# Patient Record
Sex: FEMALE | Race: WHITE | Employment: OTHER | URBAN - METROPOLITAN AREA
[De-identification: names, ages, dates, MRNs, and addresses within clinical notes are randomized per-mention and may not be internally consistent; named-entity substitution may affect disease eponyms.]

---

## 2017-03-14 ENCOUNTER — OFFICE VISIT (OUTPATIENT)
Dept: PULMONOLOGY | Age: 72
End: 2017-03-14

## 2017-03-14 VITALS
SYSTOLIC BLOOD PRESSURE: 136 MMHG | OXYGEN SATURATION: 95 % | RESPIRATION RATE: 20 BRPM | BODY MASS INDEX: 28.99 KG/M2 | HEIGHT: 65 IN | WEIGHT: 174 LBS | HEART RATE: 96 BPM | DIASTOLIC BLOOD PRESSURE: 70 MMHG

## 2017-03-14 DIAGNOSIS — J44.9 COPD, VERY SEVERE (HCC): Primary | ICD-10-CM

## 2017-03-14 DIAGNOSIS — R91.1 PULMONARY NODULE: ICD-10-CM

## 2017-03-14 PROCEDURE — 99213 OFFICE O/P EST LOW 20 MIN: CPT | Performed by: INTERNAL MEDICINE

## 2017-03-22 ENCOUNTER — HOSPITAL ENCOUNTER (OUTPATIENT)
Dept: CT IMAGING | Age: 72
Discharge: HOME | End: 2017-03-22
Attending: INTERNAL MEDICINE | Admitting: INTERNAL MEDICINE

## 2017-03-22 DIAGNOSIS — R91.1 LUNG NODULE: ICD-10-CM

## 2017-04-10 ENCOUNTER — NURSE ONLY (OUTPATIENT)
Dept: PULMONOLOGY | Age: 72
End: 2017-04-10

## 2017-04-10 DIAGNOSIS — R91.1 PULMONARY NODULE: ICD-10-CM

## 2017-04-10 DIAGNOSIS — J44.9 COPD, VERY SEVERE (HCC): ICD-10-CM

## 2017-04-10 LAB
DLCO %PRED: NORMAL
DLCO PRE: NORMAL
FEF 25-75%-POST: 0.22
FEF 25-75%-PRE: 0.2
FEV1-POST: 0.5
FEV1-PRE: 0.45
FEV1/FVC-POST: 32.1
FEV1/FVC-PRE: 28.97
FVC-POST: 1.56
FVC-PRE: 1.55
MEP: NORMAL
MIP: NORMAL
TLC %PRED: NORMAL
TLC PRE: NORMAL

## 2017-04-10 PROCEDURE — 94060 EVALUATION OF WHEEZING: CPT | Performed by: INTERNAL MEDICINE

## 2017-04-10 ASSESSMENT — PULMONARY FUNCTION TESTS
FEV1/FVC_PRE: 28.97
FEV1_PRE: 0.45
FVC_PRE: 1.55
FVC_POST: 1.56
FEV1_POST: 0.50
FEV1/FVC_POST: 32.1

## 2017-07-14 RX ORDER — PREDNISONE 1 MG/1
530 TABLET ORAL DAILY
Qty: 30 TABLET | Refills: 11 | Status: SHIPPED | OUTPATIENT
Start: 2017-07-14 | End: 2018-08-23 | Stop reason: SDUPTHER

## 2017-07-25 ENCOUNTER — HOSPITAL ENCOUNTER (OUTPATIENT)
Dept: GENERAL RADIOLOGY | Age: 72
Discharge: HOME | End: 2017-07-25
Attending: INTERNAL MEDICINE | Admitting: INTERNAL MEDICINE

## 2017-07-25 LAB
ALT SERPL-CCNC: 20 U/L (ref 10–40)
AST SERPL-CCNC: 26 IU/L (ref 15–37)
BASOPHILS ABSOLUTE: 0.1 K/CU MM
BASOPHILS RELATIVE PERCENT: 0.4 % (ref 0–1)
BUN BLDV-MCNC: 21 MG/DL (ref 6–23)
CREAT SERPL-MCNC: 0.9 MG/DL (ref 0.6–1.1)
DIFFERENTIAL TYPE: ABNORMAL
EOSINOPHILS ABSOLUTE: 0.1 K/CU MM
EOSINOPHILS RELATIVE PERCENT: 0.5 % (ref 0–3)
ERYTHROCYTE SEDIMENTATION RATE: 15 MM/HR (ref 0–30)
GFR AFRICAN AMERICAN: >60 ML/MIN/1.73M2
GFR NON-AFRICAN AMERICAN: >60 ML/MIN/1.73M2
HCT VFR BLD CALC: 47 % (ref 37–47)
HEMOGLOBIN: 15.3 GM/DL (ref 12.5–16)
IMMATURE NEUTROPHIL %: 0.5 % (ref 0–0.43)
LYMPHOCYTES ABSOLUTE: 1.7 K/CU MM
LYMPHOCYTES RELATIVE PERCENT: 12.5 % (ref 24–44)
MCH RBC QN AUTO: 31.7 PG (ref 27–31)
MCHC RBC AUTO-ENTMCNC: 32.6 % (ref 32–36)
MCV RBC AUTO: 97.3 FL (ref 78–100)
MONOCYTES ABSOLUTE: 0.8 K/CU MM
MONOCYTES RELATIVE PERCENT: 6.2 % (ref 0–4)
NUCLEATED RBC %: 0 %
PDW BLD-RTO: 13.1 % (ref 11.7–14.9)
PLATELET # BLD: 222 K/CU MM (ref 140–440)
PMV BLD AUTO: 10.8 FL (ref 7.5–11.1)
RBC # BLD: 4.83 M/CU MM (ref 4.2–5.4)
SEGMENTED NEUTROPHILS ABSOLUTE COUNT: 10.9 K/CU MM
SEGMENTED NEUTROPHILS RELATIVE PERCENT: 79.9 % (ref 36–66)
TOTAL IMMATURE NEUTOROPHIL: 0.07 K/CU MM
TOTAL NUCLEATED RBC: 0 K/CU MM
WBC # BLD: 13.6 K/CU MM (ref 4–10.5)

## 2017-09-06 ENCOUNTER — HOSPITAL ENCOUNTER (OUTPATIENT)
Dept: GENERAL RADIOLOGY | Age: 72
Discharge: HOME | End: 2017-09-06
Attending: INTERNAL MEDICINE | Admitting: INTERNAL MEDICINE

## 2017-09-06 ENCOUNTER — HOSPITAL ENCOUNTER (OUTPATIENT)
Dept: CT IMAGING | Age: 72
Discharge: HOME | End: 2017-09-06
Attending: INTERNAL MEDICINE | Admitting: INTERNAL MEDICINE

## 2017-09-06 DIAGNOSIS — R91.1 COIN LESION OF LUNG: ICD-10-CM

## 2017-09-06 LAB — ERYTHROCYTE SEDIMENTATION RATE: 15 MM/HR (ref 0–30)

## 2017-09-14 ENCOUNTER — OFFICE VISIT (OUTPATIENT)
Dept: PULMONOLOGY | Age: 72
End: 2017-09-14

## 2017-09-14 VITALS
WEIGHT: 173.94 LBS | OXYGEN SATURATION: 96 % | HEART RATE: 90 BPM | HEIGHT: 65 IN | DIASTOLIC BLOOD PRESSURE: 72 MMHG | SYSTOLIC BLOOD PRESSURE: 126 MMHG | BODY MASS INDEX: 28.98 KG/M2

## 2017-09-14 DIAGNOSIS — R91.1 PULMONARY NODULE: ICD-10-CM

## 2017-09-14 DIAGNOSIS — R06.02 SHORTNESS OF BREATH: ICD-10-CM

## 2017-09-14 DIAGNOSIS — J44.9 COPD, VERY SEVERE (HCC): Primary | ICD-10-CM

## 2017-09-14 DIAGNOSIS — J96.11 CHRONIC HYPOXEMIC RESPIRATORY FAILURE (HCC): ICD-10-CM

## 2017-09-14 PROCEDURE — 99213 OFFICE O/P EST LOW 20 MIN: CPT | Performed by: INTERNAL MEDICINE

## 2017-09-14 RX ORDER — ALBUTEROL SULFATE 90 UG/1
2 AEROSOL, METERED RESPIRATORY (INHALATION) EVERY 6 HOURS PRN
Qty: 1 INHALER | Refills: 3 | Status: ON HOLD | OUTPATIENT
Start: 2017-09-14 | End: 2017-11-20 | Stop reason: HOSPADM

## 2017-11-14 PROBLEM — J18.9 SEPSIS DUE TO PNEUMONIA (HCC): Status: ACTIVE | Noted: 2017-11-14

## 2017-11-14 PROBLEM — A41.9 SEPSIS DUE TO PNEUMONIA (HCC): Status: ACTIVE | Noted: 2017-11-14

## 2017-11-15 PROBLEM — J96.21 ACUTE AND CHRONIC RESPIRATORY FAILURE WITH HYPOXIA (HCC): Status: ACTIVE | Noted: 2017-09-14

## 2017-11-15 PROBLEM — J44.1 COPD WITH EXACERBATION (HCC): Status: ACTIVE | Noted: 2017-11-15

## 2017-11-15 PROBLEM — D72.829 LEUKOCYTOSIS: Status: ACTIVE | Noted: 2017-11-15

## 2018-02-06 ENCOUNTER — OFFICE VISIT (OUTPATIENT)
Dept: PULMONOLOGY | Age: 73
End: 2018-02-06

## 2018-02-06 VITALS
OXYGEN SATURATION: 93 % | HEIGHT: 66 IN | SYSTOLIC BLOOD PRESSURE: 116 MMHG | DIASTOLIC BLOOD PRESSURE: 72 MMHG | WEIGHT: 170 LBS | RESPIRATION RATE: 20 BRPM | BODY MASS INDEX: 27.32 KG/M2 | HEART RATE: 95 BPM

## 2018-02-06 DIAGNOSIS — J96.11 CHRONIC HYPOXEMIC RESPIRATORY FAILURE (HCC): ICD-10-CM

## 2018-02-06 DIAGNOSIS — J44.9 COPD, VERY SEVERE (HCC): Primary | ICD-10-CM

## 2018-02-06 DIAGNOSIS — R91.1 PULMONARY NODULE: ICD-10-CM

## 2018-02-06 DIAGNOSIS — R06.02 SHORTNESS OF BREATH: ICD-10-CM

## 2018-02-06 PROCEDURE — 1123F ACP DISCUSS/DSCN MKR DOCD: CPT | Performed by: INTERNAL MEDICINE

## 2018-02-06 PROCEDURE — G8400 PT W/DXA NO RESULTS DOC: HCPCS | Performed by: INTERNAL MEDICINE

## 2018-02-06 PROCEDURE — G8419 CALC BMI OUT NRM PARAM NOF/U: HCPCS | Performed by: INTERNAL MEDICINE

## 2018-02-06 PROCEDURE — 1090F PRES/ABSN URINE INCON ASSESS: CPT | Performed by: INTERNAL MEDICINE

## 2018-02-06 PROCEDURE — G8427 DOCREV CUR MEDS BY ELIG CLIN: HCPCS | Performed by: INTERNAL MEDICINE

## 2018-02-06 PROCEDURE — 4040F PNEUMOC VAC/ADMIN/RCVD: CPT | Performed by: INTERNAL MEDICINE

## 2018-02-06 PROCEDURE — 3023F SPIROM DOC REV: CPT | Performed by: INTERNAL MEDICINE

## 2018-02-06 PROCEDURE — 3014F SCREEN MAMMO DOC REV: CPT | Performed by: INTERNAL MEDICINE

## 2018-02-06 PROCEDURE — G8926 SPIRO NO PERF OR DOC: HCPCS | Performed by: INTERNAL MEDICINE

## 2018-02-06 PROCEDURE — G8598 ASA/ANTIPLAT THER USED: HCPCS | Performed by: INTERNAL MEDICINE

## 2018-02-06 PROCEDURE — G8484 FLU IMMUNIZE NO ADMIN: HCPCS | Performed by: INTERNAL MEDICINE

## 2018-02-06 PROCEDURE — 1036F TOBACCO NON-USER: CPT | Performed by: INTERNAL MEDICINE

## 2018-02-06 PROCEDURE — 3017F COLORECTAL CA SCREEN DOC REV: CPT | Performed by: INTERNAL MEDICINE

## 2018-02-06 PROCEDURE — 99213 OFFICE O/P EST LOW 20 MIN: CPT | Performed by: INTERNAL MEDICINE

## 2018-02-06 RX ORDER — LORAZEPAM 0.5 MG/1
TABLET ORAL
Refills: 0 | COMMUNITY
Start: 2018-01-04 | End: 2022-01-18 | Stop reason: SDUPTHER

## 2018-02-06 RX ORDER — PREDNISONE 1 MG/1
5 TABLET ORAL DAILY
Qty: 90 TABLET | Refills: 3 | Status: SHIPPED | OUTPATIENT
Start: 2018-02-06 | End: 2018-05-07

## 2018-02-16 ENCOUNTER — HOSPITAL ENCOUNTER (OUTPATIENT)
Dept: CT IMAGING | Age: 73
Discharge: HOME | End: 2018-02-16
Attending: INTERNAL MEDICINE | Admitting: INTERNAL MEDICINE

## 2018-02-16 ENCOUNTER — HOSPITAL ENCOUNTER (OUTPATIENT)
Dept: GENERAL RADIOLOGY | Age: 73
Discharge: HOME | End: 2018-02-16
Attending: INTERNAL MEDICINE | Admitting: INTERNAL MEDICINE

## 2018-02-16 DIAGNOSIS — R91.1 LUNG NODULE: ICD-10-CM

## 2018-02-16 LAB — ERYTHROCYTE SEDIMENTATION RATE: 24 MM/HR (ref 0–30)

## 2018-02-23 ENCOUNTER — TELEPHONE (OUTPATIENT)
Dept: PULMONOLOGY | Age: 73
End: 2018-02-23

## 2018-04-09 ENCOUNTER — NURSE ONLY (OUTPATIENT)
Dept: PULMONOLOGY | Age: 73
End: 2018-04-09

## 2018-04-09 DIAGNOSIS — R06.02 SHORTNESS OF BREATH: ICD-10-CM

## 2018-04-09 DIAGNOSIS — J96.11 CHRONIC HYPOXEMIC RESPIRATORY FAILURE (HCC): ICD-10-CM

## 2018-04-09 DIAGNOSIS — J44.9 COPD, VERY SEVERE (HCC): ICD-10-CM

## 2018-04-09 LAB
DLCO %PRED: NORMAL
DLCO PRE: NORMAL
FEF 25-75%-POST: 0.18
FEF 25-75%-PRE: 0.18
FEV1-POST: 0.44
FEV1-PRE: 0.43
FEV1/FVC-POST: 31.3
FEV1/FVC-PRE: 31.1
FVC-POST: 1.4
FVC-PRE: 1.38
MEP: NORMAL
MIP: NORMAL
TLC %PRED: NORMAL
TLC PRE: NORMAL

## 2018-04-09 PROCEDURE — 94060 EVALUATION OF WHEEZING: CPT | Performed by: INTERNAL MEDICINE

## 2018-04-09 ASSESSMENT — PULMONARY FUNCTION TESTS
FEV1/FVC_POST: 31.3
FEV1_POST: 0.44
FEV1/FVC_PRE: 31.1
FVC_POST: 1.40
FEV1_PRE: 0.43
FVC_PRE: 1.38

## 2018-05-24 PROBLEM — I20.0 UNSTABLE ANGINA (HCC): Status: ACTIVE | Noted: 2018-05-24

## 2018-05-29 ENCOUNTER — HOSPITAL ENCOUNTER (OUTPATIENT)
Dept: CARDIAC CATH/INVASIVE PROCEDURES | Age: 73
Discharge: HOME | End: 2018-06-27
Attending: INTERNAL MEDICINE | Admitting: INTERNAL MEDICINE

## 2018-06-18 ENCOUNTER — OFFICE VISIT (OUTPATIENT)
Dept: PULMONOLOGY | Age: 73
End: 2018-06-18

## 2018-06-18 VITALS
WEIGHT: 175.04 LBS | SYSTOLIC BLOOD PRESSURE: 128 MMHG | BODY MASS INDEX: 29.88 KG/M2 | HEIGHT: 64 IN | HEART RATE: 82 BPM | DIASTOLIC BLOOD PRESSURE: 72 MMHG | OXYGEN SATURATION: 92 %

## 2018-06-18 DIAGNOSIS — J96.11 CHRONIC HYPOXEMIC RESPIRATORY FAILURE (HCC): ICD-10-CM

## 2018-06-18 DIAGNOSIS — J44.9 COPD, VERY SEVERE (HCC): Primary | ICD-10-CM

## 2018-06-18 DIAGNOSIS — R91.1 PULMONARY NODULE: ICD-10-CM

## 2018-06-18 PROCEDURE — G8926 SPIRO NO PERF OR DOC: HCPCS | Performed by: INTERNAL MEDICINE

## 2018-06-18 PROCEDURE — 4040F PNEUMOC VAC/ADMIN/RCVD: CPT | Performed by: INTERNAL MEDICINE

## 2018-06-18 PROCEDURE — 99213 OFFICE O/P EST LOW 20 MIN: CPT | Performed by: INTERNAL MEDICINE

## 2018-06-18 PROCEDURE — 3017F COLORECTAL CA SCREEN DOC REV: CPT | Performed by: INTERNAL MEDICINE

## 2018-06-18 PROCEDURE — G8427 DOCREV CUR MEDS BY ELIG CLIN: HCPCS | Performed by: INTERNAL MEDICINE

## 2018-06-18 PROCEDURE — G8400 PT W/DXA NO RESULTS DOC: HCPCS | Performed by: INTERNAL MEDICINE

## 2018-06-18 PROCEDURE — 1123F ACP DISCUSS/DSCN MKR DOCD: CPT | Performed by: INTERNAL MEDICINE

## 2018-06-18 PROCEDURE — G8598 ASA/ANTIPLAT THER USED: HCPCS | Performed by: INTERNAL MEDICINE

## 2018-06-18 PROCEDURE — 1090F PRES/ABSN URINE INCON ASSESS: CPT | Performed by: INTERNAL MEDICINE

## 2018-06-18 PROCEDURE — G8419 CALC BMI OUT NRM PARAM NOF/U: HCPCS | Performed by: INTERNAL MEDICINE

## 2018-06-18 PROCEDURE — 3023F SPIROM DOC REV: CPT | Performed by: INTERNAL MEDICINE

## 2018-06-18 PROCEDURE — 1036F TOBACCO NON-USER: CPT | Performed by: INTERNAL MEDICINE

## 2018-06-18 RX ORDER — PREDNISONE 1 MG/1
TABLET ORAL
Qty: 20 TABLET | Refills: 0 | Status: SHIPPED | OUTPATIENT
Start: 2018-06-18 | End: 2018-08-09 | Stop reason: ALTCHOICE

## 2018-06-27 ENCOUNTER — TELEPHONE (OUTPATIENT)
Dept: PULMONOLOGY | Age: 73
End: 2018-06-27

## 2018-07-11 ENCOUNTER — HOSPITAL ENCOUNTER (OUTPATIENT)
Dept: CT IMAGING | Age: 73
Discharge: HOME | End: 2018-07-11
Attending: PHYSICIAN ASSISTANT | Admitting: PHYSICIAN ASSISTANT

## 2018-07-11 DIAGNOSIS — R06.02 BREATH SHORTNESS: ICD-10-CM

## 2018-07-11 RX ORDER — SODIUM CHLORIDE 0.9 % (FLUSH) 0.9 %
10 SYRINGE (ML) INJECTION ONCE
Status: COMPLETED | OUTPATIENT
Start: 2018-07-11 | End: 2018-07-11

## 2018-07-11 RX ADMIN — Medication 10 ML: at 13:01

## 2018-07-18 ENCOUNTER — OFFICE VISIT (OUTPATIENT)
Dept: PULMONOLOGY | Age: 73
End: 2018-07-18

## 2018-07-18 VITALS
HEIGHT: 64 IN | HEART RATE: 90 BPM | DIASTOLIC BLOOD PRESSURE: 58 MMHG | BODY MASS INDEX: 29.02 KG/M2 | WEIGHT: 170 LBS | OXYGEN SATURATION: 95 % | SYSTOLIC BLOOD PRESSURE: 118 MMHG

## 2018-07-18 DIAGNOSIS — J44.9 COPD, VERY SEVERE (HCC): ICD-10-CM

## 2018-07-18 DIAGNOSIS — J85.0 NECROTIZING PNEUMONIA (HCC): Primary | ICD-10-CM

## 2018-07-18 DIAGNOSIS — J96.11 CHRONIC HYPOXEMIC RESPIRATORY FAILURE (HCC): ICD-10-CM

## 2018-07-18 DIAGNOSIS — R06.02 SHORTNESS OF BREATH: ICD-10-CM

## 2018-07-18 PROCEDURE — 1036F TOBACCO NON-USER: CPT | Performed by: INTERNAL MEDICINE

## 2018-07-18 PROCEDURE — G8598 ASA/ANTIPLAT THER USED: HCPCS | Performed by: INTERNAL MEDICINE

## 2018-07-18 PROCEDURE — G8427 DOCREV CUR MEDS BY ELIG CLIN: HCPCS | Performed by: INTERNAL MEDICINE

## 2018-07-18 PROCEDURE — 1123F ACP DISCUSS/DSCN MKR DOCD: CPT | Performed by: INTERNAL MEDICINE

## 2018-07-18 PROCEDURE — 1101F PT FALLS ASSESS-DOCD LE1/YR: CPT | Performed by: INTERNAL MEDICINE

## 2018-07-18 PROCEDURE — 3017F COLORECTAL CA SCREEN DOC REV: CPT | Performed by: INTERNAL MEDICINE

## 2018-07-18 PROCEDURE — 1090F PRES/ABSN URINE INCON ASSESS: CPT | Performed by: INTERNAL MEDICINE

## 2018-07-18 PROCEDURE — 4040F PNEUMOC VAC/ADMIN/RCVD: CPT | Performed by: INTERNAL MEDICINE

## 2018-07-18 PROCEDURE — G8926 SPIRO NO PERF OR DOC: HCPCS | Performed by: INTERNAL MEDICINE

## 2018-07-18 PROCEDURE — G8419 CALC BMI OUT NRM PARAM NOF/U: HCPCS | Performed by: INTERNAL MEDICINE

## 2018-07-18 PROCEDURE — 3023F SPIROM DOC REV: CPT | Performed by: INTERNAL MEDICINE

## 2018-07-18 PROCEDURE — G8400 PT W/DXA NO RESULTS DOC: HCPCS | Performed by: INTERNAL MEDICINE

## 2018-07-18 PROCEDURE — 99213 OFFICE O/P EST LOW 20 MIN: CPT | Performed by: INTERNAL MEDICINE

## 2018-07-18 RX ORDER — PREDNISONE 1 MG/1
TABLET ORAL
Qty: 38 TABLET | Refills: 0 | Status: SHIPPED | OUTPATIENT
Start: 2018-07-18 | End: 2018-08-09 | Stop reason: ALTCHOICE

## 2018-07-18 RX ORDER — CLINDAMYCIN HYDROCHLORIDE 150 MG/1
150 CAPSULE ORAL 4 TIMES DAILY
Qty: 40 CAPSULE | Refills: 1
Start: 2018-07-18 | End: 2018-07-28

## 2018-07-18 RX ORDER — CLINDAMYCIN HYDROCHLORIDE 150 MG/1
150 CAPSULE ORAL 4 TIMES DAILY
Qty: 40 CAPSULE | Refills: 1 | Status: SHIPPED | OUTPATIENT
Start: 2018-07-18 | End: 2018-07-28

## 2018-07-18 NOTE — PROGRESS NOTES
cylindrical bronchiectasis.  Findings are new since   prior examination suggesting an acute infectious process.       Findings of COPD and emphysema.       Coronary and aortic atherosclerotic disease. PFT: Office spirometry last done on 4/9/18 demonstrated a very severe obstructive defect with no significant response to bronchodilators        ASSESSMENT:    1. Necrotizing pneumonia ([address]    2. COPD, very severe ([address]    3. Chronic hypoxemic respiratory failure (HCC)    4. Shortness of breath          PLAN:  I will have her finish out her course of Levaquin and bump her up on her prednisone for short while. When she finishes her Levaquin I will repeat her chest x-ray. Because this is probably a necrotizing pneumonia I would prefer using Augmentin but she is allergic to penicillin. I will start her on clindamycin and complete 2-3 weeks of therapy and then hopefully consider repeating her CT chest at that time. At any time during this course of therapy if she gets worse I will have her medially go to the emergency room for hospital admission. I will continue to follow her  Return in about 3 weeks (around 8/8/2018) for Recheck for COPD, Recheck for Shortness of Breath, recheck for pneumonia, Recheck for emphysema, chr.      This dictation was performed with a verbal recognition program and it was checked for errors. It is possible that there are still dictated errors within this office note. Any errors should be brought immediately to my attention for correction. All efforts were made to ensure that this office note is accurate.

## 2018-07-23 LAB
CHOLESTEROL, TOTAL: 161 MG/DL
CHOLESTEROL/HDL RATIO: NORMAL
HDLC SERPL-MCNC: 60 MG/DL (ref 35–70)
LDL CHOLESTEROL CALCULATED: 68 MG/DL (ref 0–160)
TRIGL SERPL-MCNC: 164 MG/DL
VLDLC SERPL CALC-MCNC: 33 MG/DL

## 2018-08-01 ENCOUNTER — HOSPITAL ENCOUNTER (OUTPATIENT)
Dept: GENERAL RADIOLOGY | Age: 73
Discharge: HOME | End: 2018-08-01
Attending: INTERNAL MEDICINE | Admitting: INTERNAL MEDICINE

## 2018-08-01 ENCOUNTER — TELEPHONE (OUTPATIENT)
Dept: PULMONOLOGY | Age: 73
End: 2018-08-01

## 2018-08-01 DIAGNOSIS — J85.0 NECROTIZING PNEUMONIA (HCC): ICD-10-CM

## 2018-08-01 DIAGNOSIS — J44.9 COPD, VERY SEVERE (HCC): ICD-10-CM

## 2018-08-02 RX ORDER — CLINDAMYCIN HYDROCHLORIDE 150 MG/1
150 CAPSULE ORAL 4 TIMES DAILY
Qty: 40 CAPSULE | Refills: 0 | Status: SHIPPED | OUTPATIENT
Start: 2018-08-02 | End: 2018-08-12

## 2018-08-02 NOTE — TELEPHONE ENCOUNTER
I spoke directly to Star Christensen over the phone and discuss her most recent chest x-ray which shows persistent residual infiltrates in the right perihilar and mid lung field. Unfortunately a chest x-ray was not ordered at the time of her CT chest in early July and so I have no chest x-ray to compare to since her chest x-ray in June  did not show any pneumonic infiltrate. She has completed several week course of clindamycin following her therapy with Levaquin. She is not having fever or chills but states she still has some chest congestion with tightness and sometimes some right-sided chest pain. I would like to continue the clindamycin for 10 more days and will repeat her chest x-ray at that time if she fails to show improvement I will consider infectious disease consultation.   At any time if she gets worse or develops fever I told her to go directly to the emergency room for admission and IV antibiotic therapy

## 2018-08-03 ENCOUNTER — TELEPHONE (OUTPATIENT)
Dept: PULMONOLOGY | Age: 73
End: 2018-08-03

## 2018-08-09 ENCOUNTER — OFFICE VISIT (OUTPATIENT)
Dept: PULMONOLOGY | Age: 73
End: 2018-08-09

## 2018-08-09 VITALS
HEART RATE: 89 BPM | SYSTOLIC BLOOD PRESSURE: 126 MMHG | WEIGHT: 160 LBS | OXYGEN SATURATION: 94 % | HEIGHT: 65 IN | RESPIRATION RATE: 20 BRPM | BODY MASS INDEX: 26.66 KG/M2 | DIASTOLIC BLOOD PRESSURE: 68 MMHG

## 2018-08-09 DIAGNOSIS — R06.02 SHORTNESS OF BREATH: ICD-10-CM

## 2018-08-09 DIAGNOSIS — J96.11 CHRONIC HYPOXEMIC RESPIRATORY FAILURE (HCC): ICD-10-CM

## 2018-08-09 DIAGNOSIS — J85.0 NECROTIZING PNEUMONIA (HCC): Primary | ICD-10-CM

## 2018-08-09 DIAGNOSIS — J44.9 COPD, VERY SEVERE (HCC): ICD-10-CM

## 2018-08-09 PROCEDURE — 1101F PT FALLS ASSESS-DOCD LE1/YR: CPT | Performed by: INTERNAL MEDICINE

## 2018-08-09 PROCEDURE — G8926 SPIRO NO PERF OR DOC: HCPCS | Performed by: INTERNAL MEDICINE

## 2018-08-09 PROCEDURE — 4040F PNEUMOC VAC/ADMIN/RCVD: CPT | Performed by: INTERNAL MEDICINE

## 2018-08-09 PROCEDURE — G8427 DOCREV CUR MEDS BY ELIG CLIN: HCPCS | Performed by: INTERNAL MEDICINE

## 2018-08-09 PROCEDURE — 1123F ACP DISCUSS/DSCN MKR DOCD: CPT | Performed by: INTERNAL MEDICINE

## 2018-08-09 PROCEDURE — G8419 CALC BMI OUT NRM PARAM NOF/U: HCPCS | Performed by: INTERNAL MEDICINE

## 2018-08-09 PROCEDURE — 1036F TOBACCO NON-USER: CPT | Performed by: INTERNAL MEDICINE

## 2018-08-09 PROCEDURE — 3023F SPIROM DOC REV: CPT | Performed by: INTERNAL MEDICINE

## 2018-08-09 PROCEDURE — 99213 OFFICE O/P EST LOW 20 MIN: CPT | Performed by: INTERNAL MEDICINE

## 2018-08-09 PROCEDURE — G8400 PT W/DXA NO RESULTS DOC: HCPCS | Performed by: INTERNAL MEDICINE

## 2018-08-09 PROCEDURE — G8598 ASA/ANTIPLAT THER USED: HCPCS | Performed by: INTERNAL MEDICINE

## 2018-08-09 PROCEDURE — 1090F PRES/ABSN URINE INCON ASSESS: CPT | Performed by: INTERNAL MEDICINE

## 2018-08-09 PROCEDURE — 3017F COLORECTAL CA SCREEN DOC REV: CPT | Performed by: INTERNAL MEDICINE

## 2018-08-14 ENCOUNTER — TELEPHONE (OUTPATIENT)
Dept: PULMONOLOGY | Age: 73
End: 2018-08-14

## 2018-08-14 ENCOUNTER — HOSPITAL ENCOUNTER (OUTPATIENT)
Dept: GENERAL RADIOLOGY | Age: 73
Discharge: HOME | End: 2018-08-14
Attending: INTERNAL MEDICINE | Admitting: INTERNAL MEDICINE

## 2018-08-14 DIAGNOSIS — J96.11 CHRONIC HYPOXEMIC RESPIRATORY FAILURE (HCC): ICD-10-CM

## 2018-08-14 DIAGNOSIS — R91.8 LUNG INFILTRATE ON CT: Primary | ICD-10-CM

## 2018-08-14 DIAGNOSIS — J85.0 NECROTIZING PNEUMONIA (HCC): ICD-10-CM

## 2018-08-14 DIAGNOSIS — J44.9 COPD, VERY SEVERE (HCC): ICD-10-CM

## 2018-08-21 ENCOUNTER — TELEPHONE (OUTPATIENT)
Dept: PULMONOLOGY | Age: 73
End: 2018-08-21

## 2018-08-21 ENCOUNTER — HOSPITAL ENCOUNTER (OUTPATIENT)
Dept: CT IMAGING | Age: 73
Discharge: HOME | End: 2018-08-21
Attending: INTERNAL MEDICINE | Admitting: INTERNAL MEDICINE

## 2018-08-21 DIAGNOSIS — R91.8 LUNG INFILTRATE ON CT: ICD-10-CM

## 2018-08-23 RX ORDER — PREDNISONE 1 MG/1
5 TABLET ORAL DAILY
Qty: 90 TABLET | Refills: 3 | Status: SHIPPED | OUTPATIENT
Start: 2018-08-23 | End: 2019-08-22 | Stop reason: SDUPTHER

## 2018-09-06 ENCOUNTER — OFFICE VISIT (OUTPATIENT)
Dept: PULMONOLOGY | Age: 73
End: 2018-09-06

## 2018-09-06 VITALS
DIASTOLIC BLOOD PRESSURE: 70 MMHG | SYSTOLIC BLOOD PRESSURE: 110 MMHG | HEIGHT: 65 IN | RESPIRATION RATE: 18 BRPM | BODY MASS INDEX: 26.99 KG/M2 | HEART RATE: 89 BPM | WEIGHT: 162 LBS | OXYGEN SATURATION: 94 %

## 2018-09-06 DIAGNOSIS — J85.0 NECROTIZING PNEUMONIA (HCC): ICD-10-CM

## 2018-09-06 DIAGNOSIS — R06.02 SHORTNESS OF BREATH: ICD-10-CM

## 2018-09-06 DIAGNOSIS — J96.11 CHRONIC HYPOXEMIC RESPIRATORY FAILURE (HCC): ICD-10-CM

## 2018-09-06 DIAGNOSIS — J44.9 COPD, VERY SEVERE (HCC): Primary | ICD-10-CM

## 2018-09-06 PROCEDURE — G8427 DOCREV CUR MEDS BY ELIG CLIN: HCPCS | Performed by: INTERNAL MEDICINE

## 2018-09-06 PROCEDURE — G8926 SPIRO NO PERF OR DOC: HCPCS | Performed by: INTERNAL MEDICINE

## 2018-09-06 PROCEDURE — G8419 CALC BMI OUT NRM PARAM NOF/U: HCPCS | Performed by: INTERNAL MEDICINE

## 2018-09-06 PROCEDURE — G8598 ASA/ANTIPLAT THER USED: HCPCS | Performed by: INTERNAL MEDICINE

## 2018-09-06 PROCEDURE — G8400 PT W/DXA NO RESULTS DOC: HCPCS | Performed by: INTERNAL MEDICINE

## 2018-09-06 PROCEDURE — 1123F ACP DISCUSS/DSCN MKR DOCD: CPT | Performed by: INTERNAL MEDICINE

## 2018-09-06 PROCEDURE — 1036F TOBACCO NON-USER: CPT | Performed by: INTERNAL MEDICINE

## 2018-09-06 PROCEDURE — 3017F COLORECTAL CA SCREEN DOC REV: CPT | Performed by: INTERNAL MEDICINE

## 2018-09-06 PROCEDURE — 1101F PT FALLS ASSESS-DOCD LE1/YR: CPT | Performed by: INTERNAL MEDICINE

## 2018-09-06 PROCEDURE — 4040F PNEUMOC VAC/ADMIN/RCVD: CPT | Performed by: INTERNAL MEDICINE

## 2018-09-06 PROCEDURE — 3023F SPIROM DOC REV: CPT | Performed by: INTERNAL MEDICINE

## 2018-09-06 PROCEDURE — 1090F PRES/ABSN URINE INCON ASSESS: CPT | Performed by: INTERNAL MEDICINE

## 2018-09-06 PROCEDURE — 99213 OFFICE O/P EST LOW 20 MIN: CPT | Performed by: INTERNAL MEDICINE

## 2018-09-06 NOTE — PROGRESS NOTES
SUBJECTIVE:  Chief Complaint: very severe COPD with recent necrotizing right-sided pneumonia, chronic hypoxemic respiratory failure  Leny Lehman continues to slowly improve. She is not expectorating purulent sputum and she denies any chest discomfort. She remains short of breath with mild exertion. She remains on oxygen 24 hours a day. She denies fever or chills. She continues on Advair discus, Spiriva, long-acting theophylline 400 mg twice a day and has albuterol solution for her nebulizer as needed and also carries an Atrovent MDI for rescue while ambulating    OBJECTIVE:  /70   Pulse 89   Resp 18   Ht 5' 4.5\" (1.638 m)   Wt 162 lb (73.5 kg)   SpO2 94% Comment: 2L/NC  BMI 27.38 kg/m²      Physical Exam:  Constitutional:  She appears well developed and well-nourished. Has nasal oxygen on  Neck:  Supple, No palpable lymphadenopathy, No JVD  Cardiovascular:  S1, S2 Normal, Regular rhythm, no murmurs or gallops, No pericardial  rubs. Pulmonary:  Breath sounds are diminished throughout all lung areas but I hear no wheezing or rhonchi and there is no pleural rubs  Abdomen:not examined   Extremities: no edema, No DVT  Neurologic:  Awake and Alert, No focal deficits    Radiology: CT chest 8/21/18 showed multifocal consolidation within the right lung has improved with residual consolidation in the posterior right upper lobe and spare segment of the right lower lobe. Previously seen air-fluid levels or cavitary components have resolved. 8 mm noncalcified nodule in left lower lobe is unchanged from 2016. Advanced emphysema  PFT: office spirometry on 4/9/18 demonstrated a very severe obstructive defect with no significant response to bronchodilators        ASSESSMENT:    1. COPD, very severe ([address]    2. Chronic hypoxemic respiratory failure (HCC)    3. Necrotizing pneumonia ([address]    4.  Shortness of breath          PLAN:  I would like her switched from Atrovent MDI 2 an albuterol MDI since she is already on

## 2018-09-11 ENCOUNTER — TELEPHONE (OUTPATIENT)
Dept: PULMONOLOGY | Age: 73
End: 2018-09-11

## 2018-09-11 NOTE — TELEPHONE ENCOUNTER
I received the most recent theophylline level done on Janessa through Dr. Son Castro office with a level of 18. I did call her and currently she is on long-acting theophylline 450 mg twice a day and I asked her to cut her pill in half and take a half pill twice a day and I will recheck a theophylline level in 2-3 weeks.   I would like her level somewhere between 5 and 12

## 2018-09-21 ENCOUNTER — TELEPHONE (OUTPATIENT)
Dept: PULMONOLOGY | Age: 73
End: 2018-09-21

## 2018-09-21 DIAGNOSIS — J44.9 COPD, SEVERE (HCC): Primary | ICD-10-CM

## 2018-09-21 NOTE — TELEPHONE ENCOUNTER
Pt states you wanted her cut her Theophylline in half & you would order labs in a couple of weeks.  Pleas place order

## 2018-09-25 ENCOUNTER — TELEPHONE (OUTPATIENT)
Dept: PULMONOLOGY | Age: 73
End: 2018-09-25

## 2018-09-25 ENCOUNTER — HOSPITAL ENCOUNTER (OUTPATIENT)
Age: 73
Discharge: HOME | End: 2018-09-25
Payer: COMMERCIAL

## 2018-09-25 LAB — THEOPHYLLINE LEVEL: 6.3 UG/ML (ref 10–20)

## 2018-09-25 PROCEDURE — 80198 ASSAY OF THEOPHYLLINE: CPT

## 2018-09-25 PROCEDURE — 36415 COLL VENOUS BLD VENIPUNCTURE: CPT

## 2018-12-18 ENCOUNTER — APPOINTMENT (OUTPATIENT)
Dept: GENERAL RADIOLOGY | Age: 73
End: 2018-12-18
Payer: MEDICARE

## 2018-12-18 ENCOUNTER — HOSPITAL ENCOUNTER (EMERGENCY)
Age: 73
Discharge: HOME | End: 2018-12-18
Attending: EMERGENCY MEDICINE
Payer: MEDICARE

## 2018-12-18 VITALS
WEIGHT: 160 LBS | OXYGEN SATURATION: 96 % | TEMPERATURE: 98 F | BODY MASS INDEX: 27.31 KG/M2 | SYSTOLIC BLOOD PRESSURE: 136 MMHG | HEIGHT: 64 IN | HEART RATE: 78 BPM | RESPIRATION RATE: 18 BRPM | DIASTOLIC BLOOD PRESSURE: 64 MMHG

## 2018-12-18 DIAGNOSIS — R07.89 CHEST WALL PAIN: ICD-10-CM

## 2018-12-18 DIAGNOSIS — R07.9 CHEST PAIN, UNSPECIFIED TYPE: Primary | ICD-10-CM

## 2018-12-18 LAB
ALBUMIN SERPL-MCNC: 3.8 GM/DL (ref 3.4–5)
ALP BLD-CCNC: 56 IU/L (ref 40–129)
ALT SERPL-CCNC: 14 U/L (ref 10–40)
ANION GAP SERPL CALCULATED.3IONS-SCNC: 9 MMOL/L (ref 4–16)
AST SERPL-CCNC: 18 IU/L (ref 15–37)
BACTERIA: ABNORMAL /HPF
BASOPHILS ABSOLUTE: 0 K/CU MM
BASOPHILS RELATIVE PERCENT: 0.4 % (ref 0–1)
BILIRUB SERPL-MCNC: 0.3 MG/DL (ref 0–1)
BILIRUBIN URINE: NEGATIVE MG/DL
BLOOD, URINE: NEGATIVE
BUN BLDV-MCNC: 17 MG/DL (ref 6–23)
CALCIUM SERPL-MCNC: 9.5 MG/DL (ref 8.3–10.6)
CHLORIDE BLD-SCNC: 98 MMOL/L (ref 99–110)
CLARITY: CLEAR
CO2: 31 MMOL/L (ref 21–32)
COLOR: YELLOW
CREAT SERPL-MCNC: 0.7 MG/DL (ref 0.6–1.1)
DIFFERENTIAL TYPE: ABNORMAL
EOSINOPHILS ABSOLUTE: 0 K/CU MM
EOSINOPHILS RELATIVE PERCENT: 0.2 % (ref 0–3)
GFR AFRICAN AMERICAN: >60 ML/MIN/1.73M2
GFR NON-AFRICAN AMERICAN: >60 ML/MIN/1.73M2
GLUCOSE BLD-MCNC: 100 MG/DL (ref 70–99)
GLUCOSE, URINE: NEGATIVE MG/DL
HCT VFR BLD CALC: 44.4 % (ref 37–47)
HEMOGLOBIN: 13.5 GM/DL (ref 12.5–16)
HYALINE CASTS: 2 /LPF
IMMATURE NEUTROPHIL %: 0.4 % (ref 0–0.43)
INR BLD: 2.86 INDEX
KETONES, URINE: NEGATIVE MG/DL
LEUKOCYTE ESTERASE, URINE: NEGATIVE
LIPASE: 37 IU/L (ref 13–60)
LYMPHOCYTES ABSOLUTE: 1.1 K/CU MM
LYMPHOCYTES RELATIVE PERCENT: 10.2 % (ref 24–44)
MCH RBC QN AUTO: 30.1 PG (ref 27–31)
MCHC RBC AUTO-ENTMCNC: 30.4 % (ref 32–36)
MCV RBC AUTO: 98.9 FL (ref 78–100)
MONOCYTES ABSOLUTE: 0.7 K/CU MM
MONOCYTES RELATIVE PERCENT: 6.1 % (ref 0–4)
MUCUS: ABNORMAL HPF
NITRITE URINE, QUANTITATIVE: NEGATIVE
NUCLEATED RBC %: 0 %
PDW BLD-RTO: 15 % (ref 11.7–14.9)
PH, URINE: 5 (ref 5–8)
PLATELET # BLD: 227 K/CU MM (ref 140–440)
PMV BLD AUTO: 10.1 FL (ref 7.5–11.1)
POTASSIUM SERPL-SCNC: 3.8 MMOL/L (ref 3.5–5.1)
PROTEIN UA: NEGATIVE MG/DL
PROTHROMBIN TIME: 32.3 SECONDS (ref 9.12–12.5)
RBC # BLD: 4.49 M/CU MM (ref 4.2–5.4)
RBC URINE: 2 /HPF (ref 0–6)
SEGMENTED NEUTROPHILS ABSOLUTE COUNT: 9.2 K/CU MM
SEGMENTED NEUTROPHILS RELATIVE PERCENT: 82.7 % (ref 36–66)
SODIUM BLD-SCNC: 138 MMOL/L (ref 135–145)
SPECIFIC GRAVITY UA: 1.02 (ref 1–1.03)
SQUAMOUS EPITHELIAL: 1 /HPF
TOTAL CK: 68 IU/L (ref 26–140)
TOTAL IMMATURE NEUTOROPHIL: 0.05 K/CU MM
TOTAL NUCLEATED RBC: 0 K/CU MM
TOTAL PROTEIN: 6 GM/DL (ref 6.4–8.2)
TRANSITIONAL EPITHELIAL: <1 /HPF
TRICHOMONAS: ABNORMAL /HPF
TROPONIN T: <0.01 NG/ML
URIC ACID CRYSTALS: ABNORMAL /HPF
UROBILINOGEN, URINE: 1 MG/DL (ref 0.2–1)
WBC # BLD: 11.2 K/CU MM (ref 4–10.5)
WBC UA: 1 /HPF (ref 0–5)

## 2018-12-18 PROCEDURE — 6370000000 HC RX 637 (ALT 250 FOR IP): Performed by: EMERGENCY MEDICINE

## 2018-12-18 PROCEDURE — 85610 PROTHROMBIN TIME: CPT

## 2018-12-18 PROCEDURE — 99285 EMERGENCY DEPT VISIT HI MDM: CPT

## 2018-12-18 PROCEDURE — 36415 COLL VENOUS BLD VENIPUNCTURE: CPT

## 2018-12-18 PROCEDURE — 83690 ASSAY OF LIPASE: CPT

## 2018-12-18 PROCEDURE — 81001 URINALYSIS AUTO W/SCOPE: CPT

## 2018-12-18 PROCEDURE — 93005 ELECTROCARDIOGRAM TRACING: CPT | Performed by: EMERGENCY MEDICINE

## 2018-12-18 PROCEDURE — 80053 COMPREHEN METABOLIC PANEL: CPT

## 2018-12-18 PROCEDURE — 85025 COMPLETE CBC W/AUTO DIFF WBC: CPT

## 2018-12-18 PROCEDURE — 71046 X-RAY EXAM CHEST 2 VIEWS: CPT

## 2018-12-18 PROCEDURE — 84484 ASSAY OF TROPONIN QUANT: CPT

## 2018-12-18 PROCEDURE — 82550 ASSAY OF CK (CPK): CPT

## 2018-12-18 RX ORDER — LIDOCAINE 50 MG/G
1 PATCH TOPICAL DAILY
Qty: 30 PATCH | Refills: 0 | Status: SHIPPED | OUTPATIENT
Start: 2018-12-18 | End: 2019-01-17

## 2018-12-18 RX ORDER — ACETAMINOPHEN 500 MG
1000 TABLET ORAL ONCE
Status: COMPLETED | OUTPATIENT
Start: 2018-12-18 | End: 2018-12-18

## 2018-12-18 RX ORDER — LIDOCAINE 4 G/G
1 PATCH TOPICAL DAILY
Status: DISCONTINUED | OUTPATIENT
Start: 2018-12-18 | End: 2018-12-18 | Stop reason: HOSPADM

## 2018-12-18 RX ADMIN — ACETAMINOPHEN 1000 MG: 500 TABLET, COATED ORAL at 13:24

## 2018-12-18 ASSESSMENT — PAIN DESCRIPTION - DESCRIPTORS: DESCRIPTORS: ACHING

## 2018-12-18 ASSESSMENT — PAIN SCALES - WONG BAKER: WONGBAKER_NUMERICALRESPONSE: 4

## 2018-12-18 ASSESSMENT — PAIN SCALES - GENERAL: PAINLEVEL_OUTOF10: 6

## 2018-12-18 ASSESSMENT — ENCOUNTER SYMPTOMS
ALLERGIC/IMMUNOLOGIC NEGATIVE: 1
GASTROINTESTINAL NEGATIVE: 1
EYES NEGATIVE: 1
RESPIRATORY NEGATIVE: 1

## 2018-12-18 ASSESSMENT — PAIN DESCRIPTION - PAIN TYPE: TYPE: ACUTE PAIN

## 2018-12-18 ASSESSMENT — PAIN DESCRIPTION - LOCATION: LOCATION: CHEST;RIB CAGE

## 2018-12-18 ASSESSMENT — PAIN DESCRIPTION - ORIENTATION: ORIENTATION: RIGHT

## 2018-12-18 ASSESSMENT — PAIN DESCRIPTION - FREQUENCY: FREQUENCY: CONTINUOUS

## 2018-12-18 NOTE — ED PROVIDER NOTES
 Biotin 05990 MCG TABS Take 10,000 mcg by mouth daily      fluticasone-salmeterol (ADVAIR DISKUS) 500-50 MCG/DOSE diskus inhaler Inhale 1 puff into the lungs every 12 hours      LORazepam (ATIVAN) 0.5 MG tablet TAKE 1 TAB BY MOUTH EVERY DAY AS NEEDED  0    b complex vitamins capsule Take 1 capsule by mouth daily      levothyroxine (SYNTHROID) 50 MCG tablet Take 50 mcg by mouth Daily      theophylline CR, 12 hour, (THEODUR) 450 MG CR tablet Take 450 mg by mouth 2 times daily      Calcium Carb-Cholecalciferol (CALCIUM + D3) 600-200 MG-UNIT TABS Take 1 tablet by mouth 2 times daily      Multiple Vitamins-Minerals (MULTIVITAMIN PO) Take 1 tablet by mouth daily      gabapentin (NEURONTIN) 400 MG capsule Take 400 mg by mouth every evening      rosuvastatin (CRESTOR) 20 MG tablet Take 20 mg by mouth nightly       Ipratropium Bromide HFA (ATROVENT HFA IN) Inhale 2 puffs into the lungs as needed.  OXYGEN Inhale 2 L/hr into the lungs continuous.  Roflumilast (DALIRESP) 500 MCG tablet Take 500 mcg by mouth daily.  risedronate (ACTONEL) 35 MG tablet Take 35 mg by mouth every 7 days 05/24/8 Patient takes on Sundays      tiotropium (SPIRIVA) 18 MCG inhalation capsule Inhale 18 mcg into the lungs daily.  albuterol (PROVENTIL) (2.5 MG/3ML) 0.083% nebulizer solution   Take 2.5 mg by nebulization every 4 hours       montelukast (SINGULAIR) 10 MG tablet Take 10 mg by mouth nightly.  albuterol (PROVENTIL HFA;VENTOLIN HFA) 108 (90 BASE) MCG/ACT inhaler Inhale 2 puffs into the lungs every 6 hours as needed.         celecoxib (CELEBREX) 200 MG capsule Take 200 mg by mouth daily       furosemide (LASIX) 40 MG tablet Take 40 mg by mouth See Admin Instructions 05/24/18 40 mg x 2 days and then 60 mg on the third day then repeat, takes at HS dose due 60 mg this pm (05/24/18)      potassium chloride (KLOR-CON) 10 MEQ CR tablet Take 10 mEq by mouth daily       esomeprazole (NEXIUM) 40 MG capsule no edema or deformity. Neurological: She is alert and oriented to person, place, and time. She has normal strength. She is not disoriented. She displays no atrophy and no tremor. No cranial nerve deficit or sensory deficit. She exhibits normal muscle tone. She displays no seizure activity. Coordination normal. GCS eye subscore is 4. GCS verbal subscore is 5. GCS motor subscore is 6. Skin: Skin is warm. No rash noted. She is not diaphoretic. No erythema. No pallor. Psychiatric: She has a normal mood and affect. Her behavior is normal. Judgment and thought content normal.   Nursing note and vitals reviewed.         I have reviewed andinterpreted all of the currently available lab results from this visit (if applicable):    Results for orders placed or performed during the hospital encounter of 12/18/18   CBC auto diff   Result Value Ref Range    WBC 11.2 (H) 4.0 - 10.5 K/CU MM    RBC 4.49 4.2 - 5.4 M/CU MM    Hemoglobin 13.5 12.5 - 16.0 GM/DL    Hematocrit 44.4 37 - 47 %    MCV 98.9 78 - 100 FL    MCH 30.1 27 - 31 PG    MCHC 30.4 (L) 32.0 - 36.0 %    RDW 15.0 (H) 11.7 - 14.9 %    Platelets 742 771 - 151 K/CU MM    MPV 10.1 7.5 - 11.1 FL    Differential Type AUTOMATED DIFFERENTIAL     Segs Relative 82.7 (H) 36 - 66 %    Lymphocytes % 10.2 (L) 24 - 44 %    Monocytes % 6.1 (H) 0 - 4 %    Eosinophils % 0.2 0 - 3 %    Basophils % 0.4 0 - 1 %    Segs Absolute 9.2 K/CU MM    Lymphocytes # 1.1 K/CU MM    Monocytes # 0.7 K/CU MM    Eosinophils # 0.0 K/CU MM    Basophils # 0.0 K/CU MM    Nucleated RBC % 0.0 %    Total Nucleated RBC 0.0 K/CU MM    Total Immature Neutrophil 0.05 K/CU MM    Immature Neutrophil % 0.4 0 - 0.43 %   CMP   Result Value Ref Range    Sodium 138 135 - 145 MMOL/L    Potassium 3.8 3.5 - 5.1 MMOL/L    Chloride 98 (L) 99 - 110 mMol/L    CO2 31 21 - 32 MMOL/L    BUN 17 6 - 23 MG/DL    CREATININE 0.7 0.6 - 1.1 MG/DL    Glucose 100 (H) 70 - 99 MG/DL    Calcium 9.5 8.3 - 10.6 MG/DL    Alb 3.8 3.4 - 5.0 GM/DL Total Protein 6.0 (L) 6.4 - 8.2 GM/DL    Total Bilirubin 0.3 0.0 - 1.0 MG/DL    ALT 14 10 - 40 U/L    AST 18 15 - 37 IU/L    Alkaline Phosphatase 56 40 - 129 IU/L    GFR Non-African American >60 >60 mL/min/1.73m2    GFR African American >60 >60 mL/min/1.73m2    Anion Gap 9 4 - 16   Troponin   Result Value Ref Range    Troponin T <0.010 <0.01 NG/ML   Urinalysis   Result Value Ref Range    Color, UA YELLOW UYELL    Clarity, UA CLEAR CLEAR    Glucose, Urine NEGATIVE NEG MG/DL    Bilirubin Urine NEGATIVE NEG MG/DL    Ketones, Urine NEGATIVE NEG MG/DL    Specific Gravity, UA 1.018 1.001 - 1.035    Blood, Urine NEGATIVE NEG    pH, Urine 5.0 5.0 - 8.0    Protein, UA NEGATIVE NEG MG/DL    Urobilinogen, Urine 1 0.2 - 1.0 MG/DL    Nitrite Urine, Quantitative NEGATIVE NEG    Leukocyte Esterase, Urine NEGATIVE NEG    RBC, UA 2 0 - 6 /HPF    WBC, UA 1 0 - 5 /HPF    Bacteria, UA RARE (A) NEG /HPF    Squam Epithel, UA 1 /HPF    Trans Epithel, UA <1 /HPF    Mucus, UA RARE (A) NEG HPF    Trichomonas, UA NONE SEEN NOSEE /HPF    Hyaline Casts, UA 2 /LPF    Uric Acid Meggan, UA RARE /HPF   CK   Result Value Ref Range    Total CK 68 26 - 140 IU/L   Lipase   Result Value Ref Range    Lipase 37 13 - 60 IU/L   PT - INR   Result Value Ref Range    Protime 32.3 (H) 9.12 - 12.5 SECONDS    INR 2.86 INDEX   EKG 12 Lead   Result Value Ref Range    Ventricular Rate 86 BPM    Atrial Rate 86 BPM    P-R Interval 128 ms    QRS Duration 100 ms    Q-T Interval 350 ms    QTc Calculation (Bazett) 418 ms    P Axis 77 degrees    R Axis -33 degrees    T Axis 71 degrees    Diagnosis       Sinus rhythm with occasional premature ventricular complexes  Left axis deviation  Left ventricular hypertrophy with repolarization abnormality  Abnormal ECG  When compared with ECG of 27-JUN-2018 14:15,  No significant change was found          Radiographs (if obtained):  [] The following radiograph was interpreted by myself in the absence of a radiologist:  [x]

## 2018-12-19 PROCEDURE — 93010 ELECTROCARDIOGRAM REPORT: CPT | Performed by: INTERNAL MEDICINE

## 2018-12-21 LAB
EKG ATRIAL RATE: 86 BPM
EKG DIAGNOSIS: NORMAL
EKG P AXIS: 77 DEGREES
EKG P-R INTERVAL: 128 MS
EKG Q-T INTERVAL: 350 MS
EKG QRS DURATION: 100 MS
EKG QTC CALCULATION (BAZETT): 418 MS
EKG R AXIS: -33 DEGREES
EKG T AXIS: 71 DEGREES
EKG VENTRICULAR RATE: 86 BPM

## 2019-01-07 ENCOUNTER — TELEPHONE (OUTPATIENT)
Dept: PULMONOLOGY | Age: 74
End: 2019-01-07

## 2019-01-07 DIAGNOSIS — R91.8 LUNG INFILTRATE ON CT: Primary | ICD-10-CM

## 2019-01-07 DIAGNOSIS — R91.1 NODULE OF LOWER LOBE OF LEFT LUNG: ICD-10-CM

## 2019-01-14 ENCOUNTER — HOSPITAL ENCOUNTER (OUTPATIENT)
Dept: CT IMAGING | Age: 74
Discharge: HOME | End: 2019-01-14
Payer: MEDICARE

## 2019-01-14 DIAGNOSIS — R91.1 NODULE OF LOWER LOBE OF LEFT LUNG: ICD-10-CM

## 2019-01-14 DIAGNOSIS — R91.8 LUNG INFILTRATE ON CT: ICD-10-CM

## 2019-01-14 PROCEDURE — 71250 CT THORAX DX C-: CPT

## 2019-01-22 ENCOUNTER — OFFICE VISIT (OUTPATIENT)
Dept: PULMONOLOGY | Age: 74
End: 2019-01-22
Payer: MEDICARE

## 2019-01-22 VITALS
SYSTOLIC BLOOD PRESSURE: 132 MMHG | DIASTOLIC BLOOD PRESSURE: 70 MMHG | OXYGEN SATURATION: 93 % | HEART RATE: 84 BPM | BODY MASS INDEX: 27.31 KG/M2 | WEIGHT: 160 LBS | HEIGHT: 64 IN

## 2019-01-22 DIAGNOSIS — R91.8 LUNG INFILTRATE ON CT: ICD-10-CM

## 2019-01-22 DIAGNOSIS — R06.02 SHORTNESS OF BREATH: ICD-10-CM

## 2019-01-22 DIAGNOSIS — J44.9 COPD, VERY SEVERE (HCC): Primary | ICD-10-CM

## 2019-01-22 DIAGNOSIS — J96.11 CHRONIC HYPOXEMIC RESPIRATORY FAILURE (HCC): ICD-10-CM

## 2019-01-22 PROCEDURE — G8427 DOCREV CUR MEDS BY ELIG CLIN: HCPCS | Performed by: INTERNAL MEDICINE

## 2019-01-22 PROCEDURE — 1101F PT FALLS ASSESS-DOCD LE1/YR: CPT | Performed by: INTERNAL MEDICINE

## 2019-01-22 PROCEDURE — 1090F PRES/ABSN URINE INCON ASSESS: CPT | Performed by: INTERNAL MEDICINE

## 2019-01-22 PROCEDURE — G8599 NO ASA/ANTIPLAT THER USE RNG: HCPCS | Performed by: INTERNAL MEDICINE

## 2019-01-22 PROCEDURE — 3023F SPIROM DOC REV: CPT | Performed by: INTERNAL MEDICINE

## 2019-01-22 PROCEDURE — G8400 PT W/DXA NO RESULTS DOC: HCPCS | Performed by: INTERNAL MEDICINE

## 2019-01-22 PROCEDURE — G8926 SPIRO NO PERF OR DOC: HCPCS | Performed by: INTERNAL MEDICINE

## 2019-01-22 PROCEDURE — 3017F COLORECTAL CA SCREEN DOC REV: CPT | Performed by: INTERNAL MEDICINE

## 2019-01-22 PROCEDURE — 4040F PNEUMOC VAC/ADMIN/RCVD: CPT | Performed by: INTERNAL MEDICINE

## 2019-01-22 PROCEDURE — G8484 FLU IMMUNIZE NO ADMIN: HCPCS | Performed by: INTERNAL MEDICINE

## 2019-01-22 PROCEDURE — G8419 CALC BMI OUT NRM PARAM NOF/U: HCPCS | Performed by: INTERNAL MEDICINE

## 2019-01-22 PROCEDURE — 99213 OFFICE O/P EST LOW 20 MIN: CPT | Performed by: INTERNAL MEDICINE

## 2019-01-22 PROCEDURE — 1123F ACP DISCUSS/DSCN MKR DOCD: CPT | Performed by: INTERNAL MEDICINE

## 2019-01-22 PROCEDURE — 1036F TOBACCO NON-USER: CPT | Performed by: INTERNAL MEDICINE

## 2019-01-22 RX ORDER — PREDNISONE 20 MG/1
TABLET ORAL
Refills: 0 | COMMUNITY
Start: 2019-01-07 | End: 2019-01-22

## 2019-04-30 LAB
INR BLD: 1.62 (ref 0.86–1.14)
PROTHROMBIN TIME: 18.5 SEC (ref 9.8–13)

## 2019-05-01 ENCOUNTER — TELEPHONE (OUTPATIENT)
Dept: FAMILY MEDICINE CLINIC | Age: 74
End: 2019-05-01

## 2019-05-01 DIAGNOSIS — Z79.01 LONG TERM (CURRENT) USE OF ANTICOAGULANTS: ICD-10-CM

## 2019-05-01 DIAGNOSIS — Z51.81 ENCOUNTER FOR THERAPEUTIC DRUG MONITORING: ICD-10-CM

## 2019-05-01 DIAGNOSIS — I80.292: Primary | ICD-10-CM

## 2019-05-01 NOTE — TELEPHONE ENCOUNTER
----- Message from Rishabh Laureano MD sent at 5/1/2019  4:45 PM EDT -----  Increase Coumadin to 6/6/3 mg alternating every 3 days. recheck in 4 weeks.

## 2019-05-01 NOTE — TELEPHONE ENCOUNTER
Spoke w/pt re: inr 5/1/19 1.6. Per Dr Tasha Wan change coumadin to 6/6/3 recheck 4 weeks. Pt voiced understanding.

## 2019-05-21 ENCOUNTER — OFFICE VISIT (OUTPATIENT)
Dept: PULMONOLOGY | Age: 74
End: 2019-05-21
Payer: MEDICARE

## 2019-05-21 VITALS
HEIGHT: 64 IN | OXYGEN SATURATION: 93 % | DIASTOLIC BLOOD PRESSURE: 68 MMHG | HEART RATE: 93 BPM | BODY MASS INDEX: 28.51 KG/M2 | RESPIRATION RATE: 18 BRPM | SYSTOLIC BLOOD PRESSURE: 142 MMHG | WEIGHT: 167 LBS

## 2019-05-21 DIAGNOSIS — R91.8 LUNG INFILTRATE ON CT: ICD-10-CM

## 2019-05-21 DIAGNOSIS — J44.9 COPD, VERY SEVERE (HCC): Primary | ICD-10-CM

## 2019-05-21 DIAGNOSIS — R06.02 SHORTNESS OF BREATH: ICD-10-CM

## 2019-05-21 DIAGNOSIS — J96.11 CHRONIC HYPOXEMIC RESPIRATORY FAILURE (HCC): ICD-10-CM

## 2019-05-21 PROCEDURE — 1123F ACP DISCUSS/DSCN MKR DOCD: CPT | Performed by: INTERNAL MEDICINE

## 2019-05-21 PROCEDURE — G8926 SPIRO NO PERF OR DOC: HCPCS | Performed by: INTERNAL MEDICINE

## 2019-05-21 PROCEDURE — 4040F PNEUMOC VAC/ADMIN/RCVD: CPT | Performed by: INTERNAL MEDICINE

## 2019-05-21 PROCEDURE — G8599 NO ASA/ANTIPLAT THER USE RNG: HCPCS | Performed by: INTERNAL MEDICINE

## 2019-05-21 PROCEDURE — 1036F TOBACCO NON-USER: CPT | Performed by: INTERNAL MEDICINE

## 2019-05-21 PROCEDURE — G8419 CALC BMI OUT NRM PARAM NOF/U: HCPCS | Performed by: INTERNAL MEDICINE

## 2019-05-21 PROCEDURE — G8400 PT W/DXA NO RESULTS DOC: HCPCS | Performed by: INTERNAL MEDICINE

## 2019-05-21 PROCEDURE — G8427 DOCREV CUR MEDS BY ELIG CLIN: HCPCS | Performed by: INTERNAL MEDICINE

## 2019-05-21 PROCEDURE — 99213 OFFICE O/P EST LOW 20 MIN: CPT | Performed by: INTERNAL MEDICINE

## 2019-05-21 PROCEDURE — 1090F PRES/ABSN URINE INCON ASSESS: CPT | Performed by: INTERNAL MEDICINE

## 2019-05-21 PROCEDURE — 3017F COLORECTAL CA SCREEN DOC REV: CPT | Performed by: INTERNAL MEDICINE

## 2019-05-21 PROCEDURE — 3023F SPIROM DOC REV: CPT | Performed by: INTERNAL MEDICINE

## 2019-05-21 NOTE — PROGRESS NOTES
SUBJECTIVE:  Chief Complaint: Very severe COPD, shortness of breath, lung infiltrate, chronic hypoxemic respiratory failure  Star Christensen has done very well over the past several months. She denies any recent episodes of bronchitis. She denies purulent sputum expectoration, persistent cough or hemoptysis. She continues on Advair discus, Spiriva, oral theophylline 225 mg twice a day, albuterol solution per nebulizer and 5 mg prednisone daily. She also continues on oxygen 24 hours a day. She states that she is recovered from her pneumonia this winter. OBJECTIVE:  BP (!) 142/68   Pulse 93   Resp 18   Ht 5' 4\" (1.626 m)   Wt 167 lb (75.8 kg)   SpO2 93% Comment: 2L/NC  BMI 28.67 kg/m²      Physical Exam:  Constitutional:  She appears well developed and well-nourished. Wearing nasal oxygen  Neck:  Supple, No palpable lymphadenopathy, No JVD  Cardiovascular:  S1, S2 Normal, Regular rhythm, no murmurs or gallops, No pericardial  rubs. Pulmonary:  Diminished but fairly clear breath sounds throughout all lung areas without wheezing or rhonchi  Abdomen: Not examined  Extremities: no edema, No DVT  Neurologic:  Awake and Alert, No focal deficits    Radiology: CT chest without contrast on 1/14/19 showed significant interval improvement of the right lung. Consolidative changes with a stable 8 mm left lower lobe lung nodule with emphysema and chronic bronchitis  PFT: Office spirometry on 4/9/18 demonstrated a very severe obstructive defect with no significant response to bronchodilators        ASSESSMENT:    1. COPD, very severe ([address]    2. Lung infiltrate on CT    3. Chronic hypoxemic respiratory failure (HCC)    4. Shortness of breath          PLAN:   I will make no change in her current bronchodilator therapy. I will plan on seeing her back for pulmonary function tests in near future.  I will continue to follow her  Return in about 1 month (around 6/18/2019) for Recheck for COPD, Recheck for Shortness of Breath, chronic hypoxemic respiratory failure. This dictation was performed with a verbal recognition program and it was checked for errors. It is possible that there are still dictated errors within this office note. Any errors should be brought immediately to my attention for correction. All efforts were made to ensure that this office note is accurate.

## 2019-05-29 ENCOUNTER — NURSE ONLY (OUTPATIENT)
Dept: FAMILY MEDICINE CLINIC | Age: 74
End: 2019-05-29
Payer: MEDICARE

## 2019-05-29 DIAGNOSIS — Z79.01 LONG TERM (CURRENT) USE OF ANTICOAGULANTS: ICD-10-CM

## 2019-05-29 DIAGNOSIS — Z51.81 ENCOUNTER FOR THERAPEUTIC DRUG MONITORING: ICD-10-CM

## 2019-05-29 DIAGNOSIS — I80.292: ICD-10-CM

## 2019-05-29 LAB
INTERNATIONAL NORMALIZATION RATIO, POC: 1.5
PROTHROMBIN TIME, POC: ABNORMAL

## 2019-05-29 PROCEDURE — 85610 PROTHROMBIN TIME: CPT | Performed by: FAMILY MEDICINE

## 2019-05-29 RX ORDER — WARFARIN SODIUM 6 MG/1
TABLET ORAL
Qty: 30 TABLET | Refills: 0 | Status: SHIPPED | OUTPATIENT
Start: 2019-05-29 | End: 2019-06-22 | Stop reason: SDUPTHER

## 2019-05-29 NOTE — PROGRESS NOTES
PATIENT'S INR 1.5. CURRENT DOSE OF COUMADIN IS ALT 6/6/3. PER DR. HAMPTON, CHANGE TO 6 MG DAILY AND RECHECK INR IN 3 WEEKS. PATIENT VOICED UNDERSTANDING. RX FOR COUMADIN 6 MG SENT TO [facility] PER PATIENT'S REQUEST.

## 2019-06-04 ENCOUNTER — NURSE ONLY (OUTPATIENT)
Dept: PULMONOLOGY | Age: 74
End: 2019-06-04
Payer: MEDICARE

## 2019-06-04 DIAGNOSIS — J44.9 COPD, VERY SEVERE (HCC): Primary | ICD-10-CM

## 2019-06-04 LAB
EXPIRATORY TIME-POST: NORMAL SEC
EXPIRATORY TIME: NORMAL SEC
FEF 25-75% %CHNG: NORMAL
FEF 25-75% %PRED-POST: NORMAL %
FEF 25-75% %PRED-PRE: NORMAL L/SEC
FEF 25-75% PRED: NORMAL L/SEC
FEF 25-75%-POST: NORMAL L/SEC
FEF 25-75%-PRE: NORMAL L/SEC
FEV1 %PRED-POST: 23 %
FEV1 %PRED-PRE: 24 %
FEV1 PRED: 2.43 L
FEV1-POST: 0.55 L
FEV1-PRE: 0.58 L
FEV1/FVC %PRED-POST: 43 %
FEV1/FVC %PRED-PRE: 44 %
FEV1/FVC PRED: 75 %
FEV1/FVC-POST: 32.5 %
FEV1/FVC-PRE: 33 %
FVC %PRED-POST: 53 L
FVC %PRED-PRE: 54 %
FVC PRED: 3.22 L
FVC-POST: 1.7 L
FVC-PRE: 1.75 L
PEF %PRED-POST: NORMAL %
PEF %PRED-PRE: NORMAL L/SEC
PEF PRED: NORMAL L/SEC
PEF%CHNG: NORMAL
PEF-POST: NORMAL L/SEC
PEF-PRE: NORMAL L/SEC

## 2019-06-04 PROCEDURE — 94060 EVALUATION OF WHEEZING: CPT | Performed by: INTERNAL MEDICINE

## 2019-06-04 ASSESSMENT — PULMONARY FUNCTION TESTS
FEV1_PRE: 0.58
FVC_PERCENT_PREDICTED_PRE: 54
FEV1/FVC_PERCENT_PREDICTED_PRE: 44
FEV1/FVC_PERCENT_PREDICTED_POST: 43
FEV1/FVC_PRE: 33.0
FVC_PERCENT_PREDICTED_POST: 53
FVC_PRE: 1.75
FEV1/FVC_PREDICTED: 75.0
FEV1/FVC_POST: 32.5
FEV1_POST: 0.55
FEV1_PERCENT_PREDICTED_POST: 23
FVC_PREDICTED: 3.22
FVC_POST: 1.70
FEV1_PERCENT_PREDICTED_PRE: 24
FEV1_PREDICTED: 2.43

## 2019-06-10 NOTE — PROGRESS NOTES
Savana Crawford came to office for a PFT. The patients chief complaint is very severe COPD. She demonstrates an FEV1 of 0.58 liters. She demonstrates a very severe obstructive lung defect. She shows no significant response to bronchodilators. Overall, her lung function has remained stable or slightly improved over the past year. I will make no changes to her current bronchodilator therapy.  These results will be discussed further at a later date or her next appointment

## 2019-06-11 RX ORDER — TIOTROPIUM BROMIDE 18 UG/1
CAPSULE ORAL; RESPIRATORY (INHALATION)
Qty: 90 CAPSULE | Refills: 0 | Status: SHIPPED | OUTPATIENT
Start: 2019-06-11 | End: 2019-06-19 | Stop reason: SDUPTHER

## 2019-06-11 NOTE — TELEPHONE ENCOUNTER
Bozena Saavedra called needing the following prescription refills:   Requested Prescriptions     Pending Prescriptions Disp Refills    Natan Vicky 18 MCG inhalation capsule [Pharmacy Med Name: Andrey Coppersmith 18 MCG CP-HANDIHALER] 90 capsule 0     Sig: INHALE 1 PUFF AS DIRECTED ONCE A DAY       Medications were reviewed and appropriate refills were sent to the requested pharmacy after provider approval.     Electronically signed by Lynn Escobar LPN on 9/87/69 at 3:09 PM

## 2019-06-12 DIAGNOSIS — R06.02 SHORTNESS OF BREATH: ICD-10-CM

## 2019-06-12 DIAGNOSIS — J44.9 COPD, VERY SEVERE (HCC): ICD-10-CM

## 2019-06-12 DIAGNOSIS — J96.11 CHRONIC HYPOXEMIC RESPIRATORY FAILURE (HCC): ICD-10-CM

## 2019-06-12 PROCEDURE — 94060 EVALUATION OF WHEEZING: CPT | Performed by: INTERNAL MEDICINE

## 2019-06-17 RX ORDER — THEOPHYLLINE 450 MG/1
TABLET, EXTENDED RELEASE ORAL
Qty: 180 TABLET | Refills: 1 | Status: SHIPPED | OUTPATIENT
Start: 2019-06-17 | End: 2019-06-19 | Stop reason: SDUPTHER

## 2019-06-19 ENCOUNTER — NURSE ONLY (OUTPATIENT)
Dept: FAMILY MEDICINE CLINIC | Age: 74
End: 2019-06-19
Payer: MEDICARE

## 2019-06-19 DIAGNOSIS — I80.00: Primary | ICD-10-CM

## 2019-06-19 LAB
INTERNATIONAL NORMALIZATION RATIO, POC: 2.6
PROTHROMBIN TIME, POC: NORMAL

## 2019-06-19 PROCEDURE — 85610 PROTHROMBIN TIME: CPT | Performed by: FAMILY MEDICINE

## 2019-06-19 RX ORDER — MONTELUKAST SODIUM 10 MG/1
10 TABLET ORAL NIGHTLY
Qty: 90 TABLET | Refills: 0 | Status: SHIPPED | OUTPATIENT
Start: 2019-06-19 | End: 2019-08-22 | Stop reason: SDUPTHER

## 2019-06-19 RX ORDER — THEOPHYLLINE 450 MG/1
TABLET, EXTENDED RELEASE ORAL
Qty: 90 TABLET | Refills: 0 | Status: SHIPPED | OUTPATIENT
Start: 2019-06-19 | End: 2020-06-04 | Stop reason: SDUPTHER

## 2019-06-19 RX ORDER — ALBUTEROL SULFATE 90 UG/1
2 AEROSOL, METERED RESPIRATORY (INHALATION) 4 TIMES DAILY PRN
Qty: 3 INHALER | Refills: 0 | Status: SHIPPED | OUTPATIENT
Start: 2019-06-19 | End: 2019-09-16 | Stop reason: SDUPTHER

## 2019-06-19 RX ORDER — FUROSEMIDE 40 MG/1
40 TABLET ORAL DAILY
Qty: 90 TABLET | Refills: 0 | Status: SHIPPED | OUTPATIENT
Start: 2019-06-19 | End: 2020-03-10

## 2019-06-19 RX ORDER — LEVOTHYROXINE SODIUM 0.05 MG/1
50 TABLET ORAL DAILY
Qty: 90 TABLET | Refills: 0 | Status: SHIPPED | OUTPATIENT
Start: 2019-06-19 | End: 2019-10-07 | Stop reason: SDUPTHER

## 2019-06-19 RX ORDER — VITAMIN E (DL,TOCOPHERYL ACET) 180 MG
500 CAPSULE ORAL DAILY
Qty: 90 CAPSULE | Refills: 0 | Status: SHIPPED | OUTPATIENT
Start: 2019-06-19 | End: 2019-08-01

## 2019-06-19 RX ORDER — CELECOXIB 200 MG/1
200 CAPSULE ORAL DAILY
Qty: 90 CAPSULE | Refills: 0 | Status: SHIPPED | OUTPATIENT
Start: 2019-06-19 | End: 2019-06-24 | Stop reason: SDUPTHER

## 2019-06-19 RX ORDER — ESOMEPRAZOLE MAGNESIUM 40 MG/1
40 CAPSULE, DELAYED RELEASE ORAL 2 TIMES DAILY
Qty: 90 CAPSULE | Refills: 0 | Status: SHIPPED | OUTPATIENT
Start: 2019-06-19 | End: 2019-08-22 | Stop reason: SDUPTHER

## 2019-06-19 RX ORDER — GABAPENTIN 400 MG/1
400 CAPSULE ORAL EVERY EVENING
Qty: 90 CAPSULE | Refills: 0 | Status: SHIPPED | OUTPATIENT
Start: 2019-06-19 | End: 2020-01-20

## 2019-06-19 NOTE — TELEPHONE ENCOUNTER
PATIENT REQUESTED REFILLS FOR ALL MEDICATIONS BE SENT TO [facility]. SENT 90 DAY SUPPLY PER REQUEST. RX FOR GABAPENTIN GENERATED AND ROUTED TO DR. HAMPTON TO APPROVE AND SEND TO [facility].

## 2019-07-15 RX ORDER — POTASSIUM CHLORIDE 750 MG/1
TABLET, EXTENDED RELEASE ORAL
Qty: 30 TABLET | Refills: 0 | Status: SHIPPED | OUTPATIENT
Start: 2019-07-15 | End: 2019-08-12 | Stop reason: SDUPTHER

## 2019-07-16 ENCOUNTER — NURSE ONLY (OUTPATIENT)
Dept: FAMILY MEDICINE CLINIC | Age: 74
End: 2019-07-16
Payer: MEDICARE

## 2019-07-16 VITALS
HEIGHT: 64 IN | SYSTOLIC BLOOD PRESSURE: 118 MMHG | HEART RATE: 92 BPM | DIASTOLIC BLOOD PRESSURE: 60 MMHG | OXYGEN SATURATION: 98 % | BODY MASS INDEX: 28.24 KG/M2 | WEIGHT: 165.4 LBS

## 2019-07-16 DIAGNOSIS — Z79.01 ANTICOAGULANT LONG-TERM USE: Primary | ICD-10-CM

## 2019-07-16 LAB
INTERNATIONAL NORMALIZATION RATIO, POC: 2.6
INTERNATIONAL NORMALIZATION RATIO, POC: NORMAL
PROTHROMBIN TIME, POC: 2.6

## 2019-07-16 PROCEDURE — 85610 PROTHROMBIN TIME: CPT | Performed by: FAMILY MEDICINE

## 2019-07-16 RX ORDER — WARFARIN SODIUM 6 MG/1
TABLET ORAL
Qty: 30 TABLET | Refills: 0 | Status: SHIPPED | OUTPATIENT
Start: 2019-07-16 | End: 2019-08-09 | Stop reason: SDUPTHER

## 2019-07-31 ENCOUNTER — TELEPHONE (OUTPATIENT)
Dept: FAMILY MEDICINE CLINIC | Age: 74
End: 2019-07-31

## 2019-08-01 ENCOUNTER — OFFICE VISIT (OUTPATIENT)
Dept: FAMILY MEDICINE CLINIC | Age: 74
End: 2019-08-01
Payer: MEDICARE

## 2019-08-01 VITALS
HEART RATE: 74 BPM | HEIGHT: 64 IN | WEIGHT: 167.8 LBS | DIASTOLIC BLOOD PRESSURE: 72 MMHG | BODY MASS INDEX: 28.65 KG/M2 | SYSTOLIC BLOOD PRESSURE: 124 MMHG | OXYGEN SATURATION: 97 %

## 2019-08-01 DIAGNOSIS — I10 ESSENTIAL HYPERTENSION: Primary | ICD-10-CM

## 2019-08-01 DIAGNOSIS — E03.9 ACQUIRED HYPOTHYROIDISM: ICD-10-CM

## 2019-08-01 DIAGNOSIS — J44.9 COPD, VERY SEVERE (HCC): ICD-10-CM

## 2019-08-01 DIAGNOSIS — I25.10 ATHEROSCLEROSIS OF CORONARY ARTERY OF NATIVE HEART WITHOUT ANGINA PECTORIS, UNSPECIFIED VESSEL OR LESION TYPE: ICD-10-CM

## 2019-08-01 PROBLEM — I20.0 UNSTABLE ANGINA (HCC): Status: RESOLVED | Noted: 2018-05-24 | Resolved: 2019-08-01

## 2019-08-01 PROCEDURE — 99214 OFFICE O/P EST MOD 30 MIN: CPT | Performed by: FAMILY MEDICINE

## 2019-08-01 ASSESSMENT — PATIENT HEALTH QUESTIONNAIRE - PHQ9
SUM OF ALL RESPONSES TO PHQ QUESTIONS 1-9: 0
SUM OF ALL RESPONSES TO PHQ9 QUESTIONS 1 & 2: 0
SUM OF ALL RESPONSES TO PHQ QUESTIONS 1-9: 0
2. FEELING DOWN, DEPRESSED OR HOPELESS: 0
1. LITTLE INTEREST OR PLEASURE IN DOING THINGS: 0

## 2019-08-01 ASSESSMENT — ENCOUNTER SYMPTOMS
CHEST TIGHTNESS: 0
DIARRHEA: 0
SORE THROAT: 0
COUGH: 0
RHINORRHEA: 0
ABDOMINAL PAIN: 0
CONSTIPATION: 0
SHORTNESS OF BREATH: 0
SINUS PRESSURE: 0

## 2019-08-01 NOTE — PROGRESS NOTES
8/1/2019    Trish Mims    Chief Complaint   Patient presents with    Other     medical clearance for c-scope       HPI  Mkaayla Marte is a 76 y.o. female who presents today with clearance for colonoscopy. Patient is planning on a colonoscopy for general cancer screening. She has not had problems with colonoscopies in the past.  It is a 5-year follow-up. Patient had significant medical problems 2 years ago with severe respiratory failure. She has been doing better in the last 12 months. She feels good she denies chest pain. She is at her baseline shortness of breath. Patient is not having GI complaints. She has had no blood loss. No recent history of anemia. Patient denies chest pain. She has followed up with cardiology and states that her last appointment they did not need to see her for a year. Patient feels her current respiratory medications are sufficient. She is not rescuing herself more than her baseline. She is without fever or significant sputum production. REVIEW OF SYMPTOMS  Review of Systems   Constitutional: Negative for chills and fever. HENT: Negative for rhinorrhea, sinus pressure and sore throat. Respiratory: Negative for cough, chest tightness and shortness of breath. Gastrointestinal: Negative for abdominal pain, constipation and diarrhea. Genitourinary: Negative for dysuria and frequency. Musculoskeletal: Negative for myalgias.        PAST MEDICAL HISTORY  Past Medical History:   Diagnosis Date    Anticoagulant long-term use     Arthritis     Asthma     [facility] [address] [address] [address]     Chronic hypoxemic respiratory failure (HCC) 2/6/2018    Coronary atherosclerosis     Deep venous thrombosis of lower extremity (HCC)     Gastroesophageal reflux disease     Hiatal hernia     History of pulmonary embolus (PE)     Hyperlipidemia     Leg pain, right     burning     LONG TERM ANTICOAGULENT USE     Lung infiltrate on CT 1/22/2019    Lung nodule     [facility]- Dr Teresa Garcia

## 2019-08-01 NOTE — LETTER
MEDICATION AGREEMENT     Jonathan Bidding  [birth year]      For certain conditions, multiple classes of medications may be used to help better manage your symptoms, and to improve your ability to function at home, work and in social settings. However, these medications do have risks, which will be discussed with you, including addiction and dependency. The following prescribed medications need frequent monitoring and will require you to partner and assist in your healthcare. Medication  Dose, instructions and quantity as indicated on current prescription bottle Diagnosis/Reason(s) for Taking Category     lorazepam 1 qd prn 4   gabapentin 400mg 1 q evening 4                    Benefits and goals of Controlled Substance Medications: There are two potential goals for your treatment: (1) decreased pain and suffering (2) improved daily life functions. There are many possible treatments for your chronic condition(s), and, in addition to controlled substance medications, we will try alternatives such as physical therapy, yoga, massage, home daily exercise, meditation, relaxation techniques, injections, chiropractic manipulations, surgery, cognitive therapy, hypnosis and many medications that are not habit-forming. Use of controlled substance medications may be helpful, but they are unlikely to resolve all of your symptoms or restore all function. Risks of Controlled Substance Medications:    Opioid pain medications: These medications can lead to problems such as addiction/dependence, sedation, lightheadedness/dizziness, memory issues, falls, constipation, nausea, or vomiting. They may also impair the ability to drive or operate machinery. Additionally, these medications may lower testosterone levels, leading to loss of bone strength, stamina and sex drive.   They may cause problems with breathing, sleep apnea and reduced coughing, which are especially dangerous for patients with lung disease. Overdose or dangerous interactions with alcohol and other medications may occur, leading to death. Hyperalgesia may develop, in which patients receiving opioids for the treatment of pain may actually become more sensitive to certain painful stimuli, and in some cases, experience pain from ordinarily non-painful stimuli. Women between the ages of 14-53 who could become pregnant should carefully weigh the risks and benefits of opioids with their physicians, as these medications increase the risk of pregnancy complications, including miscarriage,  delivery and stillbirth. It is also possible for babies to be born addicted to opioids. Opioid dependence withdrawal symptoms may include; feelings of uneasiness, increased pain, irritability, belly pain, diarrhea, sweats and goose-flesh. Benzodiazepines and non-benzodiazepine sleep medications: These medications can lead to problems such as addiction/dependence, sedation, fatigue, lightheadedness, dizziness, incoordination, falls, depression, hallucinations, and impaired judgment, memory and concentration. The ability to drive and operate machinery may also be affected. Abnormal sleep-related behaviors have been reported, including sleep walking, driving, making telephone calls, eating, or having sex while not fully awake. These medications can suppress breathing and worsen sleep apnea, particularly when combined with alcohol or other sedating medications, potentially leading to death. Dependence withdrawal symptoms may include tremors, anxiety, hallucinations and seizures. Stimulants:  Common adverse effects include addiction/dependence, increased blood pressure and heart rate, decreased appetite, nausea, involuntary weight loss, insomnia, irritability, and headaches.   These risks may increase when these medications are combined with other stimulants, such as caffeine pills or energy drinks, certain weight loss

## 2019-08-06 ENCOUNTER — TELEPHONE (OUTPATIENT)
Dept: FAMILY MEDICINE CLINIC | Age: 74
End: 2019-08-06

## 2019-08-06 NOTE — TELEPHONE ENCOUNTER
DAVID  PM REGARD MESSAGE BELOW, PT WILL NEED AN APPT. WITH A PROVIDER HERE IN OUR OFFICE TO SUBMIT. PAPERWORK SUCESSFULLY TO HAVE INSURANCE COVER PORTABLE OXYGEN. PT IS TO CALL AND [address]. APPT. Electronically signed by Katherine Nelson LPN on 4/8/2170 at 8:08 PM      ----- Message from Kyara Dong sent at 8/5/2019 12:49 PM EDT -----  Would like Dr. Kiara Shearer to ok her to get a portable oxygen concentrator, battery operated. She said it will need a prior authorization then he can send a script in for it. Any questions call her.

## 2019-08-09 RX ORDER — WARFARIN SODIUM 6 MG/1
TABLET ORAL
Qty: 30 TABLET | Refills: 1 | Status: SHIPPED | OUTPATIENT
Start: 2019-08-09 | End: 2019-08-13 | Stop reason: SDUPTHER

## 2019-08-12 ENCOUNTER — TELEPHONE (OUTPATIENT)
Dept: FAMILY MEDICINE CLINIC | Age: 74
End: 2019-08-12

## 2019-08-12 RX ORDER — POTASSIUM CHLORIDE 750 MG/1
10 TABLET, EXTENDED RELEASE ORAL DAILY
Qty: 90 TABLET | Refills: 0 | Status: SHIPPED | OUTPATIENT
Start: 2019-08-12 | End: 2019-10-07 | Stop reason: SDUPTHER

## 2019-08-12 NOTE — TELEPHONE ENCOUNTER
----- Message from [facility] sent at 8/12/2019  9:27 AM EDT -----  klor-con 90 day       [facility]

## 2019-08-13 ENCOUNTER — NURSE ONLY (OUTPATIENT)
Dept: FAMILY MEDICINE CLINIC | Age: 74
End: 2019-08-13
Payer: MEDICARE

## 2019-08-13 DIAGNOSIS — Z79.01 LONG TERM (CURRENT) USE OF ANTICOAGULANTS: ICD-10-CM

## 2019-08-13 DIAGNOSIS — Z51.81 ENCOUNTER FOR THERAPEUTIC DRUG MONITORING: ICD-10-CM

## 2019-08-13 DIAGNOSIS — I80.299 PHLEBITIS OF DORSALIS PEDIS VEIN, UNSPECIFIED LATERALITY (HCC): Primary | ICD-10-CM

## 2019-08-13 LAB
INTERNATIONAL NORMALIZATION RATIO, POC: 2.5
PROTHROMBIN TIME, POC: NORMAL

## 2019-08-13 PROCEDURE — 85610 PROTHROMBIN TIME: CPT | Performed by: FAMILY MEDICINE

## 2019-08-13 RX ORDER — WARFARIN SODIUM 6 MG/1
TABLET ORAL
Qty: 30 TABLET | Refills: 1 | Status: CANCELLED | OUTPATIENT
Start: 2019-08-13

## 2019-08-13 RX ORDER — WARFARIN SODIUM 6 MG/1
TABLET ORAL
Qty: 30 TABLET | Refills: 1 | Status: SHIPPED | OUTPATIENT
Start: 2019-08-13 | End: 2019-10-08 | Stop reason: SDUPTHER

## 2019-08-22 RX ORDER — ESOMEPRAZOLE MAGNESIUM 40 MG/1
40 CAPSULE, DELAYED RELEASE ORAL 2 TIMES DAILY
Qty: 180 CAPSULE | Refills: 0 | Status: SHIPPED | OUTPATIENT
Start: 2019-08-22 | End: 2019-10-07 | Stop reason: SDUPTHER

## 2019-08-22 RX ORDER — ROSUVASTATIN CALCIUM 20 MG/1
20 TABLET, COATED ORAL NIGHTLY
Qty: 90 TABLET | Refills: 0 | Status: SHIPPED | OUTPATIENT
Start: 2019-08-22 | End: 2019-10-07 | Stop reason: SDUPTHER

## 2019-08-22 RX ORDER — MONTELUKAST SODIUM 10 MG/1
10 TABLET ORAL NIGHTLY
Qty: 90 TABLET | Refills: 0 | Status: SHIPPED | OUTPATIENT
Start: 2019-08-22 | End: 2019-10-07 | Stop reason: SDUPTHER

## 2019-08-22 RX ORDER — PREDNISONE 1 MG/1
TABLET ORAL
Qty: 90 TABLET | Refills: 3 | Status: SHIPPED | OUTPATIENT
Start: 2019-08-22 | End: 2019-09-23 | Stop reason: SDUPTHER

## 2019-09-10 ENCOUNTER — NURSE ONLY (OUTPATIENT)
Dept: FAMILY MEDICINE CLINIC | Age: 74
End: 2019-09-10
Payer: MEDICARE

## 2019-09-10 DIAGNOSIS — Z51.81 ENCOUNTER FOR THERAPEUTIC DRUG MONITORING: ICD-10-CM

## 2019-09-10 DIAGNOSIS — Z79.01 LONG TERM (CURRENT) USE OF ANTICOAGULANTS: ICD-10-CM

## 2019-09-10 DIAGNOSIS — I80.299 PHLEBITIS OF DORSALIS PEDIS VEIN, UNSPECIFIED LATERALITY (HCC): ICD-10-CM

## 2019-09-10 LAB
INTERNATIONAL NORMALIZATION RATIO, POC: 2.6
INTERNATIONAL NORMALIZATION RATIO, POC: 2.6
PROTHROMBIN TIME, POC: NORMAL

## 2019-09-10 PROCEDURE — 85610 PROTHROMBIN TIME: CPT | Performed by: FAMILY MEDICINE

## 2019-09-13 RX ORDER — MAGNESIUM OXIDE 500 MG
TABLET ORAL
Qty: 90 TABLET | Refills: 0 | Status: SHIPPED | OUTPATIENT
Start: 2019-09-13 | End: 2020-01-20

## 2019-09-23 ENCOUNTER — OFFICE VISIT (OUTPATIENT)
Dept: PULMONOLOGY | Age: 74
End: 2019-09-23
Payer: MEDICARE

## 2019-09-23 VITALS
SYSTOLIC BLOOD PRESSURE: 136 MMHG | HEIGHT: 65 IN | HEART RATE: 74 BPM | WEIGHT: 165 LBS | DIASTOLIC BLOOD PRESSURE: 56 MMHG | BODY MASS INDEX: 27.49 KG/M2 | OXYGEN SATURATION: 95 %

## 2019-09-23 DIAGNOSIS — J44.9 COPD, VERY SEVERE (HCC): Primary | ICD-10-CM

## 2019-09-23 DIAGNOSIS — R06.02 SHORTNESS OF BREATH: ICD-10-CM

## 2019-09-23 DIAGNOSIS — J96.11 CHRONIC HYPOXEMIC RESPIRATORY FAILURE (HCC): ICD-10-CM

## 2019-09-23 PROCEDURE — 99213 OFFICE O/P EST LOW 20 MIN: CPT | Performed by: INTERNAL MEDICINE

## 2019-09-23 PROCEDURE — G8427 DOCREV CUR MEDS BY ELIG CLIN: HCPCS | Performed by: INTERNAL MEDICINE

## 2019-09-23 PROCEDURE — 4040F PNEUMOC VAC/ADMIN/RCVD: CPT | Performed by: INTERNAL MEDICINE

## 2019-09-23 PROCEDURE — 3017F COLORECTAL CA SCREEN DOC REV: CPT | Performed by: INTERNAL MEDICINE

## 2019-09-23 PROCEDURE — G8926 SPIRO NO PERF OR DOC: HCPCS | Performed by: INTERNAL MEDICINE

## 2019-09-23 PROCEDURE — 1090F PRES/ABSN URINE INCON ASSESS: CPT | Performed by: INTERNAL MEDICINE

## 2019-09-23 PROCEDURE — G8419 CALC BMI OUT NRM PARAM NOF/U: HCPCS | Performed by: INTERNAL MEDICINE

## 2019-09-23 PROCEDURE — 1036F TOBACCO NON-USER: CPT | Performed by: INTERNAL MEDICINE

## 2019-09-23 PROCEDURE — 1123F ACP DISCUSS/DSCN MKR DOCD: CPT | Performed by: INTERNAL MEDICINE

## 2019-09-23 PROCEDURE — G8400 PT W/DXA NO RESULTS DOC: HCPCS | Performed by: INTERNAL MEDICINE

## 2019-09-23 PROCEDURE — G8598 ASA/ANTIPLAT THER USED: HCPCS | Performed by: INTERNAL MEDICINE

## 2019-09-23 PROCEDURE — 3023F SPIROM DOC REV: CPT | Performed by: INTERNAL MEDICINE

## 2019-09-23 RX ORDER — PREDNISONE 1 MG/1
TABLET ORAL
Qty: 90 TABLET | Refills: 3 | Status: SHIPPED | OUTPATIENT
Start: 2019-09-23 | End: 2019-10-07 | Stop reason: SDUPTHER

## 2019-09-23 RX ORDER — PREDNISONE 1 MG/1
TABLET ORAL
Qty: 20 TABLET | Refills: 0 | Status: SHIPPED | OUTPATIENT
Start: 2019-09-23 | End: 2019-12-02

## 2019-10-07 RX ORDER — POTASSIUM CHLORIDE 750 MG/1
10 TABLET, EXTENDED RELEASE ORAL DAILY
Qty: 90 TABLET | Refills: 0 | Status: SHIPPED | OUTPATIENT
Start: 2019-10-07 | End: 2020-01-20

## 2019-10-07 RX ORDER — ESOMEPRAZOLE MAGNESIUM 40 MG/1
40 CAPSULE, DELAYED RELEASE ORAL 2 TIMES DAILY
Qty: 180 CAPSULE | Refills: 0 | Status: SHIPPED | OUTPATIENT
Start: 2019-10-07 | End: 2020-01-07

## 2019-10-07 RX ORDER — ALBUTEROL SULFATE 2.5 MG/3ML
2.5 SOLUTION RESPIRATORY (INHALATION) 4 TIMES DAILY PRN
Qty: 1050 ML | Refills: 0 | Status: SHIPPED | OUTPATIENT
Start: 2019-10-07 | End: 2020-04-01

## 2019-10-07 RX ORDER — PREDNISONE 1 MG/1
TABLET ORAL
Qty: 20 TABLET | Refills: 0 | Status: CANCELLED | OUTPATIENT
Start: 2019-10-07

## 2019-10-07 RX ORDER — PREDNISONE 1 MG/1
TABLET ORAL
Qty: 90 TABLET | Refills: 3 | Status: SHIPPED | OUTPATIENT
Start: 2019-10-07 | End: 2020-05-28 | Stop reason: SDUPTHER

## 2019-10-07 RX ORDER — ROSUVASTATIN CALCIUM 20 MG/1
20 TABLET, COATED ORAL NIGHTLY
Qty: 90 TABLET | Refills: 0 | Status: SHIPPED | OUTPATIENT
Start: 2019-10-07 | End: 2020-01-20

## 2019-10-07 RX ORDER — LEVOTHYROXINE SODIUM 0.05 MG/1
50 TABLET ORAL DAILY
Qty: 90 TABLET | Refills: 0 | Status: SHIPPED | OUTPATIENT
Start: 2019-10-07 | End: 2020-01-07

## 2019-10-07 RX ORDER — MONTELUKAST SODIUM 10 MG/1
10 TABLET ORAL NIGHTLY
Qty: 90 TABLET | Refills: 0 | Status: SHIPPED | OUTPATIENT
Start: 2019-10-07 | End: 2020-01-20

## 2019-10-08 ENCOUNTER — NURSE ONLY (OUTPATIENT)
Dept: FAMILY MEDICINE CLINIC | Age: 74
End: 2019-10-08
Payer: MEDICARE

## 2019-10-08 DIAGNOSIS — Z51.81 ENCOUNTER FOR THERAPEUTIC DRUG MONITORING: ICD-10-CM

## 2019-10-08 DIAGNOSIS — I80.299 PHLEBITIS OF DORSALIS PEDIS VEIN, UNSPECIFIED LATERALITY (HCC): ICD-10-CM

## 2019-10-08 DIAGNOSIS — Z79.01 LONG TERM (CURRENT) USE OF ANTICOAGULANTS: ICD-10-CM

## 2019-10-08 LAB
INTERNATIONAL NORMALIZATION RATIO, POC: 2.6
PROTHROMBIN TIME, POC: NORMAL

## 2019-10-08 PROCEDURE — 85610 PROTHROMBIN TIME: CPT | Performed by: FAMILY MEDICINE

## 2019-10-08 RX ORDER — CELECOXIB 200 MG/1
200 CAPSULE ORAL DAILY
Qty: 90 CAPSULE | Refills: 1 | Status: SHIPPED | OUTPATIENT
Start: 2019-10-08 | End: 2020-04-16

## 2019-10-08 RX ORDER — WARFARIN SODIUM 6 MG/1
TABLET ORAL
Qty: 30 TABLET | Refills: 1 | Status: SHIPPED | OUTPATIENT
Start: 2019-10-08 | End: 2019-11-05 | Stop reason: SDUPTHER

## 2019-10-08 RX ORDER — ISOSORBIDE MONONITRATE 60 MG/1
60 TABLET, EXTENDED RELEASE ORAL DAILY
Qty: 90 TABLET | Refills: 1 | Status: SHIPPED | OUTPATIENT
Start: 2019-10-08 | End: 2020-10-26

## 2019-10-22 ENCOUNTER — TELEPHONE (OUTPATIENT)
Dept: FAMILY MEDICINE CLINIC | Age: 74
End: 2019-10-22

## 2019-10-22 RX ORDER — RISEDRONATE SODIUM 35 MG/1
35 TABLET, FILM COATED ORAL
Qty: 4 TABLET | Refills: 2 | Status: SHIPPED | OUTPATIENT
Start: 2019-10-22 | End: 2020-01-10

## 2019-11-05 ENCOUNTER — ANTI-COAG VISIT (OUTPATIENT)
Dept: FAMILY MEDICINE CLINIC | Age: 74
End: 2019-11-05

## 2019-11-05 ENCOUNTER — NURSE ONLY (OUTPATIENT)
Dept: FAMILY MEDICINE CLINIC | Age: 74
End: 2019-11-05
Payer: MEDICARE

## 2019-11-05 DIAGNOSIS — I80.299 PHLEBITIS OF DORSALIS PEDIS VEIN, UNSPECIFIED LATERALITY (HCC): ICD-10-CM

## 2019-11-05 DIAGNOSIS — Z79.01 LONG TERM (CURRENT) USE OF ANTICOAGULANTS: ICD-10-CM

## 2019-11-05 DIAGNOSIS — Z51.81 ENCOUNTER FOR THERAPEUTIC DRUG MONITORING: ICD-10-CM

## 2019-11-05 LAB
INTERNATIONAL NORMALIZATION RATIO, POC: 2.5
PROTHROMBIN TIME, POC: NORMAL

## 2019-11-05 PROCEDURE — 85610 PROTHROMBIN TIME: CPT | Performed by: FAMILY MEDICINE

## 2019-11-05 RX ORDER — WARFARIN SODIUM 6 MG/1
TABLET ORAL
Qty: 30 TABLET | Refills: 1 | Status: SHIPPED | OUTPATIENT
Start: 2019-11-05 | End: 2020-03-11 | Stop reason: SDUPTHER

## 2019-11-25 ENCOUNTER — TELEPHONE (OUTPATIENT)
Dept: FAMILY MEDICINE CLINIC | Age: 74
End: 2019-11-25

## 2019-12-02 ENCOUNTER — OFFICE VISIT (OUTPATIENT)
Dept: FAMILY MEDICINE CLINIC | Age: 74
End: 2019-12-02
Payer: MEDICARE

## 2019-12-02 VITALS
WEIGHT: 167.6 LBS | DIASTOLIC BLOOD PRESSURE: 82 MMHG | HEIGHT: 64 IN | OXYGEN SATURATION: 95 % | BODY MASS INDEX: 28.61 KG/M2 | SYSTOLIC BLOOD PRESSURE: 122 MMHG | HEART RATE: 71 BPM

## 2019-12-02 DIAGNOSIS — I10 ESSENTIAL HYPERTENSION: ICD-10-CM

## 2019-12-02 DIAGNOSIS — E03.9 ACQUIRED HYPOTHYROIDISM: Primary | ICD-10-CM

## 2019-12-02 DIAGNOSIS — E78.00 PURE HYPERCHOLESTEROLEMIA: ICD-10-CM

## 2019-12-02 DIAGNOSIS — E03.9 ACQUIRED HYPOTHYROIDISM: ICD-10-CM

## 2019-12-02 DIAGNOSIS — I26.99 PULMONARY EMBOLISM WITHOUT ACUTE COR PULMONALE, UNSPECIFIED CHRONICITY, UNSPECIFIED PULMONARY EMBOLISM TYPE (HCC): ICD-10-CM

## 2019-12-02 DIAGNOSIS — J44.9 COPD, VERY SEVERE (HCC): ICD-10-CM

## 2019-12-02 LAB
A/G RATIO: 1.8 (ref 1.1–2.2)
ALBUMIN SERPL-MCNC: 4 G/DL (ref 3.4–5)
ALP BLD-CCNC: 59 U/L (ref 40–129)
ALT SERPL-CCNC: 20 U/L (ref 10–40)
ANION GAP SERPL CALCULATED.3IONS-SCNC: 15 MMOL/L (ref 3–16)
AST SERPL-CCNC: 24 U/L (ref 15–37)
BASOPHILS ABSOLUTE: 0 K/UL (ref 0–0.2)
BASOPHILS RELATIVE PERCENT: 0.3 %
BILIRUB SERPL-MCNC: <0.2 MG/DL (ref 0–1)
BUN BLDV-MCNC: 22 MG/DL (ref 7–20)
CALCIUM SERPL-MCNC: 9.8 MG/DL (ref 8.3–10.6)
CHLORIDE BLD-SCNC: 100 MMOL/L (ref 99–110)
CO2: 25 MMOL/L (ref 21–32)
CREAT SERPL-MCNC: 0.7 MG/DL (ref 0.6–1.2)
EOSINOPHILS ABSOLUTE: 0 K/UL (ref 0–0.6)
EOSINOPHILS RELATIVE PERCENT: 0.2 %
GFR AFRICAN AMERICAN: >60
GFR NON-AFRICAN AMERICAN: >60
GLOBULIN: 2.2 G/DL
GLUCOSE BLD-MCNC: 107 MG/DL (ref 70–99)
HCT VFR BLD CALC: 41.6 % (ref 36–48)
HEMOGLOBIN: 13.7 G/DL (ref 12–16)
INR BLD: 2.45 (ref 0.86–1.14)
LYMPHOCYTES ABSOLUTE: 0.9 K/UL (ref 1–5.1)
LYMPHOCYTES RELATIVE PERCENT: 10.3 %
MCH RBC QN AUTO: 31 PG (ref 26–34)
MCHC RBC AUTO-ENTMCNC: 32.8 G/DL (ref 31–36)
MCV RBC AUTO: 94.3 FL (ref 80–100)
MONOCYTES ABSOLUTE: 0.4 K/UL (ref 0–1.3)
MONOCYTES RELATIVE PERCENT: 4.5 %
NEUTROPHILS ABSOLUTE: 7.7 K/UL (ref 1.7–7.7)
NEUTROPHILS RELATIVE PERCENT: 84.7 %
PDW BLD-RTO: 15.1 % (ref 12.4–15.4)
PLATELET # BLD: 237 K/UL (ref 135–450)
PMV BLD AUTO: 8.7 FL (ref 5–10.5)
POTASSIUM SERPL-SCNC: 4.3 MMOL/L (ref 3.5–5.1)
PROTHROMBIN TIME: 28.7 SEC (ref 10–13.2)
RBC # BLD: 4.42 M/UL (ref 4–5.2)
SODIUM BLD-SCNC: 140 MMOL/L (ref 136–145)
TOTAL PROTEIN: 6.2 G/DL (ref 6.4–8.2)
TSH REFLEX FT4: 1.25 UIU/ML (ref 0.27–4.2)
WBC # BLD: 9.1 K/UL (ref 4–11)

## 2019-12-02 PROCEDURE — G8482 FLU IMMUNIZE ORDER/ADMIN: HCPCS | Performed by: FAMILY MEDICINE

## 2019-12-02 PROCEDURE — G8598 ASA/ANTIPLAT THER USED: HCPCS | Performed by: FAMILY MEDICINE

## 2019-12-02 PROCEDURE — 1036F TOBACCO NON-USER: CPT | Performed by: FAMILY MEDICINE

## 2019-12-02 PROCEDURE — 3017F COLORECTAL CA SCREEN DOC REV: CPT | Performed by: FAMILY MEDICINE

## 2019-12-02 PROCEDURE — G8926 SPIRO NO PERF OR DOC: HCPCS | Performed by: FAMILY MEDICINE

## 2019-12-02 PROCEDURE — G8400 PT W/DXA NO RESULTS DOC: HCPCS | Performed by: FAMILY MEDICINE

## 2019-12-02 PROCEDURE — 4040F PNEUMOC VAC/ADMIN/RCVD: CPT | Performed by: FAMILY MEDICINE

## 2019-12-02 PROCEDURE — G8427 DOCREV CUR MEDS BY ELIG CLIN: HCPCS | Performed by: FAMILY MEDICINE

## 2019-12-02 PROCEDURE — 3023F SPIROM DOC REV: CPT | Performed by: FAMILY MEDICINE

## 2019-12-02 PROCEDURE — 1123F ACP DISCUSS/DSCN MKR DOCD: CPT | Performed by: FAMILY MEDICINE

## 2019-12-02 PROCEDURE — G8417 CALC BMI ABV UP PARAM F/U: HCPCS | Performed by: FAMILY MEDICINE

## 2019-12-02 PROCEDURE — 1090F PRES/ABSN URINE INCON ASSESS: CPT | Performed by: FAMILY MEDICINE

## 2019-12-02 PROCEDURE — 99214 OFFICE O/P EST MOD 30 MIN: CPT | Performed by: FAMILY MEDICINE

## 2019-12-02 ASSESSMENT — ENCOUNTER SYMPTOMS
SINUS PRESSURE: 0
SORE THROAT: 0
CHEST TIGHTNESS: 0
SHORTNESS OF BREATH: 0
CONSTIPATION: 0
ABDOMINAL PAIN: 0
DIARRHEA: 0
RHINORRHEA: 0
COUGH: 0

## 2019-12-09 RX ORDER — ALBUTEROL SULFATE 90 UG/1
AEROSOL, METERED RESPIRATORY (INHALATION)
Qty: 25.5 INHALER | Refills: 0 | Status: SHIPPED | OUTPATIENT
Start: 2019-12-09 | End: 2020-12-04 | Stop reason: SDUPTHER

## 2019-12-20 ENCOUNTER — TELEPHONE (OUTPATIENT)
Dept: FAMILY MEDICINE CLINIC | Age: 74
End: 2019-12-20

## 2019-12-31 ENCOUNTER — NURSE ONLY (OUTPATIENT)
Dept: FAMILY MEDICINE CLINIC | Age: 74
End: 2019-12-31
Payer: MEDICARE

## 2019-12-31 DIAGNOSIS — I80.299 PHLEBITIS OF DORSALIS PEDIS VEIN, UNSPECIFIED LATERALITY (HCC): ICD-10-CM

## 2019-12-31 DIAGNOSIS — Z51.81 ENCOUNTER FOR THERAPEUTIC DRUG MONITORING: ICD-10-CM

## 2019-12-31 DIAGNOSIS — Z79.01 LONG TERM (CURRENT) USE OF ANTICOAGULANTS: ICD-10-CM

## 2019-12-31 LAB
INTERNATIONAL NORMALIZATION RATIO, POC: 2.8
PROTHROMBIN TIME, POC: NORMAL

## 2019-12-31 PROCEDURE — 99024 POSTOP FOLLOW-UP VISIT: CPT | Performed by: FAMILY MEDICINE

## 2019-12-31 PROCEDURE — 85610 PROTHROMBIN TIME: CPT | Performed by: FAMILY MEDICINE

## 2020-01-07 RX ORDER — LEVOTHYROXINE SODIUM 0.05 MG/1
TABLET ORAL
Qty: 90 TABLET | Refills: 0 | Status: SHIPPED | OUTPATIENT
Start: 2020-01-07 | End: 2020-04-15 | Stop reason: SDUPTHER

## 2020-01-07 RX ORDER — ESOMEPRAZOLE MAGNESIUM 40 MG/1
CAPSULE, DELAYED RELEASE ORAL
Qty: 180 CAPSULE | Refills: 0 | Status: SHIPPED | OUTPATIENT
Start: 2020-01-07 | End: 2020-01-08 | Stop reason: SDUPTHER

## 2020-01-08 RX ORDER — ESOMEPRAZOLE MAGNESIUM 40 MG/1
40 CAPSULE, DELAYED RELEASE ORAL DAILY
Qty: 180 CAPSULE | Refills: 0 | Status: SHIPPED | OUTPATIENT
Start: 2020-01-08 | End: 2020-01-08 | Stop reason: SDUPTHER

## 2020-01-08 RX ORDER — ESOMEPRAZOLE MAGNESIUM 40 MG/1
40 CAPSULE, DELAYED RELEASE ORAL 2 TIMES DAILY
Qty: 180 CAPSULE | Refills: 1 | Status: SHIPPED | OUTPATIENT
Start: 2020-01-08 | End: 2020-04-09 | Stop reason: SDUPTHER

## 2020-01-14 ENCOUNTER — TELEPHONE (OUTPATIENT)
Dept: FAMILY MEDICINE CLINIC | Age: 75
End: 2020-01-14

## 2020-01-15 ENCOUNTER — NURSE ONLY (OUTPATIENT)
Dept: FAMILY MEDICINE CLINIC | Age: 75
End: 2020-01-15
Payer: MEDICARE

## 2020-01-15 LAB
INTERNATIONAL NORMALIZATION RATIO, POC: 3.2
PROTHROMBIN TIME, POC: NORMAL

## 2020-01-15 PROCEDURE — 85610 PROTHROMBIN TIME: CPT | Performed by: FAMILY MEDICINE

## 2020-01-15 RX ORDER — WARFARIN SODIUM 6 MG/1
6 TABLET ORAL DAILY
Qty: 30 TABLET | Refills: 2 | Status: SHIPPED | OUTPATIENT
Start: 2020-01-15 | End: 2020-02-12 | Stop reason: CLARIF

## 2020-01-20 RX ORDER — VITAMIN E (DL,TOCOPHERYL ACET) 180 MG
CAPSULE ORAL
Qty: 90 CAPSULE | Refills: 0 | Status: SHIPPED | OUTPATIENT
Start: 2020-01-20 | End: 2020-04-27

## 2020-01-20 RX ORDER — ROSUVASTATIN CALCIUM 20 MG/1
TABLET, COATED ORAL
Qty: 90 TABLET | Refills: 0 | Status: SHIPPED | OUTPATIENT
Start: 2020-01-20 | End: 2020-04-24

## 2020-01-20 RX ORDER — GABAPENTIN 400 MG/1
400 CAPSULE ORAL EVERY EVENING
Qty: 90 CAPSULE | Refills: 0 | Status: SHIPPED | OUTPATIENT
Start: 2020-01-20 | End: 2020-04-20

## 2020-01-20 RX ORDER — MONTELUKAST SODIUM 10 MG/1
TABLET ORAL
Qty: 90 TABLET | Refills: 0 | Status: SHIPPED | OUTPATIENT
Start: 2020-01-20 | End: 2020-04-22

## 2020-01-20 RX ORDER — POTASSIUM CHLORIDE 750 MG/1
TABLET, EXTENDED RELEASE ORAL
Qty: 90 TABLET | Refills: 0 | Status: SHIPPED | OUTPATIENT
Start: 2020-01-20 | End: 2020-06-04 | Stop reason: SDUPTHER

## 2020-02-04 ENCOUNTER — OFFICE VISIT (OUTPATIENT)
Dept: PULMONOLOGY | Age: 75
End: 2020-02-04
Payer: MEDICARE

## 2020-02-04 VITALS
DIASTOLIC BLOOD PRESSURE: 58 MMHG | HEIGHT: 65 IN | BODY MASS INDEX: 27.72 KG/M2 | OXYGEN SATURATION: 95 % | SYSTOLIC BLOOD PRESSURE: 128 MMHG | HEART RATE: 80 BPM | WEIGHT: 166.4 LBS

## 2020-02-04 PROCEDURE — 3023F SPIROM DOC REV: CPT | Performed by: INTERNAL MEDICINE

## 2020-02-04 PROCEDURE — 1036F TOBACCO NON-USER: CPT | Performed by: INTERNAL MEDICINE

## 2020-02-04 PROCEDURE — G8482 FLU IMMUNIZE ORDER/ADMIN: HCPCS | Performed by: INTERNAL MEDICINE

## 2020-02-04 PROCEDURE — 1090F PRES/ABSN URINE INCON ASSESS: CPT | Performed by: INTERNAL MEDICINE

## 2020-02-04 PROCEDURE — 3017F COLORECTAL CA SCREEN DOC REV: CPT | Performed by: INTERNAL MEDICINE

## 2020-02-04 PROCEDURE — G8427 DOCREV CUR MEDS BY ELIG CLIN: HCPCS | Performed by: INTERNAL MEDICINE

## 2020-02-04 PROCEDURE — G8400 PT W/DXA NO RESULTS DOC: HCPCS | Performed by: INTERNAL MEDICINE

## 2020-02-04 PROCEDURE — 99213 OFFICE O/P EST LOW 20 MIN: CPT | Performed by: INTERNAL MEDICINE

## 2020-02-04 PROCEDURE — 1123F ACP DISCUSS/DSCN MKR DOCD: CPT | Performed by: INTERNAL MEDICINE

## 2020-02-04 PROCEDURE — 4040F PNEUMOC VAC/ADMIN/RCVD: CPT | Performed by: INTERNAL MEDICINE

## 2020-02-04 PROCEDURE — G8926 SPIRO NO PERF OR DOC: HCPCS | Performed by: INTERNAL MEDICINE

## 2020-02-04 PROCEDURE — G8417 CALC BMI ABV UP PARAM F/U: HCPCS | Performed by: INTERNAL MEDICINE

## 2020-02-04 NOTE — PROGRESS NOTES
SUBJECTIVE:  Chief Complaint: Very severe COPD, chronic hypoxemic respiratory failure, shortness of breath, history of pulmonary embolus  Ray Martins has done very well over the past several months. She denies any recent bronchitic infections. She continues on Advair Diskus, Spiriva, oral theophylline, albuterol solution per nebulizer and 5 mg prednisone daily. She also continues on anticoagulants. She wears oxygen 24 hours a day. She denies any worsening shortness of breath. She denies chest pain or hemoptysis    OBJECTIVE:  BP (!) 128/58   Pulse 80   Ht 5' 5\" (1.651 m)   Wt 166 lb 6.4 oz (75.5 kg)   SpO2 95% Comment: on 2 L  BMI 27.69 kg/m²      Physical Exam:  Constitutional:  She appears well developed and well-nourished. Wearing nasal oxygen  Neck:  Supple, No palpable lymphadenopathy, No JVD  Cardiovascular:  S1, S2 Normal, Regular rhythm, no murmurs or gallops, No pericardial  rubs. Pulmonary: Diminished breath sounds throughout all lung areas but no wheezing or rhonchi are noted and there is no pleural rubs  Abdomen: Not examined  Extremities: no edema, No DVT, mild clubbing of fingernails  Neurologic:  Awake and Alert, No focal deficits    Radiology: Chest CT on 1/14/2019 showed significant interval improvement of the right lung consolidative changes with a stable 8 mm solid pulmonary nodule of the left lower lobe compared to 11/11/2016  PFT: Office spirometry on 6/10/2019 demonstrated a very severe obstructive defect with no significant response to bronchodilators        ASSESSMENT:    1. COPD, very severe ([address]    2. Chronic hypoxemic respiratory failure (HCC)    3. Shortness of breath          PLAN:   I will make no change in her current bronchodilator therapy. I may consider repeating her CT chest in the near future to follow-up on her lung nodule.   I will continue to follow her  Return in about 4 months (around 6/4/2020) for Recheck for COPD, Recheck for Shortness of Breath, chronic hypoxemic

## 2020-02-12 ENCOUNTER — OFFICE VISIT (OUTPATIENT)
Dept: FAMILY MEDICINE CLINIC | Age: 75
End: 2020-02-12
Payer: MEDICARE

## 2020-02-12 VITALS
RESPIRATION RATE: 20 BRPM | DIASTOLIC BLOOD PRESSURE: 99 MMHG | OXYGEN SATURATION: 96 % | SYSTOLIC BLOOD PRESSURE: 152 MMHG | HEIGHT: 65 IN | WEIGHT: 147 LBS | HEART RATE: 83 BPM | BODY MASS INDEX: 24.49 KG/M2

## 2020-02-12 LAB
INTERNATIONAL NORMALIZATION RATIO, POC: 3.2
PROTHROMBIN TIME, POC: NORMAL

## 2020-02-12 PROCEDURE — 85610 PROTHROMBIN TIME: CPT | Performed by: NURSE PRACTITIONER

## 2020-02-12 PROCEDURE — 3017F COLORECTAL CA SCREEN DOC REV: CPT | Performed by: NURSE PRACTITIONER

## 2020-02-12 PROCEDURE — G8427 DOCREV CUR MEDS BY ELIG CLIN: HCPCS | Performed by: NURSE PRACTITIONER

## 2020-02-12 PROCEDURE — 1090F PRES/ABSN URINE INCON ASSESS: CPT | Performed by: NURSE PRACTITIONER

## 2020-02-12 PROCEDURE — G8400 PT W/DXA NO RESULTS DOC: HCPCS | Performed by: NURSE PRACTITIONER

## 2020-02-12 PROCEDURE — 4040F PNEUMOC VAC/ADMIN/RCVD: CPT | Performed by: NURSE PRACTITIONER

## 2020-02-12 PROCEDURE — 1123F ACP DISCUSS/DSCN MKR DOCD: CPT | Performed by: NURSE PRACTITIONER

## 2020-02-12 PROCEDURE — 3023F SPIROM DOC REV: CPT | Performed by: NURSE PRACTITIONER

## 2020-02-12 PROCEDURE — G8926 SPIRO NO PERF OR DOC: HCPCS | Performed by: NURSE PRACTITIONER

## 2020-02-12 PROCEDURE — G8420 CALC BMI NORM PARAMETERS: HCPCS | Performed by: NURSE PRACTITIONER

## 2020-02-12 PROCEDURE — G8482 FLU IMMUNIZE ORDER/ADMIN: HCPCS | Performed by: NURSE PRACTITIONER

## 2020-02-12 PROCEDURE — 99213 OFFICE O/P EST LOW 20 MIN: CPT | Performed by: NURSE PRACTITIONER

## 2020-02-12 PROCEDURE — 1036F TOBACCO NON-USER: CPT | Performed by: NURSE PRACTITIONER

## 2020-02-12 RX ORDER — BENZONATATE 100 MG/1
100 CAPSULE ORAL 3 TIMES DAILY PRN
Qty: 30 CAPSULE | Refills: 0 | Status: SHIPPED | OUTPATIENT
Start: 2020-02-12 | End: 2020-02-19

## 2020-02-12 RX ORDER — PREDNISONE 10 MG/1
TABLET ORAL
Qty: 20 TABLET | Refills: 0 | Status: SHIPPED | OUTPATIENT
Start: 2020-02-12 | End: 2020-09-15

## 2020-02-12 RX ORDER — AZITHROMYCIN 250 MG/1
250 TABLET, FILM COATED ORAL SEE ADMIN INSTRUCTIONS
Qty: 6 TABLET | Refills: 0 | Status: SHIPPED | OUTPATIENT
Start: 2020-02-12 | End: 2020-02-17

## 2020-02-12 ASSESSMENT — ENCOUNTER SYMPTOMS
SINUS PRESSURE: 1
SHORTNESS OF BREATH: 0
COLOR CHANGE: 0
RHINORRHEA: 1
VOMITING: 0
ABDOMINAL PAIN: 0
CHEST TIGHTNESS: 0
DIARRHEA: 0
APNEA: 0
SINUS PAIN: 0
COUGH: 1
NAUSEA: 0

## 2020-02-12 ASSESSMENT — PATIENT HEALTH QUESTIONNAIRE - PHQ9
2. FEELING DOWN, DEPRESSED OR HOPELESS: 0
SUM OF ALL RESPONSES TO PHQ9 QUESTIONS 1 & 2: 0
SUM OF ALL RESPONSES TO PHQ QUESTIONS 1-9: 0
SUM OF ALL RESPONSES TO PHQ QUESTIONS 1-9: 0
DEPRESSION UNABLE TO ASSESS: FUNCTIONAL CAPACITY MOTIVATION LIMITS ACCURACY
1. LITTLE INTEREST OR PLEASURE IN DOING THINGS: 0

## 2020-02-12 NOTE — PROGRESS NOTES
Espinoza Pickup  1945 02/12/20    Chief Complaint   Patient presents with    Cough     light green mucus production, sore throat sx started 2 days ago     Coagulation Disorder       SUBJECTIVE:      Mrs Heath Christianson is a current patient of Dr Simba Moralez who presents to the office this afternoon for c/o chest congestion, mild wheezing, and cough productive intermittent of green mucus over the last 4-5 days. Denies any fevers, chills, nausea, emesis, diarrhea. [facility] s/p pulmonary embolism. She denies any c/o blood in stools, blood in urine or other overt signs of obvious bleeding. Anticoagulation: Patient here for followup of chronic anticoagulation. Indication: PE  Bleeding Signs/Symptoms:  None  Missed Coumadin Doses:  None  Medication Changes:  no  Dietary Changes:  no    VITAL SIGNS reviewed    PLAN:  Alternate 6 mg, 3 mg every other day while on ATB     Review of Systems   Constitutional: Negative for activity change, appetite change, fatigue and fever. HENT: Positive for congestion, postnasal drip, rhinorrhea and sinus pressure. Negative for nosebleeds and sinus pain. Respiratory: Positive for cough. Negative for apnea, chest tightness and shortness of breath. Cardiovascular: Negative for chest pain and palpitations. Gastrointestinal: Negative for abdominal pain, diarrhea, nausea and vomiting. Genitourinary: Negative for difficulty urinating, flank pain and hematuria. Musculoskeletal: Negative for arthralgias, joint swelling and myalgias. Skin: Negative for color change and rash. Neurological: Negative for dizziness, light-headedness and headaches. Psychiatric/Behavioral: Negative. Negative for behavioral problems. OBJECTIVE:    BP (!) 152/99   Pulse 83   Resp 20   Ht 5' 5\" (1.651 m)   Wt 147 lb (66.7 kg)   SpO2 96%   BMI 24.46 kg/m²     Physical Exam  Constitutional:       Appearance: She is well-developed.    HENT:      Right Ear: External ear

## 2020-02-12 NOTE — PATIENT INSTRUCTIONS
While on Azithromycin take coumadin as below    Day 1: 3 mg  Day 2: 6 mg  Day 3: 3 mg   Day 4: 6 mg  Day 5: 3 mg

## 2020-03-11 ENCOUNTER — NURSE ONLY (OUTPATIENT)
Dept: FAMILY MEDICINE CLINIC | Age: 75
End: 2020-03-11
Payer: MEDICARE

## 2020-03-11 ENCOUNTER — ANTI-COAG VISIT (OUTPATIENT)
Dept: FAMILY MEDICINE CLINIC | Age: 75
End: 2020-03-11

## 2020-03-11 LAB
INTERNATIONAL NORMALIZATION RATIO, POC: 3.2
PROTHROMBIN TIME, POC: NORMAL

## 2020-03-11 PROCEDURE — 85610 PROTHROMBIN TIME: CPT | Performed by: FAMILY MEDICINE

## 2020-03-11 RX ORDER — WARFARIN SODIUM 6 MG/1
TABLET ORAL
Qty: 30 TABLET | Refills: 5 | Status: SHIPPED | OUTPATIENT
Start: 2020-03-11 | End: 2020-07-20 | Stop reason: SDUPTHER

## 2020-03-31 ENCOUNTER — PATIENT MESSAGE (OUTPATIENT)
Dept: FAMILY MEDICINE CLINIC | Age: 75
End: 2020-03-31

## 2020-04-02 NOTE — TELEPHONE ENCOUNTER
Patient won't be picking up the refills of the albuterol (it is too expensive that way---she has to go every week to ) She uses 4 tubes everyday and there are only 25 tubes in one package. Please re-order the albuterol for 14 packages which is cheaper and will last about 3 months for her.     Pharmacy:  [facility] on [address]

## 2020-04-03 RX ORDER — ALBUTEROL SULFATE 2.5 MG/3ML
2.5 SOLUTION RESPIRATORY (INHALATION) 4 TIMES DAILY PRN
Qty: 14 PACKAGE | Refills: 0 | Status: SHIPPED | OUTPATIENT
Start: 2020-04-03 | End: 2020-08-07 | Stop reason: SDUPTHER

## 2020-04-08 ENCOUNTER — PATIENT MESSAGE (OUTPATIENT)
Dept: FAMILY MEDICINE CLINIC | Age: 75
End: 2020-04-08

## 2020-04-08 ENCOUNTER — NURSE ONLY (OUTPATIENT)
Dept: FAMILY MEDICINE CLINIC | Age: 75
End: 2020-04-08
Payer: MEDICARE

## 2020-04-08 LAB — INTERNATIONAL NORMALIZATION RATIO, POC: 2

## 2020-04-08 PROCEDURE — 85610 PROTHROMBIN TIME: CPT | Performed by: FAMILY MEDICINE

## 2020-04-08 NOTE — TELEPHONE ENCOUNTER
From: Mando Leon  To: Radha Cloud MD  Sent: 4/8/2020 2:57 PM EDT  Subject: Prescription Question    I have a question about POCT INR - ANTICOAG [facility] resulted on 4/8/20, 10:49 AM.    Do I need to alter my Coumadin dosage or keep doing 6,6,3,6,6?

## 2020-04-09 ENCOUNTER — ANTI-COAG VISIT (OUTPATIENT)
Dept: FAMILY MEDICINE CLINIC | Age: 75
End: 2020-04-09
Payer: MEDICARE

## 2020-04-09 LAB — INTERNATIONAL NORMALIZATION RATIO, POC: 2

## 2020-04-09 PROCEDURE — 85610 PROTHROMBIN TIME: CPT | Performed by: FAMILY MEDICINE

## 2020-04-09 RX ORDER — ESOMEPRAZOLE MAGNESIUM 40 MG/1
40 CAPSULE, DELAYED RELEASE ORAL 2 TIMES DAILY
Qty: 180 CAPSULE | Refills: 0 | Status: SHIPPED | OUTPATIENT
Start: 2020-04-09 | End: 2020-06-04 | Stop reason: SDUPTHER

## 2020-04-15 RX ORDER — LEVOTHYROXINE SODIUM 0.05 MG/1
TABLET ORAL
Qty: 90 TABLET | Refills: 0 | OUTPATIENT
Start: 2020-04-15

## 2020-04-15 RX ORDER — ESOMEPRAZOLE MAGNESIUM 40 MG/1
CAPSULE, DELAYED RELEASE ORAL
Qty: 180 CAPSULE | Refills: 0 | OUTPATIENT
Start: 2020-04-15

## 2020-04-15 RX ORDER — LEVOTHYROXINE SODIUM 0.05 MG/1
50 TABLET ORAL DAILY
Qty: 90 TABLET | Refills: 0 | Status: SHIPPED | OUTPATIENT
Start: 2020-04-15 | End: 2020-06-04 | Stop reason: SDUPTHER

## 2020-04-16 RX ORDER — CELECOXIB 200 MG/1
CAPSULE ORAL
Qty: 90 CAPSULE | Refills: 0 | Status: SHIPPED | OUTPATIENT
Start: 2020-04-16 | End: 2020-06-04 | Stop reason: SDUPTHER

## 2020-04-20 RX ORDER — GABAPENTIN 400 MG/1
400 CAPSULE ORAL EVERY EVENING
Qty: 90 CAPSULE | Refills: 0 | Status: SHIPPED | OUTPATIENT
Start: 2020-04-20 | End: 2020-06-04 | Stop reason: SDUPTHER

## 2020-04-22 RX ORDER — MONTELUKAST SODIUM 10 MG/1
TABLET ORAL
Qty: 90 TABLET | Refills: 0 | Status: SHIPPED | OUTPATIENT
Start: 2020-04-22 | End: 2020-06-04 | Stop reason: SDUPTHER

## 2020-04-24 RX ORDER — ROSUVASTATIN CALCIUM 20 MG/1
TABLET, COATED ORAL
Qty: 90 TABLET | Refills: 0 | Status: SHIPPED | OUTPATIENT
Start: 2020-04-24 | End: 2020-06-04 | Stop reason: SDUPTHER

## 2020-04-27 RX ORDER — VITAMIN E (DL,TOCOPHERYL ACET) 180 MG
CAPSULE ORAL
Qty: 90 CAPSULE | Refills: 0 | Status: SHIPPED | OUTPATIENT
Start: 2020-04-27 | End: 2020-06-04 | Stop reason: SDUPTHER

## 2020-05-05 ENCOUNTER — TELEPHONE (OUTPATIENT)
Dept: FAMILY MEDICINE CLINIC | Age: 75
End: 2020-05-05

## 2020-05-07 ENCOUNTER — NURSE ONLY (OUTPATIENT)
Dept: FAMILY MEDICINE CLINIC | Age: 75
End: 2020-05-07
Payer: MEDICARE

## 2020-05-07 LAB
INTERNATIONAL NORMALIZATION RATIO, POC: 2
PROTHROMBIN TIME, POC: NORMAL

## 2020-05-07 PROCEDURE — 99024 POSTOP FOLLOW-UP VISIT: CPT | Performed by: FAMILY MEDICINE

## 2020-05-07 PROCEDURE — 85610 PROTHROMBIN TIME: CPT | Performed by: FAMILY MEDICINE

## 2020-05-19 ENCOUNTER — PATIENT MESSAGE (OUTPATIENT)
Dept: FAMILY MEDICINE CLINIC | Age: 75
End: 2020-05-19

## 2020-05-20 NOTE — TELEPHONE ENCOUNTER
From: Luli Muniz  To: Tommie Daigle MD  Sent: 5/19/2020 3:37 PM EDT  Subject: Non-Urgent Medical Question    I have an appt on June 4, at 10:00, which is a reg. Checkup. I also need a protime done. My question is, since I am high risk, do I keep the Dr. John Bazan? If not I need u to come down to the car like the last two months. As far as I can tell there is nothing going on health wise that I need to see Dr. Cheo Sandoval. If you can't understand my question, please call me.

## 2020-05-28 ENCOUNTER — OFFICE VISIT (OUTPATIENT)
Dept: PULMONOLOGY | Age: 75
End: 2020-05-28
Payer: MEDICARE

## 2020-05-28 VITALS
SYSTOLIC BLOOD PRESSURE: 118 MMHG | DIASTOLIC BLOOD PRESSURE: 60 MMHG | TEMPERATURE: 97.9 F | HEIGHT: 65 IN | BODY MASS INDEX: 27.49 KG/M2 | HEART RATE: 74 BPM | OXYGEN SATURATION: 96 % | WEIGHT: 165 LBS

## 2020-05-28 PROCEDURE — 3023F SPIROM DOC REV: CPT | Performed by: INTERNAL MEDICINE

## 2020-05-28 PROCEDURE — 4040F PNEUMOC VAC/ADMIN/RCVD: CPT | Performed by: INTERNAL MEDICINE

## 2020-05-28 PROCEDURE — 3017F COLORECTAL CA SCREEN DOC REV: CPT | Performed by: INTERNAL MEDICINE

## 2020-05-28 PROCEDURE — G8427 DOCREV CUR MEDS BY ELIG CLIN: HCPCS | Performed by: INTERNAL MEDICINE

## 2020-05-28 PROCEDURE — 1090F PRES/ABSN URINE INCON ASSESS: CPT | Performed by: INTERNAL MEDICINE

## 2020-05-28 PROCEDURE — 99213 OFFICE O/P EST LOW 20 MIN: CPT | Performed by: INTERNAL MEDICINE

## 2020-05-28 PROCEDURE — 1123F ACP DISCUSS/DSCN MKR DOCD: CPT | Performed by: INTERNAL MEDICINE

## 2020-05-28 PROCEDURE — G8926 SPIRO NO PERF OR DOC: HCPCS | Performed by: INTERNAL MEDICINE

## 2020-05-28 PROCEDURE — 1036F TOBACCO NON-USER: CPT | Performed by: INTERNAL MEDICINE

## 2020-05-28 PROCEDURE — G8417 CALC BMI ABV UP PARAM F/U: HCPCS | Performed by: INTERNAL MEDICINE

## 2020-05-28 PROCEDURE — G8400 PT W/DXA NO RESULTS DOC: HCPCS | Performed by: INTERNAL MEDICINE

## 2020-06-04 ENCOUNTER — OFFICE VISIT (OUTPATIENT)
Dept: FAMILY MEDICINE CLINIC | Age: 75
End: 2020-06-04
Payer: MEDICARE

## 2020-06-04 ENCOUNTER — ANTI-COAG VISIT (OUTPATIENT)
Dept: FAMILY MEDICINE CLINIC | Age: 75
End: 2020-06-04

## 2020-06-04 VITALS
BODY MASS INDEX: 28.71 KG/M2 | DIASTOLIC BLOOD PRESSURE: 64 MMHG | HEART RATE: 75 BPM | TEMPERATURE: 97.6 F | OXYGEN SATURATION: 98 % | SYSTOLIC BLOOD PRESSURE: 128 MMHG | WEIGHT: 168.2 LBS | HEIGHT: 64 IN

## 2020-06-04 DIAGNOSIS — I10 ESSENTIAL HYPERTENSION: ICD-10-CM

## 2020-06-04 DIAGNOSIS — E78.00 PURE HYPERCHOLESTEROLEMIA: ICD-10-CM

## 2020-06-04 DIAGNOSIS — E03.9 ACQUIRED HYPOTHYROIDISM: ICD-10-CM

## 2020-06-04 LAB
A/G RATIO: 2.2 (ref 1.1–2.2)
ALBUMIN SERPL-MCNC: 4 G/DL (ref 3.4–5)
ALP BLD-CCNC: 57 U/L (ref 40–129)
ALT SERPL-CCNC: 18 U/L (ref 10–40)
ANION GAP SERPL CALCULATED.3IONS-SCNC: 12 MMOL/L (ref 3–16)
AST SERPL-CCNC: 25 U/L (ref 15–37)
BASOPHILS ABSOLUTE: 0.1 K/UL (ref 0–0.2)
BASOPHILS RELATIVE PERCENT: 0.7 %
BILIRUB SERPL-MCNC: 0.3 MG/DL (ref 0–1)
BUN BLDV-MCNC: 12 MG/DL (ref 7–20)
CALCIUM SERPL-MCNC: 9.4 MG/DL (ref 8.3–10.6)
CHLORIDE BLD-SCNC: 97 MMOL/L (ref 99–110)
CHOLESTEROL, TOTAL: 190 MG/DL (ref 0–199)
CO2: 34 MMOL/L (ref 21–32)
CREAT SERPL-MCNC: 0.7 MG/DL (ref 0.6–1.2)
EOSINOPHILS ABSOLUTE: 0 K/UL (ref 0–0.6)
EOSINOPHILS RELATIVE PERCENT: 0.4 %
GFR AFRICAN AMERICAN: >60
GFR NON-AFRICAN AMERICAN: >60
GLOBULIN: 1.8 G/DL
GLUCOSE BLD-MCNC: 91 MG/DL (ref 70–99)
HCT VFR BLD CALC: 42.3 % (ref 36–48)
HDLC SERPL-MCNC: 86 MG/DL (ref 40–60)
HEMOGLOBIN: 13.9 G/DL (ref 12–16)
INTERNATIONAL NORMALIZATION RATIO, POC: 1.7
LDL CHOLESTEROL CALCULATED: 80 MG/DL
LYMPHOCYTES ABSOLUTE: 2.1 K/UL (ref 1–5.1)
LYMPHOCYTES RELATIVE PERCENT: 29.5 %
MCH RBC QN AUTO: 31.2 PG (ref 26–34)
MCHC RBC AUTO-ENTMCNC: 32.8 G/DL (ref 31–36)
MCV RBC AUTO: 94.9 FL (ref 80–100)
MONOCYTES ABSOLUTE: 0.6 K/UL (ref 0–1.3)
MONOCYTES RELATIVE PERCENT: 8.8 %
NEUTROPHILS ABSOLUTE: 4.4 K/UL (ref 1.7–7.7)
NEUTROPHILS RELATIVE PERCENT: 60.6 %
PDW BLD-RTO: 14.9 % (ref 12.4–15.4)
PLATELET # BLD: 208 K/UL (ref 135–450)
PMV BLD AUTO: 8.6 FL (ref 5–10.5)
POTASSIUM SERPL-SCNC: 3.4 MMOL/L (ref 3.5–5.1)
PROTHROMBIN TIME, POC: ABNORMAL
RBC # BLD: 4.45 M/UL (ref 4–5.2)
SODIUM BLD-SCNC: 143 MMOL/L (ref 136–145)
TOTAL PROTEIN: 5.8 G/DL (ref 6.4–8.2)
TRIGL SERPL-MCNC: 121 MG/DL (ref 0–150)
TSH REFLEX FT4: 2.03 UIU/ML (ref 0.27–4.2)
VLDLC SERPL CALC-MCNC: 24 MG/DL
WBC # BLD: 7.2 K/UL (ref 4–11)

## 2020-06-04 PROCEDURE — 99214 OFFICE O/P EST MOD 30 MIN: CPT | Performed by: FAMILY MEDICINE

## 2020-06-04 PROCEDURE — 85610 PROTHROMBIN TIME: CPT | Performed by: FAMILY MEDICINE

## 2020-06-04 RX ORDER — THEOPHYLLINE 450 MG/1
TABLET, EXTENDED RELEASE ORAL
Qty: 90 TABLET | Refills: 1 | Status: SHIPPED | OUTPATIENT
Start: 2020-06-04 | End: 2020-12-04 | Stop reason: SDUPTHER

## 2020-06-04 RX ORDER — CELECOXIB 200 MG/1
CAPSULE ORAL
Qty: 90 CAPSULE | Refills: 1 | Status: SHIPPED | OUTPATIENT
Start: 2020-06-04 | End: 2021-01-06

## 2020-06-04 RX ORDER — ROSUVASTATIN CALCIUM 20 MG/1
TABLET, COATED ORAL
Qty: 90 TABLET | Refills: 1 | Status: SHIPPED | OUTPATIENT
Start: 2020-06-04 | End: 2020-12-04 | Stop reason: SDUPTHER

## 2020-06-04 RX ORDER — LEVOTHYROXINE SODIUM 0.05 MG/1
50 TABLET ORAL DAILY
Qty: 90 TABLET | Refills: 1 | Status: SHIPPED | OUTPATIENT
Start: 2020-06-04 | End: 2020-12-04 | Stop reason: SDUPTHER

## 2020-06-04 RX ORDER — POTASSIUM CHLORIDE 750 MG/1
TABLET, EXTENDED RELEASE ORAL
Qty: 90 TABLET | Refills: 1 | Status: SHIPPED | OUTPATIENT
Start: 2020-06-04 | End: 2020-06-09

## 2020-06-04 RX ORDER — VITAMIN E (DL,TOCOPHERYL ACET) 180 MG
CAPSULE ORAL
Qty: 90 CAPSULE | Refills: 1 | Status: SHIPPED | OUTPATIENT
Start: 2020-06-04 | End: 2020-10-19 | Stop reason: SDUPTHER

## 2020-06-04 RX ORDER — ESOMEPRAZOLE MAGNESIUM 40 MG/1
40 CAPSULE, DELAYED RELEASE ORAL 2 TIMES DAILY
Qty: 180 CAPSULE | Refills: 1 | Status: SHIPPED | OUTPATIENT
Start: 2020-06-04 | End: 2020-12-04 | Stop reason: SDUPTHER

## 2020-06-04 RX ORDER — MONTELUKAST SODIUM 10 MG/1
TABLET ORAL
Qty: 90 TABLET | Refills: 1 | Status: SHIPPED | OUTPATIENT
Start: 2020-06-04 | End: 2020-12-04 | Stop reason: SDUPTHER

## 2020-06-04 RX ORDER — RISEDRONATE SODIUM 35 MG/1
TABLET, FILM COATED ORAL
Qty: 12 TABLET | Refills: 1 | Status: SHIPPED | OUTPATIENT
Start: 2020-06-04 | End: 2020-07-01 | Stop reason: SDUPTHER

## 2020-06-04 RX ORDER — GABAPENTIN 400 MG/1
400 CAPSULE ORAL EVERY EVENING
Qty: 90 CAPSULE | Refills: 0 | Status: SHIPPED | OUTPATIENT
Start: 2020-06-04 | End: 2020-08-27 | Stop reason: SDUPTHER

## 2020-06-04 NOTE — PROGRESS NOTES
Per  pt is to keep coumadin dose at coumadin 6/6/3mg alternating.  inr 1.7. per dr Cris Gan change to 6mg qd recheck in 4 weeks

## 2020-06-05 NOTE — PROGRESS NOTES
pneumonia ([facility] 2018    On home oxygen therapy     on     Osteopenia     Phlebitis     Phlebitis     Pulmonary nodule 2016    S/P left heart catheterization by percutaneous approach 2013    Shortness of breath 2019    Stasis ulcer ([facility]     Wears glasses        FAMILY HISTORY  Family History   Problem Relation Age of Onset    Heart Disease Mother     Cancer Father         lung ca    Heart Disease Father     COPD Father     Cancer Sister     Heart Disease Sister        SOCIAL HISTORY  Social History     Socioeconomic History    Marital status:      Spouse name: None    Number of children: None    Years of education: None    Highest education level: None   Occupational History    None   Social Needs    Financial resource strain: None    Food insecurity     Worry: None     Inability: None    Transportation needs     Medical: None     Non-medical: None   Tobacco Use    Smoking status: Former Smoker     Packs/day: 1.50     Years: 26.00     Pack years: 39.00     Types: Cigarettes     Start date:      Last attempt to quit: 1991     Years since quittin.5    Smokeless tobacco: Never Used   Substance and Sexual Activity    Alcohol use: No    Drug use: No    Sexual activity: Not Currently     Partners: Male   Lifestyle    Physical activity     Days per week: None     Minutes per session: None    Stress: None   Relationships    Social connections     Talks on phone: None     Gets together: None     Attends Hindu service: None     Active member of club or organization: None     Attends meetings of clubs or organizations: None     Relationship status: None    Intimate partner violence     Fear of current or ex partner: None     Emotionally abused: None     Physically abused: None     Forced sexual activity: None   Other Topics Concern    None   Social History Narrative    None        SURGICAL HISTORY  Past Surgical History:   Procedure Laterality Date    Alan Kumar    COLONOSCOPY      ENDOSCOPY, COLON, DIAGNOSTIC      TONSILLECTOMY      TUBAL LIGATION      VEIN SURGERY         CURRENT MEDICATIONS  Current Outpatient Medications   Medication Sig Dispense Refill    Magnesium Oxide 500 MG CAPS TAKE 1 CAPSULE BY MOUTH EVERY DAY 90 capsule 1    rosuvastatin (CRESTOR) 20 MG tablet TAKE 1 TABLET BY MOUTH EVERY DAY EVERY NIGHT 90 tablet 1    montelukast (SINGULAIR) 10 MG tablet TAKE 1 TABLET BY MOUTH EVERY DAY EVERY NIGHT 90 tablet 1    gabapentin (NEURONTIN) 400 MG capsule Take 1 capsule by mouth every evening for 90 days.  90 capsule 0    celecoxib (CELEBREX) 200 MG capsule TAKE 1 CAPSULE BY MOUTH EVERY DAY 90 capsule 1    levothyroxine (SYNTHROID) 50 MCG tablet Take 1 tablet by mouth Daily 90 tablet 1    esomeprazole (NEXIUM) 40 MG delayed release capsule Take 1 capsule by mouth 2 times daily 180 capsule 1    potassium chloride (KLOR-CON M10) 10 MEQ extended release tablet TAKE 1 TABLET BY MOUTH EVERY DAY 90 tablet 1    risedronate (ACTONEL) 35 MG tablet TAKE 1 TABLET BY MOUTH EVERY 7 DAYS PATIENT TAKES ON SUNDAY 12 tablet 1    Roflumilast (DALIRESP) 500 MCG tablet Take 1 tablet by mouth daily 90 tablet 1    theophylline (THEODUR) 450 MG extended release tablet TAKE 1/2 TABLET TWICE A DAY 90 tablet 1    furosemide (LASIX) 40 MG tablet TAKE 1 TABLET BY MOUTH EVERY DAY 90 tablet 1    albuterol (PROVENTIL) (2.5 MG/3ML) 0.083% nebulizer solution Take 3 mLs by nebulization 4 times daily as needed for Wheezing 14 Package 0    tiotropium (SPIRIVA HANDIHALER) 18 MCG inhalation capsule INHALE 1 PUFF BY MOUTH AS DIRECTED ONCE A DAY 30 capsule 2    warfarin (COUMADIN) 6 MG tablet TAKE 1 TABLET BY MOUTH EVERY DAY PER INR 30 tablet 5    predniSONE (DELTASONE) 10 MG tablet Take 4 tablets daily for 2 days, then 3 tablets for 2 days, 2 tablets for 2 days, then 1 tablet for 2 days (Patient taking differently: Take 5 mg tenderness, supple  Lymphatic:  No lymphadenopathy noted  Cardiovascular:  Normal heart rate, S1S2 nl  Thorax & Lungs: Chronically diminished breath sounds, no respiratory distress, expiratory wheezing  Skin:  Warm, dry, no erythema, no rash  Back:  straight  Extremities:  No edema, no tenderness, no cyanosis  Musculoskeletal:  Good range of motion   Neurologic:  Alert & oriented X 3      ASSESSMENT & PLAN    1. Acquired hypothyroidism  Issue is stable check labs today. Adjust medication off of lab results. - levothyroxine (SYNTHROID) 50 MCG tablet; Take 1 tablet by mouth Daily  Dispense: 90 tablet; Refill: 1  - TSH WITH REFLEX TO FT4; Future    2. Anticoagulant long-term use  Increase Coumadin to 6 mg daily recheck in 4 weeks  - POCT INR    3. COPD, very severe ([address]  Issue controlled. Continue meds. Refilled meds. - montelukast (SINGULAIR) 10 MG tablet; TAKE 1 TABLET BY MOUTH EVERY DAY EVERY NIGHT  Dispense: 90 tablet; Refill: 1  - Roflumilast (DALIRESP) 500 MCG tablet; Take 1 tablet by mouth daily  Dispense: 90 tablet; Refill: 1  - theophylline (THEODUR) 450 MG extended release tablet; TAKE 1/2 TABLET TWICE A DAY  Dispense: 90 tablet; Refill: 1    4. Essential hypertension  Issue controlled. Continue meds. Refilled meds. - Magnesium Oxide 500 MG CAPS; TAKE 1 CAPSULE BY MOUTH EVERY DAY  Dispense: 90 capsule; Refill: 1  - potassium chloride (KLOR-CON M10) 10 MEQ extended release tablet; TAKE 1 TABLET BY MOUTH EVERY DAY  Dispense: 90 tablet; Refill: 1  - Comprehensive Metabolic Panel; Future  - CBC Auto Differential; Future    5. Pure hypercholesterolemia  Issue is stable check labs today. Adjust medication off of lab results. - rosuvastatin (CRESTOR) 20 MG tablet; TAKE 1 TABLET BY MOUTH EVERY DAY EVERY NIGHT  Dispense: 90 tablet; Refill: 1  - Lipid Panel;  Future      Follow-up 6 months    Electronically signed by Radha Cloud MD on 6/5/2020

## 2020-06-09 RX ORDER — POTASSIUM CHLORIDE 750 MG/1
TABLET, EXTENDED RELEASE ORAL
Qty: 90 TABLET | Refills: 1
Start: 2020-06-09 | End: 2020-09-14 | Stop reason: SDUPTHER

## 2020-06-22 RX ORDER — TIOTROPIUM BROMIDE 18 UG/1
CAPSULE ORAL; RESPIRATORY (INHALATION)
Qty: 30 CAPSULE | Refills: 5 | Status: SHIPPED | OUTPATIENT
Start: 2020-06-22 | End: 2020-12-04 | Stop reason: SDUPTHER

## 2020-07-01 ENCOUNTER — HOSPITAL ENCOUNTER (OUTPATIENT)
Age: 75
Discharge: HOME | End: 2020-07-01
Payer: MEDICARE

## 2020-07-01 ENCOUNTER — NURSE ONLY (OUTPATIENT)
Dept: FAMILY MEDICINE CLINIC | Age: 75
End: 2020-07-01

## 2020-07-01 ENCOUNTER — HOSPITAL ENCOUNTER (OUTPATIENT)
Dept: GENERAL RADIOLOGY | Age: 75
Discharge: HOME | End: 2020-07-01
Payer: MEDICARE

## 2020-07-01 ENCOUNTER — ANTI-COAG VISIT (OUTPATIENT)
Dept: FAMILY MEDICINE CLINIC | Age: 75
End: 2020-07-01
Payer: MEDICARE

## 2020-07-01 LAB
INTERNATIONAL NORMALIZATION RATIO, POC: 3.8
PROTHROMBIN TIME, POC: ABNORMAL

## 2020-07-01 PROCEDURE — 85610 PROTHROMBIN TIME: CPT | Performed by: FAMILY MEDICINE

## 2020-07-01 PROCEDURE — 71046 X-RAY EXAM CHEST 2 VIEWS: CPT

## 2020-07-01 RX ORDER — RISEDRONATE SODIUM 35 MG/1
TABLET, FILM COATED ORAL
Qty: 12 TABLET | Refills: 1 | Status: SHIPPED | OUTPATIENT
Start: 2020-07-01 | End: 2020-12-04 | Stop reason: SDUPTHER

## 2020-07-01 RX ORDER — RISEDRONATE SODIUM 35 MG/1
TABLET, FILM COATED ORAL
Qty: 12 TABLET | Refills: 1 | OUTPATIENT
Start: 2020-07-01

## 2020-07-01 NOTE — TELEPHONE ENCOUNTER
Requested Prescriptions     Signed Prescriptions Disp Refills    risedronate (ACTONEL) 35 MG tablet 12 tablet 1     Sig: TAKE 1 TABLET BY MOUTH EVERY 7 DAYS PATIENT TAKES ON SUNDAY     Authorizing Provider: Suki Lacy     Ordering User: Annelise Gonzales

## 2020-07-01 NOTE — TELEPHONE ENCOUNTER
To Marisa-    Patient wants to know why her Actonel was declined----she only 1 tablet left-----she just had an appt on 6/4/20----the appt for 7/1/20 is for an INR-----could you please call and let her know what she should do.         Phone: [phone]

## 2020-07-01 NOTE — PROGRESS NOTES
Confirmed current coumadin 6 mg qd.  inr 3.8. per valencia nichols change to 6/6/6/3 recheck in 2 weeks

## 2020-07-07 ENCOUNTER — TELEPHONE (OUTPATIENT)
Dept: FAMILY MEDICINE CLINIC | Age: 75
End: 2020-07-07

## 2020-07-07 NOTE — TELEPHONE ENCOUNTER
New script for hand held battery operated nebulizer.  The one she has now is Aero nebgo  Can you fax to [address] [place] [address]    [phone]

## 2020-07-15 ENCOUNTER — NURSE ONLY (OUTPATIENT)
Dept: FAMILY MEDICINE CLINIC | Age: 75
End: 2020-07-15
Payer: MEDICARE

## 2020-07-15 ENCOUNTER — ANTI-COAG VISIT (OUTPATIENT)
Dept: FAMILY MEDICINE CLINIC | Age: 75
End: 2020-07-15

## 2020-07-15 LAB
INTERNATIONAL NORMALIZATION RATIO, POC: 2.2
PROTHROMBIN TIME, POC: NORMAL

## 2020-07-15 PROCEDURE — 85610 PROTHROMBIN TIME: CPT | Performed by: FAMILY MEDICINE

## 2020-07-20 ENCOUNTER — TELEPHONE (OUTPATIENT)
Dept: FAMILY MEDICINE CLINIC | Age: 75
End: 2020-07-20

## 2020-07-20 RX ORDER — WARFARIN SODIUM 3 MG/1
TABLET ORAL
Qty: 30 TABLET | Refills: 0 | Status: SHIPPED | OUTPATIENT
Start: 2020-07-20 | End: 2020-08-17

## 2020-07-20 NOTE — TELEPHONE ENCOUNTER
Requested Prescriptions     Signed Prescriptions Disp Refills    fluticasone-salmeterol (ADVAIR DISKUS) 500-50 MCG/DOSE diskus inhaler 1 each 4     Sig: Inhale 1 puff into the lungs every 12 hours     Authorizing Provider: Thuy Minor     Ordering User: MARCO A Bueno    warfarin (COUMADIN) 3 MG tablet 30 tablet 0     Sig: TAKE 1 TABLET BY MOUTH EVERY DAY PER INR     Authorizing Provider: Thuy Minor     Ordering User: Caleb Urbano

## 2020-07-28 ENCOUNTER — NURSE ONLY (OUTPATIENT)
Dept: PULMONOLOGY | Age: 75
End: 2020-07-28
Payer: MEDICARE

## 2020-07-28 VITALS — TEMPERATURE: 97.7 F

## 2020-07-28 LAB
EXPIRATORY TIME-POST: NORMAL
EXPIRATORY TIME: NORMAL
FEF 25-75% %CHNG: NORMAL
FEF 25-75% %PRED-POST: NORMAL
FEF 25-75% %PRED-PRE: NORMAL
FEF 25-75% PRED: NORMAL
FEF 25-75%-POST: NORMAL
FEF 25-75%-PRE: NORMAL
FEV1 %PRED-POST: 23.6 %
FEV1 %PRED-PRE: 24.6 %
FEV1 PRED: 2.2 L
FEV1-POST: 0.52 L
FEV1-PRE: 0.54 L
FEV1/FVC %PRED-POST: 57.7 %
FEV1/FVC %PRED-PRE: 55.7 %
FEV1/FVC PRED: 74.8 %
FEV1/FVC-POST: 43.2 %
FEV1/FVC-PRE: 41.7 %
FVC %PRED-POST: 41.1 L
FVC %PRED-PRE: 44.3 %
FVC PRED: 2.94 L
FVC-POST: 1.21 L
FVC-PRE: 1.3 L
PEF %PRED-POST: NORMAL
PEF %PRED-PRE: NORMAL
PEF PRED: NORMAL
PEF%CHNG: NORMAL
PEF-POST: NORMAL
PEF-PRE: NORMAL

## 2020-07-28 PROCEDURE — 94060 EVALUATION OF WHEEZING: CPT | Performed by: INTERNAL MEDICINE

## 2020-07-28 ASSESSMENT — PULMONARY FUNCTION TESTS
FVC_POST: 1.21
FEV1/FVC_PREDICTED: 74.8
FEV1_PERCENT_PREDICTED_PRE: 24.6
FEV1_PRE: 0.54
FEV1_PREDICTED: 2.20
FVC_PRE: 1.30
FEV1_POST: 0.52
FEV1/FVC_PERCENT_PREDICTED_POST: 57.7
FEV1/FVC_POST: 43.2
FEV1/FVC_PRE: 41.7
FVC_PERCENT_PREDICTED_PRE: 44.3
FVC_PERCENT_PREDICTED_POST: 41.1
FEV1_PERCENT_PREDICTED_POST: 23.6
FEV1/FVC_PERCENT_PREDICTED_PRE: 55.7
FVC_PREDICTED: 2.94

## 2020-08-06 ENCOUNTER — TELEPHONE (OUTPATIENT)
Dept: FAMILY MEDICINE CLINIC | Age: 75
End: 2020-08-06

## 2020-08-12 ENCOUNTER — TELEPHONE (OUTPATIENT)
Dept: FAMILY MEDICINE CLINIC | Age: 75
End: 2020-08-12

## 2020-08-12 ENCOUNTER — NURSE ONLY (OUTPATIENT)
Dept: FAMILY MEDICINE CLINIC | Age: 75
End: 2020-08-12
Payer: MEDICARE

## 2020-08-12 LAB
INTERNATIONAL NORMALIZATION RATIO, POC: 2.1
INTERNATIONAL NORMALIZATION RATIO, POC: 2.1
PROTHROMBIN TIME, POC: NORMAL
PROTHROMBIN TIME, POC: NORMAL

## 2020-08-12 PROCEDURE — 85610 PROTHROMBIN TIME: CPT | Performed by: FAMILY MEDICINE

## 2020-08-12 NOTE — PROGRESS NOTES
Nurse visit, INR check. Patient came to our parking lot, I went out to her and checked her INR level. Level @ 2.1 today, her current dose is 6/6/6/3 mg alternating. Keep the same and re check in 4 weeks.

## 2020-08-17 RX ORDER — WARFARIN SODIUM 3 MG/1
TABLET ORAL
Qty: 30 TABLET | Refills: 0 | Status: SHIPPED | OUTPATIENT
Start: 2020-08-17 | End: 2020-09-11

## 2020-08-17 NOTE — TELEPHONE ENCOUNTER
Requested Prescriptions     Signed Prescriptions Disp Refills    warfarin (COUMADIN) 3 MG tablet 30 tablet 0     Sig: TAKE 1 TABLET BY MOUTH EVERY DAY PER INR     Authorizing Provider: Sera Andino     Ordering User: Nickolas Barba

## 2020-08-26 ENCOUNTER — TELEPHONE (OUTPATIENT)
Dept: PULMONOLOGY | Age: 75
End: 2020-08-26

## 2020-08-26 RX ORDER — PREDNISONE 1 MG/1
5 TABLET ORAL DAILY
Qty: 30 TABLET | Refills: 11 | Status: SHIPPED | OUTPATIENT
Start: 2020-08-26 | End: 2020-09-25

## 2020-08-28 RX ORDER — GABAPENTIN 400 MG/1
400 CAPSULE ORAL EVERY EVENING
Qty: 90 CAPSULE | Refills: 0 | Status: SHIPPED | OUTPATIENT
Start: 2020-08-28 | End: 2020-12-04 | Stop reason: SDUPTHER

## 2020-09-09 ENCOUNTER — NURSE ONLY (OUTPATIENT)
Dept: FAMILY MEDICINE CLINIC | Age: 75
End: 2020-09-09
Payer: MEDICARE

## 2020-09-09 LAB
INTERNATIONAL NORMALIZATION RATIO, POC: 1.9
PROTHROMBIN TIME, POC: NORMAL

## 2020-09-09 PROCEDURE — 85610 PROTHROMBIN TIME: CPT | Performed by: FAMILY MEDICINE

## 2020-09-11 RX ORDER — WARFARIN SODIUM 3 MG/1
TABLET ORAL
Qty: 30 TABLET | Refills: 0 | Status: SHIPPED | OUTPATIENT
Start: 2020-09-11 | End: 2020-10-07

## 2020-09-11 NOTE — TELEPHONE ENCOUNTER
Requested Prescriptions     Signed Prescriptions Disp Refills    warfarin (COUMADIN) 3 MG tablet 30 tablet 0     Sig: TAKE 1 TABLET BY MOUTH EVERY DAY PER INR     Authorizing Provider: Harshal Perez     Ordering User: Jacek Harris

## 2020-09-14 RX ORDER — POTASSIUM CHLORIDE 750 MG/1
TABLET, EXTENDED RELEASE ORAL
Qty: 135 TABLET | Refills: 0 | Status: SHIPPED | OUTPATIENT
Start: 2020-09-14 | End: 2020-12-18

## 2020-09-14 NOTE — TELEPHONE ENCOUNTER
Requested Prescriptions     Signed Prescriptions Disp Refills    potassium chloride (KLOR-CON M10) 10 MEQ extended release tablet 135 tablet 0     Sig: Alternate take 1 tablet and then 2 tablets every other day     Authorizing Provider: Phoebe Haile     Ordering User: Janna Doss

## 2020-09-15 ENCOUNTER — OFFICE VISIT (OUTPATIENT)
Dept: PULMONOLOGY | Age: 75
End: 2020-09-15
Payer: MEDICARE

## 2020-09-15 VITALS
HEIGHT: 64 IN | SYSTOLIC BLOOD PRESSURE: 118 MMHG | BODY MASS INDEX: 28.17 KG/M2 | WEIGHT: 165 LBS | DIASTOLIC BLOOD PRESSURE: 56 MMHG | HEART RATE: 74 BPM | OXYGEN SATURATION: 95 %

## 2020-09-15 PROCEDURE — 4040F PNEUMOC VAC/ADMIN/RCVD: CPT | Performed by: INTERNAL MEDICINE

## 2020-09-15 PROCEDURE — 1123F ACP DISCUSS/DSCN MKR DOCD: CPT | Performed by: INTERNAL MEDICINE

## 2020-09-15 PROCEDURE — 3017F COLORECTAL CA SCREEN DOC REV: CPT | Performed by: INTERNAL MEDICINE

## 2020-09-15 PROCEDURE — G8417 CALC BMI ABV UP PARAM F/U: HCPCS | Performed by: INTERNAL MEDICINE

## 2020-09-15 PROCEDURE — 3023F SPIROM DOC REV: CPT | Performed by: INTERNAL MEDICINE

## 2020-09-15 PROCEDURE — G8427 DOCREV CUR MEDS BY ELIG CLIN: HCPCS | Performed by: INTERNAL MEDICINE

## 2020-09-15 PROCEDURE — 99213 OFFICE O/P EST LOW 20 MIN: CPT | Performed by: INTERNAL MEDICINE

## 2020-09-15 PROCEDURE — 1090F PRES/ABSN URINE INCON ASSESS: CPT | Performed by: INTERNAL MEDICINE

## 2020-09-15 PROCEDURE — G8400 PT W/DXA NO RESULTS DOC: HCPCS | Performed by: INTERNAL MEDICINE

## 2020-09-15 PROCEDURE — G8926 SPIRO NO PERF OR DOC: HCPCS | Performed by: INTERNAL MEDICINE

## 2020-09-15 PROCEDURE — 1036F TOBACCO NON-USER: CPT | Performed by: INTERNAL MEDICINE

## 2020-09-15 NOTE — PROGRESS NOTES
SUBJECTIVE:  Chief Complaint: Stage IV COPD, chronic hypoxemic respiratory failure, shortness of breath, history of pulmonary nodule  , He has done very well over the past 4 to 5 months. She denies any recent bronchitic infections. She has been socially distancing herself and has had no known COVID-19 exposure. She specifically denies any fever associated with cough. She continues on Advair Diskus, Spiriva, oral theophylline, albuterol solution per nebulizer and also 5 mg of prednisone daily. She denies chest pain or chest pressure. She denies purulent sputum expectoration and has had no hemoptysis. ROS:  Constitution:  HEENT: Negative for ear, throat pain  Cardiovascular: Negative for chest pain, syncope, edema  Pulmonary: See HPI  Musculoskeletal: Negative for DVT, myalgias, arthralgias    OBJECTIVE:  BP (!) 118/56   Pulse 74   Ht 5' 4\" (1.626 m)   Wt 165 lb (74.8 kg)   SpO2 95%   BMI 28.32 kg/m²      Physical Exam:  Constitutional:  She appears well developed and well-nourished. Wearing nasal oxygen  Neck:  Supple, No palpable lymphadenopathy, No JVD  Cardiovascular:  S1, S2 Normal, Regular rhythm, no murmurs or gallops, No pericardial  rubs. Pulmonary: Diminished breath sounds bilateral with a few scattered basilar rhonchi  Abdomen: Not examined  Extremities: no edema, No DVT  Neurologic: Oriented x3, No focal deficits    Radiology: Chest x-ray on 7/1/2020 showed COPD with no acute process  PFT: Office spirometry on 7/28/2020 demonstrated a severe obstructive lung defect with no significant response to bronchodilators and overall her lung function had remained stable over the previous year      Echocardiogram: No recent echo    ASSESSMENT:    1. COPD, very severe ([address]    2. Chronic hypoxemic respiratory failure (HCC)    3. Shortness of breath          PLAN:   I will make no change in her current bronchodilator therapy.   She mentions that she has already received the flu vaccine and I did recommend the COVID-19 vaccine when becomes available as soon as possible. I will continue to follow her    We have discussed the need to maintain yearly flu immunization, pneumococcal vaccination. We have discussed Coronavirus precaution including handwashing practice, wiping items touched in public such as gas pumps, door handles, shopping carts, etc. Self monitoring for infection - fever, chills, cough, SOB. Should they develop symptoms they should call office for further instructions. Return in about 4 months (around 1/15/2021) for Recheck for COPD, Recheck for Shortness of Breath, chronic hypoxemic respiratory failure. This dictation was performed with a verbal recognition program and it was checked for errors. It is possible that there are still dictated errors within this office note. Any errors should be brought immediately to my attention for correction. All efforts were made to ensure that this office note is accurate.

## 2020-10-07 ENCOUNTER — ANTI-COAG VISIT (OUTPATIENT)
Dept: FAMILY MEDICINE CLINIC | Age: 75
End: 2020-10-07
Payer: MEDICARE

## 2020-10-07 ENCOUNTER — NURSE ONLY (OUTPATIENT)
Dept: FAMILY MEDICINE CLINIC | Age: 75
End: 2020-10-07
Payer: MEDICARE

## 2020-10-07 LAB
INTERNATIONAL NORMALIZATION RATIO, POC: 2.6
PROTHROMBIN TIME, POC: NORMAL

## 2020-10-07 PROCEDURE — 85610 PROTHROMBIN TIME: CPT | Performed by: FAMILY MEDICINE

## 2020-10-07 PROCEDURE — 93793 ANTICOAG MGMT PT WARFARIN: CPT | Performed by: FAMILY MEDICINE

## 2020-10-19 ENCOUNTER — PATIENT MESSAGE (OUTPATIENT)
Dept: FAMILY MEDICINE CLINIC | Age: 75
End: 2020-10-19

## 2020-10-19 RX ORDER — VITAMIN E (DL,TOCOPHERYL ACET) 180 MG
CAPSULE ORAL
Qty: 90 CAPSULE | Refills: 0 | Status: SHIPPED | OUTPATIENT
Start: 2020-10-19 | End: 2021-01-29

## 2020-10-19 NOTE — TELEPHONE ENCOUNTER
Requested Prescriptions     Pending Prescriptions Disp Refills    Magnesium Oxide 500 MG CAPS 90 capsule 0     Sig: TAKE 1 CAPSULE BY MOUTH EVERY DAY

## 2020-10-26 RX ORDER — ISOSORBIDE MONONITRATE 60 MG/1
TABLET, EXTENDED RELEASE ORAL
Qty: 90 TABLET | Refills: 0 | Status: SHIPPED | OUTPATIENT
Start: 2020-10-26 | End: 2021-01-29

## 2020-10-26 NOTE — TELEPHONE ENCOUNTER
Requested Prescriptions     Signed Prescriptions Disp Refills    isosorbide mononitrate (IMDUR) 60 MG extended release tablet 90 tablet 0     Sig: TAKE 1 TABLET BY MOUTH EVERY DAY     Authorizing Provider: Arslan Lopez     Ordering User: Mona Ames

## 2020-11-02 ENCOUNTER — TELEPHONE (OUTPATIENT)
Dept: FAMILY MEDICINE CLINIC | Age: 75
End: 2020-11-02

## 2020-11-04 ENCOUNTER — NURSE ONLY (OUTPATIENT)
Dept: FAMILY MEDICINE CLINIC | Age: 75
End: 2020-11-04
Payer: MEDICARE

## 2020-11-04 LAB
INTERNATIONAL NORMALIZATION RATIO, POC: 2.5
PROTHROMBIN TIME, POC: NORMAL

## 2020-11-04 PROCEDURE — 99024 POSTOP FOLLOW-UP VISIT: CPT | Performed by: FAMILY MEDICINE

## 2020-11-04 PROCEDURE — 85610 PROTHROMBIN TIME: CPT | Performed by: FAMILY MEDICINE

## 2020-11-13 ENCOUNTER — PATIENT MESSAGE (OUTPATIENT)
Dept: FAMILY MEDICINE CLINIC | Age: 75
End: 2020-11-13

## 2020-11-13 NOTE — TELEPHONE ENCOUNTER
Spoke with pt confirmed no sob,pt states sx started yesterday, no fever.  Strongly advised pt to use the drive thru covid testing at [facility] l[facility] \[facility]\""[place] open 1  - 5 pm. Pt agreed and voiced understanding

## 2020-11-13 NOTE — TELEPHONE ENCOUNTER
From: Bang Rojas  To: Flor Flores MD  Sent: 11/13/2020 8:09 AM EST  Subject: Prescription Question    Dr. Emily Kessler can you please call in a prescription for me? I have a cough that feels like it is in my chest. Not coughing anything up, but my chest hurts when I cough and I am afraid it will get worse if I don't get an antibiotic for it. I haven't had to call in for medicine in a long time. [facility] on [address]. Thank you.

## 2020-11-17 ENCOUNTER — PATIENT MESSAGE (OUTPATIENT)
Dept: FAMILY MEDICINE CLINIC | Age: 75
End: 2020-11-17

## 2020-11-17 NOTE — TELEPHONE ENCOUNTER
From: Kathy Bazan  To: Papito Gallo MD  Sent: 11/17/2020 9:12 AM EST  Subject: Prescription Question    Hi, I took the [facility], [facility] 19 test on Sunday, so far no results. In the meantime, I fill like I need some medicine for :  Cough, chest hurts when I cough. When I can finally cough enough to get something up it is clear and I also have COPD and on oxygen 24/7. This fills like when I would get sick all the times in the past. I haven't had to get medicine for this in a long time. I have been very fortunate this past year. I just don't want this to get worse. Thanks.

## 2020-11-18 ENCOUNTER — TELEPHONE (OUTPATIENT)
Dept: FAMILY MEDICINE CLINIC | Age: 75
End: 2020-11-18

## 2020-11-18 RX ORDER — GUAIFENESIN AND DEXTROMETHORPHAN HYDROBROMIDE 600; 30 MG/1; MG/1
TABLET, EXTENDED RELEASE ORAL
Qty: 28 TABLET | Refills: 0 | Status: SHIPPED | OUTPATIENT
Start: 2020-11-18

## 2020-11-18 RX ORDER — AZITHROMYCIN 500 MG/1
500 TABLET, FILM COATED ORAL DAILY
Qty: 3 TABLET | Refills: 0 | Status: SHIPPED | OUTPATIENT
Start: 2020-11-18 | End: 2020-11-21

## 2020-11-18 NOTE — TELEPHONE ENCOUNTER
Has a cold like she always gets- had covid test done Sunday   She would like you to call in what you usually do for her colds.      z-pack and Biaxin - [facility]

## 2020-11-18 NOTE — TELEPHONE ENCOUNTER
Marisa please call patient and Z-Manfred #1 use as directed and Medrol Dosepak No. 1 use as directed.   Thanks

## 2020-11-19 RX ORDER — METHYLPREDNISOLONE 4 MG/1
TABLET ORAL
Qty: 1 KIT | Refills: 0 | Status: SHIPPED | OUTPATIENT
Start: 2020-11-19 | End: 2020-11-25

## 2020-11-19 NOTE — TELEPHONE ENCOUNTER
Requested Prescriptions     Signed Prescriptions Disp Refills    methylPREDNISolone (MEDROL DOSEPACK) 4 MG tablet 1 kit 0     Sig: Take by mouth.      Authorizing Provider: Linwood Purdy     Ordering User: Inez Hameed

## 2020-12-04 ENCOUNTER — OFFICE VISIT (OUTPATIENT)
Dept: FAMILY MEDICINE CLINIC | Age: 75
End: 2020-12-04
Payer: MEDICARE

## 2020-12-04 VITALS
BODY MASS INDEX: 28 KG/M2 | TEMPERATURE: 97.2 F | HEART RATE: 72 BPM | SYSTOLIC BLOOD PRESSURE: 120 MMHG | HEIGHT: 64 IN | DIASTOLIC BLOOD PRESSURE: 62 MMHG | WEIGHT: 164 LBS

## 2020-12-04 DIAGNOSIS — I10 ESSENTIAL HYPERTENSION: ICD-10-CM

## 2020-12-04 PROBLEM — Z86.711 HISTORY OF PULMONARY EMBOLISM: Status: ACTIVE | Noted: 2020-12-04

## 2020-12-04 PROBLEM — Z86.718 HISTORY OF DVT OF LOWER EXTREMITY: Status: ACTIVE | Noted: 2020-12-04

## 2020-12-04 LAB
ANION GAP SERPL CALCULATED.3IONS-SCNC: 16 MMOL/L (ref 3–16)
BUN BLDV-MCNC: 12 MG/DL (ref 7–20)
CALCIUM SERPL-MCNC: 9.5 MG/DL (ref 8.3–10.6)
CHLORIDE BLD-SCNC: 99 MMOL/L (ref 99–110)
CO2: 27 MMOL/L (ref 21–32)
CREAT SERPL-MCNC: 0.7 MG/DL (ref 0.6–1.2)
GFR AFRICAN AMERICAN: >60
GFR NON-AFRICAN AMERICAN: >60
GLUCOSE BLD-MCNC: 77 MG/DL (ref 70–99)
INTERNATIONAL NORMALIZATION RATIO, POC: 5.7
POTASSIUM SERPL-SCNC: 3.7 MMOL/L (ref 3.5–5.1)
PROTHROMBIN TIME, POC: ABNORMAL
SODIUM BLD-SCNC: 142 MMOL/L (ref 136–145)

## 2020-12-04 PROCEDURE — G8926 SPIRO NO PERF OR DOC: HCPCS | Performed by: FAMILY MEDICINE

## 2020-12-04 PROCEDURE — 3017F COLORECTAL CA SCREEN DOC REV: CPT | Performed by: FAMILY MEDICINE

## 2020-12-04 PROCEDURE — 3023F SPIROM DOC REV: CPT | Performed by: FAMILY MEDICINE

## 2020-12-04 PROCEDURE — 4040F PNEUMOC VAC/ADMIN/RCVD: CPT | Performed by: FAMILY MEDICINE

## 2020-12-04 PROCEDURE — G8484 FLU IMMUNIZE NO ADMIN: HCPCS | Performed by: FAMILY MEDICINE

## 2020-12-04 PROCEDURE — 1090F PRES/ABSN URINE INCON ASSESS: CPT | Performed by: FAMILY MEDICINE

## 2020-12-04 PROCEDURE — 1123F ACP DISCUSS/DSCN MKR DOCD: CPT | Performed by: FAMILY MEDICINE

## 2020-12-04 PROCEDURE — G8417 CALC BMI ABV UP PARAM F/U: HCPCS | Performed by: FAMILY MEDICINE

## 2020-12-04 PROCEDURE — 99214 OFFICE O/P EST MOD 30 MIN: CPT | Performed by: FAMILY MEDICINE

## 2020-12-04 PROCEDURE — G8427 DOCREV CUR MEDS BY ELIG CLIN: HCPCS | Performed by: FAMILY MEDICINE

## 2020-12-04 PROCEDURE — G8400 PT W/DXA NO RESULTS DOC: HCPCS | Performed by: FAMILY MEDICINE

## 2020-12-04 PROCEDURE — 1036F TOBACCO NON-USER: CPT | Performed by: FAMILY MEDICINE

## 2020-12-04 PROCEDURE — 85610 PROTHROMBIN TIME: CPT | Performed by: FAMILY MEDICINE

## 2020-12-04 RX ORDER — ESOMEPRAZOLE MAGNESIUM 40 MG/1
40 CAPSULE, DELAYED RELEASE ORAL 2 TIMES DAILY
Qty: 180 CAPSULE | Refills: 1 | Status: SHIPPED | OUTPATIENT
Start: 2020-12-04 | End: 2021-06-01

## 2020-12-04 RX ORDER — ALBUTEROL SULFATE 90 UG/1
AEROSOL, METERED RESPIRATORY (INHALATION)
Qty: 3 INHALER | Refills: 1 | Status: SHIPPED | OUTPATIENT
Start: 2020-12-04 | End: 2021-06-01

## 2020-12-04 RX ORDER — ROSUVASTATIN CALCIUM 20 MG/1
TABLET, COATED ORAL
Qty: 90 TABLET | Refills: 1 | Status: SHIPPED | OUTPATIENT
Start: 2020-12-04 | End: 2021-05-05 | Stop reason: SDUPTHER

## 2020-12-04 RX ORDER — THEOPHYLLINE 450 MG/1
TABLET, EXTENDED RELEASE ORAL
Qty: 90 TABLET | Refills: 1 | Status: SHIPPED | OUTPATIENT
Start: 2020-12-04 | End: 2021-05-26

## 2020-12-04 RX ORDER — ALBUTEROL SULFATE 2.5 MG/3ML
2.5 SOLUTION RESPIRATORY (INHALATION) 4 TIMES DAILY PRN
Qty: 14 PACKAGE | Refills: 1 | Status: SHIPPED | OUTPATIENT
Start: 2020-12-04 | End: 2021-08-26

## 2020-12-04 RX ORDER — WARFARIN SODIUM 6 MG/1
TABLET ORAL
Qty: 30 TABLET | Refills: 5 | Status: SHIPPED | OUTPATIENT
Start: 2020-12-04 | End: 2021-06-01

## 2020-12-04 RX ORDER — MONTELUKAST SODIUM 10 MG/1
TABLET ORAL
Qty: 90 TABLET | Refills: 1 | Status: SHIPPED | OUTPATIENT
Start: 2020-12-04 | End: 2021-06-16 | Stop reason: SDUPTHER

## 2020-12-04 RX ORDER — RISEDRONATE SODIUM 35 MG/1
TABLET, FILM COATED ORAL
Qty: 12 TABLET | Refills: 1 | Status: SHIPPED | OUTPATIENT
Start: 2020-12-04 | End: 2021-10-04

## 2020-12-04 RX ORDER — TIOTROPIUM BROMIDE 18 UG/1
CAPSULE ORAL; RESPIRATORY (INHALATION)
Qty: 30 CAPSULE | Refills: 5 | Status: SHIPPED | OUTPATIENT
Start: 2020-12-04 | End: 2021-05-04

## 2020-12-04 RX ORDER — LEVOTHYROXINE SODIUM 0.05 MG/1
50 TABLET ORAL DAILY
Qty: 90 TABLET | Refills: 1 | Status: SHIPPED | OUTPATIENT
Start: 2020-12-04 | End: 2021-08-12

## 2020-12-04 RX ORDER — GABAPENTIN 400 MG/1
400 CAPSULE ORAL EVERY EVENING
Qty: 90 CAPSULE | Refills: 0 | Status: SHIPPED | OUTPATIENT
Start: 2020-12-04 | End: 2021-03-04 | Stop reason: SDUPTHER

## 2020-12-05 NOTE — PROGRESS NOTES
12/5/2020    eC Carranza    Chief Complaint   Patient presents with    6 Month Follow-Up    Other     no concerns    Other     PLEASE REVIEW PT INR AND COUMADIN DOSE       HPI    Tiffanie Gan is a 76 y.o. female who presents today with follow-up. Patient notes compliance with her Coumadin and her Coumadin dose. She feels that her nutrition has been stable. She has not been on antibiotics for over a week. Both she and I are unclear why her Coumadin is high today. Patient just got over Covid. I placed her on steroids and antibiotics due to the fact that she had a viral infection and has severe COPD. Patient despite having chronic respiratory failure and severe COPD did not require hospitalization for her Covid. She feels that she is doing fairly well. Patient feels that her reflux is controlled medications are needed and appropriate. She denies side effect. We reviewed prior thyroid tests which are on track. She notes compliance and denies side effects.     REVIEW OF SYSTEMS    Constitutional:  Denies fever, chills, weight loss or weakness  Eyes:  no photophobia or discharge  ENT:  no sore throat or ear pain  Cardiovascular:  Denies chest pain, palpitations or swelling  Respiratory:  Denies cough or shortness of breath  GI:  no abdominal pain, nausea, vomiting, or diarrhea  Musculoskeletal:  no back pain  Skin:  No rashes  Neurologic:  no headache, focal weakness, or sensory changes  Endocrine:  no polyuria or polydipsia      PAST MEDICAL HISTORY  Past Medical History:   Diagnosis Date    Anticoagulant long-term use     Arthritis     Cancer ([facility] [address]     Chronic hypoxemic respiratory failure (HCC) 2/6/2018    Coronary atherosclerosis     Gastroesophageal reflux disease     Hiatal hernia     Lung infiltrate on CT 1/22/2019    On home oxygen therapy     on 24/7    Osteopenia     Phlebitis     Pulmonary nodule 7/5/2016    S/P left heart catheterization by percutaneous approach 6/27/2013    Shortness of breath 2019    Wears glasses        FAMILY HISTORY  Family History   Problem Relation Age of Onset    Heart Disease Mother     Cancer Father         lung ca    Heart Disease Father     COPD Father     Cancer Sister     Heart Disease Sister        SOCIAL HISTORY  Social History     Socioeconomic History    Marital status:      Spouse name: None    Number of children: None    Years of education: None    Highest education level: None   Occupational History    None   Social Needs    Financial resource strain: None    Food insecurity     Worry: None     Inability: None    Transportation needs     Medical: None     Non-medical: None   Tobacco Use    Smoking status: Former Smoker     Packs/day: 1.50     Years: 26.00     Pack years: 39.00     Types: Cigarettes     Start date:      Last attempt to quit: 1991     Years since quittin.0    Smokeless tobacco: Never Used   Substance and Sexual Activity    Alcohol use: No    Drug use: No    Sexual activity: Not Currently     Partners: Male   Lifestyle    Physical activity     Days per week: None     Minutes per session: None    Stress: None   Relationships    Social connections     Talks on phone: None     Gets together: None     Attends Religion service: None     Active member of club or organization: None     Attends meetings of clubs or organizations: None     Relationship status: None    Intimate partner violence     Fear of current or ex partner: None     Emotionally abused: None     Physically abused: None     Forced sexual activity: None   Other Topics Concern    None   Social History Narrative    None        SURGICAL HISTORY  Past Surgical History:   Procedure Laterality Date   Kate Corona    COLONOSCOPY      ENDOSCOPY, COLON, DIAGNOSTIC      TONSILLECTOMY      [address]  Current Outpatient Medications Medication Sig Dispense Refill    tiotropium (SPIRIVA HANDIHALER) 18 MCG inhalation capsule INHALE 1 PUFF BY MOUTH AS DIRECTED ONCE A DAY 30 capsule 5    albuterol (PROVENTIL) (2.5 MG/3ML) 0.083% nebulizer solution Take 3 mLs by nebulization 4 times daily as needed for Wheezing 14 Package 1    albuterol sulfate  (90 Base) MCG/ACT inhaler INHALE 2 PUFF FOUR TIMES A DAY AS NEEDED 3 Inhaler 1    fluticasone-salmeterol (ADVAIR DISKUS) 500-50 MCG/DOSE diskus inhaler Inhale 1 puff into the lungs every 12 hours 3 each 1    risedronate (ACTONEL) 35 MG tablet TAKE 1 TABLET BY MOUTH EVERY 7 DAYS PATIENT TAKES ON SUNDAY 12 tablet 1    theophylline (THEODUR) 450 MG extended release tablet TAKE 1/2 TABLET TWICE A DAY 90 tablet 1    montelukast (SINGULAIR) 10 MG tablet TAKE 1 TABLET BY MOUTH EVERY DAY EVERY NIGHT 90 tablet 1    gabapentin (NEURONTIN) 400 MG capsule Take 1 capsule by mouth every evening for 90 days.  90 capsule 0    esomeprazole (NEXIUM) 40 MG delayed release capsule Take 1 capsule by mouth 2 times daily 180 capsule 1    rosuvastatin (CRESTOR) 20 MG tablet TAKE 1 TABLET BY MOUTH EVERY DAY EVERY NIGHT 90 tablet 1    warfarin (COUMADIN) 6 MG tablet TAKE 1 TABLET BY MOUTH EVERY DAY PER INR 30 tablet 5    levothyroxine (SYNTHROID) 50 MCG tablet Take 1 tablet by mouth Daily 90 tablet 1    isosorbide mononitrate (IMDUR) 60 MG extended release tablet TAKE 1 TABLET BY MOUTH EVERY DAY 90 tablet 0    Magnesium Oxide 500 MG CAPS TAKE 1 CAPSULE BY MOUTH EVERY DAY 90 capsule 0    potassium chloride (KLOR-CON M10) 10 MEQ extended release tablet Alternate take 1 tablet and then 2 tablets every other day 135 tablet 0    celecoxib (CELEBREX) 200 MG capsule TAKE 1 CAPSULE BY MOUTH EVERY DAY 90 capsule 1    Roflumilast (DALIRESP) 500 MCG tablet Take 1 tablet by mouth daily 90 tablet 1    furosemide (LASIX) 40 MG tablet TAKE 1 TABLET BY MOUTH EVERY DAY 90 tablet 1    nitroGLYCERIN (NITROSTAT) 0.4 MG SL tablet Place 1 tablet under the tongue every 5 minutes as needed for Chest pain 25 tablet 1    diltiazem (CARDIZEM CD) 240 MG extended release capsule Take 240 mg by mouth daily      dicyclomine (BENTYL) 10 MG capsule Take 10 mg by mouth 2 times daily      Biotin 07966 MCG TABS Take 10,000 mcg by mouth daily      LORazepam (ATIVAN) 0.5 MG tablet TAKE 1 TAB BY MOUTH EVERY DAY AS NEEDED  0    b complex vitamins capsule Take 1 capsule by mouth daily      Calcium Carb-Cholecalciferol (CALCIUM + D3) 600-200 MG-UNIT TABS Take 1 tablet by mouth 2 times daily      Multiple Vitamins-Minerals (MULTIVITAMIN PO) Take 1 tablet by mouth daily      OXYGEN Inhale 2 L/hr into the lungs continuous.  Dextromethorphan-guaiFENesin (MUCINEX DM)  MG TB12 Take 1 to 2 tablet by mouth every 12 hours (maximum: 4 tablets/24 hours). Drink plenty of water. (Patient not taking: Reported on 12/4/2020) 28 tablet 0    Ipratropium Bromide HFA (ATROVENT HFA IN) Inhale 2 puffs into the lungs as needed. No current facility-administered medications for this visit.         ALLERGIES  Allergies   Allergen Reactions    Codeine Swelling    Darvon [Propoxyphene Hcl] Swelling    Lamisil [Terbinafine Hcl]     Morphine Swelling    Neosporin [Neomycin-Polymyxin-Gramicidin]     Pcn [Penicillins] Swelling       PHYSICAL EXAM    /62   Pulse 72   Temp 97.2 °F (36.2 °C)   Ht 5' 3.5\" (1.613 m)   Wt 164 lb (74.4 kg)   BMI 28.60 kg/m²     Constitutional:  Well developed, well nourished  HEENT:  Normocephalic, atraumatic, bilateral external ears normal, oropharynx moist, nose normal  Neck:  Normal range of motion, no tenderness, supple  Lymphatic:  No lymphadenopathy noted  Cardiovascular:  Normal heart rate, S1S2 nl  Thorax & Lungs:  Normal breath sounds, no respiratory distress, no wheezing  Skin:  Warm, dry, no erythema, no rash  Back:  straight  Extremities:  No edema, no tenderness, no cyanosis  Musculoskeletal:  Good range of motion   Neurologic:  Alert & oriented X 3      ASSESSMENT & PLAN    1. COPD, very severe ([address]  Issue controlled. Continue meds. Refilled meds. - tiotropium (SPIRIVA HANDIHALER) 18 MCG inhalation capsule; INHALE 1 PUFF BY MOUTH AS DIRECTED ONCE A DAY  Dispense: 30 capsule; Refill: 5  - albuterol (PROVENTIL) (2.5 MG/3ML) 0.083% nebulizer solution; Take 3 mLs by nebulization 4 times daily as needed for Wheezing  Dispense: 14 Package; Refill: 1  - albuterol sulfate  (90 Base) MCG/ACT inhaler; INHALE 2 PUFF FOUR TIMES A DAY AS NEEDED  Dispense: 3 Inhaler; Refill: 1  - fluticasone-salmeterol (ADVAIR DISKUS) 500-50 MCG/DOSE diskus inhaler; Inhale 1 puff into the lungs every 12 hours  Dispense: 3 each; Refill: 1  - theophylline (THEODUR) 450 MG extended release tablet; TAKE 1/2 TABLET TWICE A DAY  Dispense: 90 tablet; Refill: 1  - montelukast (SINGULAIR) 10 MG tablet; TAKE 1 TABLET BY MOUTH EVERY DAY EVERY NIGHT  Dispense: 90 tablet; Refill: 1    2. Gastroesophageal reflux disease without esophagitis  Issue controlled. Continue meds. Refilled meds. 3. Pure hypercholesterolemia  Reviewed latest cholesterol. Currently at goal.  Continue the same. - rosuvastatin (CRESTOR) 20 MG tablet; TAKE 1 TABLET BY MOUTH EVERY DAY EVERY NIGHT  Dispense: 90 tablet; Refill: 1    4. Acquired hypothyroidism  Reviewed latest labs. Currently controlled. Continue the same.  - levothyroxine (SYNTHROID) 50 MCG tablet; Take 1 tablet by mouth Daily  Dispense: 90 tablet; Refill: 1    5. Anticoagulant long-term use  Coumadin is supratherapeutic see discussion above. Patient to hold Coumadin for 1 day then start 3 and 6 alternating every other day. Recheck in 2 weeks  - warfarin (COUMADIN) 6 MG tablet; TAKE 1 TABLET BY MOUTH EVERY DAY PER INR  Dispense: 30 tablet; Refill: 5  - POCT INR    6. Essential hypertension  Issue controlled. Continue meds. Refilled meds. - Basic Metabolic Panel;  Future    Follow-up 6 months Electronically signed by Marshal Spicer MD on 12/5/2020

## 2020-12-18 ENCOUNTER — NURSE ONLY (OUTPATIENT)
Dept: FAMILY MEDICINE CLINIC | Age: 75
End: 2020-12-18
Payer: MEDICARE

## 2020-12-18 LAB
INTERNATIONAL NORMALIZATION RATIO, POC: 2.3
PROTHROMBIN TIME, POC: NORMAL

## 2020-12-18 PROCEDURE — 85610 PROTHROMBIN TIME: CPT | Performed by: FAMILY MEDICINE

## 2020-12-18 RX ORDER — POTASSIUM CHLORIDE 750 MG/1
TABLET, EXTENDED RELEASE ORAL
Qty: 135 TABLET | Refills: 1 | Status: SHIPPED | OUTPATIENT
Start: 2020-12-18 | End: 2021-07-14

## 2020-12-18 NOTE — TELEPHONE ENCOUNTER
Requested Prescriptions     Signed Prescriptions Disp Refills    potassium chloride (KLOR-CON M10) 10 MEQ extended release tablet 135 tablet 1     Sig: ALTERNATE TAKE 1 TABLET AND THEN 2 TABLETS EVERY OTHER DAY     Authorizing Provider: Karolina Patricia     Ordering User: Richard Rojas

## 2021-01-06 RX ORDER — CELECOXIB 200 MG/1
CAPSULE ORAL
Qty: 90 CAPSULE | Refills: 1 | Status: SHIPPED | OUTPATIENT
Start: 2021-01-06 | End: 2021-04-30 | Stop reason: SDUPTHER

## 2021-01-15 ENCOUNTER — ANTI-COAG VISIT (OUTPATIENT)
Dept: FAMILY MEDICINE CLINIC | Age: 76
End: 2021-01-15
Payer: MEDICARE

## 2021-01-15 ENCOUNTER — NURSE ONLY (OUTPATIENT)
Dept: FAMILY MEDICINE CLINIC | Age: 76
End: 2021-01-15
Payer: MEDICARE

## 2021-01-15 DIAGNOSIS — Z79.01 ANTICOAGULANT LONG-TERM USE: ICD-10-CM

## 2021-01-15 LAB
INTERNATIONAL NORMALIZATION RATIO, POC: 2
PROTHROMBIN TIME, POC: NORMAL

## 2021-01-15 PROCEDURE — 93793 ANTICOAG MGMT PT WARFARIN: CPT | Performed by: FAMILY MEDICINE

## 2021-01-15 PROCEDURE — 85610 PROTHROMBIN TIME: CPT | Performed by: FAMILY MEDICINE

## 2021-01-18 ENCOUNTER — OFFICE VISIT (OUTPATIENT)
Dept: PULMONOLOGY | Age: 76
End: 2021-01-18
Payer: MEDICARE

## 2021-01-18 VITALS
SYSTOLIC BLOOD PRESSURE: 124 MMHG | BODY MASS INDEX: 28 KG/M2 | WEIGHT: 164 LBS | OXYGEN SATURATION: 94 % | HEIGHT: 64 IN | HEART RATE: 58 BPM | DIASTOLIC BLOOD PRESSURE: 60 MMHG

## 2021-01-18 DIAGNOSIS — R91.1 PULMONARY NODULE: ICD-10-CM

## 2021-01-18 DIAGNOSIS — J44.9 COPD, VERY SEVERE (HCC): Primary | ICD-10-CM

## 2021-01-18 DIAGNOSIS — R06.02 SHORTNESS OF BREATH: ICD-10-CM

## 2021-01-18 DIAGNOSIS — J96.11 CHRONIC HYPOXEMIC RESPIRATORY FAILURE (HCC): ICD-10-CM

## 2021-01-18 DIAGNOSIS — U07.1 COVID-19 VIRUS DETECTED: ICD-10-CM

## 2021-01-18 PROCEDURE — G8400 PT W/DXA NO RESULTS DOC: HCPCS | Performed by: INTERNAL MEDICINE

## 2021-01-18 PROCEDURE — 4040F PNEUMOC VAC/ADMIN/RCVD: CPT | Performed by: INTERNAL MEDICINE

## 2021-01-18 PROCEDURE — 1036F TOBACCO NON-USER: CPT | Performed by: INTERNAL MEDICINE

## 2021-01-18 PROCEDURE — G8484 FLU IMMUNIZE NO ADMIN: HCPCS | Performed by: INTERNAL MEDICINE

## 2021-01-18 PROCEDURE — 3017F COLORECTAL CA SCREEN DOC REV: CPT | Performed by: INTERNAL MEDICINE

## 2021-01-18 PROCEDURE — 1090F PRES/ABSN URINE INCON ASSESS: CPT | Performed by: INTERNAL MEDICINE

## 2021-01-18 PROCEDURE — G8926 SPIRO NO PERF OR DOC: HCPCS | Performed by: INTERNAL MEDICINE

## 2021-01-18 PROCEDURE — 99213 OFFICE O/P EST LOW 20 MIN: CPT | Performed by: INTERNAL MEDICINE

## 2021-01-18 PROCEDURE — 3023F SPIROM DOC REV: CPT | Performed by: INTERNAL MEDICINE

## 2021-01-18 PROCEDURE — 1123F ACP DISCUSS/DSCN MKR DOCD: CPT | Performed by: INTERNAL MEDICINE

## 2021-01-18 PROCEDURE — G8427 DOCREV CUR MEDS BY ELIG CLIN: HCPCS | Performed by: INTERNAL MEDICINE

## 2021-01-18 PROCEDURE — G8417 CALC BMI ABV UP PARAM F/U: HCPCS | Performed by: INTERNAL MEDICINE

## 2021-01-18 NOTE — PROGRESS NOTES
no significant response to bronchodilators      Echocardiogram: No recent echo available    ASSESSMENT:    1. COPD, very severe ([address]    2. Chronic hypoxemic respiratory failure (HCC)    3. Pulmonary nodule    4. COVID-19 virus detected    5. Shortness of breath          PLAN:   I will make no change in her current bronchodilator therapy. I did encourage her to get the COVID-19 vaccine when it becomes available. I will continue to follow her    We have discussed the need to maintain yearly flu immunization, pneumococcal vaccination. We have discussed Coronavirus precaution including handwashing practice, wiping items touched in public such as gas pumps, door handles, shopping carts, etc. Self monitoring for infection - fever, chills, cough, SOB. Should they develop symptoms they should call office for further instructions. Return for Recheck for COPD, Recheck for Shortness of Breath, chronic hypoxemic respiratory failure. This dictation was performed with a verbal recognition program and it was checked for errors. It is possible that there are still dictated errors within this office note. Any errors should be brought immediately to my attention for correction. All efforts were made to ensure that this office note is accurate.

## 2021-01-26 DIAGNOSIS — J44.9 COPD, VERY SEVERE (HCC): ICD-10-CM

## 2021-01-26 RX ORDER — ROFLUMILAST 500 UG/1
TABLET ORAL
Qty: 90 TABLET | Refills: 1 | Status: SHIPPED | OUTPATIENT
Start: 2021-01-26 | End: 2021-07-22

## 2021-01-26 NOTE — TELEPHONE ENCOUNTER
Requested Prescriptions     Signed Prescriptions Disp Refills    DALIRESP 500 MCG tablet 90 tablet 1     Sig: TAKE 1 TABLET BY MOUTH EVERY DAY     Authorizing Provider: Solmon Form     Ordering User: Natalya Castillo

## 2021-01-29 DIAGNOSIS — I10 ESSENTIAL HYPERTENSION: ICD-10-CM

## 2021-01-29 RX ORDER — ISOSORBIDE MONONITRATE 60 MG/1
TABLET, EXTENDED RELEASE ORAL
Qty: 90 TABLET | Refills: 1 | Status: ON HOLD | OUTPATIENT
Start: 2021-01-29 | End: 2022-01-04 | Stop reason: HOSPADM

## 2021-01-29 RX ORDER — VITAMIN E (DL,TOCOPHERYL ACET) 180 MG
CAPSULE ORAL
Qty: 90 CAPSULE | Refills: 1 | Status: SHIPPED | OUTPATIENT
Start: 2021-01-29 | End: 2021-04-30 | Stop reason: SDUPTHER

## 2021-02-12 ENCOUNTER — NURSE ONLY (OUTPATIENT)
Dept: FAMILY MEDICINE CLINIC | Age: 76
End: 2021-02-12
Payer: MEDICARE

## 2021-02-12 DIAGNOSIS — Z79.01 ANTICOAGULANT LONG-TERM USE: ICD-10-CM

## 2021-02-12 LAB
INTERNATIONAL NORMALIZATION RATIO, POC: 2.2
PROTHROMBIN TIME, POC: NORMAL

## 2021-02-12 PROCEDURE — 85610 PROTHROMBIN TIME: CPT | Performed by: FAMILY MEDICINE

## 2021-02-13 ASSESSMENT — LIFESTYLE VARIABLES
HOW OFTEN DO YOU HAVE A DRINK CONTAINING ALCOHOL: NEVER
AUDIT-C TOTAL SCORE: INCOMPLETE
AUDIT TOTAL SCORE: INCOMPLETE

## 2021-02-13 ASSESSMENT — PATIENT HEALTH QUESTIONNAIRE - PHQ9
SUM OF ALL RESPONSES TO PHQ QUESTIONS 1-9: 0
1. LITTLE INTEREST OR PLEASURE IN DOING THINGS: 0

## 2021-02-17 ENCOUNTER — VIRTUAL VISIT (OUTPATIENT)
Dept: FAMILY MEDICINE CLINIC | Age: 76
End: 2021-02-17
Payer: MEDICARE

## 2021-02-17 DIAGNOSIS — Z00.00 ROUTINE GENERAL MEDICAL EXAMINATION AT A HEALTH CARE FACILITY: Primary | ICD-10-CM

## 2021-02-17 PROCEDURE — 4040F PNEUMOC VAC/ADMIN/RCVD: CPT | Performed by: FAMILY MEDICINE

## 2021-02-17 PROCEDURE — 1123F ACP DISCUSS/DSCN MKR DOCD: CPT | Performed by: FAMILY MEDICINE

## 2021-02-17 PROCEDURE — G0438 PPPS, INITIAL VISIT: HCPCS | Performed by: FAMILY MEDICINE

## 2021-02-17 NOTE — PROGRESS NOTES
Medicare Annual Wellness Visit  Name: Kellie Eastman Date: 2021   MRN: [id number] Sex: Female   Age: 68 y.o. Ethnicity: Non-/Non    : 1945 Race: Kaivtha Rodrigues is here for Medicare AWV    Screenings for behavioral, psychosocial and functional/safety risks, and cognitive dysfunction are all negative except as indicated below. These results, as well as other patient data from the [address] form, are documented in Flowsheets linked to this Encounter. Allergies   Allergen Reactions    Codeine Swelling    Darvon [Propoxyphene Hcl] Swelling    Lamisil [Terbinafine Hcl]     Morphine Swelling    Neosporin [Neomycin-Polymyxin-Gramicidin]     Pcn [Penicillins] Swelling       Prior to Visit Medications    Medication Sig Taking?  Authorizing Provider   isosorbide mononitrate (IMDUR) 60 MG extended release tablet TAKE 1 TABLET BY MOUTH EVERY DAY Yes Wood Joseph MD   Magnesium Oxide 500 MG CAPS TAKE 1 CAPSULE BY MOUTH EVERY DAY Yes Wood Joseph MD   DALIRESP 500 MCG tablet TAKE 1 TABLET BY MOUTH EVERY DAY Yes Wood Joseph MD   celecoxib (CELEBREX) 200 MG capsule TAKE 1 CAPSULE BY MOUTH EVERY DAY Yes Wood Joseph MD   potassium chloride (KLOR-CON M10) 10 MEQ extended release tablet ALTERNATE TAKE 1 TABLET AND THEN 2 TABLETS EVERY OTHER DAY Yes Wood Joseph MD   tiotropium (Shawanda Puna) 18 MCG inhalation capsule INHALE 1 PUFF BY MOUTH AS DIRECTED ONCE A DAY Yes Wood Joseph MD   albuterol (PROVENTIL) (2.5 MG/3ML) 0.083% nebulizer solution Take 3 mLs by nebulization 4 times daily as needed for Wheezing Yes Wood Joseph MD   albuterol sulfate  (90 Base) MCG/ACT inhaler INHALE 2 PUFF FOUR TIMES A DAY AS NEEDED Yes Wood Joseph MD   risedronate (ACTONEL) 35 MG tablet TAKE 1 TABLET BY MOUTH EVERY 7 DAYS PATIENT TAKES ON  Yes Wood Joseph MD   theophylline (THEODUR) 450 MG extended release tablet TAKE 1/2 TABLET TWICE A DAY Yes Jose Barrera MD   montelukast (SINGULAIR) 10 MG tablet TAKE 1 TABLET BY MOUTH EVERY DAY EVERY NIGHT Yes Jose Barrera MD   gabapentin (NEURONTIN) 400 MG capsule Take 1 capsule by mouth every evening for 90 days. Yes Jose Barrera MD   rosuvastatin (CRESTOR) 20 MG tablet TAKE 1 TABLET BY MOUTH EVERY DAY EVERY NIGHT Yes Jose Barrera MD   warfarin (COUMADIN) 6 MG tablet TAKE 1 TABLET BY MOUTH EVERY DAY PER INR Yes Jose Barrera MD   levothyroxine (SYNTHROID) 50 MCG tablet Take 1 tablet by mouth Daily Yes Jose Barrera MD   Dextromethorphan-guaiFENesin [facility] DM)  MG TB12 Take 1 to 2 tablet by mouth every 12 hours (maximum: 4 tablets/24 hours). Drink plenty of water. Yes Kaylen Alvarez PA-C   furosemide (LASIX) 40 MG tablet TAKE 1 TABLET BY MOUTH EVERY DAY Yes Vaughn Maharaj PA-C   nitroGLYCERIN (NITROSTAT) 0.4 MG SL tablet Place 1 tablet under the tongue every 5 minutes as needed for Chest pain Yes Neel Kim MD   diltiazem (CARDIZEM CD) 240 MG extended release capsule Take 240 mg by mouth daily Yes Historical Provider, MD   dicyclomine (BENTYL) 10 MG capsule Take 10 mg by mouth 2 times daily Yes Historical Provider, MD   Biotin 30199 MCG TABS Take 10,000 mcg by mouth daily Yes Historical Provider, MD   LORazepam (ATIVAN) 0.5 MG tablet TAKE 1 TAB BY MOUTH EVERY DAY AS NEEDED Yes Historical Provider, MD   b complex vitamins capsule Take 1 capsule by mouth daily Yes Historical Provider, MD   Calcium Carb-Cholecalciferol (CALCIUM + D3) 600-200 MG-UNIT TABS Take 1 tablet by mouth 2 times daily Yes Historical Provider, MD   Multiple Vitamins-Minerals (MULTIVITAMIN PO) Take 1 tablet by mouth daily Yes Historical Provider, MD   Ipratropium Bromide HFA (ATROVENT HFA IN) Inhale 2 puffs into the lungs as needed.  Yes Historical Provider, MD   OXYGEN Inhale 2 L/hr into the lungs continuous. Yes Historical Provider, MD   fluticasone-salmeterol (ADVAIR DISKUS) 500-50 MCG/DOSE diskus inhaler Inhale 1 puff into the lungs every 12 hours  Melania Gomes MD   esomeprazole (NEXIUM) 40 MG delayed release capsule Take 1 capsule by mouth 2 times daily  Melania Gomes MD       Past Medical History:   Diagnosis Date    Anticoagulant long-term use     Arthritis     [facility] [address]     Chronic hypoxemic respiratory failure ([facility] [address] 2/6/2018    Coronary atherosclerosis     COVID-19 virus detected 1/18/2021    Gastroesophageal reflux disease     Hiatal hernia     Lung infiltrate on CT 1/22/2019    On home oxygen therapy     on 24/7    Osteopenia     Phlebitis     Pulmonary nodule 7/5/2016    S/P left heart catheterization by percutaneous approach 6/27/2013    Shortness of breath 9/23/2019    Shortness of breath 1/18/2021    Wears glasses        Past Surgical History:   Procedure Laterality Date   Tatiana Jauregui    COLONOSCOPY      ENDOSCOPY, COLON, DIAGNOSTIC      TONSILLECTOMY      TUBAL LIGATION      VEIN SURGERY         Family History   Problem Relation Age of Onset    Heart Disease Mother     Cancer Father         lung ca    Heart Disease Father     COPD Father     Cancer Sister     Heart Disease Sister        CareTeam (Including outside providers/suppliers regularly involved in providing care):   Patient Care Team:  Melania Gomes MD as PCP - General  Melania Gomes MD as PCP [facility]  Marcela Corona MD as Consulting Physician (Cardiology)    Wt Readings from Last 3 Encounters:   01/18/21 164 lb (74.4 kg)   12/04/20 164 lb (74.4 kg)   09/15/20 165 lb (74.8 kg)     There were no vitals filed for this visit. There is no height or weight on file to calculate BMI. Based upon direct observation of the patient, evaluation of cognition reveals recent and remote memory intact.     Patient's complete Health Risk Assessment and screening values have been reviewed and are found in Flowsheets. The following problems were reviewed today and where indicated follow up appointments were made and/or referrals ordered. Positive Risk Factor Screenings with Interventions:          General Health and ACP:  General  In general, how would you say your health is?: Good  In the past 7 days, have you experienced any of the following?  New or Increased Pain, New or Increased Fatigue, Loneliness, Social Isolation, Stress or Anger?: (!) Anger  Do you get the social and emotional support that you need?: Yes  Do you have a Living Will?: Yes  Advance Directives     Power of  Living Will ACP-Advance Directive ACP-Power of     Not on File Not on File Not on File Not on File      General Health Risk Interventions:  · Anger: relaxation techniques discussed    Health Habits/Nutrition:  Health Habits/Nutrition  Do you exercise for at least 20 minutes 2-3 times per week?: (!) No  Have you lost any weight without trying in the past 3 months?: No  Do you eat only one meal per day?: No  Have you seen the dentist within the past year?: Yes     Health Habits/Nutrition Interventions:  · Inadequate physical activity:  patient is not ready to increase his/her physical activity level at this time       Personalized Preventive Plan   Current Health Maintenance Status  Immunization History   Administered Date(s) Administered    Influenza A ([id number]) Vaccine PF IM 11/11/2009    Influenza Vaccine, unspecified formulation 09/02/2011, 09/07/2012, 09/23/2014    Influenza Virus Vaccine 09/18/2000, 09/02/2011, 09/07/2012    Influenza Whole 10/02/2013    Influenza, High Dose (Fluzone 65 yrs and older) 10/06/2015, 10/03/2016, 09/09/2017, 09/19/2019    Influenza, High-dose, Art Strickland, 65 yrs +, IM (Fluzone) 09/09/2020    Influenza, Triv, inactivated, subunit, adjuvanted, IM (Fluad 65 yrs and older) 09/19/2019    Pneumococcal Polysaccharide guardian's) consent, to reduce the patient's risk of exposure to COVID-19 and provide necessary medical care. The patient (and/or legal guardian) has also been advised to contact this office for worsening conditions or problems, and seek emergency medical treatment and/or call 911 if deemed necessary. Patient identification was verified at the start of the visit: Yes    Total time spent for this encounter: Not billed by time    Services were provided through audio synchronous discussion virtually to substitute for in-person clinic visit. Patient and provider were located at their individual homes. --Heather Blancas LPN on 0/17/1220 at 2:81 PM    An electronic signature was used to authenticate this note. This encounter was performed under myRebecca MDs, direct supervision, 2/17/2021.

## 2021-02-17 NOTE — PATIENT INSTRUCTIONS
Personalized Preventive Plan for Ani Denny - 2/17/2021  Medicare offers a range of preventive health benefits. Some of the tests and screenings are paid in full while other may be subject to a deductible, co-insurance, and/or copay. Some of these benefits include a comprehensive review of your medical history including lifestyle, illnesses that may run in your family, and various assessments and screenings as appropriate. After reviewing your medical record and screening and assessments performed today your provider may have ordered immunizations, labs, imaging, and/or referrals for you. A list of these orders (if applicable) as well as your Preventive Care list are included within your After Visit Summary for your review. Other Preventive Recommendations:    · A preventive eye exam performed by an eye specialist is recommended every 1-2 years to screen for glaucoma; cataracts, macular degeneration, and other eye disorders. · A preventive dental visit is recommended every 6 months. · Try to get at least 150 minutes of exercise per week or 10,000 steps per day on a pedometer . · Order or download the FREE \"Exercise & Physical Activity: Your Everyday Guide\" from [employer]. Call 0-881.532.3842 or search [employer] online. · You need 5063-8342 mg of calcium and 1240-6818 IU of vitamin D per day. It is possible to meet your calcium requirement with diet alone, but a vitamin D supplement is usually necessary to meet this goal.  · When exposed to the sun, use a sunscreen that protects against both UVA and UVB radiation with an SPF of 30 or greater. Reapply every 2 to 3 hours or after sweating, drying off with a towel, or swimming. · Always wear a seat belt when traveling in a car. Always wear a helmet when riding a bicycle or motorcycle.

## 2021-02-23 ENCOUNTER — TELEPHONE (OUTPATIENT)
Dept: FAMILY MEDICINE CLINIC | Age: 76
End: 2021-02-23

## 2021-02-23 NOTE — TELEPHONE ENCOUNTER
Faxed denial medicare D coverage for med neb which stated med could be covered under medicare part B to [facility] [phone]    Pt informed

## 2021-03-12 ENCOUNTER — ANTI-COAG VISIT (OUTPATIENT)
Dept: FAMILY MEDICINE CLINIC | Age: 76
End: 2021-03-12

## 2021-03-12 ENCOUNTER — NURSE ONLY (OUTPATIENT)
Dept: FAMILY MEDICINE CLINIC | Age: 76
End: 2021-03-12
Payer: MEDICARE

## 2021-03-12 DIAGNOSIS — Z79.01 ANTICOAGULANT LONG-TERM USE: ICD-10-CM

## 2021-03-12 DIAGNOSIS — Z86.711 HISTORY OF PULMONARY EMBOLISM: ICD-10-CM

## 2021-03-12 LAB
INTERNATIONAL NORMALIZATION RATIO, POC: 1.9
PROTHROMBIN TIME, POC: ABNORMAL

## 2021-03-12 PROCEDURE — 85610 PROTHROMBIN TIME: CPT | Performed by: FAMILY MEDICINE

## 2021-04-06 ENCOUNTER — ANTI-COAG VISIT (OUTPATIENT)
Dept: FAMILY MEDICINE CLINIC | Age: 76
End: 2021-04-06

## 2021-04-06 ENCOUNTER — NURSE ONLY (OUTPATIENT)
Dept: FAMILY MEDICINE CLINIC | Age: 76
End: 2021-04-06
Payer: MEDICARE

## 2021-04-06 ENCOUNTER — TELEPHONE (OUTPATIENT)
Dept: FAMILY MEDICINE CLINIC | Age: 76
End: 2021-04-06

## 2021-04-06 DIAGNOSIS — Z79.01 ANTICOAGULANT LONG-TERM USE: ICD-10-CM

## 2021-04-06 LAB
INTERNATIONAL NORMALIZATION RATIO, POC: 1.8
PROTHROMBIN TIME, POC: ABNORMAL

## 2021-04-06 PROCEDURE — 85610 PROTHROMBIN TIME: CPT | Performed by: FAMILY MEDICINE

## 2021-04-20 ENCOUNTER — ANTI-COAG VISIT (OUTPATIENT)
Dept: FAMILY MEDICINE CLINIC | Age: 76
End: 2021-04-20

## 2021-04-20 ENCOUNTER — NURSE ONLY (OUTPATIENT)
Dept: FAMILY MEDICINE CLINIC | Age: 76
End: 2021-04-20
Payer: MEDICARE

## 2021-04-20 DIAGNOSIS — Z79.01 ANTICOAGULANT LONG-TERM USE: ICD-10-CM

## 2021-04-20 LAB
INTERNATIONAL NORMALIZATION RATIO, POC: 1.9
PROTHROMBIN TIME, POC: NORMAL

## 2021-04-20 PROCEDURE — 85610 PROTHROMBIN TIME: CPT | Performed by: FAMILY MEDICINE

## 2021-04-30 DIAGNOSIS — I10 ESSENTIAL HYPERTENSION: ICD-10-CM

## 2021-04-30 RX ORDER — VITAMIN E (DL,TOCOPHERYL ACET) 180 MG
CAPSULE ORAL
Qty: 90 CAPSULE | Refills: 0 | Status: SHIPPED | OUTPATIENT
Start: 2021-04-30 | End: 2021-07-27 | Stop reason: SDUPTHER

## 2021-04-30 RX ORDER — CELECOXIB 200 MG/1
CAPSULE ORAL
Qty: 90 CAPSULE | Refills: 0 | Status: SHIPPED | OUTPATIENT
Start: 2021-04-30 | End: 2021-10-29

## 2021-05-04 ENCOUNTER — NURSE ONLY (OUTPATIENT)
Dept: FAMILY MEDICINE CLINIC | Age: 76
End: 2021-05-04
Payer: MEDICARE

## 2021-05-04 ENCOUNTER — ANTI-COAG VISIT (OUTPATIENT)
Dept: FAMILY MEDICINE CLINIC | Age: 76
End: 2021-05-04

## 2021-05-04 DIAGNOSIS — Z79.01 ANTICOAGULANT LONG-TERM USE: ICD-10-CM

## 2021-05-04 DIAGNOSIS — J44.9 COPD, VERY SEVERE (HCC): ICD-10-CM

## 2021-05-04 LAB
INTERNATIONAL NORMALIZATION RATIO, POC: 2.2
PROTHROMBIN TIME, POC: NORMAL

## 2021-05-04 PROCEDURE — 85610 PROTHROMBIN TIME: CPT | Performed by: FAMILY MEDICINE

## 2021-05-04 RX ORDER — TIOTROPIUM BROMIDE 18 UG/1
CAPSULE ORAL; RESPIRATORY (INHALATION)
Qty: 30 CAPSULE | Refills: 0 | Status: SHIPPED | OUTPATIENT
Start: 2021-05-04 | End: 2021-07-21

## 2021-05-05 DIAGNOSIS — E78.00 PURE HYPERCHOLESTEROLEMIA: ICD-10-CM

## 2021-05-05 RX ORDER — ROSUVASTATIN CALCIUM 20 MG/1
TABLET, COATED ORAL
Qty: 90 TABLET | Refills: 0 | Status: SHIPPED | OUTPATIENT
Start: 2021-05-05 | End: 2021-09-07

## 2021-05-18 ENCOUNTER — OFFICE VISIT (OUTPATIENT)
Dept: PULMONOLOGY | Age: 76
End: 2021-05-18
Payer: MEDICARE

## 2021-05-18 VITALS
BODY MASS INDEX: 26.2 KG/M2 | HEIGHT: 66 IN | SYSTOLIC BLOOD PRESSURE: 124 MMHG | OXYGEN SATURATION: 96 % | WEIGHT: 163 LBS | DIASTOLIC BLOOD PRESSURE: 70 MMHG | HEART RATE: 82 BPM

## 2021-05-18 DIAGNOSIS — R06.02 SHORTNESS OF BREATH: ICD-10-CM

## 2021-05-18 DIAGNOSIS — R91.1 PULMONARY NODULE: ICD-10-CM

## 2021-05-18 DIAGNOSIS — U07.1 COVID-19 VIRUS DETECTED: ICD-10-CM

## 2021-05-18 DIAGNOSIS — J44.9 COPD, VERY SEVERE (HCC): Primary | ICD-10-CM

## 2021-05-18 DIAGNOSIS — J96.11 CHRONIC HYPOXEMIC RESPIRATORY FAILURE (HCC): ICD-10-CM

## 2021-05-18 PROCEDURE — 1090F PRES/ABSN URINE INCON ASSESS: CPT | Performed by: INTERNAL MEDICINE

## 2021-05-18 PROCEDURE — 3023F SPIROM DOC REV: CPT | Performed by: INTERNAL MEDICINE

## 2021-05-18 PROCEDURE — G8427 DOCREV CUR MEDS BY ELIG CLIN: HCPCS | Performed by: INTERNAL MEDICINE

## 2021-05-18 PROCEDURE — G8417 CALC BMI ABV UP PARAM F/U: HCPCS | Performed by: INTERNAL MEDICINE

## 2021-05-18 PROCEDURE — 4040F PNEUMOC VAC/ADMIN/RCVD: CPT | Performed by: INTERNAL MEDICINE

## 2021-05-18 PROCEDURE — 1036F TOBACCO NON-USER: CPT | Performed by: INTERNAL MEDICINE

## 2021-05-18 PROCEDURE — 1123F ACP DISCUSS/DSCN MKR DOCD: CPT | Performed by: INTERNAL MEDICINE

## 2021-05-18 PROCEDURE — G8400 PT W/DXA NO RESULTS DOC: HCPCS | Performed by: INTERNAL MEDICINE

## 2021-05-18 PROCEDURE — 99213 OFFICE O/P EST LOW 20 MIN: CPT | Performed by: INTERNAL MEDICINE

## 2021-05-18 PROCEDURE — G8926 SPIRO NO PERF OR DOC: HCPCS | Performed by: INTERNAL MEDICINE

## 2021-05-18 RX ORDER — PREDNISONE 1 MG/1
5 TABLET ORAL DAILY
COMMUNITY
Start: 2021-04-30 | End: 2021-09-13

## 2021-05-18 NOTE — PROGRESS NOTES
SUBJECTIVE:  Chief Complaint: Very severe/stage IV COPD, chronic hypoxemic respiratory failure, shortness of breath, history of pulmonary nodule, history of COVID-19 infection  Star Christensen has done extremely well over the past 5 months. She denies any recent bronchitic infections. She continues on Advair Diskus, Spiriva, oral theophylline, albuterol solution per nebulizer or albuterol MDI and also takes 5 mg prednisone daily. She continues wear oxygen 24 hours a day. She has had COVID-19 infection last fall but did not require hospitalization. She has received both Moderna COVID-19 vaccinations. She continued be short of breath with minimal exertion but this has not stopped her from being able to get out of the house and ambulate. She denies chest pain or chest discomfort and has had no hemoptysis or recent purulent sputum expectoration. ROS:  Constitution:  HEENT: Negative for ear, throat pain  Cardiovascular: Negative for chest pain, syncope, edema  Pulmonary: See HPI  Musculoskeletal: Negative for DVT, myalgias, arthralgias    OBJECTIVE:  /70   Pulse 82   Ht 5' 5.5\" (1.664 m)   Wt 163 lb (73.9 kg)   SpO2 96%   BMI 26.71 kg/m²      Physical Exam:  Constitutional:  She appears well developed and well-nourished. Wearing nasal oxygen. Does not appear to be short of breath at rest  Neck:  Supple, No palpable lymphadenopathy, No JVD  Cardiovascular:  S1, S2 Normal, Regular rhythm, no murmurs or gallops, No pericardial  rubs.   Pulmonary: Breath sounds are diminished throughout all areas with a few scattered rhonchi  Abdomen: Not examined  Extremities: no edema, No DVT  Neurologic: Oriented x3, No focal deficits    Radiology: Chest x-ray on 7/1/2020 showed COPD and no acute process  CT chest on 1/14/2019 demonstrated a stable 8 mm solid nodule in the left lower lobe unchanged since prior CT in 2016 and likely benign in nature  PFT: Office spirometry on 7/28/2020 demonstrated a very severe obstructive defect with no significant response to bronchodilators      Echocardiogram: No recent echo available    ASSESSMENT:    1. COPD, very severe ([address]    2. Chronic hypoxemic respiratory failure (HCC)    3. Shortness of breath    4. COVID-19 virus detected    5. Pulmonary nodule          PLAN:   I would like to repeat her chest x-ray and pulmonary function tests in the next several months. I will make no change in her current bronchodilator therapy. I did encourage her to continue wearing oxygen 24 hours a day and to continue to ambulate as tolerated. I will continue to follow her    We have discussed the need to maintain yearly flu immunization, pneumococcal vaccination. We have discussed Coronavirus precaution including handwashing practice, wiping items touched in public such as gas pumps, door handles, shopping carts, etc. Self monitoring for infection - fever, chills, cough, SOB. Should they develop symptoms they should call office for further instructions. Return in about 2 months (around 7/18/2021) for Recheck for COPD, Recheck for Shortness of Breath, chronic hypoxemic respiratory failure. This dictation was performed with a verbal recognition program and it was checked for errors. It is possible that there are still dictated errors within this office note. Any errors should be brought immediately to my attention for correction. All efforts were made to ensure that this office note is accurate.

## 2021-05-25 DIAGNOSIS — J44.9 COPD, VERY SEVERE (HCC): ICD-10-CM

## 2021-05-26 ENCOUNTER — HOSPITAL ENCOUNTER (OUTPATIENT)
Dept: GENERAL RADIOLOGY | Age: 76
Discharge: HOME | End: 2021-05-26
Payer: MEDICARE

## 2021-05-26 ENCOUNTER — HOSPITAL ENCOUNTER (OUTPATIENT)
Age: 76
Discharge: HOME | End: 2021-05-26
Payer: MEDICARE

## 2021-05-26 DIAGNOSIS — J96.11 CHRONIC HYPOXEMIC RESPIRATORY FAILURE (HCC): ICD-10-CM

## 2021-05-26 DIAGNOSIS — R06.02 SHORTNESS OF BREATH: ICD-10-CM

## 2021-05-26 DIAGNOSIS — J44.9 COPD, VERY SEVERE (HCC): ICD-10-CM

## 2021-05-26 DIAGNOSIS — R91.1 PULMONARY NODULE: ICD-10-CM

## 2021-05-26 PROCEDURE — 71046 X-RAY EXAM CHEST 2 VIEWS: CPT

## 2021-05-26 RX ORDER — THEOPHYLLINE 450 MG/1
TABLET, EXTENDED RELEASE ORAL
Qty: 90 TABLET | Refills: 0 | Status: SHIPPED | OUTPATIENT
Start: 2021-05-26 | End: 2021-08-19

## 2021-05-28 RX ORDER — GABAPENTIN 400 MG/1
400 CAPSULE ORAL EVERY EVENING
Qty: 90 CAPSULE | Refills: 0 | Status: SHIPPED | OUTPATIENT
Start: 2021-05-28 | End: 2021-08-26 | Stop reason: SDUPTHER

## 2021-05-29 DIAGNOSIS — J44.9 COPD, VERY SEVERE (HCC): ICD-10-CM

## 2021-05-29 DIAGNOSIS — Z79.01 ANTICOAGULANT LONG-TERM USE: ICD-10-CM

## 2021-06-01 RX ORDER — ALBUTEROL SULFATE 90 UG/1
AEROSOL, METERED RESPIRATORY (INHALATION)
Qty: 25.5 INHALER | Refills: 1 | Status: SHIPPED | OUTPATIENT
Start: 2021-06-01 | End: 2021-11-17 | Stop reason: SDUPTHER

## 2021-06-01 RX ORDER — ESOMEPRAZOLE MAGNESIUM 40 MG/1
CAPSULE, DELAYED RELEASE ORAL
Qty: 180 CAPSULE | Refills: 1 | Status: SHIPPED | OUTPATIENT
Start: 2021-06-01 | End: 2021-11-22

## 2021-06-01 RX ORDER — WARFARIN SODIUM 6 MG/1
TABLET ORAL
Qty: 90 TABLET | Refills: 1 | Status: SHIPPED | OUTPATIENT
Start: 2021-06-01 | End: 2021-11-22

## 2021-06-04 ENCOUNTER — OFFICE VISIT (OUTPATIENT)
Dept: FAMILY MEDICINE CLINIC | Age: 76
End: 2021-06-04
Payer: MEDICARE

## 2021-06-04 VITALS
WEIGHT: 163.5 LBS | BODY MASS INDEX: 27.91 KG/M2 | HEART RATE: 68 BPM | DIASTOLIC BLOOD PRESSURE: 66 MMHG | HEIGHT: 64 IN | SYSTOLIC BLOOD PRESSURE: 140 MMHG

## 2021-06-04 DIAGNOSIS — Z00.00 ROUTINE GENERAL MEDICAL EXAMINATION AT A HEALTH CARE FACILITY: ICD-10-CM

## 2021-06-04 DIAGNOSIS — E03.9 ACQUIRED HYPOTHYROIDISM: ICD-10-CM

## 2021-06-04 DIAGNOSIS — E78.00 PURE HYPERCHOLESTEROLEMIA: ICD-10-CM

## 2021-06-04 DIAGNOSIS — J44.9 COPD, VERY SEVERE (HCC): Primary | ICD-10-CM

## 2021-06-04 PROBLEM — A41.9 SEPSIS DUE TO PNEUMONIA (HCC): Status: RESOLVED | Noted: 2017-11-14 | Resolved: 2021-06-04

## 2021-06-04 PROBLEM — J18.9 SEPSIS DUE TO PNEUMONIA (HCC): Status: RESOLVED | Noted: 2017-11-14 | Resolved: 2021-06-04

## 2021-06-04 PROCEDURE — 4040F PNEUMOC VAC/ADMIN/RCVD: CPT | Performed by: FAMILY MEDICINE

## 2021-06-04 PROCEDURE — 1123F ACP DISCUSS/DSCN MKR DOCD: CPT | Performed by: FAMILY MEDICINE

## 2021-06-04 PROCEDURE — G0438 PPPS, INITIAL VISIT: HCPCS | Performed by: FAMILY MEDICINE

## 2021-06-04 SDOH — ECONOMIC STABILITY: FOOD INSECURITY: WITHIN THE PAST 12 MONTHS, YOU WORRIED THAT YOUR FOOD WOULD RUN OUT BEFORE YOU GOT MONEY TO BUY MORE.: NEVER TRUE

## 2021-06-04 SDOH — ECONOMIC STABILITY: FOOD INSECURITY: WITHIN THE PAST 12 MONTHS, THE FOOD YOU BOUGHT JUST DIDN'T LAST AND YOU DIDN'T HAVE MONEY TO GET MORE.: NEVER TRUE

## 2021-06-04 ASSESSMENT — PATIENT HEALTH QUESTIONNAIRE - PHQ9
SUM OF ALL RESPONSES TO PHQ QUESTIONS 1-9: 0
1. LITTLE INTEREST OR PLEASURE IN DOING THINGS: 0
SUM OF ALL RESPONSES TO PHQ9 QUESTIONS 1 & 2: 0
SUM OF ALL RESPONSES TO PHQ QUESTIONS 1-9: 0
SUM OF ALL RESPONSES TO PHQ QUESTIONS 1-9: 0
2. FEELING DOWN, DEPRESSED OR HOPELESS: 0

## 2021-06-04 ASSESSMENT — VISUAL ACUITY
OD_CC: 20/20
OS_CC: 20/25

## 2021-06-04 ASSESSMENT — LIFESTYLE VARIABLES: HOW OFTEN DO YOU HAVE A DRINK CONTAINING ALCOHOL: 0

## 2021-06-04 ASSESSMENT — SOCIAL DETERMINANTS OF HEALTH (SDOH): HOW HARD IS IT FOR YOU TO PAY FOR THE VERY BASICS LIKE FOOD, HOUSING, MEDICAL CARE, AND HEATING?: NOT HARD AT ALL

## 2021-06-04 NOTE — PROGRESS NOTES
Medicare Annual Wellness Visit  Name: Dougie Rivera Date: 2021   MRN: [id number] Sex: Female   Age: 68 y.o. Ethnicity: Non-/Non    : 1945 Race: Samantha Arteaga is here for Medicare AWV    Screenings for behavioral, psychosocial and functional/safety risks, and cognitive dysfunction are all negative except as indicated below. These results, as well as other patient data from the [address] form, are documented in Flowsheets linked to this Encounter. Allergies   Allergen Reactions    Codeine Swelling    Darvon [Propoxyphene Hcl] Swelling    Lamisil [Terbinafine Hcl]     Morphine Swelling    Neosporin [Neomycin-Polymyxin-Gramicidin]     Pcn [Penicillins] Swelling       Prior to Visit Medications    Medication Sig Taking? Authorizing Provider   esomeprazole (NEXIUM) 40 MG delayed release capsule TAKE 1 CAPSULE BY MOUTH TWICE A DAY Yes Myrna Martin MD   albuterol sulfate  (90 Base) MCG/ACT inhaler INHALE 2 PUFF FOUR TIMES A DAY AS NEEDED Yes Myrna Martin MD   warfarin (COUMADIN) 6 MG tablet TAKE 1 TABLET BY MOUTH EVERY DAY PER INR Yes Myrna Martin MD   gabapentin (NEURONTIN) 400 MG capsule Take 1 capsule by mouth every evening for 90 days.  Yes Myrna Martin MD   theophylline (THEODUR) 450 MG extended release tablet TAKE 1/2 TABLET BY MOUTH TWICE A DAY Yes Myrna Martin MD   predniSONE (DELTASONE) 5 MG tablet Take 5 mg by mouth daily Yes Historical Provider, MD   rosuvastatin (CRESTOR) 20 MG tablet TAKE 1 TABLET BY MOUTH EVERY DAY EVERY NIGHT Yes Myrna Martin MD   SPIRIVA HANDIHALER 18 MCG inhalation capsule INHALE 1 PUFF BY MOUTH AS DIRECTED ONCE A DAY Yes Myrna Martin MD   celecoxib (CELEBREX) 200 MG capsule TAKE 1 CAPSULE BY MOUTH EVERY DAY Yes Myrna Martin MD   Magnesium Oxide 500 MG CAPS TAKE 1 CAPSULE BY MOUTH EVERY DAY Yes Myrna Martin MD   isosorbide mononitrate (IMDUR) 60 MG extended release tablet TAKE 1 TABLET BY MOUTH EVERY DAY Yes Tessy Cruz MD   DALIRESP 500 MCG tablet TAKE 1 TABLET BY MOUTH EVERY DAY Yes Tessy Cruz MD   potassium chloride (KLOR-CON M10) 10 MEQ extended release tablet ALTERNATE TAKE 1 TABLET AND THEN 2 TABLETS EVERY OTHER DAY Yes Tessy Cruz MD   albuterol (PROVENTIL) (2.5 MG/3ML) 0.083% nebulizer solution Take 3 mLs by nebulization 4 times daily as needed for Wheezing Yes Tessy Cruz MD   fluticasone-salmeterol (ADVAIR DISKUS) 500-50 MCG/DOSE diskus inhaler Inhale 1 puff into the lungs every 12 hours Yes Tessy Cruz MD   risedronate (ACTONEL) 35 MG tablet TAKE 1 TABLET BY MOUTH EVERY 7 DAYS PATIENT TAKES ON SUNDAY Yes Tessy Cruz MD   montelukast (SINGULAIR) 10 MG tablet TAKE 1 TABLET BY MOUTH EVERY DAY EVERY NIGHT Yes Tessy Cruz MD   levothyroxine (SYNTHROID) 50 MCG tablet Take 1 tablet by mouth Daily Yes Tessy Cruz MD   Dextromethorphan-guaiFENesin [facility] [place] DM)  MG TB12 Take 1 to 2 tablet by mouth every 12 hours (maximum: 4 tablets/24 hours). Drink plenty of water.  Yes Marizol Vargas PA-C   furosemide (LASIX) 40 MG tablet TAKE 1 TABLET BY MOUTH EVERY DAY Yes Vaughn Maharaj PA-C   nitroGLYCERIN (NITROSTAT) 0.4 MG SL tablet Place 1 tablet under the tongue every 5 minutes as needed for Chest pain Yes Oli Pimentel MD   diltiazem (CARDIZEM CD) 240 MG extended release capsule Take 240 mg by mouth daily Yes Historical Provider, MD   dicyclomine (BENTYL) 10 MG capsule Take 10 mg by mouth 2 times daily Yes Historical Provider, MD   Biotin 35232 MCG TABS Take 10,000 mcg by mouth daily Yes Historical Provider, MD   LORazepam (ATIVAN) 0.5 MG tablet TAKE 1 TAB BY MOUTH EVERY DAY AS NEEDED Yes Historical Provider, MD   b complex vitamins capsule Take 1 capsule by mouth daily Yes Historical Provider, MD   Calcium Carb-Cholecalciferol (CALCIUM + D3) 600-200 MG-UNIT TABS Take 1 tablet by mouth 2 times daily Yes Historical Provider, MD   Multiple Vitamins-Minerals (MULTIVITAMIN PO) Take 1 tablet by mouth daily Yes Historical Provider, MD   OXYGEN Inhale 2 L/hr into the lungs continuous. Yes Historical Provider, MD       Past Medical History:   Diagnosis Date    Anticoagulant long-term use     Arthritis     [facility]     Chronic hypoxemic respiratory failure ([facility] 2/6/2018    Coronary atherosclerosis     COVID-19 virus detected 1/18/2021    Gastroesophageal reflux disease     Hiatal hernia     Lung infiltrate on CT 1/22/2019    On home oxygen therapy     on 24/7    Osteopenia     Phlebitis     Pulmonary nodule 7/5/2016    S/P left heart catheterization by percutaneous approach 6/27/2013    Sepsis due to pneumonia ([facility] 11/14/2017    Shortness of breath 9/23/2019    Shortness of breath 1/18/2021    Wears glasses        Past Surgical History:   Procedure Laterality Date    APPENDECTOMY     Alphonsus     Dr Kristin Salinas, COLON, DIAGNOSTIC      TONSILLECTOMY      TUBAL LIGATION      VEIN SURGERY         Family History   Problem Relation Age of Onset    Heart Disease Mother     Cancer Father         lung ca    Heart Disease Father     COPD Father     Cancer Sister     Heart Disease Sister        CareTeam (Including outside providers/suppliers regularly involved in providing care):   Patient Care Team:  Patrice Morrow MD as PCP - [facility]  Patrice Morrow MD as PCP - [facility] Empaneled Provider  Sharyn Gomez MD as Consulting Physician (Cardiology)    Wt Readings from Last 3 Encounters:   06/04/21 163 lb 8 oz (74.2 kg)   05/18/21 163 lb (73.9 kg)   01/18/21 164 lb (74.4 kg)     Vitals:    06/04/21 1028   BP: (!) 140/68   Pulse: 68   Weight: 163 lb 8 oz (74.2 kg)   Height: 5' 4\" (1.626 m)     Body mass index is 28.06 kg/m².     Based upon direct observation of the patient, evaluation of cognition reveals recent and remote memory intact. Pulmonary/Chest: decreased breath sounds noted appears at baseline. Cardiovascular: normal rate, normal S1 and S2, no gallops and no carotid bruits    Patient's complete Health Risk Assessment and screening values have been reviewed and are found in Flowsheets. The following problems were reviewed today and where indicated follow up appointments were made and/or referrals ordered. Positive Risk Factor Screenings with Interventions:          General Health and ACP:  General  In general, how would you say your health is?: Very Good  In the past 7 days, have you experienced any of the following? New or Increased Pain, New or Increased Fatigue, Loneliness, Social Isolation, Stress or Anger?: None of These  Do you get the social and emotional support that you need?: Yes  Do you have a Living Will?: Yes  Advance Directives     Power of [employer] Will ACP-Advance Directive ACP-Power of     Not on File Not on File Not on File Not on File      General Health Risk Interventions:  · No issues    Health Habits/Nutrition:  Health Habits/Nutrition  Do you exercise for at least 20 minutes 2-3 times per week?: (!) No  Have you lost any weight without trying in the past 3 months?: No  Do you eat only one meal per day?: (!) Yes  Have you seen the dentist within the past year?: Yes  Body mass index: (!) 28.06  Health Habits/Nutrition Interventions:  · Patient's exercise is limited by her severe COPD. She declines pulmonary rehab. I suggested starting a walking routine or considering yoga or Pilates exercise routine. Patient notes that she does eat multiple meals per day. I suggested patient grow taller to improve her BMI.   ·     Hearing/Vision:   Hearing Screening    125Hz 250Hz 500Hz 1000Hz 2000Hz 3000Hz 4000Hz 6000Hz 8000Hz   Right ear:            Left ear:               Visual Acuity Screening    Right eye Left eye Both eyes   Without correction:      With correction: 20/20 20/25 20/20     Hearing/Vision  Do you or your family notice any trouble with your hearing that hasn't been managed with hearing aids?: No  Do you have difficulty driving, watching TV, or doing any of your daily activities because of your eyesight?: No  Have you had an eye exam within the past year?: Yes  Hearing/Vision Interventions:  · No issues    Safety:  Safety  Do you have working smoke detectors?: Yes  Have all throw rugs been removed or fastened?: Yes  Do you have non-slip mats or surfaces in all bathtubs/showers?: Yes  Do all of your stairways have a railing or banister?: (!) No (no stairs)  Are your doorways, halls and stairs free of clutter?: Yes  Do you always fasten your seatbelt when you are in a car?: Yes  Safety Interventions:  · Patient declines any further evaluation/treatment for this issue     Personalized Preventive Plan   Current Health Maintenance Status  Immunization History   Administered Date(s) Administered    COVID-19, Moderna, PF, 100mcg/0.5mL 01/25/2021, 02/22/2021    Influenza A (C2G3-65) Vaccine PF IM 11/11/2009    Influenza Vaccine, unspecified formulation 09/02/2011, 09/07/2012, 09/23/2014    Influenza Virus Vaccine 09/18/2000, 09/02/2011, 09/07/2012    Influenza Whole 10/02/2013    Influenza, High Dose (Fluzone 65 yrs and older) 10/06/2015, 10/03/2016, 09/09/2017, 09/19/2019    Influenza, High-dose, Joyceann Moment, 65 yrs +, IM (Fluzone) 09/09/2020    Influenza, Triv, inactivated, subunit, adjuvanted, IM (Fluad 65 yrs and older) 09/19/2019    Pneumococcal Polysaccharide ([id number]) 09/02/2011, 10/28/2013    Td, unspecified formulation 12/22/2011    Zoster Live (Zostavax) 01/04/2016    Zoster Recombinant (Shingrix) 02/04/2020, 02/04/2020, 06/08/2020        Health Maintenance   Topic Date Due    Hepatitis C screen  Never done    DTaP/Tdap/Td vaccine (1 - Tdap) 02/12/1964    DEXA (modify frequency per FRAX score)  Never done    TSH testing  06/04/2021   

## 2021-06-04 NOTE — PATIENT INSTRUCTIONS
Personalized Preventive Plan for Hector Cates - 6/4/2021  Medicare offers a range of preventive health benefits. Some of the tests and screenings are paid in full while other may be subject to a deductible, co-insurance, and/or copay. Some of these benefits include a comprehensive review of your medical history including lifestyle, illnesses that may run in your family, and various assessments and screenings as appropriate. After reviewing your medical record and screening and assessments performed today your provider may have ordered immunizations, labs, imaging, and/or referrals for you. A list of these orders (if applicable) as well as your Preventive Care list are included within your After Visit Summary for your review. Other Preventive Recommendations:    · A preventive eye exam performed by an eye specialist is recommended every 1-2 years to screen for glaucoma; cataracts, macular degeneration, and other eye disorders. · A preventive dental visit is recommended every 6 months. · Try to get at least 150 minutes of exercise per week or 10,000 steps per day on a pedometer . · Order or download the FREE \"Exercise & Physical Activity: Your Everyday Guide\" from The [employer]. Call 4-158.667.6422 or search The [employer] online. · You need 9947-6224 mg of calcium and 4928-4633 IU of vitamin D per day. It is possible to meet your calcium requirement with diet alone, but a vitamin D supplement is usually necessary to meet this goal.  · When exposed to the sun, use a sunscreen that protects against both UVA and UVB radiation with an SPF of 30 or greater. Reapply every 2 to 3 hours or after sweating, drying off with a towel, or swimming. · Always wear a seat belt when traveling in a car. Always wear a helmet when riding a bicycle or motorcycle.

## 2021-06-08 ENCOUNTER — TELEPHONE (OUTPATIENT)
Dept: PULMONOLOGY | Age: 76
End: 2021-06-08

## 2021-06-08 ENCOUNTER — NURSE ONLY (OUTPATIENT)
Dept: FAMILY MEDICINE CLINIC | Age: 76
End: 2021-06-08
Payer: MEDICARE

## 2021-06-08 ENCOUNTER — ANTI-COAG VISIT (OUTPATIENT)
Dept: FAMILY MEDICINE CLINIC | Age: 76
End: 2021-06-08
Payer: MEDICARE

## 2021-06-08 DIAGNOSIS — Z79.01 ANTICOAGULANT LONG-TERM USE: ICD-10-CM

## 2021-06-08 LAB
INTERNATIONAL NORMALIZATION RATIO, POC: 1.5
INTERNATIONAL NORMALIZATION RATIO, POC: 1.5
PROTHROMBIN TIME, POC: ABNORMAL
PROTHROMBIN TIME, POC: ABNORMAL

## 2021-06-08 PROCEDURE — 85610 PROTHROMBIN TIME: CPT | Performed by: FAMILY MEDICINE

## 2021-06-08 PROCEDURE — 93793 ANTICOAG MGMT PT WARFARIN: CPT | Performed by: FAMILY MEDICINE

## 2021-06-08 NOTE — TELEPHONE ENCOUNTER
Patient was given a sample of Breztri and states that it has been helping.  She would like a new Rx sent to [facility]

## 2021-06-08 NOTE — PROGRESS NOTES
Confirmed current coumadin 6 mg x 4 days & 3 mg x 3 day .  inr 1.5. per alexa nichols change to 3/6/6mg recheck 2 weeks

## 2021-06-09 RX ORDER — BUDESONIDE, GLYCOPYRROLATE, AND FORMOTEROL FUMARATE 160; 9; 4.8 UG/1; UG/1; UG/1
2 AEROSOL, METERED RESPIRATORY (INHALATION) 2 TIMES DAILY
Qty: 1 INHALER | Refills: 11 | Status: SHIPPED | OUTPATIENT
Start: 2021-06-09 | End: 2022-01-13

## 2021-06-16 DIAGNOSIS — J44.9 COPD, VERY SEVERE (HCC): ICD-10-CM

## 2021-06-16 RX ORDER — FUROSEMIDE 40 MG/1
20 TABLET ORAL DAILY
Qty: 90 TABLET | Refills: 1 | Status: SHIPPED | OUTPATIENT
Start: 2021-06-16 | End: 2021-11-17 | Stop reason: SDUPTHER

## 2021-06-16 RX ORDER — MONTELUKAST SODIUM 10 MG/1
TABLET ORAL
Qty: 90 TABLET | Refills: 1 | Status: SHIPPED | OUTPATIENT
Start: 2021-06-16 | End: 2021-12-01 | Stop reason: SDUPTHER

## 2021-06-23 ENCOUNTER — NURSE ONLY (OUTPATIENT)
Dept: FAMILY MEDICINE CLINIC | Age: 76
End: 2021-06-23
Payer: MEDICARE

## 2021-06-23 DIAGNOSIS — Z79.01 ANTICOAGULANT LONG-TERM USE: ICD-10-CM

## 2021-06-23 LAB
INTERNATIONAL NORMALIZATION RATIO, POC: 2.2
PROTHROMBIN TIME, POC: NORMAL

## 2021-06-23 PROCEDURE — 85610 PROTHROMBIN TIME: CPT | Performed by: FAMILY MEDICINE

## 2021-06-23 NOTE — PROGRESS NOTES
Confirmed current coumadin 6/6/3 mg alternating.  inr 2.2. pt to keep dose the same and recheck 4 weeks

## 2021-07-14 DIAGNOSIS — I10 ESSENTIAL HYPERTENSION: ICD-10-CM

## 2021-07-14 RX ORDER — POTASSIUM CHLORIDE 750 MG/1
TABLET, EXTENDED RELEASE ORAL
Qty: 135 TABLET | Refills: 1 | Status: SHIPPED | OUTPATIENT
Start: 2021-07-14 | End: 2021-12-01 | Stop reason: SDUPTHER

## 2021-07-14 NOTE — TELEPHONE ENCOUNTER
Requested Prescriptions     Signed Prescriptions Disp Refills    potassium chloride (KLOR-CON M10) 10 MEQ extended release tablet 135 tablet 1     Sig: ALTERNATE TAKE 1 TABLET AND THEN 2 TABLETS EVERY OTHER DAY     Authorizing Provider: Portia Shukla     Ordering User: Monster Schwarz

## 2021-07-21 ENCOUNTER — PROCEDURE VISIT (OUTPATIENT)
Dept: PULMONOLOGY | Age: 76
End: 2021-07-21
Payer: MEDICARE

## 2021-07-21 ENCOUNTER — NURSE ONLY (OUTPATIENT)
Dept: FAMILY MEDICINE CLINIC | Age: 76
End: 2021-07-21
Payer: MEDICARE

## 2021-07-21 VITALS — WEIGHT: 163 LBS | BODY MASS INDEX: 27.83 KG/M2 | HEIGHT: 64 IN

## 2021-07-21 DIAGNOSIS — U07.1 COVID-19 VIRUS DETECTED: ICD-10-CM

## 2021-07-21 DIAGNOSIS — J96.11 CHRONIC HYPOXEMIC RESPIRATORY FAILURE (HCC): ICD-10-CM

## 2021-07-21 DIAGNOSIS — R06.02 SHORTNESS OF BREATH: ICD-10-CM

## 2021-07-21 DIAGNOSIS — J44.9 COPD, VERY SEVERE (HCC): Primary | ICD-10-CM

## 2021-07-21 DIAGNOSIS — J44.9 COPD, VERY SEVERE (HCC): ICD-10-CM

## 2021-07-21 DIAGNOSIS — Z79.01 ANTICOAGULANT LONG-TERM USE: ICD-10-CM

## 2021-07-21 DIAGNOSIS — R91.1 PULMONARY NODULE: ICD-10-CM

## 2021-07-21 LAB
EXPIRATORY TIME-POST: NORMAL
EXPIRATORY TIME: NORMAL
FEF 25-75% %CHNG: NORMAL
FEF 25-75% %PRED-POST: NORMAL
FEF 25-75% %PRED-PRE: NORMAL
FEF 25-75% PRED: NORMAL
FEF 25-75%-POST: NORMAL
FEF 25-75%-PRE: NORMAL
FEV1 %PRED-POST: 27.3 %
FEV1 %PRED-PRE: 29 %
FEV1 PRED: 2.17 L
FEV1-POST: 0.59 L
FEV1-PRE: 0.63 L
FEV1/FVC %PRED-POST: 52.6 %
FEV1/FVC %PRED-PRE: 53.4 %
FEV1/FVC PRED: 74.6 %
FEV1/FVC-POST: 39.2 %
FEV1/FVC-PRE: 39.9 %
FVC %PRED-POST: 52.3 L
FVC %PRED-PRE: 54.6 %
FVC PRED: 2.9 L
FVC-POST: 1.51 L
FVC-PRE: 1.58 L
INTERNATIONAL NORMALIZATION RATIO, POC: 2.1
PEF %PRED-POST: NORMAL
PEF %PRED-PRE: NORMAL
PEF PRED: NORMAL
PEF%CHNG: NORMAL
PEF-POST: NORMAL
PEF-PRE: NORMAL
PROTHROMBIN TIME, POC: NORMAL

## 2021-07-21 PROCEDURE — G8400 PT W/DXA NO RESULTS DOC: HCPCS | Performed by: INTERNAL MEDICINE

## 2021-07-21 PROCEDURE — 99213 OFFICE O/P EST LOW 20 MIN: CPT | Performed by: INTERNAL MEDICINE

## 2021-07-21 PROCEDURE — 4040F PNEUMOC VAC/ADMIN/RCVD: CPT | Performed by: INTERNAL MEDICINE

## 2021-07-21 PROCEDURE — G8427 DOCREV CUR MEDS BY ELIG CLIN: HCPCS | Performed by: INTERNAL MEDICINE

## 2021-07-21 PROCEDURE — 1123F ACP DISCUSS/DSCN MKR DOCD: CPT | Performed by: INTERNAL MEDICINE

## 2021-07-21 PROCEDURE — 1036F TOBACCO NON-USER: CPT | Performed by: INTERNAL MEDICINE

## 2021-07-21 PROCEDURE — 1090F PRES/ABSN URINE INCON ASSESS: CPT | Performed by: INTERNAL MEDICINE

## 2021-07-21 PROCEDURE — G8417 CALC BMI ABV UP PARAM F/U: HCPCS | Performed by: INTERNAL MEDICINE

## 2021-07-21 PROCEDURE — 3023F SPIROM DOC REV: CPT | Performed by: INTERNAL MEDICINE

## 2021-07-21 PROCEDURE — 85610 PROTHROMBIN TIME: CPT | Performed by: FAMILY MEDICINE

## 2021-07-21 PROCEDURE — G8926 SPIRO NO PERF OR DOC: HCPCS | Performed by: INTERNAL MEDICINE

## 2021-07-21 RX ORDER — DILTIAZEM HYDROCHLORIDE 240 MG/1
CAPSULE, EXTENDED RELEASE ORAL
COMMUNITY
Start: 2021-05-03 | End: 2021-12-01

## 2021-07-21 RX ORDER — PREDNISONE 1 MG/1
5 TABLET ORAL DAILY
Qty: 90 TABLET | Refills: 3 | Status: SHIPPED | OUTPATIENT
Start: 2021-07-21 | End: 2021-09-13

## 2021-07-21 ASSESSMENT — PULMONARY FUNCTION TESTS
FVC_PREDICTED: 2.90
FVC_POST: 1.51
FVC_PERCENT_PREDICTED_PRE: 54.6
FEV1_PREDICTED: 2.17
FVC_PRE: 1.58
FEV1_PERCENT_PREDICTED_POST: 27.3
FEV1/FVC_PERCENT_PREDICTED_POST: 52.6
FVC_PERCENT_PREDICTED_POST: 52.3
FEV1/FVC_PERCENT_PREDICTED_PRE: 53.4
FEV1/FVC_PREDICTED: 74.6
FEV1/FVC_POST: 39.2
FEV1_PRE: 0.63
FEV1_POST: 0.59
FEV1/FVC_PRE: 39.9
FEV1_PERCENT_PREDICTED_PRE: 29.0

## 2021-07-21 NOTE — PROGRESS NOTES
fever, chills, cough, SOB. Should they develop symptoms they should call office for further instructions. Return for Recheck for COPD, Recheck for Shortness of Breath, chronic hypoxemic respiratory failure. This dictation was performed with a verbal recognition program and it was checked for errors. It is possible that there are still dictated errors within this office note. Any errors should be brought immediately to my attention for correction. All efforts were made to ensure that this office note is accurate.

## 2021-07-21 NOTE — PATIENT INSTRUCTIONS
Confirmed current coumadin 6/6/3 mg alternating.  inr 2.1. pt to keep dose the same and recheck 4 weeks

## 2021-07-22 DIAGNOSIS — J44.9 COPD, VERY SEVERE (HCC): ICD-10-CM

## 2021-07-22 RX ORDER — ROFLUMILAST 500 UG/1
TABLET ORAL
Qty: 90 TABLET | Refills: 1 | Status: SHIPPED | OUTPATIENT
Start: 2021-07-22 | End: 2022-02-25

## 2021-07-27 DIAGNOSIS — I10 ESSENTIAL HYPERTENSION: ICD-10-CM

## 2021-07-27 RX ORDER — VITAMIN E (DL,TOCOPHERYL ACET) 180 MG
CAPSULE ORAL
Qty: 90 CAPSULE | Refills: 1 | Status: SHIPPED | OUTPATIENT
Start: 2021-07-27 | End: 2021-12-01

## 2021-08-12 DIAGNOSIS — E03.9 ACQUIRED HYPOTHYROIDISM: ICD-10-CM

## 2021-08-12 RX ORDER — LEVOTHYROXINE SODIUM 0.05 MG/1
TABLET ORAL
Qty: 90 TABLET | Refills: 0 | Status: SHIPPED | OUTPATIENT
Start: 2021-08-12 | End: 2021-11-05

## 2021-08-18 ENCOUNTER — NURSE ONLY (OUTPATIENT)
Dept: FAMILY MEDICINE CLINIC | Age: 76
End: 2021-08-18
Payer: MEDICARE

## 2021-08-18 DIAGNOSIS — J44.9 COPD, VERY SEVERE (HCC): ICD-10-CM

## 2021-08-18 DIAGNOSIS — Z79.01 ANTICOAGULANT LONG-TERM USE: ICD-10-CM

## 2021-08-18 LAB
INTERNATIONAL NORMALIZATION RATIO, POC: 2.2
PROTHROMBIN TIME, POC: NORMAL

## 2021-08-18 PROCEDURE — 85610 PROTHROMBIN TIME: CPT | Performed by: FAMILY MEDICINE

## 2021-08-19 RX ORDER — THEOPHYLLINE 450 MG/1
TABLET, EXTENDED RELEASE ORAL
Qty: 90 TABLET | Refills: 0 | Status: SHIPPED | OUTPATIENT
Start: 2021-08-19 | End: 2021-11-10

## 2021-08-26 DIAGNOSIS — J44.9 COPD, VERY SEVERE (HCC): ICD-10-CM

## 2021-08-27 RX ORDER — ALBUTEROL SULFATE 2.5 MG/3ML
2.5 SOLUTION RESPIRATORY (INHALATION) 4 TIMES DAILY PRN
Qty: 14 PACKAGE | Refills: 1 | Status: SHIPPED | OUTPATIENT
Start: 2021-08-27 | End: 2022-02-21

## 2021-08-27 RX ORDER — GABAPENTIN 400 MG/1
400 CAPSULE ORAL EVERY EVENING
Qty: 90 CAPSULE | Refills: 0 | Status: SHIPPED | OUTPATIENT
Start: 2021-08-27 | End: 2021-11-30

## 2021-09-13 ENCOUNTER — APPOINTMENT (OUTPATIENT)
Dept: GENERAL RADIOLOGY | Age: 76
End: 2021-09-13
Payer: MEDICARE

## 2021-09-13 ENCOUNTER — TELEPHONE (OUTPATIENT)
Dept: FAMILY MEDICINE CLINIC | Age: 76
End: 2021-09-13

## 2021-09-13 ENCOUNTER — HOSPITAL ENCOUNTER (EMERGENCY)
Age: 76
Discharge: HOME | End: 2021-09-13
Attending: EMERGENCY MEDICINE
Payer: MEDICARE

## 2021-09-13 VITALS
SYSTOLIC BLOOD PRESSURE: 156 MMHG | OXYGEN SATURATION: 96 % | HEART RATE: 78 BPM | RESPIRATION RATE: 16 BRPM | BODY MASS INDEX: 26.66 KG/M2 | DIASTOLIC BLOOD PRESSURE: 59 MMHG | WEIGHT: 160 LBS | TEMPERATURE: 98.1 F | HEIGHT: 65 IN

## 2021-09-13 DIAGNOSIS — J06.9 ACUTE UPPER RESPIRATORY INFECTION: Primary | ICD-10-CM

## 2021-09-13 DIAGNOSIS — J44.1 COPD EXACERBATION (HCC): ICD-10-CM

## 2021-09-13 LAB
ADENOVIRUS DETECTION BY PCR: NOT DETECTED
BORDETELLA PARAPERTUSSIS BY PCR: NOT DETECTED
BORDETELLA PERTUSSIS PCR: NOT DETECTED
CHLAMYDOPHILA PNEUMONIA PCR: NOT DETECTED
CORONAVIRUS 229E PCR: NOT DETECTED
CORONAVIRUS HKU1 PCR: NOT DETECTED
CORONAVIRUS NL63 PCR: NOT DETECTED
CORONAVIRUS OC43 PCR: NOT DETECTED
EKG ATRIAL RATE: 79 BPM
EKG DIAGNOSIS: NORMAL
EKG P AXIS: 72 DEGREES
EKG P-R INTERVAL: 128 MS
EKG Q-T INTERVAL: 388 MS
EKG QRS DURATION: 116 MS
EKG QTC CALCULATION (BAZETT): 444 MS
EKG R AXIS: -37 DEGREES
EKG T AXIS: 78 DEGREES
EKG VENTRICULAR RATE: 79 BPM
HUMAN METAPNEUMOVIRUS PCR: NOT DETECTED
INFLUENZA A BY PCR: NOT DETECTED
INFLUENZA A H1 (2009) PCR: NOT DETECTED
INFLUENZA A H1 PANDEMIC PCR: NOT DETECTED
INFLUENZA A H3 PCR: NOT DETECTED
INFLUENZA B BY PCR: NOT DETECTED
MYCOPLASMA PNEUMONIAE PCR: NOT DETECTED
PARAINFLUENZA 1 PCR: NOT DETECTED
PARAINFLUENZA 2 PCR: NOT DETECTED
PARAINFLUENZA 3 PCR: ABNORMAL
PARAINFLUENZA 4 PCR: NOT DETECTED
RHINOVIRUS ENTEROVIRUS PCR: NOT DETECTED
RSV PCR: NOT DETECTED
SARS-COV-2: NOT DETECTED

## 2021-09-13 PROCEDURE — 99285 EMERGENCY DEPT VISIT HI MDM: CPT

## 2021-09-13 PROCEDURE — 93010 ELECTROCARDIOGRAM REPORT: CPT | Performed by: INTERNAL MEDICINE

## 2021-09-13 PROCEDURE — 6370000000 HC RX 637 (ALT 250 FOR IP): Performed by: EMERGENCY MEDICINE

## 2021-09-13 PROCEDURE — 0202U NFCT DS 22 TRGT SARS-COV-2: CPT

## 2021-09-13 PROCEDURE — 71045 X-RAY EXAM CHEST 1 VIEW: CPT

## 2021-09-13 PROCEDURE — 93005 ELECTROCARDIOGRAM TRACING: CPT | Performed by: EMERGENCY MEDICINE

## 2021-09-13 RX ORDER — BACLOFEN 20 MG
TABLET ORAL
COMMUNITY
Start: 2021-08-03 | End: 2022-02-21 | Stop reason: SDUPTHER

## 2021-09-13 RX ORDER — AZITHROMYCIN 250 MG/1
500 TABLET, FILM COATED ORAL ONCE
Status: COMPLETED | OUTPATIENT
Start: 2021-09-13 | End: 2021-09-13

## 2021-09-13 RX ORDER — AZITHROMYCIN 250 MG/1
250 TABLET, FILM COATED ORAL DAILY
Qty: 4 TABLET | Refills: 0 | Status: SHIPPED | OUTPATIENT
Start: 2021-09-14 | End: 2021-09-13

## 2021-09-13 RX ORDER — AZITHROMYCIN 250 MG/1
250 TABLET, FILM COATED ORAL DAILY
Qty: 4 TABLET | Refills: 0 | Status: SHIPPED | OUTPATIENT
Start: 2021-09-14 | End: 2021-09-18

## 2021-09-13 RX ORDER — PREDNISONE 10 MG/1
40 TABLET ORAL DAILY
Qty: 16 TABLET | Refills: 0 | Status: SHIPPED | OUTPATIENT
Start: 2021-09-14 | End: 2021-09-18

## 2021-09-13 RX ORDER — PREDNISONE 20 MG/1
40 TABLET ORAL ONCE
Status: COMPLETED | OUTPATIENT
Start: 2021-09-13 | End: 2021-09-13

## 2021-09-13 RX ORDER — ALBUTEROL SULFATE 90 UG/1
2 AEROSOL, METERED RESPIRATORY (INHALATION) ONCE
Status: COMPLETED | OUTPATIENT
Start: 2021-09-13 | End: 2021-09-13

## 2021-09-13 RX ADMIN — PREDNISONE 40 MG: 20 TABLET ORAL at 10:47

## 2021-09-13 RX ADMIN — ALBUTEROL SULFATE 2 PUFF: 90 AEROSOL, METERED RESPIRATORY (INHALATION) at 10:47

## 2021-09-13 RX ADMIN — AZITHROMYCIN MONOHYDRATE 500 MG: 250 TABLET ORAL at 10:47

## 2021-09-13 NOTE — ED PROVIDER NOTES
Triage Chief Complaint:   Pharyngitis    [place]:  Gabi Workman is a 68 y.o. female that presents with sore throat, cough and congestion. Patient was in baseline state of health until the past weekend when the above started. Cough is productive of a dark brown sputum. Some chest pain with coughing only that is midsternal and mild. Patient does report some shortness of breath with exertion. Patient was around grandchild who has a cold currently. Patient does wear 2L NC at baseline. Patient is vaccinated and is also status post covid-19 this year.       ROS:  General:  No fevers, no chills, no weakness  Eyes:  No recent vison changes, no discharge  ENT:  + sore throat, + nasal congestion, no hearing changes  Cardiovascular:  + chest pain with cough, no palpitations  Respiratory:  No shortness of breath, + cough, + wheezing  Gastrointestinal:  No pain, no nausea, no vomiting, no diarrhea  Musculoskeletal:  No muscle pain, no joint pain  Skin:  No rash, no pruritis, no easy bruising  Neurologic:  No speech problems, no headache, no extremity numbness, no extremity tingling, no extremity weakness  Psychiatric:  No anxiety  Genitourinary:  No dysuria, no hematuria  Endocrine:  No unexpected weight gain, no unexpected weight loss  Extremities:  no edema, no pain    Past Medical History:   Diagnosis Date    Anticoagulant long-term use     Arthritis     Cancer ([address]     Chronic hypoxemic respiratory failure (HCC) 2/6/2018    Coronary atherosclerosis     COVID-19 virus detected 1/18/2021    Gastroesophageal reflux disease     Hiatal hernia     Lung infiltrate on CT 1/22/2019    On home oxygen therapy     on 24/7    Osteopenia     Phlebitis     Pulmonary nodule 7/5/2016    S/P left heart catheterization by percutaneous approach 6/27/2013    Sepsis due to pneumonia ([address] 11/14/2017    Shortness of breath 9/23/2019    Shortness of breath 1/18/2021    Wears glasses      Past Surgical History: Procedure Laterality Date   Ijeoma Pap    Dr Jessica Ni    COLONOSCOPY      ENDOSCOPY, COLON, DIAGNOSTIC      TONSILLECTOMY      TUBAL LIGATION      VEIN SURGERY       Family History   Problem Relation Age of Onset    Heart Disease Mother     Cancer Father         lung ca    Heart Disease Father     COPD Father     Cancer Sister     Heart Disease Sister      Social History     Socioeconomic History    Marital status:      Spouse name: Not on file    Number of children: Not on file    Years of education: Not on file    Highest education level: Not on file   Occupational History    Not on file   Tobacco Use    Smoking status: Former Smoker     Packs/day: 1.50     Years: 26.00     Pack years: 39.00     Types: Cigarettes     Start date:      Quit date: 1991     Years since quittin.8    Smokeless tobacco: Never Used   Vaping Use    Vaping Use: Never used   Substance and Sexual Activity    Alcohol use: No    Drug use: No    Sexual activity: Not Currently     Partners: Male   Other Topics Concern    Not on file   Social History Narrative    Not on file     Social Determinants of Health     Financial Resource Strain: Low Risk     Difficulty of Paying Living Expenses: Not hard at all   Food Insecurity: No Food Insecurity    Worried About [address] in the Last Year: Never true    [facility] in the Last Year: Never true   Transportation Needs:     Lack of Transportation (Medical):      Lack of Transportation (Non-Medical):    Physical Activity:     Days of Exercise per Week:     Minutes of Exercise per Session:    Stress:     Feeling of Stress :    Social Connections:     Frequency of Communication with Friends and Family:     Frequency of Social Gatherings with Friends and Family:     Attends Mu-ism Services:     Active Member of Clubs or Organizations:     Attends Club or Organization Meetings:     Marital Status: Intimate Partner Violence:     Fear of Current or Ex-Partner:     Emotionally Abused:     Physically Abused:     Sexually Abused:      No current facility-administered medications for this encounter. Current Outpatient Medications   Medication Sig Dispense Refill    [START ON 9/14/2021] predniSONE (DELTASONE) 10 MG tablet Take 4 tablets by mouth daily for 4 days 16 tablet 0    [START ON 9/14/2021] azithromycin (ZITHROMAX) 250 MG tablet Take 1 tablet by mouth daily for 4 days 4 tablet 0    Magnesium Oxide 500 MG TABS TAKE 1 TABLET BY MOUTH EVERY DAY      rosuvastatin (CRESTOR) 20 MG tablet TAKE 1 TABLET BY MOUTH EVERY DAY EVERY NIGHT 90 tablet 1    albuterol (PROVENTIL) (2.5 MG/3ML) 0.083% nebulizer solution TAKE 3 MLS BY NEBULIZATION 4 TIMES DAILY AS NEEDED FOR WHEEZING 14 Package 1    gabapentin (NEURONTIN) 400 MG capsule Take 1 capsule by mouth every evening for 90 days.  90 capsule 0    theophylline (THEODUR) 450 MG extended release tablet TAKE 1/2 TABLET BY MOUTH TWICE A DAY 90 tablet 0    levothyroxine (SYNTHROID) 50 MCG tablet TAKE 1 TABLET BY MOUTH EVERY DAY 90 tablet 0    Magnesium Oxide 500 MG CAPS TAKE 1 CAPSULE BY MOUTH EVERY DAY 90 capsule 1    DALIRESP 500 MCG tablet TAKE 1 TABLET BY MOUTH EVERY DAY 90 tablet 1    DILT- MG extended release capsule TAKE 1 CAPSULE BY MOUTH EVERY DAY      potassium chloride (KLOR-CON M10) 10 MEQ extended release tablet ALTERNATE TAKE 1 TABLET AND THEN 2 TABLETS EVERY OTHER  tablet 1    furosemide (LASIX) 40 MG tablet Take 0.5 tablets by mouth daily TAKE 1 TABLET BY MOUTH EVERY DAY 90 tablet 1    montelukast (SINGULAIR) 10 MG tablet TAKE 1 TABLET BY MOUTH EVERY DAY EVERY NIGHT 90 tablet 1    Budeson-Glycopyrrol-Formoterol (BREZTRI AEROSPHERE) 160-9-4.8 MCG/ACT AERO Inhale 2 actuation into the lungs 2 times daily 1 Inhaler 11    esomeprazole (NEXIUM) 40 MG delayed release capsule TAKE 1 CAPSULE BY MOUTH TWICE A  capsule 1    m)      Head Circumference       Peak Flow       Pain Score       Pain Loc       Pain Edu? Excl. in [address]? My pulse ox interpretation is - normal on home 2 L nasal cannula. General appearance:  No acute distress. Skin:  Warm. Dry. Eye:  Extraocular movements intact. Ears, nose, mouth and throat:  Oral mucosa moist.  There is mild posterior oropharyngeal erythema without edema or exudate. Tolerating oral secretions. Neck:  Trachea midline. No meningismus or rigidity. Extremity:  No swelling. Normal ROM     Heart:  Regular rate and rhythm, normal S1 & S2, no extra heart sounds. Perfusion:  Intact. Strong symmetric bilateral radial and PT pulses. Respiratory: There is end expiratory wheezing bilaterally. Moving good air. Speaking clearly in full sentences. No respiratory distress. Abdominal:  Normal bowel sounds. Soft. Nontender. Non distended. Back:  No CVA tenderness to palpation     Neurological:  Alert and oriented times 3. No focal neuro deficits.              Psychiatric:  Appropriate    I have reviewed and interpreted all of the currently available lab results from this visit (if applicable):  Results for orders placed or performed during the hospital encounter of 09/13/21   EKG 12 Lead   Result Value Ref Range    Ventricular Rate 79 BPM    Atrial Rate 79 BPM    P-R Interval 128 ms    QRS Duration 116 ms    Q-T Interval 388 ms    QTc Calculation (Bazett) 444 ms    P Axis 72 degrees    R Axis -37 degrees    T Axis 78 degrees    Diagnosis       Normal sinus rhythm  Left axis deviation  Left ventricular hypertrophy with QRS widening and repolarization abnormality  Abnormal ECG  When compared with ECG of 18-DEC-2018 11:43,  premature ventricular complexes are no longer present        Radiographs (if obtained):  [] The following radiograph was interpreted by myself in the absence of a radiologist:   [x] Radiologist's Report Reviewed:  XR CHEST PORTABLE   Final Result   No acute process. Emphysematous changes. EKG (if obtained): (All EKG's are interpreted by myself in the absence of a cardiologist)  12 lead EKG per my interpretation:  Normal Sinus Rhythm at 79  [place] is   Left axis deviation  QTc is  within an acceptable range  There is no specific T wave changes appreciated. There is no specific ST wave changes appreciated. LVH  NO STEMI    Prior EKG to compare with was available and LVH with repull abnormality was noted on prior. Chart review shows recent radiographs:  No results found. MDM:  Pt presents as above. Emergent conditions considered. Presentation prompted initial chest x-ray and EKG. EKG is with a normal sinus rhythm as a chest x-ray is negative for acute cardiopulmonary process. Emphysematous changes are noted. Patient is treated symptomatically here in the emergency department and started on a prednisone burst and azithromycin course given COPD exacerbation in setting of upper respiratory infection. Patient is here with likely viral syndrome with suspicion for COVID-19. Patient is overall hemodynamically stable and well-appearing with stable vital signs on room air. Patient appears well-hydrated and is tolerating p.o. Patient currently does not meet inpatient criteria for further evaluation and treatment and is appropriate for further outpatient follow-up and close monitoring of symptoms. I did encourage patient to isolate until symptoms resolve. Patient encouraged to return to the emergency department for any new or worsening symptoms including increasing chest pain or shortness of breath or inability to tolerate p.o. I discussed specific signs and symptoms on when to return to the emergency department as well as the need for close outpatient follow-up. Questions sought and answered with the patient. They voice understanding and agree with plan. Clinical Impression:  1. Acute upper respiratory infection    2.  COPD exacerbation ([address]      Disposition referral (if applicable):  Simona Cruz MD  [place] [address]  [phone]    Schedule an appointment as soon as possible for a visit       [address]  [phone]  Today  If symptoms worsen    Disposition medications (if applicable):  Discharge Medication List as of 9/13/2021 11:25 AM      START taking these medications    Details   azithromycin (ZITHROMAX) 250 MG tablet Take 1 tablet by mouth daily for 4 days, Disp-4 tablet, R-0Normal             Comment: Please note this report has been produced using speech recognition software and may contain errors related to that system including errors in grammar, punctuation, and spelling, as well as words and phrases that may be inappropriate. If there are any questions or concerns please feel free to contact the dictating provider for clarification.        Terri May MD  09/13/21 3666

## 2021-09-13 NOTE — ED NOTES
Patient discharged with 2 escript prescriptions; education of medications reviewed. Pt verbalized understanding of discharge instructions. All questions answered and patient understands follow up instructions.        Rj Wong RN  09/13/21 1932

## 2021-09-13 NOTE — ED TRIAGE NOTES
Pt arrived via triage with c/o sore throat and intermittent cough. Pt reports hx of similar symptoms. Pt reports she is unable to get into PCP. Pt is alert, oriented and ambulatory. Pt wears home o2 at baseline. Pt denies increased shortness of breath or chest pain.

## 2021-09-13 NOTE — TELEPHONE ENCOUNTER
Spoke with pt re sx's cough, sore throat. Pt states cough is productive, brown sputum. Pt complained trying to schedule appt with walk in clinic unable to speak with anyone. Advised pt to go to [facility] emergency ctr. Pt agreed and voiced understanding.

## 2021-09-13 NOTE — ED NOTES
Respiratory panel test collected, labeled and walked to lab for processing.       [facility] Edward, DANNY  09/13/21 4964 fall

## 2021-09-15 ENCOUNTER — NURSE ONLY (OUTPATIENT)
Dept: FAMILY MEDICINE CLINIC | Age: 76
End: 2021-09-15
Payer: MEDICARE

## 2021-09-15 DIAGNOSIS — Z86.718 HISTORY OF DVT OF LOWER EXTREMITY: ICD-10-CM

## 2021-09-15 DIAGNOSIS — Z79.01 ANTICOAGULANT LONG-TERM USE: Primary | ICD-10-CM

## 2021-09-15 DIAGNOSIS — Z86.711 HISTORY OF PULMONARY EMBOLISM: ICD-10-CM

## 2021-09-15 LAB
INTERNATIONAL NORMALIZATION RATIO, POC: 2.3
PROTHROMBIN TIME, POC: NORMAL

## 2021-09-15 PROCEDURE — 85610 PROTHROMBIN TIME: CPT | Performed by: FAMILY MEDICINE

## 2021-09-20 ENCOUNTER — TELEPHONE (OUTPATIENT)
Dept: FAMILY MEDICINE CLINIC | Age: 76
End: 2021-09-20

## 2021-09-20 RX ORDER — METHYLPREDNISOLONE 4 MG/1
TABLET ORAL
Qty: 1 KIT | Refills: 0 | Status: SHIPPED | OUTPATIENT
Start: 2021-09-20 | End: 2021-09-26

## 2021-09-20 RX ORDER — DOXYCYCLINE HYCLATE 100 MG
100 TABLET ORAL 2 TIMES DAILY
Qty: 14 TABLET | Refills: 0 | Status: SHIPPED | OUTPATIENT
Start: 2021-09-20 | End: 2021-09-27

## 2021-09-20 NOTE — TELEPHONE ENCOUNTER
Patient called and stated that she was seen in the [facility] ER 9-13-21. Was given Z-Pack and Prednisone and still is not feeling well. Cough with phlegm is light green.  No  Fever Call patient and advise   [facility]

## 2021-09-20 NOTE — TELEPHONE ENCOUNTER
Requested Prescriptions     Signed Prescriptions Disp Refills    doxycycline hyclate (VIBRA-TABS) 100 MG tablet 14 tablet 0     Sig: Take 1 tablet by mouth 2 times daily for 7 days     Authorizing Provider: Faye Nieto     Ordering User: MARCO A Kirby    methylPREDNISolone (MEDROL DOSEPACK) 4 MG tablet 1 kit 0     Sig: Take by mouth. Authorizing Provider: Faye Nieto     Ordering User: Braxton Olmedo     Spoke with pt per dr Ramona Roberson note.  Pt agreed & voiced understanding

## 2021-09-20 NOTE — TELEPHONE ENCOUNTER
Asked patient to do a repeat round of steroids with a different antibiotic. Ask her to do a Medrol Dosepak and doxycycline 100 mg 1 twice daily x7 days with food number 14 pills no refill. Please use your breathing treatments to the max.   Good luck

## 2021-09-20 NOTE — TELEPHONE ENCOUNTER
Negative covid test. Positive parainfluenza. Pt finished both rx's. Sx's today. Fatigue, cough, nonproductive, chest congestion improved not resolved, t 97, o2 93%.      [facility]

## 2021-10-04 RX ORDER — RISEDRONATE SODIUM 35 MG/1
TABLET, FILM COATED ORAL
Qty: 12 TABLET | Refills: 1 | Status: SHIPPED | OUTPATIENT
Start: 2021-10-04 | End: 2022-02-25

## 2021-10-06 ENCOUNTER — PATIENT MESSAGE (OUTPATIENT)
Dept: FAMILY MEDICINE CLINIC | Age: 76
End: 2021-10-06

## 2021-10-07 NOTE — TELEPHONE ENCOUNTER
From: Edwina Sullivan  To: Fang Diaz MD  Sent: 10/6/2021 12:52 PM EDT  Subject: Non-Urgent Medical Question    Flu shot on October 5, 2021

## 2021-10-13 ENCOUNTER — NURSE ONLY (OUTPATIENT)
Dept: FAMILY MEDICINE CLINIC | Age: 76
End: 2021-10-13
Payer: MEDICARE

## 2021-10-13 DIAGNOSIS — Z86.711 HISTORY OF PULMONARY EMBOLISM: ICD-10-CM

## 2021-10-13 DIAGNOSIS — Z79.01 ANTICOAGULANT LONG-TERM USE: ICD-10-CM

## 2021-10-13 DIAGNOSIS — Z86.718 HISTORY OF DVT OF LOWER EXTREMITY: ICD-10-CM

## 2021-10-13 LAB
INTERNATIONAL NORMALIZATION RATIO, POC: 3.1
PROTHROMBIN TIME, POC: NORMAL

## 2021-10-13 PROCEDURE — 85610 PROTHROMBIN TIME: CPT | Performed by: FAMILY MEDICINE

## 2021-10-29 RX ORDER — CELECOXIB 200 MG/1
CAPSULE ORAL
Qty: 90 CAPSULE | Refills: 0 | Status: SHIPPED | OUTPATIENT
Start: 2021-10-29 | End: 2021-11-22 | Stop reason: SDUPTHER

## 2021-11-04 ENCOUNTER — TELEPHONE (OUTPATIENT)
Dept: PULMONOLOGY | Age: 76
End: 2021-11-04

## 2021-11-04 DIAGNOSIS — J44.9 VERY SEVERE CHRONIC OBSTRUCTIVE PULMONARY DISEASE (HCC): Primary | ICD-10-CM

## 2021-11-04 NOTE — TELEPHONE ENCOUNTER
Pt states that she is having some increased issues breathing and states she knows she has COPD stage 4. She wants to know if you think she soul get a chest XR before next appt on 11/22.   Please advise

## 2021-11-05 ENCOUNTER — HOSPITAL ENCOUNTER (OUTPATIENT)
Age: 76
Discharge: HOME | End: 2021-11-05
Payer: MEDICARE

## 2021-11-05 ENCOUNTER — HOSPITAL ENCOUNTER (OUTPATIENT)
Dept: GENERAL RADIOLOGY | Age: 76
Discharge: HOME | End: 2021-11-05
Payer: MEDICARE

## 2021-11-05 DIAGNOSIS — E03.9 ACQUIRED HYPOTHYROIDISM: ICD-10-CM

## 2021-11-05 DIAGNOSIS — J44.9 VERY SEVERE CHRONIC OBSTRUCTIVE PULMONARY DISEASE (HCC): ICD-10-CM

## 2021-11-05 PROBLEM — R91.8 LUNG INFILTRATE ON CT: Chronic | Status: ACTIVE | Noted: 2019-01-22

## 2021-11-05 PROBLEM — J96.11 CHRONIC HYPOXEMIC RESPIRATORY FAILURE (HCC): Chronic | Status: ACTIVE | Noted: 2018-02-06

## 2021-11-05 PROCEDURE — 71046 X-RAY EXAM CHEST 2 VIEWS: CPT

## 2021-11-05 RX ORDER — LEVOTHYROXINE SODIUM 0.05 MG/1
TABLET ORAL
Qty: 90 TABLET | Refills: 0 | Status: SHIPPED | OUTPATIENT
Start: 2021-11-05 | End: 2022-02-04

## 2021-11-09 RX ORDER — WARFARIN SODIUM 3 MG/1
TABLET ORAL
Qty: 90 TABLET | Refills: 1 | Status: SHIPPED | OUTPATIENT
Start: 2021-11-09 | End: 2021-11-15 | Stop reason: SDUPTHER

## 2021-11-10 DIAGNOSIS — J44.9 COPD, VERY SEVERE (HCC): ICD-10-CM

## 2021-11-10 RX ORDER — THEOPHYLLINE 450 MG/1
TABLET, EXTENDED RELEASE ORAL
Qty: 90 TABLET | Refills: 0 | Status: SHIPPED | OUTPATIENT
Start: 2021-11-10 | End: 2022-02-07

## 2021-11-15 ENCOUNTER — NURSE ONLY (OUTPATIENT)
Dept: FAMILY MEDICINE CLINIC | Age: 76
End: 2021-11-15
Payer: MEDICARE

## 2021-11-15 DIAGNOSIS — Z86.718 HISTORY OF DVT OF LOWER EXTREMITY: ICD-10-CM

## 2021-11-15 LAB — INTERNATIONAL NORMALIZATION RATIO, POC: 1.8

## 2021-11-15 PROCEDURE — 85610 PROTHROMBIN TIME: CPT | Performed by: FAMILY MEDICINE

## 2021-11-15 RX ORDER — WARFARIN SODIUM 3 MG/1
TABLET ORAL
Qty: 90 TABLET | Refills: 1 | Status: SHIPPED | OUTPATIENT
Start: 2021-11-15 | End: 2022-09-08

## 2021-11-17 ENCOUNTER — PATIENT MESSAGE (OUTPATIENT)
Dept: FAMILY MEDICINE CLINIC | Age: 76
End: 2021-11-17

## 2021-11-17 DIAGNOSIS — J44.9 COPD, VERY SEVERE (HCC): ICD-10-CM

## 2021-11-17 RX ORDER — ALBUTEROL SULFATE 90 UG/1
AEROSOL, METERED RESPIRATORY (INHALATION)
Qty: 1 EACH | Refills: 5 | Status: SHIPPED | OUTPATIENT
Start: 2021-11-17 | End: 2022-06-03

## 2021-11-17 RX ORDER — FUROSEMIDE 40 MG/1
20 TABLET ORAL DAILY
Qty: 90 TABLET | Refills: 1 | Status: SHIPPED | OUTPATIENT
Start: 2021-11-17 | End: 2021-12-21

## 2021-11-17 NOTE — TELEPHONE ENCOUNTER
From: Maki Glaser  To: Dr. Pepito Edwards  Sent: 11/17/2021 10:49 AM EST  Subject: Prescriptions    Need 90 day refills on the following prescriptions: Gabapentin 400 mg capsule,   Albuterol Sulfate HFA 90 mcg,    IC Furosemide, 40 mg tablet. Called in to [facility].     Thank you

## 2021-11-22 ENCOUNTER — OFFICE VISIT (OUTPATIENT)
Dept: PULMONOLOGY | Age: 76
End: 2021-11-22
Payer: MEDICARE

## 2021-11-22 VITALS
OXYGEN SATURATION: 94 % | DIASTOLIC BLOOD PRESSURE: 60 MMHG | SYSTOLIC BLOOD PRESSURE: 118 MMHG | BODY MASS INDEX: 26.66 KG/M2 | HEART RATE: 87 BPM | HEIGHT: 65 IN | WEIGHT: 160 LBS

## 2021-11-22 DIAGNOSIS — R06.02 SHORTNESS OF BREATH: ICD-10-CM

## 2021-11-22 DIAGNOSIS — R91.1 PULMONARY NODULE: Chronic | ICD-10-CM

## 2021-11-22 DIAGNOSIS — J96.11 CHRONIC HYPOXEMIC RESPIRATORY FAILURE (HCC): Chronic | ICD-10-CM

## 2021-11-22 DIAGNOSIS — J44.9 COPD, VERY SEVERE (HCC): Primary | Chronic | ICD-10-CM

## 2021-11-22 DIAGNOSIS — Z87.891 FORMER SMOKER: ICD-10-CM

## 2021-11-22 DIAGNOSIS — Z79.01 ANTICOAGULANT LONG-TERM USE: ICD-10-CM

## 2021-11-22 PROCEDURE — G8427 DOCREV CUR MEDS BY ELIG CLIN: HCPCS | Performed by: INTERNAL MEDICINE

## 2021-11-22 PROCEDURE — 3023F SPIROM DOC REV: CPT | Performed by: INTERNAL MEDICINE

## 2021-11-22 PROCEDURE — 1090F PRES/ABSN URINE INCON ASSESS: CPT | Performed by: INTERNAL MEDICINE

## 2021-11-22 PROCEDURE — G8482 FLU IMMUNIZE ORDER/ADMIN: HCPCS | Performed by: INTERNAL MEDICINE

## 2021-11-22 PROCEDURE — 1123F ACP DISCUSS/DSCN MKR DOCD: CPT | Performed by: INTERNAL MEDICINE

## 2021-11-22 PROCEDURE — 1036F TOBACCO NON-USER: CPT | Performed by: INTERNAL MEDICINE

## 2021-11-22 PROCEDURE — G8926 SPIRO NO PERF OR DOC: HCPCS | Performed by: INTERNAL MEDICINE

## 2021-11-22 PROCEDURE — 4040F PNEUMOC VAC/ADMIN/RCVD: CPT | Performed by: INTERNAL MEDICINE

## 2021-11-22 PROCEDURE — G8400 PT W/DXA NO RESULTS DOC: HCPCS | Performed by: INTERNAL MEDICINE

## 2021-11-22 PROCEDURE — G8417 CALC BMI ABV UP PARAM F/U: HCPCS | Performed by: INTERNAL MEDICINE

## 2021-11-22 PROCEDURE — 99213 OFFICE O/P EST LOW 20 MIN: CPT | Performed by: INTERNAL MEDICINE

## 2021-11-22 RX ORDER — CELECOXIB 200 MG/1
CAPSULE ORAL
Qty: 90 CAPSULE | Refills: 0 | Status: SHIPPED | OUTPATIENT
Start: 2021-11-22 | End: 2022-02-17 | Stop reason: SDUPTHER

## 2021-11-22 RX ORDER — WARFARIN SODIUM 6 MG/1
TABLET ORAL
Qty: 90 TABLET | Refills: 0 | Status: SHIPPED | OUTPATIENT
Start: 2021-11-22 | End: 2022-02-21

## 2021-11-22 RX ORDER — PREDNISONE 1 MG/1
5 TABLET ORAL DAILY
Qty: 30 TABLET | Refills: 11 | Status: SHIPPED | OUTPATIENT
Start: 2021-11-22 | End: 2021-12-22

## 2021-11-22 RX ORDER — ESOMEPRAZOLE MAGNESIUM 40 MG/1
CAPSULE, DELAYED RELEASE ORAL
Qty: 180 CAPSULE | Refills: 0 | Status: SHIPPED | OUTPATIENT
Start: 2021-11-22 | End: 2022-05-16 | Stop reason: SDUPTHER

## 2021-11-22 NOTE — PROGRESS NOTES
([facility]    2. Chronic hypoxemic respiratory failure (HCC)    3. Pulmonary nodule    4. Shortness of breath    5. Former smoker          PLAN:   I did renew prednisone 5 mg daily. I will make no other changes in her current bronchodilator therapy. I told her to call us at any signs of respiratory tract infection. I will continue to follow her    We have discussed the need to maintain yearly flu immunization, pneumococcal vaccination. We have discussed Coronavirus precaution including handwashing practice, wiping items touched in public such as gas pumps, door handles, shopping carts, etc. Self monitoring for infection - fever, chills, cough, SOB. Should they develop symptoms they should call office for further instructions. Return in about 4 months (around 3/22/2022) for Recheck for COPD, Recheck for Shortness of Breath, chronic hypoxemic respiratory failure. This dictation was performed with a verbal recognition program and it was checked for errors. It is possible that there are still dictated errors within this office note. Any errors should be brought immediately to my attention for correction. All efforts were made to ensure that this office note is accurate.

## 2021-11-30 ENCOUNTER — NURSE ONLY (OUTPATIENT)
Dept: FAMILY MEDICINE CLINIC | Age: 76
End: 2021-11-30
Payer: MEDICARE

## 2021-11-30 ENCOUNTER — ANTI-COAG VISIT (OUTPATIENT)
Dept: FAMILY MEDICINE CLINIC | Age: 76
End: 2021-11-30

## 2021-11-30 DIAGNOSIS — Z86.711 HISTORY OF PULMONARY EMBOLISM: ICD-10-CM

## 2021-11-30 DIAGNOSIS — Z86.718 HISTORY OF DVT OF LOWER EXTREMITY: ICD-10-CM

## 2021-11-30 DIAGNOSIS — Z79.01 ANTICOAGULANT LONG-TERM USE: ICD-10-CM

## 2021-11-30 LAB
INTERNATIONAL NORMALIZATION RATIO, POC: 2.8
PROTHROMBIN TIME, POC: NORMAL

## 2021-11-30 PROCEDURE — 99999 PR OFFICE/OUTPT VISIT,PROCEDURE ONLY: CPT | Performed by: FAMILY MEDICINE

## 2021-11-30 PROCEDURE — 85610 PROTHROMBIN TIME: CPT | Performed by: FAMILY MEDICINE

## 2021-11-30 NOTE — PROGRESS NOTES
Confirmed current coumadin 6/6/3 mg alternating. inr 2.8  pt to keep dose the same and recheck 4 weeks.

## 2021-12-01 ENCOUNTER — PATIENT MESSAGE (OUTPATIENT)
Dept: FAMILY MEDICINE CLINIC | Age: 76
End: 2021-12-01

## 2021-12-01 ENCOUNTER — OFFICE VISIT (OUTPATIENT)
Dept: FAMILY MEDICINE CLINIC | Age: 76
End: 2021-12-01
Payer: MEDICARE

## 2021-12-01 VITALS
WEIGHT: 158 LBS | BODY MASS INDEX: 26.33 KG/M2 | SYSTOLIC BLOOD PRESSURE: 130 MMHG | OXYGEN SATURATION: 97 % | HEIGHT: 65 IN | HEART RATE: 64 BPM | DIASTOLIC BLOOD PRESSURE: 78 MMHG

## 2021-12-01 DIAGNOSIS — I25.10 ATHEROSCLEROSIS OF CORONARY ARTERY OF NATIVE HEART WITHOUT ANGINA PECTORIS, UNSPECIFIED VESSEL OR LESION TYPE: ICD-10-CM

## 2021-12-01 DIAGNOSIS — I10 ESSENTIAL HYPERTENSION: ICD-10-CM

## 2021-12-01 DIAGNOSIS — J44.9 COPD, VERY SEVERE (HCC): Chronic | ICD-10-CM

## 2021-12-01 DIAGNOSIS — E03.9 ACQUIRED HYPOTHYROIDISM: ICD-10-CM

## 2021-12-01 DIAGNOSIS — I10 ESSENTIAL HYPERTENSION: Primary | ICD-10-CM

## 2021-12-01 LAB
A/G RATIO: 2.7 (ref 1.1–2.2)
ALBUMIN SERPL-MCNC: 4 G/DL (ref 3.4–5)
ALP BLD-CCNC: 66 U/L (ref 40–129)
ALT SERPL-CCNC: 17 U/L (ref 10–40)
ANION GAP SERPL CALCULATED.3IONS-SCNC: 13 MMOL/L (ref 3–16)
AST SERPL-CCNC: 22 U/L (ref 15–37)
BASOPHILS ABSOLUTE: 0.1 K/UL (ref 0–0.2)
BASOPHILS RELATIVE PERCENT: 0.6 %
BILIRUB SERPL-MCNC: 0.3 MG/DL (ref 0–1)
BUN BLDV-MCNC: 19 MG/DL (ref 7–20)
CALCIUM SERPL-MCNC: 8.8 MG/DL (ref 8.3–10.6)
CHLORIDE BLD-SCNC: 96 MMOL/L (ref 99–110)
CHOLESTEROL, TOTAL: 175 MG/DL (ref 0–199)
CO2: 31 MMOL/L (ref 21–32)
CREAT SERPL-MCNC: 0.8 MG/DL (ref 0.6–1.2)
EOSINOPHILS ABSOLUTE: 0 K/UL (ref 0–0.6)
EOSINOPHILS RELATIVE PERCENT: 0.3 %
GFR AFRICAN AMERICAN: >60
GFR NON-AFRICAN AMERICAN: >60
GLUCOSE BLD-MCNC: 94 MG/DL (ref 70–99)
HCT VFR BLD CALC: 40.4 % (ref 36–48)
HDLC SERPL-MCNC: 77 MG/DL (ref 40–60)
HEMOGLOBIN: 13.1 G/DL (ref 12–16)
LDL CHOLESTEROL CALCULATED: 81 MG/DL
LYMPHOCYTES ABSOLUTE: 1.5 K/UL (ref 1–5.1)
LYMPHOCYTES RELATIVE PERCENT: 17.7 %
MCH RBC QN AUTO: 30.9 PG (ref 26–34)
MCHC RBC AUTO-ENTMCNC: 32.5 G/DL (ref 31–36)
MCV RBC AUTO: 95.1 FL (ref 80–100)
MONOCYTES ABSOLUTE: 0.8 K/UL (ref 0–1.3)
MONOCYTES RELATIVE PERCENT: 9.8 %
NEUTROPHILS ABSOLUTE: 5.9 K/UL (ref 1.7–7.7)
NEUTROPHILS RELATIVE PERCENT: 71.6 %
PDW BLD-RTO: 14.8 % (ref 12.4–15.4)
PLATELET # BLD: 223 K/UL (ref 135–450)
PMV BLD AUTO: 8.7 FL (ref 5–10.5)
POTASSIUM SERPL-SCNC: 3.2 MMOL/L (ref 3.5–5.1)
RBC # BLD: 4.25 M/UL (ref 4–5.2)
SODIUM BLD-SCNC: 140 MMOL/L (ref 136–145)
TOTAL PROTEIN: 5.5 G/DL (ref 6.4–8.2)
TRIGL SERPL-MCNC: 87 MG/DL (ref 0–150)
TSH REFLEX FT4: 2.19 UIU/ML (ref 0.27–4.2)
VLDLC SERPL CALC-MCNC: 17 MG/DL
WBC # BLD: 8.2 K/UL (ref 4–11)

## 2021-12-01 PROCEDURE — G8926 SPIRO NO PERF OR DOC: HCPCS | Performed by: FAMILY MEDICINE

## 2021-12-01 PROCEDURE — 3023F SPIROM DOC REV: CPT | Performed by: FAMILY MEDICINE

## 2021-12-01 PROCEDURE — G8400 PT W/DXA NO RESULTS DOC: HCPCS | Performed by: FAMILY MEDICINE

## 2021-12-01 PROCEDURE — 4040F PNEUMOC VAC/ADMIN/RCVD: CPT | Performed by: FAMILY MEDICINE

## 2021-12-01 PROCEDURE — G8417 CALC BMI ABV UP PARAM F/U: HCPCS | Performed by: FAMILY MEDICINE

## 2021-12-01 PROCEDURE — G8427 DOCREV CUR MEDS BY ELIG CLIN: HCPCS | Performed by: FAMILY MEDICINE

## 2021-12-01 PROCEDURE — 99214 OFFICE O/P EST MOD 30 MIN: CPT | Performed by: FAMILY MEDICINE

## 2021-12-01 PROCEDURE — 1123F ACP DISCUSS/DSCN MKR DOCD: CPT | Performed by: FAMILY MEDICINE

## 2021-12-01 PROCEDURE — G8482 FLU IMMUNIZE ORDER/ADMIN: HCPCS | Performed by: FAMILY MEDICINE

## 2021-12-01 PROCEDURE — 1090F PRES/ABSN URINE INCON ASSESS: CPT | Performed by: FAMILY MEDICINE

## 2021-12-01 PROCEDURE — 1036F TOBACCO NON-USER: CPT | Performed by: FAMILY MEDICINE

## 2021-12-01 RX ORDER — POTASSIUM CHLORIDE 750 MG/1
TABLET, EXTENDED RELEASE ORAL
Qty: 135 TABLET | Refills: 1 | Status: SHIPPED | OUTPATIENT
Start: 2021-12-01 | End: 2021-12-08 | Stop reason: SDUPTHER

## 2021-12-01 RX ORDER — MONTELUKAST SODIUM 10 MG/1
TABLET ORAL
Qty: 90 TABLET | Refills: 1 | Status: SHIPPED | OUTPATIENT
Start: 2021-12-01 | End: 2022-07-27 | Stop reason: SDUPTHER

## 2021-12-01 NOTE — TELEPHONE ENCOUNTER
From: Bishop Hirsch  To: Dr. Eduin Jerome  Sent: 12/1/2021 11:24 AM EST  Subject: Protime schedule, Marisa    Can you schedule an appt. for me for 30 days from Nov. 30, 2022. Forgot to do it yesterday.

## 2021-12-02 NOTE — PROGRESS NOTES
History     Socioeconomic History    Marital status:      Spouse name: Not on file    Number of children: Not on file    Years of education: Not on file    Highest education level: Not on file   Occupational History    Not on file   Tobacco Use    Smoking status: Former Smoker     Packs/day: 1.50     Years: 26.00     Pack years: 39.00     Types: Cigarettes     Start date: 65     Quit date: 1991     Years since quittin.0    Smokeless tobacco: Never Used   Vaping Use    Vaping Use: Never used   Substance and Sexual Activity    Alcohol use: No    Drug use: No    Sexual activity: Not Currently     Partners: Male   Other Topics Concern    Not on file   Social History Narrative    Not on file     Social Determinants of Health     Financial Resource Strain: Low Risk     Difficulty of Paying Living Expenses: Not hard at all   Food Insecurity: No Food Insecurity    Worried About [address] in the Last Year: Never true    [facility] in the Last Year: Never true   Transportation Needs:     Lack of Transportation (Medical): Not on file    Lack of Transportation (Non-Medical):  Not on file   Physical Activity:     Days of Exercise per Week: Not on file    Minutes of Exercise per Session: Not on file   Stress:     Feeling of Stress : Not on file   Social Connections:     Frequency of Communication with Friends and Family: Not on file    Frequency of Social Gatherings with Friends and Family: Not on file    Attends Congregational Services: Not on file    Active Member of Clubs or Organizations: Not on file    Attends Club or Organization Meetings: Not on file    Marital Status: Not on file   Intimate Partner Violence:     Fear of Current or Ex-Partner: Not on file    Emotionally Abused: Not on file    Physically Abused: Not on file    Sexually Abused: Not on file   Housing Stability:     Unable to Pay for Housing in the Last Year: Not on file    Number of [facility] in nebulizer solution TAKE 3 MLS BY NEBULIZATION 4 TIMES DAILY AS NEEDED FOR WHEEZING 14 Package 1    DALIRESP 500 MCG tablet TAKE 1 TABLET BY MOUTH EVERY DAY 90 tablet 1    Budeson-Glycopyrrol-Formoterol (BREZTRI AEROSPHERE) 160-9-4.8 MCG/ACT AERO Inhale 2 actuation into the lungs 2 times daily 1 Inhaler 11    isosorbide mononitrate (IMDUR) 60 MG extended release tablet TAKE 1 TABLET BY MOUTH EVERY DAY 90 tablet 1    Dextromethorphan-guaiFENesin (MUCINEX DM)  MG TB12 Take 1 to 2 tablet by mouth every 12 hours (maximum: 4 tablets/24 hours). Drink plenty of water. 28 tablet 0    nitroGLYCERIN (NITROSTAT) 0.4 MG SL tablet Place 1 tablet under the tongue every 5 minutes as needed for Chest pain 25 tablet 1    diltiazem (CARDIZEM CD) 240 MG extended release capsule Take 240 mg by mouth daily       dicyclomine (BENTYL) 10 MG capsule Take 10 mg by mouth 2 times daily      Biotin 74472 MCG TABS Take 10,000 mcg by mouth daily      LORazepam (ATIVAN) 0.5 MG tablet TAKE 1 TAB BY MOUTH EVERY DAY AS NEEDED  0    b complex vitamins capsule Take 1 capsule by mouth daily      Calcium Carb-Cholecalciferol (CALCIUM + D3) 600-200 MG-UNIT TABS Take 1 tablet by mouth 2 times daily      Multiple Vitamins-Minerals (MULTIVITAMIN PO) Take 1 tablet by mouth daily      OXYGEN Inhale 2 L/hr into the lungs continuous. No current facility-administered medications for this visit. ALLERGIES  Allergies   Allergen Reactions    Codeine Swelling    Darvon [Propoxyphene Hcl] Swelling    Lamisil [Terbinafine Hcl]     Morphine Swelling    Neosporin [Neomycin-Polymyxin-Gramicidin]     Pcn [Penicillins] Swelling       PHYSICAL EXAM  /78   Pulse 64   Ht 5' 5\" (1.651 m)   Wt 158 lb (71.7 kg)   SpO2 97%   BMI 26.29 kg/m²     ASSESSMENT & PLAN    1. Essential hypertension  Issue controlled. Continue meds. Refilled meds.   - potassium chloride (KLOR-CON M10) 10 MEQ extended release tablet; ALTERNATE TAKE 1 TABLET AND THEN 2 TABLETS EVERY OTHER DAY  Dispense: 135 tablet; Refill: 1  - Comprehensive Metabolic Panel; Future  - CBC Auto Differential; Future    2. Atherosclerosis of coronary artery of native heart without angina pectoris, unspecified vessel or lesion type  Issue is stable check labs today. Adjust medication off of lab results. - Lipid Panel; Future    3. COPD, very severe ([facility] [address] [address]  Reviewed Dr. Nicolas Macias notes  Issue controlled. Continue meds. Refilled meds. - montelukast (SINGULAIR) 10 MG tablet; TAKE 1 TABLET BY MOUTH EVERY DAY EVERY NIGHT  Dispense: 90 tablet; Refill: 1    4. Acquired hypothyroidism  Issue is stable check labs today. Adjust medication off of lab results.  - TSH WITH REFLEX TO FT4; Future    -Reviewed yesterday's Coumadin.     Patient billed off total time-30minutes  5 minutes prechart review  20 minutes spent with patient  5 minutes creating note         Follow-up 6 months         Electronically signed by Min Dhaliwal MD on 12/1/2021

## 2021-12-03 ENCOUNTER — TELEPHONE (OUTPATIENT)
Dept: FAMILY MEDICINE CLINIC | Age: 76
End: 2021-12-03

## 2021-12-03 NOTE — TELEPHONE ENCOUNTER
----- Message from Luis Felipe Friedman sent at 12/3/2021  4:10 PM EST -----  Subject: Message to Provider    QUESTIONS  Information for Provider? Patient called to return a call but the office   was already closed. Please call patient back. ---------------------------------------------------------------------------  --------------  Ky Fraction INFO  What is the best way for the office to contact you?  OK to leave message on   voicemail  Preferred Call Back Phone Number? [phone]  ---------------------------------------------------------------------------  --------------  SCRIPT ANSWERS  undefined

## 2021-12-08 DIAGNOSIS — I10 ESSENTIAL HYPERTENSION: ICD-10-CM

## 2021-12-08 RX ORDER — POTASSIUM CHLORIDE 750 MG/1
20 TABLET, EXTENDED RELEASE ORAL DAILY
Qty: 180 TABLET | Refills: 1 | Status: SHIPPED | OUTPATIENT
Start: 2021-12-08 | End: 2022-07-13

## 2021-12-13 ENCOUNTER — PATIENT MESSAGE (OUTPATIENT)
Dept: FAMILY MEDICINE CLINIC | Age: 76
End: 2021-12-13

## 2021-12-21 RX ORDER — FUROSEMIDE 40 MG/1
40 TABLET ORAL DAILY
Qty: 90 TABLET | Refills: 0 | Status: SHIPPED | OUTPATIENT
Start: 2021-12-21 | End: 2022-02-25

## 2021-12-28 ENCOUNTER — HOSPITAL ENCOUNTER (EMERGENCY)
Age: 76
Discharge: HOME | End: 2021-12-28
Attending: EMERGENCY MEDICINE
Payer: MEDICARE

## 2021-12-28 ENCOUNTER — APPOINTMENT (OUTPATIENT)
Dept: GENERAL RADIOLOGY | Age: 76
End: 2021-12-28
Payer: MEDICARE

## 2021-12-28 VITALS
SYSTOLIC BLOOD PRESSURE: 149 MMHG | HEIGHT: 64 IN | DIASTOLIC BLOOD PRESSURE: 61 MMHG | OXYGEN SATURATION: 94 % | WEIGHT: 160 LBS | BODY MASS INDEX: 27.31 KG/M2 | TEMPERATURE: 98.8 F | RESPIRATION RATE: 18 BRPM | HEART RATE: 65 BPM

## 2021-12-28 DIAGNOSIS — J44.1 COPD EXACERBATION (HCC): Primary | ICD-10-CM

## 2021-12-28 LAB
ALBUMIN SERPL-MCNC: 3.4 GM/DL (ref 3.4–5)
ALP BLD-CCNC: 59 IU/L (ref 40–129)
ALT SERPL-CCNC: 17 U/L (ref 10–40)
ANION GAP SERPL CALCULATED.3IONS-SCNC: 11 MMOL/L (ref 4–16)
AST SERPL-CCNC: 28 IU/L (ref 15–37)
BASOPHILS ABSOLUTE: 0 K/CU MM
BASOPHILS RELATIVE PERCENT: 0.2 % (ref 0–1)
BILIRUB SERPL-MCNC: 0.3 MG/DL (ref 0–1)
BUN BLDV-MCNC: 13 MG/DL (ref 6–23)
CALCIUM SERPL-MCNC: 8.8 MG/DL (ref 8.3–10.6)
CHLORIDE BLD-SCNC: 101 MMOL/L (ref 99–110)
CO2: 25 MMOL/L (ref 21–32)
CREAT SERPL-MCNC: 0.7 MG/DL (ref 0.6–1.1)
DIFFERENTIAL TYPE: ABNORMAL
EOSINOPHILS ABSOLUTE: 0.1 K/CU MM
EOSINOPHILS RELATIVE PERCENT: 0.6 % (ref 0–3)
GFR AFRICAN AMERICAN: >60 ML/MIN/1.73M2
GFR NON-AFRICAN AMERICAN: >60 ML/MIN/1.73M2
GLUCOSE BLD-MCNC: 95 MG/DL (ref 70–99)
HCT VFR BLD CALC: 39.7 % (ref 37–47)
HEMOGLOBIN: 12.9 GM/DL (ref 12.5–16)
IMMATURE NEUTROPHIL %: 0.5 % (ref 0–0.43)
LYMPHOCYTES ABSOLUTE: 0.4 K/CU MM
LYMPHOCYTES RELATIVE PERCENT: 3.1 % (ref 24–44)
MCH RBC QN AUTO: 31.2 PG (ref 27–31)
MCHC RBC AUTO-ENTMCNC: 32.5 % (ref 32–36)
MCV RBC AUTO: 96.1 FL (ref 78–100)
MONOCYTES ABSOLUTE: 1.1 K/CU MM
MONOCYTES RELATIVE PERCENT: 8 % (ref 0–4)
PDW BLD-RTO: 13.8 % (ref 11.7–14.9)
PLATELET # BLD: 152 K/CU MM (ref 140–440)
PMV BLD AUTO: 10.4 FL (ref 7.5–11.1)
POTASSIUM SERPL-SCNC: 4.7 MMOL/L (ref 3.5–5.1)
RAPID INFLUENZA  B AGN: NEGATIVE
RAPID INFLUENZA A AGN: NEGATIVE
RBC # BLD: 4.13 M/CU MM (ref 4.2–5.4)
SEGMENTED NEUTROPHILS ABSOLUTE COUNT: 11.6 K/CU MM
SEGMENTED NEUTROPHILS RELATIVE PERCENT: 87.6 % (ref 36–66)
SODIUM BLD-SCNC: 137 MMOL/L (ref 135–145)
TOTAL IMMATURE NEUTOROPHIL: 0.06 K/CU MM
TOTAL PROTEIN: 5.3 GM/DL (ref 6.4–8.2)
TROPONIN T: <0.01 NG/ML
WBC # BLD: 13.2 K/CU MM (ref 4–10.5)

## 2021-12-28 PROCEDURE — 99284 EMERGENCY DEPT VISIT MOD MDM: CPT

## 2021-12-28 PROCEDURE — 85025 COMPLETE CBC W/AUTO DIFF WBC: CPT

## 2021-12-28 PROCEDURE — 6360000002 HC RX W HCPCS: Performed by: EMERGENCY MEDICINE

## 2021-12-28 PROCEDURE — 84484 ASSAY OF TROPONIN QUANT: CPT

## 2021-12-28 PROCEDURE — 93005 ELECTROCARDIOGRAM TRACING: CPT | Performed by: EMERGENCY MEDICINE

## 2021-12-28 PROCEDURE — 80053 COMPREHEN METABOLIC PANEL: CPT

## 2021-12-28 PROCEDURE — 71045 X-RAY EXAM CHEST 1 VIEW: CPT

## 2021-12-28 PROCEDURE — 87804 INFLUENZA ASSAY W/OPTIC: CPT

## 2021-12-28 PROCEDURE — 6370000000 HC RX 637 (ALT 250 FOR IP): Performed by: EMERGENCY MEDICINE

## 2021-12-28 RX ORDER — BENZONATATE 100 MG/1
100 CAPSULE ORAL 2 TIMES DAILY PRN
Qty: 20 CAPSULE | Refills: 0 | Status: SHIPPED | OUTPATIENT
Start: 2021-12-28 | End: 2022-01-13

## 2021-12-28 RX ORDER — AZITHROMYCIN 250 MG/1
250 TABLET, FILM COATED ORAL SEE ADMIN INSTRUCTIONS
Qty: 6 TABLET | Refills: 0 | Status: ON HOLD | OUTPATIENT
Start: 2021-12-28 | End: 2022-01-04 | Stop reason: HOSPADM

## 2021-12-28 RX ORDER — ALBUTEROL SULFATE 2.5 MG/3ML
2.5 SOLUTION RESPIRATORY (INHALATION) ONCE
Status: COMPLETED | OUTPATIENT
Start: 2021-12-28 | End: 2021-12-28

## 2021-12-28 RX ORDER — PREDNISONE 50 MG/1
50 TABLET ORAL DAILY
Qty: 5 TABLET | Refills: 0 | Status: ON HOLD | OUTPATIENT
Start: 2021-12-28 | End: 2022-01-04 | Stop reason: HOSPADM

## 2021-12-28 RX ADMIN — PREDNISONE 50 MG: 20 TABLET ORAL at 16:56

## 2021-12-28 RX ADMIN — ALBUTEROL SULFATE 2.5 MG: 0.83 SOLUTION RESPIRATORY (INHALATION) at 16:57

## 2021-12-28 ASSESSMENT — PAIN DESCRIPTION - ORIENTATION: ORIENTATION: MID

## 2021-12-28 ASSESSMENT — PAIN DESCRIPTION - PAIN TYPE: TYPE: ACUTE PAIN

## 2021-12-28 ASSESSMENT — PAIN DESCRIPTION - LOCATION: LOCATION: CHEST

## 2021-12-28 ASSESSMENT — PAIN SCALES - GENERAL: PAINLEVEL_OUTOF10: 7

## 2021-12-28 ASSESSMENT — PAIN DESCRIPTION - DESCRIPTORS: DESCRIPTORS: ACHING

## 2021-12-28 NOTE — ED TRIAGE NOTES
The patient presents to the emergency department alert and oriented with a complaint of a cough for one day. The patient states that she has an increase in her shortness of breath with exertion. The patient is on 2 LPM of oxygen constantly with her oxygen saturation in the upper 90's normally. O2 saturation was 88% initially with an increase to 91% after sitting for several minutes . The patient placed on the monitor with vital signs taken. Assessment as follows; Skin is pink, warm and dry. Lung sounds are clear in the apex with no wheezing or crackles noted and diminished in the bases.

## 2021-12-28 NOTE — ED NOTES
Discharge instructions and prescriptions were reviewed and the patient will follow up with the PCP. The patient understands these instructions. She was taken to her car in a wheel chair. She was changed to her portable tank prior to discharge.        Michelle Marrufo RN  12/28/21 6786

## 2021-12-28 NOTE — ED PROVIDER NOTES
EMERGENCY DEPARTMENT ENCOUNTER    Patient: Caatlino Zelaya  MRN: [id number]  : 1945  Date of Evaluation: 2021  ED Provider:  Essie Ponce MD    CHIEF COMPLAINT  Chief Complaint   Patient presents with    Cough    Shortness of Breath       KESHIA Zelaya is a 68 y.o. female who presents with complaints of moderate severity, constant shortness of breath with wheezing and nonproductive cough for the last several days. Denies chest pain. Denies fever. No known trigger modifying factors. Denies any other associated symptoms or complaints or concerns.       REVIEW OF SYSTEMS    Constitutional: negative for fever, chills  Neurological: negative for HA, focal weakness, loss of sensation  Ophthalmic: negative for vision change  ENT: negative for congestion, rhinorrhea, sore throat, earaches  Cardiovascular: negative for chest pain  Respiratory: negative for sputum  GI: negative for abdominal pain, nausea, vomiting, diarrhea, constipation  : negative for dysuria, hematuria  Musculoskeletal: negative for neck stiffness, myalgias, decreased ROM, joint swelling  Dermatological: negative for rash, wounds  Heme: Negative for bleeding, bruising      PAST MEDICAL HISTORY  Past Medical History:   Diagnosis Date    Anticoagulant long-term use     Arthritis     Cancer ([address]     Chronic hypoxemic respiratory failure ([address] 2018    COPD (chronic obstructive pulmonary disease) ([address]     Coronary atherosclerosis     COVID-19 virus detected 2021    Former smoker 2021    Gastroesophageal reflux disease     Hiatal hernia     Lung infiltrate on CT 2019    On home oxygen therapy     on     Osteopenia     Phlebitis     Pulmonary nodule 2016    S/P left heart catheterization by percutaneous approach 2013    Sepsis due to pneumonia ([address] 2017    Shortness of breath 2019    Shortness of breath 2021    Shortness of breath 2021    Wears glasses CURRENT MEDICATIONS  [unfilled]    ALLERGIES  Allergies   Allergen Reactions    Codeine Swelling    Darvon [Propoxyphene Hcl] Swelling    Lamisil [Terbinafine Hcl]     Morphine Swelling    Neosporin [Neomycin-Polymyxin-Gramicidin]     Pcn [Penicillins] Swelling       SURGICAL HISTORY  Past Surgical History:   Procedure Laterality Date   Shashi Gonsalez    COLONOSCOPY      ENDOSCOPY, COLON, DIAGNOSTIC      TONSILLECTOMY      TUBAL LIGATION      VEIN SURGERY         FAMILY HISTORY  Family History   Problem Relation Age of Onset    Heart Disease Mother     Cancer Father         lung ca    Heart Disease Father     COPD Father     Cancer Sister     Heart Disease Sister        SOCIAL HISTORY  Social History     Socioeconomic History    Marital status:      Spouse name: None    Number of children: None    Years of education: None    Highest education level: None   Occupational History    None   Tobacco Use    Smoking status: Former Smoker     Packs/day: 1.50     Years: 26.00     Pack years: 39.00     Types: Cigarettes     Start date:      Quit date: 1991     Years since quittin.1    Smokeless tobacco: Never Used   Vaping Use    Vaping Use: Never used   Substance and Sexual Activity    Alcohol use: No    Drug use: No    Sexual activity: Not Currently     Partners: Male   Other Topics Concern    None   Social History Narrative    None     Social Determinants of Health     Financial Resource Strain: Low Risk     Difficulty of Paying Living Expenses: Not hard at all   Food Insecurity: No Food Insecurity    Worried About Running Out of Food in the Last Year: Never true    Henry of Food in the Last Year: Never true   Transportation Needs:     Lack of Transportation (Medical): Not on file    Lack of Transportation (Non-Medical):  Not on file   Physical Activity:     Days of Exercise per Week: Not on file    Minutes of Exercise per Session: Not on file   Stress:     Feeling of Stress : Not on file   Social Connections:     Frequency of Communication with Friends and Family: Not on file    Frequency of Social Gatherings with Friends and Family: Not on file    Attends Alevism Services: Not on file    Active Member of [address] [place] [address] or Organizations: Not on file    Attends Club or Organization Meetings: Not on file    Marital Status: Not on file   Intimate Partner Violence:     Fear of Current or Ex-Partner: Not on file    Emotionally Abused: Not on file    Physically Abused: Not on file    Sexually Abused: Not on file   Housing Stability:     Unable to Pay for Housing in the Last Year: Not on file    Number of [place] in the Last Year: Not on file    Unstable Housing in the Last Year: Not on file         **Past medical, family and social histories, and nursing notes reviewed and verified by me**      PHYSICAL EXAM  VITAL SIGNS:   ED Triage Vitals [12/28/21 1324]   Enc Vitals Group      /66      Pulse 88      Resp 20      Temp 98.8 °F (37.1 °C)      Temp src       SpO2 (!) 88 %      Weight 160 lb (72.6 kg)      Height 5' 4\" (1.626 m)      Head Circumference       Peak Flow       Pain Score       Pain Loc       Pain Edu? Excl. in [address]? Vitals during ED course were reviewed and are as charted.     Constitutional: Minimal distress, Non-toxic appearance  Eyes: Conjunctiva normal, No discharge  HENT: Normocephalic, Atraumatic, bilateral external ears normal, bilateral TMs appear normal, no tenderness to percussion over the bilateral mastoid processes, no tenderness to percussion over the frontal or bilateral maxillary sinuses, posterior oropharynx is nonerythematous and without exudate, uvula is midline, oropharynx moist  Cardiovascular: Regular rate and rhythm, No murmurs, No rubs, No gallops  Pulmonary/Chest: Diffuse bilateral wheezing, otherwise no respiratory distress or accessory muscle use, No crackles or rhonchi. Abdomen: Soft, nondistended and nonrigid, No tenderness or peritoneal signs, No masses, normal bowel sounds  Back: No midline point tenderness, No paraspinous muscle tenderness.  No CVA tenderness  Extremities: No gross deformities, no edema, no tenderness  Neurologic: Normal motor function, Normal sensory function, No focal deficits  Skin: Warm, Dry, No erythema, No rash, No cyanosis, No mottling  Lymphatic: No lymphadenopathy in the following location(s): cervical  Psychiatric: Alert and oriented x3, Affect normal              RADIOLOGY/PROCEDURES/LABS/MEDICATIONS ADMINISTERED:    I have reviewed and interpreted all of the currently available lab results from this visit (if applicable):  Results for orders placed or performed during the hospital encounter of 12/28/21   Rapid Flu Swab    Specimen: Nasopharyngeal   Result Value Ref Range    Rapid Influenza A Ag NEGATIVE NEGATIVE    Rapid Influenza B Ag NEGATIVE NEGATIVE   CBC Auto Differential   Result Value Ref Range    WBC 13.2 (H) 4.0 - 10.5 K/CU MM    RBC 4.13 (L) 4.2 - 5.4 M/CU MM    Hemoglobin 12.9 12.5 - 16.0 GM/DL    Hematocrit 39.7 37 - 47 %    MCV 96.1 78 - 100 FL    MCH 31.2 (H) 27 - 31 PG    MCHC 32.5 32.0 - 36.0 %    RDW 13.8 11.7 - 14.9 %    Platelets 280 045 - 535 K/CU MM    MPV 10.4 7.5 - 11.1 FL    Differential Type AUTOMATED DIFFERENTIAL     Segs Relative 87.6 (H) 36 - 66 %    Lymphocytes % 3.1 (L) 24 - 44 %    Monocytes % 8.0 (H) 0 - 4 %    Eosinophils % 0.6 0 - 3 %    Basophils % 0.2 0 - 1 %    Segs Absolute 11.6 K/CU MM    Lymphocytes Absolute 0.4 K/CU MM    Monocytes Absolute 1.1 K/CU MM    Eosinophils Absolute 0.1 K/CU MM    Basophils Absolute 0.0 K/CU MM    Immature Neutrophil % 0.5 (H) 0 - 0.43 %    Total Immature Neutrophil 0.06 K/CU MM   Troponin   Result Value Ref Range    Troponin T <0.010 <0.01 NG/ML   Comprehensive Metabolic Panel w/ Reflex to MG   Result Value Ref Range    Sodium 137 135 - 145 MMOL/L    Potassium 4.7 3.5 - 5. 1 MMOL/L    Chloride 101 99 - 110 mMol/L    CO2 25 21 - 32 MMOL/L    BUN 13 6 - 23 MG/DL    CREATININE 0.7 0.6 - 1.1 MG/DL    Glucose 95 70 - 99 MG/DL    Calcium 8.8 8.3 - 10.6 MG/DL    Albumin 3.4 3.4 - 5.0 GM/DL    Total Protein 5.3 (L) 6.4 - 8.2 GM/DL    Total Bilirubin 0.3 0.0 - 1.0 MG/DL    ALT 17 10 - 40 U/L    AST 28 15 - 37 IU/L    Alkaline Phosphatase 59 40 - 129 IU/L    GFR Non-African American >60 >60 mL/min/1.73m2    GFR African American >60 >60 mL/min/1.73m2    Anion Gap 11 4 - 16   EKG 12 Lead   Result Value Ref Range    Ventricular Rate 85 BPM    Atrial Rate 85 BPM    P-R Interval 124 ms    QRS Duration 98 ms    Q-T Interval 348 ms    QTc Calculation (Bazett) 414 ms    P Axis 50 degrees    R Axis -50 degrees    T Axis 65 degrees    Diagnosis       Sinus rhythm with frequent and consecutive premature ventricular complexes and fusion complexes  Left anterior fascicular block  Moderate voltage criteria for LVH, may be normal variant  Abnormal ECG  When compared with ECG of 13-SEP-2021 11:11,  fusion complexes are now present  premature ventricular complexes are now present  Inverted T waves have replaced nonspecific T wave abnormality in Lateral leads            ABNORMAL LABS:  Labs Reviewed   CBC WITH AUTO DIFFERENTIAL - Abnormal; Notable for the following components:       Result Value    WBC 13.2 (*)     RBC 4.13 (*)     MCH 31.2 (*)     Segs Relative 87.6 (*)     Lymphocytes % 3.1 (*)     Monocytes % 8.0 (*)     Immature Neutrophil % 0.5 (*)     All other components within normal limits   COMPREHENSIVE METABOLIC PANEL W/ REFLEX TO MG FOR LOW K - Abnormal; Notable for the following components: Total Protein 5.3 (*)     All other components within normal limits   RAPID INFLUENZA A/B ANTIGENS   TROPONIN         IMAGING STUDIES ORDERED:  XR CHEST PORTABLE    I have personally viewed the imaging studies. The radiologist interpretation is:   XR CHEST PORTABLE   Final Result   1. No acute process.    2. COPD.               MEDICATIONS ADMINISTERED:  Medications   predniSONE (DELTASONE) tablet 50 mg (50 mg Oral Given 12/28/21 1656)   albuterol (PROVENTIL) nebulizer solution 2.5 mg (2.5 mg Nebulization Given 12/28/21 1657)         COURSE & MEDICAL DECISION MAKING  Last vitals: BP (!) 149/61   Pulse 65   Temp 98.8 °F (37.1 °C)   Resp 18   Ht 5' 4\" (1.626 m)   Wt 160 lb (72.6 kg)   SpO2 94%   BMI 27.46 kg/m²     60-year-old female with COPD exacerbation. Initially was noted to have an oxygen saturation of 87% (restlessness on her own oxygen tank which was noted to be empty. Was placed on wall O2 she was satting well at her baseline O2 levels. Not any respiratory distress. Did have some wheezing on auscultation was given steroids and breathing treatment with significant improvement in symptoms. No radiographic evidence to suggest pneumonia. No clinical evidence to suggest sepsis. No evidence for acute cardiac process. She is noted to have some leukocytosis which may be due to her chronic low-dose steroid use. Additional workup and treatment in the ED as documented above. Patient reassured and will be discharged home. I have explained to the patient in appropriate terminology our work-up in the ED and their diagnosis. I have also given anticipatory guidance and expectant management of their condition as an outpatient as per my custom. The patient was given clear discharge and follow-up instructions including return to the ER immediately for worsening concerns. The patient has been advised to follow-up with their primary care physician and/or referred physician in the next two to three days or sooner if worsening and to return to the ER immediately as above with any concerns. I provided the patient counseling with regard to my customary list of strict return precautions as well as return precautions specific to the cause for today's emergency department visit.  The patient will return under these provided conditions, but should also return for new concerns or further worsening. Pt and/or family understand and agree with plan. Clinical Impression:  1. COPD exacerbation ([address]        Disposition referral (if applicable):  Joshua Franco MD  [place] [address]  [phone]    Schedule an appointment as soon as possible for a visit       [facility]  [address]  [phone]    If symptoms worsen      Disposition medications (if applicable):  New Prescriptions    AZITHROMYCIN (ZITHROMAX) 250 MG TABLET    Take 1 tablet by mouth See Admin Instructions for 5 days 500mg on day 1 followed by 250mg on days 2 - 5    BENZONATATE (TESSALON) 100 MG CAPSULE    Take 1 capsule by mouth 2 times daily as needed for Cough    PREDNISONE (DELTASONE) 50 MG TABLET    Take 1 tablet by mouth daily for 5 days       ED Provider Disposition Time  DISPOSITION            Electronically signed by: Lindsey James M.D., 12/28/2021 6:26 PM      This dictation was created with voice recognition software. While attempts have been made to review the dictation as it is transcribed, on occasion the spoken word can be misinterpreted by the technology leading to omissions or inappropriate words, phrases or sentences.         Thais Kimbrough MD  12/28/21 7739

## 2021-12-29 LAB
EKG ATRIAL RATE: 85 BPM
EKG DIAGNOSIS: NORMAL
EKG P AXIS: 50 DEGREES
EKG P-R INTERVAL: 124 MS
EKG Q-T INTERVAL: 348 MS
EKG QRS DURATION: 98 MS
EKG QTC CALCULATION (BAZETT): 414 MS
EKG R AXIS: -50 DEGREES
EKG T AXIS: 65 DEGREES
EKG VENTRICULAR RATE: 85 BPM

## 2021-12-29 PROCEDURE — 93010 ELECTROCARDIOGRAM REPORT: CPT | Performed by: INTERNAL MEDICINE

## 2021-12-31 ENCOUNTER — HOSPITAL ENCOUNTER (INPATIENT)
Age: 76
LOS: 4 days | Discharge: HOME HEALTH | DRG: 871 | End: 2022-01-04
Attending: EMERGENCY MEDICINE | Admitting: INTERNAL MEDICINE
Payer: MEDICARE

## 2021-12-31 ENCOUNTER — APPOINTMENT (OUTPATIENT)
Dept: GENERAL RADIOLOGY | Age: 76
DRG: 871 | End: 2021-12-31
Payer: MEDICARE

## 2021-12-31 DIAGNOSIS — J96.02 ACUTE RESPIRATORY FAILURE WITH HYPOXIA AND HYPERCAPNIA (HCC): Primary | ICD-10-CM

## 2021-12-31 DIAGNOSIS — J11.1 INFLUENZA: ICD-10-CM

## 2021-12-31 DIAGNOSIS — J96.01 ACUTE RESPIRATORY FAILURE WITH HYPOXIA AND HYPERCAPNIA (HCC): Primary | ICD-10-CM

## 2021-12-31 DIAGNOSIS — J44.1 COPD EXACERBATION (HCC): ICD-10-CM

## 2021-12-31 PROBLEM — J10.00 PNEUMONIA DUE TO INFLUENZA A VIRUS: Status: ACTIVE | Noted: 2021-12-31

## 2021-12-31 LAB
ALBUMIN SERPL-MCNC: 3.5 GM/DL (ref 3.4–5)
ALP BLD-CCNC: 79 IU/L (ref 40–129)
ALT SERPL-CCNC: 21 U/L (ref 10–40)
ANION GAP SERPL CALCULATED.3IONS-SCNC: 9 MMOL/L (ref 4–16)
APTT: 58.3 SECONDS (ref 25.1–37.1)
AST SERPL-CCNC: 34 IU/L (ref 15–37)
BANDED NEUTROPHILS ABSOLUTE COUNT: 2.13 K/CU MM
BANDED NEUTROPHILS RELATIVE PERCENT: 19 % (ref 5–11)
BILIRUB SERPL-MCNC: 0.3 MG/DL (ref 0–1)
BUN BLDV-MCNC: 19 MG/DL (ref 6–23)
CALCIUM SERPL-MCNC: 9 MG/DL (ref 8.3–10.6)
CHLORIDE BLD-SCNC: 99 MMOL/L (ref 99–110)
CO2: 28 MMOL/L (ref 21–32)
CREAT SERPL-MCNC: 0.6 MG/DL (ref 0.6–1.1)
DIFFERENTIAL TYPE: ABNORMAL
DOSE AMOUNT: ABNORMAL
DOSE TIME: ABNORMAL
GFR AFRICAN AMERICAN: >60 ML/MIN/1.73M2
GFR NON-AFRICAN AMERICAN: >60 ML/MIN/1.73M2
GLUCOSE BLD-MCNC: 114 MG/DL (ref 70–99)
HCT VFR BLD CALC: 46.4 % (ref 37–47)
HEMOGLOBIN: 14.6 GM/DL (ref 12.5–16)
INR BLD: 3.81 INDEX
LYMPHOCYTES ABSOLUTE: 0.8 K/CU MM
LYMPHOCYTES RELATIVE PERCENT: 7 % (ref 24–44)
MCH RBC QN AUTO: 30.9 PG (ref 27–31)
MCHC RBC AUTO-ENTMCNC: 31.5 % (ref 32–36)
MCV RBC AUTO: 98.3 FL (ref 78–100)
MONOCYTES ABSOLUTE: 0.7 K/CU MM
MONOCYTES RELATIVE PERCENT: 6 % (ref 0–4)
PDW BLD-RTO: 13.8 % (ref 11.7–14.9)
PLATELET # BLD: 218 K/CU MM (ref 140–440)
PLT MORPHOLOGY: ABNORMAL
PMV BLD AUTO: 10.4 FL (ref 7.5–11.1)
POLYCHROMASIA: ABNORMAL
POTASSIUM SERPL-SCNC: 3.9 MMOL/L (ref 3.5–5.1)
PRO-BNP: 501.9 PG/ML
PROTHROMBIN TIME: 49.8 SECONDS (ref 11.7–14.5)
RAPID INFLUENZA  B AGN: NEGATIVE
RAPID INFLUENZA A AGN: POSITIVE
RBC # BLD: 4.72 M/CU MM (ref 4.2–5.4)
SARS-COV-2, NAAT: NOT DETECTED
SEGMENTED NEUTROPHILS ABSOLUTE COUNT: 7.6 K/CU MM
SEGMENTED NEUTROPHILS RELATIVE PERCENT: 68 % (ref 36–66)
SODIUM BLD-SCNC: 136 MMOL/L (ref 135–145)
THEOPHYLLINE LEVEL: 6.1 UG/ML (ref 10–20)
TOTAL PROTEIN: 6.9 GM/DL (ref 6.4–8.2)
TROPONIN T: <0.01 NG/ML
TSH HIGH SENSITIVITY: 1.03 UIU/ML (ref 0.27–4.2)
WBC # BLD: 11.2 K/CU MM (ref 4–10.5)

## 2021-12-31 PROCEDURE — 85027 COMPLETE CBC AUTOMATED: CPT

## 2021-12-31 PROCEDURE — 6360000002 HC RX W HCPCS: Performed by: EMERGENCY MEDICINE

## 2021-12-31 PROCEDURE — 93005 ELECTROCARDIOGRAM TRACING: CPT | Performed by: EMERGENCY MEDICINE

## 2021-12-31 PROCEDURE — 6370000000 HC RX 637 (ALT 250 FOR IP): Performed by: INTERNAL MEDICINE

## 2021-12-31 PROCEDURE — 6370000000 HC RX 637 (ALT 250 FOR IP): Performed by: EMERGENCY MEDICINE

## 2021-12-31 PROCEDURE — 96365 THER/PROPH/DIAG IV INF INIT: CPT

## 2021-12-31 PROCEDURE — 6360000002 HC RX W HCPCS: Performed by: INTERNAL MEDICINE

## 2021-12-31 PROCEDURE — 82803 BLOOD GASES ANY COMBINATION: CPT

## 2021-12-31 PROCEDURE — 93005 ELECTROCARDIOGRAM TRACING: CPT | Performed by: INTERNAL MEDICINE

## 2021-12-31 PROCEDURE — 94640 AIRWAY INHALATION TREATMENT: CPT

## 2021-12-31 PROCEDURE — 2580000003 HC RX 258: Performed by: INTERNAL MEDICINE

## 2021-12-31 PROCEDURE — 84484 ASSAY OF TROPONIN QUANT: CPT

## 2021-12-31 PROCEDURE — 2700000000 HC OXYGEN THERAPY PER DAY

## 2021-12-31 PROCEDURE — 99285 EMERGENCY DEPT VISIT HI MDM: CPT

## 2021-12-31 PROCEDURE — 2140000000 HC CCU INTERMEDIATE R&B

## 2021-12-31 PROCEDURE — 85610 PROTHROMBIN TIME: CPT

## 2021-12-31 PROCEDURE — 85730 THROMBOPLASTIN TIME PARTIAL: CPT

## 2021-12-31 PROCEDURE — 83880 ASSAY OF NATRIURETIC PEPTIDE: CPT

## 2021-12-31 PROCEDURE — 94761 N-INVAS EAR/PLS OXIMETRY MLT: CPT

## 2021-12-31 PROCEDURE — 87635 SARS-COV-2 COVID-19 AMP PRB: CPT

## 2021-12-31 PROCEDURE — 84443 ASSAY THYROID STIM HORMONE: CPT

## 2021-12-31 PROCEDURE — 94660 CPAP INITIATION&MGMT: CPT

## 2021-12-31 PROCEDURE — 87804 INFLUENZA ASSAY W/OPTIC: CPT

## 2021-12-31 PROCEDURE — 71045 X-RAY EXAM CHEST 1 VIEW: CPT

## 2021-12-31 PROCEDURE — 80198 ASSAY OF THEOPHYLLINE: CPT

## 2021-12-31 PROCEDURE — 96375 TX/PRO/DX INJ NEW DRUG ADDON: CPT

## 2021-12-31 PROCEDURE — 80053 COMPREHEN METABOLIC PANEL: CPT

## 2021-12-31 PROCEDURE — 85007 BL SMEAR W/DIFF WBC COUNT: CPT

## 2021-12-31 PROCEDURE — 99222 1ST HOSP IP/OBS MODERATE 55: CPT | Performed by: INTERNAL MEDICINE

## 2021-12-31 RX ORDER — DICYCLOMINE HYDROCHLORIDE 10 MG/1
10 CAPSULE ORAL 2 TIMES DAILY
Status: DISCONTINUED | OUTPATIENT
Start: 2021-12-31 | End: 2022-01-04 | Stop reason: HOSPADM

## 2021-12-31 RX ORDER — SODIUM CHLORIDE 0.9 % (FLUSH) 0.9 %
5-40 SYRINGE (ML) INJECTION EVERY 12 HOURS SCHEDULED
Status: DISCONTINUED | OUTPATIENT
Start: 2021-12-31 | End: 2022-01-04 | Stop reason: HOSPADM

## 2021-12-31 RX ORDER — IPRATROPIUM BROMIDE AND ALBUTEROL SULFATE 2.5; .5 MG/3ML; MG/3ML
3 SOLUTION RESPIRATORY (INHALATION) ONCE
Status: COMPLETED | OUTPATIENT
Start: 2021-12-31 | End: 2021-12-31

## 2021-12-31 RX ORDER — MONTELUKAST SODIUM 10 MG/1
10 TABLET ORAL NIGHTLY
Status: DISCONTINUED | OUTPATIENT
Start: 2021-12-31 | End: 2022-01-04 | Stop reason: HOSPADM

## 2021-12-31 RX ORDER — ROSUVASTATIN CALCIUM 5 MG/1
20 TABLET, COATED ORAL NIGHTLY
Status: DISCONTINUED | OUTPATIENT
Start: 2021-12-31 | End: 2022-01-03

## 2021-12-31 RX ORDER — ONDANSETRON 2 MG/ML
4 INJECTION INTRAMUSCULAR; INTRAVENOUS EVERY 6 HOURS PRN
Status: DISCONTINUED | OUTPATIENT
Start: 2021-12-31 | End: 2022-01-04 | Stop reason: HOSPADM

## 2021-12-31 RX ORDER — SODIUM CHLORIDE 0.9 % (FLUSH) 0.9 %
5-40 SYRINGE (ML) INJECTION PRN
Status: DISCONTINUED | OUTPATIENT
Start: 2021-12-31 | End: 2022-01-04 | Stop reason: HOSPADM

## 2021-12-31 RX ORDER — IPRATROPIUM BROMIDE AND ALBUTEROL SULFATE 2.5; .5 MG/3ML; MG/3ML
1 SOLUTION RESPIRATORY (INHALATION)
Status: DISCONTINUED | OUTPATIENT
Start: 2021-12-31 | End: 2022-01-01

## 2021-12-31 RX ORDER — SODIUM CHLORIDE 9 MG/ML
25 INJECTION, SOLUTION INTRAVENOUS PRN
Status: DISCONTINUED | OUTPATIENT
Start: 2021-12-31 | End: 2022-01-04 | Stop reason: HOSPADM

## 2021-12-31 RX ORDER — METHYLPREDNISOLONE SODIUM SUCCINATE 125 MG/2ML
125 INJECTION, POWDER, LYOPHILIZED, FOR SOLUTION INTRAMUSCULAR; INTRAVENOUS ONCE
Status: COMPLETED | OUTPATIENT
Start: 2021-12-31 | End: 2021-12-31

## 2021-12-31 RX ORDER — ALBUTEROL SULFATE 2.5 MG/3ML
2.5 SOLUTION RESPIRATORY (INHALATION) EVERY 4 HOURS PRN
Status: DISCONTINUED | OUTPATIENT
Start: 2021-12-31 | End: 2022-01-01

## 2021-12-31 RX ORDER — ISOSORBIDE MONONITRATE 60 MG/1
60 TABLET, EXTENDED RELEASE ORAL DAILY
Status: DISCONTINUED | OUTPATIENT
Start: 2021-12-31 | End: 2022-01-04

## 2021-12-31 RX ORDER — PREDNISONE 20 MG/1
40 TABLET ORAL DAILY
Status: DISCONTINUED | OUTPATIENT
Start: 2022-01-03 | End: 2022-01-04 | Stop reason: HOSPADM

## 2021-12-31 RX ORDER — LEVOFLOXACIN 5 MG/ML
500 INJECTION, SOLUTION INTRAVENOUS EVERY 24 HOURS
Status: DISCONTINUED | OUTPATIENT
Start: 2021-12-31 | End: 2022-01-04 | Stop reason: HOSPADM

## 2021-12-31 RX ORDER — ACETAMINOPHEN 650 MG/1
650 SUPPOSITORY RECTAL EVERY 6 HOURS PRN
Status: DISCONTINUED | OUTPATIENT
Start: 2021-12-31 | End: 2022-01-04 | Stop reason: HOSPADM

## 2021-12-31 RX ORDER — ONDANSETRON 4 MG/1
4 TABLET, ORALLY DISINTEGRATING ORAL EVERY 8 HOURS PRN
Status: DISCONTINUED | OUTPATIENT
Start: 2021-12-31 | End: 2022-01-04 | Stop reason: HOSPADM

## 2021-12-31 RX ORDER — MAGNESIUM SULFATE IN WATER 40 MG/ML
2000 INJECTION, SOLUTION INTRAVENOUS ONCE
Status: COMPLETED | OUTPATIENT
Start: 2021-12-31 | End: 2021-12-31

## 2021-12-31 RX ORDER — METHYLPREDNISOLONE SODIUM SUCCINATE 40 MG/ML
40 INJECTION, POWDER, LYOPHILIZED, FOR SOLUTION INTRAMUSCULAR; INTRAVENOUS EVERY 6 HOURS
Status: COMPLETED | OUTPATIENT
Start: 2021-12-31 | End: 2022-01-02

## 2021-12-31 RX ORDER — POLYETHYLENE GLYCOL 3350 17 G/17G
17 POWDER, FOR SOLUTION ORAL DAILY PRN
Status: DISCONTINUED | OUTPATIENT
Start: 2021-12-31 | End: 2022-01-04 | Stop reason: HOSPADM

## 2021-12-31 RX ORDER — FUROSEMIDE 20 MG/1
40 TABLET ORAL DAILY
Status: DISCONTINUED | OUTPATIENT
Start: 2021-12-31 | End: 2022-01-04 | Stop reason: HOSPADM

## 2021-12-31 RX ORDER — GABAPENTIN 400 MG/1
400 CAPSULE ORAL EVERY EVENING
Status: DISCONTINUED | OUTPATIENT
Start: 2021-12-31 | End: 2022-01-04 | Stop reason: HOSPADM

## 2021-12-31 RX ORDER — PANTOPRAZOLE SODIUM 40 MG/1
40 TABLET, DELAYED RELEASE ORAL
Status: DISCONTINUED | OUTPATIENT
Start: 2022-01-01 | End: 2022-01-04 | Stop reason: HOSPADM

## 2021-12-31 RX ORDER — ACETAMINOPHEN 325 MG/1
650 TABLET ORAL EVERY 6 HOURS PRN
Status: DISCONTINUED | OUTPATIENT
Start: 2021-12-31 | End: 2022-01-04 | Stop reason: HOSPADM

## 2021-12-31 RX ORDER — VITAMIN B COMPLEX
1 CAPSULE ORAL DAILY
Status: DISCONTINUED | OUTPATIENT
Start: 2021-12-31 | End: 2022-01-04 | Stop reason: HOSPADM

## 2021-12-31 RX ORDER — OSELTAMIVIR PHOSPHATE 75 MG/1
75 CAPSULE ORAL 2 TIMES DAILY
Status: DISCONTINUED | OUTPATIENT
Start: 2021-12-31 | End: 2022-01-04 | Stop reason: HOSPADM

## 2021-12-31 RX ORDER — LEVOTHYROXINE SODIUM 0.05 MG/1
50 TABLET ORAL DAILY
Status: DISCONTINUED | OUTPATIENT
Start: 2021-12-31 | End: 2022-01-04 | Stop reason: HOSPADM

## 2021-12-31 RX ADMIN — MONTELUKAST 10 MG: 10 TABLET, FILM COATED ORAL at 22:11

## 2021-12-31 RX ADMIN — OSELTAMIVIR PHOSPHATE 75 MG: 75 CAPSULE ORAL at 14:35

## 2021-12-31 RX ADMIN — THEOPHYLLINE ANHYDROUS 200 MG: 200 CAPSULE, EXTENDED RELEASE ORAL at 22:12

## 2021-12-31 RX ADMIN — OSELTAMIVIR PHOSPHATE 75 MG: 75 CAPSULE ORAL at 22:12

## 2021-12-31 RX ADMIN — THEOPHYLLINE ANHYDROUS 200 MG: 200 CAPSULE, EXTENDED RELEASE ORAL at 14:35

## 2021-12-31 RX ADMIN — METHYLPREDNISOLONE SODIUM SUCCINATE 125 MG: 125 INJECTION, POWDER, FOR SOLUTION INTRAMUSCULAR; INTRAVENOUS at 08:57

## 2021-12-31 RX ADMIN — IPRATROPIUM BROMIDE AND ALBUTEROL SULFATE 1 AMPULE: .5; 2.5 SOLUTION RESPIRATORY (INHALATION) at 18:19

## 2021-12-31 RX ADMIN — IPRATROPIUM BROMIDE AND ALBUTEROL SULFATE 3 AMPULE: .5; 3 SOLUTION RESPIRATORY (INHALATION) at 09:35

## 2021-12-31 RX ADMIN — ISOSORBIDE MONONITRATE 60 MG: 60 TABLET, EXTENDED RELEASE ORAL at 14:36

## 2021-12-31 RX ADMIN — SODIUM CHLORIDE, PRESERVATIVE FREE 10 ML: 5 INJECTION INTRAVENOUS at 22:13

## 2021-12-31 RX ADMIN — FUROSEMIDE 40 MG: 20 TABLET ORAL at 14:36

## 2021-12-31 RX ADMIN — DICYCLOMINE HYDROCHLORIDE 10 MG: 10 CAPSULE ORAL at 14:36

## 2021-12-31 RX ADMIN — DICYCLOMINE HYDROCHLORIDE 10 MG: 10 CAPSULE ORAL at 22:11

## 2021-12-31 RX ADMIN — METHYLPREDNISOLONE SODIUM SUCCINATE 40 MG: 40 INJECTION, POWDER, LYOPHILIZED, FOR SOLUTION INTRAMUSCULAR; INTRAVENOUS at 15:26

## 2021-12-31 RX ADMIN — METHYLPREDNISOLONE SODIUM SUCCINATE 40 MG: 40 INJECTION, POWDER, LYOPHILIZED, FOR SOLUTION INTRAMUSCULAR; INTRAVENOUS at 22:12

## 2021-12-31 RX ADMIN — ROSUVASTATIN CALCIUM 20 MG: 5 TABLET, COATED ORAL at 22:12

## 2021-12-31 RX ADMIN — IPRATROPIUM BROMIDE AND ALBUTEROL SULFATE 1 AMPULE: .5; 2.5 SOLUTION RESPIRATORY (INHALATION) at 22:03

## 2021-12-31 RX ADMIN — LEVOTHYROXINE SODIUM 50 MCG: 0.05 TABLET ORAL at 14:35

## 2021-12-31 RX ADMIN — MAGNESIUM SULFATE HEPTAHYDRATE 2000 MG: 40 INJECTION, SOLUTION INTRAVENOUS at 08:58

## 2021-12-31 RX ADMIN — GABAPENTIN 400 MG: 400 CAPSULE ORAL at 22:11

## 2021-12-31 RX ADMIN — Medication 1 CAPSULE: at 14:35

## 2021-12-31 RX ADMIN — ROFLUMILAST 500 MCG: 500 TABLET ORAL at 14:35

## 2021-12-31 ASSESSMENT — PAIN DESCRIPTION - PAIN TYPE: TYPE: ACUTE PAIN

## 2021-12-31 ASSESSMENT — PAIN SCALES - GENERAL: PAINLEVEL_OUTOF10: 8

## 2021-12-31 ASSESSMENT — PAIN DESCRIPTION - LOCATION: LOCATION: CHEST

## 2021-12-31 NOTE — ED NOTES
1157 perfect serve message sent to Dr Luis Daniel Flood [facility] Luis Daniel Flood acknowledged perfect serve message.  Added to treatment team.      Gilles Brandon  12/31/21 3908

## 2021-12-31 NOTE — ED NOTES
Cardiology at bedside,patient converted to sinus rhythm,ekg complete,informed not to start Cardizem gtt     Lili Thompson, RN  12/31/21 7473 Initial (On Arrival)

## 2021-12-31 NOTE — ED NOTES
0316 paged ;hospitalist     Jamee Esqueda  12/31/21 [phone] hospitalist returned call      Jamee Esqueda  12/31/21 8543

## 2021-12-31 NOTE — PROGRESS NOTES
PHARMACY ANTICOAGULATION MONITORING SERVICE    Loki Owen is a 68 y.o. female on warfarin therapy for A-fib. Pharmacy consulted by Dr. Flaco David for monitoring and adjustment of treatment. Indication for anticoagulation: A-fib  INR goal: 2-3  Warfarin dose prior to admission: warfarin 6 mg daily    Pertinent Laboratory Values   Recent Labs     12/28/21  1655 12/28/21  1655 12/31/21  0856 12/31/21  1140   INR  --   --   --  3.81   HGB 12.9   < > 14.6  --    HCT 39.7   < > 46.4  --      --  218  --     < > = values in this interval not displayed.      Assessment/Plan:   Drug Interactions:  o Methylprednisolone may increase effects of warfarin   INR: 3.81, supra-therapeutic   Plan to hold warfarin and look to resume when INR <3   Additional dose adjustments will be made based on INR trends, interacting medications, and other clinical factors   Pharmacy will continue to monitor and adjust warfarin therapy as indicated    Thank you for the consult,  Britta Peguero [facility], PharmD  12/31/2021 2:05 PM

## 2021-12-31 NOTE — H&P
pneumonia. COVID-19 test is negative. She has been admitted for management of influenza pneumonia, acute respiratory failure. Past Medical History:      Diagnosis Date    Anticoagulant long-term use     Arthritis     [facility]     Chronic hypoxemic respiratory failure (HCC) 2/6/2018    [facility] disease) [facility]     Coronary atherosclerosis     COVID-19 virus detected 1/18/2021    Former smoker 11/22/2021    Gastroesophageal reflux disease     Hiatal hernia     Lung infiltrate on CT 1/22/2019    On home oxygen therapy     on 24/7    Osteopenia     Phlebitis     Pulmonary nodule 7/5/2016    S/P left heart catheterization by percutaneous approach 6/27/2013    Sepsis due to pneumonia ([facility] 11/14/2017    Shortness of breath 9/23/2019    Shortness of breath 1/18/2021    Shortness of breath 11/22/2021    Wears glasses        Past Surgical History:      Procedure Laterality Date    APPENDECTOMY     Lacey Doe    COLONOSCOPY      ENDOSCOPY, COLON, DIAGNOSTIC      TONSILLECTOMY      TUBAL LIGATION      VEIN SURGERY         Medications (prior to admission):  Prior to Admission medications    Medication Sig Start Date End Date Taking?  Authorizing Provider   predniSONE (DELTASONE) 50 MG tablet Take 1 tablet by mouth daily for 5 days 12/28/21 1/2/22  Jayme Adams MD   benzonatate (TESSALON) 100 MG capsule Take 1 capsule by mouth 2 times daily as needed for Cough 12/28/21   Jayme Adams MD   azithromycin (ZITHROMAX) 250 MG tablet Take 1 tablet by mouth See Admin Instructions for 5 days 500mg on day 1 followed by 250mg on days 2 - 5 12/28/21 1/2/22  Jayme Adams MD   furosemide (LASIX) 40 MG tablet Take 1 tablet by mouth daily 12/21/21 3/21/22  Tanisha Mon MD   potassium chloride (KLOR-CON M10) 10 MEQ extended release tablet Take 2 tablets by mouth daily 12/8/21 3/8/22  Tanisha Mon MD   montelukast (SINGULAIR) 10 MG tablet TAKE 1 TABLET BY MOUTH EVERY DAY EVERY NIGHT 12/1/21   Ted Madrid MD   gabapentin (NEURONTIN) 400 MG capsule TAKE 1 CAPSULE BY MOUTH EVERY EVENING FOR 90 DAYS.  11/30/21 2/28/22  Ted Madrid MD   warfarin (COUMADIN) 6 MG tablet TAKE 1 TABLET BY MOUTH EVERY DAY PER INR 11/22/21   Ted Madrid MD   esomeprazole ([place]) 40 MG delayed release capsule TAKE 1 CAPSULE BY MOUTH TWICE A DAY 11/22/21   Ted Madrid MD   celecoxib (CELEBREX) 200 MG capsule TAKE 1 CAPSULE BY MOUTH EVERY DAY 11/22/21   Ted Madrid MD   albuterol sulfate  (90 Base) MCG/ACT inhaler INHALE 2 PUFF FOUR TIMES A DAY AS NEEDED 11/17/21   Ted Madrid MD   warfarin (COUMADIN) 3 MG tablet Take 1 tabet by mouth every day per INR 11/15/21   Ted Madrid MD   theophylline (THEODUR) 450 MG extended release tablet TAKE 1/2 TABLET BY MOUTH TWICE A DAY 11/10/21   Ted Madrid MD   levothyroxine (SYNTHROID) 50 MCG tablet TAKE 1 TABLET BY MOUTH EVERY DAY 11/5/21   Ted Madrid MD   risedronate (ACTONEL) 35 MG tablet TAKE 1 TABLET BY MOUTH EVERY 7 DAYS PATIENT TAKES ON ROSALIND 10/4/21   Ted Madrid MD   Magnesium Oxide 500 MG TABS TAKE 1 TABLET BY MOUTH EVERY DAY 8/3/21   Historical Provider, MD   rosuvastatin (CRESTOR) 20 MG tablet TAKE 1 TABLET BY MOUTH EVERY DAY EVERY NIGHT 9/7/21   Ted Madrid MD   albuterol (PROVENTIL) (2.5 MG/3ML) 0.083% nebulizer solution TAKE 3 MLS BY NEBULIZATION 4 TIMES DAILY AS NEEDED FOR WHEEZING 8/27/21   Ted Madrid MD   DALIRESP 500 MCG tablet TAKE 1 TABLET BY MOUTH EVERY DAY 7/22/21 12/1/21  Ted Madrid MD   Budeson-Glycopyrrol-Formoterol (BREZTRI AEROSPHERE) 160-9-4.8 MCG/ACT AERO Inhale 2 actuation into the lungs 2 times daily 6/9/21   Maria Elena Gay MD   isosorbide mononitrate (IMDUR) 60 MG extended release tablet TAKE 1 TABLET BY MOUTH EVERY DAY 1/29/21 12/1/21  Ted Madrid, MD   Dextromethorphan-guaiFENesin (MUCINEX DM)  MG TB12 Take 1 to 2 tablet by mouth every 12 hours (maximum: 4 tablets/24 hours). Drink plenty of water. 11/18/20   Karen Mendoza PA-C   nitroGLYCERIN (NITROSTAT) 0.4 MG SL tablet Place 1 tablet under the tongue every 5 minutes as needed for Chest pain 5/27/18   Cecilia Benson MD   diltiazem (CARDIZEM CD) 240 MG extended release capsule Take 240 mg by mouth daily  5/21/18   Historical Provider, MD   dicyclomine (BENTYL) 10 MG capsule Take 10 mg by mouth 2 times daily 5/17/18   Historical Provider, MD   Biotin 35854 MCG TABS Take 10,000 mcg by mouth daily    Historical Provider, MD   LORazepam (ATIVAN) 0.5 MG tablet TAKE 1 TAB BY MOUTH EVERY DAY AS NEEDED 1/4/18   Historical Provider, MD   b complex vitamins capsule Take 1 capsule by mouth daily    Historical Provider, MD   Calcium Carb-Cholecalciferol (CALCIUM + D3) 600-200 MG-UNIT TABS Take 1 tablet by mouth 2 times daily    Historical Provider, MD   Multiple Vitamins-Minerals (MULTIVITAMIN PO) Take 1 tablet by mouth daily    Historical Provider, MD   OXYGEN Inhale 2 L/hr into the lungs continuous. Historical Provider, MD       Allergy(ies):  Codeine, Darvon [propoxyphene hcl], Lamisil [terbinafine hcl], Morphine, Neosporin [neomycin-polymyxin-gramicidin], and Pcn [penicillins]    Social History:  TOBACCO:  reports that she quit smoking about 30 years ago. Her smoking use included cigarettes. She started smoking about 57 years ago. She has a 39.00 pack-year smoking history. She has never used smokeless tobacco.  ETOH:  reports no history of alcohol use. Family History:      Problem Relation Age of Onset    Heart Disease Mother     Cancer Father         lung ca    Heart Disease Father     COPD Father     Cancer Sister     Heart Disease Sister        Review of Systems:  Pertinent positives are listed in HPI. At least 10-point ROS reviewed and were negative.      Vitals and physical examination:  BP 138/71   Pulse 121   Temp 98.1 °F (36.7 °C) (Oral)   Resp 20   Ht 5' 4\" (1.626 m)   Wt 160 lb (72.6 kg)   SpO2 100%   BMI 27.46 kg/m²   Gen/overall appearance: Not in acute distress. Alert. Oriented X3  Head: Normocephalic, atraumatic  Eyes: EOMI, good acuity  ENT: Oral mucosa moist  Neck: No JVD, thyromegaly  CVS: Nml S1S2, no MRG. Irregular, tachycardic  Pulm: Diminished throughout. Faint expiratory wheezes present. Gastrointestinal: Soft, NT/ND, +BS  Musculoskeletal: Stasis dermatitis in bilateral lower extremities. Neuro: No focal deficit. Moves extremity spontaneously. Psychiatry: Appropriate affect. Not agitated. Skin: Warm, dry with normal turgor. No rash  Capillary refill: Brisk,< 3 seconds   Peripheral Pulses: +2 palpable, equal bilaterally      Labs/imaging/EKG:  CBC:   Recent Labs     12/28/21  1655 12/31/21  0856   WBC 13.2* 11.2*   HGB 12.9 14.6    218     BMP:    Recent Labs     12/28/21  1655 12/31/21  0856    136   K 4.7 3.9    99   CO2 25 28   BUN 13 19   CREATININE 0.7 0.6   GLUCOSE 95 114*     Hepatic:   Recent Labs     12/28/21  1655 12/31/21  0856   AST 28 34   ALT 17 21   BILITOT 0.3 0.3   ALKPHOS 59 79       XR CHEST PORTABLE    Result Date: 12/31/2021  EXAMINATION: ONE XRAY VIEW OF THE CHEST 12/31/2021 9:15 am COMPARISON: 12/28/2021, 09/13/2021, 11/05/2021 HISTORY: ORDERING SYSTEM PROVIDED HISTORY: shortness of breath TECHNOLOGIST PROVIDED HISTORY: Reason for exam:->shortness of breath Reason for Exam: shortness of breath, COVID check FINDINGS: Scattered calcified granulomata noted in both lungs. There is biapical pleuroparenchymal scarring. There is attenuation of the pulmonary vasculature suggesting emphysematous changes. There is hyperinflation of the lungs with flattening of the diaphragms suggesting COPD. Compared to chest x-ray from September and November there are increased subtle interstitial and alveolar opacities noted throughout both lungs.   This

## 2021-12-31 NOTE — CONSULTS
[address] [place] [address], [address] [facility]                                  CONSULTATION    PATIENT NAME: Moiz Aldana                   :        1945  MED REC NO:   [id number]                          ROOM:       TR02  ACCOUNT NO:   [de-identified]                           ADMIT DATE: 2021  PROVIDER:     Charles Hammond    CONSULT DATE:  2021    INDICATION:  AFib with RVR. HISTORY OF PRESENT ILLNESS:  The patient is a 51-year-old female who  presented to the emergency department with shortness of breath. She has  chronically shortness of breath requiring 2 liters by nasal cannula, but  it had gotten worse. When she arrived, oxygen levels were low and was  put on BiPAP. She was found to have influenza A. At that time, she was  also in AFib with RVR. Heart rates into the 150s. The patient was  going to be started on Cardizem drip per primary team.  However, she  converted on her own. Talking to her, she feels more comfortable. She  denies any chest pain. She denies any dizziness, any palpitations. She  does endorse to have some lower extremity edema. The patient has a  history of PAF, and is on Coumadin, getting INR checks. She had a heart  cath in 2018. At that time, the left main was patent. LAD, circ, and  RCA all had mild disease, EDP was around 15-20%. PAST MEDICAL HISTORY:  She has a previous medical history of arthritis,  COPD, _____ arthrosis, former smoker, GERD, hiatal hernia, home O2 use,  osteopenia, pulmonary nodules, and shortness of breath. PAST SURGICAL HISTORY:  She has a previous surgical history of  appendectomy, bronchoscopy, colonoscopy, endoscopy, tonsillectomy, and  vein surgery. FAMILY HISTORY:  The patient's mother, father, and sister all have heart  disease. SOCIAL HISTORY:  She is a former smoker, used to smoke a pack-and-a-half  a day. She does not use any alcohol.   No illicit drug use. ALLERGIES:  She has allergies to CODEINE, DARVON, MORPHINE, NEOSPORIN,  PENICILLIN, and LAMISIL. REVIEW OF SYSTEMS:  Ten-point review of systems otherwise negative  unless noted above in the HPI. PHYSICAL EXAMINATION:  GENERAL:  The patient is awake, alert, answering questions  appropriately, not in acute distress. VITAL SIGNS:  The patient is afebrile, 98.1. Respirations are 21. Pulse is at 84. Blood pressure 141/78. She is satting 99% on 100% of  FiO2 with a BiPAP. HEENT:  Head is normocephalic, atraumatic. Pupils are equal and  reactive to light. CHEST:  Equal in expansion. LUNGS:  Clear to auscultation. HEART:  Rate and rhythm regular. ABDOMEN:  Soft, nontender. Bowel sounds are present in all four  quadrants. No hepatosplenomegaly or guarding noted. EXTREMITIES:  No cyanosis or clubbing noted. 1+ lower extremity edema  noted. NEUROLOGICAL:  Cranial nerves II through XII are grossly intact. LABORATORY DATA:  Troponin is not elevated. BNP is 501. Creatinine is  0.6, BUN is at 19. White blood cell count is at 11.2, hemoglobin is at  14.6. She did test positive for influenza A. INR is 3.81. DIAGNOSTIC TESTING:  Chest x-ray:  Development of interstitial and  airspace subtle opacities noted throughout both lungs. Differential  includes atypical pneumonia versus COVID-19 pneumonia, other viral  pneumonias as well, and interstitial pulmonary edema. IMPRESSION:  This is a 49-year-old female who presented to hospital with  influenza A, likely due to having infection, went into AFib with RVR,  converted once on BiPAP. We will continue to watch her rhythm. We will  check echo and adjust medications as needed. We will continue to follow  alongside the patient and make recommendations based on those  guidelines. Continue with anticoagulation with Coumadin at this time. This plan was discussed with Dr. Saeid Spicer.         Richar Winter    D: 12/31/2021 12:54:50 T: 12/31/2021 16:39:13     [id number]  Job#: [id number]     Doc#: [id number]    CC:

## 2021-12-31 NOTE — CONSULTS
Dictation [id number]  SOB, found to have Influenza A pneumonia   Also found to be in COPD exacerbating  Afib with RVR at that time  Now back to NSR  Will obtain echo  Pulmonary edema and small left effusion  On lasix  Continue Ac with Coumadin  Afib likely triggered from influenza  Will cont' to follow    CXR 12/31/2021  Impression   Development of interstitial and airspace subtle opacities noted throughout   both lungs.  Differential includes atypical pneumonia such as COVID-19   pneumonia, other viral pneumonias as well as interstitial pulmonary edema.       Findings of COPD and emphysema.       Small left effusion. [facility] 5/30/2021  IMPRESSION:  1. Left main is patent. 2.  LAD, circ and RCA all have mild disease noted.   3.  EDP is around 15-20 mmHg present

## 2021-12-31 NOTE — ED NOTES
1144 perfect serve message sent to Edil Noguera NP taking calls for Dr Jenae Stearns  12/31/21 Akash Lord NP acknowledged perfect serve message.  Added to treatment team.      Lux Burroughs  12/31/21 1157

## 2021-12-31 NOTE — CONSULTS
Father         lung ca    Heart Disease Father     COPD Father     Cancer Sister     Heart Disease Sister        REVIEW OF SYSTEMS:    CONSTITUTIONAL:  negative for fevers, chills, diaphoresis, activity change, appetite change, fatigue, night sweats and unexpected weight change. EYES:  negative for blurred vision, eye discharge, visual disturbance and icterus  HEENT:  negative for hearing loss, tinnitus, ear drainage, sinus pressure, nasal congestion, epistaxis and snoring  RESPIRATORY:  See HPI  CARDIOVASCULAR:  negative for chest pain, palpitations, exertional chest pressure/discomfort, edema, syncope  GASTROINTESTINAL:  negative for nausea, vomiting, diarrhea, constipation, blood in stool and abdominal pain  GENITOURINARY:  negative for frequency, dysuria, urinary incontinence, decreased urine volume, and hematuria  HEMATOLOGIC/LYMPHATIC:  negative for easy bruising, bleeding and lymphadenopathy  ALLERGIC/IMMUNOLOGIC:  negative for recurrent infections, angioedema, anaphylaxis and drug reactions  ENDOCRINE:  negative for weight changes and diabetic symptoms including polyuria, polydipsia and polyphagia  MUSCULOSKELETAL:  negative for  pain, joint swelling, decreased range of motion and muscle weakness  NEUROLOGICAL:  negative for headaches, slurred speech, unilateral weakness  PSYCHIATRIC/BEHAVIORAL: negative for hallucinations, behavioral problems, confusion and agitation.      Objective:   PHYSICAL EXAM:      VITALS:  BP (!) 141/70   Pulse 84   Temp 98.1 °F (36.7 °C) (Oral)   Resp 21   Ht 5' 4\" (1.626 m)   Wt 160 lb (72.6 kg)   SpO2 99%   BMI 27.46 kg/m²   24HR INTAKE/OUTPUT:  No intake or output data in the 24 hours ending 21 1223  CURRENT PULSE OXIMETRY:  SpO2: 99 %  24HR PULSE OXIMETRY RANGE:  SpO2  Av.4 %  Min: 99 %  Max: 100 %    CONSTITUTIONAL:  awake, alert, cooperative, no apparent distress, and appears stated age  NECK:  Supple, symmetrical, trachea midline, no adenopathy, thyroid symmetric, not enlarged and no tenderness, skin normal  LUNGS:  Occasional basal crackles  CARDIOVASCULAR:  normal S1 and S2, no edema and no JVD  ABDOMEN:  normal bowel sounds, non-distended and no masses palpated, and no tenderness to palpation. No hepatospleenomegaly  LYMPHADENOPATHY:  no axillary or supraclavicular adenopathy. No cervical adnenopathy  PSYCHIATRIC: Oriented to person place and time. No obvious depression or anxiety. MUSCULOSKELETAL: No obvious misalignment or effusion of the joints. No clubbing, cyanosis of the digits. SKIN:  normal skin color, texture, turgor and no redness, warmth, or swelling.  No palpable nodules    DATA:    Old records have been reviewed  CBC with Differential:    Lab Results   Component Value Date    WBC 11.2 12/31/2021    RBC 4.72 12/31/2021    HGB 14.6 12/31/2021    HCT 46.4 12/31/2021     12/31/2021    MCV 98.3 12/31/2021    MCH 30.9 12/31/2021    MCHC 31.5 12/31/2021    RDW 13.8 12/31/2021    SEGSPCT 68.0 12/31/2021    BANDSPCT 19 12/31/2021    LYMPHOPCT 7.0 12/31/2021    MONOPCT 6.0 12/31/2021    EOSPCT 0.0 10/24/2011    BASOPCT 0.2 12/28/2021    MONOSABS 0.7 12/31/2021    LYMPHSABS 0.8 12/31/2021    EOSABS 0.1 12/28/2021    BASOSABS 0.0 12/28/2021    DIFFTYPE MANUAL DIFFERENTIAL 12/31/2021     BMP:    Lab Results   Component Value Date     12/31/2021    K 3.9 12/31/2021    CL 99 12/31/2021    CO2 28 12/31/2021    BUN 19 12/31/2021    CREATININE 0.6 12/31/2021    CALCIUM 9.0 12/31/2021    GFRAA >60 12/31/2021    LABGLOM >60 12/31/2021    GLUCOSE 114 12/31/2021     Hepatic Function Panel:    Lab Results   Component Value Date    ALKPHOS 79 12/31/2021    ALT 21 12/31/2021    AST 34 12/31/2021    PROT 6.9 12/31/2021    PROT 6.9 10/21/2011    BILITOT 0.3 12/31/2021     ABG:  No results found for: QHV5LPZ, BEART, S0UNYSMU, PHART, THGBART, HUF4VBN, PO2ART, SSC7HJR    Cultures:   Pending    Radiology Review:    Development of interstitial and airspace subtle opacities noted throughout   both lungs.  Differential includes atypical pneumonia such as COVID-19   pneumonia, other viral pneumonias as well as interstitial pulmonary edema.       Findings of COPD and emphysema.       Small left effusion. Assessment/Plan       Patient Active Problem List    Diagnosis Date Noted    Pneumonia due to influenza A virus 12/31/2021    Shortness of breath 11/22/2021    Former smoker 11/22/2021    COVID-19 virus detected 01/18/2021    History of DVT of lower extremity 12/04/2020    History of pulmonary embolism 12/04/2020    Gastroesophageal reflux disease     Pure hypercholesterolemia 12/02/2019    Acquired hypothyroidism 08/01/2019    Lung infiltrate on CT 01/22/2019    Chronic hypoxemic respiratory failure ([address] 02/06/2018    COPD with exacerbation ([address] 11/15/2017    Leukocytosis 11/15/2017    Pulmonary nodule 07/05/2016    TIA (transient ischemic attack) 06/24/2015    Vertigo, central 06/24/2015     Overview Note:     Possible        Anticoagulant long-term use 06/24/2015    Arthritis 02/19/2014    Coronary atherosclerosis 02/19/2014    COPD, very severe ([address] 06/28/2013    Essential hypertension 06/28/2013    Phlebitis 06/27/2013     Acute Hypoxic resp failure  Parainfluenza pneumonia  AECOPD  Overweight    PLAN  1. Levaquin for atypicals  2. F/u C&S  3. D/c Tamiflu as she has Parainfluenza and not Influenza  4. Inhalers  5. Solumedrol  6. BIPAP qhs and prn  7. Keep sats > 92%  8. DVT and GI Prophylaxis  9. CXR in am  10.  C/w present management      Electronically signed by Pepe Solorzano MD on 12/31/2021 at 12:23 PM

## 2021-12-31 NOTE — ED NOTES
Report called to mayda DELGADO on [facility] [place]  12/31/21 [id number]

## 2021-12-31 NOTE — ED PROVIDER NOTES
EMERGENCY DEPARTMENT ENCOUNTER      CHIEF COMPLAINT:   Shortness of breath    CRITICAL CARE NOTE:  There was a high probability of clinically significant life-threatening deterioration of the patient's condition requiring my urgent intervention. Total critical care time is 45 minutes  This includes vital sign monitoring, pulse oximetry monitoring, telemetry monitoring, clinical response to the IV medications, reviewing the nursing notes, consultation time, dictation/documetation time. (This time excludes time spent performing procedures). HPI: Noemí Thomason is a 68 y.o. female with a past medical history of COPD who presents to the Emergency Department, via EMS, for evaluation of severe shortness of breath. The patient indicates that the symptoms started yesterday and became worse overnight. It is been constant and is associated with a nonproductive cough. Her chest is sore from coughing. There are no exacerbating or relieving factors. The patient shakes her head no when asked if she smokes tobacco. There is no documented history history of DVT or PE in the chart. The patient denies leg pain or leg swelling. No further information is able to be obtained as the patient is struggling to breathe and cannot answer many questions. REVIEW OF SYSTEMS:  \"Remaining review of systems unable to obtain as the patient is in severe respiratory distress and unable to answer many questions. I have reviewed the nursing triage documentation and agree unless otherwise noted below. \"      PAST MEDICAL HISTORY:   Past Medical History:   Diagnosis Date    Anticoagulant long-term use     Arthritis     Cancer [facility]     Chronic hypoxemic respiratory failure ([facility] 2/6/2018    COPD (chronic obstructive pulmonary disease) (HCC)     Coronary atherosclerosis     COVID-19 virus detected 1/18/2021    Former smoker 11/22/2021    Gastroesophageal reflux disease     Hiatal hernia     Lung infiltrate on CT 1/22/2019    On home oxygen therapy     on     Osteopenia     Phlebitis     Pulmonary nodule 2016    S/P left heart catheterization by percutaneous approach 2013    Sepsis due to pneumonia ([facility] 2017    Shortness of breath 2019    Shortness of breath 2021    Shortness of breath 2021    Wears glasses        CURRENT MEDICATIONS:   Home medications reviewed.     SURGICAL HISTORY:   Past Surgical History:   Procedure Laterality Date   Ashlee Abebe    COLONOSCOPY      ENDOSCOPY, COLON, DIAGNOSTIC      TONSILLECTOMY      TUBAL LIGATION      VEIN SURGERY         FAMILY HISTORY:   Family History   Problem Relation Age of Onset    Heart Disease Mother     Cancer Father         lung ca    Heart Disease Father     COPD Father     Cancer Sister     Heart Disease Sister        SOCIAL HISTORY:   Social History     Socioeconomic History    Marital status:      Spouse name: Not on file    Number of children: Not on file    Years of education: Not on file    Highest education level: Not on file   Occupational History    Not on file   Tobacco Use    Smoking status: Former Smoker     Packs/day: 1.50     Years: 26.00     Pack years: 39.00     Types: Cigarettes     Start date:      Quit date: 1991     Years since quittin.1    Smokeless tobacco: Never Used   Vaping Use    Vaping Use: Never used   Substance and Sexual Activity    Alcohol use: No    Drug use: No    Sexual activity: Not Currently     Partners: Male   Other Topics Concern    Not on file   Social History Narrative    Not on file     Social Determinants of Health     Financial Resource Strain: Low Risk     Difficulty of Paying Living Expenses: Not hard at all   Food Insecurity: No Food Insecurity    Worried About Running Out of Food in the Last Year: Never true    Henry of Food in the Last Year: Never true   Transportation Needs:     Lack of Transportation (Medical): Not on file    Lack of Transportation (Non-Medical): Not on file   Physical Activity:     Days of Exercise per Week: Not on file    Minutes of Exercise per Session: Not on file   Stress:     Feeling of Stress : Not on file   Social Connections:     Frequency of Communication with Friends and Family: Not on file    Frequency of Social Gatherings with Friends and Family: Not on file    Attends Catholic Services: Not on file    Active Member of [address] [place] [address] [employer] [place] or Organizations: Not on file    Attends Club or Organization Meetings: Not on file    Marital Status: Not on file   Intimate Partner Violence:     Fear of Current or Ex-Partner: Not on file    Emotionally Abused: Not on file    Physically Abused: Not on file    Sexually Abused: Not on file   Housing Stability:     Unable to Pay for Housing in the Last Year: Not on file    Number of [place] in the Last Year: Not on file    Unstable Housing in the Last Year: Not on file       ALLERGIES: Codeine, Darvon [propoxyphene hcl], Lamisil [terbinafine hcl], Morphine, Neosporin [neomycin-polymyxin-gramicidin], and Pcn [penicillins]    PHYSICAL EXAM:  VITAL SIGNS:   ED Triage Vitals [12/31/21 0850]   Enc Vitals Group      BP (!) 179/131      Pulse 153      Resp (!) 31      Temp 98.1 °F (36.7 °C)      Temp Source Oral      SpO2 99 %      Weight 160 lb (72.6 kg)      Height 5' 4\" (1.626 m)      Head Circumference       Peak Flow       Pain Score       Pain Loc       Pain Edu? Excl. in [address]? Constitutional: Ill-appearing, severe respiratory distress  HENT: Normocephalic, Atraumatic  Eyes:  PERRL, Conjunctiva normal, No discharge. Neck: Normal range of motion, No tenderness, Supple, No stridor, No lymphadenopathy. Cardiovascular:  Tachycardic, Irregular rhythm  Pulmonary/Chest: Severe respiratory distress with diffuse wheezing throughout, tachypneic, dyspneic  Abdomen:   Bowel sounds normal, Soft, No tenderness, No masses, No pulsatile masses  Extremities:  Normal range of motion, Intact distal pulses, No edema, No tenderness  Skin:  Warm, Dry, No erythema, No rash    EKG Interpretation #1 (09:03 am)  Interpreted by me  Compared to 12/28/21  Rhythm: sinus tachycardia with significant artifact  Rate: tachycardic 104  Axis: left  Ectopy: frequent PVCs and PACs  Conduction: normal  ST Segments: no acute change although difficult to interpret due to artifac  T Waves: no acute change  Clinical Impression: sinus tachycardia with significant artifact, frequent PVCs and PACs, LAD    EKG Interpretation #2 (12:08 pm)  Interpreted by me  Compared to prior EKG from today  Rhythm: sinus tachycardia   Rate: tachycardic 106  Axis: left  Ectopy: frequent PVCs and PACs  Conduction: normal  ST Segments: no acute change   T Waves: no acute change  Clinical Impression: sinus tachycardia, frequent PVCs and PACs, LAD    Cardiac Monitor Strip Interpretation  Interpreted by me  Monitor strip interpreted for greater than 10 seconds  Rhythm: sinus tachycardia  Rate: tachycardic  Ectopy: frequent PVCs and PACs  ST Segments: normal      Radiology / Procedures:  XR CHEST PORTABLE (Preliminary result)  Result time 12/31/21 09:37:13  Preliminary result by Yisel Garcia MD (12/31/21 09:37:13)                Impression:    Development of interstitial and airspace subtle opacities noted throughout   both lungs.  Differential includes atypical pneumonia such as COVID-19   pneumonia, other viral pneumonias as well as interstitial pulmonary edema. Findings of COPD and emphysema. Small left effusion.              Narrative:    EXAMINATION:   ONE XRAY VIEW OF THE CHEST     12/31/2021 9:15 am     COMPARISON:   12/28/2021, 09/13/2021, 11/05/2021     HISTORY:   ORDERING SYSTEM PROVIDED HISTORY: shortness of breath   TECHNOLOGIST PROVIDED HISTORY:   Reason for exam:->shortness of breath   Reason for Exam: shortness of breath, COVID check     FINDINGS:   Scattered calcified granulomata noted in both lungs.  There is biapical   pleuroparenchymal scarring. David Leeks is attenuation of the pulmonary   vasculature suggesting emphysematous changes.  There is hyperinflation of the   lungs with flattening of the diaphragms suggesting COPD. Compared to chest x-ray from September and November there are increased   subtle interstitial and alveolar opacities noted throughout both lungs.  This   is nonspecific but can be seen with atypical pneumonia such as COVID   pneumonia. Woodman Leeks is also a small left pleural effusion.  Cardiomediastinal   silhouette and bony thorax are unchanged.                 Preliminary result by Claudine Luque MD (12/31/21 09:21:21)                Impression:    Development of interstitial and airspace subtle opacities noted throughout   both lungs.  Differential includes atypical pneumonia such as COVID-19   pneumonia, other viral pneumonias as well as interstitial pulmonary edema. Findings of COPD and emphysema. Small left effusion.                Labs Reviewed   RAPID INFLUENZA A/B ANTIGENS - Abnormal; Notable for the following components:       Result Value    Rapid Influenza A Ag POSITIVE (*)     All other components within normal limits   CBC WITH AUTO DIFFERENTIAL - Abnormal; Notable for the following components:    WBC 11.2 (*)     MCHC 31.5 (*)     Bands Relative 19 (*)     Segs Relative 68.0 (*)     Lymphocytes % 7.0 (*)     Monocytes % 6.0 (*)     All other components within normal limits   COMPREHENSIVE METABOLIC PANEL - Abnormal; Notable for the following components:    Glucose 114 (*)     All other components within normal limits   BRAIN NATRIURETIC PEPTIDE - Abnormal; Notable for the following components:    Pro-.9 (*)     All other components within normal limits   PROTIME/INR & PTT - Abnormal; Notable for the following components:    Protime 49.8 (*)     aPTT 58.3 (*)     All other components within normal limits   THEOPHYLLINE LEVEL - Abnormal; Notable for the following components: Theophylline Lvl 6.1 (*)     All other components within normal limits   COVID-19, RAPID   TROPONIN   TSH WITHOUT REFLEX   BLOOD GAS, VENOUS       ED COURSE & MEDICAL DECISION MAKING:  Pertinent Labs & Imaging studies reviewed. (See chart for details)  On exam, the patient is afebrile, but is ill-appearing. She is in severe respiratory distress. She is hypertensive but is asymptomatic. She is tachycardic in the 150s and tachypneic. Respiratory was called to the bedside and placed the patient on BiPAP. EKG shows sinus tachycardia with significant artifact and ectopy with no ST elevation or depression (the EKG machine read this is A. fib with RVR but I disagree). Labs are obtained and the patient has mild leukocytosis. She is positive for influenza. Venous blood gas showed a pH of 7.24 with a PCO2 of 71 and a PO2 of 69. For some reason, these results did not crossover into the lab results. There are no other clinically significant lab abnormalities. Chest x-ray was obtained and shows interstitial airspace opacities throughout both lungs. Differential includes atypical pneumonia such as COVID-19 pneumonia or other bilateral pneumonia as well as interstitial pulmonary edema. The patient was treated with IV magnesium, IV Solu-Medrol and breathing treatments as well as with BiPAP. Her respiratory distress significantly improved. Repeat EKG showed normal sinus rhythm with ectopy. I suspect that the patient has acute respiratory failure with hypoxia and hypercapnia secondary to influenza. I have a low suspicion for PTX, PE, STEMI, flash pulmonary edema, impending respiratory failure or sepsis. I recommended admission to the hospital and the patient was agreeable. I discussed the case with the hospitalist who will admit the patient for further treatment and care. The patient is currently in stable condition awaiting admission. Clinical Impression:  1. Acute respiratory failure with hypoxia and hypercapnia (HCC)    2. Influenza    3. COPD exacerbation ([facility]        Comment: Please note this report has been produced using speech recognition software and may contain errors related to that system including errors in grammar, punctuation, and spelling, as well as words and phrases that may be inappropriate. If there are any questions or concerns please feel free to contact the dictating provider for clarification.         Robert Dominique MD  12/31/21 1344

## 2022-01-01 ENCOUNTER — APPOINTMENT (OUTPATIENT)
Dept: GENERAL RADIOLOGY | Age: 77
DRG: 871 | End: 2022-01-01
Payer: MEDICARE

## 2022-01-01 LAB
ALBUMIN SERPL-MCNC: 3.1 GM/DL (ref 3.4–5)
ALP BLD-CCNC: 65 IU/L (ref 40–128)
ALT SERPL-CCNC: 20 U/L (ref 10–40)
ANION GAP SERPL CALCULATED.3IONS-SCNC: 7 MMOL/L (ref 4–16)
APTT: 71.4 SECONDS (ref 25.1–37.1)
AST SERPL-CCNC: 31 IU/L (ref 15–37)
BASOPHILS ABSOLUTE: 0 K/CU MM
BASOPHILS RELATIVE PERCENT: 0.1 % (ref 0–1)
BILIRUB SERPL-MCNC: 0.2 MG/DL (ref 0–1)
BUN BLDV-MCNC: 20 MG/DL (ref 6–23)
CALCIUM SERPL-MCNC: 8 MG/DL (ref 8.3–10.6)
CHLORIDE BLD-SCNC: 101 MMOL/L (ref 99–110)
CO2: 32 MMOL/L (ref 21–32)
CREAT SERPL-MCNC: 0.5 MG/DL (ref 0.6–1.1)
DIFFERENTIAL TYPE: ABNORMAL
EOSINOPHILS ABSOLUTE: 0 K/CU MM
EOSINOPHILS RELATIVE PERCENT: 0 % (ref 0–3)
GFR AFRICAN AMERICAN: >60 ML/MIN/1.73M2
GFR NON-AFRICAN AMERICAN: >60 ML/MIN/1.73M2
GLUCOSE BLD-MCNC: 116 MG/DL (ref 70–99)
HCT VFR BLD CALC: 39.5 % (ref 37–47)
HEMOGLOBIN: 12.3 GM/DL (ref 12.5–16)
IMMATURE NEUTROPHIL %: 0.4 % (ref 0–0.43)
INR BLD: 5.15 INDEX
LYMPHOCYTES ABSOLUTE: 0.2 K/CU MM
LYMPHOCYTES RELATIVE PERCENT: 2.5 % (ref 24–44)
MAGNESIUM: 2.4 MG/DL (ref 1.8–2.4)
MCH RBC QN AUTO: 31 PG (ref 27–31)
MCHC RBC AUTO-ENTMCNC: 31.1 % (ref 32–36)
MCV RBC AUTO: 99.5 FL (ref 78–100)
MONOCYTES ABSOLUTE: 0.4 K/CU MM
MONOCYTES RELATIVE PERCENT: 4.3 % (ref 0–4)
NUCLEATED RBC %: 0 %
PDW BLD-RTO: 13.7 % (ref 11.7–14.9)
PHOSPHORUS: 1.7 MG/DL (ref 2.5–4.9)
PLATELET # BLD: 195 K/CU MM (ref 140–440)
PMV BLD AUTO: 10.5 FL (ref 7.5–11.1)
POTASSIUM SERPL-SCNC: 4.2 MMOL/L (ref 3.5–5.1)
PROTHROMBIN TIME: 67.6 SECONDS (ref 11.7–14.5)
RBC # BLD: 3.97 M/CU MM (ref 4.2–5.4)
SEGMENTED NEUTROPHILS ABSOLUTE COUNT: 8.4 K/CU MM
SEGMENTED NEUTROPHILS RELATIVE PERCENT: 92.7 % (ref 36–66)
SODIUM BLD-SCNC: 140 MMOL/L (ref 135–145)
TOTAL IMMATURE NEUTOROPHIL: 0.04 K/CU MM
TOTAL NUCLEATED RBC: 0 K/CU MM
TOTAL PROTEIN: 5.1 GM/DL (ref 6.4–8.2)
WBC # BLD: 9.1 K/CU MM (ref 4–10.5)

## 2022-01-01 PROCEDURE — 36415 COLL VENOUS BLD VENIPUNCTURE: CPT

## 2022-01-01 PROCEDURE — 6360000002 HC RX W HCPCS: Performed by: INTERNAL MEDICINE

## 2022-01-01 PROCEDURE — 6370000000 HC RX 637 (ALT 250 FOR IP): Performed by: INTERNAL MEDICINE

## 2022-01-01 PROCEDURE — 84100 ASSAY OF PHOSPHORUS: CPT

## 2022-01-01 PROCEDURE — 2580000003 HC RX 258: Performed by: INTERNAL MEDICINE

## 2022-01-01 PROCEDURE — 94761 N-INVAS EAR/PLS OXIMETRY MLT: CPT

## 2022-01-01 PROCEDURE — 71045 X-RAY EXAM CHEST 1 VIEW: CPT

## 2022-01-01 PROCEDURE — 99232 SBSQ HOSP IP/OBS MODERATE 35: CPT | Performed by: INTERNAL MEDICINE

## 2022-01-01 PROCEDURE — 94640 AIRWAY INHALATION TREATMENT: CPT

## 2022-01-01 PROCEDURE — 83735 ASSAY OF MAGNESIUM: CPT

## 2022-01-01 PROCEDURE — 2140000000 HC CCU INTERMEDIATE R&B

## 2022-01-01 PROCEDURE — 85025 COMPLETE CBC W/AUTO DIFF WBC: CPT

## 2022-01-01 PROCEDURE — 80053 COMPREHEN METABOLIC PANEL: CPT

## 2022-01-01 PROCEDURE — 94660 CPAP INITIATION&MGMT: CPT

## 2022-01-01 PROCEDURE — 2700000000 HC OXYGEN THERAPY PER DAY

## 2022-01-01 PROCEDURE — 85610 PROTHROMBIN TIME: CPT

## 2022-01-01 PROCEDURE — 85730 THROMBOPLASTIN TIME PARTIAL: CPT

## 2022-01-01 RX ORDER — ALBUTEROL SULFATE 90 UG/1
2 AEROSOL, METERED RESPIRATORY (INHALATION) 4 TIMES DAILY
Status: DISCONTINUED | OUTPATIENT
Start: 2022-01-01 | End: 2022-01-04 | Stop reason: HOSPADM

## 2022-01-01 RX ORDER — IPRATROPIUM BROMIDE AND ALBUTEROL SULFATE 2.5; .5 MG/3ML; MG/3ML
1 SOLUTION RESPIRATORY (INHALATION) EVERY 4 HOURS PRN
Status: DISCONTINUED | OUTPATIENT
Start: 2022-01-01 | End: 2022-01-04 | Stop reason: HOSPADM

## 2022-01-01 RX ORDER — ALBUTEROL SULFATE 90 UG/1
2 AEROSOL, METERED RESPIRATORY (INHALATION) EVERY 4 HOURS PRN
Status: DISCONTINUED | OUTPATIENT
Start: 2022-01-01 | End: 2022-01-01

## 2022-01-01 RX ORDER — ALBUTEROL SULFATE 90 UG/1
2 AEROSOL, METERED RESPIRATORY (INHALATION) 4 TIMES DAILY
Status: DISCONTINUED | OUTPATIENT
Start: 2022-01-01 | End: 2022-01-01

## 2022-01-01 RX ORDER — ALBUTEROL SULFATE 90 UG/1
2 AEROSOL, METERED RESPIRATORY (INHALATION) EVERY 4 HOURS PRN
Status: DISCONTINUED | OUTPATIENT
Start: 2022-01-01 | End: 2022-01-04 | Stop reason: HOSPADM

## 2022-01-01 RX ADMIN — FUROSEMIDE 40 MG: 20 TABLET ORAL at 09:44

## 2022-01-01 RX ADMIN — ALBUTEROL SULFATE 2.5 MG: 2.5 SOLUTION RESPIRATORY (INHALATION) at 05:41

## 2022-01-01 RX ADMIN — THEOPHYLLINE ANHYDROUS 200 MG: 200 CAPSULE, EXTENDED RELEASE ORAL at 21:49

## 2022-01-01 RX ADMIN — Medication 2 PUFF: at 02:48

## 2022-01-01 RX ADMIN — Medication 2 PUFF: at 19:49

## 2022-01-01 RX ADMIN — ACETAMINOPHEN 650 MG: 325 TABLET ORAL at 04:40

## 2022-01-01 RX ADMIN — Medication 1 CAPSULE: at 09:51

## 2022-01-01 RX ADMIN — ALBUTEROL SULFATE 2 PUFF: 90 AEROSOL, METERED RESPIRATORY (INHALATION) at 08:26

## 2022-01-01 RX ADMIN — ISOSORBIDE MONONITRATE 60 MG: 60 TABLET, EXTENDED RELEASE ORAL at 09:51

## 2022-01-01 RX ADMIN — LEVOTHYROXINE SODIUM 50 MCG: 0.05 TABLET ORAL at 09:44

## 2022-01-01 RX ADMIN — Medication 2 PUFF: at 12:06

## 2022-01-01 RX ADMIN — ALBUTEROL SULFATE 2 PUFF: 90 AEROSOL, METERED RESPIRATORY (INHALATION) at 12:06

## 2022-01-01 RX ADMIN — PANTOPRAZOLE SODIUM 40 MG: 40 TABLET, DELAYED RELEASE ORAL at 10:00

## 2022-01-01 RX ADMIN — ROFLUMILAST 500 MCG: 500 TABLET ORAL at 09:51

## 2022-01-01 RX ADMIN — METHYLPREDNISOLONE SODIUM SUCCINATE 40 MG: 40 INJECTION, POWDER, LYOPHILIZED, FOR SOLUTION INTRAMUSCULAR; INTRAVENOUS at 09:44

## 2022-01-01 RX ADMIN — THEOPHYLLINE ANHYDROUS 200 MG: 200 CAPSULE, EXTENDED RELEASE ORAL at 09:44

## 2022-01-01 RX ADMIN — GABAPENTIN 400 MG: 400 CAPSULE ORAL at 18:47

## 2022-01-01 RX ADMIN — Medication 2 PUFF: at 08:27

## 2022-01-01 RX ADMIN — LEVOFLOXACIN 500 MG: 5 INJECTION, SOLUTION INTRAVENOUS at 04:38

## 2022-01-01 RX ADMIN — SODIUM CHLORIDE, PRESERVATIVE FREE 10 ML: 5 INJECTION INTRAVENOUS at 04:35

## 2022-01-01 RX ADMIN — METHYLPREDNISOLONE SODIUM SUCCINATE 40 MG: 40 INJECTION, POWDER, LYOPHILIZED, FOR SOLUTION INTRAMUSCULAR; INTRAVENOUS at 14:56

## 2022-01-01 RX ADMIN — SODIUM CHLORIDE, PRESERVATIVE FREE 10 ML: 5 INJECTION INTRAVENOUS at 21:44

## 2022-01-01 RX ADMIN — METHYLPREDNISOLONE SODIUM SUCCINATE 40 MG: 40 INJECTION, POWDER, LYOPHILIZED, FOR SOLUTION INTRAMUSCULAR; INTRAVENOUS at 21:45

## 2022-01-01 RX ADMIN — LEVOFLOXACIN 500 MG: 5 INJECTION, SOLUTION INTRAVENOUS at 21:48

## 2022-01-01 RX ADMIN — DICYCLOMINE HYDROCHLORIDE 10 MG: 10 CAPSULE ORAL at 09:44

## 2022-01-01 RX ADMIN — ALBUTEROL SULFATE 2 PUFF: 90 AEROSOL, METERED RESPIRATORY (INHALATION) at 16:33

## 2022-01-01 RX ADMIN — DICYCLOMINE HYDROCHLORIDE 10 MG: 10 CAPSULE ORAL at 21:49

## 2022-01-01 RX ADMIN — MONTELUKAST 10 MG: 10 TABLET, FILM COATED ORAL at 21:49

## 2022-01-01 RX ADMIN — ALBUTEROL SULFATE 2 PUFF: 90 AEROSOL, METERED RESPIRATORY (INHALATION) at 14:59

## 2022-01-01 RX ADMIN — ALBUTEROL SULFATE 2 PUFF: 90 AEROSOL, METERED RESPIRATORY (INHALATION) at 02:48

## 2022-01-01 RX ADMIN — ROSUVASTATIN CALCIUM 20 MG: 5 TABLET, COATED ORAL at 21:49

## 2022-01-01 RX ADMIN — ALBUTEROL SULFATE 2 PUFF: 90 AEROSOL, METERED RESPIRATORY (INHALATION) at 19:49

## 2022-01-01 RX ADMIN — Medication 2 PUFF: at 16:29

## 2022-01-01 RX ADMIN — METHYLPREDNISOLONE SODIUM SUCCINATE 40 MG: 40 INJECTION, POWDER, LYOPHILIZED, FOR SOLUTION INTRAMUSCULAR; INTRAVENOUS at 04:35

## 2022-01-01 ASSESSMENT — PAIN SCALES - GENERAL
PAINLEVEL_OUTOF10: 4
PAINLEVEL_OUTOF10: 0

## 2022-01-01 NOTE — PROGRESS NOTES
Daily Progress Note  Awake and alert; feeling fair  Still slightly SOB  Now only on 4L NC  NSR on tele, HR 90's  BP stable  Will cont' to follow    Afib with RVR    Converted to NSR    On coumadin for AC    HR NSR with PAC's, will cont' to monitor    HR slightly elevated 90's-100's likely d/t respiratory distress    Denies any palpitations    Will get echo    Influenza A    Positive PCR in ER    On tamiflu     On ATB, high risk for bacteria PNA    Treat per primary    COPDE    Likely related to influenza    Wears 2L NC chronically    On IV steroids,     Breathing treatments    Treatment per primary    Hx of DVT;s    On coumadin    INR elevated; hold coumadin at this time     CXR 1/1/2021  Impression   Biapical pleuroparenchymal scarring with chronic interstitial changes noted   throughout both lungs.  Stable exam without acute process. PAST MEDICAL HISTORY:  She has a previous medical history of arthritis,  COPD, _____ arthrosis, former smoker, GERD, hiatal hernia, home O2 use,  osteopenia, pulmonary nodules, and shortness of breath.     PAST SURGICAL HISTORY:  She has a previous surgical history of  appendectomy, bronchoscopy, colonoscopy, endoscopy, tonsillectomy, and  vein surgery.     FAMILY HISTORY:  The patient's mother, father, and sister all have heart  disease.     SOCIAL HISTORY:  She is a former smoker, used to smoke a pack-and-a-half  a day. She does not use any alcohol. No illicit drug use.     ALLERGIES:  She has allergies to CODEINE, DARVON, MORPHINE, NEOSPORIN,  PENICILLIN, and LAMISIL.   Objective:   BP (!) 158/86   Pulse 104   Temp 97.7 °F (36.5 °C) (Oral)   Resp 19   Ht 5' 4\" (1.626 m)   Wt 158 lb (71.7 kg)   SpO2 99%   BMI 27.12 kg/m²     Intake/Output Summary (Last 24 hours) at 1/1/2022 0944  Last data filed at 1/1/2022 4480  Gross per 24 hour   Intake 257.13 ml   Output 400 ml   Net -142.87 ml       Medications:   Scheduled Meds:   ipratropium  2 puff Inhalation 4x daily    b complex vitamins  1 capsule Oral Daily    Roflumilast  500 mcg Oral Daily    dicyclomine  10 mg Oral BID    pantoprazole  40 mg Oral QAM AC    furosemide  40 mg Oral Daily    gabapentin  400 mg Oral QPM    isosorbide mononitrate  60 mg Oral Daily    levothyroxine  50 mcg Oral Daily    montelukast  10 mg Oral Nightly    rosuvastatin  20 mg Oral Nightly    theophylline  200 mg Oral BID    sodium chloride flush  5-40 mL IntraVENous 2 times per day    methylPREDNISolone  40 mg IntraVENous Q6H    Followed by   Jada Gallegos ON 1/3/2022] predniSONE  40 mg Oral Daily    oseltamivir  75 mg Oral BID    warfarin (COUMADIN) daily dosing (placeholder)   Other See Admin Instructions    levofloxacin  500 mg IntraVENous Q24H      Infusions:   sodium chloride        PRN Meds:  ipratropium-albuterol, albuterol sulfate HFA, sodium chloride flush, sodium chloride, ondansetron **OR** ondansetron, polyethylene glycol, acetaminophen **OR** acetaminophen, albuterol       Physical Exam:  Vitals:    01/01/22 0830   BP:    Pulse:    Resp:    Temp: 97.7 °F (36.5 °C)   SpO2:         General: AAO, NAD  Chest: Nontender  Cardiac: First and Second Heart Sounds are Normal, No Murmurs or Gallops noted  Lungs: wheezes throughout lung fields, Crackles in left base. Abdomen: Soft, NT, ND, +BS  Extremities: No clubbing, no edema  Vascular:  Equal 2+ peripheral pulses.         Lab Data:  CBC:   Recent Labs     12/31/21  0856 01/01/22  0122   WBC 11.2* 9.1   HGB 14.6 12.3*   HCT 46.4 39.5   MCV 98.3 99.5    195     BMP:   Recent Labs     12/31/21  0856 01/01/22  0122    140   K 3.9 4.2   CL 99 101   CO2 28 32   PHOS  --  1.7*   BUN 19 20   CREATININE 0.6 0.5*     LIVER PROFILE:   Recent Labs     12/31/21  0856 01/01/22  0122   AST 34 31   ALT 21 20   BILITOT 0.3 0.2   ALKPHOS 79 65     PT/INR:   Recent Labs     12/31/21  1140 01/01/22  0122   PROTIME 49.8* 67.6*   INR 3.81 5.15*     APTT:   Recent Labs     12/31/21  1140 01/01/22  0122   APTT 58.3* 71.4*     BNP:  No results for input(s): BNP in the last 72 hours.       Assessment:  Patient Active Problem List    Diagnosis Date Noted    Pneumonia due to influenza A virus 12/31/2021    Acute respiratory failure with hypoxia and hypercapnia (HCC)     Shortness of breath 11/22/2021    Former smoker 11/22/2021    COVID-19 virus detected 01/18/2021    History of DVT of lower extremity 12/04/2020    History of pulmonary embolism 12/04/2020    Gastroesophageal reflux disease     Pure hypercholesterolemia 12/02/2019    Acquired hypothyroidism 08/01/2019    Lung infiltrate on CT 01/22/2019    Chronic hypoxemic respiratory failure ([address] 02/06/2018    COPD with exacerbation ([address] 11/15/2017    Leukocytosis 11/15/2017    Pulmonary nodule 07/05/2016    TIA (transient ischemic attack) 06/24/2015    Vertigo, central 06/24/2015    Anticoagulant long-term use 06/24/2015    Arthritis 02/19/2014    Coronary atherosclerosis 02/19/2014    COPD, very severe ([address] 06/28/2013    Essential hypertension 06/28/2013    Phlebitis 06/27/2013       Electronically signed by IMANI Savage CNP on 1/1/2022 at 9:44 AM

## 2022-01-01 NOTE — PROGRESS NOTES
Hospitalist Progress Note      Name:  Niya Luque /Age/Sex: 1945  (68 y.o. female)   MRN & CSN:  [id number] & [id number] Admission Date/Time: 2021  8:46 AM   Location:  3118/3118-A PCP: Patrice Morrow MD       Hospital Day: 2    Assessment and Plan:       Influenza A pneumonia. -Oseltamivir from  until     COPD exacerbation.  --on methylprednisolone 40 mg IV every 6 hours-to end on -and prednisone to start on January 3    Acute respiratory failure with hypoxia. Likely secondary to #1 and #2. Continue supplemental oxygen with plan to wean as tolerated. Monitor pulse oximeter closely. Atrial fibrillation with rapid ventricular response. Patient has underlying history of atrial fibrillation.    --in sinus rhythm  -Cardizem drip ordered, but never started -as of  heart rate is about 100. Not on rate controlling drugs currently.  -On anticoagulation with Coumadin   -INR is 5 on -pharmacy managing it  -Cardiology consult appreciated     Anticoagulation-he also had a DVT    Chronic respiratory failure on 2 L oxygen at home  History of COVID-19 pneumonia in 2021  Hiatal hernia  GERD    Subjective:     She was brought to ED by EMS  Her daughter is said that she called the EMS    Her daughter in the room indicated that the patient was on BiPAP yesterday from 8 AM to 11 AM when she was in the room and that she was in public last night    When I saw her she was on 4 L oxygen      History    Home oxygen: 2 L    Pulmonologist: Dr. Darlyn Pina    Family: She lives with her     Daughter: Bruno Stark - [phone] -I spoke with her in the room today. Objective:      Intake/Output Summary (Last 24 hours) at 2022 1713  Last data filed at 2022 1649  Gross per 24 hour   Intake 257.13 ml   Output 1200 ml   Net -942.87 ml      Vitals:   Vitals:    22 1631   BP:    Pulse:    Resp: 27   Temp:    SpO2: 97%         Labs: CBC:   Lab Results   Component Value Date    WBC 9.1 01/01/2022    HGB 12.3 01/01/2022    HCT 39.5 01/01/2022    MCV 99.5 01/01/2022     01/01/2022     BMP:   Lab Results   Component Value Date     01/01/2022    K 4.2 01/01/2022     01/01/2022    CO2 32 01/01/2022    PHOS 1.7 01/01/2022    BUN 20 01/01/2022    CREATININE 0.5 01/01/2022    CALCIUM 8.0 01/01/2022       Physical Exam:       Physical Exam  Constitutional:       Appearance: She is ill-appearing. Pulmonary:      Effort: Pulmonary effort is normal.      Breath sounds: Wheezing present. Skin:     Coloration: Skin is not jaundiced. Neurological:      Mental Status: She is alert. Cranial Nerves: No cranial nerve deficit.        Medications:   Medications:    ipratropium  2 puff Inhalation 4x daily    albuterol sulfate HFA  2 puff Inhalation 4x daily    b complex vitamins  1 capsule Oral Daily    Roflumilast  500 mcg Oral Daily    dicyclomine  10 mg Oral BID    pantoprazole  40 mg Oral QAM AC    furosemide  40 mg Oral Daily    gabapentin  400 mg Oral QPM    isosorbide mononitrate  60 mg Oral Daily    levothyroxine  50 mcg Oral Daily    montelukast  10 mg Oral Nightly    rosuvastatin  20 mg Oral Nightly    theophylline  200 mg Oral BID    sodium chloride flush  5-40 mL IntraVENous 2 times per day    methylPREDNISolone  40 mg IntraVENous Q6H    Followed by   Manfred Hayward ON 1/3/2022] predniSONE  40 mg Oral Daily    oseltamivir  75 mg Oral BID    warfarin (COUMADIN) daily dosing (placeholder)   Other See Admin Instructions    levofloxacin  500 mg IntraVENous Q24H      Infusions:    sodium chloride       PRN Meds: ipratropium-albuterol, 1 ampule, Q4H PRN  albuterol sulfate HFA, 2 puff, Q4H PRN  sodium chloride flush, 5-40 mL, PRN  sodium chloride, 25 mL, PRN  ondansetron, 4 mg, Q8H PRN   Or  ondansetron, 4 mg, Q6H PRN  polyethylene glycol, 17 g, Daily PRN  acetaminophen, 650 mg, Q6H PRN   Or  acetaminophen, 650 mg, Q6H PRN          Electronically signed by Victor M Fletcher MD on 1/1/2022 at 5:13 PM

## 2022-01-01 NOTE — PROGRESS NOTES
Results for Yojana Miramontes (MRN [id number]) as of 1/1/2022 04:54   Ref.  Range 1/1/2022 01:22   INR Latest Units: INDEX 5.15 ()     Will contact primary Service on-Call to inform

## 2022-01-01 NOTE — PROGRESS NOTES
Pulmonary and Critical Care  Progress Note      VITALS:  BP (!) 158/86   Pulse 104   Temp 97.7 °F (36.5 °C) (Oral)   Resp 17   Ht 5' 4\" (1.626 m)   Wt 158 lb (71.7 kg)   SpO2 96%   BMI 27.12 kg/m²     Subjective:   CHIEF COMPLAINT :SOB     HPI:                The patient is lying in the bed. She is in mild resp distress    Objective:   PHYSICAL EXAM:    LUNGS:Occasional basal crackles  Abd-soft, BS+,NT  Ext- no pedal edema  CVS-s1s2, no murmurs      DATA:    CBC:  Recent Labs     12/31/21  0856 01/01/22  0122   WBC 11.2* 9.1   RBC 4.72 3.97*   HGB 14.6 12.3*   HCT 46.4 39.5    195   MCV 98.3 99.5   MCH 30.9 31.0   MCHC 31.5* 31.1*   RDW 13.8 13.7   SEGSPCT 68.0* 92.7*   BANDSPCT 19*  --       BMP:  Recent Labs     12/31/21  0856 01/01/22  0122    140   K 3.9 4.2   CL 99 101   CO2 28 32   BUN 19 20   CREATININE 0.6 0.5*   CALCIUM 9.0 8.0*   GLUCOSE 114* 116*      ABG:  No results for input(s): PH, PO2ART, GXL5XYX, HCO3, BEART, O2SAT in the last 72 hours. BNP  Lab Results   Component Value Date    BNP 57 10/21/2011      D-Dimer:  No results found for: DDIMER   Radiology:   Biapical pleuroparenchymal scarring with chronic interstitial changes noted   throughout both lungs.  Stable exam without acute process.      1.       Assessment/Plan     Patient Active Problem List    Diagnosis Date Noted    Pneumonia due to influenza A virus 12/31/2021    Acute respiratory failure with hypoxia and hypercapnia (HCC)     Shortness of breath 11/22/2021    Former smoker 11/22/2021    COVID-19 virus detected 01/18/2021    History of DVT of lower extremity 12/04/2020    History of pulmonary embolism 12/04/2020    Gastroesophageal reflux disease     Pure hypercholesterolemia 12/02/2019    Acquired hypothyroidism 08/01/2019    Lung infiltrate on CT 01/22/2019    Chronic hypoxemic respiratory failure ([address] 02/06/2018    COPD with exacerbation ([address] 11/15/2017    Leukocytosis 11/15/2017    Pulmonary nodule 07/05/2016    TIA (transient ischemic attack) 06/24/2015    Vertigo, central 06/24/2015     Overview Note:     Possible        Anticoagulant long-term use 06/24/2015    Arthritis 02/19/2014    Coronary atherosclerosis 02/19/2014    COPD, very severe ([address] 06/28/2013    Essential hypertension 06/28/2013    Phlebitis 06/27/2013   Acute Hypoxic resp failure  Parainfluenza pneumonia  AECOPD  Overweight       1. Abx  2. F/u C&S  3. Inhalers  4. Solumedrol  5. BIPAP  6. Keep sats > 92%  7. ICS  8. OOB  9. CXR in am  10.  C.w present managment    Electronically signed by Liz Maharaj MD on 1/1/2022 at 12:39 PM

## 2022-01-01 NOTE — PROGRESS NOTES
PHARMACY ANTICOAGULATION MONITORING SERVICE    Dheeraj Stauffer is a 68 y.o. female on warfarin therapy for A-fib. Pharmacy consulted by Dr. Roby Bynum for monitoring and adjustment of treatment. Indication for anticoagulation: A-fib  INR goal: 2-3  Warfarin dose prior to admission: warfarin 6 mg daily    Pertinent Laboratory Values   Recent Labs     12/31/21  0856 12/31/21  1140 01/01/22  0122   INR  --    < > 5.15*   HGB 14.6   < > 12.3*   HCT 46.4   < > 39.5     --  195    < > = values in this interval not displayed.      Assessment/Plan:   Drug Interactions:  o Methylprednisolone may increase effects of warfarin  o Tamiflu may increase effects of warfarin   INR: 5.15, continues to be supra-therapeutic  o No warfarin doses given since admission   Continue to hold warfarin and look to resume when INR <3   Additional dose adjustments will be made based on INR trends, interacting medications, and other clinical factors   Pharmacy will continue to monitor and adjust warfarin therapy as indicated    Thank you for the consult,  Colton VLEÁSQUEZ [facility], PharmD  1/1/2022 4:21 PM

## 2022-01-02 ENCOUNTER — APPOINTMENT (OUTPATIENT)
Dept: GENERAL RADIOLOGY | Age: 77
DRG: 871 | End: 2022-01-02
Payer: MEDICARE

## 2022-01-02 LAB
ALBUMIN SERPL-MCNC: 3.1 GM/DL (ref 3.4–5)
ALP BLD-CCNC: 65 IU/L (ref 40–128)
ALT SERPL-CCNC: 23 U/L (ref 10–40)
ANION GAP SERPL CALCULATED.3IONS-SCNC: 8 MMOL/L (ref 4–16)
APTT: 50.1 SECONDS (ref 25.1–37.1)
AST SERPL-CCNC: 41 IU/L (ref 15–37)
BASOPHILS ABSOLUTE: 0 K/CU MM
BASOPHILS RELATIVE PERCENT: 0 % (ref 0–1)
BILIRUB SERPL-MCNC: 0.2 MG/DL (ref 0–1)
BUN BLDV-MCNC: 19 MG/DL (ref 6–23)
CALCIUM SERPL-MCNC: 8.3 MG/DL (ref 8.3–10.6)
CHLORIDE BLD-SCNC: 101 MMOL/L (ref 99–110)
CO2: 32 MMOL/L (ref 21–32)
CREAT SERPL-MCNC: 0.5 MG/DL (ref 0.6–1.1)
DIFFERENTIAL TYPE: ABNORMAL
EKG ATRIAL RATE: 72 BPM
EKG DIAGNOSIS: NORMAL
EKG Q-T INTERVAL: 274 MS
EKG QRS DURATION: 102 MS
EKG QTC CALCULATION (BAZETT): 360 MS
EKG R AXIS: -43 DEGREES
EKG T AXIS: 84 DEGREES
EKG VENTRICULAR RATE: 104 BPM
EOSINOPHILS ABSOLUTE: 0 K/CU MM
EOSINOPHILS RELATIVE PERCENT: 0 % (ref 0–3)
GFR AFRICAN AMERICAN: >60 ML/MIN/1.73M2
GFR NON-AFRICAN AMERICAN: >60 ML/MIN/1.73M2
GLUCOSE BLD-MCNC: 131 MG/DL (ref 70–99)
HCT VFR BLD CALC: 39.3 % (ref 37–47)
HEMOGLOBIN: 12.4 GM/DL (ref 12.5–16)
IMMATURE NEUTROPHIL %: 0.4 % (ref 0–0.43)
INR BLD: 3.94 INDEX
LYMPHOCYTES ABSOLUTE: 0.4 K/CU MM
LYMPHOCYTES RELATIVE PERCENT: 3.8 % (ref 24–44)
MAGNESIUM: 2.3 MG/DL (ref 1.8–2.4)
MCH RBC QN AUTO: 30.6 PG (ref 27–31)
MCHC RBC AUTO-ENTMCNC: 31.6 % (ref 32–36)
MCV RBC AUTO: 97 FL (ref 78–100)
MONOCYTES ABSOLUTE: 0.6 K/CU MM
MONOCYTES RELATIVE PERCENT: 5.5 % (ref 0–4)
NUCLEATED RBC %: 0 %
PDW BLD-RTO: 13.4 % (ref 11.7–14.9)
PHOSPHORUS: 1.3 MG/DL (ref 2.5–4.9)
PLATELET # BLD: 221 K/CU MM (ref 140–440)
PMV BLD AUTO: 9.9 FL (ref 7.5–11.1)
POTASSIUM SERPL-SCNC: 3.8 MMOL/L (ref 3.5–5.1)
PROTHROMBIN TIME: 51.5 SECONDS (ref 11.7–14.5)
RBC # BLD: 4.05 M/CU MM (ref 4.2–5.4)
SEGMENTED NEUTROPHILS ABSOLUTE COUNT: 9.1 K/CU MM
SEGMENTED NEUTROPHILS RELATIVE PERCENT: 90.3 % (ref 36–66)
SODIUM BLD-SCNC: 141 MMOL/L (ref 135–145)
TOTAL IMMATURE NEUTOROPHIL: 0.04 K/CU MM
TOTAL NUCLEATED RBC: 0 K/CU MM
TOTAL PROTEIN: 5 GM/DL (ref 6.4–8.2)
WBC # BLD: 10 K/CU MM (ref 4–10.5)

## 2022-01-02 PROCEDURE — 99232 SBSQ HOSP IP/OBS MODERATE 35: CPT | Performed by: INTERNAL MEDICINE

## 2022-01-02 PROCEDURE — 93010 ELECTROCARDIOGRAM REPORT: CPT | Performed by: INTERNAL MEDICINE

## 2022-01-02 PROCEDURE — 2500000003 HC RX 250 WO HCPCS: Performed by: INTERNAL MEDICINE

## 2022-01-02 PROCEDURE — 2580000003 HC RX 258: Performed by: INTERNAL MEDICINE

## 2022-01-02 PROCEDURE — 85025 COMPLETE CBC W/AUTO DIFF WBC: CPT

## 2022-01-02 PROCEDURE — 80048 BASIC METABOLIC PNL TOTAL CA: CPT

## 2022-01-02 PROCEDURE — 94640 AIRWAY INHALATION TREATMENT: CPT

## 2022-01-02 PROCEDURE — 6370000000 HC RX 637 (ALT 250 FOR IP): Performed by: INTERNAL MEDICINE

## 2022-01-02 PROCEDURE — 6360000002 HC RX W HCPCS: Performed by: INTERNAL MEDICINE

## 2022-01-02 PROCEDURE — 85730 THROMBOPLASTIN TIME PARTIAL: CPT

## 2022-01-02 PROCEDURE — 83735 ASSAY OF MAGNESIUM: CPT

## 2022-01-02 PROCEDURE — 85610 PROTHROMBIN TIME: CPT

## 2022-01-02 PROCEDURE — 94761 N-INVAS EAR/PLS OXIMETRY MLT: CPT

## 2022-01-02 PROCEDURE — 2700000000 HC OXYGEN THERAPY PER DAY

## 2022-01-02 PROCEDURE — 84100 ASSAY OF PHOSPHORUS: CPT

## 2022-01-02 PROCEDURE — 2140000000 HC CCU INTERMEDIATE R&B

## 2022-01-02 PROCEDURE — 36415 COLL VENOUS BLD VENIPUNCTURE: CPT

## 2022-01-02 PROCEDURE — 85027 COMPLETE CBC AUTOMATED: CPT

## 2022-01-02 PROCEDURE — 80053 COMPREHEN METABOLIC PANEL: CPT

## 2022-01-02 PROCEDURE — 71045 X-RAY EXAM CHEST 1 VIEW: CPT

## 2022-01-02 RX ADMIN — ALBUTEROL SULFATE 2 PUFF: 90 AEROSOL, METERED RESPIRATORY (INHALATION) at 08:04

## 2022-01-02 RX ADMIN — LEVOFLOXACIN 500 MG: 5 INJECTION, SOLUTION INTRAVENOUS at 23:16

## 2022-01-02 RX ADMIN — Medication 2 PUFF: at 20:33

## 2022-01-02 RX ADMIN — SODIUM CHLORIDE, PRESERVATIVE FREE 10 ML: 5 INJECTION INTRAVENOUS at 20:28

## 2022-01-02 RX ADMIN — OSELTAMIVIR PHOSPHATE 75 MG: 75 CAPSULE ORAL at 08:40

## 2022-01-02 RX ADMIN — Medication 2 PUFF: at 15:32

## 2022-01-02 RX ADMIN — SODIUM PHOSPHATE, MONOBASIC, MONOHYDRATE AND SODIUM PHOSPHATE, DIBASIC, ANHYDROUS 20 MMOL: 276; 142 INJECTION, SOLUTION INTRAVENOUS at 14:19

## 2022-01-02 RX ADMIN — ALBUTEROL SULFATE 2 PUFF: 90 AEROSOL, METERED RESPIRATORY (INHALATION) at 20:33

## 2022-01-02 RX ADMIN — SODIUM CHLORIDE, PRESERVATIVE FREE 10 ML: 5 INJECTION INTRAVENOUS at 23:15

## 2022-01-02 RX ADMIN — GABAPENTIN 400 MG: 400 CAPSULE ORAL at 17:27

## 2022-01-02 RX ADMIN — ALBUTEROL SULFATE 2 PUFF: 90 AEROSOL, METERED RESPIRATORY (INHALATION) at 01:19

## 2022-01-02 RX ADMIN — Medication 1 CAPSULE: at 08:44

## 2022-01-02 RX ADMIN — Medication 2 PUFF: at 08:06

## 2022-01-02 RX ADMIN — PANTOPRAZOLE SODIUM 40 MG: 40 TABLET, DELAYED RELEASE ORAL at 08:52

## 2022-01-02 RX ADMIN — DICYCLOMINE HYDROCHLORIDE 10 MG: 10 CAPSULE ORAL at 08:40

## 2022-01-02 RX ADMIN — ROSUVASTATIN CALCIUM 20 MG: 5 TABLET, COATED ORAL at 23:04

## 2022-01-02 RX ADMIN — THEOPHYLLINE ANHYDROUS 200 MG: 200 CAPSULE, EXTENDED RELEASE ORAL at 23:04

## 2022-01-02 RX ADMIN — SODIUM CHLORIDE, PRESERVATIVE FREE 10 ML: 5 INJECTION INTRAVENOUS at 20:30

## 2022-01-02 RX ADMIN — ALBUTEROL SULFATE 2 PUFF: 90 AEROSOL, METERED RESPIRATORY (INHALATION) at 15:31

## 2022-01-02 RX ADMIN — THEOPHYLLINE ANHYDROUS 200 MG: 200 CAPSULE, EXTENDED RELEASE ORAL at 08:40

## 2022-01-02 RX ADMIN — MONTELUKAST 10 MG: 10 TABLET, FILM COATED ORAL at 23:04

## 2022-01-02 RX ADMIN — FUROSEMIDE 40 MG: 20 TABLET ORAL at 08:40

## 2022-01-02 RX ADMIN — METHYLPREDNISOLONE SODIUM SUCCINATE 40 MG: 40 INJECTION, POWDER, LYOPHILIZED, FOR SOLUTION INTRAMUSCULAR; INTRAVENOUS at 02:52

## 2022-01-02 RX ADMIN — OSELTAMIVIR PHOSPHATE 75 MG: 75 CAPSULE ORAL at 23:04

## 2022-01-02 RX ADMIN — SODIUM CHLORIDE, PRESERVATIVE FREE 10 ML: 5 INJECTION INTRAVENOUS at 02:52

## 2022-01-02 RX ADMIN — DICYCLOMINE HYDROCHLORIDE 10 MG: 10 CAPSULE ORAL at 23:04

## 2022-01-02 RX ADMIN — ISOSORBIDE MONONITRATE 60 MG: 60 TABLET, EXTENDED RELEASE ORAL at 08:44

## 2022-01-02 RX ADMIN — Medication 2 PUFF: at 11:22

## 2022-01-02 RX ADMIN — METHYLPREDNISOLONE SODIUM SUCCINATE 40 MG: 40 INJECTION, POWDER, LYOPHILIZED, FOR SOLUTION INTRAMUSCULAR; INTRAVENOUS at 08:40

## 2022-01-02 RX ADMIN — ROFLUMILAST 500 MCG: 500 TABLET ORAL at 08:44

## 2022-01-02 RX ADMIN — ALBUTEROL SULFATE 2 PUFF: 90 AEROSOL, METERED RESPIRATORY (INHALATION) at 11:22

## 2022-01-02 RX ADMIN — LEVOTHYROXINE SODIUM 50 MCG: 0.05 TABLET ORAL at 08:40

## 2022-01-02 ASSESSMENT — PAIN SCALES - GENERAL
PAINLEVEL_OUTOF10: 0
PAINLEVEL_OUTOF10: 0

## 2022-01-02 NOTE — CARE COORDINATION
[facility] Requested order from Dr Anusha Montaenz thru [facility].  Jeanna Schroeder RN

## 2022-01-02 NOTE — PROGRESS NOTES
Daily Progress Note  Awake and alert; feeling better  SOB has been improving; Now down to home dose of O2  NSR with PAC's on tele, HR stable  BP stable  Still holding coumadin, INR 3.94  Will get echo tomorrow  Will cont' to follow    Afib with RVR    Converted to NSR    On coumadin for AC    HR NSR with PAC's, will cont' to monitor    HR slightly elevated 90's-100's likely d/t respiratory distress    Denies any palpitations    Will get echo     Influenza A    Positive PCR in ER    On tamiflu     On ATB, high risk for bacteria PNA    Treat per primary     COPDE    Likely related to influenza    Wears 2L NC chronically    On IV steroids,     Breathing treatments    Treatment per primary     Hx of DVT;s    On coumadin    INR elevated; hold coumadin at this time    Pharmacy for dosing      CXR 1/2/2022  Impression   No significant interval change in bilateral airspace and interstitial   opacities.  Questionable small left pleural effusion   ressionPAST MEDICAL HISTORY: Toya Marie has a previous medical history of arthritis,  COPD, _____ arthrosis, former smoker, GERD, hiatal hernia, home O2 use,  osteopenia, pulmonary nodules, and shortness of breath.     PAST SURGICAL HISTORY:  She has a previous surgical history of  appendectomy, bronchoscopy, colonoscopy, endoscopy, tonsillectomy, and  vein surgery.     FAMILY HISTORY:  The patient's mother, father, and sister all have heart  disease.     SOCIAL HISTORY: Toya Marie is a former smoker, used to smoke a pack-and-a-half  a day.  She does not use any alcohol.  No illicit drug use.     ALLERGIES:  She has allergies to CODEINE, DARVON, MORPHINE, NEOSPORIN,  PENICILLIN, and LAMISIL.       Objective:   /77   Pulse 94   Temp 98.3 °F (36.8 °C) (Oral)   Resp 25   Ht 5' 4\" (1.626 m)   Wt 158 lb (71.7 kg)   SpO2 96%   BMI 27.12 kg/m²     Intake/Output Summary (Last 24 hours) at 1/2/2022 1006  Last data filed at 1/2/2022 0252  Gross per 24 hour   Intake 264.84 ml   Output 1000 ml   Net -735.16 ml       Medications:   Scheduled Meds:   sodium phosphate IVPB  20 mmol IntraVENous Once    ipratropium  2 puff Inhalation 4x daily    albuterol sulfate HFA  2 puff Inhalation 4x daily    b complex vitamins  1 capsule Oral Daily    Roflumilast  500 mcg Oral Daily    dicyclomine  10 mg Oral BID    pantoprazole  40 mg Oral QAM AC    furosemide  40 mg Oral Daily    gabapentin  400 mg Oral QPM    isosorbide mononitrate  60 mg Oral Daily    levothyroxine  50 mcg Oral Daily    montelukast  10 mg Oral Nightly    rosuvastatin  20 mg Oral Nightly    theophylline  200 mg Oral BID    sodium chloride flush  5-40 mL IntraVENous 2 times per day    [START ON 1/3/2022] predniSONE  40 mg Oral Daily    oseltamivir  75 mg Oral BID    warfarin (COUMADIN) daily dosing (placeholder)   Other See Admin Instructions    levofloxacin  500 mg IntraVENous Q24H      Infusions:   sodium chloride        PRN Meds:  sodium phosphate IVPB **OR** sodium phosphate IVPB, ipratropium-albuterol, albuterol sulfate HFA, sodium chloride flush, sodium chloride, ondansetron **OR** ondansetron, polyethylene glycol, acetaminophen **OR** acetaminophen       Physical Exam:  Vitals:    01/02/22 0845   BP:    Pulse: 94   Resp: 25   Temp: 98.3 °F (36.8 °C)   SpO2:         General: AAO, NAD  Chest: Nontender  Cardiac: First and Second Heart Sounds are Normal, No Murmurs or Gallops noted  Lungs: Wheezes noted throughout lung field. Abdomen: Soft, NT, ND, +BS  Extremities: No clubbing, no edema  Vascular:  Equal 2+ peripheral pulses.         Lab Data:  CBC:   Recent Labs     12/31/21  0856 01/01/22  0122 01/02/22  0420   WBC 11.2* 9.1 10.0   HGB 14.6 12.3* 12.4*   HCT 46.4 39.5 39.3   MCV 98.3 99.5 97.0    195 221     BMP:   Recent Labs     12/31/21  0856 01/01/22  0122 01/02/22  0420    140 141   K 3.9 4.2 3.8   CL 99 101 101   CO2 28 32 32   PHOS  --  1.7* 1.3*   BUN 19 20 19   CREATININE 0.6 0.5* 0.5*     LIVER PROFILE:   Recent Labs     12/31/21  0856 01/01/22  0122 01/02/22  0420   AST 34 31 41*   ALT 21 20 23   BILITOT 0.3 0.2 0.2   ALKPHOS 79 65 65     PT/INR:   Recent Labs     12/31/21  1140 01/01/22  0122 01/02/22  0420   PROTIME 49.8* 67.6* 51.5*   INR 3.81 5.15* 3.94     APTT:   Recent Labs     12/31/21  1140 01/01/22  0122 01/02/22  0420   APTT 58.3* 71.4* 50.1*     BNP:  No results for input(s): BNP in the last 72 hours.       Assessment:  Patient Active Problem List    Diagnosis Date Noted    Pneumonia due to influenza A virus 12/31/2021    Acute respiratory failure with hypoxia and hypercapnia (HCC)     Shortness of breath 11/22/2021    Former smoker 11/22/2021    COVID-19 virus detected 01/18/2021    History of DVT of lower extremity 12/04/2020    History of pulmonary embolism 12/04/2020    Gastroesophageal reflux disease     Pure hypercholesterolemia 12/02/2019    Acquired hypothyroidism 08/01/2019    Lung infiltrate on CT 01/22/2019    Chronic hypoxemic respiratory failure ([address] 02/06/2018    COPD with exacerbation ([address] 11/15/2017    Leukocytosis 11/15/2017    Pulmonary nodule 07/05/2016    TIA (transient ischemic attack) 06/24/2015    Vertigo, central 06/24/2015    Anticoagulant long-term use 06/24/2015    Arthritis 02/19/2014    Coronary atherosclerosis 02/19/2014    COPD, very severe ([address] 06/28/2013    Essential hypertension 06/28/2013    Phlebitis 06/27/2013       Electronically signed by IMANI Allen CNP on 1/2/2022 at 10:06 AM

## 2022-01-02 NOTE — PROGRESS NOTES
PHARMACY ANTICOAGULATION MONITORING SERVICE    Loki Owen is a 68 y.o. female on warfarin therapy for A-fib. Pharmacy consulted by Dr. Flaco David for monitoring and adjustment of treatment. Indication for anticoagulation: A-fib  INR goal: 2-3  Warfarin dose prior to admission: warfarin 6 mg daily    Pertinent Laboratory Values   Recent Labs     12/31/21  0856 12/31/21  1140 01/01/22  0122 01/01/22  0122 01/02/22  0420   INR  --    < > 5.15*   < > 3.94   HGB 14.6   < > 12.3*   < > 12.4*   HCT 46.4   < > 39.5   < > 39.3     --  195  --  221    < > = values in this interval not displayed.      Assessment/Plan:   Drug Interactions:  o Ongoing/ scheduled/ home meds- possible drug interactions: gabapentin, rosuvastatin.  o Methylprednisolone may increase effects of warfarin  o Tamiflu may increase effects of warfarin [begun 12/31]  o Levaquin began 2 days ago= 12/31.  o No Tx bridge   INR: 3.94, trending better, but: continues to be supra-therapeutic  o No warfarin doses given since admission   Continue to hold warfarin and look to resume when INR <3   Additional dose adjustments will be made based on INR trends, interacting medications, and other clinical factors   Pharmacy will continue to monitor and adjust warfarin therapy as indicated    Thank you for the consult,  Bobby Khan, [facility]   1/2/2022 10:16 AM

## 2022-01-02 NOTE — PROGRESS NOTES
Pulmonary and Critical Care  Progress Note      VITALS:  /72   Pulse 94   Temp 98.5 °F (36.9 °C) (Axillary)   Resp 25   Ht 5' 4\" (1.626 m)   Wt 158 lb (71.7 kg)   SpO2 96%   BMI 27.12 kg/m²     Subjective:   CHIEF COMPLAINT :SOB     HPI:                The patient is lying in the bed. She is not in acute reps distress    Objective:   PHYSICAL EXAM:    LUNGS:Occasional basal crackles  Abd-soft, BS+,NT  Ext- no pedal edema  CVS-s1s2, no murmurs      DATA:    CBC:  Recent Labs     12/31/21  0856 01/01/22  0122 01/02/22  0420   WBC 11.2* 9.1 10.0   RBC 4.72 3.97* 4.05*   HGB 14.6 12.3* 12.4*   HCT 46.4 39.5 39.3    195 221   MCV 98.3 99.5 97.0   MCH 30.9 31.0 30.6   MCHC 31.5* 31.1* 31.6*   RDW 13.8 13.7 13.4   SEGSPCT 68.0* 92.7* 90.3*   BANDSPCT 19*  --   --       BMP:  Recent Labs     12/31/21  0856 01/01/22  0122 01/02/22  0420    140 141   K 3.9 4.2 3.8   CL 99 101 101   CO2 28 32 32   BUN 19 20 19   CREATININE 0.6 0.5* 0.5*   CALCIUM 9.0 8.0* 8.3   GLUCOSE 114* 116* 131*      ABG:  No results for input(s): PH, PO2ART, UXD0KDB, HCO3, BEART, O2SAT in the last 72 hours. BNP  Lab Results   Component Value Date    BNP 57 10/21/2011      D-Dimer:  No results found for: [facility]   Radiology:   No significant interval change in bilateral airspace and interstitial   opacities.  Questionable small left pleural effusion     1.        Assessment/Plan     Patient Active Problem List    Diagnosis Date Noted    Pneumonia due to influenza A virus 12/31/2021    Acute respiratory failure with hypoxia and hypercapnia (HCC)     Shortness of breath 11/22/2021    Former smoker 11/22/2021    COVID-19 virus detected 01/18/2021    History of DVT of lower extremity 12/04/2020    History of pulmonary embolism 12/04/2020    Gastroesophageal reflux disease     Pure hypercholesterolemia 12/02/2019    Acquired hypothyroidism 08/01/2019    Lung infiltrate on CT 01/22/2019    Chronic hypoxemic respiratory failure ([facility] 02/06/2018    [facility]ation [facility] 11/15/2017    Leukocytosis 11/15/2017    Pulmonary nodule 07/05/2016    TIA (transient ischemic attack) 06/24/2015    Vertigo, central 06/24/2015     Overview Note:     Possible        Anticoagulant long-term use 06/24/2015    Arthritis 02/19/2014    Coronary atherosclerosis 02/19/2014    COPD, very severe ([facility] 06/28/2013    Essential hypertension 06/28/2013    Phlebitis 06/27/2013   Acute Hypoxic resp failure  Parainfluenza pneumonia  Influenza A  AECOPD  Overweight       1. Tamiflu  2. Abx  3. Prednisone taper  4. ICS  5. OOB  6. Keep sats > 92%  7.  C/w present management    Electronically signed by Teresa Tenorio MD on 1/2/2022 at 2:07 PM

## 2022-01-03 LAB
ALBUMIN SERPL-MCNC: 3.4 GM/DL (ref 3.4–5)
ALP BLD-CCNC: 70 IU/L (ref 40–128)
ALT SERPL-CCNC: 32 U/L (ref 10–40)
ANION GAP SERPL CALCULATED.3IONS-SCNC: 12 MMOL/L (ref 4–16)
APTT: 33.5 SECONDS (ref 25.1–37.1)
AST SERPL-CCNC: 55 IU/L (ref 15–37)
BASOPHILS ABSOLUTE: 0 K/CU MM
BASOPHILS RELATIVE PERCENT: 0.2 % (ref 0–1)
BILIRUB SERPL-MCNC: 0.2 MG/DL (ref 0–1)
BUN BLDV-MCNC: 20 MG/DL (ref 6–23)
C-REACTIVE PROTEIN, HIGH SENSITIVITY: 43.8 MG/L
CALCIUM SERPL-MCNC: 8.4 MG/DL (ref 8.3–10.6)
CHLORIDE BLD-SCNC: 101 MMOL/L (ref 99–110)
CO2: 31 MMOL/L (ref 21–32)
CREAT SERPL-MCNC: 0.6 MG/DL (ref 0.6–1.1)
DIFFERENTIAL TYPE: ABNORMAL
EKG ATRIAL RATE: 85 BPM
EKG ATRIAL RATE: 92 BPM
EKG DIAGNOSIS: NORMAL
EKG DIAGNOSIS: NORMAL
EKG P AXIS: 65 DEGREES
EKG P AXIS: 68 DEGREES
EKG P-R INTERVAL: 126 MS
EKG P-R INTERVAL: 130 MS
EKG Q-T INTERVAL: 356 MS
EKG Q-T INTERVAL: 360 MS
EKG QRS DURATION: 102 MS
EKG QRS DURATION: 110 MS
EKG QTC CALCULATION (BAZETT): 440 MS
EKG QTC CALCULATION (BAZETT): 478 MS
EKG R AXIS: -33 DEGREES
EKG R AXIS: -41 DEGREES
EKG T AXIS: 70 DEGREES
EKG T AXIS: 77 DEGREES
EKG VENTRICULAR RATE: 106 BPM
EKG VENTRICULAR RATE: 92 BPM
EOSINOPHILS ABSOLUTE: 0 K/CU MM
EOSINOPHILS RELATIVE PERCENT: 0 % (ref 0–3)
GFR AFRICAN AMERICAN: >60 ML/MIN/1.73M2
GFR NON-AFRICAN AMERICAN: >60 ML/MIN/1.73M2
GLUCOSE BLD-MCNC: 88 MG/DL (ref 70–99)
HCT VFR BLD CALC: 41.7 % (ref 37–47)
HEMOGLOBIN: 13.1 GM/DL (ref 12.5–16)
IMMATURE NEUTROPHIL %: 1.6 % (ref 0–0.43)
INR BLD: 2.76 INDEX
LV EF: 53 %
LVEF MODALITY: NORMAL
LYMPHOCYTES ABSOLUTE: 1.2 K/CU MM
LYMPHOCYTES RELATIVE PERCENT: 9.7 % (ref 24–44)
MAGNESIUM: 2.1 MG/DL (ref 1.8–2.4)
MCH RBC QN AUTO: 31 PG (ref 27–31)
MCHC RBC AUTO-ENTMCNC: 31.4 % (ref 32–36)
MCV RBC AUTO: 98.6 FL (ref 78–100)
MONOCYTES ABSOLUTE: 1.2 K/CU MM
MONOCYTES RELATIVE PERCENT: 10.1 % (ref 0–4)
NUCLEATED RBC %: 0 %
PDW BLD-RTO: 13.4 % (ref 11.7–14.9)
PHOSPHORUS: 1.8 MG/DL (ref 2.5–4.9)
PLATELET # BLD: 247 K/CU MM (ref 140–440)
PMV BLD AUTO: 9.7 FL (ref 7.5–11.1)
POTASSIUM SERPL-SCNC: 3.3 MMOL/L (ref 3.5–5.1)
PROCALCITONIN: 0.14
PROTHROMBIN TIME: 36 SECONDS (ref 11.7–14.5)
RBC # BLD: 4.23 M/CU MM (ref 4.2–5.4)
SEGMENTED NEUTROPHILS ABSOLUTE COUNT: 9.5 K/CU MM
SEGMENTED NEUTROPHILS RELATIVE PERCENT: 78.4 % (ref 36–66)
SODIUM BLD-SCNC: 144 MMOL/L (ref 135–145)
TOTAL IMMATURE NEUTOROPHIL: 0.19 K/CU MM
TOTAL NUCLEATED RBC: 0 K/CU MM
TOTAL PROTEIN: 5 GM/DL (ref 6.4–8.2)
WBC # BLD: 12.1 K/CU MM (ref 4–10.5)

## 2022-01-03 PROCEDURE — 6370000000 HC RX 637 (ALT 250 FOR IP): Performed by: INTERNAL MEDICINE

## 2022-01-03 PROCEDURE — 84100 ASSAY OF PHOSPHORUS: CPT

## 2022-01-03 PROCEDURE — 2500000003 HC RX 250 WO HCPCS: Performed by: INTERNAL MEDICINE

## 2022-01-03 PROCEDURE — 97530 THERAPEUTIC ACTIVITIES: CPT

## 2022-01-03 PROCEDURE — 6360000002 HC RX W HCPCS: Performed by: INTERNAL MEDICINE

## 2022-01-03 PROCEDURE — 86140 C-REACTIVE PROTEIN: CPT

## 2022-01-03 PROCEDURE — 2580000003 HC RX 258: Performed by: INTERNAL MEDICINE

## 2022-01-03 PROCEDURE — 83735 ASSAY OF MAGNESIUM: CPT

## 2022-01-03 PROCEDURE — 85025 COMPLETE CBC W/AUTO DIFF WBC: CPT

## 2022-01-03 PROCEDURE — 93005 ELECTROCARDIOGRAM TRACING: CPT | Performed by: INTERNAL MEDICINE

## 2022-01-03 PROCEDURE — 80053 COMPREHEN METABOLIC PANEL: CPT

## 2022-01-03 PROCEDURE — 94640 AIRWAY INHALATION TREATMENT: CPT

## 2022-01-03 PROCEDURE — 93306 TTE W/DOPPLER COMPLETE: CPT

## 2022-01-03 PROCEDURE — 2140000000 HC CCU INTERMEDIATE R&B

## 2022-01-03 PROCEDURE — 2700000000 HC OXYGEN THERAPY PER DAY

## 2022-01-03 PROCEDURE — 36415 COLL VENOUS BLD VENIPUNCTURE: CPT

## 2022-01-03 PROCEDURE — 94761 N-INVAS EAR/PLS OXIMETRY MLT: CPT

## 2022-01-03 PROCEDURE — 97161 PT EVAL LOW COMPLEX 20 MIN: CPT

## 2022-01-03 PROCEDURE — 93010 ELECTROCARDIOGRAM REPORT: CPT | Performed by: INTERNAL MEDICINE

## 2022-01-03 PROCEDURE — 84145 PROCALCITONIN (PCT): CPT

## 2022-01-03 PROCEDURE — 6370000000 HC RX 637 (ALT 250 FOR IP): Performed by: NURSE PRACTITIONER

## 2022-01-03 PROCEDURE — 85610 PROTHROMBIN TIME: CPT

## 2022-01-03 PROCEDURE — 85730 THROMBOPLASTIN TIME PARTIAL: CPT

## 2022-01-03 PROCEDURE — 99232 SBSQ HOSP IP/OBS MODERATE 35: CPT | Performed by: INTERNAL MEDICINE

## 2022-01-03 RX ORDER — WARFARIN SODIUM 4 MG/1
4 TABLET ORAL DAILY
Status: DISCONTINUED | OUTPATIENT
Start: 2022-01-03 | End: 2022-01-04 | Stop reason: HOSPADM

## 2022-01-03 RX ORDER — POTASSIUM CHLORIDE 20 MEQ/1
10 TABLET, EXTENDED RELEASE ORAL 2 TIMES DAILY
Status: DISCONTINUED | OUTPATIENT
Start: 2022-01-03 | End: 2022-01-04 | Stop reason: HOSPADM

## 2022-01-03 RX ORDER — DILTIAZEM HYDROCHLORIDE 60 MG/1
60 TABLET, FILM COATED ORAL EVERY 8 HOURS SCHEDULED
Status: DISCONTINUED | OUTPATIENT
Start: 2022-01-03 | End: 2022-01-04 | Stop reason: HOSPADM

## 2022-01-03 RX ORDER — POTASSIUM CHLORIDE 20 MEQ/1
40 TABLET, EXTENDED RELEASE ORAL ONCE
Status: COMPLETED | OUTPATIENT
Start: 2022-01-03 | End: 2022-01-03

## 2022-01-03 RX ORDER — MIDODRINE HYDROCHLORIDE 5 MG/1
5 TABLET ORAL
Status: DISCONTINUED | OUTPATIENT
Start: 2022-01-03 | End: 2022-01-04 | Stop reason: HOSPADM

## 2022-01-03 RX ORDER — ROSUVASTATIN CALCIUM 5 MG/1
10 TABLET, COATED ORAL NIGHTLY
Status: DISCONTINUED | OUTPATIENT
Start: 2022-01-03 | End: 2022-01-04 | Stop reason: HOSPADM

## 2022-01-03 RX ORDER — LACTULOSE 10 G/15ML
10 SOLUTION ORAL 2 TIMES DAILY
Status: DISCONTINUED | OUTPATIENT
Start: 2022-01-03 | End: 2022-01-04 | Stop reason: HOSPADM

## 2022-01-03 RX ADMIN — LACTULOSE 10 G: 10 SOLUTION ORAL at 11:43

## 2022-01-03 RX ADMIN — LEVOFLOXACIN 500 MG: 5 INJECTION, SOLUTION INTRAVENOUS at 21:37

## 2022-01-03 RX ADMIN — MIDODRINE HYDROCHLORIDE 5 MG: 5 TABLET ORAL at 11:43

## 2022-01-03 RX ADMIN — ALBUTEROL SULFATE 2 PUFF: 90 AEROSOL, METERED RESPIRATORY (INHALATION) at 20:09

## 2022-01-03 RX ADMIN — ALBUTEROL SULFATE 2 PUFF: 90 AEROSOL, METERED RESPIRATORY (INHALATION) at 11:30

## 2022-01-03 RX ADMIN — DILTIAZEM HYDROCHLORIDE 60 MG: 60 TABLET, FILM COATED ORAL at 21:30

## 2022-01-03 RX ADMIN — Medication 2 PUFF: at 20:09

## 2022-01-03 RX ADMIN — MONTELUKAST 10 MG: 10 TABLET, FILM COATED ORAL at 21:30

## 2022-01-03 RX ADMIN — Medication 2 PUFF: at 15:43

## 2022-01-03 RX ADMIN — THEOPHYLLINE ANHYDROUS 200 MG: 200 CAPSULE, EXTENDED RELEASE ORAL at 08:52

## 2022-01-03 RX ADMIN — PANTOPRAZOLE SODIUM 40 MG: 40 TABLET, DELAYED RELEASE ORAL at 08:52

## 2022-01-03 RX ADMIN — DILTIAZEM HYDROCHLORIDE 60 MG: 60 TABLET, FILM COATED ORAL at 14:00

## 2022-01-03 RX ADMIN — ALBUTEROL SULFATE 2 PUFF: 90 AEROSOL, METERED RESPIRATORY (INHALATION) at 07:53

## 2022-01-03 RX ADMIN — PREDNISONE 40 MG: 20 TABLET ORAL at 08:52

## 2022-01-03 RX ADMIN — LEVOTHYROXINE SODIUM 50 MCG: 0.05 TABLET ORAL at 08:52

## 2022-01-03 RX ADMIN — WARFARIN SODIUM 4 MG: 4 TABLET ORAL at 17:16

## 2022-01-03 RX ADMIN — SODIUM CHLORIDE, PRESERVATIVE FREE 10 ML: 5 INJECTION INTRAVENOUS at 08:52

## 2022-01-03 RX ADMIN — THEOPHYLLINE ANHYDROUS 200 MG: 200 CAPSULE, EXTENDED RELEASE ORAL at 21:30

## 2022-01-03 RX ADMIN — POTASSIUM CHLORIDE 40 MEQ: 20 TABLET, EXTENDED RELEASE ORAL at 08:55

## 2022-01-03 RX ADMIN — POTASSIUM CHLORIDE 10 MEQ: 20 TABLET, EXTENDED RELEASE ORAL at 21:30

## 2022-01-03 RX ADMIN — Medication 2 PUFF: at 11:30

## 2022-01-03 RX ADMIN — GABAPENTIN 400 MG: 400 CAPSULE ORAL at 17:16

## 2022-01-03 RX ADMIN — SODIUM PHOSPHATE, MONOBASIC, MONOHYDRATE AND SODIUM PHOSPHATE, DIBASIC, ANHYDROUS 10 MMOL: 276; 142 INJECTION, SOLUTION INTRAVENOUS at 10:42

## 2022-01-03 RX ADMIN — LACTULOSE 10 G: 10 SOLUTION ORAL at 21:31

## 2022-01-03 RX ADMIN — ROFLUMILAST 500 MCG: 500 TABLET ORAL at 08:52

## 2022-01-03 RX ADMIN — Medication 1 CAPSULE: at 08:52

## 2022-01-03 RX ADMIN — DICYCLOMINE HYDROCHLORIDE 10 MG: 10 CAPSULE ORAL at 21:30

## 2022-01-03 RX ADMIN — ROSUVASTATIN CALCIUM 10 MG: 5 TABLET, COATED ORAL at 21:30

## 2022-01-03 RX ADMIN — ALBUTEROL SULFATE 2 PUFF: 90 AEROSOL, METERED RESPIRATORY (INHALATION) at 05:55

## 2022-01-03 RX ADMIN — SODIUM CHLORIDE, PRESERVATIVE FREE 10 ML: 5 INJECTION INTRAVENOUS at 21:30

## 2022-01-03 RX ADMIN — ALBUTEROL SULFATE 2 PUFF: 90 AEROSOL, METERED RESPIRATORY (INHALATION) at 15:43

## 2022-01-03 RX ADMIN — OSELTAMIVIR PHOSPHATE 75 MG: 75 CAPSULE ORAL at 08:52

## 2022-01-03 RX ADMIN — Medication 2 PUFF: at 07:53

## 2022-01-03 RX ADMIN — POTASSIUM CHLORIDE 10 MEQ: 20 TABLET, EXTENDED RELEASE ORAL at 11:43

## 2022-01-03 RX ADMIN — DICYCLOMINE HYDROCHLORIDE 10 MG: 10 CAPSULE ORAL at 08:52

## 2022-01-03 RX ADMIN — OSELTAMIVIR PHOSPHATE 75 MG: 75 CAPSULE ORAL at 21:30

## 2022-01-03 ASSESSMENT — PAIN SCALES - GENERAL
PAINLEVEL_OUTOF10: 0

## 2022-01-03 NOTE — PROGRESS NOTES
Daily Progress Note      Awake and alert; up in chair  Denies any Cp, SOB is still present  On 2L NC  Tolerating ambulating to chair well  BP and HR stable  Echo for today  Will cont' to follow    Feeling better, sitting in chair  Remain in afib/sinus and SA-check ekg  Family at bedside  She is constipated had no BM since admission placed on lactulose  Remote hx of smoking  Pneumonia noted -viral   Correct K, heart rate in =adjust meds for rate control  Hx of DVT on AC   Resume cardizem 60 tid for now, lower crestor to 10 mg daily --add proamatine for BP  Check echo       Cath 2018  DICTATED--[id number]  LEFT MAIN PATENT  LAD/LCX AND RCA MILD DX  LVEDP 15-20  MEDICAL TREATMENT       Afib with RVR    Converted to NSR    [facility] [place] NSR with PAC's, will cont' to monitor  [facility] slightly elevated 80's-100's likely d/t respiratory distress    Denies any palpitations    Echo today     Influenza A    Positive PCR in ER    On tamiflu     On ATB, high risk for bacteria PNA    Treat per primary     COPDE    Likely related to influenza    Wears 2L NC chronically    On IV steroids,     Breathing treatments    Treatment per primary     Hx of DVT;s    On coumadin    INR elevated; hold coumadin at this time    Pharmacy for dosing     CXR 1/2/2022  Impression   No significant interval change in bilateral airspace and interstitial   opacities.  Questionable small left pleural effusion     PAST MEDICAL HISTORY: Son Peguero has a previous medical history of arthritis,  COPD, _____ arthrosis, former smoker, GERD, hiatal hernia, home O2 use,  osteopenia, pulmonary nodules, and shortness of breath.     PAST SURGICAL HISTORY:  She has a previous surgical history of  appendectomy, bronchoscopy, colonoscopy, endoscopy, tonsillectomy, and  vein surgery.     FAMILY HISTORY:  The patient's mother, father, and sister all have heart  disease.     SOCIAL HISTORY: Son Peguero is a former smoker, used to smoke a pack-and-a-half  a day. Son Peguero does not use any alcohol.  No illicit drug use.     ALLERGIES:  She has allergies to CODEINE, DARVON, MORPHINE, NEOSPORIN,  PENICILLIN, and LAMISIL.       Objective:   BP (!) 108/46   Pulse 78   Temp 98.3 °F (36.8 °C) (Oral)   Resp 16   Ht 5' 4\" (1.626 m)   Wt 158 lb (71.7 kg)   SpO2 93%   BMI 27.12 kg/m²     Intake/Output Summary (Last 24 hours) at 1/3/2022 4775  Last data filed at 1/3/2022 0515  Gross per 24 hour   Intake 313.43 ml   Output 350 ml   Net -36.57 ml       Medications:   Scheduled Meds:   sodium phosphate IVPB  10 mmol IntraVENous Once    ipratropium  2 puff Inhalation 4x daily    albuterol sulfate HFA  2 puff Inhalation 4x daily    b complex vitamins  1 capsule Oral Daily    Roflumilast  500 mcg Oral Daily    dicyclomine  10 mg Oral BID    pantoprazole  40 mg Oral QAM AC    furosemide  40 mg Oral Daily    gabapentin  400 mg Oral QPM    isosorbide mononitrate  60 mg Oral Daily    levothyroxine  50 mcg Oral Daily    montelukast  10 mg Oral Nightly    rosuvastatin  20 mg Oral Nightly    theophylline  200 mg Oral BID    sodium chloride flush  5-40 mL IntraVENous 2 times per day    predniSONE  40 mg Oral Daily    oseltamivir  75 mg Oral BID    warfarin (COUMADIN) daily dosing (placeholder)   Other See Admin Instructions    levofloxacin  500 mg IntraVENous Q24H      Infusions:   sodium chloride        PRN Meds:  sodium phosphate IVPB **OR** sodium phosphate IVPB, ipratropium-albuterol, albuterol sulfate HFA, sodium chloride flush, sodium chloride, ondansetron **OR** ondansetron, polyethylene glycol, acetaminophen **OR** acetaminophen       Physical Exam:  Vitals:    01/03/22 0848   BP: (!) 108/46   Pulse: 78   Resp: 16   Temp: 98.3 °F (36.8 °C)   SpO2: 93%        General: AAO, NAD  Chest: Nontender  Cardiac: First and Second Heart Sounds are Normal, No Murmurs or Gallops noted  Lungs:Wheezes noted throughout lung field  Abdomen: Soft, NT, ND, +BS  Extremities: No clubbing, no edema  Vascular:  Equal 2+ peripheral pulses. Lab Data:  CBC:   Recent Labs     01/01/22 0122 01/02/22 0420 01/03/22 0229   WBC 9.1 10.0 12.1*   HGB 12.3* 12.4* 13.1   HCT 39.5 39.3 41.7   MCV 99.5 97.0 98.6    221 247     BMP:   Recent Labs     01/01/22 0122 01/02/22 0420 01/03/22 0229    141 144   K 4.2 3.8 3.3*    101 101   CO2 32 32 31   PHOS 1.7* 1.3* 1.8*   BUN 20 19 20   CREATININE 0.5* 0.5* 0.6     LIVER PROFILE:   Recent Labs     01/01/22 0122 01/02/22 0420 01/03/22 0229   AST 31 41* 55*   ALT 20 23 32   BILITOT 0.2 0.2 0.2   ALKPHOS 65 65 70     PT/INR:   Recent Labs     01/01/22 0122 01/02/22 0420 01/03/22 0229   PROTIME 67.6* 51.5* 36.0*   INR 5.15* 3.94 2.76     APTT:   Recent Labs     01/01/22 0122 01/02/22 0420 01/03/22 0229   APTT 71.4* 50.1* 33.5     BNP:  No results for input(s): BNP in the last 72 hours.       Assessment:  Patient Active Problem List    Diagnosis Date Noted    Pneumonia due to influenza A virus 12/31/2021    Acute respiratory failure with hypoxia and hypercapnia (HCC)     Shortness of breath 11/22/2021    Former smoker 11/22/2021    COVID-19 virus detected 01/18/2021    History of DVT of lower extremity 12/04/2020    History of pulmonary embolism 12/04/2020    Gastroesophageal reflux disease     Pure hypercholesterolemia 12/02/2019    Acquired hypothyroidism 08/01/2019    Lung infiltrate on CT 01/22/2019    Chronic hypoxemic respiratory failure ([address] 02/06/2018    COPD with exacerbation ([facility] 11/15/2017    Leukocytosis 11/15/2017    Pulmonary nodule 07/05/2016    TIA (transient ischemic attack) 06/24/2015    Vertigo, central 06/24/2015    Anticoagulant long-term use 06/24/2015    Arthritis 02/19/2014    Coronary atherosclerosis 02/19/2014    COPD, very severe ([facility] 06/28/2013    Essential hypertension 06/28/2013    Phlebitis 06/27/2013       Electronically signed by IMANI Bowser CNP on 1/3/2022 at 9:58 AM    Electronically signed by Tuyet Bagley MD on 1/3/22 at 10:51 AM EST

## 2022-01-03 NOTE — PROGRESS NOTES
Physician Progress Note      Ken Zhu  CSN #:                  [id number]  :                       1945  ADMIT DATE:       2021 8:46 AM  DISCH DATE:  RESPONDING  PROVIDER #:        Layo Lee MD          QUERY TEXT:    Hospitalists,    Pt admitted with respiratory failure due to PNA and COPD. Carolina Morgan Pt noted to have   SIRS and meets [facility] sepsis criteria. If possible, please document in the   progress notes and discharge summary if you are evaluating and /or treating   any of the following: The medical record reflects the following:  Risk Factors: influenza PNA  Clinical Indicators: RR 16-31, HR , atrial fib, WBC 9.1-12.1, bands 19,   resp failure, atrial fib,  Treatment: labs, imaging, O2, IV Levaquin, tamiflu, BIPAP    Thank you,  Leonila Whaley RN CDS  [phone]  Options provided:  -- Sepsis, present on admission  -- Sepsis, present on admission, now resolved  -- Sepsis, not present on admission  -- Sepsis was ruled out  -- Other - I will add my own diagnosis  -- Disagree - Not applicable / Not valid  -- Disagree - Clinically unable to determine / Unknown  -- Refer to Clinical Documentation Reviewer    PROVIDER RESPONSE TEXT:    This patient has sepsis which was present on admission.     Query created by: Luís Talbert on 1/3/2022 9:59 AM      Electronically signed by:  Layo Lee MD 1/3/2022 10:03 AM

## 2022-01-03 NOTE — PROGRESS NOTES
Hospitalist Progress Note      Name:  Rodney Lutz /Age/Sex: 1945  (68 y.o. female)   MRN & CSN:  [id number] & [id number] Admission Date/Time: 2021  8:46 AM   Location:  -A PCP: Pato Black MD       Hospital Day: 3    Assessment and Plan:         Influenza A pneumonia. -Oseltamivir from  until     COPD exacerbation.  --on methylprednisolone 40 mg IV every 6 hours-to end on -and prednisone to start on January 3  -Patient would like to go home tomorrow on  if she can, we will need to assess her tomorrow    Acute respiratory failure with hypoxia. Likely secondary to #1 and #2. Continue supplemental oxygen with plan to wean as tolerated. Monitor pulse oximeter closely.  --only requiring 2 L at this time, continue to wean oxygen    Atrial fibrillation with rapid ventricular response. Patient has underlying history of atrial fibrillation.    --in sinus rhythm  -Cardizem drip ordered, but never started -as of  heart rate is about 100.   Not on rate controlling drugs currently.  -On anticoagulation with Coumadin   -INR is 5 on -pharmacy managing it  -Cardiology consult appreciated     Anticoagulation-he also had a DVT    Chronic respiratory failure on 2 L oxygen at home  History of COVID-19 pneumonia in 2021  Hiatal hernia  GERD    Subjective:       :  -She was only requiring 2 L when I saw her today  -Family member in the room stated that when she coughs her oxygen level drops  -Her phosphate was low, 1.3, and she got IV phosphate today      :      She was brought to ED by EMS  Her daughter is said that she called the EMS    Her daughter in the room indicated that the patient was on BiPAP yesterday from 8 AM to 11 AM when she was in the room and that she was in public last night    When I saw her she was on 4 L oxygen      History    Home oxygen: 2 L    Pulmonologist:  chloride       PRN Meds: sodium phosphate IVPB, 0.16 mmol/kg, PRN   Or  sodium phosphate IVPB, 0.32 mmol/kg, PRN  ipratropium-albuterol, 1 ampule, Q4H PRN  albuterol sulfate HFA, 2 puff, Q4H PRN  sodium chloride flush, 5-40 mL, PRN  sodium chloride, 25 mL, PRN  ondansetron, 4 mg, Q8H PRN   Or  ondansetron, 4 mg, Q6H PRN  polyethylene glycol, 17 g, Daily PRN  acetaminophen, 650 mg, Q6H PRN   Or  acetaminophen, 650 mg, Q6H PRN          Electronically signed by An Fine MD on 1/2/2022 at 9:10 PM

## 2022-01-03 NOTE — PROGRESS NOTES
Pulmonary and Critical Care  Progress Note      VITALS:  BP (!) 108/46   Pulse 78   Temp 98.3 °F (36.8 °C) (Oral)   Resp 10   Ht 5' 4\" (1.626 m)   Wt 158 lb (71.7 kg)   SpO2 94%   BMI 27.12 kg/m²     Subjective:   CHIEF COMPLAINT :SOB     HPI:                The patient is sitting in the bed. She is not in acute resp distress. She is looking better. Objective:   PHYSICAL EXAM:    LUNGS:Occasional basal crackles  Abd-soft, BS+,NT  Ext- no pedal edema  CVS-s1s2, no murmurs      DATA:    CBC:  Recent Labs     01/01/22  0122 01/02/22  0420 01/03/22 0229   WBC 9.1 10.0 12.1*   RBC 3.97* 4.05* 4.23   HGB 12.3* 12.4* 13.1   HCT 39.5 39.3 41.7    221 247   MCV 99.5 97.0 98.6   MCH 31.0 30.6 31.0   MCHC 31.1* 31.6* 31.4*   RDW 13.7 13.4 13.4   SEGSPCT 92.7* 90.3* 78.4*      BMP:  Recent Labs     01/01/22  0122 01/02/22 0420 01/03/22 0229    141 144   K 4.2 3.8 3.3*    101 101   CO2 32 32 31   BUN 20 19 20   CREATININE 0.5* 0.5* 0.6   CALCIUM 8.0* 8.3 8.4   GLUCOSE 116* 131* 88      ABG:  No results for input(s): PH, PO2ART, MKZ6BOC, HCO3, BEART, O2SAT in the last 72 hours. BNP  Lab Results   Component Value Date    BNP 57 10/21/2011      D-Dimer:  No results found for: DDIMER   1.  Radiology: None      Assessment/Plan     Patient Active Problem List    Diagnosis Date Noted    Pneumonia due to influenza A virus 12/31/2021    Acute respiratory failure with hypoxia and hypercapnia (HCC)     Shortness of breath 11/22/2021    Former smoker 11/22/2021    COVID-19 virus detected 01/18/2021    History of DVT of lower extremity 12/04/2020    History of pulmonary embolism 12/04/2020    Gastroesophageal reflux disease     Pure hypercholesterolemia 12/02/2019    Acquired hypothyroidism 08/01/2019    Lung infiltrate on CT 01/22/2019    Chronic hypoxemic respiratory failure ([facility] 02/06/2018    COPD with exacerbation ([facility] 11/15/2017    Leukocytosis 11/15/2017    Pulmonary nodule 07/05/2016  TIA (transient ischemic attack) 06/24/2015    Vertigo, central 06/24/2015     Overview Note:     Possible        Anticoagulant long-term use 06/24/2015    Arthritis 02/19/2014    Coronary atherosclerosis 02/19/2014    COPD, very severe ([facility] 06/28/2013    Essential hypertension 06/28/2013    Phlebitis 06/27/2013   Acute Hypoxic resp failure  Parainfluenza pneumonia  Influenza A  AECOPD  Overweight       1. Abx  2. Tamiflu  3. Prednisone taper  4. Keep sats > 92%  5. ICS  6. OOB  7. Await placement  8.  C.w present management    Electronically signed by Oumar Velasco MD on 1/3/2022 at 12:13 PM

## 2022-01-03 NOTE — PROGRESS NOTES
Physical Therapy    Facility/Department: [address] [place] [address]  Initial Assessment    NAME: Niya Luque  : 1945  MRN: [id number]    Date of Service: 1/3/2022    Discharge Recommendations:  Home with assist PRN   PT Equipment Recommendations  Equipment Needed:  (possible cane; cont to assess)    Assessment   Body structures, Functions, Activity limitations: Decreased functional mobility ; Decreased ADL status; Decreased endurance;Decreased balance  Assessment: Pt presents 4 days s/p admission w/ inc SOB, cough 2/2 influenza A PNA per EMR. Pt presents from home, where she lives w/  in a level entry home, is normally ind, recieves assist from family for heavy activities. Medical hx includes arthritis, osteoporosis, cancer, COPD. Pt presents w/ the above listed deficits, however, she is primarily limited by her balance at this time. She demonstrates intermittent need for steadying assist and desat on inc supplemental O2; she will benefit from further skilled acute care therapy to promote a safe transition home. Recommending home w/ assist PRN. Prognosis: Good  Decision Making: Medium Complexity  PT Education: Goals;Transfer Training;Disease Specific Education; Energy Conservation;PT Role;Functional Mobility Training;Plan of Care;General Safety; Injury Prevention; Adaptive Device Training;Gait Training;Family Education  REQUIRES PT FOLLOW UP: Yes  Activity Tolerance  Activity Tolerance: Patient limited by endurance        Treatment:  Therapeutic Activity Training:   Therapeutic activity training was instructed today. Cues were given for safety, sequence, UE/LE placement, awareness, and balance. Activities performed today included bed mobility training, sup-sit, sit-stand, SPT. Patient Diagnosis(es): The primary encounter diagnosis was Acute respiratory failure with hypoxia and hypercapnia ([address] Diagnoses of Influenza and COPD exacerbation ([address] were also pertinent to this visit.      has a past medical history of Anticoagulant long-term use, Arthritis, Cancer ([facility] Chronic hypoxemic respiratory failure ([facility] COPD (chronic obstructive pulmonary disease) ([facility] Coronary atherosclerosis, COVID-19 virus detected, DVT (deep venous thrombosis) ([facility] Former smoker, Gastroesophageal reflux disease, Hiatal hernia, Lung infiltrate on CT, On home oxygen therapy, Osteopenia, Phlebitis, Pulmonary nodule, S/P left heart catheterization by percutaneous approach, Sepsis due to pneumonia [facility]), Shortness of breath, Shortness of breath, Shortness of breath, and Wears glasses. has a past surgical history that includes Appendectomy; Tonsillectomy; Tubal ligation; Vein Surgery; bronchoscopy (1995); Colonoscopy; and Endoscopy, colon, diagnostic. Restrictions  Restrictions/Precautions  Restrictions/Precautions: Fall Risk,General Precautions (O2 at home (2 L); droplet)  Vision/Hearing  Vision: Impaired  Vision Exceptions: Wears glasses at all times  Hearing: Within functional limits     Subjective  General  Chart Reviewed: Yes  Patient assessed for rehabilitation services?: Yes  Family / Caregiver Present: Yes (, dtr)  Follows Commands: Within Functional Limits  Subjective  Subjective:  Im feeling a lot better  Pain Screening  Patient Currently in Pain: Denies  Vital Signs  Patient Currently in Pain: Denies       Orientation  Orientation  Overall Orientation Status: Within Functional Limits  Social/Functional History  Social/Functional History  Lives With: Spouse  Type of Home: House  Home Layout: One level  Home Access: Level entry  Home Equipment: [address] Help From: Family  ADL Assistance: Independent  Homemaking Assistance: Independent  Homemaking Responsibilities: Yes (splits w/ )  Ambulation Assistance: Independent  Transfer Assistance: Independent  Active : Yes ( drives)  Mode of Transportation: Car  Occupation: Retired  Additional Comments: pt normally ind w/o AD  Cognition   Cognition  Overall Cognitive Status: [facility]    Objective     Observation/Palpation  Posture: Good  Observation: Pt presents seated in recliner, awake w/ family in room?, agreeable to therapy. 4 L of nc O2, intermittently in a fib, SOB w/ sats dropping to 85% following functional mobility training, rebounds after 2-3 mins of seated rest. Tele, pulse ox, BP cuff, O2 in place. AROM RLE (degrees)  RLE AROM: WFL  AROM LLE (degrees)  LLE AROM : WFL  Strength RLE  Strength RLE: WFL  Comment: grossly 4+/5  Strength LLE  Strength LLE: WFL  Comment: grossly 4+/5     Sensation  Overall Sensation Status: [facility]  Bed mobility  Comment: NT; pt recieved in chair  Transfers  Sit to Stand: Stand by assistance  Stand to sit: Contact guard assistance  Bed to Chair: Stand by assistance (stand step w/o AD)  Ambulation  Ambulation?: Yes  More Ambulation?: No  Ambulation 1  Surface: level tile  Device: Hand-Held Assist  Assistance: Contact guard assistance  Gait Deviations: Slow [place]; Increased PAMELA; Decreased step length;Decreased step height  Distance: 40 ft  Comments: before seated rest needed     Balance  Sitting - Static: Good  Standing - Static: Fair;+  Standing - Dynamic: [address]  Times per week: 3+  Plan weeks: 1  Current Treatment Recommendations: Strengthening,Transfer Training,Endurance Training,ROM,ADL/Self-care Training,Balance Training,Gait Training,Functional Mobility Training,Positioning,Safety Education & Training,Equipment Evaluation, Education, & procurement,Pain Management,Patient/Caregiver Education & Training  Plan Comment: gait w/ cane next session  Safety Devices  Type of devices: Call light within reach,Gait belt,Patient at risk for falls,Left in chair,Nurse notified  Restraints  Initially in place: No    AM-PAC Score  AM-PAC Inpatient Mobility Raw Score : 18 (01/03/22 1044)  AM-PAC Inpatient T-Scale Score : 43.63 (01/03/22 1044)  Mobility Inpatient CMS 0-100% Score: 46.58 (01/03/22 1044)  Mobility Inpatient CMS G-Code Modifier : CK (01/03/22 1044)          Goals  Short term goals  Time Frame for Short term goals: 1 week  Short term goal 1: pt will complete bed mobility at sup  Short term goal 2: pt will complete transfers at sup  Short term goal 3: pt will ambulate 100 ft w/ LRAD at [facility]  Patient Goals   Patient goals : return home       Therapy Time   Individual Concurrent Group Co-treatment   Time In 0940         Time Out 1003         Minutes 23         Timed Code Treatment Minutes: 13 Minutes (TA)       Justina Dhillon, PT, DPT

## 2022-01-03 NOTE — PROGRESS NOTES
PHARMACY ANTICOAGULATION MONITORING SERVICE    Laurie Berg is a 68 y.o. female on warfarin therapy for A-fib. Pharmacy consulted by Dr. Dot Dubon for monitoring and adjustment of treatment. Indication for anticoagulation: A-fib  INR goal: 2-3  Warfarin dose prior to admission: warfarin 6 mg daily    Pertinent Laboratory Values   Recent Labs     01/01/22  0122 01/01/22  0122 01/02/22  0420 01/02/22  0420 01/03/22  0229   INR 5.15*   < > 3.94   < > 2.76   HGB 12.3*   < > 12.4*   < > 13.1   HCT 39.5   < > 39.3   < > 41.7     --  221  --  247    < > = values in this interval not displayed. Assessment/Plan:   Drug Interactions:  o Home meds:  Levothyroxine,   o Methylprednisolone, Tamiflu, and levaquin may increase effects of warfarin  o No Tx bridge   INR supra-therapeutic on admission. No warfarin doses have been give since 12/31/21. INR now back down into therapeutic range.  Restart warfarin dosing tonight at a slightly lower dose of 4mg daily and follow INR trends tomorrow. [address] [place] [address] will continue to monitor and adjust warfarin therapy as indicated    Thank you for the consult,  Timmy Kramer.  Geovanny Earl, [facility]   1/3/2022 4:33 PM

## 2022-01-04 VITALS
HEART RATE: 70 BPM | DIASTOLIC BLOOD PRESSURE: 56 MMHG | RESPIRATION RATE: 19 BRPM | TEMPERATURE: 97.8 F | OXYGEN SATURATION: 93 % | WEIGHT: 160.1 LBS | HEIGHT: 64 IN | SYSTOLIC BLOOD PRESSURE: 139 MMHG | BODY MASS INDEX: 27.33 KG/M2

## 2022-01-04 LAB
ALBUMIN SERPL-MCNC: 2.8 GM/DL (ref 3.4–5)
ALP BLD-CCNC: 58 IU/L (ref 40–128)
ALT SERPL-CCNC: 26 U/L (ref 10–40)
ANION GAP SERPL CALCULATED.3IONS-SCNC: 8 MMOL/L (ref 4–16)
APTT: 30.4 SECONDS (ref 25.1–37.1)
AST SERPL-CCNC: 31 IU/L (ref 15–37)
ATYPICAL LYMPHOCYTE ABSOLUTE COUNT: ABNORMAL
BANDED NEUTROPHILS ABSOLUTE COUNT: 0.16 K/CU MM
BANDED NEUTROPHILS RELATIVE PERCENT: 2 % (ref 5–11)
BILIRUB SERPL-MCNC: 0.3 MG/DL (ref 0–1)
BUN BLDV-MCNC: 14 MG/DL (ref 6–23)
CALCIUM SERPL-MCNC: 8.7 MG/DL (ref 8.3–10.6)
CHLORIDE BLD-SCNC: 99 MMOL/L (ref 99–110)
CO2: 35 MMOL/L (ref 21–32)
CREAT SERPL-MCNC: 0.5 MG/DL (ref 0.6–1.1)
DIFFERENTIAL TYPE: ABNORMAL
GFR AFRICAN AMERICAN: >60 ML/MIN/1.73M2
GFR NON-AFRICAN AMERICAN: >60 ML/MIN/1.73M2
GLUCOSE BLD-MCNC: 81 MG/DL (ref 70–99)
HCT VFR BLD CALC: 42.6 % (ref 37–47)
HEMOGLOBIN: 13.2 GM/DL (ref 12.5–16)
INR BLD: 2.23 INDEX
LYMPHOCYTES ABSOLUTE: 2 K/CU MM
LYMPHOCYTES RELATIVE PERCENT: 25 % (ref 24–44)
MAGNESIUM: 2.1 MG/DL (ref 1.8–2.4)
MCH RBC QN AUTO: 30.2 PG (ref 27–31)
MCHC RBC AUTO-ENTMCNC: 31 % (ref 32–36)
MCV RBC AUTO: 97.5 FL (ref 78–100)
METAMYELOCYTES ABSOLUTE COUNT: 0.4 K/CU MM
METAMYELOCYTES PERCENT: 5 %
MONOCYTES ABSOLUTE: 0.7 K/CU MM
MONOCYTES RELATIVE PERCENT: 9 % (ref 0–4)
PDW BLD-RTO: 13.4 % (ref 11.7–14.9)
PHOSPHORUS: 1.9 MG/DL (ref 2.5–4.9)
PLATELET # BLD: 255 K/CU MM (ref 140–440)
PMV BLD AUTO: 9.9 FL (ref 7.5–11.1)
POLYCHROMASIA: ABNORMAL
POTASSIUM SERPL-SCNC: 3.7 MMOL/L (ref 3.5–5.1)
PROTHROMBIN TIME: 29 SECONDS (ref 11.7–14.5)
RBC # BLD: 4.37 M/CU MM (ref 4.2–5.4)
SEGMENTED NEUTROPHILS ABSOLUTE COUNT: 4.7 K/CU MM
SEGMENTED NEUTROPHILS RELATIVE PERCENT: 59 % (ref 36–66)
SODIUM BLD-SCNC: 142 MMOL/L (ref 135–145)
TOTAL PROTEIN: 4.6 GM/DL (ref 6.4–8.2)
TOXIC GRANULATION: PRESENT
WBC # BLD: 8 K/CU MM (ref 4–10.5)

## 2022-01-04 PROCEDURE — 83735 ASSAY OF MAGNESIUM: CPT

## 2022-01-04 PROCEDURE — 94761 N-INVAS EAR/PLS OXIMETRY MLT: CPT

## 2022-01-04 PROCEDURE — 85007 BL SMEAR W/DIFF WBC COUNT: CPT

## 2022-01-04 PROCEDURE — 85027 COMPLETE CBC AUTOMATED: CPT

## 2022-01-04 PROCEDURE — 97530 THERAPEUTIC ACTIVITIES: CPT

## 2022-01-04 PROCEDURE — 6370000000 HC RX 637 (ALT 250 FOR IP): Performed by: INTERNAL MEDICINE

## 2022-01-04 PROCEDURE — 85610 PROTHROMBIN TIME: CPT

## 2022-01-04 PROCEDURE — 84100 ASSAY OF PHOSPHORUS: CPT

## 2022-01-04 PROCEDURE — 2700000000 HC OXYGEN THERAPY PER DAY

## 2022-01-04 PROCEDURE — 94640 AIRWAY INHALATION TREATMENT: CPT

## 2022-01-04 PROCEDURE — 80053 COMPREHEN METABOLIC PANEL: CPT

## 2022-01-04 PROCEDURE — 2580000003 HC RX 258: Performed by: INTERNAL MEDICINE

## 2022-01-04 PROCEDURE — 99232 SBSQ HOSP IP/OBS MODERATE 35: CPT | Performed by: INTERNAL MEDICINE

## 2022-01-04 PROCEDURE — 36415 COLL VENOUS BLD VENIPUNCTURE: CPT

## 2022-01-04 PROCEDURE — 97166 OT EVAL MOD COMPLEX 45 MIN: CPT

## 2022-01-04 PROCEDURE — 85730 THROMBOPLASTIN TIME PARTIAL: CPT

## 2022-01-04 RX ORDER — DOXYCYCLINE HYCLATE 100 MG
100 TABLET ORAL 2 TIMES DAILY
Qty: 10 TABLET | Refills: 0 | Status: SHIPPED | OUTPATIENT
Start: 2022-01-04 | End: 2022-01-09

## 2022-01-04 RX ORDER — DILTIAZEM HYDROCHLORIDE 60 MG/1
60 TABLET, FILM COATED ORAL EVERY 8 HOURS SCHEDULED
Qty: 120 TABLET | Refills: 3 | Status: SHIPPED | OUTPATIENT
Start: 2022-01-04

## 2022-01-04 RX ORDER — METHYLPREDNISOLONE 4 MG/1
TABLET ORAL
Qty: 1 KIT | Refills: 0 | Status: SHIPPED | OUTPATIENT
Start: 2022-01-04 | End: 2022-01-13

## 2022-01-04 RX ADMIN — DICYCLOMINE HYDROCHLORIDE 10 MG: 10 CAPSULE ORAL at 08:45

## 2022-01-04 RX ADMIN — DILTIAZEM HYDROCHLORIDE 60 MG: 60 TABLET, FILM COATED ORAL at 14:06

## 2022-01-04 RX ADMIN — PANTOPRAZOLE SODIUM 40 MG: 40 TABLET, DELAYED RELEASE ORAL at 05:33

## 2022-01-04 RX ADMIN — ALBUTEROL SULFATE 2 PUFF: 90 AEROSOL, METERED RESPIRATORY (INHALATION) at 07:48

## 2022-01-04 RX ADMIN — ALBUTEROL SULFATE 2 PUFF: 90 AEROSOL, METERED RESPIRATORY (INHALATION) at 15:49

## 2022-01-04 RX ADMIN — WARFARIN SODIUM 4 MG: 4 TABLET ORAL at 17:27

## 2022-01-04 RX ADMIN — FUROSEMIDE 40 MG: 20 TABLET ORAL at 08:44

## 2022-01-04 RX ADMIN — OSELTAMIVIR PHOSPHATE 75 MG: 75 CAPSULE ORAL at 08:44

## 2022-01-04 RX ADMIN — GABAPENTIN 400 MG: 400 CAPSULE ORAL at 17:27

## 2022-01-04 RX ADMIN — Medication 1 CAPSULE: at 08:46

## 2022-01-04 RX ADMIN — LEVOTHYROXINE SODIUM 50 MCG: 0.05 TABLET ORAL at 08:44

## 2022-01-04 RX ADMIN — ISOSORBIDE MONONITRATE 60 MG: 60 TABLET, EXTENDED RELEASE ORAL at 08:48

## 2022-01-04 RX ADMIN — ROFLUMILAST 500 MCG: 500 TABLET ORAL at 08:45

## 2022-01-04 RX ADMIN — POTASSIUM CHLORIDE 10 MEQ: 20 TABLET, EXTENDED RELEASE ORAL at 08:44

## 2022-01-04 RX ADMIN — MIDODRINE HYDROCHLORIDE 5 MG: 5 TABLET ORAL at 17:27

## 2022-01-04 RX ADMIN — Medication 2 PUFF: at 15:50

## 2022-01-04 RX ADMIN — MIDODRINE HYDROCHLORIDE 5 MG: 5 TABLET ORAL at 08:44

## 2022-01-04 RX ADMIN — THEOPHYLLINE ANHYDROUS 200 MG: 200 CAPSULE, EXTENDED RELEASE ORAL at 08:44

## 2022-01-04 RX ADMIN — DILTIAZEM HYDROCHLORIDE 60 MG: 60 TABLET, FILM COATED ORAL at 05:33

## 2022-01-04 RX ADMIN — PREDNISONE 40 MG: 20 TABLET ORAL at 08:44

## 2022-01-04 RX ADMIN — ALBUTEROL SULFATE 2 PUFF: 90 AEROSOL, METERED RESPIRATORY (INHALATION) at 11:38

## 2022-01-04 RX ADMIN — SODIUM CHLORIDE, PRESERVATIVE FREE 10 ML: 5 INJECTION INTRAVENOUS at 08:45

## 2022-01-04 RX ADMIN — Medication 2 PUFF: at 07:48

## 2022-01-04 RX ADMIN — Medication 2 PUFF: at 11:39

## 2022-01-04 RX ADMIN — LACTULOSE 10 G: 10 SOLUTION ORAL at 08:45

## 2022-01-04 RX ADMIN — MIDODRINE HYDROCHLORIDE 5 MG: 5 TABLET ORAL at 11:33

## 2022-01-04 ASSESSMENT — PAIN SCALES - GENERAL
PAINLEVEL_OUTOF10: 0
PAINLEVEL_OUTOF10: 0

## 2022-01-04 NOTE — PROGRESS NOTES
Hospitalist Progress Note      Name:  Dyana Tobar /Age/Sex: 1945  (68 y.o. female)   MRN & CSN:  [id number] & [id number] Admission Date/Time: 2021  8:46 AM   Location:  3118/3118-A PCP: Colin Morton MD           Assessment and Plan:       January 3-she reports that she wants to go home tomorrow if possible. Consider discharge if wheezing improved. Influenza A pneumonia. -Oseltamivir from  until   -With sepsis    COPD exacerbation.  --on methylprednisolone 40 mg IV every 6 hours-to end on -and prednisone to start on January 3  -Patient would like to go home tomorrow on  if she can, we will need to assess her tomorrow    Acute respiratory failure with hypoxia. Likely secondary to #1 and #2. Continue supplemental oxygen with plan to wean as tolerated. Monitor pulse oximeter closely.  --only requiring 2 L at this time, continue to wean oxygen    Atrial fibrillation with rapid ventricular response. Patient has underlying history of atrial fibrillation.    --in sinus rhythm  -Cardizem drip ordered, but never started -as of  heart rate is about 100.   Not on rate controlling drugs currently.  -On anticoagulation with Coumadin   -INR is 5 on -pharmacy managing it  -Cardiology consult appreciated     Anticoagulation-he also had a DVT    Chronic respiratory failure on 2 L oxygen at home  History of COVID-19 pneumonia in 2021  Hiatal hernia  GERD    Subjective:     January 3: She feels better today    :  -She was only requiring 2 L when I saw her today  -Family member in the room stated that when she coughs her oxygen level drops  -Her phosphate was low, 1.3, and she got IV phosphate today      :      She was brought to ED by EMS  Her daughter is said that she called the EMS    Her daughter in the room indicated that the patient was on BiPAP yesterday from 8 AM to 11 AM when she was in the room and that she was in public last night    When I saw her she was on 4 L oxygen      History    Home oxygen: 2 L    Pulmonologist: Dr. Roni Lujan    Family: She lives with her     Daughter: Ernie Denney - [phone] -I spoke with her in the room today. Objective:     Temperature 98.3  Pulse 77  Respirations 17  Blood pressure 128/50    Physical Exam:       Physical Exam  Constitutional:       Appearance: She is not ill-appearing. Pulmonary:      Effort: Pulmonary effort is normal.   Skin:     Coloration: Skin is not jaundiced. Neurological:      Mental Status: She is alert. Cranial Nerves: No cranial nerve deficit.

## 2022-01-04 NOTE — PROGRESS NOTES
Daily Progress Note       Awake and alert; up in chair  Denies any Cp, SOB is still present  On 2L NC, HR and BP stable     She is stable from cardiac stand  PAFIB now sinus keep on  [facility]  Stop imdur as she is on proamatine also     Afib with RVR    Converted to NSR    On coumadin for [facility] NSR with PAC's, will cont' to monitor    HR HR in the 70's today    Denies any palpitations    Echo stable     Influenza A    Positive PCR in ER    On tamiflu     On ATB, high risk for bacteria PNA    Treat per primary     COPDE    Likely related to influenza    Wears 2L NC chronically    On IV steroids,     Breathing treatments    Treatment per primary     Hx of DVT;s    On coumadin    INR stable now, pharmacy to dose    Will follow- stable from cardiac stand-if D/C f/u at office in 2-3 weeks         CXR 1/2/2022  Impression   No significant interval change in bilateral airspace and interstitial   opacities.  Questionable small left pleural effusion      PAST MEDICAL HISTORY: Humphrey Lopez has a previous medical history of arthritis,  COPD, _____ arthrosis, former smoker, GERD, hiatal hernia, home O2 use,  osteopenia, pulmonary nodules, and shortness of breath.     PAST SURGICAL HISTORY:  She has a previous surgical history of  appendectomy, bronchoscopy, colonoscopy, endoscopy, tonsillectomy, and  vein surgery.     FAMILY HISTORY:  The patient's mother, father, and sister all have heart  disease.     SOCIAL HISTORY: Humphrey Lopez is a former smoker, used to smoke a pack-and-a-half  a day.  She does not use any alcohol.  No illicit drug use.     ALLERGIES:  She has allergies to CODEINE, DARVON, MORPHINE, NEOSPORIN,  PENICILLIN, and LAMISIL.         Objective:   BP (!) 122/41   Pulse 70   Temp 98.1 °F (36.7 °C) (Oral)   Resp 18   Ht 5' 4\" (1.626 m)   Wt 160 lb 1.6 oz (72.6 kg)   SpO2 94%   BMI 27.48 kg/m²   No intake or output data in the 24 hours ending 01/04/22 1012    Medications:   Scheduled Meds:   lactulose  10 g Oral BID    rosuvastatin  10 mg Oral Nightly    dilTIAZem  60 mg Oral 3 times per day    potassium chloride  10 mEq Oral BID    midodrine  5 mg Oral TID WC    warfarin  4 mg Oral Daily    ipratropium  2 puff Inhalation 4x daily    albuterol sulfate HFA  2 puff Inhalation 4x daily    b complex vitamins  1 capsule Oral Daily    Roflumilast  500 mcg Oral Daily    dicyclomine  10 mg Oral BID    pantoprazole  40 mg Oral QAM AC    furosemide  40 mg Oral Daily    gabapentin  400 mg Oral QPM    isosorbide mononitrate  60 mg Oral Daily    levothyroxine  50 mcg Oral Daily    montelukast  10 mg Oral Nightly    theophylline  200 mg Oral BID    sodium chloride flush  5-40 mL IntraVENous 2 times per day    predniSONE  40 mg Oral Daily    oseltamivir  75 mg Oral BID    levofloxacin  500 mg IntraVENous Q24H      Infusions:   sodium chloride        PRN Meds:  sodium phosphate IVPB **OR** sodium phosphate IVPB, ipratropium-albuterol, albuterol sulfate HFA, sodium chloride flush, sodium chloride, ondansetron **OR** ondansetron, polyethylene glycol, acetaminophen **OR** acetaminophen       Physical Exam:  Vitals:    01/04/22 0841   BP: (!) 122/41   Pulse: 70   Resp: 18   Temp: 98.1 °F (36.7 °C)   SpO2: 94%        General: AAO, NAD  Chest: Nontender  Cardiac: First and Second Heart Sounds are Normal, No Murmurs or Gallops noted  Lungs:Clear to auscultation and percussion. Abdomen: Soft, NT, ND, +BS  Extremities: No clubbing, no edema  Vascular:  Equal 2+ peripheral pulses.         Lab Data:  CBC:   Recent Labs     01/02/22 0420 01/03/22 0229 01/04/22  0652   WBC 10.0 12.1* 8.0   HGB 12.4* 13.1 13.2   HCT 39.3 41.7 42.6   MCV 97.0 98.6 97.5    247 255     BMP:   Recent Labs     01/02/22  0420 01/03/22 0229 01/04/22  0652    144 142   K 3.8 3.3* 3.7    101 99   CO2 32 31 35*   PHOS 1.3* 1.8* 1.9*   BUN 19 20 14   CREATININE 0.5* 0.6 0.5*     LIVER PROFILE:   Recent Labs     01/02/22  0420 01/03/22  0221 01/04/22  0652   AST 41* 55* 31   ALT 23 32 26   BILITOT 0.2 0.2 0.3   ALKPHOS 65 70 58     PT/INR:   Recent Labs     01/02/22  0420 01/03/22 0229 01/04/22 0652   PROTIME 51.5* 36.0* 29.0*   INR 3.94 2.76 2.23     APTT:   Recent Labs     01/02/22  0420 01/03/22 0229 01/04/22 0652   APTT 50.1* 33.5 30.4     BNP:  No results for input(s): BNP in the last 72 hours.       Assessment:  Patient Active Problem List    Diagnosis Date Noted    Pneumonia due to influenza A virus 12/31/2021    Acute respiratory failure with hypoxia and hypercapnia (HCC)     Shortness of breath 11/22/2021    Former smoker 11/22/2021    COVID-19 virus detected 01/18/2021    History of DVT of lower extremity 12/04/2020    History of pulmonary embolism 12/04/2020    Gastroesophageal reflux disease     Pure hypercholesterolemia 12/02/2019    Acquired hypothyroidism 08/01/2019    Lung infiltrate on CT 01/22/2019    Chronic hypoxemic respiratory failure ([address] 02/06/2018    COPD with exacerbation ([address] 11/15/2017    Leukocytosis 11/15/2017    Pulmonary nodule 07/05/2016    TIA (transient ischemic attack) 06/24/2015    Vertigo, central 06/24/2015    Anticoagulant long-term use 06/24/2015    Arthritis 02/19/2014    Coronary atherosclerosis 02/19/2014    COPD, very severe ([address] 06/28/2013    Essential hypertension 06/28/2013    Phlebitis 06/27/2013       Electronically signed by Shailesh Lipscomb PA-C on 1/4/2022 at 10:12 AM    Electronically signed by Kendell Ramirez MD on 1/4/22 at 12:00 PM EST

## 2022-01-04 NOTE — DISCHARGE SUMMARY
Luli Cisneros 1945 [id number]  PCP:  Angela Ruiz MD    Admit date: 12/31/2021  Admitting Physician: Manjeet Persaud MD    Discharge date: 1/4/2022 Discharge Physician: Jacky Ferraro MD         Discharge Diagnoses. Patient Active Problem List   Diagnosis    Phlebitis    COPD[facility] severe ([facility]    Essential hypertension    TIA (transient ischemic attack)    Vertigo, central    Anticoagulant long-term use    Pulmonary nodule    Arthritis    Coronary atherosclerosis    COPD with exacerbation ([facility]    Leukocytosis    Chronic hypoxemic respiratory failure (HCC)    Lung infiltrate on CT    Acquired hypothyroidism    Pure hypercholesterolemia    History of DVT of lower extremity    History of pulmonary embolism    Gastroesophageal reflux disease    COVID-19 virus detected    Shortness of breath    Former smoker    Pneumonia due to influenza A virus    Acute respiratory failure with hypoxia and hypercapnia [facility])        Hospital Course:  History of present illness at admission: As per H&P  Subsequent Hospital Course:     Patient was admitted with shortness of breath due to influenza A pneumonia. Started on Tamiflu and steroids for associated acute COPD exacerbation. CXR did not show much evidence of pneumonia. Patient is back to baseline on 2 L of O2. Paroxysmal A. fib presenting with A. fib RVR now back in normal sinus rhythm. Echo normal EF. Discontinued home Cardizem CD and started on Cardizem 60 3 times daily per cardio. Stop home Imdur and continue ProAmatine and Lasix. BP and heart rate seem to be stable. Continue home anticoagulation with Coumadin with goal INR 2-3. DC home on Doxy x5 days. On the day of discharge, pt felt better. No new complaints.     Pertinent Exam Findings on Day of Discharge:  General Appearance:    Alert, cooperative, no distress, appears stated age  Head:    Normocephalic, without obvious abnormality, atraumatic  Eyes: PERRL, conjunctiva/corneas clear, EOM's intact  Lungs:    Clear to auscultation bilaterally, respirations unlabored   Heart:    Regular rate and rhythm, S1 and S2 normal, no murmur,   rub or gallop  Abdomen:     Soft, non-tender, bowel sounds active, no masses, no organomegaly  Extremities:   Extremities normal, atraumatic, no cyanosis or edema    Consults:  IP CONSULT TO HOSPITALIST  IP CONSULT TO CARDIOLOGY  IP CONSULT TO PHARMACY  IP CONSULT TO PULMONOLOGY  IP CONSULT TO CASE MANAGEMENT  IP CONSULT TO CASE MANAGEMENT  IP CONSULT TO HOME CARE NEEDS    Patient Instructions:     Medication List      START taking these medications    dilTIAZem 60 MG tablet  Commonly known as: CARDIZEM  Take 1 tablet by mouth every 8 hours     doxycycline hyclate 100 MG tablet  Commonly known as: VIBRA-TABS  Take 1 tablet by mouth 2 times daily for 5 days     methylPREDNISolone 4 MG tablet  Commonly known as: MEDROL DOSEPACK  Take by mouth.         CONTINUE taking these medications    * albuterol (2.5 MG/3ML) 0.083% nebulizer solution  Commonly known as: PROVENTIL  TAKE 3 MLS BY NEBULIZATION 4 TIMES DAILY AS NEEDED FOR WHEEZING     * albuterol sulfate  (90 Base) MCG/ACT inhaler  INHALE 2 PUFF FOUR TIMES A DAY AS NEEDED     b complex vitamins capsule     benzonatate 100 MG capsule  Commonly known as: TESSALON  Take 1 capsule by mouth 2 times daily as needed for Cough     Biotin 75413 MCG Tabs     Breztri Aerosphere 160-9-4.8 MCG/ACT Aero  Generic drug: Budeson-Glycopyrrol-Formoterol  Inhale 2 actuation into the lungs 2 times daily     Calcium + D3 600-200 MG-UNIT Tabs tablet     celecoxib 200 MG capsule  Commonly known as: CELEBREX  TAKE 1 CAPSULE BY MOUTH EVERY DAY     Daliresp 500 MCG tablet  Generic drug: Roflumilast  TAKE 1 TABLET BY MOUTH EVERY DAY     dicyclomine 10 MG capsule  Commonly known as: BENTYL     esomeprazole 40 MG delayed release capsule  Commonly known as: NEXIUM  TAKE 1 CAPSULE BY MOUTH TWICE A DAY furosemide 40 MG tablet  Commonly known as: LASIX  Take 1 tablet by mouth daily     gabapentin 400 MG capsule  Commonly known as: NEURONTIN  TAKE 1 CAPSULE BY MOUTH EVERY EVENING FOR 90 DAYS.     levothyroxine 50 MCG tablet  Commonly known as: SYNTHROID  TAKE 1 TABLET BY MOUTH EVERY DAY     LORazepam 0.5 MG tablet  Commonly known as: ATIVAN     Magnesium Oxide 500 MG Tabs     montelukast 10 MG tablet  Commonly known as: SINGULAIR  TAKE 1 TABLET BY MOUTH EVERY DAY EVERY NIGHT     Mucinex DM  MG Tb12  Take 1 to 2 tablet by mouth every 12 hours (maximum: 4 tablets/24 hours). Drink plenty of water. MULTIVITAMIN PO     nitroGLYCERIN 0.4 MG SL tablet  Commonly known as: NITROSTAT  Place 1 tablet under the tongue every 5 minutes as needed for Chest pain     OXYGEN     potassium chloride 10 MEQ extended release tablet  Commonly known as: Klor-Con M10  Take 2 tablets by mouth daily     risedronate 35 MG tablet  Commonly known as: ACTONEL  TAKE 1 TABLET BY MOUTH EVERY 7 DAYS PATIENT TAKES ON SUNDAY     rosuvastatin 20 MG tablet  Commonly known as: CRESTOR  TAKE 1 TABLET BY MOUTH EVERY DAY EVERY NIGHT     theophylline 450 MG extended release tablet  Commonly known as: THEODUR  TAKE 1/2 TABLET BY MOUTH TWICE A DAY     * warfarin 3 MG tablet  Commonly known as: COUMADIN  Take as directed. If you are unsure how to take this medication, talk to your nurse or doctor. Original instructions: Take 1 tabet by mouth every day per INR     * warfarin 6 MG tablet  Commonly known as: COUMADIN  Take as directed. If you are unsure how to take this medication, talk to your nurse or doctor. Original instructions: TAKE 1 TABLET BY MOUTH EVERY DAY PER INR         * This list has 4 medication(s) that are the same as other medications prescribed for you. Read the directions carefully, and ask your doctor or other care provider to review them with you.             STOP taking these medications    azithromycin 250 MG tablet  Commonly known as: ZITHROMAX     dilTIAZem 240 MG extended release capsule  Commonly known as: CARDIZEM CD     isosorbide mononitrate 60 MG extended release tablet  Commonly known as: IMDUR     predniSONE 50 MG tablet  Commonly known as: Guy Kim           Where to Get Your Medications      These [id number], [address] [place] [address] [phone]  [address] [place] [address] 21, [address]    Phone: [phone]   · dilTIAZem 60 MG tablet  · doxycycline hyclate 100 MG tablet  · methylPREDNISolone 4 MG tablet           Diet:  ADULT DIET;  Regular; 4 carb choices (60 gm/meal)    Activity:   activity as tolerated     Discharge Condition:   good    Disposition:   home    Follow-up    Angela Ruiz MD  Schedule an appointment as soon as possible for a visit  Medical Management  [facility] [address] [place]  [phone]   Mikey Renee MD  Go to  Medical Management of heart issues  [address] [place] [address], Pending sale to [facility] [facility] [address] [phone]       Time spent on discharge in the examination, evaluation, counseling and review of medications and discharge plan: >35 minutes       Discharge Physician Signed: Jacky Ferraro MD

## 2022-01-04 NOTE — PROGRESS NOTES
[facility] ACUTE CARE OCCUPATIONAL THERAPY EVALUATION    Jayy Vásquez, 1945, [id number], 1/4/2022    Discharge Recommendation: Home with initial 24 hour supervision/assistance, Home Health OT services      History:  [place]:  The primary encounter diagnosis was Acute respiratory failure with hypoxia and hypercapnia ([facility] Diagnoses of Influenza and COPD exacerbation ([facility] were also pertinent to this visit.     Subjective:  Patient states: \"I'm feeling much better\"  Pain: Pt denied pain this date  Communication with other providers: RN stated okay for eval/tx  Restrictions: General Precautions, Fall Risk, Telemetry, BP cuff, Pulse Ox, 2L of O2 via NC, Droplet Isolation     Home Setup/Prior level of function:  Social/Functional History  Lives With: Spouse  Type of Home: House  Home Layout: One level  Home Access: Level entry  Bathroom Shower/Tub: Walk-in shower  Bathroom Equipment: Grab bars in [facility]  Home Equipment: Rolling walker, [employer], Oxygen  Receives Help From: Family  ADL Assistance: Independent  Homemaking Assistance: Independent  Homemaking Responsibilities: Yes (splits w/ )  Ambulation Assistance: Independent  Transfer Assistance: Independent  Active : Yes ( drives)  Mode of Transportation: Car  Occupation: Retired  Additional Comments: pt normally ind w/o AD    Examination:  · Observation: Sitting on BSC with dtr present upon OT arrival  · Vision: WFL with glasses  · Hearing: WFL  · Vitals: Stable vitals throughout session    Body Systems and functions:  · ROM: BL UEs WFL   · Strength: 4+/5 BL UEs across all major muscle groups    · Sensation: WFL  · Tone: Normal  · Coordination: WFL  · Perception: WNL    Activities of Daily Living (ADLs):  · Feeding: Independent   · Grooming: CGA, anticipate Pt could perform in standing at sink   · UB bathing: CGA, seated for safety   · LB bathing: Min A, seated for safety, anticipate light assist to reach distal LEs and light dynamic standing balance   · UB dressing: CGA, seated for safety   · LB dressing: Min A, seated for safety, anticipate light assist for dynamic balance   · Toileting: CGA, Pt had BM on BSC and was CGA for all parts including idalia care in standing. Cognitive and Psychosocial Functioning:  · Overall cognitive status: WNL  · Affect: Normal     Balance:   · Sitting: SBA sitting unsupported on BSC and at EOB   · Standing: CGA without AD     Functional Mobility:  · Bed Mobility: SBA sit to supine with HOB elevated, sup to sit not observed  · Transfers: CGA sit to stand from [facility] without AD, CGA stand to sit on bed   · Ambulation: CGA 5ft from [facility] to bed without AD      AM-PAC 6 click short form for inpatient daily activity:   How much help from another person does the patient currently need. .. Unable  Dep A Lot  Max A A Lot   Mod A A Little  Min A A Little   CGA  SBA None   Mod I  Indep  Sup   1. Putting on and taking off regular lower body clothing? [] 1    [] 2   [] 2   [x] 3   [] 3   [] 4      2. Bathing (including washing, rinsing, drying)? [] 1   [] 2   [] 2 [x] 3 [] 3 [] 4   3. Toileting, which includes using toilet, bedpan, or urinal? [] 1    [] 2   [] 2   [] 3   [x] 3   [] 4     4. Putting on and taking off regular upper body clothing? [] 1   [] 2   [] 2   [] 3   [x] 3    [] 4      5. Taking care of personal grooming such as brushing teeth? [] 1   [] 2    [] 2 [] 3    [x] 3   [] 4      6. Eating meals? [] 1   [] 2   [] 2   [] 3   [] 3   [x] 4      Raw Score: 19    [24=0% impaired(CH), 23=1-19%(CI), 20-22=20-39%(CJ), 15-19=40-59%(CK), 10-14=60-79%(CL), 7-9=80-99%(CM), 6=100%(CN)]     Treatment:  Therapeutic Activity Training:   Therapeutic activity training was instructed today. Cues were given for safety, sequence, UE/LE placement, awareness, and balance.     Activities performed today included bed mobility training, sup-sit, sit-stand, SPT, transfer from [facility], edu on role of OT, edu on discharge planning/recommendations, edu on importance of OOB/EOB activity. Safety Measures: Gait belt used, Left in , Alarm in place, Needs in reach     Assessment:  Pt is a 68year old female with a past medical history of Anticoagulant long-term use, Arthritis, Cancer (HCC), Chronic hypoxemic respiratory failure ([facility] COPD (chronic obstructive pulmonary disease) ([facility] Coronary atherosclerosis, COVID-19 virus detected, DVT (deep venous thrombosis) ([facility] Former smoker, Gastroesophageal reflux disease, Hiatal hernia, Lung infiltrate on CT, On home oxygen therapy, Osteopenia, Phlebitis, Pulmonary nodule, S/P left heart catheterization by percutaneous approach, Sepsis due to pneumonia ([facility] Shortness of breath, Shortness of breath, Shortness of breath, and Wears glasses. Pt admitted for SOB on 12/31. Pt dx with Influenza A PNA and COPD exacerbation. Pt is from home where she lives with her  in a 1story house with no BARB. Prior to admission Pt was fully independent in all ADLs, IADLs, driving and ambulating without AD. Pt presents as above and would benefit from continued acute care OT services as well as [facility] OT upon returning home with initial 24/7 supervision/assist.       Complexity: Moderate  Prognosis: Good  Plan: 2x/week      Goals:  1. Pt will complete all aspects of bed mobility for EOB/OOB ADLs ind with HOB flat  2. Pt will complete UB/LB bathing mod I seated  3. Pt will complete all aspects of LB dressing mod I seated  4. Pt will complete all functional transfers to and from bed, chair, toilet, shower chair SBA with LRAD  5. Pt will ambulate HH distance to bathroom for toileting SBA with LRAD  6. Pt will complete all aspects of toileting task SBA  7. Pt will complete oral hygiene/grooming routine in standing at sink CGA  8. Pt will complete ther ex/ther act with focus on activity tolerance, fatigue mgmt, BL UE strength, dynamic sitting and standing balance.        Time: Time in: 1022  Time out: 1041  Timed treatment minutes: 9  Total time: 19      Electronically signed by:       LUNA Tidwell/L, [address] [facility], [id number]

## 2022-01-04 NOTE — PROGRESS NOTES
PHARMACY ANTICOAGULATION MONITORING SERVICE    Ej Hinojosa is a 68 y.o. female on warfarin therapy for A-fib. Pharmacy consulted by Dr. Josue Shields for monitoring and adjustment of treatment. Indication for anticoagulation: A-fib  INR goal: 2-3  Warfarin dose prior to admission: warfarin 6 mg daily    Pertinent Laboratory Values   Recent Labs     01/02/22  0420 01/02/22  0420 01/03/22  0229 01/03/22  0229 01/04/22  0652   INR 3.94   < > 2.76   < > 2.23   HGB 12.4*   < > 13.1   < > 13.2   HCT 39.3   < > 41.7   < > 42.6     --  247  --  255    < > = values in this interval not displayed. Assessment/Plan:   Drug Interactions:  o Home meds:  Levothyroxine  o Prednisone, Tamiflu, and levaquin may increase effects of warfarin  o No Tx bridge   INR supra-therapeutic on admission. INR back down into therapeutic range yesterday. Warfarin dosing restarted last night, INR remains therapeutic today.  Continue warfarin at a slightly lower dose of 4mg daily and follow INR trends tomorrow. [address] [place] [address] will continue to monitor and adjust warfarin therapy as indicated    Thank you for the consult,  Chauncey Kowalski.  Dina Alcantar, [facility]   1/4/2022 5:45 PM

## 2022-01-04 NOTE — PROGRESS NOTES
Pulmonary and Critical Care  Progress Note      VITALS:  BP (!) 116/40   Pulse 70   Temp 97.9 °F (36.6 °C) (Oral)   Resp 15   Ht 5' 4\" (1.626 m)   Wt 160 lb 1.6 oz (72.6 kg)   SpO2 93%   BMI 27.48 kg/m²     Subjective:   CHIEF COMPLAINT :SOB     HPI:                The patient is sitting in the bed. She is not in acute resp distress    Objective:   PHYSICAL EXAM:    LUNGS:Occasional basal crackles  Abd-soft, BS+,NT  Ext- no pedal edema  CVS-s1s2, no murmurs      DATA:    CBC:  Recent Labs     01/02/22  0420 01/03/22 0229 01/04/22  0652   WBC 10.0 12.1* 8.0   RBC 4.05* 4.23 4.37   HGB 12.4* 13.1 13.2   HCT 39.3 41.7 42.6    247 255   MCV 97.0 98.6 97.5   MCH 30.6 31.0 30.2   MCHC 31.6* 31.4* 31.0*   RDW 13.4 13.4 13.4   SEGSPCT 90.3* 78.4* 59.0   BANDSPCT  --   --  2*      BMP:  Recent Labs     01/02/22 0420 01/03/22 0229 01/04/22  0652    144 142   K 3.8 3.3* 3.7    101 99   CO2 32 31 35*   BUN 19 20 14   CREATININE 0.5* 0.6 0.5*   CALCIUM 8.3 8.4 8.7   GLUCOSE 131* 88 81      ABG:  No results for input(s): PH, PO2ART, IMF1ATA, HCO3, BEART, O2SAT in the last 72 hours. BNP  Lab Results   Component Value Date    BNP 57 10/21/2011      D-Dimer:  No results found for: DDIMER   1.  Radiology: None      Assessment/Plan     Patient Active Problem List    Diagnosis Date Noted    Pneumonia due to influenza A virus 12/31/2021    Acute respiratory failure with hypoxia and hypercapnia (HCC)     Shortness of breath 11/22/2021    Former smoker 11/22/2021    COVID-19 virus detected 01/18/2021    History of DVT of lower extremity 12/04/2020    History of pulmonary embolism 12/04/2020    Gastroesophageal reflux disease     Pure hypercholesterolemia 12/02/2019    Acquired hypothyroidism 08/01/2019    Lung infiltrate on CT 01/22/2019    Chronic hypoxemic respiratory failure ([facility] 02/06/2018    COPD with exacerbation ([facility] 11/15/2017    Leukocytosis 11/15/2017    Pulmonary nodule 07/05/2016  TIA (transient ischemic attack) 06/24/2015    Vertigo, central 06/24/2015     Overview Note:     Possible        Anticoagulant long-term use 06/24/2015    Arthritis 02/19/2014    Coronary atherosclerosis 02/19/2014    COPD, very severe ([facility] 06/28/2013    Essential hypertension 06/28/2013    Phlebitis 06/27/2013   Acute Hypoxic resp failure  Parainfluenza pneumonia  Influenza A  AECOPD  Overweight       1. tamiflu  2. Abx  3. F/u C&S  4. ICS  5. OOB  6. Keep sats > 92%  7. Await placement  8.  C.w present management    Electronically signed by Patricia Cote MD on 1/4/2022 at 11:43 AM

## 2022-01-05 ENCOUNTER — CARE COORDINATION (OUTPATIENT)
Dept: CASE MANAGEMENT | Age: 77
End: 2022-01-05

## 2022-01-05 ENCOUNTER — TELEPHONE (OUTPATIENT)
Dept: FAMILY MEDICINE CLINIC | Age: 77
End: 2022-01-05

## 2022-01-05 DIAGNOSIS — J10.00 PNEUMONIA DUE TO INFLUENZA A VIRUS: Primary | ICD-10-CM

## 2022-01-05 PROCEDURE — 1111F DSCHRG MED/CURRENT MED MERGE: CPT | Performed by: FAMILY MEDICINE

## 2022-01-05 NOTE — CARE COORDINATION
1/5/2022 10:06 am phoned and spoke with [facility] from [facility] to see if she had this referral for [facility].  She is going to check and return call to CM

## 2022-01-05 NOTE — TELEPHONE ENCOUNTER
Yes, [facility] thanks for asking. Your steroid pack is like prednisone but stronger. As soon as the pack is gone, you can restart your prednisone. Glad you are doing better.

## 2022-01-05 NOTE — TELEPHONE ENCOUNTER
Requesting Home Care (patient has already discharged from [address] [place] [address] on 1/4/22)    Skilled Nursing      Please call [phone] office staff at:  [phone]

## 2022-01-05 NOTE — CARE COORDINATION
Frank 45 Transitions Initial Follow Up Call    Call within 2 business days of discharge: Yes    Patient: Dyana Tobar Patient : 1945   MRN: [id number]  Reason for Admission: Pneumonia due to influenza A virus / Acute respiratory failure with hypoxia and hypercapnia   Discharge Date: 22 RARS: Readmission Risk Score: 14.5 ( )      Last Discharge [facility]       Complaint Diagnosis Description Type Department Provider    21 Shortness of Breath Acute respiratory failure with hypoxia and hypercapnia ([facility] [address] . .. ED to Hosp-Admission (Discharged) (ADMITTED) Kristopher Casanova MD; Marylu Tena,... Transitions of Care Initial Call:  Explained the role of Care Transition Nurse and the Transition program, patient is agreeable to follow up calls Post discharge from the [place] [address] and spoke to patient's daughter, Todd ortiz (on HIPAA FORM). Daughter states patient is doing better. Patient is up to bathroom with walker with no issues. Denies wheezing, cough, chest pain with inspiration or expiration, fatigue, fever or chills. Patient does get SOB with exertion. Uses Oxygen 2/L continuously. SpO2 is 96% with rest and oxygen on. Patient does have a nebulizer and uses as needed 6-8 hours a day. Reviewed medications with Daughter, Todd ortiz. Confirmed Patient obtained and is taking medications as directed. There are no questions concerning medications at this time. Medication education provided needed, questions answered, verbalizes understanding. Stressed importance of medication compliance. Advised contacting Pharmacy/MD for refill needs. Expresses understanding. 1111F Medication Reconciliation completed. Routed to PCP. Patient has fair appetite and is drinking adequate fluids. Normal bladder elimination patterns. Patient is having small bowel movements and daughter states patient will resume her metamucil and this will help. Reviewed appointments with patient.  Will see PCP 22, Cardiology 22 and Pulmonary 2022 - Daughter made all patient's appointments. Explained the Transition to Home Program to patient. Patient is called after discharge by CTN to make sure all needs are met and to see if patient has any questions or problems. Expresses understanding. Patient is aware of when to contact MD with any new or worsening symptoms. Advised to contact PCP  with any health concerns for early outpatient intervention in an effort to avoid hospitalization. Report any worsening symptoms to PCP and/or Call 911 and/or GO TO  EMERGENCY ROOM if symptoms are severe. Expresses understanding. Will continue to follow. Marcelo Wiseman LPN    [phone]  Jamaal Serna / Frank Ansari Coordinator    Was this an external facility discharge? No Discharge Facility: [facility]    Challenges to be reviewed by the provider   Additional needs identified to be addressed with provider No  none             Method of communication with provider : none      Advance Care Planning:   Does patient have an Advance Directive:  reviewed and current. Was this a readmission? No  Patient stated reason for admission: Pneumonia due to influenza A virus / Acute respiratory failure with hypoxia and hypercapnia     Patients top risk factors for readmission: functional physical ability  lack of knowledge about disease  medical condition-Pneumonia due to influenza A virus / Acute respiratory failure with hypoxia and hypercapnia   medication management  support system    Care Transition Nurse (CTN) contacted the family by telephone to perform post hospital discharge assessment. Verified name and  with family as identifiers. Provided introduction to self, and explanation of the CTN role. CTN reviewed discharge instructions, medical action plan and red flags with family who verbalized understanding.  Reviewed possible problems and red flags: Severe acute or prolonged  SOB, insomnia, persistent productive cough, Chest heaviness and pain. Call PCP or specialist if any of these occur. Expresses understanding. Family given an opportunity to ask questions and does not have any further questions or concerns at this time. Were discharge instructions available to patient? Yes. Reviewed appropriate site of care based on symptoms and resources available to patient including: PCP, Specialist, When to call 911 and [employer]. The family agrees to contact the PCP office for questions related to their healthcare. Medication reconciliation was performed with family, who verbalizes understanding of administration of home medications. Advised obtaining a 90-day supply of all daily and as-needed medications. Reviewed and educated family on any new and changed medications related to discharge diagnosis     Was patient discharged with a pulse oximeter? Yes     Discussed and confirmed pulse oximeter discharge instructions and when to notify provider or seek emergency care. LPN CC provided contact information. Plan for follow-up call in 5-7 days based on severity of symptoms and risk factors.        Non-face-to-face services provided:  Obtained and reviewed discharge summary and/or continuity of care documents    Care Transitions 24 Hour Call    Schedule Follow Up Appointment with PCP: Completed  Do you have any ongoing symptoms?: Yes  Patient-reported symptoms: Shortness of Breath, Fatigue, Weakness  Do you have a copy of your discharge instructions?: Yes  Do you have all of your prescriptions and are they filled?: Yes  Have you been contacted by a [id number] [employer] Pharmacist?: No  Have you scheduled your follow up appointment?: Yes (Comment: PCP 1/13/22)  How are you going to get to your appointment?: Other  Were you discharged with any Home Care or Post Acute Services: No  Do you feel like you have everything you need to keep you well at home?: Yes  Care Transitions Interventions  No Identified Needs         Follow Up  Future Appointments   Date Time Provider Department Center   1/13/2022  9:15 AM Princess Cee MD [address] [place] [facility]   1/24/2022  1:15 PM [facility]   3/22/2022  9:30 AM Yi Wilson MD [address] [place] [facility]   6/1/2022  8:15 AM Princess Cee MD [address] [place] [facility]       Tamiko Anthony LPN

## 2022-01-05 NOTE — TELEPHONE ENCOUNTER
To Dr. Carmenza Johnson--    Patient was discharged yesterday (1/4/22) from [facility] ---the Hospitalist prescribed a Medrol Dose Pack and instructed patient to stop the Prednisone until the Dose pack is completed. She wants to make sure that is okay with you. Please advise. Hospital follow up appt is 1/13/22----that was the only time she could get to see you. Patient would like to know today please.

## 2022-01-10 ENCOUNTER — TELEPHONE (OUTPATIENT)
Dept: FAMILY MEDICINE CLINIC | Age: 77
End: 2022-01-10

## 2022-01-10 NOTE — TELEPHONE ENCOUNTER
To Dr. Carmenza Johnson--    Would you like [facility]) to do patient's INR, instead of patient coming to our parking lot and a nurse going down to the car. Do you want her to do an INR before the appt 1/13/22.      Please advise

## 2022-01-11 ENCOUNTER — HOSPITAL ENCOUNTER (OUTPATIENT)
Age: 77
Setting detail: SPECIMEN
Discharge: HOME | End: 2022-01-11
Payer: MEDICARE

## 2022-01-11 LAB
ANION GAP SERPL CALCULATED.3IONS-SCNC: 7 MMOL/L (ref 4–16)
BASOPHILS ABSOLUTE: 0 K/CU MM
BASOPHILS RELATIVE PERCENT: 0.1 % (ref 0–1)
BUN BLDV-MCNC: 16 MG/DL (ref 6–23)
CALCIUM SERPL-MCNC: 9.4 MG/DL (ref 8.3–10.6)
CHLORIDE BLD-SCNC: 98 MMOL/L (ref 99–110)
CO2: 36 MMOL/L (ref 21–32)
CREAT SERPL-MCNC: 0.7 MG/DL (ref 0.6–1.1)
DIFFERENTIAL TYPE: ABNORMAL
EOSINOPHILS ABSOLUTE: 0 K/CU MM
EOSINOPHILS RELATIVE PERCENT: 0.1 % (ref 0–3)
GFR AFRICAN AMERICAN: >60 ML/MIN/1.73M2
GFR NON-AFRICAN AMERICAN: >60 ML/MIN/1.73M2
GLUCOSE BLD-MCNC: 175 MG/DL (ref 70–99)
HCT VFR BLD CALC: 41.2 % (ref 37–47)
HEMOGLOBIN: 12.7 GM/DL (ref 12.5–16)
IMMATURE NEUTROPHIL %: 1 % (ref 0–0.43)
LYMPHOCYTES ABSOLUTE: 1.1 K/CU MM
LYMPHOCYTES RELATIVE PERCENT: 7.4 % (ref 24–44)
MCH RBC QN AUTO: 30.8 PG (ref 27–31)
MCHC RBC AUTO-ENTMCNC: 30.8 % (ref 32–36)
MCV RBC AUTO: 100 FL (ref 78–100)
MONOCYTES ABSOLUTE: 0.6 K/CU MM
MONOCYTES RELATIVE PERCENT: 3.9 % (ref 0–4)
NUCLEATED RBC %: 0 %
PDW BLD-RTO: 14.1 % (ref 11.7–14.9)
PLATELET # BLD: 262 K/CU MM (ref 140–440)
PMV BLD AUTO: 9.8 FL (ref 7.5–11.1)
POTASSIUM SERPL-SCNC: 4.2 MMOL/L (ref 3.5–5.1)
RBC # BLD: 4.12 M/CU MM (ref 4.2–5.4)
SEGMENTED NEUTROPHILS ABSOLUTE COUNT: 12.5 K/CU MM
SEGMENTED NEUTROPHILS RELATIVE PERCENT: 87.5 % (ref 36–66)
SODIUM BLD-SCNC: 141 MMOL/L (ref 135–145)
TOTAL IMMATURE NEUTOROPHIL: 0.14 K/CU MM
TOTAL NUCLEATED RBC: 0 K/CU MM
WBC # BLD: 14.3 K/CU MM (ref 4–10.5)

## 2022-01-11 PROCEDURE — 80048 BASIC METABOLIC PNL TOTAL CA: CPT

## 2022-01-11 PROCEDURE — 85025 COMPLETE CBC W/AUTO DIFF WBC: CPT

## 2022-01-12 ENCOUNTER — TELEPHONE (OUTPATIENT)
Dept: FAMILY MEDICINE CLINIC | Age: 77
End: 2022-01-12

## 2022-01-12 ENCOUNTER — ANTI-COAG VISIT (OUTPATIENT)
Dept: FAMILY MEDICINE CLINIC | Age: 77
End: 2022-01-12

## 2022-01-12 ENCOUNTER — CARE COORDINATION (OUTPATIENT)
Dept: CASE MANAGEMENT | Age: 77
End: 2022-01-12

## 2022-01-12 NOTE — TELEPHONE ENCOUNTER
Anjelica Lees from [facility] called today 1-12-22 with patients  INR 2.8  PT 33.3

## 2022-01-12 NOTE — CARE COORDINATION
[facility] Transitions Follow Up Call    2022    Patient: Angela Pablo  Patient : 1945   MRN: [id number]  Reason for Admission: Pneumonia due to influenza A virus / Acute respiratory failure with hypoxia and hypercapnia   Discharge Date: 22 RARS: Readmission Risk Score: 14.5 ( )    Attempted to contact patient for Transition Subsequent call. Left HIPAA Compliant message on Voice Mail to call CTN (number given) with any questions and an update on patient's condition since discharge. Will continue to follow. Michael Mccain LPN    [phone]  Cathleen Shields / Marli Mijares 50 Transitions Subsequent and Final Call    Subsequent and Final Calls  Care Transitions Interventions  Other Interventions:            Follow Up  Future Appointments   Date Time Provider Department Center   2022  9:15 AM Elizabeth Barnes MD [facility]   2022  1:15 PM [facility]worth [facility]   3/22/2022  9:30 AM Viky Duckworth MD [facility]   2022  8:15 AM Elizabeth Barnes MD [facility]       Michael Mccain LPN

## 2022-01-13 ENCOUNTER — OFFICE VISIT (OUTPATIENT)
Dept: FAMILY MEDICINE CLINIC | Age: 77
End: 2022-01-13
Payer: MEDICARE

## 2022-01-13 VITALS
WEIGHT: 156 LBS | OXYGEN SATURATION: 97 % | BODY MASS INDEX: 26.63 KG/M2 | HEART RATE: 64 BPM | DIASTOLIC BLOOD PRESSURE: 74 MMHG | HEIGHT: 64 IN | SYSTOLIC BLOOD PRESSURE: 114 MMHG

## 2022-01-13 DIAGNOSIS — J11.00 INFLUENZAL PNEUMONIA: Primary | ICD-10-CM

## 2022-01-13 DIAGNOSIS — J44.9 COPD, VERY SEVERE (HCC): Chronic | ICD-10-CM

## 2022-01-13 PROCEDURE — 1123F ACP DISCUSS/DSCN MKR DOCD: CPT | Performed by: FAMILY MEDICINE

## 2022-01-13 PROCEDURE — 99213 OFFICE O/P EST LOW 20 MIN: CPT | Performed by: FAMILY MEDICINE

## 2022-01-13 PROCEDURE — 4040F PNEUMOC VAC/ADMIN/RCVD: CPT | Performed by: FAMILY MEDICINE

## 2022-01-13 PROCEDURE — G8482 FLU IMMUNIZE ORDER/ADMIN: HCPCS | Performed by: FAMILY MEDICINE

## 2022-01-13 PROCEDURE — 1111F DSCHRG MED/CURRENT MED MERGE: CPT | Performed by: FAMILY MEDICINE

## 2022-01-13 PROCEDURE — 1090F PRES/ABSN URINE INCON ASSESS: CPT | Performed by: FAMILY MEDICINE

## 2022-01-13 PROCEDURE — G8400 PT W/DXA NO RESULTS DOC: HCPCS | Performed by: FAMILY MEDICINE

## 2022-01-13 PROCEDURE — G8427 DOCREV CUR MEDS BY ELIG CLIN: HCPCS | Performed by: FAMILY MEDICINE

## 2022-01-13 PROCEDURE — G8417 CALC BMI ABV UP PARAM F/U: HCPCS | Performed by: FAMILY MEDICINE

## 2022-01-13 PROCEDURE — 1036F TOBACCO NON-USER: CPT | Performed by: FAMILY MEDICINE

## 2022-01-13 PROCEDURE — 3023F SPIROM DOC REV: CPT | Performed by: FAMILY MEDICINE

## 2022-01-13 RX ORDER — BUDESONIDE, GLYCOPYRROLATE, AND FORMOTEROL FUMARATE 160; 9; 4.8 UG/1; UG/1; UG/1
AEROSOL, METERED RESPIRATORY (INHALATION)
Qty: 10.7 EACH | Refills: 6 | Status: SHIPPED | OUTPATIENT
Start: 2022-01-13 | End: 2022-07-27 | Stop reason: SDUPTHER

## 2022-01-13 NOTE — PROGRESS NOTES
1/13/2022    Lilia Bonilla    Chief Complaint   Patient presents with   Thaddeus Gillis [facility] f/u influenza A pneumonia    Other     no c/o's       HPI    Igor Griffin is a 68 y.o. female who presents today with follow-up. There were no nursing phone calls made between hospital discharge and today. Patient's been on for 2 days. She notes good continued improvement. She notes that she was very sick upon entering as she lost memory of an entire day. She awoke with the CPAP mask on. Patient was diagnosed with influenza. She recovered well. Patient is back on 2 L her baseline oxygen. She is using a walker but otherwise feels her strength is returning well. Patient has no new complaints today    REVIEW OF SYSTEMS    Constitutional:  Denies fever, chills, weight loss or weakness  Eyes:  no photophobia or discharge  ENT:  no sore throat or ear pain  Cardiovascular:  Denies chest pain, palpitations or swelling  Respiratory:  At baseline   GI:  no abdominal pain, nausea, vomiting, or diarrhea  Musculoskeletal:  no back pain  Skin:  No rashes  Neurologic:  no headache, focal weakness, or sensory changes  Endocrine:  no polyuria or polydipsia      PAST MEDICAL HISTORY  Past Medical History:   Diagnosis Date    Anticoagulant long-term use     Arthritis     Cancer ([address]     Chronic hypoxemic respiratory failure ([address] 2/6/2018    COPD (chronic obstructive pulmonary disease) ([address]     Coronary atherosclerosis     COVID-19 virus detected 1/18/2021    DVT [facility]deep venous thrombosis) [facility] [address]     Former smoker 11/22/2021    Gastroesophageal reflux disease     Hiatal hernia     Lung infiltrate on CT 1/22/2019    On home oxygen therapy     on 24/7    Osteopenia     Phlebitis     Pulmonary nodule 7/5/2016    S/P left heart catheterization by percutaneous approach 6/27/2013    Sepsis due to pneumonia ([address] 11/14/2017    Shortness of breath 9/23/2019    Shortness of breath 1/18/2021    Shortness of breath 2021    Wears glasses        FAMILY HISTORY  Family History   Problem Relation Age of Onset    Heart Disease Mother     Cancer Father         lung ca    Heart Disease Father     COPD Father     Cancer Sister     Heart Disease Sister        SOCIAL HISTORY  Social History     Socioeconomic History    Marital status:      Spouse name: Not on file    Number of children: Not on file    Years of education: Not on file    Highest education level: Not on file   Occupational History    Not on file   Tobacco Use    Smoking status: Former Smoker     Packs/day: 1.50     Years: 26.00     Pack years: 39.00     Types: Cigarettes     Start date:      Quit date: 1991     Years since quittin.2    Smokeless tobacco: Never Used   Vaping Use    Vaping Use: Never used   Substance and Sexual Activity    Alcohol use: No    Drug use: No    Sexual activity: Not Currently     Partners: Male   Other Topics Concern    Not on file   Social History Narrative    Not on file     Social Determinants of Health     Financial Resource Strain: Low Risk     Difficulty of Paying Living Expenses: Not hard at all   Food Insecurity: No Food Insecurity    Worried About [address] in the Last Year: Never true    [address] [facility] [address] in the Last Year: Never true   Transportation Needs:     Lack of Transportation (Medical): Not on file    Lack of Transportation (Non-Medical):  Not on file   Physical Activity:     Days of Exercise per Week: Not on file    Minutes of Exercise per Session: Not on file   Stress:     Feeling of Stress : Not on file   Social Connections:     Frequency of Communication with Friends and Family: Not on file    Frequency of Social Gatherings with Friends and Family: Not on file    Attends Protestant Services: Not on file    Active Member of Clubs or Organizations: Not on file    Attends Club or Organization Meetings: Not on file    Marital Status: Not on file   Intimate Partner Violence:     Fear of Current or Ex-Partner: Not on file    Emotionally Abused: Not on file    Physically Abused: Not on file    Sexually Abused: Not on file   Housing Stability:     Unable to Pay for Housing in the Last Year: Not on file    Number of Places Lived in the Last Year: Not on file    Unstable Housing in the Last Year: Not on file        SURGICAL HISTORY  Past Surgical History:   Procedure Laterality Date   Gilbert Boucher    COLONOSCOPY      ENDOSCOPY, COLON, DIAGNOSTIC      TONSILLECTOMY      TUBAL LIGATION      VEIN SURGERY         CURRENT MEDICATIONS  Current Outpatient Medications   Medication Sig Dispense Refill    BREZTRI AEROSPHERE 160-9-4.8 MCG/ACT AERO INHALE 2 PUFFS BY MOUTH TWICE DAILY 10.7 each 6    dilTIAZem (CARDIZEM) 60 MG tablet Take 1 tablet by mouth every 8 hours 120 tablet 3    furosemide (LASIX) 40 MG tablet Take 1 tablet by mouth daily 90 tablet 0    potassium chloride (KLOR-CON M10) 10 MEQ extended release tablet Take 2 tablets by mouth daily 180 tablet 1    montelukast (SINGULAIR) 10 MG tablet TAKE 1 TABLET BY MOUTH EVERY DAY EVERY NIGHT 90 tablet 1    gabapentin (NEURONTIN) 400 MG capsule TAKE 1 CAPSULE BY MOUTH EVERY EVENING FOR 90 DAYS.  90 capsule 0    warfarin (COUMADIN) 6 MG tablet TAKE 1 TABLET BY MOUTH EVERY DAY PER INR 90 tablet 0    esomeprazole (NEXIUM) 40 MG delayed release capsule TAKE 1 CAPSULE BY MOUTH TWICE A  capsule 0    celecoxib (CELEBREX) 200 MG capsule TAKE 1 CAPSULE BY MOUTH EVERY DAY 90 capsule 0    albuterol sulfate  (90 Base) MCG/ACT inhaler INHALE 2 PUFF FOUR TIMES A DAY AS NEEDED 1 each 5    warfarin (COUMADIN) 3 MG tablet Take 1 tabet by mouth every day per INR 90 tablet 1    theophylline (THEODUR) 450 MG extended release tablet TAKE 1/2 TABLET BY MOUTH TWICE A DAY 90 tablet 0    levothyroxine (SYNTHROID) 50 MCG tablet TAKE 1 TABLET BY MOUTH EVERY DAY 90 tablet 0    risedronate (ACTONEL) 35 MG tablet TAKE 1 TABLET BY MOUTH EVERY 7 DAYS PATIENT TAKES ON SUNDAY 12 tablet 1    Magnesium Oxide 500 MG TABS TAKE 1 TABLET BY MOUTH EVERY DAY      rosuvastatin (CRESTOR) 20 MG tablet TAKE 1 TABLET BY MOUTH EVERY DAY EVERY NIGHT 90 tablet 1    albuterol (PROVENTIL) (2.5 MG/3ML) 0.083% nebulizer solution TAKE 3 MLS BY NEBULIZATION 4 TIMES DAILY AS NEEDED FOR WHEEZING 14 Package 1    Dextromethorphan-guaiFENesin (MUCINEX DM)  MG TB12 Take 1 to 2 tablet by mouth every 12 hours (maximum: 4 tablets/24 hours). Drink plenty of water. 28 tablet 0    nitroGLYCERIN (NITROSTAT) 0.4 MG SL tablet Place 1 tablet under the tongue every 5 minutes as needed for Chest pain 25 tablet 1    dicyclomine (BENTYL) 10 MG capsule Take 10 mg by mouth 2 times daily      Biotin 13448 MCG TABS Take 10,000 mcg by mouth daily      LORazepam (ATIVAN) 0.5 MG tablet TAKE 1 TAB BY MOUTH EVERY DAY AS NEEDED  0    b complex vitamins capsule Take 1 capsule by mouth daily      Calcium Carb-Cholecalciferol (CALCIUM + D3) 600-200 MG-UNIT TABS Take 1 tablet by mouth 2 times daily      Multiple Vitamins-Minerals (MULTIVITAMIN PO) Take 1 tablet by mouth daily      OXYGEN Inhale 2 L/hr into the lungs continuous.  DALIRESP 500 MCG tablet TAKE 1 TABLET BY MOUTH EVERY DAY 90 tablet 1     No current facility-administered medications for this visit. ALLERGIES  Allergies   Allergen Reactions    Codeine Swelling    Darvon [Propoxyphene Hcl] Swelling    Lamisil [Terbinafine Hcl]     Morphine Swelling    Neosporin [Neomycin-Polymyxin-Gramicidin]     Pcn [Penicillins] Swelling       PHYSICAL EXAM  /74   Pulse 64   Ht 5' 4\" (1.626 m)   Wt 156 lb (70.8 kg)   SpO2 97%   BMI 26.78 kg/m²     ASSESSMENT & PLAN    1. Influenzal pneumonia  Continue increasing walking activity  Doing well  Remain on 2 L of oxygen 24 hours a day    2. COPD, very severe ([facility] [address]  Issue controlled. Continue meds.   Refilled meds.    Patient billed off total time20minutes  5 minutes prechart review  10 minutes spent with patient  5 minutes creating note            Electronically signed by Rich Corley MD on 1/13/2022

## 2022-01-18 ENCOUNTER — TELEPHONE (OUTPATIENT)
Dept: FAMILY MEDICINE CLINIC | Age: 77
End: 2022-01-18

## 2022-01-18 DIAGNOSIS — F41.9 ANXIETY: Primary | ICD-10-CM

## 2022-01-18 RX ORDER — LORAZEPAM 0.5 MG/1
TABLET ORAL
Refills: 0 | Status: CANCELLED | OUTPATIENT
Start: 2022-01-18

## 2022-01-18 RX ORDER — LORAZEPAM 0.5 MG/1
TABLET ORAL
Qty: 20 TABLET | Refills: 0 | Status: SHIPPED | OUTPATIENT
Start: 2022-01-18 | End: 2022-02-17

## 2022-01-18 NOTE — TELEPHONE ENCOUNTER
Ask patient to take an extra Lasix now, this evening. Then have patient increase the 40 mg Lasix daily to 60 mg daily (1-1/2 pills) until swelling resolves and then return to 40 mg if possible. Okay to use the Ativan when needed. How Severe Is It?: mild Is This A New Presentation, Or A Follow-Up?: Follow Up Isotretinoin

## 2022-01-18 NOTE — TELEPHONE ENCOUNTER
Spoke with pt ,    Confirmed edema bilateral foot. Denies sob, , pt states she is keeping feet elevated, is taking lasix 40 mg 1 q am as directed. Also   in the evenings having anxiety, inside chest burning, then fearfull of sob may occur at night. Not having sx's during the daytime.  Lorazepam rx pended in his message

## 2022-01-19 ENCOUNTER — CARE COORDINATION (OUTPATIENT)
Dept: CASE MANAGEMENT | Age: 77
End: 2022-01-19

## 2022-01-19 NOTE — CARE COORDINATION
Frank [facility] Transitions Follow Up Call    2022    Patient: Lance Pineda  Patient : 1945   MRN: [id number]  Reason for Admission: Pneumonia due to influenza A virus / Acute respiratory failure with hypoxia and hypercapnia   Discharge Date: 22 RARS: Readmission Risk Score: 14.5 ( )    Attempted to contact patient for Transition Subsequent call. No answer - Unable to leave message. Will continue to follow. Helen Hackett LPN    [phone]  Janine Villar / Marli Mijares 50 Transitions Subsequent and Final Call    Subsequent and Final Calls  Care Transitions Interventions  Other Interventions:            Follow Up  Future Appointments   Date Time Provider Judi Menjivar   2022  1:15 PM [facility]   2022 10:15 AM Sanam KHANNA NURSE [facility]   3/22/2022  9:30 AM Britt Chirinos MD [facility]   2022  9:45 AM Conchita Marie MD [facility]       Helen Hackett LPN

## 2022-01-24 ENCOUNTER — OFFICE VISIT (OUTPATIENT)
Dept: PULMONOLOGY | Age: 77
End: 2022-01-24
Payer: MEDICARE

## 2022-01-24 ENCOUNTER — HOSPITAL ENCOUNTER (OUTPATIENT)
Age: 77
Discharge: HOME | End: 2022-01-24
Payer: MEDICARE

## 2022-01-24 ENCOUNTER — HOSPITAL ENCOUNTER (OUTPATIENT)
Dept: GENERAL RADIOLOGY | Age: 77
Discharge: HOME | End: 2022-01-24
Payer: MEDICARE

## 2022-01-24 VITALS
WEIGHT: 154 LBS | DIASTOLIC BLOOD PRESSURE: 62 MMHG | BODY MASS INDEX: 25.66 KG/M2 | SYSTOLIC BLOOD PRESSURE: 148 MMHG | HEART RATE: 78 BPM | HEIGHT: 65 IN | OXYGEN SATURATION: 93 %

## 2022-01-24 DIAGNOSIS — J96.11 CHRONIC HYPOXEMIC RESPIRATORY FAILURE (HCC): Chronic | ICD-10-CM

## 2022-01-24 DIAGNOSIS — J44.9 COPD, VERY SEVERE (HCC): Primary | Chronic | ICD-10-CM

## 2022-01-24 DIAGNOSIS — J44.9 COPD, VERY SEVERE (HCC): Chronic | ICD-10-CM

## 2022-01-24 DIAGNOSIS — J10.00 PNEUMONIA DUE TO INFLUENZA A VIRUS: ICD-10-CM

## 2022-01-24 DIAGNOSIS — R06.02 SHORTNESS OF BREATH: ICD-10-CM

## 2022-01-24 DIAGNOSIS — Z87.891 FORMER SMOKER: ICD-10-CM

## 2022-01-24 DIAGNOSIS — I27.20 MILD PULMONARY HYPERTENSION (HCC): ICD-10-CM

## 2022-01-24 PROCEDURE — 1090F PRES/ABSN URINE INCON ASSESS: CPT | Performed by: INTERNAL MEDICINE

## 2022-01-24 PROCEDURE — 3023F SPIROM DOC REV: CPT | Performed by: INTERNAL MEDICINE

## 2022-01-24 PROCEDURE — 99213 OFFICE O/P EST LOW 20 MIN: CPT | Performed by: INTERNAL MEDICINE

## 2022-01-24 PROCEDURE — 4040F PNEUMOC VAC/ADMIN/RCVD: CPT | Performed by: INTERNAL MEDICINE

## 2022-01-24 PROCEDURE — 1036F TOBACCO NON-USER: CPT | Performed by: INTERNAL MEDICINE

## 2022-01-24 PROCEDURE — G8427 DOCREV CUR MEDS BY ELIG CLIN: HCPCS | Performed by: INTERNAL MEDICINE

## 2022-01-24 PROCEDURE — G8400 PT W/DXA NO RESULTS DOC: HCPCS | Performed by: INTERNAL MEDICINE

## 2022-01-24 PROCEDURE — 71046 X-RAY EXAM CHEST 2 VIEWS: CPT

## 2022-01-24 PROCEDURE — 1123F ACP DISCUSS/DSCN MKR DOCD: CPT | Performed by: INTERNAL MEDICINE

## 2022-01-24 PROCEDURE — G8482 FLU IMMUNIZE ORDER/ADMIN: HCPCS | Performed by: INTERNAL MEDICINE

## 2022-01-24 PROCEDURE — G8417 CALC BMI ABV UP PARAM F/U: HCPCS | Performed by: INTERNAL MEDICINE

## 2022-01-24 PROCEDURE — 1111F DSCHRG MED/CURRENT MED MERGE: CPT | Performed by: INTERNAL MEDICINE

## 2022-01-24 NOTE — PROGRESS NOTES
SUBJECTIVE:  Chief Complaint: Recent bilateral pneumonia, very severe stage IV COPD, chronic hypoxemic respiratory failure, shortness of breath, history of COVID-19 infection, former smoker, mild pulmonary hypertension    Marita Nair states that she got out of [facility] on 1/4/2022 after being diagnosed with bilateral influenza A pneumonia. She is feeling better but she still notes some episodes of shortness of breath. The other day she took an extra half Lasix because she noted ankle swelling with worsening shortness of breath and did feel much better after taking it. She continues on Breztri, albuterol per nebulizer or aerosol, oral theophylline 225 twice daily, 5 mg prednisone daily and also is on oxygen 24 hours a day. She has had all 3 Moderna COVID-19 vaccinations including her booster vaccination and has received the influenza vaccine. ROS:  Constitution:  HEENT: Negative for ear, throat pain  Cardiovascular: Negative for chest pain, syncope, edema  Pulmonary: See HPI  Musculoskeletal: Negative for DVT, myalgias, arthralgias    OBJECTIVE:  BP (!) 148/62   Pulse 78   Ht 5' 5\" (1.651 m)   Wt 154 lb (69.9 kg)   SpO2 93%   BMI 25.63 kg/m²      Physical Exam:  Constitutional:  She appears well developed and well-nourished. Wearing nasal oxygen but does not appear to be short of breath at rest  Neck:  Supple, No palpable lymphadenopathy, No JVD  Cardiovascular:  S1, S2 Normal, Regular rhythm, no murmurs or gallops, No pericardial  rubs.   Pulmonary: Diminished breath sounds throughout all areas without wheezing or rhonchi  Abdomen: Not examined  Extremities: Minimal lower leg and ankle edema, No DVT  Neurologic: Oriented x3, No focal deficits    Radiology: Chest x-ray in 1/2/2022 showed no significant interval change in bilateral airspace and interstitial opacities with a a questionable small left pleural effusion   PFT:  office spirometry on 7/21/2021 demonstrated a very severe/stage IV COPD with no significant response to bronchodilators. Overall her lung function had remained stable over the previous year      Echocardiogram: 1/3/2022  Left ventricular systolic function is normal.   Ejection fraction is visually estimated at 50-55%. Mild aortic stenosis with mean gradient of 11 mmHg. Mild to moderate tricuspid regurgitation; RVSP: 40 mmHg. No evidence of any pericardial effusion. Grade I diastolic dysfunction . ASSESSMENT:    1. COPD, very severe ([address]    2. Pneumonia due to influenza A virus    3. Shortness of breath    4. Chronic hypoxemic respiratory failure (HCC)    5. Former smoker    6. Mild pulmonary hypertension (HCC)          PLAN:   I would like to repeat her chest x-ray to follow-up on the bilateral infiltrates and make sure there has been further clearing. I will make no change in her current bronchodilator therapy. I will continue to follow her    We have discussed the need to maintain yearly flu immunization, pneumococcal vaccination. We have discussed Coronavirus precaution including handwashing practice, wiping items touched in public such as gas pumps, door handles, shopping carts, etc. Self monitoring for infection - fever, chills, cough, SOB. Should they develop symptoms they should call office for further instructions. Return in about 8 weeks (around 3/22/2022) for Recheck for COPD, Recheck for Shortness of Breath, chronic hypoxemic respiratory failure. This dictation was performed with a verbal recognition program and it was checked for errors. It is possible that there are still dictated errors within this office note. Any errors should be brought immediately to my attention for correction. All efforts were made to ensure that this office note is accurate.

## 2022-02-04 ENCOUNTER — CARE COORDINATION (OUTPATIENT)
Dept: CASE MANAGEMENT | Age: 77
End: 2022-02-04

## 2022-02-04 NOTE — CARE COORDINATION
Frank 45 Transitions Follow Up Call    2022    Patient: Harmony Olivares  Patient : 1945   MRN: [id number]  Reason for Admission: Pneumonia due to influenza A virus / Acute respiratory failure with hypoxia and hypercapnia   Discharge Date: 22 RARS: Readmission Risk Score: 14.5 ( )    Care Transitions Follow Up Call:    Patient states she is doing well. Patient states SOB is baseline and uses Oxygen 2/L continuously. Denies wheezing, cough, chest pain with inspiration or expiration, fatigue, fever or chills. Patient confirms all medications are available and are being taken as directed. Home Health Care continues. Patient Denies Transportation, Home, or Medication needs or concerns at this time. Patient had Transitional Follow up appointment. 2022    Patient is aware of when to contact MD with any new or worsening symptoms. Advised to contact PCP  with any health concerns for early outpatient intervention in an effort to avoid hospitalization. Report any worsening symptoms to PCP and/or Call 911 and/or GO TO  EMERGENCY ROOM if symptoms are severe. Expresses understanding. Instructed patient that this is the Final Transition Call and to call their Specialist/PCP for any problems or issues that may occur. Expresses understanding. Selvin Gregorio LPN    [phone]  Rinku Amaro / Frank Ansari Coordinator    Needs to be reviewed by the provider   Additional needs identified to be addressed with provider No  none             Method of communication with provider : none      Care Transition Nurse (CTN) contacted the patient by telephone to follow up after admission on 2022. Verified name and  with patient as identifiers. Discussed follow-up appointments. If no appointment was previously scheduled, appointment scheduling offered: Yes   Is follow up appointment scheduled within 7 days of discharge?  Yes    Advance Care Planning:   Does patient have an Advance Directive:  reviewed and current. CTN reviewed discharge instructions, medical action plan and red flags with patient and discussed any barriers to care and/or understanding of plan of care after discharge. Discussed appropriate site of care based on symptoms and resources available to patient including: PCP, Home health, When to call 911 and [employer]. The patient agrees to contact the PCP office for questions related to their healthcare. Patients top risk factors for readmission: lack of knowledge about disease  medical condition-Pneumonia due to influenza A virus / Acute respiratory failure with hypoxia and hypercapnia   medication management  Interventions to address risk factors: Obtained and reviewed discharge summary and/or continuity of care documents    Non-[facility] follow up appointment(s): 1/13/2022    CTN provided contact information for future needs. No further follow-up call identified based on severity of symptoms and risk factors. Plan for next call:         Care Transitions Subsequent and Final Call    Subsequent and Final Calls  Do you have any ongoing symptoms?: Yes  Patient-reported symptoms: Shortness of Breath  Have your medications changed?: No  Do you have any questions related to your medications?: No  Do you currently have any active services?: Yes  Are you currently active with any services?: Home Health  Do you have any needs or concerns that I can assist you with?: No  Identified Barriers: None  Care Transitions Interventions  Other Interventions:            Follow Up  Future Appointments   Date Time Provider Judi Menjivar   2/9/2022 10:15 AM Simone Serrato FPS NURSE [facility]   3/22/2022  9:30 AM Rudy Griggs MD [facility] PULM [facility]   4/13/2022  9:45 AM Tessy Lau MD [facility]       Misha Castellano LPN

## 2022-02-05 ENCOUNTER — PATIENT MESSAGE (OUTPATIENT)
Dept: FAMILY MEDICINE CLINIC | Age: 77
End: 2022-02-05

## 2022-02-07 ENCOUNTER — TELEPHONE (OUTPATIENT)
Dept: FAMILY MEDICINE CLINIC | Age: 77
End: 2022-02-07

## 2022-02-07 NOTE — TELEPHONE ENCOUNTER
Patient wants the Home Care Nurse to do the INR when she comes---could you give her home care nurse the order (verbal). Looks like she uses [id number] phone is:  [phone].

## 2022-02-07 NOTE — TELEPHONE ENCOUNTER
Cirilo Araujo think Sinai Gomez is saying that she has a nursing appointment in our office for Coumadin check tomorrow and that she has a home nurse visit to do labs on Wednesday. If you can clarify, I think she needs to cancel tomorrow's nursing visit with us and can you put an order in for PT/INR check. Thanks.

## 2022-02-08 ENCOUNTER — ANTI-COAG VISIT (OUTPATIENT)
Dept: FAMILY MEDICINE CLINIC | Age: 77
End: 2022-02-08

## 2022-02-08 ENCOUNTER — TELEPHONE (OUTPATIENT)
Dept: FAMILY MEDICINE CLINIC | Age: 77
End: 2022-02-08

## 2022-02-08 LAB — INR BLD: 2.8

## 2022-02-08 NOTE — TELEPHONE ENCOUNTER
Kulwinder Eddy [place] [facility] [place]   Pro time 33.7 INR 2.8  Warfarin 6 mg every other day alternating with   3 mg  Call Kulwinder Eddy and advise

## 2022-02-08 NOTE — PROGRESS NOTES
Confirmed current coumadin 6/3 mg alternating.  inr 2.8  spoke with home care nurse [employer] informed keep coumadin dose same recheck 4 weeks

## 2022-02-17 RX ORDER — CELECOXIB 200 MG/1
CAPSULE ORAL
Qty: 90 CAPSULE | Refills: 0 | Status: SHIPPED | OUTPATIENT
Start: 2022-02-17 | End: 2022-07-27

## 2022-02-17 RX ORDER — GABAPENTIN 400 MG/1
400 CAPSULE ORAL EVERY EVENING
Qty: 90 CAPSULE | Refills: 0 | Status: SHIPPED | OUTPATIENT
Start: 2022-02-17 | End: 2022-05-16 | Stop reason: SDUPTHER

## 2022-02-18 NOTE — TELEPHONE ENCOUNTER
Spoke with pt informed gave order to Nurse Zaynab Lipscomb with her home care to recheck pt inr 3/8/22.  Pt voiced understanding

## 2022-02-19 DIAGNOSIS — Z79.01 ANTICOAGULANT LONG-TERM USE: ICD-10-CM

## 2022-02-20 DIAGNOSIS — J44.9 COPD, VERY SEVERE (HCC): ICD-10-CM

## 2022-02-21 ENCOUNTER — PATIENT MESSAGE (OUTPATIENT)
Dept: FAMILY MEDICINE CLINIC | Age: 77
End: 2022-02-21

## 2022-02-21 RX ORDER — WARFARIN SODIUM 6 MG/1
TABLET ORAL
Qty: 90 TABLET | Refills: 0 | Status: SHIPPED | OUTPATIENT
Start: 2022-02-21 | End: 2022-02-25

## 2022-02-21 RX ORDER — ALBUTEROL SULFATE 2.5 MG/3ML
2.5 SOLUTION RESPIRATORY (INHALATION) 4 TIMES DAILY PRN
Qty: 1050 ML | Refills: 1 | Status: SHIPPED | OUTPATIENT
Start: 2022-02-21 | End: 2022-02-25 | Stop reason: CLARIF

## 2022-02-21 RX ORDER — BACLOFEN 20 MG
TABLET ORAL
Qty: 90 TABLET | Refills: 1 | Status: SHIPPED | OUTPATIENT
Start: 2022-02-21 | End: 2022-07-27 | Stop reason: SDUPTHER

## 2022-02-21 NOTE — TELEPHONE ENCOUNTER
Requested Prescriptions     Signed Prescriptions Disp Refills    Magnesium Oxide 500 MG TABS 90 tablet 1     Sig: TAKE 1 TABLET BY MOUTH EVERY DAY     Authorizing Provider: Wai Lay     Ordering User: Nell Baca

## 2022-02-21 NOTE — TELEPHONE ENCOUNTER
LV: 1/8/20  NV: 1/3/21  TSH- 1/8/20  Refill authorized per protocol.     Ok to send in Mag oxide 500 mg 1 qd #90 / rf 1

## 2022-02-21 NOTE — TELEPHONE ENCOUNTER
From: Ej Hinojosa  To: Dr. Steven Hooper  Sent: 2/21/2022 10:49 AM EST  Subject: Magnesium oxide refill    Can you send a prescription for magnesium oxide 500 mg caplet a day to [facility], Insurance wont cover over the counter, so I need to see if they will cover a prescription for 90 day supply.  Thank you

## 2022-02-25 DIAGNOSIS — J44.9 COPD, VERY SEVERE (HCC): Primary | ICD-10-CM

## 2022-02-25 DIAGNOSIS — Z79.01 ANTICOAGULANT LONG-TERM USE: ICD-10-CM

## 2022-02-25 DIAGNOSIS — J44.9 COPD, VERY SEVERE (HCC): ICD-10-CM

## 2022-02-25 DIAGNOSIS — E78.00 PURE HYPERCHOLESTEROLEMIA: ICD-10-CM

## 2022-02-25 RX ORDER — RISEDRONATE SODIUM 35 MG/1
TABLET, FILM COATED ORAL
Qty: 12 TABLET | Refills: 1 | Status: SHIPPED | OUTPATIENT
Start: 2022-02-25 | End: 2022-07-29

## 2022-02-25 RX ORDER — FUROSEMIDE 40 MG/1
TABLET ORAL
Qty: 90 TABLET | Refills: 0 | Status: SHIPPED | OUTPATIENT
Start: 2022-02-25 | End: 2022-05-16 | Stop reason: SDUPTHER

## 2022-02-25 RX ORDER — ALBUTEROL SULFATE 2.5 MG/.5ML
2.5 SOLUTION RESPIRATORY (INHALATION) 4 TIMES DAILY PRN
Qty: 120 EACH | Refills: 5 | Status: SHIPPED | OUTPATIENT
Start: 2022-02-25 | End: 2022-03-11

## 2022-02-25 RX ORDER — ROFLUMILAST 500 UG/1
TABLET ORAL
Qty: 90 TABLET | Refills: 1 | Status: SHIPPED | OUTPATIENT
Start: 2022-02-25 | End: 2022-07-29

## 2022-02-25 RX ORDER — WARFARIN SODIUM 6 MG/1
TABLET ORAL
Qty: 90 TABLET | Refills: 0 | Status: SHIPPED | OUTPATIENT
Start: 2022-02-25 | End: 2022-09-08

## 2022-02-25 RX ORDER — ROSUVASTATIN CALCIUM 20 MG/1
TABLET, COATED ORAL
Qty: 90 TABLET | Refills: 1 | Status: SHIPPED | OUTPATIENT
Start: 2022-02-25 | End: 2022-07-29

## 2022-02-25 NOTE — PROGRESS NOTES
Requested Prescriptions     Signed Prescriptions Disp Refills    albuterol (PROVENTIL) 2.5 MG/0.5ML NEBU nebulizer solution 120 each 5     Sig: Take 0.5 mLs by nebulization 4 times daily as needed for Wheezing     Pt informed

## 2022-03-02 ENCOUNTER — TELEPHONE (OUTPATIENT)
Dept: FAMILY MEDICINE CLINIC | Age: 77
End: 2022-03-02

## 2022-03-02 NOTE — TELEPHONE ENCOUNTER
Spoke with [place] with [facility]. Nebulizer solution was billed under incorrect ins. Needs to be billed under medicare d. Med is on a back order but will fill and bill correctly when received.  Pt informed

## 2022-03-08 ENCOUNTER — ANTI-COAG VISIT (OUTPATIENT)
Dept: FAMILY MEDICINE CLINIC | Age: 77
End: 2022-03-08

## 2022-03-08 ENCOUNTER — NURSE ONLY (OUTPATIENT)
Dept: FAMILY MEDICINE CLINIC | Age: 77
End: 2022-03-08
Payer: MEDICARE

## 2022-03-08 DIAGNOSIS — Z86.718 HISTORY OF DVT OF LOWER EXTREMITY: ICD-10-CM

## 2022-03-08 DIAGNOSIS — Z79.01 ANTICOAGULANT LONG-TERM USE: ICD-10-CM

## 2022-03-08 DIAGNOSIS — Z86.711 HISTORY OF PULMONARY EMBOLISM: ICD-10-CM

## 2022-03-08 LAB
INTERNATIONAL NORMALIZATION RATIO, POC: 2
PROTHROMBIN TIME, POC: ABNORMAL

## 2022-03-08 PROCEDURE — 99999 PR OFFICE/OUTPT VISIT,PROCEDURE ONLY: CPT | Performed by: FAMILY MEDICINE

## 2022-03-08 PROCEDURE — 85610 PROTHROMBIN TIME: CPT | Performed by: FAMILY MEDICINE

## 2022-03-11 ENCOUNTER — TELEPHONE (OUTPATIENT)
Dept: FAMILY MEDICINE CLINIC | Age: 77
End: 2022-03-11

## 2022-03-11 DIAGNOSIS — J44.9 COPD, VERY SEVERE (HCC): ICD-10-CM

## 2022-03-11 RX ORDER — ALBUTEROL SULFATE 2.5 MG/3ML
2.5 SOLUTION RESPIRATORY (INHALATION) 4 TIMES DAILY PRN
Qty: 120 EACH | Refills: 5 | Status: SHIPPED | OUTPATIENT
Start: 2022-03-11 | End: 2022-05-17 | Stop reason: SDUPTHER

## 2022-03-11 NOTE — TELEPHONE ENCOUNTER
To Marisa TIMMONS---    Patient was just talking with you about her medicines; could you please call her back----she was reading something on the bottle she wants to ask about.      Phone:  [phone]

## 2022-03-11 NOTE — TELEPHONE ENCOUNTER
Requested Prescriptions      No prescriptions requested or ordered in this encounter     Spoke with pharmacy informed per pt request cancel albuterol med neb 2.5mg/0.5ml and fill albuterol 2.5mg/3ml 0.083% pharmacist voiced understanding.     Pt informed

## 2022-03-17 ENCOUNTER — HOSPITAL ENCOUNTER (INPATIENT)
Age: 77
LOS: 4 days | Discharge: HOME | DRG: 190 | End: 2022-03-22
Attending: EMERGENCY MEDICINE | Admitting: INTERNAL MEDICINE
Payer: MEDICARE

## 2022-03-17 ENCOUNTER — APPOINTMENT (OUTPATIENT)
Dept: GENERAL RADIOLOGY | Age: 77
DRG: 190 | End: 2022-03-17
Payer: MEDICARE

## 2022-03-17 ENCOUNTER — TELEPHONE (OUTPATIENT)
Dept: PULMONOLOGY | Age: 77
End: 2022-03-17

## 2022-03-17 DIAGNOSIS — R06.00 DYSPNEA AND RESPIRATORY ABNORMALITIES: ICD-10-CM

## 2022-03-17 DIAGNOSIS — J96.11 CHRONIC RESPIRATORY FAILURE WITH HYPOXIA (HCC): ICD-10-CM

## 2022-03-17 DIAGNOSIS — R06.89 DYSPNEA AND RESPIRATORY ABNORMALITIES: ICD-10-CM

## 2022-03-17 DIAGNOSIS — J44.1 COPD EXACERBATION (HCC): Primary | ICD-10-CM

## 2022-03-17 PROBLEM — J44.9 COPD (CHRONIC OBSTRUCTIVE PULMONARY DISEASE) (HCC): Status: ACTIVE | Noted: 2022-03-17

## 2022-03-17 LAB
ADENOVIRUS DETECTION BY PCR: NOT DETECTED
ANION GAP SERPL CALCULATED.3IONS-SCNC: 11 MMOL/L (ref 4–16)
BASOPHILS ABSOLUTE: 0.1 K/CU MM
BASOPHILS RELATIVE PERCENT: 0.8 % (ref 0–1)
BORDETELLA PARAPERTUSSIS BY PCR: NOT DETECTED
BORDETELLA PERTUSSIS PCR: NOT DETECTED
BUN BLDV-MCNC: 10 MG/DL (ref 6–23)
CALCIUM SERPL-MCNC: 9.1 MG/DL (ref 8.3–10.6)
CHLAMYDOPHILA PNEUMONIA PCR: NOT DETECTED
CHLORIDE BLD-SCNC: 99 MMOL/L (ref 99–110)
CO2: 30 MMOL/L (ref 21–32)
CORONAVIRUS 229E PCR: NOT DETECTED
CORONAVIRUS HKU1 PCR: NOT DETECTED
CORONAVIRUS NL63 PCR: NOT DETECTED
CORONAVIRUS OC43 PCR: NOT DETECTED
CREAT SERPL-MCNC: 0.7 MG/DL (ref 0.6–1.1)
DIFFERENTIAL TYPE: ABNORMAL
EKG ATRIAL RATE: 83 BPM
EKG DIAGNOSIS: NORMAL
EKG P AXIS: 66 DEGREES
EKG P-R INTERVAL: 128 MS
EKG Q-T INTERVAL: 364 MS
EKG QRS DURATION: 102 MS
EKG QTC CALCULATION (BAZETT): 427 MS
EKG R AXIS: -45 DEGREES
EKG T AXIS: 63 DEGREES
EKG VENTRICULAR RATE: 83 BPM
EOSINOPHILS ABSOLUTE: 0 K/CU MM
EOSINOPHILS RELATIVE PERCENT: 0.5 % (ref 0–3)
GFR AFRICAN AMERICAN: >60 ML/MIN/1.73M2
GFR NON-AFRICAN AMERICAN: >60 ML/MIN/1.73M2
GLUCOSE BLD-MCNC: 102 MG/DL (ref 70–99)
HCT VFR BLD CALC: 42.4 % (ref 37–47)
HEMOGLOBIN: 13.4 GM/DL (ref 12.5–16)
HUMAN METAPNEUMOVIRUS PCR: ABNORMAL
IMMATURE NEUTROPHIL %: 0.4 % (ref 0–0.43)
INFLUENZA A BY PCR: NOT DETECTED
INFLUENZA A H1 (2009) PCR: NOT DETECTED
INFLUENZA A H1 PANDEMIC PCR: NOT DETECTED
INFLUENZA A H3 PCR: NOT DETECTED
INFLUENZA B BY PCR: NOT DETECTED
INR BLD: 2.25 INDEX
LYMPHOCYTES ABSOLUTE: 1.2 K/CU MM
LYMPHOCYTES RELATIVE PERCENT: 15.7 % (ref 24–44)
MCH RBC QN AUTO: 30.8 PG (ref 27–31)
MCHC RBC AUTO-ENTMCNC: 31.6 % (ref 32–36)
MCV RBC AUTO: 97.5 FL (ref 78–100)
MONOCYTES ABSOLUTE: 1.1 K/CU MM
MONOCYTES RELATIVE PERCENT: 13.7 % (ref 0–4)
MYCOPLASMA PNEUMONIAE PCR: NOT DETECTED
PARAINFLUENZA 1 PCR: NOT DETECTED
PARAINFLUENZA 2 PCR: NOT DETECTED
PARAINFLUENZA 3 PCR: NOT DETECTED
PARAINFLUENZA 4 PCR: NOT DETECTED
PDW BLD-RTO: 14.4 % (ref 11.7–14.9)
PLATELET # BLD: 212 K/CU MM (ref 140–440)
PMV BLD AUTO: 9.3 FL (ref 7.5–11.1)
POTASSIUM SERPL-SCNC: 3.7 MMOL/L (ref 3.5–5.1)
PRO-BNP: 163.6 PG/ML
PROTHROMBIN TIME: 24.3 SECONDS (ref 11.7–14.5)
RBC # BLD: 4.35 M/CU MM (ref 4.2–5.4)
RHINOVIRUS ENTEROVIRUS PCR: NOT DETECTED
RSV PCR: NOT DETECTED
SARS-COV-2, NAAT: NOT DETECTED
SARS-COV-2: NOT DETECTED
SEGMENTED NEUTROPHILS ABSOLUTE COUNT: 5.3 K/CU MM
SEGMENTED NEUTROPHILS RELATIVE PERCENT: 68.9 % (ref 36–66)
SODIUM BLD-SCNC: 140 MMOL/L (ref 135–145)
SOURCE: NORMAL
TOTAL IMMATURE NEUTOROPHIL: 0.03 K/CU MM
TROPONIN T: <0.01 NG/ML
WBC # BLD: 7.7 K/CU MM (ref 4–10.5)

## 2022-03-17 PROCEDURE — 2580000003 HC RX 258: Performed by: EMERGENCY MEDICINE

## 2022-03-17 PROCEDURE — 6370000000 HC RX 637 (ALT 250 FOR IP): Performed by: NURSE PRACTITIONER

## 2022-03-17 PROCEDURE — 96376 TX/PRO/DX INJ SAME DRUG ADON: CPT

## 2022-03-17 PROCEDURE — 84484 ASSAY OF TROPONIN QUANT: CPT

## 2022-03-17 PROCEDURE — 94761 N-INVAS EAR/PLS OXIMETRY MLT: CPT

## 2022-03-17 PROCEDURE — 85025 COMPLETE CBC W/AUTO DIFF WBC: CPT

## 2022-03-17 PROCEDURE — 94640 AIRWAY INHALATION TREATMENT: CPT

## 2022-03-17 PROCEDURE — 6360000002 HC RX W HCPCS: Performed by: EMERGENCY MEDICINE

## 2022-03-17 PROCEDURE — 93010 ELECTROCARDIOGRAM REPORT: CPT | Performed by: INTERNAL MEDICINE

## 2022-03-17 PROCEDURE — 96366 THER/PROPH/DIAG IV INF ADDON: CPT

## 2022-03-17 PROCEDURE — 96365 THER/PROPH/DIAG IV INF INIT: CPT

## 2022-03-17 PROCEDURE — 96361 HYDRATE IV INFUSION ADD-ON: CPT

## 2022-03-17 PROCEDURE — 6360000002 HC RX W HCPCS: Performed by: NURSE PRACTITIONER

## 2022-03-17 PROCEDURE — 2700000000 HC OXYGEN THERAPY PER DAY

## 2022-03-17 PROCEDURE — 85610 PROTHROMBIN TIME: CPT

## 2022-03-17 PROCEDURE — 87635 SARS-COV-2 COVID-19 AMP PRB: CPT

## 2022-03-17 PROCEDURE — 80048 BASIC METABOLIC PNL TOTAL CA: CPT

## 2022-03-17 PROCEDURE — 6370000000 HC RX 637 (ALT 250 FOR IP): Performed by: EMERGENCY MEDICINE

## 2022-03-17 PROCEDURE — G0378 HOSPITAL OBSERVATION PER HR: HCPCS

## 2022-03-17 PROCEDURE — 0202U NFCT DS 22 TRGT SARS-COV-2: CPT

## 2022-03-17 PROCEDURE — 71045 X-RAY EXAM CHEST 1 VIEW: CPT

## 2022-03-17 PROCEDURE — 96375 TX/PRO/DX INJ NEW DRUG ADDON: CPT

## 2022-03-17 PROCEDURE — 96374 THER/PROPH/DIAG INJ IV PUSH: CPT

## 2022-03-17 PROCEDURE — 2580000003 HC RX 258: Performed by: NURSE PRACTITIONER

## 2022-03-17 PROCEDURE — 93005 ELECTROCARDIOGRAM TRACING: CPT | Performed by: EMERGENCY MEDICINE

## 2022-03-17 PROCEDURE — 83880 ASSAY OF NATRIURETIC PEPTIDE: CPT

## 2022-03-17 PROCEDURE — 99285 EMERGENCY DEPT VISIT HI MDM: CPT

## 2022-03-17 RX ORDER — ACETAMINOPHEN 325 MG/1
650 TABLET ORAL EVERY 4 HOURS PRN
Status: DISCONTINUED | OUTPATIENT
Start: 2022-03-17 | End: 2022-03-22 | Stop reason: HOSPADM

## 2022-03-17 RX ORDER — GABAPENTIN 400 MG/1
400 CAPSULE ORAL EVERY EVENING
Status: DISCONTINUED | OUTPATIENT
Start: 2022-03-17 | End: 2022-03-22 | Stop reason: HOSPADM

## 2022-03-17 RX ORDER — METHYLPREDNISOLONE SODIUM SUCCINATE 125 MG/2ML
60 INJECTION, POWDER, LYOPHILIZED, FOR SOLUTION INTRAMUSCULAR; INTRAVENOUS ONCE
Status: COMPLETED | OUTPATIENT
Start: 2022-03-17 | End: 2022-03-17

## 2022-03-17 RX ORDER — PANTOPRAZOLE SODIUM 40 MG/1
40 TABLET, DELAYED RELEASE ORAL
Status: DISCONTINUED | OUTPATIENT
Start: 2022-03-18 | End: 2022-03-22 | Stop reason: HOSPADM

## 2022-03-17 RX ORDER — FUROSEMIDE 40 MG/1
40 TABLET ORAL DAILY
Status: DISCONTINUED | OUTPATIENT
Start: 2022-03-17 | End: 2022-03-22 | Stop reason: HOSPADM

## 2022-03-17 RX ORDER — DILTIAZEM HYDROCHLORIDE 60 MG/1
60 TABLET, FILM COATED ORAL EVERY 8 HOURS SCHEDULED
Status: DISCONTINUED | OUTPATIENT
Start: 2022-03-17 | End: 2022-03-22 | Stop reason: HOSPADM

## 2022-03-17 RX ORDER — DICYCLOMINE HYDROCHLORIDE 10 MG/1
10 CAPSULE ORAL 2 TIMES DAILY
Status: DISCONTINUED | OUTPATIENT
Start: 2022-03-17 | End: 2022-03-22 | Stop reason: HOSPADM

## 2022-03-17 RX ORDER — THEOPHYLLINE 450 MG/1
225 TABLET, EXTENDED RELEASE ORAL 2 TIMES DAILY
Status: DISCONTINUED | OUTPATIENT
Start: 2022-03-17 | End: 2022-03-17 | Stop reason: CLARIF

## 2022-03-17 RX ORDER — LEVOTHYROXINE SODIUM 0.05 MG/1
50 TABLET ORAL DAILY
Status: DISCONTINUED | OUTPATIENT
Start: 2022-03-17 | End: 2022-03-22 | Stop reason: HOSPADM

## 2022-03-17 RX ORDER — WARFARIN SODIUM 3 MG/1
3 TABLET ORAL EVERY OTHER DAY
Status: DISCONTINUED | OUTPATIENT
Start: 2022-03-18 | End: 2022-03-19 | Stop reason: DRUGHIGH

## 2022-03-17 RX ORDER — 0.9 % SODIUM CHLORIDE 0.9 %
500 INTRAVENOUS SOLUTION INTRAVENOUS ONCE
Status: COMPLETED | OUTPATIENT
Start: 2022-03-17 | End: 2022-03-17

## 2022-03-17 RX ORDER — POTASSIUM CHLORIDE 20 MEQ/1
20 TABLET, EXTENDED RELEASE ORAL DAILY
Status: DISCONTINUED | OUTPATIENT
Start: 2022-03-17 | End: 2022-03-22 | Stop reason: HOSPADM

## 2022-03-17 RX ORDER — DILTIAZEM HYDROCHLORIDE 60 MG/1
60 TABLET, FILM COATED ORAL EVERY 8 HOURS SCHEDULED
Status: DISCONTINUED | OUTPATIENT
Start: 2022-03-17 | End: 2022-03-17

## 2022-03-17 RX ORDER — BUDESONIDE AND FORMOTEROL FUMARATE DIHYDRATE 160; 4.5 UG/1; UG/1
2 AEROSOL RESPIRATORY (INHALATION) 2 TIMES DAILY
Status: DISCONTINUED | OUTPATIENT
Start: 2022-03-17 | End: 2022-03-18

## 2022-03-17 RX ORDER — IPRATROPIUM BROMIDE AND ALBUTEROL SULFATE 2.5; .5 MG/3ML; MG/3ML
1 SOLUTION RESPIRATORY (INHALATION) ONCE
Status: COMPLETED | OUTPATIENT
Start: 2022-03-17 | End: 2022-03-17

## 2022-03-17 RX ORDER — ROSUVASTATIN CALCIUM 20 MG/1
20 TABLET, COATED ORAL NIGHTLY
Status: DISCONTINUED | OUTPATIENT
Start: 2022-03-17 | End: 2022-03-22 | Stop reason: HOSPADM

## 2022-03-17 RX ORDER — CELECOXIB 200 MG/1
200 CAPSULE ORAL DAILY
Status: DISCONTINUED | OUTPATIENT
Start: 2022-03-17 | End: 2022-03-22 | Stop reason: HOSPADM

## 2022-03-17 RX ORDER — PREDNISONE 20 MG/1
40 TABLET ORAL DAILY
Status: DISCONTINUED | OUTPATIENT
Start: 2022-03-20 | End: 2022-03-18

## 2022-03-17 RX ORDER — ALBUTEROL SULFATE 90 UG/1
2 AEROSOL, METERED RESPIRATORY (INHALATION) EVERY 4 HOURS PRN
Status: DISCONTINUED | OUTPATIENT
Start: 2022-03-17 | End: 2022-03-22 | Stop reason: HOSPADM

## 2022-03-17 RX ORDER — MAGNESIUM OXIDE 400 MG/1
400 TABLET ORAL DAILY
Status: DISCONTINUED | OUTPATIENT
Start: 2022-03-17 | End: 2022-03-22 | Stop reason: HOSPADM

## 2022-03-17 RX ORDER — VITAMIN B COMPLEX
1 CAPSULE ORAL DAILY
Status: DISCONTINUED | OUTPATIENT
Start: 2022-03-17 | End: 2022-03-22 | Stop reason: HOSPADM

## 2022-03-17 RX ORDER — ALBUTEROL SULFATE 2.5 MG/3ML
2.5 SOLUTION RESPIRATORY (INHALATION) ONCE
Status: COMPLETED | OUTPATIENT
Start: 2022-03-17 | End: 2022-03-17

## 2022-03-17 RX ORDER — METHYLPREDNISOLONE SODIUM SUCCINATE 40 MG/ML
40 INJECTION, POWDER, LYOPHILIZED, FOR SOLUTION INTRAMUSCULAR; INTRAVENOUS EVERY 6 HOURS
Status: DISCONTINUED | OUTPATIENT
Start: 2022-03-17 | End: 2022-03-18

## 2022-03-17 RX ORDER — WARFARIN SODIUM 6 MG/1
6 TABLET ORAL EVERY OTHER DAY
Status: DISCONTINUED | OUTPATIENT
Start: 2022-03-17 | End: 2022-03-19 | Stop reason: DRUGHIGH

## 2022-03-17 RX ORDER — MONTELUKAST SODIUM 10 MG/1
10 TABLET ORAL NIGHTLY
Status: DISCONTINUED | OUTPATIENT
Start: 2022-03-17 | End: 2022-03-22 | Stop reason: HOSPADM

## 2022-03-17 RX ORDER — ALBUTEROL SULFATE 2.5 MG/3ML
2.5 SOLUTION RESPIRATORY (INHALATION) 4 TIMES DAILY PRN
Status: DISCONTINUED | OUTPATIENT
Start: 2022-03-17 | End: 2022-03-22 | Stop reason: HOSPADM

## 2022-03-17 RX ORDER — GLUCOSAMINE/D3/BOSWELLIA SERRA 1500MG-400
10000 TABLET ORAL DAILY
Status: DISCONTINUED | OUTPATIENT
Start: 2022-03-17 | End: 2022-03-17

## 2022-03-17 RX ADMIN — POTASSIUM CHLORIDE 20 MEQ: 20 TABLET, EXTENDED RELEASE ORAL at 16:37

## 2022-03-17 RX ADMIN — DILTIAZEM HYDROCHLORIDE 60 MG: 60 TABLET, FILM COATED ORAL at 22:11

## 2022-03-17 RX ADMIN — METHYLPREDNISOLONE SODIUM SUCCINATE 40 MG: 40 INJECTION, POWDER, FOR SOLUTION INTRAMUSCULAR; INTRAVENOUS at 22:11

## 2022-03-17 RX ADMIN — ALBUTEROL SULFATE 2.5 MG: 2.5 SOLUTION RESPIRATORY (INHALATION) at 10:23

## 2022-03-17 RX ADMIN — IPRATROPIUM BROMIDE AND ALBUTEROL SULFATE 1 AMPULE: .5; 2.5 SOLUTION RESPIRATORY (INHALATION) at 10:23

## 2022-03-17 RX ADMIN — MAGNESIUM OXIDE 400 MG (241.3 MG MAGNESIUM) TABLET 400 MG: TABLET at 16:37

## 2022-03-17 RX ADMIN — BUDESONIDE AND FORMOTEROL FUMARATE DIHYDRATE 2 PUFF: 160; 4.5 AEROSOL RESPIRATORY (INHALATION) at 20:02

## 2022-03-17 RX ADMIN — IPRATROPIUM BROMIDE AND ALBUTEROL SULFATE 1 AMPULE: .5; 2.5 SOLUTION RESPIRATORY (INHALATION) at 14:07

## 2022-03-17 RX ADMIN — WARFARIN SODIUM 6 MG: 6 TABLET ORAL at 16:37

## 2022-03-17 RX ADMIN — ROSUVASTATIN CALCIUM 20 MG: 20 TABLET, FILM COATED ORAL at 22:11

## 2022-03-17 RX ADMIN — THEOPHYLLINE ANHYDROUS 200 MG: 200 CAPSULE, EXTENDED RELEASE ORAL at 22:17

## 2022-03-17 RX ADMIN — DICYCLOMINE HYDROCHLORIDE 10 MG: 10 CAPSULE ORAL at 22:11

## 2022-03-17 RX ADMIN — Medication 1 CAPSULE: at 16:38

## 2022-03-17 RX ADMIN — AZITHROMYCIN DIHYDRATE 500 MG: 500 INJECTION, POWDER, LYOPHILIZED, FOR SOLUTION INTRAVENOUS at 16:54

## 2022-03-17 RX ADMIN — FUROSEMIDE 40 MG: 40 TABLET ORAL at 16:37

## 2022-03-17 RX ADMIN — MONTELUKAST 10 MG: 10 TABLET, FILM COATED ORAL at 22:12

## 2022-03-17 RX ADMIN — SODIUM CHLORIDE 500 ML: 9 INJECTION, SOLUTION INTRAVENOUS at 10:21

## 2022-03-17 RX ADMIN — GABAPENTIN 400 MG: 400 CAPSULE ORAL at 16:37

## 2022-03-17 RX ADMIN — ROFLUMILAST 500 MCG: 500 TABLET ORAL at 18:01

## 2022-03-17 RX ADMIN — DILTIAZEM HYDROCHLORIDE 60 MG: 60 TABLET, FILM COATED ORAL at 16:37

## 2022-03-17 RX ADMIN — METHYLPREDNISOLONE SODIUM SUCCINATE 60 MG: 125 INJECTION, POWDER, FOR SOLUTION INTRAMUSCULAR; INTRAVENOUS at 10:22

## 2022-03-17 RX ADMIN — METHYLPREDNISOLONE SODIUM SUCCINATE 40 MG: 40 INJECTION, POWDER, FOR SOLUTION INTRAMUSCULAR; INTRAVENOUS at 16:38

## 2022-03-17 ASSESSMENT — ENCOUNTER SYMPTOMS
DIARRHEA: 0
WHEEZING: 1
EYE REDNESS: 0
COUGH: 1
VOMITING: 0
NAUSEA: 0
SHORTNESS OF BREATH: 1

## 2022-03-17 ASSESSMENT — PAIN DESCRIPTION - LOCATION: LOCATION: BACK

## 2022-03-17 ASSESSMENT — PAIN SCALES - GENERAL
PAINLEVEL_OUTOF10: 3
PAINLEVEL_OUTOF10: 0

## 2022-03-17 NOTE — CONSULTS
PHARMACY ANTICOAGULATION MONITORING SERVICE    Virgilio Curtis is a 68 y.o. female on warfarin therapy for Afib. Pharmacy consulted by RASHMI Frazier for monitoring and adjustment of treatment. Indication for anticoagulation: Afib  INR goal: 2-3  Warfarin dose prior to admission: alternates 6mg with 3mg every other day    Pertinent Laboratory Values   Recent Labs     03/17/22  1019   INR 2.25   HGB 13.4   HCT 42.4          Assessment/Plan:  Drug Interactions:   Home meds:  levothyroxine  Possible new interactions:  azithromycin, methylprednisolone  INR therapeutic on admission @2.25  Will continue home warfarin regimen alternating 6mg with 3mg doses every other day. Give the 6mg dose tonight and follow INR trends tomorrow. Pharmacy will continue to monitor and adjust warfarin therapy as indicated    Thank you for the consult. Eunice Zhou Oh, [facility]  3/17/2022 3:44 PM

## 2022-03-17 NOTE — CONSULTS
PHARMACY ANTICOAGULATION MONITORING SERVICE    Angela Pablo is a 68 y.o. female on warfarin therapy for Afib. Pharmacy consulted by RASHMI Marti for monitoring and adjustment of treatment. Indication for anticoagulation: Afib  INR goal: 2-3  Warfarin dose prior to admission: alternates 6mg with 3mg every other day    Pertinent Laboratory Values   Recent Labs     03/17/22  1019   INR 2.25   HGB 13.4   HCT 42.4          Assessment/Plan:  Drug Interactions:   Home meds:  levothyroxine  Possible new interactions:  azithromycin, methylprednisolone  INR therapeutic on admission @2.25  Will continue home warfarin regimen alternating 6mg with 3mg doses every other day. Give the 6mg dose tonight and follow INR trends tomorrow. Pharmacy will continue to monitor and adjust warfarin therapy as indicated    Thank you for the consult. Alma Alanis, [facility]  3/17/2022 3:44 PM

## 2022-03-17 NOTE — H&P
V2.0  History and Physical      Name:  Jones Flower /Age/Sex: 1945  (68 y.o. female)   MRN & CSN:  [id number] & [id number] Encounter Date/Time: 3/17/2022 3:32 PM EDT   Location:  [address] [place] [address]-A PCP: Maddie Bueno MD       Hospital Day: 1    Assessment and Plan:   Jones Flower is a 68 y.o. female with a pmh of COPD with home O2  who presents with SOB, COPD Exacerbation     COPD Exacerbation  Chronic respiratory failure with hypoxia     -Admit to observation unit   -Chest x-ray with hyperaeration, no focal consolidation or acute infiltrates   -Rapid Covid test negative, patient is vaccinated and received booster   -Check respiratory PCR   -Denies fevers, chills, white count 7.7   -Cough productive with thick yellow sputum, start empiric azithromycin   -Start methylprednisone IV every 6 hours with prednisone taper   -Schedule breathing treatments   -Guaifenesin cough syrup   -Consulted Dr. Ganesh Ayala, pulmonary, established patient   -On baseline home O2 2 L nasal cannula    Atrial fibrillation  History of DVT  Chronic diastolic heart failure   Chronically anticoagulated on Coumadin   Consult pharmacy to dose Coumadin   Continue home diltiazem, Lasix   EF 50 to 55% 1/3/2022 echo. Chronic Conditions: continue home medication as ordered      All testing  and results reviewed with patient . All questions answered. Patient and family voiced understanding    This patient was seen and examined in conjunction with Dr. Juanito Enriquez. He/She was agreeable with the plan and management as dictated above. Disposition:     Estimated D/C: 2 days    Diet ADULT DIET;  Regular   GI Prophylaxis  [x] PPI,  [] H2 Blocker,  [] Carafate,  [x] Diet/Tube Feeds   DVT Prophylaxis [] Lovenox, []  Heparin, [] SCDs, [] Ambulation,  [x] Coumadin   Code Status Full Code   Surrogate Decision Maker/ POA          History from:     patient, electronic medical record    History of Present Illness:     Chief Complaint: Acute exacerbation of chronic obstructive pulmonary disease (COPD) ([facility])  Niya Luque is a 68 y.o. female with pmh of COPD on home O2 presented to [facility] ED with complaints of shortness of breath, increased difficulty breathing at night, wheezing and increased difficulty breathing when ambulating to the bathroom. Onset was yesterday evening. Patient states this is similar to her previous COPD exacerbations. Also endorses cough which is minimally productive. Endorses thick sputum. Denies any chest pain, endorses leg swelling. Has been taking her Coumadin as directed. Her INR is therapeutic. Denies any bleeding problems. Patient has been using her home inhalers as prescribed and reports some improvement in symptoms. Patient denies any recent travel or ill contacts. She is fully vaccinated for COVID-19 and received her booster. Additional review of symptoms as noted below     Review of Systems: Need 10 Elements   Review of Systems   Constitutional: Positive for activity change and fatigue. Negative for appetite change and diaphoresis. HENT: Negative for congestion and postnasal drip. Eyes: Negative for redness and visual disturbance. Respiratory: Positive for cough, shortness of breath and wheezing. Cardiovascular: Positive for leg swelling. Negative for chest pain and palpitations. Gastrointestinal: Negative for diarrhea, nausea and vomiting. Endocrine: Negative for cold intolerance and heat intolerance. Genitourinary: Negative for difficulty urinating and dysuria. Musculoskeletal: Negative for joint swelling and neck pain. Skin: Negative. Neurological: Negative for dizziness and speech difficulty. Psychiatric/Behavioral: Negative for agitation and confusion. Objective:        Intake/Output Summary (Last 24 hours) at 3/17/2022 1603  Last data filed at 3/17/2022 1222  Gross per 24 hour   Intake 490.89 ml   Output    Net 490.89 ml      Vitals:   Vitals:    03/17/22 1322 03/17/22 1400 03/17/22 1515 03/17/22 1518   BP: 120/61 128/67 (!) 161/77    Pulse: 80  95 95   Resp:   20    Temp:   98 °F (36.7 °C)    SpO2: 97% 99% 98%    Weight:       Height:           Medications Prior to Admission     Prior to Admission medications    Medication Sig Start Date End Date Taking? Authorizing Provider   ipratropium (ATROVENT HFA) 17 MCG/ACT inhaler Inhale 2 puffs into the lungs every 6-8 hours as needed   Yes Historical Provider, MD   albuterol (PROVENTIL) (2.5 MG/3ML) 0.083% nebulizer solution Take 3 mLs by nebulization 4 times daily as needed for Wheezing 3/11/22 4/10/22  Pato Black MD   warfarin (COUMADIN) 6 MG tablet TAKE 1 TABLET BY MOUTH EVERY DAY PER INR  Patient taking differently: every other day Alternates with 3mg dose 2/25/22   Pato Black MD   DALIRESP 500 MCG tablet TAKE 1 TABLET BY MOUTH EVERY DAY 2/25/22 5/26/22  Pato Black MD   risedronate (ACTONEL) 35 MG tablet TAKE 1 TABLET BY MOUTH EVERY 7 DAYS PATIENT TAKES ON SUNDAY 2/25/22   Pato Black MD   furosemide (LASIX) 40 MG tablet TAKE 1 TABLET BY MOUTH EVERY DAY 2/25/22   Pato Black MD   rosuvastatin (CRESTOR) 20 MG tablet TAKE 1 TABLET BY MOUTH EVERY DAY EVERY NIGHT 2/25/22   Pato Black MD   Magnesium Oxide 500 MG TABS TAKE 1 TABLET BY MOUTH EVERY DAY 2/21/22   Pato Black MD   celecoxib (CELEBREX) 200 MG capsule TAKE 1 CAPSULE BY MOUTH EVERY DAY 2/17/22   Pato Black MD   gabapentin (NEURONTIN) 400 MG capsule Take 1 capsule by mouth every evening for 90 days.  2/17/22 5/18/22  Pato Black MD   theophylline [place]) 450 MG extended release tablet TAKE 1/2 TABLET BY MOUTH TWICE A DAY 2/7/22   Pato Black MD   levothyroxine (SYNTHROID) 50 MCG tablet TAKE 1 TABLET BY MOUTH EVERY DAY 2/4/22   Pato Black MD   BREZTRI AEROSPHERE 160-9-4.8 MCG/ACT AERO INHALE 2 PUFFS BY MOUTH TWICE DAILY 1/13/22   Leandra Zuñiga, MD   dilTIAZem (CARDIZEM) 60 MG tablet Take 1 tablet by mouth every 8 hours 1/4/22   Jl Langley MD   potassium chloride (KLOR-CON M10) 10 MEQ extended release tablet Take 2 tablets by mouth daily 12/8/21 3/8/22  Rich Corley MD   montelukast (SINGULAIR) 10 MG tablet TAKE 1 TABLET BY MOUTH EVERY DAY EVERY NIGHT 12/1/21   Rich Corley MD   esomeprazole ([address]) 40 MG delayed release capsule TAKE 1 CAPSULE BY MOUTH TWICE A DAY 11/22/21   Rich Corley MD   albuterol sulfate  (90 Base) MCG/ACT inhaler INHALE 2 PUFF FOUR TIMES A DAY AS NEEDED 11/17/21   Rich Corley MD   warfarin (COUMADIN) 3 MG tablet Take 1 tabet by mouth every day per INR  Patient taking differently: every other day Alternates with 6mg dose 11/15/21   Rich Corley MD   Dextromethorphan-guaiFENesin [facility] DM)  MG TB12 Take 1 to 2 tablet by mouth every 12 hours (maximum: 4 tablets/24 hours). Drink plenty of water. 11/18/20   Carlee Alfaro PA-C   nitroGLYCERIN (NITROSTAT) 0.4 MG SL tablet Place 1 tablet under the tongue every 5 minutes as needed for Chest pain 5/27/18   Deyanira Benz MD   dicyclomine (BENTYL) 10 MG capsule Take 10 mg by mouth 2 times daily 5/17/18   Historical Provider, MD   Biotin 95053 MCG TABS Take 10,000 mcg by mouth daily    Historical Provider, MD   b complex vitamins capsule Take 1 capsule by mouth daily    Historical Provider, MD   Calcium Carb-Cholecalciferol (CALCIUM + D3) 600-200 MG-UNIT TABS Take 1 tablet by mouth 2 times daily    Historical Provider, MD   Multiple Vitamins-Minerals (MULTIVITAMIN PO) Take 1 tablet by mouth daily    Historical Provider, MD   OXYGEN Inhale 2 L/hr into the lungs continuous. Historical Provider, MD       Physical Exam: Need 8 Elements   Physical Exam  Vitals and nursing note reviewed. Constitutional:       General: She is not in acute distress. Appearance: Normal appearance. HENT:      Head: Normocephalic. Nose: Nose normal.      Mouth/Throat:      Pharynx: Oropharynx is clear. Eyes:      Pupils: Pupils are equal, round, and reactive to light. Cardiovascular:      Rate and Rhythm: Normal rate and regular rhythm. Pulses: Normal pulses. Pulmonary:      Effort: Pulmonary effort is normal. No respiratory distress. Breath sounds: Examination of the right-upper field reveals wheezing. Examination of the left-upper field reveals wheezing. Examination of the right-middle field reveals wheezing. Examination of the left-middle field reveals wheezing. Examination of the right-lower field reveals decreased breath sounds. Examination of the left-lower field reveals decreased breath sounds. Decreased breath sounds and wheezing present. Abdominal:      General: Abdomen is flat. Bowel sounds are normal. There is no distension. Musculoskeletal:         General: Normal range of motion. Cervical back: Normal range of motion. Skin:     General: Skin is warm and dry. Capillary Refill: Capillary refill takes less than 2 seconds. Neurological:      General: No focal deficit present. Mental Status: She is alert and oriented to person, place, and time.    Psychiatric:         Mood and Affect: Mood normal.            Past Medical History:   PMHx   Past Medical History:   Diagnosis Date    Anticoagulant long-term use     Arthritis     Cancer ([facility]     Chronic hypoxemic respiratory failure ([facility] 2/6/2018    COPD (chronic obstructive pulmonary disease) (HCC)     Coronary atherosclerosis     COVID-19 virus detected 1/18/2021    DVT (deep venous thrombosis) ([facility]     Former smoker 11/22/2021    Gastroesophageal reflux disease     Hiatal hernia     Lung infiltrate on CT 1/22/2019    On home oxygen therapy     on 24/7    Osteopenia     Phlebitis     Pulmonary nodule 7/5/2016    S/P left heart catheterization by percutaneous approach 6/27/2013    Sepsis due to pneumonia ([facility] 11/14/2017    Social Connections:     Frequency of Communication with Friends and Family: Not on file    Frequency of Social Gatherings with Friends and Family: Not on file    Attends Restorationism Services: Not on file    Active Member of Clubs or Organizations: Not on file    Attends Club or Organization Meetings: Not on file    Marital Status: Not on file   Intimate Partner Violence:     Fear of Current or Ex-Partner: Not on file    Emotionally Abused: Not on file    Physically Abused: Not on file    Sexually Abused: Not on file   Housing Stability:     Unable to Pay for Housing in the Last Year: Not on file    Number of Places Lived in the Last Year: Not on file    Unstable Housing in the Last Year: Not on file       Medications:   Medications:    bisacodyl  5 mg Oral Daily    methylPREDNISolone  40 mg IntraVENous Q6H    Followed by   Creola Felty ON 3/20/2022] predniSONE  40 mg Oral Daily    azithromycin  500 mg IntraVENous Q24H    b complex vitamins  1 capsule Oral Daily    dicyclomine  10 mg Oral BID    [START ON 3/18/2022] pantoprazole  40 mg Oral QAM AC    montelukast  10 mg Oral Nightly    potassium chloride  20 mEq Oral Daily    levothyroxine  50 mcg Oral Daily    gabapentin  400 mg Oral QPM    magnesium oxide  400 mg Oral Daily    furosemide  40 mg Oral Daily    celecoxib  200 mg Oral Daily    Roflumilast  500 mcg Oral Daily    ipratropium  2 puff Inhalation 4x daily    rosuvastatin  20 mg Oral Nightly    theophylline  225 mg Oral BID    Budeson-Glycopyrrol-Formoterol  2 puff Inhalation BID    dilTIAZem  60 mg Oral 3 times per day    warfarin  6 mg Oral Every Other Day    [START ON 3/18/2022] warfarin  3 mg Oral Every Other Day      Infusions:   PRN Meds: acetaminophen, 650 mg, Q4H PRN  albuterol sulfate HFA, 2 puff, Q4H PRN  albuterol, 2.5 mg, 4x Daily PRN        Labs      CBC:   Recent Labs     03/17/22  1019   WBC 7.7   HGB 13.4        BMP:    Recent Labs     03/17/22  1019   NA 140   K 3.7   CL 99   CO2 30   BUN 10   CREATININE 0.7   GLUCOSE 102*     Hepatic: No results for input(s): AST, ALT, ALB, BILITOT, ALKPHOS in the last 72 hours. Lipids:   Lab Results   Component Value Date    CHOL 175 12/01/2021    HDL 77 12/01/2021    TRIG 87 12/01/2021     Hemoglobin A1C: No results found for: LABA1C  TSH: No results found for: TSH  Troponin:   Lab Results   Component Value Date    TROPONINT <0.010 03/17/2022    TROPONINT <0.010 12/31/2021    TROPONINT <0.010 12/28/2021     Lactic Acid: No results for input(s): LACTA in the last 72 hours. BNP:   Recent Labs     03/17/22  1019   PROBNP 163.6     UA:  Lab Results   Component Value Date    NITRU NEGATIVE 12/18/2018    COLORU YELLOW 12/18/2018    WBCUA 1 12/18/2018    RBCUA 2 12/18/2018    MUCUS RARE 12/18/2018    TRICHOMONAS NONE SEEN 12/18/2018    BACTERIA RARE 12/18/2018    CLARITYU CLEAR 12/18/2018    SPECGRAV 1.018 12/18/2018    LEUKOCYTESUR NEGATIVE 12/18/2018    UROBILINOGEN 1 12/18/2018    BILIRUBINUR NEGATIVE 12/18/2018    BLOODU NEGATIVE 12/18/2018    KETUA NEGATIVE 12/18/2018     Urine Cultures: No results found for: Dennis Ortega  Blood Cultures: No results found for: BC  No results found for: BLOODCULT2  Organism: No results found for: ORG    Imaging/Diagnostics Last 24 Hours   XR CHEST PORTABLE    Result Date: 3/17/2022  EXAMINATION: ONE XRAY VIEW OF THE CHEST 3/17/2022 10:14 am COMPARISON: 01/24/2022 HISTORY: ORDERING SYSTEM PROVIDED HISTORY: sob TECHNOLOGIST PROVIDED HISTORY: Reason for exam:->sob Reason for Exam: SOB FINDINGS: Cardiac size at the upper limits of normal.  Chronic interstitial markings are seen throughout the lungs bilaterally related to underlying COPD. Calcified granulomas are identified. Hyperaeration. No focal consolidation. No pneumothorax. No acute osseous abnormality. COPD without acute focal infiltrate.              Electronically signed by IMANI Montoya CNP on 3/17/2022 at 4:03 PM          This dictation was created with voice recognition software. While attempts have been made to review the dictation as it is transcribed, on occasion the spoken word can be misinterpreted by the technology leading to omissions or inappropriate words, phrases or sentences.      Electronically signed by IMANI Almazan CNP on 3/17/2022 at 4:03 PM

## 2022-03-17 NOTE — RT PROTOCOL NOTE
RT Inhaler-Nebulizer Bronchodilator Protocol Note    There is a bronchodilator order in the chart from a provider indicating to follow the RT Bronchodilator Protocol and there is an Initiate RT Inhaler-Nebulizer Bronchodilator Protocol order as well (see protocol at bottom of note). CXR Findings:  XR CHEST PORTABLE    Result Date: 3/17/2022  COPD without acute focal infiltrate. PATIENT TAKES ATROVENT INHALER PRN ONLY AT HOME    The findings from the last RT Protocol Assessment were as follows:   History Pulmonary Disease: Chronic pulmonary disease  Respiratory Pattern: Regular pattern and RR 12-20 bpm  Breath Sounds: Clear breath sounds  Cough: Strong, spontaneous, non-productive  Indication for Bronchodilator Therapy:    Bronchodilator Assessment Score: 2    Aerosolized bronchodilator medication orders have been revised according to the RT Inhaler-Nebulizer Bronchodilator Protocol below. Respiratory Therapist to perform RT Therapy Protocol Assessment initially then follow the protocol. Repeat RT Therapy Protocol Assessment PRN for score 0-3 or on second treatment, BID, and PRN for scores above 3. No Indications  adjust the frequency to every 6 hours PRN wheezing or bronchospasm, if no treatments needed after 48 hours then discontinue using Per Protocol order mode. If indication present, adjust the RT bronchodilator orders based on the Bronchodilator Assessment Score as indicated below. Use Inhaler orders unless patient has one or more of the following: on home nebulizer, not able to hold breath for 10 seconds, is not alert and oriented, cannot activate and use MDI correctly, or respiratory rate 25 breaths per minute or more, then use the equivalent nebulizer order(s) with same Frequency and PRN reasons based on the score. If a patient is on this medication at home then do not decrease Frequency below that used at home.     0-3  enter or revise RT bronchodilator order(s) to equivalent RT Bronchodilator order with Frequency of every 4 hours PRN for wheezing or increased work of breathing using Per Protocol order mode. 4-6  enter or revise RT Bronchodilator order(s) to two equivalent RT bronchodilator orders with one order with BID Frequency and one order with Frequency of every 4 hours PRN wheezing or increased work of breathing using Per Protocol order mode. 7-10  enter or revise RT Bronchodilator order(s) to two equivalent RT bronchodilator orders with one order with TID Frequency and one order with Frequency of every 4 hours PRN wheezing or increased work of breathing using Per Protocol order mode. 11-13  enter or revise RT Bronchodilator order(s) to one equivalent RT bronchodilator order with QID Frequency and an Albuterol order with Frequency of every 4 hours PRN wheezing or increased work of breathing using Per Protocol order mode. Greater than 13  enter or revise RT Bronchodilator order(s) to one equivalent RT bronchodilator order with every 4 hours Frequency and an Albuterol order with Frequency of every 2 hours PRN wheezing or increased work of breathing using Per Protocol order mode. RT to enter RT Home Evaluation for COPD & MDI Assessment order using Per Protocol order mode.     Electronically signed by Nasima Oh RCP on 3/17/2022 at 4:57 PM

## 2022-03-17 NOTE — ED PROVIDER NOTES
CHIEF COMPLAINT  Chief Complaint   Patient presents with    Shortness of Breath     reports COPD / reports since yesterday with cough       HPI  Charity Kenny is a 68 y.o. female with history of COPD resulting in chronic use of nasal cannula oxygen who presents shortness of breath, cough, wheezing that is worsened since last night and is similar to her previous COPD. Cough is minimally productive. Denies any chest pain, leg swelling. Has been taking her warfarin as directed, last INR 2.1, has been therapeutic. Denies any bleeding problems. Signs and symptoms moderate, minimal improved with home albuterol nebulizer and persistent at time of evaluation here. She has tried to rest and move minimally to help with her shortness of breath with minimal improvement. She denies any ill contacts, fever.       REVIEW OF SYSTEMS  Review of Systems   History obtained from chart review and the patient  General ROS: negative for - fever  Ophthalmic ROS: negative for - decreased vision or double vision  ENT ROS: negative for - headaches  Hematological and Lymphatic ROS: Positive for on blood thinners, denies any bleeding episodes  Endocrine ROS: negative for - unexpected weight changes  Respiratory ROS: positive for - cough, shortness of breath and wheezing  Cardiovascular ROS: positive for - dyspnea on exertion  Gastrointestinal ROS: no abdominal pain, change in bowel habits, or black or bloody stools  Genito-Urinary ROS: no dysuria, trouble voiding, or hematuria  Musculoskeletal ROS: negative for - joint pain or joint stiffness  Neurological ROS: no TIA or stroke symptoms      PAST MEDICAL HISTORY  Past Medical History:   Diagnosis Date    Anticoagulant long-term use     Arthritis     Cancer ([address]     Chronic hypoxemic respiratory failure (HCC) 2/6/2018    [facility]     Coronary atherosclerosis     COVID-19 virus detected 1/18/2021    DVT [facility]deep venous thrombosis) ([facility])  Former smoker 2021    Gastroesophageal reflux disease     Hiatal hernia     Lung infiltrate on CT 2019    On home oxygen therapy     on     Osteopenia     Phlebitis     Pulmonary nodule 2016    S/P left heart catheterization by percutaneous approach 2013    Sepsis due to pneumonia ([facility] 2017    Shortness of breath 2019    Shortness of breath 2021    Shortness of breath 2021    Wears glasses        FAMILY HISTORY  Family History   Problem Relation Age of Onset    Heart Disease Mother     Cancer Father         lung ca    Heart Disease Father     COPD Father     Cancer Sister     Heart Disease Sister        SOCIAL HISTORY  Social History     Socioeconomic History    Marital status:      Spouse name: None    Number of children: None    Years of education: None    Highest education level: None   Occupational History    None   Tobacco Use    Smoking status: Former Smoker     Packs/day: 1.50     Years: 26.00     Pack years: 39.00     Types: Cigarettes     Start date:      Quit date: 1991     Years since quittin.3    Smokeless tobacco: Never Used   Vaping Use    Vaping Use: Never used   Substance and Sexual Activity    Alcohol use: No    Drug use: No    Sexual activity: Not Currently     Partners: Male   Other Topics Concern    None   Social History Narrative    None     Social Determinants of Health     Financial Resource Strain: Low Risk     Difficulty of Paying Living Expenses: Not hard at all   Food Insecurity: No Food Insecurity    Worried About Running Out of Food in the Last Year: Never true    Henry of Food in the Last Year: Never true   Transportation Needs:     Lack of Transportation (Medical): Not on file    Lack of Transportation (Non-Medical):  Not on file   Physical Activity:     Days of Exercise per Week: Not on file    Minutes of Exercise per Session: Not on file   Stress:     Feeling of Stress : Not on file   Social Connections:     Frequency of Communication with Friends and Family: Not on file    Frequency of Social Gatherings with Friends and Family: Not on file    Attends Confucianism Services: Not on file    Active Member of Clubs or Organizations: Not on file    Attends Club or Organization Meetings: Not on file    Marital Status: Not on file   Intimate Partner Violence:     Fear of Current or Ex-Partner: Not on file    Emotionally Abused: Not on file    Physically Abused: Not on file    Sexually Abused: Not on file   Housing Stability:     Unable to Pay for Housing in the Last Year: Not on file    Number of [place] in the Last Year: Not on file    Unstable Housing in the Last Year: Not on file       SURGICAL HISTORY  Past Surgical History:   Procedure Laterality Date   Senora Curling Dr Claudie Ficks    COLONOSCOPY      ENDOSCOPY, COLON, DIAGNOSTIC     [facility]  No current facility-administered medications on file prior to encounter.      Current Outpatient Medications on File Prior to Encounter   Medication Sig Dispense Refill    albuterol (PROVENTIL) (2.5 MG/3ML) 0.083% nebulizer solution Take 3 mLs by nebulization 4 times daily as needed for Wheezing 120 each 5    warfarin (COUMADIN) 6 MG tablet TAKE 1 TABLET BY MOUTH EVERY DAY PER INR 90 tablet 0    DALIRESP 500 MCG tablet TAKE 1 TABLET BY MOUTH EVERY DAY 90 tablet 1    risedronate (ACTONEL) 35 MG tablet TAKE 1 TABLET BY MOUTH EVERY 7 DAYS PATIENT TAKES ON SUNDAY 12 tablet 1    furosemide (LASIX) 40 MG tablet TAKE 1 TABLET BY MOUTH EVERY DAY 90 tablet 0    rosuvastatin (CRESTOR) 20 MG tablet TAKE 1 TABLET BY MOUTH EVERY DAY EVERY NIGHT 90 tablet 1    Magnesium Oxide 500 MG TABS TAKE 1 TABLET BY MOUTH EVERY DAY 90 tablet 1    celecoxib (CELEBREX) 200 MG capsule TAKE 1 CAPSULE BY MOUTH EVERY DAY 90 capsule 0    gabapentin (NEURONTIN) 400 MG capsule Take 1 capsule by mouth every evening for 90 days. 90 capsule 0    theophylline (THEODUR) 450 MG extended release tablet TAKE 1/2 TABLET BY MOUTH TWICE A DAY 90 tablet 1    levothyroxine (SYNTHROID) 50 MCG tablet TAKE 1 TABLET BY MOUTH EVERY DAY 90 tablet 1    BREZTRI AEROSPHERE 160-9-4.8 MCG/ACT AERO INHALE 2 PUFFS BY MOUTH TWICE DAILY 10.7 each 6    dilTIAZem (CARDIZEM) 60 MG tablet Take 1 tablet by mouth every 8 hours 120 tablet 3    potassium chloride (KLOR-CON M10) 10 MEQ extended release tablet Take 2 tablets by mouth daily 180 tablet 1    montelukast (SINGULAIR) 10 MG tablet TAKE 1 TABLET BY MOUTH EVERY DAY EVERY NIGHT 90 tablet 1    esomeprazole (NEXIUM) 40 MG delayed release capsule TAKE 1 CAPSULE BY MOUTH TWICE A  capsule 0    albuterol sulfate  (90 Base) MCG/ACT inhaler INHALE 2 PUFF FOUR TIMES A DAY AS NEEDED 1 each 5    warfarin (COUMADIN) 3 MG tablet Take 1 tabet by mouth every day per INR 90 tablet 1    Dextromethorphan-guaiFENesin (MUCINEX DM)  MG TB12 Take 1 to 2 tablet by mouth every 12 hours (maximum: 4 tablets/24 hours). Drink plenty of water. 28 tablet 0    nitroGLYCERIN (NITROSTAT) 0.4 MG SL tablet Place 1 tablet under the tongue every 5 minutes as needed for Chest pain 25 tablet 1    dicyclomine (BENTYL) 10 MG capsule Take 10 mg by mouth 2 times daily      Biotin 79957 MCG TABS Take 10,000 mcg by mouth daily      b complex vitamins capsule Take 1 capsule by mouth daily      Calcium Carb-Cholecalciferol (CALCIUM + D3) 600-200 MG-UNIT TABS Take 1 tablet by mouth 2 times daily      Multiple Vitamins-Minerals (MULTIVITAMIN PO) Take 1 tablet by mouth daily      OXYGEN Inhale 2 L/hr into the lungs continuous.            ALLERGIES  Allergies   Allergen Reactions    Codeine Swelling    Darvon [Propoxyphene Hcl] Swelling    Lamisil [Terbinafine Hcl]     Morphine Swelling    Neosporin [Neomycin-Polymyxin-Gramicidin]     Pcn [Penicillins] Swelling       PHYSICAL EXAM  VITAL SIGNS: BP (!) 159/62   Pulse 75   Temp 97.1 °F (36.2 °C)   Resp 22   Ht 5' 4\" (1.626 m)   Wt 160 lb (72.6 kg)   SpO2 93%   BMI 27.46 kg/m²    Constitutional: Resting in bed, chronically unwell in appearance, elderly   HENT: Normocephalic, Atraumatic, Bilateral external ears normal, Oropharynx moist, No oral exudates, Nose normal.   Eyes: PERRL, EOMI, Conjunctiva normal, No discharge. Neck: Normal range of motion, Supple, No stridor. Cardiovascular: Normal heart rate, Normal rhythm, No murmurs, No rubs, No gallops. Thorax & Lungs: Diminished diffuse breath sounds, increased respiratory rate, diffuse wheezing, No chest tenderness. Nasal cannula in place  Abdomen: Bowel sounds normal, Soft, No tenderness, no guarding, no rebound, No masses, No pulsatile masses. Skin: Warm, Dry, No erythema, No rash. Extremities: Intact distal pulses, No edema, No tenderness, No cyanosis, No clubbing. Musculoskeletal: Good gross range of motion in all major joints. No major deformities noted. Neurologic: Alert & oriented x 3, Normal gross motor function, Normal gross sensory function, No focal deficits noted. Psychiatric: Affect normal    EKG  EKG Interpretation    Interpreted by emergency department physician from March 17 at 1011    Rhythm: normal sinus   Rate: normal  Axis: left  Ectopy: Sinus arrhythmia  Conduction: normal  ST Segments: no acute change  T Waves: no acute change  Q Waves: none    Clinical Impression: Sinus rhythm with a rate of 83, sinus arrhythmia, no STEMI or ectopy    Nahed Rosenberg MD      RADIOLOGY/PROCEDURES/LABS  Last Imaging results   XR CHEST PORTABLE   Final Result   COPD without acute focal infiltrate.              Imaging reviewed by myself    Labs Reviewed   CBC WITH AUTO DIFFERENTIAL - Abnormal; Notable for the following components:       Result Value    MCHC 31.6 (*)     Segs Relative 68.9 (*)     Lymphocytes % 15.7 (*) Monocytes % 13.7 (*)     All other components within normal limits   PROTIME-INR - Abnormal; Notable for the following components:    Protime 24.3 (*)     All other components within normal limits   BASIC METABOLIC PANEL W/ REFLEX TO MG FOR LOW K - Abnormal; Notable for the following components:    Glucose 102 (*)     All other components within normal limits   TROPONIN   BRAIN NATRIURETIC PEPTIDE         Medications   0.9 % sodium chloride bolus (500 mLs IntraVENous New Bag 3/17/22 1021)   ipratropium-albuterol (DUONEB) nebulizer solution 1 ampule (1 ampule Inhalation Given 3/17/22 1023)   albuterol (PROVENTIL) nebulizer solution 2.5 mg (2.5 mg Nebulization Given 3/17/22 1023)   methylPREDNISolone sodium (SOLU-MEDROL) injection 60 mg (60 mg IntraVENous Given 3/17/22 1022)       COURSE & MEDICAL DECISION MAKING  Pertinent Labs & Imaging studies reviewed. (See chart for details)    27-year-old female presents with signs and symptoms of COPD exacerbation. Chest x-ray without pneumothorax, mediastinal widening, pneumonia or suggestion of CHF. Patient treated with multiple bronchodilators in the department with DuoNeb as well as albuterol upon arrival, she had also taken 2 of her albuterol back-to-back just prior to arrival and she was still having significant dyspnea on exertion in the department. She does have improved aeration, she is on her baseline oxygen but based on her dyspnea, age and underlying significant COPD I do believe she benefit from hospitalization and additional respiratory care. She was started on IV steroids, she was given small bolus of fluid. She was discussed with the hospitalist at [facility] who is agreeable to accept care. Her Covid testing is negative, she is therapeutic on her warfarin, low clinical suspicion for PE. Patient agreeable with plan of care, to transfer awaiting transport at this time.     CRITICAL CARE NOTE:  There was a high probability of clinically significant life-threatening deterioration of the patient's condition requiring my urgent intervention due to COPD exacerbation, dyspnea, chronic respiratory failure. Multiple reevaluation, bronchodilator, IV steroid was performed to address this. Total critical care time is 8 minutes. This includes vital sign monitoring, pulse oximetry monitoring, telemetry monitoring, clinical response to the IV medications, reviewing the nursing notes, consultation time, dictation/documentation time, and interpretation of the lab work. This time excludes time spent performing procedures and separately billable procedures and family discussion time. FINAL IMPRESSION  Problem List Items Addressed This Visit     None      Visit Diagnoses     COPD exacerbation ([address]    -  Primary    Dyspnea and respiratory abnormalities        Chronic respiratory failure with hypoxia ([address]          1.    2.   3.    Patient gave me permission to discuss medical history, care, and plan with those present in the room.   Electronically signed by: Becki Galaviz MD, 3/17/2022  MD Becki Sofia MD  03/17/22 [id number]

## 2022-03-17 NOTE — ED NOTES
Updates on plan of care. No acute distress. No needs at this time. Family at bedside.      Christine Yoder RN  03/17/22 [id number]

## 2022-03-18 ENCOUNTER — APPOINTMENT (OUTPATIENT)
Dept: CT IMAGING | Age: 77
DRG: 190 | End: 2022-03-18
Payer: MEDICARE

## 2022-03-18 LAB
ALBUMIN SERPL-MCNC: 3.6 GM/DL (ref 3.4–5)
ALP BLD-CCNC: 64 IU/L (ref 40–128)
ALT SERPL-CCNC: 21 U/L (ref 10–40)
ANION GAP SERPL CALCULATED.3IONS-SCNC: 12 MMOL/L (ref 4–16)
AST SERPL-CCNC: 29 IU/L (ref 15–37)
BASE EXCESS MIXED: 2.5 (ref 0–2.3)
BASOPHILS ABSOLUTE: 0 K/CU MM
BASOPHILS RELATIVE PERCENT: 0 % (ref 0–1)
BILIRUB SERPL-MCNC: 0.2 MG/DL (ref 0–1)
BUN BLDV-MCNC: 21 MG/DL (ref 6–23)
CALCIUM SERPL-MCNC: 9 MG/DL (ref 8.3–10.6)
CARBON MONOXIDE, BLOOD: 1.8 % (ref 0–5)
CHLORIDE BLD-SCNC: 104 MMOL/L (ref 99–110)
CO2 CONTENT: 29.3 MMOL/L (ref 19–24)
CO2: 26 MMOL/L (ref 21–32)
COMMENT: ABNORMAL
CREAT SERPL-MCNC: 0.7 MG/DL (ref 0.6–1.1)
DIFFERENTIAL TYPE: ABNORMAL
EOSINOPHILS ABSOLUTE: 0 K/CU MM
EOSINOPHILS RELATIVE PERCENT: 0 % (ref 0–3)
GFR AFRICAN AMERICAN: >60 ML/MIN/1.73M2
GFR NON-AFRICAN AMERICAN: >60 ML/MIN/1.73M2
GLUCOSE BLD-MCNC: 166 MG/DL (ref 70–99)
HCO3 ARTERIAL: 27.9 MMOL/L (ref 18–23)
HCT VFR BLD CALC: 39.1 % (ref 37–47)
HEMOGLOBIN: 12.6 GM/DL (ref 12.5–16)
IMMATURE NEUTROPHIL %: 0.4 % (ref 0–0.43)
INR BLD: 2.63 INDEX
LYMPHOCYTES ABSOLUTE: 0.3 K/CU MM
LYMPHOCYTES RELATIVE PERCENT: 5.9 % (ref 24–44)
MCH RBC QN AUTO: 31.1 PG (ref 27–31)
MCHC RBC AUTO-ENTMCNC: 32.2 % (ref 32–36)
MCV RBC AUTO: 96.5 FL (ref 78–100)
METHEMOGLOBIN ARTERIAL: 1.5 %
MONOCYTES ABSOLUTE: 0.1 K/CU MM
MONOCYTES RELATIVE PERCENT: 2 % (ref 0–4)
NUCLEATED RBC %: 0 %
O2 SATURATION: 95.1 % (ref 96–97)
PCO2 ARTERIAL: 45 MMHG (ref 32–45)
PDW BLD-RTO: 14.2 % (ref 11.7–14.9)
PH BLOOD: 7.4 (ref 7.34–7.45)
PLATELET # BLD: 221 K/CU MM (ref 140–440)
PMV BLD AUTO: 10.2 FL (ref 7.5–11.1)
PO2 ARTERIAL: 89 MMHG (ref 75–100)
POTASSIUM SERPL-SCNC: 4.5 MMOL/L (ref 3.5–5.1)
PROTHROMBIN TIME: 34.3 SECONDS (ref 11.7–14.5)
RBC # BLD: 4.05 M/CU MM (ref 4.2–5.4)
SEGMENTED NEUTROPHILS ABSOLUTE COUNT: 4.2 K/CU MM
SEGMENTED NEUTROPHILS RELATIVE PERCENT: 91.7 % (ref 36–66)
SODIUM BLD-SCNC: 142 MMOL/L (ref 135–145)
TOTAL IMMATURE NEUTOROPHIL: 0.02 K/CU MM
TOTAL NUCLEATED RBC: 0 K/CU MM
TOTAL PROTEIN: 5.3 GM/DL (ref 6.4–8.2)
WBC # BLD: 4.6 K/CU MM (ref 4–10.5)

## 2022-03-18 PROCEDURE — 94761 N-INVAS EAR/PLS OXIMETRY MLT: CPT

## 2022-03-18 PROCEDURE — 94640 AIRWAY INHALATION TREATMENT: CPT

## 2022-03-18 PROCEDURE — 6360000002 HC RX W HCPCS: Performed by: INTERNAL MEDICINE

## 2022-03-18 PROCEDURE — 36415 COLL VENOUS BLD VENIPUNCTURE: CPT

## 2022-03-18 PROCEDURE — 96376 TX/PRO/DX INJ SAME DRUG ADON: CPT

## 2022-03-18 PROCEDURE — 6370000000 HC RX 637 (ALT 250 FOR IP): Performed by: NURSE PRACTITIONER

## 2022-03-18 PROCEDURE — 6360000002 HC RX W HCPCS: Performed by: NURSE PRACTITIONER

## 2022-03-18 PROCEDURE — 85610 PROTHROMBIN TIME: CPT

## 2022-03-18 PROCEDURE — 2580000003 HC RX 258: Performed by: NURSE PRACTITIONER

## 2022-03-18 PROCEDURE — 80053 COMPREHEN METABOLIC PANEL: CPT

## 2022-03-18 PROCEDURE — 96366 THER/PROPH/DIAG IV INF ADDON: CPT

## 2022-03-18 PROCEDURE — 99222 1ST HOSP IP/OBS MODERATE 55: CPT | Performed by: INTERNAL MEDICINE

## 2022-03-18 PROCEDURE — 71260 CT THORAX DX C+: CPT

## 2022-03-18 PROCEDURE — G0378 HOSPITAL OBSERVATION PER HR: HCPCS

## 2022-03-18 PROCEDURE — 85025 COMPLETE CBC W/AUTO DIFF WBC: CPT

## 2022-03-18 PROCEDURE — 6370000000 HC RX 637 (ALT 250 FOR IP): Performed by: INTERNAL MEDICINE

## 2022-03-18 PROCEDURE — 36600 WITHDRAWAL OF ARTERIAL BLOOD: CPT

## 2022-03-18 PROCEDURE — 82803 BLOOD GASES ANY COMBINATION: CPT

## 2022-03-18 PROCEDURE — 6370000000 HC RX 637 (ALT 250 FOR IP): Performed by: PHYSICIAN ASSISTANT

## 2022-03-18 PROCEDURE — 94664 DEMO&/EVAL PT USE INHALER: CPT

## 2022-03-18 PROCEDURE — 2700000000 HC OXYGEN THERAPY PER DAY

## 2022-03-18 PROCEDURE — 6360000004 HC RX CONTRAST MEDICATION: Performed by: INTERNAL MEDICINE

## 2022-03-18 RX ORDER — PREDNISONE 20 MG/1
40 TABLET ORAL DAILY
Status: DISCONTINUED | OUTPATIENT
Start: 2022-03-18 | End: 2022-03-19

## 2022-03-18 RX ORDER — ALBUTEROL SULFATE 2.5 MG/3ML
2.5 SOLUTION RESPIRATORY (INHALATION) 4 TIMES DAILY
Status: DISCONTINUED | OUTPATIENT
Start: 2022-03-18 | End: 2022-03-22 | Stop reason: HOSPADM

## 2022-03-18 RX ADMIN — ROFLUMILAST 500 MCG: 500 TABLET ORAL at 10:24

## 2022-03-18 RX ADMIN — ALBUTEROL SULFATE 2 PUFF: 90 AEROSOL, METERED RESPIRATORY (INHALATION) at 21:55

## 2022-03-18 RX ADMIN — TIOTROPIUM BROMIDE AND OLODATEROL 2 PUFF: 3.124; 2.736 SPRAY, METERED RESPIRATORY (INHALATION) at 12:06

## 2022-03-18 RX ADMIN — THEOPHYLLINE ANHYDROUS 200 MG: 200 CAPSULE, EXTENDED RELEASE ORAL at 10:26

## 2022-03-18 RX ADMIN — AZITHROMYCIN DIHYDRATE 500 MG: 500 INJECTION, POWDER, LYOPHILIZED, FOR SOLUTION INTRAVENOUS at 16:45

## 2022-03-18 RX ADMIN — FUROSEMIDE 40 MG: 40 TABLET ORAL at 10:19

## 2022-03-18 RX ADMIN — ALBUTEROL SULFATE 2.5 MG: 2.5 SOLUTION RESPIRATORY (INHALATION) at 16:58

## 2022-03-18 RX ADMIN — ALBUTEROL SULFATE 2.5 MG: 2.5 SOLUTION RESPIRATORY (INHALATION) at 12:05

## 2022-03-18 RX ADMIN — BUDESONIDE AND FORMOTEROL FUMARATE DIHYDRATE 2 PUFF: 160; 4.5 AEROSOL RESPIRATORY (INHALATION) at 07:43

## 2022-03-18 RX ADMIN — METHYLPREDNISOLONE SODIUM SUCCINATE 40 MG: 40 INJECTION, POWDER, FOR SOLUTION INTRAMUSCULAR; INTRAVENOUS at 05:19

## 2022-03-18 RX ADMIN — DICYCLOMINE HYDROCHLORIDE 10 MG: 10 CAPSULE ORAL at 21:49

## 2022-03-18 RX ADMIN — PANTOPRAZOLE SODIUM 40 MG: 40 TABLET, DELAYED RELEASE ORAL at 05:20

## 2022-03-18 RX ADMIN — IOPAMIDOL 80 ML: 755 INJECTION, SOLUTION INTRAVENOUS at 15:46

## 2022-03-18 RX ADMIN — LEVOTHYROXINE SODIUM 50 MCG: 0.05 TABLET ORAL at 10:19

## 2022-03-18 RX ADMIN — GABAPENTIN 400 MG: 400 CAPSULE ORAL at 18:16

## 2022-03-18 RX ADMIN — ROSUVASTATIN CALCIUM 20 MG: 20 TABLET, FILM COATED ORAL at 21:49

## 2022-03-18 RX ADMIN — DILTIAZEM HYDROCHLORIDE 60 MG: 60 TABLET, FILM COATED ORAL at 05:19

## 2022-03-18 RX ADMIN — MAGNESIUM OXIDE 400 MG (241.3 MG MAGNESIUM) TABLET 400 MG: TABLET at 10:19

## 2022-03-18 RX ADMIN — DICYCLOMINE HYDROCHLORIDE 10 MG: 10 CAPSULE ORAL at 10:19

## 2022-03-18 RX ADMIN — ALBUTEROL SULFATE 2.5 MG: 2.5 SOLUTION RESPIRATORY (INHALATION) at 19:18

## 2022-03-18 RX ADMIN — METHYLPREDNISOLONE SODIUM SUCCINATE 40 MG: 40 INJECTION, POWDER, FOR SOLUTION INTRAMUSCULAR; INTRAVENOUS at 10:19

## 2022-03-18 RX ADMIN — POTASSIUM CHLORIDE 20 MEQ: 20 TABLET, EXTENDED RELEASE ORAL at 10:24

## 2022-03-18 RX ADMIN — DILTIAZEM HYDROCHLORIDE 60 MG: 60 TABLET, FILM COATED ORAL at 13:31

## 2022-03-18 RX ADMIN — MONTELUKAST 10 MG: 10 TABLET, FILM COATED ORAL at 21:49

## 2022-03-18 RX ADMIN — ALBUTEROL SULFATE 2 PUFF: 90 AEROSOL, METERED RESPIRATORY (INHALATION) at 07:42

## 2022-03-18 RX ADMIN — ALBUTEROL SULFATE 2 PUFF: 90 AEROSOL, METERED RESPIRATORY (INHALATION) at 02:41

## 2022-03-18 RX ADMIN — DILTIAZEM HYDROCHLORIDE 60 MG: 60 TABLET, FILM COATED ORAL at 21:48

## 2022-03-18 RX ADMIN — WARFARIN SODIUM 3 MG: 3 TABLET ORAL at 18:12

## 2022-03-18 RX ADMIN — THEOPHYLLINE ANHYDROUS 200 MG: 200 CAPSULE, EXTENDED RELEASE ORAL at 21:48

## 2022-03-18 RX ADMIN — PREDNISONE 40 MG: 20 TABLET ORAL at 16:45

## 2022-03-18 RX ADMIN — Medication 1 CAPSULE: at 10:19

## 2022-03-18 RX ADMIN — CELECOXIB 200 MG: 200 CAPSULE ORAL at 10:19

## 2022-03-18 ASSESSMENT — PAIN SCALES - GENERAL
PAINLEVEL_OUTOF10: 0
PAINLEVEL_OUTOF10: 2
PAINLEVEL_OUTOF10: 0
PAINLEVEL_OUTOF10: 0

## 2022-03-18 NOTE — PROGRESS NOTES
Oxygen saturation fell to 78% with ambulation to restroom and back to bed on 2L nasal cannula. Sats returned to baseline within about 2 minutes once sitting. Dr Abdirashid Straussr to evaluate for increasing home oxygen level.

## 2022-03-18 NOTE — PROGRESS NOTES
V2.0  [facility] Hospitalist Progress Note      Name:  Luli Cisneros /Age/Sex: 1945  (68 y.o. female)   MRN & CSN:  [id number] & [id number] Encounter Date/Time: 3/18/2022 7:20 AM EDT    Location:  [address] [place] [address]-A PCP: Angela Ruiz MD       Hospital Day: 2    Assessment and Plan:   Luli Cisneros is a 68 y.o. female with past medical history of COPD with chronic respiratory failure who presents with Acute exacerbation of chronic obstructive pulmonary disease (COPD) ([facility] [address]     COPD exacerbation   Chronic hypoxic respiratory failure   -Presented with wheezing, productive cough  -Chest x-ray with hyper aeration, no focal consolidation or infiltrate  -Rapid Covid negative  -On baseline home 2L O2 NC  -Respiratory PCR +human metapneumovirus  -Continue azithromycin, transition to PO steroids, scheduled breathing treatments  -Patient's pulmonologist consulted on admission, appreciate recs    Paroxysmal atrial fibrillation  -EKG with sinus rhythm with sinus arrhythmia  -Continue home diltiazem  -Pharmacy to dose Coumadin    Chronic HFpEF  -Echo 2020 with EF 50%  -Not in acute exacerbation  -Continue home Lasix    History of DVT  -Chronically anticoagulated on Coumadin    This patient was discussed with Dr. Kate Vásquez. She was agreeable with the plan and management as dictated above. Diet ADULT DIET; Regular   DVT Prophylaxis [] Lovenox, []  Heparin, [] SCDs, [] Ambulation,  [] Eliquis, [] Xarelto   Code Status Full Code   Disposition Patient requires continued admission due to COPD exacerbation   Surrogate Decision Maker/ POA      Subjective:     Chief Complaint: Shortness of Breath (reports COPD / reports since yesterday with cough)     Patient seen and examined at bedside. States she is feeling better this morning, but still with some coughing and wheezing. States her breathing does feel better. Denies chest pain, lower extremity edema. We discussed plan of care, patient is agreeable. Review of Systems:    Ten point ROS reviewed negative, unless as noted above    Objective: Intake/Output Summary (Last 24 hours) at 3/18/2022 0720  Last data filed at 3/17/2022 1222  Gross per 24 hour   Intake 490.89 ml   Output    Net 490.89 ml        Vitals:   Vitals:    03/18/22 0517   BP: (!) 152/73   Pulse: 80   Resp: 18   Temp: 96.5 °F (35.8 °C)   SpO2: 96%       Physical Exam:   PHYSICAL EXAM   GEN Awake female, sitting upright in bed in no apparent distress. Appears given age. EYES Pupils are equally round. Gaze conjugate. HENT Mucous membranes are moist.   NECK Supple, no apparent thyromegaly or masses. RESP Respirations even and unlabored on 2L. Diffuse expiratory wheeze. CARDIO/VASC Regular rate without appreciable murmurs. GI Abdomen is soft without significant tenderness, masses, or guarding.   Escamilla catheter is not present. MSK No gross joint deformities. SKIN Normal coloration, warm, dry. NEURO Cranial nerves appear grossly intact, normal speech, no lateralizing weakness. PSYCH Awake, alert, oriented x 4. Affect appropriate.     Medications:   Medications:    bisacodyl  5 mg Oral Daily    methylPREDNISolone  40 mg IntraVENous Q6H    Followed by   Agnes Oconnor ON 3/20/2022] predniSONE  40 mg Oral Daily    azithromycin  500 mg IntraVENous Q24H    b complex vitamins  1 capsule Oral Daily    dicyclomine  10 mg Oral BID    pantoprazole  40 mg Oral QAM AC    montelukast  10 mg Oral Nightly    potassium chloride  20 mEq Oral Daily    levothyroxine  50 mcg Oral Daily    gabapentin  400 mg Oral QPM    magnesium oxide  400 mg Oral Daily    furosemide  40 mg Oral Daily    celecoxib  200 mg Oral Daily    Roflumilast  500 mcg Oral Daily    rosuvastatin  20 mg Oral Nightly    dilTIAZem  60 mg Oral 3 times per day    warfarin  6 mg Oral Every Other Day    warfarin  3 mg Oral Every Other Day    budesonide-formoterol  2 puff Inhalation BID    theophylline  200 mg Oral 2 times per day      Infusions:   PRN Meds: acetaminophen, 650 mg, Q4H PRN  albuterol sulfate HFA, 2 puff, Q4H PRN  albuterol, 2.5 mg, 4x Daily PRN  ipratropium, 2 puff, 4x Daily PRN        Labs      Recent Results (from the past 24 hour(s))   EKG 12 Lead    Collection Time: 03/17/22 10:11 AM   Result Value Ref Range    Ventricular Rate 83 BPM    Atrial Rate 83 BPM    P-R Interval 128 ms    QRS Duration 102 ms    Q-T Interval 364 ms    QTc Calculation (Bazett) 427 ms    P Axis 66 degrees    R Axis -45 degrees    T Axis 63 degrees    Diagnosis        Poor data quality, interpretation may be adversely affected  Sinus rhythm with marked sinus arrhythmia with occasional premature ventricular complexes  Left anterior fascicular block  Voltage criteria for left ventricular hypertrophy  Abnormal ECG  When compared with ECG of 03-JAN-2022 10:10,  fusion complexes are no longer present  Confirmed by GILDARDO Dhaliwal ([id number]) on 3/17/2022 7:36:37 PM     CBC with Auto Differential    Collection Time: 03/17/22 10:19 AM   Result Value Ref Range    WBC 7.7 4.0 - 10.5 K/CU MM    RBC 4.35 4.2 - 5.4 M/CU MM    Hemoglobin 13.4 12.5 - 16.0 GM/DL    Hematocrit 42.4 37 - 47 %    MCV 97.5 78 - 100 FL    MCH 30.8 27 - 31 PG    MCHC 31.6 (L) 32.0 - 36.0 %    RDW 14.4 11.7 - 14.9 %    Platelets 097 815 - 762 K/CU MM    MPV 9.3 7.5 - 11.1 FL    Differential Type AUTOMATED DIFFERENTIAL     Segs Relative 68.9 (H) 36 - 66 %    Lymphocytes % 15.7 (L) 24 - 44 %    Monocytes % 13.7 (H) 0 - 4 %    Eosinophils % 0.5 0 - 3 %    Basophils % 0.8 0 - 1 %    Segs Absolute 5.3 K/CU MM    Lymphocytes Absolute 1.2 K/CU MM    Monocytes Absolute 1.1 K/CU MM    Eosinophils Absolute 0.0 K/CU MM    Basophils Absolute 0.1 K/CU MM    Immature Neutrophil % 0.4 0 - 0.43 %    Total Immature Neutrophil 0.03 K/CU MM   Protime-INR    Collection Time: 03/17/22 10:19 AM   Result Value Ref Range    Protime 24.3 (H) 11.7 - 14.5 SECONDS    INR 2.25 INDEX   Basic Metabolic Panel w/ Reflex to MG    Collection Time: 03/17/22 10:19 AM   Result Value Ref Range    Sodium 140 135 - 145 MMOL/L    Potassium 3.7 3.5 - 5.1 MMOL/L    Chloride 99 99 - 110 mMol/L    CO2 30 21 - 32 MMOL/L    Anion Gap 11 4 - 16    BUN 10 6 - 23 MG/DL    CREATININE 0.7 0.6 - 1.1 MG/DL    Glucose 102 (H) 70 - 99 MG/DL    Calcium 9.1 8.3 - 10.6 MG/DL    GFR Non-African American >60 >60 mL/min/1.73m2    GFR African American >60 >60 mL/min/1.73m2   Troponin    Collection Time: 03/17/22 10:19 AM   Result Value Ref Range    Troponin T <0.010 <0.01 NG/ML   Brain Natriuretic Peptide    Collection Time: 03/17/22 10:19 AM   Result Value Ref Range    Pro-.6 <300 PG/ML   COVID-19, Rapid    Collection Time: 03/17/22 11:05 AM    Specimen: Nasopharyngeal   Result Value Ref Range    Source THROAT     SARS-CoV-2, NAAT NOT DETECTED NOT DETECTED   Respiratory Panel, Molecular, with COVID-19 (Restricted: peds pts or suitable admitted adults)    Collection Time: 03/17/22  3:00 PM    Specimen: Nasopharyngeal   Result Value Ref Range    Adenovirus Detection by PCR NOT DETECTED NOT DETECTED    Coronavirus 229E PCR NOT DETECTED NOT DETECTED    Coronavirus HKU1 PCR NOT DETECTED NOT DETECTED    Coronavirus NL63 PCR NOT DETECTED NOT DETECTED    Coronavirus OC43 PCR NOT DETECTED NOT DETECTED    SARS-CoV-2 NOT DETECTED NOT DETECTED    Human Metapneumovirus PCR DETECTED BY PCR (A) NOT DETECTED    Rhinovirus Enterovirus PCR NOT DETECTED NOT DETECTED    Influenza A by PCR NOT DETECTED NOT DETECTED    Influenza A H1 Pandemic PCR NOT DETECTED NOT DETECTED    Influenza A H1 (2009) PCR NOT DETECTED NOT DETECTED    Influenza A H3 PCR NOT DETECTED NOT DETECTED    Influenza B by PCR NOT DETECTED NOT DETECTED    Parainfluenza 1 PCR NOT DETECTED NOT DETECTED    Parainfluenza 2 PCR NOT DETECTED NOT DETECTED    Parainfluenza 3 PCR NOT DETECTED NOT DETECTED    Parainfluenza 4 PCR NOT DETECTED NOT DETECTED    RSV PCR NOT DETECTED NOT DETECTED Bordetella parapertussis by PCR NOT DETECTED NOT DETECTED    B Pertussis by PCR NOT DETECTED NOT DETECTED    Chlamydophila Pneumonia PCR NOT DETECTED NOT DETECTED    Mycoplasma pneumo by PCR NOT DETECTED NOT DETECTED   Comprehensive Metabolic Panel w/ Reflex to MG    Collection Time: 03/18/22 12:47 AM   Result Value Ref Range    Sodium 142 135 - 145 MMOL/L    Potassium 4.5 3.5 - 5.1 MMOL/L    Chloride 104 99 - 110 mMol/L    CO2 26 21 - 32 MMOL/L    BUN 21 6 - 23 MG/DL    CREATININE 0.7 0.6 - 1.1 MG/DL    Glucose 166 (H) 70 - 99 MG/DL    Calcium 9.0 8.3 - 10.6 MG/DL    Albumin 3.6 3.4 - 5.0 GM/DL    Total Protein 5.3 (L) 6.4 - 8.2 GM/DL    Total Bilirubin 0.2 0.0 - 1.0 MG/DL    ALT 21 10 - 40 U/L    AST 29 15 - 37 IU/L    Alkaline Phosphatase 64 40 - 128 IU/L    GFR Non-African American >60 >60 mL/min/1.73m2    GFR African American >60 >60 mL/min/1.73m2    Anion Gap 12 4 - 16   CBC with Auto Differential    Collection Time: 03/18/22 12:47 AM   Result Value Ref Range    WBC 4.6 4.0 - 10.5 K/CU MM    RBC 4.05 (L) 4.2 - 5.4 M/CU MM    Hemoglobin 12.6 12.5 - 16.0 GM/DL    Hematocrit 39.1 37 - 47 %    MCV 96.5 78 - 100 FL    MCH 31.1 (H) 27 - 31 PG    MCHC 32.2 32.0 - 36.0 %    RDW 14.2 11.7 - 14.9 %    Platelets 634 415 - 172 K/CU MM    MPV 10.2 7.5 - 11.1 FL    Differential Type AUTOMATED DIFFERENTIAL     Segs Relative 91.7 (H) 36 - 66 %    Lymphocytes % 5.9 (L) 24 - 44 %    Monocytes % 2.0 0 - 4 %    Eosinophils % 0.0 0 - 3 %    Basophils % 0.0 0 - 1 %    Segs Absolute 4.2 K/CU MM    Lymphocytes Absolute 0.3 K/CU MM    Monocytes Absolute 0.1 K/CU MM    Eosinophils Absolute 0.0 K/CU MM    Basophils Absolute 0.0 K/CU MM    Nucleated RBC % 0.0 %    Total Nucleated RBC 0.0 K/CU MM    Total Immature Neutrophil 0.02 K/CU MM    Immature Neutrophil % 0.4 0 - 0.43 %   Protime-INR    Collection Time: 03/18/22 12:47 AM   Result Value Ref Range    Protime 34.3 (H) 11.7 - 14.5 SECONDS    INR 2.63 INDEX        Imaging/Diagnostics Last 24 Hours   XR CHEST PORTABLE    Result Date: 3/17/2022  EXAMINATION: ONE XRAY VIEW OF THE CHEST 3/17/2022 10:14 am COMPARISON: 01/24/2022 HISTORY: ORDERING SYSTEM PROVIDED HISTORY: sob TECHNOLOGIST PROVIDED HISTORY: Reason for exam:->sob Reason for Exam: SOB FINDINGS: Cardiac size at the upper limits of normal.  Chronic interstitial markings are seen throughout the lungs bilaterally related to underlying COPD. Calcified granulomas are identified. Hyperaeration. No focal consolidation. No pneumothorax. No acute osseous abnormality. COPD without acute focal infiltrate.        Electronically signed by Tonia Rob PA-C on 3/18/2022 at 7:20 AM

## 2022-03-18 NOTE — CONSULTS
[address], [address] [facility]                                  CONSULTATION    PATIENT NAME: La Nena Keita                   :        1945  MED REC NO:   [id number]                          ROOM:       [id number]  ACCOUNT NO:   [de-identified]                           ADMIT DATE: 2022  PROVIDER:     John Minaya MD    CONSULT DATE:  2022    This is a private patient of Israel Gagnon MD.    CHIEF COMPLAINT:  Shortness of breath. HISTORY OF PRESENT ILLNESS:  The patient is a 45-year-old female with  stage IV/very severe COPD, who presents to the emergency room in [place]  complaining of worsening shortness of breath, chest tightness and mild  chest congestion. She states that her cough has been productive of  small amount of thick sputum but she does not state that it is purulent. She denies fever or chills. She has had a long history of severe COPD  with multiple exacerbations and has been on oral antibiotics and  steroids on numerous occasions. She is maintained on 5 mg prednisone  along with Breztri two inhalations twice a day, oral theophylline using  225 mg twice a day and she uses either nebulized or inhaled albuterol as  needed. She is on oxygen 24 hours a day for her long history of chronic  hypoxemic respiratory failure. She denies any purulent chest pain and  has had no hemoptysis. She denies recent COVID-19 infection, but did  have COVID-19 infection in the fall of , but did not require  hospitalization. At that time, she had received both initial Moderna  COVID-19 vaccinations, but since then, she has received her booster. She also carries a history of atrial fibrillation and chronic diastolic  CHF. PAST MEDICAL HISTORY:  Includes a long history of coronary artery  disease, GE reflux, hiatal hernia, DVT and arthritis.     SOCIAL HISTORY:  She was a previous one and a half pack per day smoker  for 26 years, but quit in 1991. She does not use alcohol and does not  have a history of illegal drug use. FAMILY HISTORY:  Includes COPD in her father. Heart disease in her  father, mother and sister and she mentions that her father and sister  had cancer. REVIEW OF SYSTEMS:  CONSTITUTIONAL:  Negative for fever, chills, weight loss or anorexia. HEENT:  Negative for ear or throat pain. Negative for tinnitus. Eyes:   Negative for diplopia or discharge. RESPIRATORY:  See HPI. CARDIAC:  Positive for atrial fibrillation, positive for coronary artery  disease. Negative for valvular heart disease. Negative for syncope. Negative for edema. GI:  Negative for nausea, vomiting, diarrhea or colitis. :  Negative for flank pain or inguinal hernias. Negative for kidney  stone. SKIN:  Negative for rash or pruritus. HEME/LYMPH:  Does bruise easily, but has been on oral Coumadin. MUSCULOSKELETAL:  Mild chronic arthritis. Negative for recent fracture. Negative for DVT recently. NEURO:  Negative for migraine headaches, seizure disorder, or CVA. PSYCH:  Negative for bipolar disease or psychosis. PHYSICAL EXAMINATION:  VITAL SIGNS:  Her blood pressure is 156/48, pulse 76, respirations  20-22, oxygen saturation currently 96% on 2 liters nasal oxygen. She is  afebrile with a temperature of 98.2. GENERAL:  She is a well-developed, barrel-chested female, who does  appear to be in mild respiratory distress even at rest, wearing nasal  oxygen. HEENT:  The oropharynx is clear. There is no palpable lymph nodes over  neck and I hear no wheezing or stridor over the neck. There is no  jugular venous distension at 45-degree angle. LUNGS:  Auscultation of lungs reveals diminished breath sounds  throughout all lung fields with scattered expiratory wheezing. With  forced expiration, she has marked wheezing. No rales or rhonchi are  heard. There are no pleural rubs.   CARDIAC:  Sounds are normal, S1 and S2, mildly irregular. I do not hear  any definite murmurs or gallops. No pericardial rubs noted. GI:  No palpable organomegaly. Bowel sounds are active. There is no  abdominal distention or pain. :  No flank pain or inguinal hernias. EXTREMITIES:  No peripheral cyanosis, clubbing or edema. No evidence of  DVT. NEURO:  Oriented x3. No focal neurologic deficit noted. LABORATORY DATA:  Serum sodium 140, potassium 3.7, creatinine 0.7 with a  BUN of 10. ProBNP 163 with troponin less than 0.010 on admission. White blood count 7700 with hemoglobin of 13.4 gm. INR 2.25. Viral  antigen PCR profile positive for human metapneumovirus and negative for  COVID-19. Portable chest x-ray on admission showed no acute infiltrates  with COPD. Chronic interstitial markings are noted. Prior CT chest in  2019 demonstrated an 8-mm solid nodule in the left lower lobe which had  remained stable over 2 years. IMPRESSION:  1. Acute exacerbation of COPD. 2.  Very severe/stage IV COPD. 3.  Chronic hypoxemic respiratory failure. 4.  Shortness of breath. PLAN:  She has been started on IV antibiotics, IV corticosteroids and  inhaled bronchodilators. She still requires hospitalization for  treatment of her persistent bronchospasm and she remains short of breath  with minimal exertion. Theophylline level will be obtained prior to  discharge. I also will reevaluate her home oxygen needs. I will  continue to follow her.         Meghan Emery MD    D: 03/18/2022 11:16:23       T: 03/18/2022 11:21:07     [email or web address]  Job#: [id number]     Doc#: [id number]    CC:

## 2022-03-18 NOTE — CONSULTS
PHARMACY ANTICOAGULATION MONITORING SERVICE    Lance Pineda is a 68 y.o. female on warfarin therapy for Afib. Pharmacy consulted by RASHMI Levine for monitoring and adjustment of treatment. Indication for anticoagulation: Afib  INR goal: 2-3  Warfarin dose prior to admission: alternates 6mg with 3mg every other day    Pertinent Laboratory Values   Recent Labs     03/17/22  1019 03/17/22  1019 03/18/22  0047   INR 2.25   < > 2.63   HGB 13.4   < > 12.6   HCT 42.4   < > 39.1     --  221    < > = values in this interval not displayed. Assessment/Plan:   Drug Interactions:   o Home meds:  levothyroxine  o Possible new interactions:  azithromycin, methylprednisolone   INR therapeutic on admission, remains therapeutic today @2.63.  6mg dose given last night.  Will continue home warfarin regimen alternating 6mg with 3mg doses every other day and follow INR trends tomorrow. Lower 3mg dose to be given tonight. [address] [place] [address] will continue to monitor and adjust warfarin therapy as indicated    Thank you for the consult. Sisi Davis, [facility]  3/18/2022 12:39 PM

## 2022-03-19 PROBLEM — J18.9 COMMUNITY ACQUIRED PNEUMONIA OF RIGHT UPPER LOBE OF LUNG: Status: ACTIVE | Noted: 2022-03-19

## 2022-03-19 PROBLEM — J44.1 COPD EXACERBATION (HCC): Status: ACTIVE | Noted: 2022-03-19

## 2022-03-19 LAB
D DIMER: <200 NG/ML(DDU)
DOSE AMOUNT: ABNORMAL
DOSE TIME: ABNORMAL
INR BLD: 3.36 INDEX
PROTHROMBIN TIME: 43.9 SECONDS (ref 11.7–14.5)
THEOPHYLLINE LEVEL: 6.1 UG/ML (ref 10–20)

## 2022-03-19 PROCEDURE — 2700000000 HC OXYGEN THERAPY PER DAY

## 2022-03-19 PROCEDURE — 6370000000 HC RX 637 (ALT 250 FOR IP): Performed by: INTERNAL MEDICINE

## 2022-03-19 PROCEDURE — 6370000000 HC RX 637 (ALT 250 FOR IP): Performed by: PHYSICIAN ASSISTANT

## 2022-03-19 PROCEDURE — 94640 AIRWAY INHALATION TREATMENT: CPT

## 2022-03-19 PROCEDURE — 99232 SBSQ HOSP IP/OBS MODERATE 35: CPT | Performed by: INTERNAL MEDICINE

## 2022-03-19 PROCEDURE — 1200000000 HC SEMI PRIVATE

## 2022-03-19 PROCEDURE — 6370000000 HC RX 637 (ALT 250 FOR IP): Performed by: NURSE PRACTITIONER

## 2022-03-19 PROCEDURE — 6360000002 HC RX W HCPCS: Performed by: INTERNAL MEDICINE

## 2022-03-19 PROCEDURE — 85379 FIBRIN DEGRADATION QUANT: CPT

## 2022-03-19 PROCEDURE — 2580000003 HC RX 258: Performed by: NURSE PRACTITIONER

## 2022-03-19 PROCEDURE — 80198 ASSAY OF THEOPHYLLINE: CPT

## 2022-03-19 PROCEDURE — 6360000002 HC RX W HCPCS: Performed by: NURSE PRACTITIONER

## 2022-03-19 PROCEDURE — 2580000003 HC RX 258: Performed by: INTERNAL MEDICINE

## 2022-03-19 PROCEDURE — 96376 TX/PRO/DX INJ SAME DRUG ADON: CPT

## 2022-03-19 PROCEDURE — 85610 PROTHROMBIN TIME: CPT

## 2022-03-19 PROCEDURE — 36415 COLL VENOUS BLD VENIPUNCTURE: CPT

## 2022-03-19 PROCEDURE — 96367 TX/PROPH/DG ADDL SEQ IV INF: CPT

## 2022-03-19 PROCEDURE — 94761 N-INVAS EAR/PLS OXIMETRY MLT: CPT

## 2022-03-19 RX ORDER — METHYLPREDNISOLONE SODIUM SUCCINATE 40 MG/ML
40 INJECTION, POWDER, LYOPHILIZED, FOR SOLUTION INTRAMUSCULAR; INTRAVENOUS EVERY 8 HOURS
Status: DISCONTINUED | OUTPATIENT
Start: 2022-03-19 | End: 2022-03-22

## 2022-03-19 RX ORDER — MAGNESIUM HYDROXIDE/ALUMINUM HYDROXICE/SIMETHICONE 120; 1200; 1200 MG/30ML; MG/30ML; MG/30ML
30 SUSPENSION ORAL EVERY 6 HOURS PRN
Status: DISCONTINUED | OUTPATIENT
Start: 2022-03-19 | End: 2022-03-22 | Stop reason: HOSPADM

## 2022-03-19 RX ORDER — GUAIFENESIN 600 MG/1
600 TABLET, EXTENDED RELEASE ORAL 2 TIMES DAILY
Status: DISCONTINUED | OUTPATIENT
Start: 2022-03-19 | End: 2022-03-22 | Stop reason: HOSPADM

## 2022-03-19 RX ADMIN — THEOPHYLLINE ANHYDROUS 200 MG: 200 CAPSULE, EXTENDED RELEASE ORAL at 21:04

## 2022-03-19 RX ADMIN — ALBUTEROL SULFATE 2.5 MG: 2.5 SOLUTION RESPIRATORY (INHALATION) at 15:58

## 2022-03-19 RX ADMIN — MAGNESIUM OXIDE 400 MG (241.3 MG MAGNESIUM) TABLET 400 MG: TABLET at 08:14

## 2022-03-19 RX ADMIN — TIOTROPIUM BROMIDE AND OLODATEROL 2 PUFF: 3.124; 2.736 SPRAY, METERED RESPIRATORY (INHALATION) at 08:11

## 2022-03-19 RX ADMIN — CEFTRIAXONE SODIUM 1000 MG: 1 INJECTION, POWDER, FOR SOLUTION INTRAMUSCULAR; INTRAVENOUS at 11:17

## 2022-03-19 RX ADMIN — GUAIFENESIN 600 MG: 600 TABLET, EXTENDED RELEASE ORAL at 21:04

## 2022-03-19 RX ADMIN — DILTIAZEM HYDROCHLORIDE 60 MG: 60 TABLET, FILM COATED ORAL at 21:04

## 2022-03-19 RX ADMIN — DILTIAZEM HYDROCHLORIDE 60 MG: 60 TABLET, FILM COATED ORAL at 05:33

## 2022-03-19 RX ADMIN — MONTELUKAST 10 MG: 10 TABLET, FILM COATED ORAL at 21:04

## 2022-03-19 RX ADMIN — GUAIFENESIN 600 MG: 600 TABLET, EXTENDED RELEASE ORAL at 11:16

## 2022-03-19 RX ADMIN — ALUMINUM HYDROXIDE, MAGNESIUM HYDROXIDE, AND SIMETHICONE 30 ML: 200; 200; 20 SUSPENSION ORAL at 22:19

## 2022-03-19 RX ADMIN — CELECOXIB 200 MG: 200 CAPSULE ORAL at 08:14

## 2022-03-19 RX ADMIN — PREDNISONE 40 MG: 20 TABLET ORAL at 08:17

## 2022-03-19 RX ADMIN — PANTOPRAZOLE SODIUM 40 MG: 40 TABLET, DELAYED RELEASE ORAL at 05:33

## 2022-03-19 RX ADMIN — DICYCLOMINE HYDROCHLORIDE 10 MG: 10 CAPSULE ORAL at 08:17

## 2022-03-19 RX ADMIN — METHYLPREDNISOLONE SODIUM SUCCINATE 40 MG: 40 INJECTION, POWDER, FOR SOLUTION INTRAMUSCULAR; INTRAVENOUS at 17:22

## 2022-03-19 RX ADMIN — Medication 1 CAPSULE: at 08:14

## 2022-03-19 RX ADMIN — FUROSEMIDE 40 MG: 40 TABLET ORAL at 08:17

## 2022-03-19 RX ADMIN — LEVOTHYROXINE SODIUM 50 MCG: 0.05 TABLET ORAL at 08:15

## 2022-03-19 RX ADMIN — ALBUTEROL SULFATE 2.5 MG: 2.5 SOLUTION RESPIRATORY (INHALATION) at 08:10

## 2022-03-19 RX ADMIN — AZITHROMYCIN DIHYDRATE 500 MG: 500 INJECTION, POWDER, LYOPHILIZED, FOR SOLUTION INTRAVENOUS at 16:17

## 2022-03-19 RX ADMIN — GABAPENTIN 400 MG: 400 CAPSULE ORAL at 17:22

## 2022-03-19 RX ADMIN — ROFLUMILAST 500 MCG: 500 TABLET ORAL at 08:52

## 2022-03-19 RX ADMIN — DILTIAZEM HYDROCHLORIDE 60 MG: 60 TABLET, FILM COATED ORAL at 13:42

## 2022-03-19 RX ADMIN — POTASSIUM CHLORIDE 20 MEQ: 20 TABLET, EXTENDED RELEASE ORAL at 08:16

## 2022-03-19 RX ADMIN — ALBUTEROL SULFATE 2.5 MG: 2.5 SOLUTION RESPIRATORY (INHALATION) at 00:08

## 2022-03-19 RX ADMIN — BISACODYL 5 MG: 5 TABLET, COATED ORAL at 08:15

## 2022-03-19 RX ADMIN — ALBUTEROL SULFATE 2.5 MG: 2.5 SOLUTION RESPIRATORY (INHALATION) at 21:42

## 2022-03-19 RX ADMIN — METHYLPREDNISOLONE SODIUM SUCCINATE 40 MG: 40 INJECTION, POWDER, FOR SOLUTION INTRAMUSCULAR; INTRAVENOUS at 11:16

## 2022-03-19 RX ADMIN — ROSUVASTATIN CALCIUM 20 MG: 20 TABLET, FILM COATED ORAL at 21:04

## 2022-03-19 RX ADMIN — ALBUTEROL SULFATE 2.5 MG: 2.5 SOLUTION RESPIRATORY (INHALATION) at 05:42

## 2022-03-19 RX ADMIN — ALBUTEROL SULFATE 2.5 MG: 2.5 SOLUTION RESPIRATORY (INHALATION) at 12:33

## 2022-03-19 RX ADMIN — DICYCLOMINE HYDROCHLORIDE 10 MG: 10 CAPSULE ORAL at 21:03

## 2022-03-19 RX ADMIN — THEOPHYLLINE ANHYDROUS 200 MG: 200 CAPSULE, EXTENDED RELEASE ORAL at 08:13

## 2022-03-19 ASSESSMENT — PAIN SCALES - GENERAL
PAINLEVEL_OUTOF10: 0

## 2022-03-19 NOTE — PROGRESS NOTES
Pulmonary and Critical Care  Progress Note      VITALS:  BP (!) 146/85   Pulse 82   Temp 98.1 °F (36.7 °C) (Oral)   Resp 18   Ht 5' 4\" (1.626 m)   Wt 161 lb 9.6 oz (73.3 kg)   SpO2 96%   BMI 27.74 kg/m²     Subjective:   Chief complaint: Right upper lobe pneumonia, stage IV COPD, shortness of breath, lung nodule, chronic hypoxemic respiratory failure  Mild and at times moderate resp distressstill great difficulty walking to the bathroom with marked shortness of breath. No chest pain  Patient awake and alert  Minimal cough but still having difficulty expectorating thick mucus  Objective:   PHYSICAL EXAM:    LUNGS: Scattered expiratory wheezes throughout all lung fields posteriorly with a few basilar rhonchi. No pleural rub  ABG yesterday on 2 L nasal oxygen with pH 7.40, PCO2 45, PO2 89  WBC yesterday 4.6      DATA:    CBC:  Recent Labs     03/17/22  1019 03/18/22  0047   WBC 7.7 4.6   RBC 4.35 4.05*   HGB 13.4 12.6   HCT 42.4 39.1    221   MCV 97.5 96.5   MCH 30.8 31.1*   MCHC 31.6* 32.2   RDW 14.4 14.2   SEGSPCT 68.9* 91.7*      BMP:  Recent Labs     03/17/22  1019 03/18/22  0047    142   K 3.7 4.5   CL 99 104   CO2 30 26   BUN 10 21   CREATININE 0.7 0.7   CALCIUM 9.1 9.0   GLUCOSE 102* 166*      ABG:  Recent Labs     03/18/22  1130   PH 7.40   PO2ART 89   AAH1PTI 45.0   O2SAT 95.1*     BNP  Lab Results   Component Value Date    BNP 57 10/21/2011      D-Dimer:  Lab Results   Component Value Date    DDIMER <200 03/19/2022      1. Radiology: CT chest 3/18/2022 reviewed    1. Severe emphysema. 2. Bilateral pleural and parenchymal scarring. 3. New patchy right upper lobe ground-glass/airspace opacities most likely   representing an evolving pneumonia.  Follow-up is recommended to ensure   resolution. 4. Noncalcified nodule involving the left lower lobe has decreased in size   compared to 2019, consistent with benign process.        Assessment:     Patient Active Problem List   Diagnosis    Phlebitis    [facility]    Essential hypertension    TIA (transient ischemic attack)    Vertigo, central    Anticoagulant long-term use    Pulmonary nodule    Arthritis    Coronary atherosclerosis    COPD with exacerbation (HCC)    Leukocytosis    Chronic hypoxemic respiratory failure (HCC)    Lung infiltrate on CT    Acquired hypothyroidism    Pure hypercholesterolemia    History of DVT of lower extremity    History of pulmonary embolism    Gastroesophageal reflux disease    COVID-19 virus detected    Shortness of breath    Former smoker    Pneumonia due to influenza A virus    Acute respiratory failure with hypoxia and hypercapnia (HCC)    Mild pulmonary hypertension (HCC)    Acute exacerbation of chronic obstructive pulmonary disease (COPD) (HCC)    COPD (chronic obstructive pulmonary disease) ([facility] [address]    Community acquired pneumonia of right upper lobe of lung       Plan:   1. continue present treatment  2. Add IV Rocephin for community-acquired pneumonia right upper lobe  3. Guaifenesin added for tenacious sputum and difficulty expectorating  4. Procalcitonin level  5.  Continue aggressive bronchopulmonary toilet with IV steroids    Sapna Phillips MD MD  3/19/2022  10:22 AM

## 2022-03-19 NOTE — PROGRESS NOTES
Dr. Severa Medley on the unit talked with this nurse and would like the patient to stay on the unit. With IV ATB for a few more days.

## 2022-03-19 NOTE — PROGRESS NOTES
Hospitalist Progress Note      Name:  Angela Pablo /Age/Sex: 1945  (68 y.o. female)   MRN & CSN:  [id number] & [id number] Admission Date/Time: 3/17/2022 10:05 AM   [facility]  [facility] PCP: Elizabeth Barnes MD         Hospital Day: 3    Assessment and Plan:   Angela Pablo is a 68 y.o. female with past medical history of COPD with chronic respiratory failure who presents with Acute exacerbation of chronic obstructive pulmonary disease (COPD) ([facility] [address]     Subjective: pt reported persistent productive cough and shortness of breath. She is on 2 L NC (baseline). But diffused wheezing in bilateral lungs.      COPD exacerbation   Chronic hypoxic respiratory failure   -Presented with wheezing, productive cough  -Chest x-ray with hyper aeration, no focal consolidation or infiltrate  -Respiratory PCR +human metapneumovirus  -appreciate pulmonologist input: continue current treatment: steroids, antibiotics with rocephin and Azithromycin, check Procalcitonin, albuteral inhaler.   -dispo per pulmonology.      Paroxysmal atrial fibrillation  -EKG with sinus rhythm with sinus arrhythmia  -Continue home diltiazem  -Pharmacy to dose Coumadin     Chronic HFpEF  -Echo 2020 with EF 50%  -Not in acute exacerbation  -Continue home Lasix     History of DVT  -Chronically anticoagulated on Coumadin     This patient was discussed with Dr. Mony Harris. She was agreeable with the plan and management as dictated above. Diet ADULT DIET;  Regular   DVT Prophylaxis [] Lovenox, []  Heparin, [] SCDs, [] Ambulation; on coumadin   GI Prophylaxis [] PPI,  [] H2 Blocker,  [] Carafate,  [] Diet/Tube Feeds   Code Status Full Code   Disposition Patient requires continued admission due to medical condition     History of Present Illness:     Chief Complaint: Acute exacerbation of chronic obstructive pulmonary disease (COPD) ([facility] [address]  Angela Pablo is a 68 y.o.  female with history of CHF, COPD, chronic respiratory failure with 2 L NC who presents with worsening shortness of breath. Pt was found diffused wheezing in bilateral lungs. Pulmonary consulted. Pt is currently on steroids, antibiotics and breathing treatment. Ten point ROS reviewed negative, unless as noted above    Objective:   No intake or output data in the 24 hours ending 03/19/22 1332   Vitals:   Vitals:    03/19/22 1234   BP:    Pulse:    Resp:    Temp:    SpO2: 93%     Physical Exam:   GEN Awake female, sitting upright in bed in no apparent distress. Appears given age. EYES Pupils are equally round. No scleral erythema, discharge, or conjunctivitis. HENT Mucous membranes are moist. Oral pharynx without exudates, no evidence of thrush. NECK Supple, no apparent thyromegaly or masses. RESP Diffused wheezing in bilateral lungs  CARDIO/VASC S1/S2 auscultated. Regular rate without appreciable murmurs, rubs, or gallops. No JVD or carotid bruits. Peripheral pulses equal bilaterally and palpable. No peripheral edema. GI Abdomen is soft without significant tenderness, masses, or guarding. Bowel sounds are normoactive. Rectal exam deferred.  No costovertebral angle tenderness. Normal appearing external genitalia. Escamilla catheter is not present. HEME/LYMPH No palpable cervical lymphadenopathy and no hepatosplenomegaly. No petechiae or ecchymoses. MSK No gross joint deformities. SKIN Normal coloration, warm, dry. NEURO Cranial nerves appear grossly intact, normal speech, no lateralizing weakness. PSYCH Awake, alert, oriented x 4. Affect appropriate.     Medications:   Medications:    guaiFENesin  600 mg Oral BID    cefTRIAXone (ROCEPHIN) IV  1,000 mg IntraVENous Q24H    methylPREDNISolone  40 mg IntraVENous Q8H    azithromycin  500 mg IntraVENous Q24H    tiotropium-olodaterol  2 puff Inhalation Daily    albuterol  2.5 mg Nebulization 4x daily    bisacodyl  5 mg Oral Daily    b complex vitamins  1 capsule Oral Daily    dicyclomine  10 mg Oral BID    pantoprazole  40 mg Oral QAM AC    montelukast  10 mg Oral Nightly    potassium chloride  20 mEq Oral Daily    levothyroxine  50 mcg Oral Daily    gabapentin  400 mg Oral QPM    magnesium oxide  400 mg Oral Daily    furosemide  40 mg Oral Daily    celecoxib  200 mg Oral Daily    Roflumilast  500 mcg Oral Daily    rosuvastatin  20 mg Oral Nightly    dilTIAZem  60 mg Oral 3 times per day    warfarin  6 mg Oral Every Other Day    warfarin  3 mg Oral Every Other Day    theophylline  200 mg Oral 2 times per day      Infusions:   PRN Meds: acetaminophen, 650 mg, Q4H PRN  albuterol sulfate HFA, 2 puff, Q4H PRN  albuterol, 2.5 mg, 4x Daily PRN          Patient is still admitted because medical condition.  The anticipated discharge is in pending    Electronically signed by Fortunato Romero CNP on 3/19/2022 at 1:32 PM

## 2022-03-19 NOTE — CONSULTS
PHARMACY ANTICOAGULATION MONITORING SERVICE    Jayy Vásquez is a 68 y.o. female on warfarin therapy for Afib. Pharmacy consulted by Beryle Lana, APRN-CNP for monitoring and adjustment of treatment. Indication for anticoagulation: Afib  INR goal: 2-3  Warfarin dose prior to admission: alternates 6mg with 3mg every other day    Pertinent Laboratory Values   Recent Labs     03/17/22  1019 03/17/22  1019 03/18/22  0047 03/18/22  0047 03/19/22  1203   INR 2.25   < > 2.63   < > 3.36   HGB 13.4   < > 12.6  --   --    HCT 42.4   < > 39.1  --   --      --  221  --   --     < > = values in this interval not displayed. Assessment/Plan:   Drug Interactions:   o Home meds:  levothyroxine  o Possible new interactions:  azithromycin, methylprednisolone   INR supra-therapeutic today @ 3.36   Will hold Warfarin for now; resume when INR < 3   Pharmacy will continue to monitor and adjust warfarin therapy as indicated    Thank you for the consult.   Charbel Nobles, [address] [place] [address]  3/19/2022 2:42 PM

## 2022-03-20 ENCOUNTER — APPOINTMENT (OUTPATIENT)
Dept: GENERAL RADIOLOGY | Age: 77
DRG: 190 | End: 2022-03-20
Payer: MEDICARE

## 2022-03-20 LAB
HCT VFR BLD CALC: 46.2 % (ref 37–47)
HEMOGLOBIN: 14.5 GM/DL (ref 12.5–16)
INR BLD: 2.02 INDEX
MCH RBC QN AUTO: 30.9 PG (ref 27–31)
MCHC RBC AUTO-ENTMCNC: 31.4 % (ref 32–36)
MCV RBC AUTO: 98.3 FL (ref 78–100)
PDW BLD-RTO: 14.3 % (ref 11.7–14.9)
PLATELET # BLD: 250 K/CU MM (ref 140–440)
PMV BLD AUTO: 10.4 FL (ref 7.5–11.1)
PROCALCITONIN: 0.07
PROTHROMBIN TIME: 26.2 SECONDS (ref 11.7–14.5)
RBC # BLD: 4.7 M/CU MM (ref 4.2–5.4)
WBC # BLD: 10.6 K/CU MM (ref 4–10.5)

## 2022-03-20 PROCEDURE — 2580000003 HC RX 258: Performed by: NURSE PRACTITIONER

## 2022-03-20 PROCEDURE — 6370000000 HC RX 637 (ALT 250 FOR IP): Performed by: NURSE PRACTITIONER

## 2022-03-20 PROCEDURE — 6360000002 HC RX W HCPCS: Performed by: NURSE PRACTITIONER

## 2022-03-20 PROCEDURE — 6360000002 HC RX W HCPCS: Performed by: INTERNAL MEDICINE

## 2022-03-20 PROCEDURE — 99232 SBSQ HOSP IP/OBS MODERATE 35: CPT | Performed by: INTERNAL MEDICINE

## 2022-03-20 PROCEDURE — 94761 N-INVAS EAR/PLS OXIMETRY MLT: CPT

## 2022-03-20 PROCEDURE — 94640 AIRWAY INHALATION TREATMENT: CPT

## 2022-03-20 PROCEDURE — 6370000000 HC RX 637 (ALT 250 FOR IP): Performed by: INTERNAL MEDICINE

## 2022-03-20 PROCEDURE — 2700000000 HC OXYGEN THERAPY PER DAY

## 2022-03-20 PROCEDURE — 36415 COLL VENOUS BLD VENIPUNCTURE: CPT

## 2022-03-20 PROCEDURE — 71045 X-RAY EXAM CHEST 1 VIEW: CPT

## 2022-03-20 PROCEDURE — 84145 PROCALCITONIN (PCT): CPT

## 2022-03-20 PROCEDURE — 85027 COMPLETE CBC AUTOMATED: CPT

## 2022-03-20 PROCEDURE — 85610 PROTHROMBIN TIME: CPT

## 2022-03-20 PROCEDURE — 1200000000 HC SEMI PRIVATE

## 2022-03-20 PROCEDURE — 2580000003 HC RX 258: Performed by: INTERNAL MEDICINE

## 2022-03-20 RX ADMIN — ROSUVASTATIN CALCIUM 20 MG: 20 TABLET, FILM COATED ORAL at 22:19

## 2022-03-20 RX ADMIN — METHYLPREDNISOLONE SODIUM SUCCINATE 40 MG: 40 INJECTION, POWDER, FOR SOLUTION INTRAMUSCULAR; INTRAVENOUS at 03:09

## 2022-03-20 RX ADMIN — CELECOXIB 200 MG: 200 CAPSULE ORAL at 08:49

## 2022-03-20 RX ADMIN — BISACODYL 5 MG: 5 TABLET, COATED ORAL at 08:49

## 2022-03-20 RX ADMIN — ALUMINUM HYDROXIDE, MAGNESIUM HYDROXIDE, AND SIMETHICONE 30 ML: 200; 200; 20 SUSPENSION ORAL at 22:20

## 2022-03-20 RX ADMIN — LEVOTHYROXINE SODIUM 50 MCG: 0.05 TABLET ORAL at 08:49

## 2022-03-20 RX ADMIN — DILTIAZEM HYDROCHLORIDE 60 MG: 60 TABLET, FILM COATED ORAL at 22:20

## 2022-03-20 RX ADMIN — GUAIFENESIN 600 MG: 600 TABLET, EXTENDED RELEASE ORAL at 22:20

## 2022-03-20 RX ADMIN — ALBUTEROL SULFATE 2.5 MG: 2.5 SOLUTION RESPIRATORY (INHALATION) at 15:08

## 2022-03-20 RX ADMIN — DICYCLOMINE HYDROCHLORIDE 10 MG: 10 CAPSULE ORAL at 08:49

## 2022-03-20 RX ADMIN — CEFTRIAXONE SODIUM 1000 MG: 1 INJECTION, POWDER, FOR SOLUTION INTRAMUSCULAR; INTRAVENOUS at 08:57

## 2022-03-20 RX ADMIN — DICYCLOMINE HYDROCHLORIDE 10 MG: 10 CAPSULE ORAL at 22:20

## 2022-03-20 RX ADMIN — TIOTROPIUM BROMIDE AND OLODATEROL 2 PUFF: 3.124; 2.736 SPRAY, METERED RESPIRATORY (INHALATION) at 07:41

## 2022-03-20 RX ADMIN — ALBUTEROL SULFATE 2.5 MG: 2.5 SOLUTION RESPIRATORY (INHALATION) at 04:54

## 2022-03-20 RX ADMIN — DILTIAZEM HYDROCHLORIDE 60 MG: 60 TABLET, FILM COATED ORAL at 05:55

## 2022-03-20 RX ADMIN — MAGNESIUM OXIDE 400 MG (241.3 MG MAGNESIUM) TABLET 400 MG: TABLET at 08:50

## 2022-03-20 RX ADMIN — THEOPHYLLINE ANHYDROUS 200 MG: 200 CAPSULE, EXTENDED RELEASE ORAL at 22:20

## 2022-03-20 RX ADMIN — ROFLUMILAST 500 MCG: 500 TABLET ORAL at 08:49

## 2022-03-20 RX ADMIN — METHYLPREDNISOLONE SODIUM SUCCINATE 40 MG: 40 INJECTION, POWDER, FOR SOLUTION INTRAMUSCULAR; INTRAVENOUS at 18:22

## 2022-03-20 RX ADMIN — DILTIAZEM HYDROCHLORIDE 60 MG: 60 TABLET, FILM COATED ORAL at 13:47

## 2022-03-20 RX ADMIN — ALBUTEROL SULFATE 2.5 MG: 2.5 SOLUTION RESPIRATORY (INHALATION) at 21:48

## 2022-03-20 RX ADMIN — GUAIFENESIN 600 MG: 600 TABLET, EXTENDED RELEASE ORAL at 08:50

## 2022-03-20 RX ADMIN — ALBUTEROL SULFATE 2 PUFF: 90 AEROSOL, METERED RESPIRATORY (INHALATION) at 18:16

## 2022-03-20 RX ADMIN — METHYLPREDNISOLONE SODIUM SUCCINATE 40 MG: 40 INJECTION, POWDER, FOR SOLUTION INTRAMUSCULAR; INTRAVENOUS at 08:50

## 2022-03-20 RX ADMIN — THEOPHYLLINE ANHYDROUS 200 MG: 200 CAPSULE, EXTENDED RELEASE ORAL at 08:50

## 2022-03-20 RX ADMIN — ALBUTEROL SULFATE 2.5 MG: 2.5 SOLUTION RESPIRATORY (INHALATION) at 07:22

## 2022-03-20 RX ADMIN — AZITHROMYCIN DIHYDRATE 500 MG: 500 INJECTION, POWDER, LYOPHILIZED, FOR SOLUTION INTRAVENOUS at 16:01

## 2022-03-20 RX ADMIN — FUROSEMIDE 40 MG: 40 TABLET ORAL at 08:50

## 2022-03-20 RX ADMIN — POTASSIUM CHLORIDE 20 MEQ: 20 TABLET, EXTENDED RELEASE ORAL at 08:50

## 2022-03-20 RX ADMIN — Medication 1 CAPSULE: at 08:50

## 2022-03-20 RX ADMIN — PANTOPRAZOLE SODIUM 40 MG: 40 TABLET, DELAYED RELEASE ORAL at 05:55

## 2022-03-20 RX ADMIN — MONTELUKAST 10 MG: 10 TABLET, FILM COATED ORAL at 22:20

## 2022-03-20 RX ADMIN — ALBUTEROL SULFATE 2.5 MG: 2.5 SOLUTION RESPIRATORY (INHALATION) at 11:15

## 2022-03-20 RX ADMIN — GABAPENTIN 400 MG: 400 CAPSULE ORAL at 17:16

## 2022-03-20 ASSESSMENT — PAIN SCALES - GENERAL
PAINLEVEL_OUTOF10: 0
PAINLEVEL_OUTOF10: 0

## 2022-03-20 NOTE — PROGRESS NOTES
Pulmonary and Critical Care  Progress Note      VITALS:  /67   Pulse 96   Temp 98.3 °F (36.8 °C) (Oral)   Resp 20   Ht 5' 4\" (1.626 m)   Wt 161 lb 9.6 oz (73.3 kg)   SpO2 93%   BMI 27.74 kg/m²     Subjective:   Chief complaint: Right upper lobe pneumonia community-acquired, stage IV COPD, shortness of breath, chronic hypoxemic respiratory failure  Mild resp distressstill notes very severe shortness of breath with any exertion like going to the bathroom  Patient awake and alert  Denies chest pain or chest discomfort  Mentions that the sputum seems to be slightly easier for her to expectorate  Objective:   PHYSICAL EXAM:    LUNGS: Still with diffuse wheezes posteriorly and diminished breath sounds throughout all lung fields. No basilar rhonchi noted  Procalcitonin pending this a.m. No new lab yet  DATA:    CBC:  Recent Labs     03/17/22  1019 03/18/22  0047   WBC 7.7 4.6   RBC 4.35 4.05*   HGB 13.4 12.6   HCT 42.4 39.1    221   MCV 97.5 96.5   MCH 30.8 31.1*   MCHC 31.6* 32.2   RDW 14.4 14.2   SEGSPCT 68.9* 91.7*      BMP:  Recent Labs     03/17/22  1019 03/18/22  0047    142   K 3.7 4.5   CL 99 104   CO2 30 26   BUN 10 21   CREATININE 0.7 0.7   CALCIUM 9.1 9.0   GLUCOSE 102* 166*      ABG:  Recent Labs     03/18/22  1130   PH 7.40   PO2ART 89   JEO3ASW 45.0   O2SAT 95.1*     BNP  Lab Results   Component Value Date    BNP 57 10/21/2011      D-Dimer:  Lab Results   Component Value Date    DDIMER <200 03/19/2022      1.  Radiology: Chest x-ray this a.m. reviewed and shows subtle suspected nodular opacity in the right upper lobe which may correlate to the findings on recent CT      Assessment:     Patient Active Problem List   Diagnosis    Phlebitis    COPD, very severe ([address]    Essential hypertension    TIA (transient ischemic attack)    Vertigo, central    Anticoagulant long-term use    Pulmonary nodule    Arthritis    Coronary atherosclerosis    COPD with exacerbation ([address]    Leukocytosis    Chronic hypoxemic respiratory failure (HCC)    Lung infiltrate on CT    Acquired hypothyroidism    Pure hypercholesterolemia    History of DVT of lower extremity    History of pulmonary embolism    Gastroesophageal reflux disease    COVID-19 virus detected    Shortness of breath    Former smoker    Pneumonia due to influenza A virus    Acute respiratory failure with hypoxia and hypercapnia (HCC)    Mild pulmonary hypertension (HCC)    Acute exacerbation of chronic obstructive pulmonary disease (COPD) (HCC)    COPD [facility]chronic obstructive pulmonary disease) ([facility] [address]    Community acquired pneumonia of right upper lobe of lung    COPD exacerbation ([facility]       Plan:   1. continue present treatment  2. Continue IV antibiotics using Rocephin and azithromycin and IV corticosteroids with Solu-Medrol. 3. Ambulate in room as tolerated  4.  Anticipate discharge tomorrow on long tapering course of oral prednisone and will probably consider using doxycycline as outpatient how to milligrams twice a day for 7 more days    Krissy Smith MD MD  3/20/2022  9:00 AM

## 2022-03-20 NOTE — PROGRESS NOTES
Hospitalist Progress Note      Name:  Ygnacio Rubinstein /Age/Sex: 1945  (68 y.o. female)   MRN & CSN:  [id number] & [id number] Admission Date/Time: 3/17/2022 10:05 AM   Location:  [facility] PCP: Tanisha Mon MD         Hospital Day: 4    Assessment and Plan:   Oumar Fitch a 68 y. o. female with past medical history of COPD with chronic respiratory failure who presents with Acute exacerbation of chronic obstructive pulmonary disease (COPD) ([address] current on antibiotics, steroids and albuterol nebulizer treatment. Expect being discharged home tomorrow morning.     Subjective: Patient reported slightly improving cough and shortness of breath today. Still having wheezing in bilateral lungs.     COPD exacerbation   Chronic hypoxic respiratory failure   -Presented with wheezing, productive cough  -Chest x-ray with hyper aeration, no focal consolidation or infiltrate  -Respiratory PCR +human metapneumovirus  -appreciate pulmonologist input: continue current treatment: steroids, antibiotics with rocephin and Azithromycin, check Procalcitonin, albuteral inhaler.   -dispo per pulmonology: Possible tomorrow morning     Paroxysmal atrial fibrillation  -EKG with sinus rhythm with sinus arrhythmia  -Continue home diltiazem  -Pharmacy to dose Coumadin     Chronic HFpEF  -Echo 2020 with EF 50%  -Not in acute exacerbation  -Continue home Lasix     History of DVT  -Chronically anticoagulated on Coumadin     This patient was discussed with Dr. Mathew. She was agreeable with the plan and management as dictated above. Diet ADULT DIET;  Regular   DVT Prophylaxis [x] Lovenox, []  Heparin, [] SCDs, [] Ambulation   GI Prophylaxis [] PPI,  [] H2 Blocker,  [] Carafate,  [] Diet/Tube Feeds   Code Status Full Code   Disposition Patient requires continued admission due to medical condition     History of Present Illness:     Chief Complaint: Acute exacerbation of chronic obstructive pulmonary disease

## 2022-03-20 NOTE — PLAN OF CARE
Problem: Falls - Risk of:  Goal: Will remain free from falls  Description: Will remain free from falls  Outcome: Ongoing  Goal: Absence of physical injury  Description: Absence of physical injury  Outcome: Ongoing     Problem: Discharge Planning:  Goal: Discharged to appropriate level of care  Description: Discharged to appropriate level of care  Outcome: Ongoing     Problem:  Activity Intolerance:  Goal: Ability to tolerate increased activity will improve  Description: Ability to tolerate increased activity will improve  Outcome: Ongoing     Problem: Airway Clearance - Ineffective:  Goal: Ability to maintain a clear airway will improve  Description: Ability to maintain a clear airway will improve  Outcome: Ongoing     Problem: Breathing Pattern - Ineffective:  Goal: Ability to achieve and maintain a regular respiratory rate will improve  Description: Ability to achieve and maintain a regular respiratory rate will improve  Outcome: Ongoing     Problem: Gas Exchange - Impaired:  Goal: Levels of oxygenation will improve  Description: Levels of oxygenation will improve  Outcome: Ongoing

## 2022-03-20 NOTE — PROGRESS NOTES
03/20/22 1115   Oxygen Therapy/Pulse Ox   O2 Therapy Oxygen   O2 Device Nasal cannula   O2 Flow Rate (L/min) 2 L/min   Resp 20   SpO2 95 %

## 2022-03-20 NOTE — CONSULTS
PHARMACY ANTICOAGULATION MONITORING SERVICE    Gardenia Guzmán is a 68 y.o. female on warfarin therapy for Afib. Pharmacy consulted by RASHMI Urbina for monitoring and adjustment of treatment. Indication for anticoagulation: Afib  INR goal: 2-3  Warfarin dose prior to admission: alternates 6mg with 3mg every other day    Pertinent Laboratory Values   Recent Labs     03/18/22  0047 03/18/22  0047 03/19/22  1203   INR 2.63   < > 3.36   HGB 12.6  --   --    HCT 39.1  --   --      --   --     < > = values in this interval not displayed. Assessment/Plan:   Drug Interactions:   o Home meds:  levothyroxine  o Possible new interactions:  azithromycin, methylprednisolone   Last INR supra-therapeutic @ 3.36   Labs ordered, no level done today   Will hold Warfarin for now; resume when INR < 3   Pharmacy will continue to monitor and adjust warfarin therapy as indicated    Thank you for the consult.   Raj Gallo [facility]  3/20/2022 4:47 PM

## 2022-03-21 LAB
INR BLD: 1.21 INDEX
PROTHROMBIN TIME: 15.6 SECONDS (ref 11.7–14.5)

## 2022-03-21 PROCEDURE — 6370000000 HC RX 637 (ALT 250 FOR IP): Performed by: NURSE PRACTITIONER

## 2022-03-21 PROCEDURE — 6360000002 HC RX W HCPCS: Performed by: INTERNAL MEDICINE

## 2022-03-21 PROCEDURE — 2580000003 HC RX 258: Performed by: INTERNAL MEDICINE

## 2022-03-21 PROCEDURE — 1200000000 HC SEMI PRIVATE

## 2022-03-21 PROCEDURE — 94761 N-INVAS EAR/PLS OXIMETRY MLT: CPT

## 2022-03-21 PROCEDURE — 6370000000 HC RX 637 (ALT 250 FOR IP): Performed by: INTERNAL MEDICINE

## 2022-03-21 PROCEDURE — 94640 AIRWAY INHALATION TREATMENT: CPT

## 2022-03-21 PROCEDURE — 99232 SBSQ HOSP IP/OBS MODERATE 35: CPT | Performed by: INTERNAL MEDICINE

## 2022-03-21 PROCEDURE — 6360000002 HC RX W HCPCS: Performed by: NURSE PRACTITIONER

## 2022-03-21 PROCEDURE — 85610 PROTHROMBIN TIME: CPT

## 2022-03-21 PROCEDURE — 2700000000 HC OXYGEN THERAPY PER DAY

## 2022-03-21 PROCEDURE — 36415 COLL VENOUS BLD VENIPUNCTURE: CPT

## 2022-03-21 RX ORDER — SIMETHICONE 80 MG
80 TABLET,CHEWABLE ORAL EVERY 6 HOURS PRN
Status: DISCONTINUED | OUTPATIENT
Start: 2022-03-21 | End: 2022-03-22 | Stop reason: HOSPADM

## 2022-03-21 RX ORDER — WARFARIN SODIUM 6 MG/1
6 TABLET ORAL
Status: COMPLETED | OUTPATIENT
Start: 2022-03-21 | End: 2022-03-21

## 2022-03-21 RX ORDER — CEFUROXIME AXETIL 250 MG/1
500 TABLET ORAL EVERY 12 HOURS SCHEDULED
Status: DISCONTINUED | OUTPATIENT
Start: 2022-03-21 | End: 2022-03-22 | Stop reason: HOSPADM

## 2022-03-21 RX ADMIN — WARFARIN SODIUM 6 MG: 6 TABLET ORAL at 18:01

## 2022-03-21 RX ADMIN — METHYLPREDNISOLONE SODIUM SUCCINATE 40 MG: 40 INJECTION, POWDER, FOR SOLUTION INTRAMUSCULAR; INTRAVENOUS at 08:05

## 2022-03-21 RX ADMIN — LEVOTHYROXINE SODIUM 50 MCG: 0.05 TABLET ORAL at 08:05

## 2022-03-21 RX ADMIN — ROSUVASTATIN CALCIUM 20 MG: 20 TABLET, FILM COATED ORAL at 22:03

## 2022-03-21 RX ADMIN — SIMETHICONE 80 MG: 80 TABLET, CHEWABLE ORAL at 03:17

## 2022-03-21 RX ADMIN — GUAIFENESIN 600 MG: 600 TABLET, EXTENDED RELEASE ORAL at 08:05

## 2022-03-21 RX ADMIN — CEFTRIAXONE SODIUM 1000 MG: 1 INJECTION, POWDER, FOR SOLUTION INTRAMUSCULAR; INTRAVENOUS at 08:14

## 2022-03-21 RX ADMIN — Medication 1 CAPSULE: at 08:05

## 2022-03-21 RX ADMIN — METHYLPREDNISOLONE SODIUM SUCCINATE 40 MG: 40 INJECTION, POWDER, FOR SOLUTION INTRAMUSCULAR; INTRAVENOUS at 02:49

## 2022-03-21 RX ADMIN — THEOPHYLLINE ANHYDROUS 200 MG: 200 CAPSULE, EXTENDED RELEASE ORAL at 22:03

## 2022-03-21 RX ADMIN — ALBUTEROL SULFATE 2.5 MG: 2.5 SOLUTION RESPIRATORY (INHALATION) at 15:29

## 2022-03-21 RX ADMIN — CEFUROXIME AXETIL 500 MG: 250 TABLET ORAL at 22:02

## 2022-03-21 RX ADMIN — ALBUTEROL SULFATE 2.5 MG: 2.5 SOLUTION RESPIRATORY (INHALATION) at 07:37

## 2022-03-21 RX ADMIN — DICYCLOMINE HYDROCHLORIDE 10 MG: 10 CAPSULE ORAL at 22:02

## 2022-03-21 RX ADMIN — POTASSIUM CHLORIDE 20 MEQ: 20 TABLET, EXTENDED RELEASE ORAL at 08:05

## 2022-03-21 RX ADMIN — DILTIAZEM HYDROCHLORIDE 60 MG: 60 TABLET, FILM COATED ORAL at 05:44

## 2022-03-21 RX ADMIN — TIOTROPIUM BROMIDE AND OLODATEROL 2 PUFF: 3.124; 2.736 SPRAY, METERED RESPIRATORY (INHALATION) at 07:37

## 2022-03-21 RX ADMIN — ALBUTEROL SULFATE 2.5 MG: 2.5 SOLUTION RESPIRATORY (INHALATION) at 06:09

## 2022-03-21 RX ADMIN — GUAIFENESIN 600 MG: 600 TABLET, EXTENDED RELEASE ORAL at 22:04

## 2022-03-21 RX ADMIN — DICYCLOMINE HYDROCHLORIDE 10 MG: 10 CAPSULE ORAL at 08:05

## 2022-03-21 RX ADMIN — ALBUTEROL SULFATE 2.5 MG: 2.5 SOLUTION RESPIRATORY (INHALATION) at 11:16

## 2022-03-21 RX ADMIN — ROFLUMILAST 500 MCG: 500 TABLET ORAL at 08:09

## 2022-03-21 RX ADMIN — DILTIAZEM HYDROCHLORIDE 60 MG: 60 TABLET, FILM COATED ORAL at 14:18

## 2022-03-21 RX ADMIN — MONTELUKAST 10 MG: 10 TABLET, FILM COATED ORAL at 22:02

## 2022-03-21 RX ADMIN — GABAPENTIN 400 MG: 400 CAPSULE ORAL at 18:01

## 2022-03-21 RX ADMIN — FUROSEMIDE 40 MG: 40 TABLET ORAL at 08:05

## 2022-03-21 RX ADMIN — ALBUTEROL SULFATE 2.5 MG: 2.5 SOLUTION RESPIRATORY (INHALATION) at 19:38

## 2022-03-21 RX ADMIN — METHYLPREDNISOLONE SODIUM SUCCINATE 40 MG: 40 INJECTION, POWDER, FOR SOLUTION INTRAMUSCULAR; INTRAVENOUS at 18:01

## 2022-03-21 RX ADMIN — MAGNESIUM OXIDE 400 MG (241.3 MG MAGNESIUM) TABLET 400 MG: TABLET at 08:05

## 2022-03-21 RX ADMIN — THEOPHYLLINE ANHYDROUS 200 MG: 200 CAPSULE, EXTENDED RELEASE ORAL at 08:05

## 2022-03-21 RX ADMIN — CELECOXIB 200 MG: 200 CAPSULE ORAL at 08:05

## 2022-03-21 RX ADMIN — BISACODYL 5 MG: 5 TABLET, COATED ORAL at 08:05

## 2022-03-21 RX ADMIN — DILTIAZEM HYDROCHLORIDE 60 MG: 60 TABLET, FILM COATED ORAL at 22:02

## 2022-03-21 RX ADMIN — PANTOPRAZOLE SODIUM 40 MG: 40 TABLET, DELAYED RELEASE ORAL at 05:44

## 2022-03-21 ASSESSMENT — PAIN SCALES - GENERAL
PAINLEVEL_OUTOF10: 0
PAINLEVEL_OUTOF10: 4
PAINLEVEL_OUTOF10: 0

## 2022-03-21 ASSESSMENT — PAIN DESCRIPTION - PAIN TYPE: TYPE: ACUTE PAIN

## 2022-03-21 ASSESSMENT — PAIN DESCRIPTION - LOCATION: LOCATION: ABDOMEN

## 2022-03-21 NOTE — CONSULTS
PHARMACY ANTICOAGULATION MONITORING SERVICE    Shahana Brizuela is a 68 y.o. female on warfarin therapy for Afib. Pharmacy consulted by RASHMI Herring for monitoring and adjustment of treatment. Indication for anticoagulation: Afib  INR goal: 2-3  Warfarin dose prior to admission: alternates 6mg with 3mg every other day    Pertinent Laboratory Values   Recent Labs     03/20/22  1611 03/20/22  1611 03/21/22  1024   INR 2.02   < > 1.21   HGB 14.5  --   --    HCT 46.2  --   --      --   --     < > = values in this interval not displayed. INR MONITORING  Recent Labs     03/19/22  1203 03/20/22  1611 03/21/22  1024   INR 3.36 2.02 1.21       Assessment/Plan:   Drug Interactions:   o Home meds:  levothyroxine  o Possible new interactions:  cefuroxime, methylprednisolone   INR now subtherapeutic. INR previously supra-therapeutic, no warfarin dose the past 2 days.  Will give warfarin 6mg dose today and follow INR trends tomorrow. [address] [place] [address] will continue to monitor and adjust warfarin therapy as indicated    Thank you for the consult. Harrel Bosworth Rutha Baize, [address] [facility]  3/21/2022 4:27 PM

## 2022-03-21 NOTE — CARE COORDINATION
Chart reviewed and met w/ pt to initiate discharge planning. CM introduced self and explained role. Pt is from home w/ her  and was independent PTA. PT has PCP and insurance with affordable RX copays. Pt reports she has a walker and cane at home but does not use them. Pt has home O2 (basline 2L) from [facility]. Pt declined any needs from CM at this time. CM available should needs present.

## 2022-03-21 NOTE — PROGRESS NOTES
Pulmonary and Critical Care  Progress Note      VITALS:  BP (!) 158/71   Pulse 81   Temp 97.7 °F (36.5 °C) (Oral)   Resp 18   Ht 5' 4\" (1.626 m)   Wt 161 lb 9.6 oz (73.3 kg)   SpO2 94%   BMI 27.74 kg/m²     Subjective:   Chief complaint: Community acquired right upper lobe pneumonia, human metapneumovirus infection, exacerbation COPD, stage IV COPD, shortness of breath, chronic hypoxemic respiratory failure  Mild resp distresswas able ambulate in room with less shortness of breath but still has significant dyspnea  Overall feeling better with less cough and minimal sputum expectoration  Patient awake and alert    Objective:   PHYSICAL EXAM:    LUNGS: Breath sounds still diminished with some mild coarse wheezing posteriorly. This has improved over the past several days. No rhonchi or rales heard  WBC 10.6 with hemoglobin 14.5  Procalcitonin 0.067    DATA:    CBC:  Recent Labs     03/20/22  1611   WBC 10.6*   RBC 4.70   HGB 14.5   HCT 46.2      MCV 98.3   MCH 30.9   MCHC 31.4*   RDW 14.3      BMP:  No results for input(s): NA, K, CL, CO2, BUN, CREATININE, CALCIUM, GLUCOSE in the last 72 hours. ABG:  Recent Labs     03/18/22  1130   PH 7.40   PO2ART 89   DDW1JPL 45.0   O2SAT 95.1*     BNP  Lab Results   Component Value Date    BNP 57 10/21/2011      D-Dimer:  Lab Results   Component Value Date    DDIMER <200 03/19/2022      1. Radiology: Portable chest x-ray 3/20/2021 reviewed  Subtle suspected nodular opacity in the right upper lung, which may correlate   to the finding on recent CT.          Assessment:     Patient Active Problem List   Diagnosis    Phlebitis    COPD, very severe ([address]    Essential hypertension    TIA (transient ischemic attack)    Vertigo, central    Anticoagulant long-term use    Pulmonary nodule    Arthritis    Coronary atherosclerosis    COPD with exacerbation ([address]    Leukocytosis    Chronic hypoxemic respiratory failure (HCC)    Lung infiltrate on CT    Acquired hypothyroidism    Pure hypercholesterolemia    History of DVT of lower extremity    History of pulmonary embolism    Gastroesophageal reflux disease    COVID-19 virus detected    Shortness of breath    Former smoker    Pneumonia due to influenza A virus    Acute respiratory failure with hypoxia and hypercapnia (HCC)    Mild pulmonary hypertension (HCC)    Acute exacerbation of chronic [facility] pulmonary disease ([facility]D) ([facility]    COPD (chronic obstructive pulmonary disease) ([facility]    Community acquired pneumonia of right upper lobe of lung    COPD exacerbation ([facility]       Plan:   1. continue present treatment  2. Okay for discharge on tapering course of oral prednisone and continue 5 mg prednisone daily, oral antibiotics to complete 10-day therapy for pneumonia using doxycycline 100 mg twice a day for 5 days  3. Continue present bronchodilator therapy at home  4.  I will see in follow-up in 2 to 3 weeks    Horacio Kraft MD MD  3/21/2022  8:30 AM

## 2022-03-21 NOTE — PROGRESS NOTES
INTERNAL MED PROGRESS NOTE           Subjective: Patient reported persistent shortness of breath, but slightly improved since admission. No fever or chills and no acute overnight event reported      Assessment/Plan:       --Acute COPD exacerbation: Continue IV steroids, scheduled and as needed DuoNebs. Continue supplemental oxygen    --Acute on chronic hypoxic respiratory failure: Secondary to above. O2 weaned down to baseline requirement of 2 L now    --Afib: controlled, cont current home meds     --CHF, diastolic and systolic with EF of 02% per most recent 2D echo. Currently compensated. Continue home meds       DVT prophylaxis: Heparin/Loveno    Disposition: Possible 1-2 more days of treatment, could be discharged then pending clinical improvement. Markus Keller MD  3/21/2022 @ 3:43 PM           Objective:       No intake or output data in the 24 hours ending 03/21/22 1543     Vitals:   Vitals:    03/21/22 1415   BP: (!) 156/76   Pulse: 94   Resp: 16   Temp: 97.8 °F (36.6 °C)   SpO2: 96%       Physical Exam:        Constitutional: Well developed, Well nourished, NAD  Neurologic: Alert & oriented x 3, No focal deficits noted. CNs II-XII grossly intact and symmetrical  Psychiatric: Affect normal, Mood normal.  HENT: Normocephalic, Atraumatic,  Eyes: PERRLA, EOMI, No pallor/scleral icterus. Neck: Normal range of motion, No tenderness, Supple, no bruit  Lymphatic: No cervical lymphadenopathy noted. Cardiovascular: Regular rate and rhythm, normal S1-S2, No murmurs, gallops or rubs. Thorax & Lungs: CTA bilaterally, No respiratory distress, No wheezing   Abdomen: Soft, BS +ve, no tenderness, no rebound or guarding  Skin: Warm, Dry, No erythema, No rash. Back: No tenderness, No CVA tenderness. Extremities: No edema, No tenderness  Musculoskeletal:. No major deformities noted.                Labs:   Reviewed, as follows:  Recent Results (from the past 24 hour(s))   Protime-INR    Collection Time: 03/20/22  4:11 PM   Result Value Ref Range    Protime 26.2 (H) 11.7 - 14.5 SECONDS    INR 2.02 INDEX   Procalcitonin    Collection Time: 03/20/22  4:11 PM   Result Value Ref Range    Procalcitonin 0.067    CBC    Collection Time: 03/20/22  4:11 PM   Result Value Ref Range    WBC 10.6 (H) 4.0 - 10.5 K/CU MM    RBC 4.70 4.2 - 5.4 M/CU MM    Hemoglobin 14.5 12.5 - 16.0 GM/DL    Hematocrit 46.2 37 - 47 %    MCV 98.3 78 - 100 FL    MCH 30.9 27 - 31 PG    MCHC 31.4 (L) 32.0 - 36.0 %    RDW 14.3 11.7 - 14.9 %    Platelets 773 150 - 577 K/CU MM    MPV 10.4 7.5 - 11.1 FL   Protime-INR    Collection Time: 03/21/22 10:24 AM   Result Value Ref Range    Protime 15.6 (H) 11.7 - 14.5 SECONDS    INR 1.21 INDEX          Body mass index is 27.74 kg/m².  Patient will follow up with PCP for further management as indicated unless otherwise specifically noted above        Sean Bender MD  3/21/2022 @ 3:43 PM

## 2022-03-22 VITALS
OXYGEN SATURATION: 95 % | SYSTOLIC BLOOD PRESSURE: 138 MMHG | WEIGHT: 161.6 LBS | TEMPERATURE: 97.7 F | RESPIRATION RATE: 18 BRPM | BODY MASS INDEX: 27.59 KG/M2 | HEIGHT: 64 IN | DIASTOLIC BLOOD PRESSURE: 74 MMHG | HEART RATE: 98 BPM

## 2022-03-22 LAB
INR BLD: 1.18 INDEX
PROTHROMBIN TIME: 15.2 SECONDS (ref 11.7–14.5)

## 2022-03-22 PROCEDURE — 94761 N-INVAS EAR/PLS OXIMETRY MLT: CPT

## 2022-03-22 PROCEDURE — 99232 SBSQ HOSP IP/OBS MODERATE 35: CPT | Performed by: INTERNAL MEDICINE

## 2022-03-22 PROCEDURE — 6370000000 HC RX 637 (ALT 250 FOR IP): Performed by: INTERNAL MEDICINE

## 2022-03-22 PROCEDURE — 6360000002 HC RX W HCPCS: Performed by: INTERNAL MEDICINE

## 2022-03-22 PROCEDURE — 2700000000 HC OXYGEN THERAPY PER DAY

## 2022-03-22 PROCEDURE — 94640 AIRWAY INHALATION TREATMENT: CPT

## 2022-03-22 PROCEDURE — 6360000002 HC RX W HCPCS: Performed by: NURSE PRACTITIONER

## 2022-03-22 PROCEDURE — 36415 COLL VENOUS BLD VENIPUNCTURE: CPT

## 2022-03-22 PROCEDURE — 85610 PROTHROMBIN TIME: CPT

## 2022-03-22 PROCEDURE — 6370000000 HC RX 637 (ALT 250 FOR IP): Performed by: NURSE PRACTITIONER

## 2022-03-22 RX ORDER — PREDNISONE 10 MG/1
TABLET ORAL
Qty: 10 TABLET | Refills: 0 | Status: SHIPPED | OUTPATIENT
Start: 2022-03-22 | End: 2022-03-30

## 2022-03-22 RX ORDER — PREDNISONE 20 MG/1
20 TABLET ORAL 2 TIMES DAILY
Status: DISCONTINUED | OUTPATIENT
Start: 2022-03-22 | End: 2022-03-22 | Stop reason: HOSPADM

## 2022-03-22 RX ORDER — DOXYCYCLINE HYCLATE 100 MG
100 TABLET ORAL 2 TIMES DAILY
Qty: 10 TABLET | Refills: 0 | Status: SHIPPED | OUTPATIENT
Start: 2022-03-22 | End: 2022-03-27

## 2022-03-22 RX ADMIN — POTASSIUM CHLORIDE 20 MEQ: 20 TABLET, EXTENDED RELEASE ORAL at 08:30

## 2022-03-22 RX ADMIN — ALBUTEROL SULFATE 2.5 MG: 2.5 SOLUTION RESPIRATORY (INHALATION) at 04:50

## 2022-03-22 RX ADMIN — TIOTROPIUM BROMIDE AND OLODATEROL 2 PUFF: 3.124; 2.736 SPRAY, METERED RESPIRATORY (INHALATION) at 07:44

## 2022-03-22 RX ADMIN — THEOPHYLLINE ANHYDROUS 200 MG: 200 CAPSULE, EXTENDED RELEASE ORAL at 08:29

## 2022-03-22 RX ADMIN — GUAIFENESIN 600 MG: 600 TABLET, EXTENDED RELEASE ORAL at 08:29

## 2022-03-22 RX ADMIN — METHYLPREDNISOLONE SODIUM SUCCINATE 40 MG: 40 INJECTION, POWDER, FOR SOLUTION INTRAMUSCULAR; INTRAVENOUS at 03:15

## 2022-03-22 RX ADMIN — DILTIAZEM HYDROCHLORIDE 60 MG: 60 TABLET, FILM COATED ORAL at 04:50

## 2022-03-22 RX ADMIN — PANTOPRAZOLE SODIUM 40 MG: 40 TABLET, DELAYED RELEASE ORAL at 08:30

## 2022-03-22 RX ADMIN — DILTIAZEM HYDROCHLORIDE 60 MG: 60 TABLET, FILM COATED ORAL at 13:18

## 2022-03-22 RX ADMIN — BISACODYL 5 MG: 5 TABLET, COATED ORAL at 08:30

## 2022-03-22 RX ADMIN — ALBUTEROL SULFATE 2.5 MG: 2.5 SOLUTION RESPIRATORY (INHALATION) at 11:09

## 2022-03-22 RX ADMIN — ALBUTEROL SULFATE 2.5 MG: 2.5 SOLUTION RESPIRATORY (INHALATION) at 15:01

## 2022-03-22 RX ADMIN — LEVOTHYROXINE SODIUM 50 MCG: 0.05 TABLET ORAL at 08:29

## 2022-03-22 RX ADMIN — DICYCLOMINE HYDROCHLORIDE 10 MG: 10 CAPSULE ORAL at 08:29

## 2022-03-22 RX ADMIN — CELECOXIB 200 MG: 200 CAPSULE ORAL at 08:29

## 2022-03-22 RX ADMIN — Medication 1 CAPSULE: at 08:29

## 2022-03-22 RX ADMIN — CEFUROXIME AXETIL 500 MG: 250 TABLET ORAL at 08:34

## 2022-03-22 RX ADMIN — ALBUTEROL SULFATE 2 PUFF: 90 AEROSOL, METERED RESPIRATORY (INHALATION) at 07:44

## 2022-03-22 RX ADMIN — PREDNISONE 20 MG: 20 TABLET ORAL at 08:29

## 2022-03-22 RX ADMIN — FUROSEMIDE 40 MG: 40 TABLET ORAL at 08:29

## 2022-03-22 RX ADMIN — MAGNESIUM OXIDE 400 MG (241.3 MG MAGNESIUM) TABLET 400 MG: TABLET at 08:29

## 2022-03-22 RX ADMIN — ROFLUMILAST 500 MCG: 500 TABLET ORAL at 08:34

## 2022-03-22 ASSESSMENT — PAIN SCALES - GENERAL: PAINLEVEL_OUTOF10: 2

## 2022-03-22 NOTE — PROGRESS NOTES
Outpatient Pharmacy Progress Note for Meds-to-Beds    Total number of Prescriptions Filled: 2  The following medications were dispensed to the patient during the discharge process:   Doxycycline 100mg   Prednisone 10mg    Additional Documentation:   Medication(s) were delivered to the patient's room prior to discharge      Thank you for letting us serve your patients.   [address] [place]    [address], [address] [facility]    Phone: [phone]    Fax: [phone]

## 2022-03-22 NOTE — PROGRESS NOTES
Pulmonary and Critical Care  Progress Note      VITALS:  BP (!) 164/77   Pulse 96   Temp 98.1 °F (36.7 °C) (Oral)   Resp 20   Ht 5' 4\" (1.626 m)   Wt 161 lb 9.6 oz (73.3 kg)   SpO2 93%   BMI 27.74 kg/m²     Subjective:   Chief complaint: Right upper lobe pneumonia community-acquired, human metapneumovirus infection, exacerbation COPD, stage IV COPD, shortness of breath, chronic hypoxemic respiratory failure  Mild but stable resp distressalmost back to baseline  States she slept well yesterday  Was able ambulate in room some yesterday  Patient awake and alert    Objective:   PHYSICAL EXAM:    LUNGS: Mild wheezing with diminished breath sounds throughout all lung fields. Probably improved back to her baseline status  No new lab  INR 1.21 yesterday      DATA:    CBC:  Recent Labs     03/20/22  1611   WBC 10.6*   RBC 4.70   HGB 14.5   HCT 46.2      MCV 98.3   MCH 30.9   MCHC 31.4*   RDW 14.3      BMP:  No results for input(s): NA, K, CL, CO2, BUN, CREATININE, CALCIUM, GLUCOSE in the last 72 hours. ABG:  No results for input(s): PH, PO2ART, PLJ3QET, HCO3, BEART, O2SAT in the last 72 hours. BNP  Lab Results   Component Value Date    BNP 57 10/21/2011      D-Dimer:  Lab Results   Component Value Date    DDIMER <200 03/19/2022      1.  Radiology: No chest x-ray today      Assessment:     Patient Active Problem List   Diagnosis    Phlebitis    COPD, very severe ([address]    Essential hypertension    TIA (transient ischemic attack)    Vertigo, central    Anticoagulant long-term use    Pulmonary nodule    Arthritis    Coronary atherosclerosis    COPD with exacerbation ([address]    Leukocytosis    Chronic hypoxemic respiratory failure (HCC)    Lung infiltrate on CT    Acquired hypothyroidism    Pure hypercholesterolemia    History of DVT of lower extremity    History of pulmonary embolism    Gastroesophageal reflux disease    COVID-19 virus detected    Shortness of breath    Former smoker    Pneumonia due to influenza A virus    Acute respiratory failure with hypoxia and hypercapnia (HCC)    Mild pulmonary hypertension (HCC)    Acute exacerbation of chronic obstructive pulmonary disease ([facility]D) [facility]    COPD (chronic obstructive pulmonary disease) ([facility]    Community acquired pneumonia of right upper lobe of lung    COPD exacerbation ([facility]       Plan:   1. continue present treatment  2. Change IV antibiotics to doxycycline p.o.  3. Change IV Solu-Medrol to oral prednisone  4.  As mentioned on previous note probably can be discharged soon on tapering course of oral prednisone, oral doxycycline 100 mg twice a day for 5 days and continue her present bronchodilator therapy    Herbert Pinto MD MD  3/22/2022  7:10 AM

## 2022-03-22 NOTE — DISCHARGE SUMMARY
V2.0  Discharge Summary    Name:  Bonifacio Tee /Age/Sex: 1945 (31 y.o. female)   Admit Date: 3/17/2022  Discharge Date: 3/22/22    MRN & CSN:  [id number] & [id number] Encounter Date and Time 3/22/22 1:45 PM EDT    Attending:  Haydee Ann MD Discharging Provider: Haydee Ann MD       Hospital Course:     Brief HPI: Bonifacio Tee is a 68 y.o. female who presented with shortness of breath      Bonifacio Tee is a 68 y.o. female with pmh of COPD on home O2 presented to [place] ED with complaints of shortness of breath, increased difficulty breathing at night, wheezing and increased difficulty breathing when ambulating to the bathroom. Onset was yesterday evening. Patient states this is similar to her previous COPD exacerbations. Also endorses cough which is minimally productive. Endorses thick sputum. Denies any chest pain, endorses leg swelling. Has been taking her Coumadin as directed. Her INR is therapeutic. Denies any bleeding problems. Patient has been using her home inhalers as prescribed and reports some improvement in symptoms. Patient denies any recent travel or ill contacts. She is fully vaccinated for COVID-19 and received her booster. Admitted as:  --Acute COPD exacerbation: Patient responded to IV steroids, bronchodilators and antibiotics. Discharged home tapering dose of prednisone to maintain a dose of 5 mg daily, bronchodilators and doxycycline for additional 5 days. Pulmonology consult acknowledged. Continue 2 L home oxygen.      --Acute on chronic hypoxic respiratory failure: Secondary to above. O2 weaned down to baseline requirement of 2 L now     --Afib: controlled, cont current home meds      --CHF, diastolic and systolic with EF of 95% per most recent 2D echo. Currently compensated.  Continue home meds         DVT prophylaxis: Warfarin        The patient expressed appropriate understanding of, and agreement with the discharge recommendations, medications, and plan. Consults this admission:  IP CONSULT TO HOSPITALIST  IP CONSULT TO PHARMACY  IP CONSULT TO PULMONOLOGY  IP CONSULT TO IV TEAM  IP CONSULT TO IV TEAM    Discharge Diagnosis:   Acute exacerbation of chronic obstructive pulmonary disease (COPD) ([address]    Acute on chronic hypoxic respiratory failure  Acute exacerbation of COPD    Discharge Instruction:   Follow up appointments: PCP in 1 week. Pulmonology clinic in 2 to 3 weeks  Primary care physician: Kirstin Beasley MD within 2 weeks  Diet: cardiac diet   Activity: activity as tolerated  Disposition: Discharged to:   [x]Home, []C, []SNF, []Acute Rehab, []Hospice   Condition on discharge: Stable  Labs and Tests to be Followed up as an outpatient by PCP or Specialist: None    Discharge Medications:        Medication List      START taking these medications    doxycycline hyclate 100 MG tablet  Commonly known as: VIBRA-TABS  Take 1 tablet by mouth 2 times daily for 5 days     predniSONE 10 MG tablet  Commonly known as: DELTASONE  Take 2 tablets by mouth daily for 3 days, THEN 1 tablet daily for 3 days, THEN 0.5 tablets daily. Start taking on: March 22, 2022        CHANGE how you take these medications    * warfarin 3 MG tablet  Commonly known as: COUMADIN  Take as directed. If you are unsure how to take this medication, talk to your nurse or doctor. Original instructions: Take 1 tabet by mouth every day per INR  What changed:   when to take this  additional instructions     * warfarin 6 MG tablet  Commonly known as: COUMADIN  Take as directed. If you are unsure how to take this medication, talk to your nurse or doctor. Original instructions: TAKE 1 TABLET BY MOUTH EVERY DAY PER INR  What changed: See the new instructions. * This list has 2 medication(s) that are the same as other medications prescribed for you. Read the directions carefully, and ask your doctor or other care provider to review them with you. CONTINUE taking these medications    * albuterol sulfate  (90 Base) MCG/ACT inhaler  INHALE 2 PUFF FOUR TIMES A DAY AS NEEDED     * albuterol (2.5 MG/3ML) 0.083% nebulizer solution  Commonly known as: PROVENTIL  Take 3 mLs by nebulization 4 times daily as needed for Wheezing     b complex vitamins capsule     Biotin 00658 MCG Tabs     Breztri Aerosphere 160-9-4.8 MCG/ACT Aero  Generic drug: Budeson-Glycopyrrol-Formoterol  INHALE 2 PUFFS BY MOUTH TWICE DAILY     Calcium + D3 600-200 MG-UNIT Tabs tablet     celecoxib 200 MG capsule  Commonly known as: CELEBREX  TAKE 1 CAPSULE BY MOUTH EVERY DAY     Daliresp 500 MCG tablet  Generic drug: Roflumilast  TAKE 1 TABLET BY MOUTH EVERY DAY     dicyclomine 10 MG capsule  Commonly known as: BENTYL     dilTIAZem 60 MG tablet  Commonly known as: CARDIZEM  Take 1 tablet by mouth every 8 hours     esomeprazole 40 MG delayed release capsule  Commonly known as: NEXIUM  TAKE 1 CAPSULE BY MOUTH TWICE A DAY     furosemide 40 MG tablet  Commonly known as: LASIX  TAKE 1 TABLET BY MOUTH EVERY DAY     gabapentin 400 MG capsule  Commonly known as: NEURONTIN  Take 1 capsule by mouth every evening for 90 days. ipratropium 17 MCG/ACT inhaler  Commonly known as: ATROVENT HFA     levothyroxine 50 MCG tablet  Commonly known as: SYNTHROID  TAKE 1 TABLET BY MOUTH EVERY DAY     Magnesium Oxide 500 MG Tabs  TAKE 1 TABLET BY MOUTH EVERY DAY     montelukast 10 MG tablet  Commonly known as: SINGULAIR  TAKE 1 TABLET BY MOUTH EVERY DAY EVERY NIGHT     Mucinex DM  MG Tb12  Take 1 to 2 tablet by mouth every 12 hours (maximum: 4 tablets/24 hours). Drink plenty of water.      MULTIVITAMIN PO     nitroGLYCERIN 0.4 MG SL tablet  Commonly known as: NITROSTAT  Place 1 tablet under the tongue every 5 minutes as needed for Chest pain     OXYGEN     potassium chloride 10 MEQ extended release tablet  Commonly known as: Klor-Con M10  Take 2 tablets by mouth daily     risedronate 35 MG tablet  Commonly known as: ACTONEL  TAKE 1 TABLET BY MOUTH EVERY 7 DAYS PATIENT TAKES ON SUNDAY     rosuvastatin 20 MG tablet  Commonly known as: CRESTOR  TAKE 1 TABLET BY MOUTH EVERY DAY EVERY NIGHT     theophylline 450 MG extended release tablet  Commonly known as: THEODUR  TAKE 1/2 TABLET BY MOUTH TWICE A DAY         * This list has 2 medication(s) that are the same as other medications prescribed for you. Read the directions carefully, and ask your doctor or other care provider to review them with you. Where to Get Your Medications      These medications were sent to [address] [place] [address] 33, [address] [place] [address]    Phone: [phone]   doxycycline hyclate 100 MG tablet  predniSONE 10 MG tablet        Objective Findings at Discharge:   /74   Pulse 98   Temp 97.7 °F (36.5 °C) (Oral)   Resp 18   Ht 5' 4\" (1.626 m)   Wt 161 lb 9.6 oz (73.3 kg)   SpO2 95%   BMI 27.74 kg/m²      Patient is feeling well. She is ready to go home. No new complaints. Physical Exam:   General: Elderly female. No acute respiratory distress. Eyes: EOMI  ENT: neck supple  Cardiovascular: Bilateral air entry. Bilateral rhonchi. Minimal wheezes. No crepitation. Respiratory: Clear to auscultation  Gastrointestinal: Soft, non tender  Genitourinary: no suprapubic tenderness  Musculoskeletal: No edema  Skin: warm, dry  Neuro: Alert. Oriented x3. Up and about. Psych: Mood appropriate. Labs and Imaging   CT CHEST W CONTRAST    Result Date: 3/18/2022  EXAMINATION: CT OF THE CHEST WITH CONTRAST 3/18/2022 3:42 pm TECHNIQUE: CT of the chest was performed with the administration of intravenous contrast. Multiplanar reformatted images are provided for review.  Dose modulation, iterative reconstruction, and/or weight based adjustment of the mA/kV was utilized to reduce the radiation dose to as low as reasonably achievable. COMPARISON: January 14, 2019, chest x-ray 03/17/2022. HISTORY: ORDERING SYSTEM PROVIDED HISTORY: Severe COPD, history of pulmonary nodule TECHNOLOGIST PROVIDED HISTORY: Reason for exam:->Severe COPD, history of pulmonary nodule Reason for Exam: Severe COPD, history of pulmonary nodule Additional signs and symptoms: 80ml Isovue 370 FINDINGS: Mediastinum: The heart is upper limits of normal in size. No pericardial effusion is present. There are extensive atherosclerotic vascular calcifications. The thoracic aorta is normal in caliber. No intrathoracic lymphadenopathy is present by CT size criteria. Lungs/pleura: Severe centrilobular emphysema is present. There is stable pleural and parenchymal scarring noted bilaterally. Patchy area of airspace disease involving the right upper lobe, image number 50, measures approximately 1.9 x 1.0 cm. Otherwise, no evidence of acute infiltrate. No pleural effusion or pneumothorax is present. The central airways are patent. There is a stable calcified granuloma involving the left lower lobe. Additionally, there is a noncalcified nodule involving the superior segment of the left lower lobe, which currently measures 6 mm and previously measured 8 mm. No new dominant nodules are identified. Upper Abdomen: Images obtained through the upper abdomen demonstrate no acute abnormality. Soft Tissues/Bones: No acute osseous abnormality. Bone mineral density is diffusely decreased. 1. Severe emphysema. 2. Bilateral pleural and parenchymal scarring. 3. New patchy right upper lobe ground-glass/airspace opacities most likely representing an evolving pneumonia. Follow-up is recommended to ensure resolution. 4. Noncalcified nodule involving the left lower lobe has decreased in size compared to 2019, consistent with benign process.      XR CHEST PORTABLE    Result Date: 3/21/2022  EXAMINATION: ONE X-RAY VIEW OF THE CHEST 3/20/2022 6:00 am COMPARISON: 03/17/2022 HISTORY: ORDERING SYSTEM PROVIDED HISTORY:  Right upper lobe pneumonia TECHNOLOGIST PROVIDED HISTORY: Reason for Exam:  Right upper lobe pneumonia FINDINGS: Heart is similar in size to the prior study. No pleural effusion or pneumothorax is seen. There is a left lower lung calcified granuloma. Subtle nodular opacity in the right upper lung is suspected. Subtle suspected nodular opacity in the right upper lung, which may correlate to the finding on recent CT. XR CHEST PORTABLE    Result Date: 3/17/2022  EXAMINATION: ONE XRAY VIEW OF THE CHEST 3/17/2022 10:14 am COMPARISON: 01/24/2022 HISTORY: ORDERING SYSTEM PROVIDED HISTORY: sob TECHNOLOGIST PROVIDED HISTORY: Reason for exam:->sob Reason for Exam: SOB FINDINGS: Cardiac size at the upper limits of normal.  Chronic interstitial markings are seen throughout the lungs bilaterally related to underlying COPD. Calcified granulomas are identified. Hyperaeration. No focal consolidation. No pneumothorax. No acute osseous abnormality. COPD without acute focal infiltrate. CBC:   Recent Labs     03/20/22  1611   WBC 10.6*   HGB 14.5        BMP:  No results for input(s): NA, K, CL, CO2, BUN, CREATININE, GLUCOSE in the last 72 hours. Hepatic: No results for input(s): AST, ALT, ALB, BILITOT, ALKPHOS in the last 72 hours. Lipids:   Lab Results   Component Value Date    CHOL 175 12/01/2021    HDL 77 12/01/2021    TRIG 87 12/01/2021     Hemoglobin A1C: No results found for: LABA1C  TSH: No results found for: TSH  Troponin:   Lab Results   Component Value Date    TROPONINT <0.010 03/17/2022    TROPONINT <0.010 12/31/2021    TROPONINT <0.010 12/28/2021     Lactic Acid: No results for input(s): LACTA in the last 72 hours. BNP: No results for input(s): PROBNP in the last 72 hours.   UA:  Lab Results   Component Value Date    NITRU NEGATIVE 12/18/2018    COLORU YELLOW 12/18/2018    WBCUA 1 12/18/2018    RBCUA 2 12/18/2018    MUCUS RARE 12/18/2018    TRICHOMONAS NONE SEEN 12/18/2018    BACTERIA RARE 12/18/2018    CLARITYU CLEAR 12/18/2018    SPECGRAV 1.018 12/18/2018    LEUKOCYTESUR NEGATIVE 12/18/2018    UROBILINOGEN 1 12/18/2018    BILIRUBINUR NEGATIVE 12/18/2018    BLOODU NEGATIVE 12/18/2018    KETUA NEGATIVE 12/18/2018     Urine Cultures: No results found for: LABURIN  Blood Cultures: No results found for: BC  No results found for: BLOODCULT2  Organism: No results found for: ORG    Time Spent Discharging patient 33 minutes    Electronically signed by Madeleine Alvarenga MD on 3/22/2022 at 1:45 PM

## 2022-03-22 NOTE — PROGRESS NOTES
AVS reviewed with pt. Pt verbalized understanding and signed AVS. Pt has x2 prescriptions delivered by outpatient pharmacy. No IV in place. Belongings gathered, to exit by w/c with home O2 for transportation home by personal vehicle.

## 2022-03-23 ENCOUNTER — CARE COORDINATION (OUTPATIENT)
Dept: CASE MANAGEMENT | Age: 77
End: 2022-03-23

## 2022-03-23 DIAGNOSIS — J44.1 COPD EXACERBATION (HCC): Primary | ICD-10-CM

## 2022-03-23 PROCEDURE — 1111F DSCHRG MED/CURRENT MED MERGE: CPT | Performed by: FAMILY MEDICINE

## 2022-03-23 NOTE — CARE COORDINATION
[facility] Transitions Initial Follow Up Call    Call within 2 business days of discharge: Yes    Patient: Dheeraj Stauffer Patient : 1945   MRN: [id number]  Reason for Admission: Ac Copd, A/C Resp Failure  Discharge Date: 3/22/22 RARS: Readmission Risk Score: 15.9 ( )    Last Discharge [facility]       Complaint Diagnosis Description Type Department Provider    3/17/22 Shortness of Breath COPD exacerbation ([facility] . .. ED to Hosp-Admission (Discharged) (TRANSFER) Isak Camarillo MD; Davis Juarez,...         Non-face-to-face services provided:  Obtained and reviewed discharge summary and/or continuity of care documents    Care Transitions 24 Hour Call    Schedule Follow Up Appointment with PCP: Completed  Do you have any ongoing symptoms?: Yes  Patient-reported symptoms: Cough, Shortness of Breath, Other (Comment: wheezing)  Interventions for patient-reported symptoms: Notified PCP/Physician  Do you have a copy of your discharge instructions?: Yes  Do you have all of your prescriptions and are they filled?: Yes  Have you been contacted by a [address] [place] [address] Avenue?: No  Have you scheduled your follow up appointment?: No (Comment: CT staff to schedule)  Were you discharged with any Home Care or Post Acute Services: No  Post Acute Services: [facility] [address] you feel like you have everything you need to keep you well at home?: Yes  Care Transitions Interventions   Home Care Waiver: Declined        Transportation Support: Declined      DME Assistance: Declined     Senior Services: Declined         Future Appointments   Date Time Provider Judi Menjivar   [facility]   3/28/2022  2:00 PM Yi Wilson MD [address] [place] [address]   3/30/2022  2:00 PM Princess Cee MD [address]   2022 10:00 AM SCHEDULE, [facility] FPS NURSE [facility]   2022 10:30 AM CHELSIE Smith [facility]     Challenges to be reviewed by the provider   Additional needs identified to be addressed with provider:  3/17/22-3/22/22 [facility] for Ac Copd, A/C Resp Failure    Patient reports continued/unchanged fatigue, wheezing, sob on exertion/at rest, cough w/ clear secretions--difficult to move secretions. Taking Doxycycline, Prednisone as directed. Using inhalers, med neb and O2. Please further advise patient     Method of communication with provider : see below    [address]: [facility]  RARS:  12    Was this a readmission? No  Patient stated reason for admission: sob  Patients top risk factors for readmission: medical condition-Copd, CHF, A Fib, Ch Resp Failure    Care Transition Nurse (CTN) contacted patient by telephone to perform post hospital discharge assessment. Verified name and  with patient as identifiers. Provided introduction to self, and explanation of the CTN role. Phone Assessment: Patient reports continued/unchanged fatigue, wheezing, sob on exertion/at rest, cough w/ clear secretions--difficult to move secretions. Denies ac rep distress, fever, chills,edema, cp. Noted to be speaking in complete unlabored sentences. Reports using inhalers, med nebs and O2 at 2L continuously as directed. Patient is nonsmoker x 30 yrs, lives in nonsmoking household. Encouraged rest, pacing activity, cough and deep breathing exercises, IS. SPO2 92% w/ [email or web address]. Agreeable for CTN to notify MD of sx. Education Provided:   COPD Zone Mgt:    Take meds as directed   Use Med Nebulizer, O2 and/or rescue inhalers as prescribed. Make sure equipment is in good working order and have all supplies.  Call MD immediately if noting sob, thicker or change in mucus color, increased cough or wheezing, fever, chills as you may need corticosteroid to antibiotic. Call 911 if noting acute distress.  Avoid cigarette smoke, inhaled irritants and other known triggers    AVS/Meds: Confirmed copy of AVS received, reviewed with Patient.  Confirmed Patient obtained and is taking Doxycycline, Prednisone as directed. Med education provided, questions answered, verbalizes understanding. Stressed importance of med compliance. 1111F medication review completed with Patient . Advised obtaining 90 day supply of routine, prn meds. Advised contacting pharmacy/md for refill needs. Resource Need Assessment:   Living Arrangements: lives w/ spouse   ADLS:independent, drives   DME:O2 per [facility] and Med Neb; has cane/walker but does not use. Denies additional dme needs   Transportation: self  HHC:none, denies need   Community Assistance:none, denies need   Referrals Made per CTN with Patient/Family Approval: [phone] Follow Up Appointments: Discussed importance of 7 day hospital follow up appointment for continuity of care. Agreeable for CTN to schedule appts:  3/24/22 1:30pm Jake HOLGUIN  3/30/22 2:00pm Dr Inocente Long  3/28/22 2:00pm Dr Inez Traylor Education:   Educated patient about risk for severe COVID-19 due to risk factors according to [facility] guidelines. Reviewed red flag symptoms with patient who verbalized understanding. Discussed COVID vaccination status: Yes--has received Moderna x 3. Education provided on COVID-19 vaccination as appropriate, advised to speak w/ PCP re: vaccine, booster. Discussed exposure protocols and quarantine with CDC Guidelines. Patient was given an opportunity to verbalize any questions and concerns and agrees to contact CTN or health care provider for questions related to their healthcare. Reviewed, advised Covid19 preventative measures including mask covering nose/mouth, social distancing, hand washing. Advance Care Planning  Reports Advanced Directives up to date, reflecting current wishes . Encouraged to place on file w/ PCP, [facility].   Healthcare Decision Maker:    Primary Decision Maker: Cody Delgado - Spouse - [phone]    Secondary Decision Maker: Trey Kessler - Child - [phone]    Secondary Decision Maker: Sravanthi Pereira - Child - [phone]    Advised to contact PCP / Dr Cr Pitch 24/7 re: any health concerns for early outpatient intervention in an effort to avoid hospitalization. Discussed benefits of WIC, Urgent Care. Advised 911 if noting acute distress. CTN provided contact information. Plan for follow-up call in 1-2 days based on severity of symptoms and risk factors. Plan for next call: sx, f/u on next day md appt, chf education    Updated Patient re: above appts, agreeable and has transportation.    [address] [place] [address] [phone]

## 2022-03-24 ENCOUNTER — OFFICE VISIT (OUTPATIENT)
Dept: FAMILY MEDICINE CLINIC | Age: 77
End: 2022-03-24
Payer: MEDICARE

## 2022-03-24 VITALS
SYSTOLIC BLOOD PRESSURE: 140 MMHG | HEIGHT: 64 IN | OXYGEN SATURATION: 90 % | DIASTOLIC BLOOD PRESSURE: 72 MMHG | HEART RATE: 80 BPM | WEIGHT: 143.6 LBS | BODY MASS INDEX: 24.52 KG/M2

## 2022-03-24 DIAGNOSIS — J44.9 COPD, VERY SEVERE (HCC): Primary | ICD-10-CM

## 2022-03-24 DIAGNOSIS — F41.9 ANXIETY: ICD-10-CM

## 2022-03-24 DIAGNOSIS — J18.9 PNEUMONIA OF RIGHT UPPER LOBE DUE TO INFECTIOUS ORGANISM: ICD-10-CM

## 2022-03-24 PROCEDURE — 99214 OFFICE O/P EST MOD 30 MIN: CPT | Performed by: PHYSICIAN ASSISTANT

## 2022-03-24 PROCEDURE — 1111F DSCHRG MED/CURRENT MED MERGE: CPT | Performed by: PHYSICIAN ASSISTANT

## 2022-03-24 RX ORDER — HYDROXYZINE HYDROCHLORIDE 25 MG/1
25 TABLET, FILM COATED ORAL NIGHTLY
Qty: 30 TABLET | Refills: 2 | Status: SHIPPED | OUTPATIENT
Start: 2022-03-24 | End: 2022-04-23

## 2022-03-24 NOTE — PROGRESS NOTES
Post-Discharge Transitional Care Follow Up      Sunny Hendricks   YOB: 1945    Date of Office Visit:  3/24/2022  Date of Hospital Admission: 3/17/22  Date of Hospital Discharge: 3/22/22  Readmission Risk Score (high >=14%. Medium >=10%):Readmission Risk Score: 15.9 ( )      Care management risk score Rising risk (score 2-5) and Complex Care (Scores >=6): 13     Non face to face  following discharge, date last encounter closed (first attempt may have been earlier): 3/23/2022 11:59 AM     Call initiated 2 business days of discharge: Yes     COPD, very severe ([address]  -     MO DISCHARGE MEDS RECONCILED W/ CURRENT OUTPATIENT MED LIST  Anxiety  -     hydrOXYzine (ATARAX) 25 MG tablet; Take 1 tablet by mouth nightly, Disp-30 tablet, R-2Normal  Pneumonia of right upper lobe due to infectious organism      Medical Decision Making: low complexity  Return if symptoms worsen or fail to improve. On this date 3/24/2022 I have spent 30 minutes reviewing previous notes, test results and face to face with the patient discussing the diagnosis and importance of compliance with the treatment plan as well as documenting on the day of the visit. Subjective:   HPI    Inpatient course: Discharge summary reviewed- see chart. Interval history/Current status: pt discharged on home level of oxygen and doxycycline. Is still tapering prednisone down to regular 5 mg. Pt is still having a lot of trouble, she says it is really hard to move and breathe at the same time. Breathing treatments do help a lot. Pt is on coumadin since her 35s for blood clots. Pt was given ativan for nighttime anxiety, but recently took an atarax instead from her daughter and it seemed to do a lot better. Would like a script.     Has follow up with monjot monday    Patient Active Problem List   Diagnosis    Phlebitis    COPD, very severe ([address]    Essential hypertension    TIA (transient ischemic attack)    Vertigo, central    Anticoagulant long-term use    Pulmonary nodule    Arthritis    Coronary atherosclerosis    COPD with exacerbation (HCC)    Leukocytosis    Chronic hypoxemic respiratory failure (HCC)    Lung infiltrate on CT    Acquired hypothyroidism    Pure hypercholesterolemia    History of DVT of lower extremity    History of pulmonary embolism    Gastroesophageal reflux disease    COVID-19 virus detected    Shortness of breath    Former smoker    Pneumonia due to influenza A virus    Acute respiratory failure with hypoxia and hypercapnia (HCC)    Mild pulmonary hypertension (HCC)    Acute exacerbation of chronic obstructive pulmonary disease (COPD) (HCC)    COPD (chronic obstructive pulmonary disease) (HCC)    Community acquired pneumonia of right upper lobe of lung    COPD exacerbation (HCC)     1. COPD, very severe ([address]  Continue home medications  - NE DISCHARGE MEDS RECONCILED W/ CURRENT OUTPATIENT MED LIST    2. Anxiety  1 to 2 tablets at night for sleep and anxiety  - hydrOXYzine (ATARAX) 25 MG tablet; Take 1 tablet by mouth nightly  Dispense: 30 tablet; Refill: 2    3. Pneumonia of right upper lobe due to infectious organism  Finish doxycycline, monitor for symptom improvement        Medications marked \"taking\" at this time  Outpatient Medications Marked as Taking for the 3/24/22 encounter (Office Visit) with CHELSIE Parkinson   Medication Sig Dispense Refill    hydrOXYzine (ATARAX) 25 MG tablet Take 1 tablet by mouth nightly 30 tablet 2    doxycycline hyclate (VIBRA-TABS) 100 MG tablet Take 1 tablet by mouth 2 times daily for 5 days 10 tablet 0    predniSONE (DELTASONE) 10 MG tablet Take 2 tablets by mouth daily for 3 days, THEN 1 tablet daily for 3 days, THEN 0.5 tablets daily.  10 tablet 0    ipratropium (ATROVENT HFA) 17 MCG/ACT inhaler Inhale 2 puffs into the lungs every 6-8 hours as needed      albuterol (PROVENTIL) (2.5 MG/3ML) 0.083% nebulizer solution Take 3 mLs by nebulization 4 times daily as needed for Wheezing 120 each 5    warfarin (COUMADIN) 6 MG tablet TAKE 1 TABLET BY MOUTH EVERY DAY PER INR (Patient taking differently: every other day Alternates with 3mg dose) 90 tablet 0    DALIRESP 500 MCG tablet TAKE 1 TABLET BY MOUTH EVERY DAY 90 tablet 1    risedronate (ACTONEL) 35 MG tablet TAKE 1 TABLET BY MOUTH EVERY 7 DAYS PATIENT TAKES ON SUNDAY 12 tablet 1    furosemide (LASIX) 40 MG tablet TAKE 1 TABLET BY MOUTH EVERY DAY 90 tablet 0    rosuvastatin (CRESTOR) 20 MG tablet TAKE 1 TABLET BY MOUTH EVERY DAY EVERY NIGHT 90 tablet 1    Magnesium Oxide 500 MG TABS TAKE 1 TABLET BY MOUTH EVERY DAY 90 tablet 1    celecoxib (CELEBREX) 200 MG capsule TAKE 1 CAPSULE BY MOUTH EVERY DAY 90 capsule 0    gabapentin (NEURONTIN) 400 MG capsule Take 1 capsule by mouth every evening for 90 days. 90 capsule 0    theophylline (THEODUR) 450 MG extended release tablet TAKE 1/2 TABLET BY MOUTH TWICE A DAY 90 tablet 1    levothyroxine (SYNTHROID) 50 MCG tablet TAKE 1 TABLET BY MOUTH EVERY DAY 90 tablet 1    BREZTRI AEROSPHERE 160-9-4.8 MCG/ACT AERO INHALE 2 PUFFS BY MOUTH TWICE DAILY 10.7 each 6    dilTIAZem (CARDIZEM) 60 MG tablet Take 1 tablet by mouth every 8 hours 120 tablet 3    potassium chloride (KLOR-CON M10) 10 MEQ extended release tablet Take 2 tablets by mouth daily 180 tablet 1    montelukast (SINGULAIR) 10 MG tablet TAKE 1 TABLET BY MOUTH EVERY DAY EVERY NIGHT 90 tablet 1    esomeprazole (NEXIUM) 40 MG delayed release capsule TAKE 1 CAPSULE BY MOUTH TWICE A  capsule 0    albuterol sulfate  (90 Base) MCG/ACT inhaler INHALE 2 PUFF FOUR TIMES A DAY AS NEEDED 1 each 5    warfarin (COUMADIN) 3 MG tablet Take 1 tabet by mouth every day per INR (Patient taking differently: every other day Alternates with 6mg dose) 90 tablet 1    Dextromethorphan-guaiFENesin (MUCINEX DM)  MG TB12 Take 1 to 2 tablet by mouth every 12 hours (maximum: 4 tablets/24 hours). Drink plenty of water. 28 tablet 0    nitroGLYCERIN (NITROSTAT) 0.4 MG SL tablet Place 1 tablet under the tongue every 5 minutes as needed for Chest pain 25 tablet 1    dicyclomine (BENTYL) 10 MG capsule Take 10 mg by mouth 2 times daily      Biotin 49964 MCG TABS Take 10,000 mcg by mouth daily      b complex vitamins capsule Take 1 capsule by mouth daily      Calcium Carb-Cholecalciferol (CALCIUM + D3) 600-200 MG-UNIT TABS Take 1 tablet by mouth 2 times daily      Multiple Vitamins-Minerals (MULTIVITAMIN PO) Take 1 tablet by mouth daily      OXYGEN Inhale 2 L/hr into the lungs continuous. Medications patient taking as of now reconciled against medications ordered at time of hospital discharge: Yes    Review of Systems    Objective:    BP (!) 140/72 (Site: Right Upper Arm, Position: Sitting, Cuff Size: Medium Adult)   Pulse 80   Ht 5' 4\" (1.626 m)   Wt 143 lb 9.6 oz (65.1 kg)   SpO2 90%   BMI 24.65 kg/m²   Physical Exam  Constitutional:       Appearance: Normal appearance. She is normal weight. HENT:      Head: Normocephalic and atraumatic. Right Ear: Tympanic membrane and external ear normal.      Left Ear: Tympanic membrane and external ear normal.      Nose: No rhinorrhea. Mouth/Throat:      Mouth: Mucous membranes are moist.      Pharynx: Oropharynx is clear. No oropharyngeal exudate or posterior oropharyngeal erythema. Eyes:      General: No scleral icterus. Extraocular Movements: Extraocular movements intact. Conjunctiva/sclera: Conjunctivae normal.      Pupils: Pupils are equal, round, and reactive to light. Cardiovascular:      Rate and Rhythm: Normal rate and regular rhythm. Pulses: Normal pulses. Heart sounds: Normal heart sounds. No murmur heard. No friction rub. No gallop. Pulmonary:      Breath sounds: Wheezing and rales present. No rhonchi. Comments: Accessory muscle use  Abdominal:      General: Bowel sounds are normal. There is no distension.       Palpations: Abdomen is soft. There is no mass. Tenderness: There is no abdominal tenderness. There is no right CVA tenderness, left CVA tenderness, guarding or rebound. Musculoskeletal:         General: No deformity. Cervical back: Normal range of motion and neck supple. No rigidity. No muscular tenderness. Right lower leg: No edema. Left lower leg: No edema. Comments: Pt in wheel chair   Skin:     General: Skin is warm and dry. Capillary Refill: Capillary refill takes less than 2 seconds. Findings: No bruising, erythema or rash. Neurological:      General: No focal deficit present. Mental Status: She is alert and oriented to person, place, and time. Coordination: Coordination normal.   Psychiatric:         Mood and Affect: Mood normal.         Behavior: Behavior normal.         An electronic signature was used to authenticate this note.   --CHELSIE Kaur

## 2022-03-25 ENCOUNTER — CARE COORDINATION (OUTPATIENT)
Dept: CASE MANAGEMENT | Age: 77
End: 2022-03-25

## 2022-03-25 NOTE — CARE COORDINATION
[facility] Transitions Follow Up Call    3/25/2022    Patient: Kavita Rawls  Patient : 1945   MRN: [id number]  Reason for Admission: Ac Copd, A/C Resp Failure  Discharge Date: 3/22/22 RARS: Readmission Risk Score: 15.9 ( )    Care Transitions Subsequent and Final Call    Schedule Follow Up Appointment with PCP: Declined  Subsequent and Final Calls  Do you have any ongoing symptoms?: Yes  Onset of Patient-reported symptoms: Other  Patient-reported symptoms: Shortness of Breath, Cough, Other  Interventions for patient-reported symptoms: Other  Have your medications changed?: Yes  Patient Reports: 3/24/22 + hydroxine  Do you have any questions related to your medications?: No  Do you currently have any active services?: No  Are you currently active with any services?: Home Health  Do you have any needs or concerns that I can assist you with?: No  Identified Barriers: Other  Care Transitions Interventions   Home Care Waiver: Declined        Transportation Support: Declined      DME Assistance: Declined     Senior Services: Declined    Other Interventions:         Future Appointments   Date Time Provider Judi Menjivar   3/28/2022  2:00 PM Angelia Jones MD [facility] PULM [facility]   3/30/2022  2:00 PM Hillary Awad MD [facility] FPS [facility]   2022 10:00 AM SCHEDULE, [facility] FPS NURSE [facility]   2022 10:30 AM CHELSIE Escamilla [facility]     CARE TRANSITION MONITORING    Facility: [facility]  RARS:  12    Spoke w/ Patient for follow up call. Patient was seen by Susy HOLGUIN yesterday as scheduled per CTN. New rx for Atarax for anxiety, taking as directed. Reports taking Doxycycline, Prednisone as directed. Using inhalers, med neb and O2 at 2L continuously w/ SPO2 94% Reports still w/ cough, sob on exertion, wheezing, gen weakness however is starting to note slight improvement of sx. Noted to be speaking in complete, unlabored sentences. Reviewed COPD education.  Advised rest, pacing activity, proper hydration/nutrition (w/i diet restriction per MD), IS, cough and deep breathing exercises. Has appt w/ Dr Nirmal Casillas- [place] [facility] 3/28/22, transportation confirmed. Patient is nonsmoker x 30 yrs, lives in nonsmoking household. Continues to deny need for rx refills, dme, hhc, community assistance. Advised Covid19 preventative measures including mask covering nose/mouth, social distancing, hand washing. Advised to contact PCP / Dr Nirmal Casillas 24/7 re: any health concerns for early outpatient intervention in an effort to avoid hospitalization. Discussed benefits of WIC, Urgent Care. Plan for follow-up call in 3-5 days based on severity of symptoms and risk factors.   Plan for next call: sx, f/u on pulm md appt, chf/copd  education    Mariza Taylor RN

## 2022-03-28 ENCOUNTER — OFFICE VISIT (OUTPATIENT)
Dept: PULMONOLOGY | Age: 77
End: 2022-03-28
Payer: MEDICARE

## 2022-03-28 VITALS
BODY MASS INDEX: 24.59 KG/M2 | OXYGEN SATURATION: 90 % | DIASTOLIC BLOOD PRESSURE: 62 MMHG | HEIGHT: 64 IN | HEART RATE: 71 BPM | WEIGHT: 144 LBS | SYSTOLIC BLOOD PRESSURE: 112 MMHG

## 2022-03-28 DIAGNOSIS — J96.11 CHRONIC HYPOXEMIC RESPIRATORY FAILURE (HCC): Chronic | ICD-10-CM

## 2022-03-28 DIAGNOSIS — R06.02 SHORTNESS OF BREATH: ICD-10-CM

## 2022-03-28 DIAGNOSIS — J44.9 COPD, VERY SEVERE (HCC): Primary | Chronic | ICD-10-CM

## 2022-03-28 DIAGNOSIS — I27.20 MILD PULMONARY HYPERTENSION (HCC): ICD-10-CM

## 2022-03-28 DIAGNOSIS — Z87.891 FORMER SMOKER: ICD-10-CM

## 2022-03-28 PROCEDURE — 1090F PRES/ABSN URINE INCON ASSESS: CPT | Performed by: INTERNAL MEDICINE

## 2022-03-28 PROCEDURE — 1111F DSCHRG MED/CURRENT MED MERGE: CPT | Performed by: INTERNAL MEDICINE

## 2022-03-28 PROCEDURE — G8427 DOCREV CUR MEDS BY ELIG CLIN: HCPCS | Performed by: INTERNAL MEDICINE

## 2022-03-28 PROCEDURE — 1036F TOBACCO NON-USER: CPT | Performed by: INTERNAL MEDICINE

## 2022-03-28 PROCEDURE — 3023F SPIROM DOC REV: CPT | Performed by: INTERNAL MEDICINE

## 2022-03-28 PROCEDURE — 4040F PNEUMOC VAC/ADMIN/RCVD: CPT | Performed by: INTERNAL MEDICINE

## 2022-03-28 PROCEDURE — 99213 OFFICE O/P EST LOW 20 MIN: CPT | Performed by: INTERNAL MEDICINE

## 2022-03-28 PROCEDURE — 1123F ACP DISCUSS/DSCN MKR DOCD: CPT | Performed by: INTERNAL MEDICINE

## 2022-03-28 PROCEDURE — G8420 CALC BMI NORM PARAMETERS: HCPCS | Performed by: INTERNAL MEDICINE

## 2022-03-28 PROCEDURE — G8400 PT W/DXA NO RESULTS DOC: HCPCS | Performed by: INTERNAL MEDICINE

## 2022-03-28 PROCEDURE — G8482 FLU IMMUNIZE ORDER/ADMIN: HCPCS | Performed by: INTERNAL MEDICINE

## 2022-03-28 RX ORDER — PREDNISONE 1 MG/1
TABLET ORAL
Qty: 38 TABLET | Refills: 0 | Status: SHIPPED | OUTPATIENT
Start: 2022-03-28 | End: 2022-03-30

## 2022-03-28 NOTE — PROGRESS NOTES
SUBJECTIVE:  Chief Complaint: Very severe/stage IV COPD, chronic hypoxemic respiratory failure, shortness of breath, former smoker, mild pulmonary hypertension  Salazar Villarreal got out of the hospital about 8 days ago for an exacerbation of her COPD and the finding of  right upper lobe community-acquired pneumonia. Viral antigen profile was positive for human metapneumovirus. She has improved but still is coughing with shortness of breath and difficulty expectorating. She has finished a course of doxycycline at home and a tapering course of oral prednisone and is back to 5 mg prednisone daily. She does continue on Breztri 2 inhalations twice a day, oral theophylline 225 mg twice daily and 5 mg prednisone daily along with oxygen 24 hours a day. She denies fever, chills and has had no hemoptysis. She remains very short of breath even at rest.    ROS:  Constitution:  HEENT: Negative for ear, throat pain  Cardiovascular: Negative for chest pain, syncope, edema  Pulmonary: See HPI  Musculoskeletal: Negative for DVT, myalgias, arthralgias    OBJECTIVE:  /62   Pulse 71   Ht 5' 4\" (1.626 m)   Wt 144 lb (65.3 kg)   SpO2 90% Comment: 2 liters pulse dose  BMI 24.72 kg/m²      Physical Exam:  Constitutional:  She appears well developed but in mild respiratory distress even at rest.  Wearing nasal oxygen. Neck:  Supple, No palpable lymphadenopathy, No JVD  Cardiovascular:  S1, S2 Normal, Regular rhythm, no murmurs or gallops, No pericardial  rubs. Pulmonary: Scattered expiratory wheezing bilaterally. A few basilar rhonchi noted.   Diminished breath sounds throughout all lung fields  Abdomen: Not examined  Extremities: no edema, No DVT  Neurologic: Oriented x3, No focal deficits    Radiology: Chest x-ray on 3/20/2022 showed subtle suspected nodular opacity in the right upper lobe which may correlate to the finding on recent CT  PFT: Office spirometry on 7/21/2021 demonstrated very severe/stage IV COPD with no

## 2022-03-30 ENCOUNTER — OFFICE VISIT (OUTPATIENT)
Dept: FAMILY MEDICINE CLINIC | Age: 77
End: 2022-03-30
Payer: MEDICARE

## 2022-03-30 ENCOUNTER — CARE COORDINATION (OUTPATIENT)
Dept: CASE MANAGEMENT | Age: 77
End: 2022-03-30

## 2022-03-30 VITALS
OXYGEN SATURATION: 96 % | SYSTOLIC BLOOD PRESSURE: 128 MMHG | BODY MASS INDEX: 25.27 KG/M2 | WEIGHT: 148 LBS | DIASTOLIC BLOOD PRESSURE: 88 MMHG | HEIGHT: 64 IN | HEART RATE: 82 BPM

## 2022-03-30 DIAGNOSIS — J96.11 CHRONIC HYPOXEMIC RESPIRATORY FAILURE (HCC): Chronic | ICD-10-CM

## 2022-03-30 DIAGNOSIS — J43.9 PULMONARY EMPHYSEMA, UNSPECIFIED EMPHYSEMA TYPE (HCC): Primary | ICD-10-CM

## 2022-03-30 DIAGNOSIS — I10 ESSENTIAL HYPERTENSION: ICD-10-CM

## 2022-03-30 PROCEDURE — 3023F SPIROM DOC REV: CPT | Performed by: FAMILY MEDICINE

## 2022-03-30 PROCEDURE — G8427 DOCREV CUR MEDS BY ELIG CLIN: HCPCS | Performed by: FAMILY MEDICINE

## 2022-03-30 PROCEDURE — G8417 CALC BMI ABV UP PARAM F/U: HCPCS | Performed by: FAMILY MEDICINE

## 2022-03-30 PROCEDURE — G8400 PT W/DXA NO RESULTS DOC: HCPCS | Performed by: FAMILY MEDICINE

## 2022-03-30 PROCEDURE — G8482 FLU IMMUNIZE ORDER/ADMIN: HCPCS | Performed by: FAMILY MEDICINE

## 2022-03-30 PROCEDURE — 1123F ACP DISCUSS/DSCN MKR DOCD: CPT | Performed by: FAMILY MEDICINE

## 2022-03-30 PROCEDURE — 1090F PRES/ABSN URINE INCON ASSESS: CPT | Performed by: FAMILY MEDICINE

## 2022-03-30 PROCEDURE — 1036F TOBACCO NON-USER: CPT | Performed by: FAMILY MEDICINE

## 2022-03-30 PROCEDURE — 1111F DSCHRG MED/CURRENT MED MERGE: CPT | Performed by: FAMILY MEDICINE

## 2022-03-30 PROCEDURE — 4040F PNEUMOC VAC/ADMIN/RCVD: CPT | Performed by: FAMILY MEDICINE

## 2022-03-30 PROCEDURE — 99213 OFFICE O/P EST LOW 20 MIN: CPT | Performed by: FAMILY MEDICINE

## 2022-03-30 NOTE — CARE COORDINATION
[facility] Transitions Follow Up Call    3/30/2022    Patient: Harmony Olivares  Patient : 1945   MRN: [id number]  Reason for Admission: Ac Copd, A/C Resp Failure  Discharge Date: 3/22/22 RARS: Readmission Risk Score: 15.9 ( )    Attempted to contact patient for Transition Subsequent call. Left HIPAA Compliant message on Voice Mail to call CTN (number given) with any questions and an update on patient's condition since discharge. Will continue to follow. Selvin Gregorio LPN    [phone]  [facility] / [address] [place] [address] Transitions Subsequent and Final Call    Subsequent and Final Calls  Care Transitions Interventions  Other Interventions:            Follow Up  Future Appointments   Date Time Provider Judi Menjivar   2022 10:00 AM SCHEDULE, [facility]PS NURSE [facility]   2022 10:30 AM CHELSIE De Los Santos [facility] FPS [facility]   2022  1:30 PM Tom Morrow MD [facility] PULM [facility]   10/4/2022  1:30 PM Jade Rosales DO [facility]       Selvin Gregorio LPN

## 2022-03-30 NOTE — PROGRESS NOTES
Shortness of breath 2019    Shortness of breath 2021    Shortness of breath 2021    Wears glasses        FAMILY HISTORY  Family History   Problem Relation Age of Onset    Heart Disease Mother     Cancer Father         lung ca    Heart Disease Father     COPD Father     Cancer Sister     Heart Disease Sister        SOCIAL HISTORY  Social History     Socioeconomic History    Marital status:      Spouse name: None    Number of children: None    Years of education: None    Highest education level: None   Occupational History    None   Tobacco Use    Smoking status: Former Smoker     Packs/day: 1.50     Years: 26.00     Pack years: 39.00     Types: Cigarettes     Start date:      Quit date: 1991     Years since quittin.4    Smokeless tobacco: Never Used   Vaping Use    Vaping Use: Never used   Substance and Sexual Activity    Alcohol use: No    Drug use: No    Sexual activity: Not Currently     Partners: Male   Other Topics Concern    None   Social History Narrative    None     Social Determinants of Health     Financial Resource Strain: Low Risk     Difficulty of Paying Living Expenses: Not hard at all   Food Insecurity: No Food Insecurity    Worried About Running Out of Food in the Last Year: Never true    Henry of Food in the Last Year: Never true   Transportation Needs:     Lack of Transportation (Medical): Not on file    Lack of Transportation (Non-Medical):  Not on file   Physical Activity:     Days of Exercise per Week: Not on file    Minutes of Exercise per Session: Not on file   Stress:     Feeling of Stress : Not on file   Social Connections:     Frequency of Communication with Friends and Family: Not on file    Frequency of Social Gatherings with Friends and Family: Not on file    Attends Church Services: Not on file    Active Member of Clubs or Organizations: Not on file    Attends Club or Organization Meetings: Not on file   Thaddeus Gillis Marital Status: Not on file   Intimate Partner Violence:     Fear of Current or Ex-Partner: Not on file    Emotionally Abused: Not on file    Physically Abused: Not on file    Sexually Abused: Not on file   Housing Stability:     Unable to Pay for Housing in the Last Year: Not on file    Number of Places Lived in the Last Year: Not on file    Unstable Housing in the Last Year: Not on file        SURGICAL HISTORY  Past Surgical History:   Procedure Laterality Date   [place]ita [place]    COLONOSCOPY      ENDOSCOPY, COLON, DIAGNOSTIC     [facility]  Current Outpatient Medications   Medication Sig Dispense Refill    hydrOXYzine (ATARAX) 25 MG tablet Take 1 tablet by mouth nightly 30 tablet 2    ipratropium (ATROVENT HFA) 17 MCG/ACT inhaler Inhale 2 puffs into the lungs every 6-8 hours as needed      albuterol (PROVENTIL) (2.5 MG/3ML) 0.083% nebulizer solution Take 3 mLs by nebulization 4 times daily as needed for Wheezing 120 each 5    warfarin (COUMADIN) 6 MG tablet TAKE 1 TABLET BY MOUTH EVERY DAY PER INR (Patient taking differently: every other day Alternates with 3mg dose) 90 tablet 0    DALIRESP 500 MCG tablet TAKE 1 TABLET BY MOUTH EVERY DAY 90 tablet 1    risedronate (ACTONEL) 35 MG tablet TAKE 1 TABLET BY MOUTH EVERY 7 DAYS PATIENT TAKES ON SUNDAY 12 tablet 1    furosemide (LASIX) 40 MG tablet TAKE 1 TABLET BY MOUTH EVERY DAY 90 tablet 0    rosuvastatin (CRESTOR) 20 MG tablet TAKE 1 TABLET BY MOUTH EVERY DAY EVERY NIGHT 90 tablet 1    Magnesium Oxide 500 MG TABS TAKE 1 TABLET BY MOUTH EVERY DAY 90 tablet 1    celecoxib (CELEBREX) 200 MG capsule TAKE 1 CAPSULE BY MOUTH EVERY DAY 90 capsule 0    gabapentin (NEURONTIN) 400 MG capsule Take 1 capsule by mouth every evening for 90 days.  90 capsule 0    theophylline (THEODUR) 450 MG extended release tablet TAKE 1/2 TABLET BY MOUTH TWICE A DAY 90 tablet 1    levothyroxine (SYNTHROID) 50 MCG tablet TAKE 1 TABLET BY MOUTH EVERY DAY 90 tablet 1    BREZTRI AEROSPHERE 160-9-4.8 MCG/ACT AERO INHALE 2 PUFFS BY MOUTH TWICE DAILY 10.7 each 6    dilTIAZem (CARDIZEM) 60 MG tablet Take 1 tablet by mouth every 8 hours 120 tablet 3    potassium chloride (KLOR-CON M10) 10 MEQ extended release tablet Take 2 tablets by mouth daily 180 tablet 1    montelukast (SINGULAIR) 10 MG tablet TAKE 1 TABLET BY MOUTH EVERY DAY EVERY NIGHT 90 tablet 1    esomeprazole (NEXIUM) 40 MG delayed release capsule TAKE 1 CAPSULE BY MOUTH TWICE A  capsule 0    albuterol sulfate  (90 Base) MCG/ACT inhaler INHALE 2 PUFF FOUR TIMES A DAY AS NEEDED 1 each 5    warfarin (COUMADIN) 3 MG tablet Take 1 tabet by mouth every day per INR (Patient taking differently: every other day Alternates with 6mg dose) 90 tablet 1    Dextromethorphan-guaiFENesin (MUCINEX DM)  MG TB12 Take 1 to 2 tablet by mouth every 12 hours (maximum: 4 tablets/24 hours). Drink plenty of water. 28 tablet 0    nitroGLYCERIN (NITROSTAT) 0.4 MG SL tablet Place 1 tablet under the tongue every 5 minutes as needed for Chest pain 25 tablet 1    dicyclomine (BENTYL) 10 MG capsule Take 10 mg by mouth 2 times daily      Biotin 95171 MCG TABS Take 10,000 mcg by mouth daily      b complex vitamins capsule Take 1 capsule by mouth daily      Calcium Carb-Cholecalciferol (CALCIUM + D3) 600-200 MG-UNIT TABS Take 1 tablet by mouth 2 times daily      Multiple Vitamins-Minerals (MULTIVITAMIN PO) Take 1 tablet by mouth daily      OXYGEN Inhale 2 L/hr into the lungs continuous. No current facility-administered medications for this visit.        ALLERGIES  Allergies   Allergen Reactions    Codeine Swelling    Darvon [Propoxyphene Hcl] Swelling    Lamisil [Terbinafine Hcl]     Morphine Swelling    Neosporin [Neomycin-Polymyxin-Gramicidin]     Pcn [Penicillins] Swelling       PHYSICAL EXAM  /88   Pulse 82   Ht 5' 4\" (1.626 m)   Wt 148 lb (67.1 kg)   SpO2 96%   BMI 25.40 kg/m²   Exam unremarkable other than chronic lead decreased breath sounds all fields. ASSESSMENT & PLAN    1. Pulmonary emphysema, unspecified emphysema type [facility])  Patient doing excellently. Remain on 2 L per nasal cannula  There is need for this medicine she benefits from it. Patient appears to have lifelong need. 2. Chronic hypoxemic respiratory failure ([address]  See above  Continue to try to avoid viral exposures. Recommended vaccinating against hooping cough at her local pharmacy. 3. Essential hypertension  Issue controlled. Continue meds. Refilled meds.     Patient billed off total time22minutes  5 minutes prechart review  12 minutes spent with patient  5 minutes creating note     Follow-up 6 months with Dr. Floyd Flores or earlier if needed         Electronically signed by Danny Barakat MD on 3/30/2022

## 2022-04-01 ENCOUNTER — CARE COORDINATION (OUTPATIENT)
Dept: CASE MANAGEMENT | Age: 77
End: 2022-04-01

## 2022-04-01 NOTE — CARE COORDINATION
[facility] Transitions Follow Up Call    2022    Patient: Kathy Bazan  Patient : 1945   MRN: [id number]  Reason for Admission: sob  Discharge Date: 3/22/22 RARS: Readmission Risk Score: 15.9 ( )         Spoke with: Yoshi Najera today and she is doing pretty good. Saw Pulm 3/28/22 and he ordered Prednisone & Mucinex and they started that. She had enough energy to attend a birthday party at her daughter's house last night. Chao Kern says her breathing is almost back to baseline-still DYSPNEA ON EXERTION w/ O2 on. Eating, drinking & sleeping pretty good. CTN educated on taking her time when eating/ambulating. She agrees. Denies cough/n/v/d/fever/problems with urination/bowels. No other concerns voiced at this time. Care Transitions Subsequent and Final Call    Subsequent and Final Calls  Do you have any ongoing symptoms?: Yes  Onset of Patient-reported symptoms: In the past 7 days  Patient-reported symptoms: Shortness of Breath  Interventions for patient-reported symptoms: Other  Have your medications changed?: Yes  Patient Reports: mucinex & prednisone added 3/28/22  Do you have any questions related to your medications?: No  Do you currently have any active services?: No  Are you currently active with any services?: Home Health  Do you have any needs or concerns that I can assist you with?: No  Identified Barriers: Lack of Education  Care Transitions Interventions  No Identified Needs   Home Care Waiver: Declined        Transportation Support: Declined      DME Assistance: Declined     Senior Services: Declined    Other Interventions:       Care Transitions Follow Up Call    Needs to be reviewed by the provider   Additional needs identified to be addressed with provider: No  none             Method of communication with provider : none      Care Transition Nurse (CTN) contacted the patient by telephone to follow up after admission on 3/17/22.  Verified name and  with patient as identifiers. Addressed changes since last contact: none  Discussed follow-up appointments. If no appointment was previously scheduled, appointment scheduling offered: Yes. Is follow up appointment scheduled within 7 days of discharge? Yes. Advance Care Planning:   Does patient have an Advance Directive: not on file. CTN reviewed discharge instructions, medical action plan and red flags with family and discussed any barriers to care and/or understanding of plan of care after discharge. Discussed appropriate site of care based on symptoms and resources available to patient including: PCP  Specialist. The family agrees to contact the PCP office for questions related to their healthcare. Patients top risk factors for readmission: functional physical ability  lack of knowledge about disease  medical condition-COPD, a fib, CHF  multiple health system providers  polypharmacy  Interventions to address risk factors: Scheduled appointment with PCP-4/13/22      Non-[facility] follow up appointment(s):     CTN provided contact information for future needs. Plan for follow-up call in 7-10 days based on severity of symptoms and risk factors. Plan for next call: symptom management-sob, palencia improved?, wt.? swelling?, cough?           Follow Up  Future Appointments   Date Time Provider Judi Menjivar   4/5/2022 10:00 AM SCHEDULE, [address] [facility] NURSE [address] [facility]   4/13/2022 10:30 AM Gerhard Dance, PA [address] [facility]   6/2/2022  1:30 PM Savana Lr MD [address] [facility]   10/4/2022  1:30 PM Mo Ontiveros DO [address] [facility]       Helen Koenig RN

## 2022-04-05 ENCOUNTER — NURSE ONLY (OUTPATIENT)
Dept: FAMILY MEDICINE CLINIC | Age: 77
End: 2022-04-05
Payer: MEDICARE

## 2022-04-05 ENCOUNTER — ANTI-COAG VISIT (OUTPATIENT)
Dept: FAMILY MEDICINE CLINIC | Age: 77
End: 2022-04-05

## 2022-04-05 DIAGNOSIS — Z86.711 HISTORY OF PULMONARY EMBOLISM: ICD-10-CM

## 2022-04-05 DIAGNOSIS — Z79.01 ANTICOAGULANT LONG-TERM USE: ICD-10-CM

## 2022-04-05 DIAGNOSIS — Z86.718 HISTORY OF DVT OF LOWER EXTREMITY: ICD-10-CM

## 2022-04-05 LAB
INTERNATIONAL NORMALIZATION RATIO, POC: 2.7
PROTHROMBIN TIME, POC: NORMAL

## 2022-04-05 PROCEDURE — 85610 PROTHROMBIN TIME: CPT | Performed by: FAMILY MEDICINE

## 2022-04-06 DIAGNOSIS — E78.00 PURE HYPERCHOLESTEROLEMIA: ICD-10-CM

## 2022-04-06 DIAGNOSIS — J44.9 COPD, VERY SEVERE (HCC): Chronic | ICD-10-CM

## 2022-04-06 DIAGNOSIS — J44.9 COPD, VERY SEVERE (HCC): ICD-10-CM

## 2022-04-06 RX ORDER — FUROSEMIDE 40 MG/1
TABLET ORAL
Qty: 90 TABLET | Refills: 0 | OUTPATIENT
Start: 2022-04-06

## 2022-04-06 RX ORDER — GABAPENTIN 400 MG/1
400 CAPSULE ORAL EVERY EVENING
Qty: 90 CAPSULE | Refills: 0 | OUTPATIENT
Start: 2022-04-06 | End: 2022-07-05

## 2022-04-06 RX ORDER — MONTELUKAST SODIUM 10 MG/1
TABLET ORAL
Qty: 90 TABLET | Refills: 1 | OUTPATIENT
Start: 2022-04-06

## 2022-04-07 RX ORDER — FUROSEMIDE 40 MG/1
TABLET ORAL
Qty: 90 TABLET | Refills: 0 | OUTPATIENT
Start: 2022-04-07

## 2022-04-07 RX ORDER — ROSUVASTATIN CALCIUM 20 MG/1
TABLET, COATED ORAL
Qty: 90 TABLET | Refills: 1 | OUTPATIENT
Start: 2022-04-07

## 2022-04-13 ENCOUNTER — OFFICE VISIT (OUTPATIENT)
Dept: FAMILY MEDICINE CLINIC | Age: 77
End: 2022-04-13
Payer: MEDICARE

## 2022-04-13 VITALS
SYSTOLIC BLOOD PRESSURE: 150 MMHG | BODY MASS INDEX: 25.99 KG/M2 | OXYGEN SATURATION: 93 % | DIASTOLIC BLOOD PRESSURE: 68 MMHG | HEIGHT: 64 IN | WEIGHT: 152.2 LBS | HEART RATE: 76 BPM

## 2022-04-13 DIAGNOSIS — I10 ESSENTIAL HYPERTENSION: Primary | ICD-10-CM

## 2022-04-13 DIAGNOSIS — J44.9 COPD, VERY SEVERE (HCC): ICD-10-CM

## 2022-04-13 PROCEDURE — G8400 PT W/DXA NO RESULTS DOC: HCPCS | Performed by: PHYSICIAN ASSISTANT

## 2022-04-13 PROCEDURE — G8427 DOCREV CUR MEDS BY ELIG CLIN: HCPCS | Performed by: PHYSICIAN ASSISTANT

## 2022-04-13 PROCEDURE — 1090F PRES/ABSN URINE INCON ASSESS: CPT | Performed by: PHYSICIAN ASSISTANT

## 2022-04-13 PROCEDURE — 1111F DSCHRG MED/CURRENT MED MERGE: CPT | Performed by: PHYSICIAN ASSISTANT

## 2022-04-13 PROCEDURE — 3023F SPIROM DOC REV: CPT | Performed by: PHYSICIAN ASSISTANT

## 2022-04-13 PROCEDURE — 99213 OFFICE O/P EST LOW 20 MIN: CPT | Performed by: PHYSICIAN ASSISTANT

## 2022-04-13 PROCEDURE — 1123F ACP DISCUSS/DSCN MKR DOCD: CPT | Performed by: PHYSICIAN ASSISTANT

## 2022-04-13 PROCEDURE — G8417 CALC BMI ABV UP PARAM F/U: HCPCS | Performed by: PHYSICIAN ASSISTANT

## 2022-04-13 PROCEDURE — 4040F PNEUMOC VAC/ADMIN/RCVD: CPT | Performed by: PHYSICIAN ASSISTANT

## 2022-04-13 PROCEDURE — 1036F TOBACCO NON-USER: CPT | Performed by: PHYSICIAN ASSISTANT

## 2022-04-13 NOTE — PROGRESS NOTES
4/13/2022    Shira Cantu    Chief Complaint   Patient presents with    3 Month Follow-Up       HPI  History was obtained from pt. Apolinar Carbone is a 68 y.o. female with a PMHx as listed below who presents today for follow up visit. She was given concentrated albuterol solution at the pharmacy which has to be mixed. She was suppoed to get the premixed solution. Pt condition continues to improve from her hospitalization for pneumonia. Elevated bp - pt get some palpitations but nothing else. Doesn't feel when it is high. 1. Essential hypertension    2.  COPD, very severe ([address]         REVIEW OF SYMPTOMS    Review of Systems    PAST MEDICAL HISTORY  Past Medical History:   Diagnosis Date    Anticoagulant long-term use     Arthritis     Cancer ([address]     Chronic hypoxemic respiratory failure ([address] 2/6/2018    Community acquired pneumonia of right upper lobe of lung 3/19/2022    COPD (chronic obstructive pulmonary disease) ([address]     Coronary atherosclerosis     COVID-19 virus detected 1/18/2021    DVT (deep venous thrombosis) ([address]     Former smoker 11/22/2021    Gastroesophageal reflux disease     Hiatal hernia     Lung infiltrate on CT 1/22/2019    On home oxygen therapy     on 24/7    Osteopenia     Phlebitis     Pulmonary nodule 7/5/2016    S/P left heart catheterization by percutaneous approach 6/27/2013    Sepsis due to pneumonia ([address] 11/14/2017    Shortness of breath 9/23/2019    Shortness of breath 1/18/2021    Shortness of breath 11/22/2021    Wears glasses        FAMILY HISTORY  Family History   Problem Relation Age of Onset    Heart Disease Mother     Cancer Father         lung ca    Heart Disease Father     COPD Father     Cancer Sister     Heart Disease Sister        SOCIAL HISTORY  Social History     Socioeconomic History    Marital status:      Spouse name: Not on file    Number of children: Not on file    Years of education: Not on file   [place] Highest education level: Not on file   Occupational History    Not on file   Tobacco Use    Smoking status: Former Smoker     Packs/day: 1.50     Years: 26.00     Pack years: 39.00     Types: Cigarettes     Start date: 65     Quit date: 1991     Years since quittin.4    Smokeless tobacco: Never Used   Vaping Use    Vaping Use: Never used   Substance and Sexual Activity    Alcohol use: No    Drug use: No    Sexual activity: Not Currently     Partners: Male   Other Topics Concern    Not on file   Social History Narrative    Not on file     Social Determinants of Health     Financial Resource Strain: Low Risk     Difficulty of Paying Living Expenses: Not hard at all   Food Insecurity: No Food Insecurity    Worried About [address] in the Last Year: Never true    [address]  [employer] in the Last Year: Never true   Transportation Needs:     Lack of Transportation (Medical): Not on file    Lack of Transportation (Non-Medical):  Not on file   Physical Activity:     Days of Exercise per Week: Not on file    Minutes of Exercise per Session: Not on file   Stress:     Feeling of Stress : Not on file   Social Connections:     Frequency of Communication with Friends and Family: Not on file    Frequency of Social Gatherings with Friends and Family: Not on file    Attends [employer] Services: Not on file    Active Member of [address] [place] [address] or Organizations: Not on file    Attends Club or Organization Meetings: Not on file    Marital Status: Not on file   Intimate Partner Violence:     Fear of Current or Ex-Partner: Not on file    Emotionally Abused: Not on file    Physically Abused: Not on file    Sexually Abused: Not on file   Housing Stability:     Unable to Pay for Housing in the Last Year: Not on file    Number of [place] in the Last Year: Not on file    Unstable Housing in the Last Year: Not on file        SURGICAL HISTORY  Past Surgical History:   Procedure Laterality Date    APPENDECTOMY Unknown     Dr Ganesh Ramos    COLONOSCOPY      ENDOSCOPY, COLON, DIAGNOSTIC      TONSILLECTOMY      TUBAL LIGATION      VEIN SURGERY                   CURRENT MEDICATIONS  Current Outpatient Medications   Medication Sig Dispense Refill    hydrOXYzine (ATARAX) 25 MG tablet Take 1 tablet by mouth nightly 30 tablet 2    ipratropium (ATROVENT HFA) 17 MCG/ACT inhaler Inhale 2 puffs into the lungs every 6-8 hours as needed      albuterol (PROVENTIL) (2.5 MG/3ML) 0.083% nebulizer solution Take 3 mLs by nebulization 4 times daily as needed for Wheezing 120 each 5    warfarin (COUMADIN) 6 MG tablet TAKE 1 TABLET BY MOUTH EVERY DAY PER INR (Patient taking differently: every other day Alternates with 3mg dose) 90 tablet 0    DALIRESP 500 MCG tablet TAKE 1 TABLET BY MOUTH EVERY DAY 90 tablet 1    risedronate (ACTONEL) 35 MG tablet TAKE 1 TABLET BY MOUTH EVERY 7 DAYS PATIENT TAKES ON SUNDAY 12 tablet 1    furosemide (LASIX) 40 MG tablet TAKE 1 TABLET BY MOUTH EVERY DAY 90 tablet 0    rosuvastatin (CRESTOR) 20 MG tablet TAKE 1 TABLET BY MOUTH EVERY DAY EVERY NIGHT 90 tablet 1    Magnesium Oxide 500 MG TABS TAKE 1 TABLET BY MOUTH EVERY DAY 90 tablet 1    celecoxib (CELEBREX) 200 MG capsule TAKE 1 CAPSULE BY MOUTH EVERY DAY 90 capsule 0    gabapentin (NEURONTIN) 400 MG capsule Take 1 capsule by mouth every evening for 90 days.  90 capsule 0    theophylline (THEODUR) 450 MG extended release tablet TAKE 1/2 TABLET BY MOUTH TWICE A DAY 90 tablet 1    levothyroxine (SYNTHROID) 50 MCG tablet TAKE 1 TABLET BY MOUTH EVERY DAY 90 tablet 1    BREZTRI AEROSPHERE 160-9-4.8 MCG/ACT AERO INHALE 2 PUFFS BY MOUTH TWICE DAILY 10.7 each 6    dilTIAZem (CARDIZEM) 60 MG tablet Take 1 tablet by mouth every 8 hours 120 tablet 3    potassium chloride (KLOR-CON M10) 10 MEQ extended release tablet Take 2 tablets by mouth daily 180 tablet 1    montelukast (SINGULAIR) 10 MG tablet TAKE 1 TABLET BY MOUTH EVERY DAY EVERY NIGHT 90 tablet 1    esomeprazole (NEXIUM) 40 MG delayed release capsule TAKE 1 CAPSULE BY MOUTH TWICE A  capsule 0    albuterol sulfate  (90 Base) MCG/ACT inhaler INHALE 2 PUFF FOUR TIMES A DAY AS NEEDED 1 each 5    warfarin (COUMADIN) 3 MG tablet Take 1 tabet by mouth every day per INR (Patient taking differently: every other day Alternates with 6mg dose) 90 tablet 1    Dextromethorphan-guaiFENesin (MUCINEX DM)  MG TB12 Take 1 to 2 tablet by mouth every 12 hours (maximum: 4 tablets/24 hours). Drink plenty of water. 28 tablet 0    nitroGLYCERIN (NITROSTAT) 0.4 MG SL tablet Place 1 tablet under the tongue every 5 minutes as needed for Chest pain 25 tablet 1    dicyclomine (BENTYL) 10 MG capsule Take 10 mg by mouth 2 times daily      Biotin 19508 MCG TABS Take 10,000 mcg by mouth daily      b complex vitamins capsule Take 1 capsule by mouth daily      Calcium Carb-Cholecalciferol (CALCIUM + D3) 600-200 MG-UNIT TABS Take 1 tablet by mouth 2 times daily      Multiple Vitamins-Minerals (MULTIVITAMIN PO) Take 1 tablet by mouth daily      OXYGEN Inhale 2 L/hr into the lungs continuous. No current facility-administered medications for this visit. ALLERGIES  Allergies   Allergen Reactions    Codeine Swelling    Darvon [Propoxyphene Hcl] Swelling    Lamisil [Terbinafine Hcl]     Morphine Swelling    Neosporin [Neomycin-Polymyxin-Gramicidin]     Pcn [Penicillins] Swelling       PHYSICAL EXAM    BP (!) 150/68 (Site: Left Upper Arm, Position: Sitting, Cuff Size: Medium Adult)   Pulse 76   Ht 5' 4\" (1.626 m)   Wt 152 lb 3.2 oz (69 kg)   SpO2 93%   BMI 26.13 kg/m²     Physical Exam  Constitutional:       Appearance: Normal appearance. She is normal weight. HENT:      Head: Normocephalic and atraumatic. Right Ear: Tympanic membrane and external ear normal.      Left Ear: Tympanic membrane and external ear normal.      Nose: No rhinorrhea.       Mouth/Throat:      Mouth: Mucous membranes are moist.      Pharynx: Oropharynx is clear. No oropharyngeal exudate or posterior oropharyngeal erythema. Eyes:      General: No scleral icterus. Extraocular Movements: Extraocular movements intact. Conjunctiva/sclera: Conjunctivae normal.      Pupils: Pupils are equal, round, and reactive to light. Cardiovascular:      Rate and Rhythm: Normal rate and regular rhythm. Pulses: Normal pulses. Heart sounds: Normal heart sounds. No murmur heard. No friction rub. No gallop. Pulmonary:      Effort: Pulmonary effort is normal.      Breath sounds: Wheezing present. No rhonchi or rales. Abdominal:      General: Bowel sounds are normal. There is no distension. Palpations: Abdomen is soft. There is no mass. Tenderness: There is no abdominal tenderness. There is no right CVA tenderness, left CVA tenderness, guarding or rebound. Musculoskeletal:         General: No deformity. Normal range of motion. Cervical back: Normal range of motion and neck supple. No rigidity. No muscular tenderness. Right lower leg: No edema. Left lower leg: No edema. Skin:     General: Skin is warm and dry. Capillary Refill: Capillary refill takes less than 2 seconds. Findings: No bruising, erythema or rash. Neurological:      General: No focal deficit present. Mental Status: She is alert and oriented to person, place, and time. Coordination: Coordination normal.      Gait: Gait normal.   Psychiatric:         Mood and Affect: Mood normal.         Behavior: Behavior normal.         ASSESSMENT & PLAN    1. COPD, very severe [facility])  Pharmacy has correct albuterol solution filled and ready for patient      2. Essential hypertension  Elevated reading today, patient to keep logs of home blood pressures and return in 20 days for BP check, will adjust medications based on home readings and recheck. Return in about 20 days (around 5/3/2022) for nurse BP check . Electronically signed by CHELSIE Ayala on 4/13/2022      Comment: Please note this report has been produced using speech recognition software and may contain errors related to that system including errors in grammar, punctuation, and spelling, as well as words and phrases that may be inappropriate. If there are any questions or concerns please feel free to contact the dictating provider for clarification.

## 2022-04-16 DIAGNOSIS — F41.9 ANXIETY: ICD-10-CM

## 2022-04-18 RX ORDER — HYDROXYZINE HYDROCHLORIDE 25 MG/1
25 TABLET, FILM COATED ORAL NIGHTLY
Qty: 30 TABLET | Refills: 2 | OUTPATIENT
Start: 2022-04-18 | End: 2022-05-18

## 2022-04-19 RX ORDER — BACLOFEN 20 MG
TABLET ORAL
Qty: 90 TABLET | Refills: 1 | OUTPATIENT
Start: 2022-04-19

## 2022-04-27 ENCOUNTER — TELEPHONE (OUTPATIENT)
Dept: PULMONOLOGY | Age: 77
End: 2022-04-27

## 2022-04-27 ENCOUNTER — SCHEDULED TELEPHONE ENCOUNTER (OUTPATIENT)
Dept: PULMONOLOGY | Age: 77
End: 2022-04-27
Payer: MEDICARE

## 2022-04-27 DIAGNOSIS — J44.1 ACUTE EXACERBATION OF CHRONIC OBSTRUCTIVE PULMONARY DISEASE (COPD) (HCC): Primary | ICD-10-CM

## 2022-04-27 DIAGNOSIS — J44.9 COPD, VERY SEVERE (HCC): Chronic | ICD-10-CM

## 2022-04-27 DIAGNOSIS — I27.20 MILD PULMONARY HYPERTENSION (HCC): ICD-10-CM

## 2022-04-27 PROBLEM — R06.02 SHORTNESS OF BREATH: Status: RESOLVED | Noted: 2021-11-22 | Resolved: 2022-04-27

## 2022-04-27 PROCEDURE — 99213 OFFICE O/P EST LOW 20 MIN: CPT | Performed by: INTERNAL MEDICINE

## 2022-04-27 RX ORDER — PREDNISONE 1 MG/1
TABLET ORAL
Qty: 38 TABLET | Refills: 0 | Status: SHIPPED | OUTPATIENT
Start: 2022-04-27 | End: 2022-08-29

## 2022-04-27 RX ORDER — AZITHROMYCIN 250 MG/1
TABLET, FILM COATED ORAL
Qty: 1 PACKET | Refills: 0 | Status: SHIPPED | OUTPATIENT
Start: 2022-04-27 | End: 2022-09-08 | Stop reason: ALTCHOICE

## 2022-04-27 NOTE — TELEPHONE ENCOUNTER
Patient called asking for an antibiotic and steroid due to a cold she has going on. When asked her symptoms she stated, cough, wheezing and sore throat. [facility].

## 2022-04-27 NOTE — PROGRESS NOTES
[place] [address] [facility]   Telephone Visit Note      Rubye Kawasaki is a 68 y.o. female evaluated via my chart telephone visit on 4/27/2022. Consent:  Pursuant to emergency declaration under the [address] in the [employer], 1135 waiver authorization in the [facility] Act, this telephone visit was completed with patient's consent, to reduce the patient's risk of exposure to COVID-19 and provide necessary medical care. She and/or health care decision maker is aware that that she may receive a bill for this telephone service, depending on her insurance coverage, and has provided verbal consent to proceed. Patient is at his residency and Provider is currently in Pulmonary Clinic at [place] [address] [facility]. Documentation:  Magalis Haile states that over the past week she has noted increasing shortness of breath with cough and mild chest congestion. She is starting to expectorate purulent sputum. She denies fever or chills. She continues on Breztri 2 inhalations twice a day, oral theophylline 225 mg twice daily 5 mg prednisone daily along with oxygen 24 hours a day, Mucinex daily. She has had community-acquired pneumonia recently. She has had COVID-19 infection in the fall 2020 and has received all  3 COVID-19 vaccinations. Magalis Haile states they are practicing social distancing, wearing a mask when out in public, washing hands frequently or using hand . Denies any fever, chills, malaise, change in sensation of taste or smell, headache or lightheadedness. Denies any known contacts with persons with respiratory infection, positive for coronavirus or under investigation for possible coronavirus exposure.       Past Medical History:   Diagnosis Date    Anticoagulant long-term use     Arthritis     [facility]     Chronic hypoxemic respiratory failure ([facility] 2/6/2018    Community acquired pneumonia of right upper lobe of lung 3/19/2022    COPD (chronic obstructive [facility] disease) ([facility]     Coronary atherosclerosis     COVID-19 virus detected 1/18/2021    DVT (deep venous thrombosis) ([facility]     Former smoker 11/22/2021    Gastroesophageal reflux disease     Hiatal hernia     Lung infiltrate on CT 1/22/2019    On home oxygen therapy     on 24/7    Osteopenia     Phlebitis     Pulmonary nodule 7/5/2016    S/P left heart catheterization by percutaneous approach 6/27/2013    Sepsis due to pneumonia ([facility] 11/14/2017    Shortness of breath 9/23/2019    Shortness of breath 1/18/2021    Shortness of breath 11/22/2021    Wears glasses        Medication and allergies have been reviewed    Social and family history are unchanged since last visit    ROS:   Constitutional: No fever, no chills   Cardiovascular: No chest pain, no palpitations. Pulmonary: Mild productive cough, moderate SOB, mild wheeze    Physical exam not complete due to telephone visit  Alert and oriented  No conversational dyspnea, no cough, no audible wheeze  Normal mentation       Diagnosis:    ICD-10-CM    1. Acute exacerbation of chronic obstructive pulmonary disease (COPD) ([facility])  J44.1 azithromycin (ZITHROMAX) 250 MG tablet   2. COPD, very severe ([facility]  J44.9 azithromycin (ZITHROMAX) 250 MG tablet   3. Mild pulmonary hypertension ([facility])  I27.20 azithromycin (ZITHROMAX) 250 MG tablet          Plan:  I discussed with Lissa Amin about going back on a tapering course of oral prednisone and oral antibiotics for her bronchitic exacerbation of her severe COPD. I will start her on azithromycin using a Z-Manfred and a tapering course of oral prednisone starting at 30 mg down to 5 mg over 10 days. She will continue her daily 5 mg prednisone. If her symptoms persist or slightly worsen I asked her to call our office for a chest x-ray. At any time if she becomes severely short of breath I asked go directly to the emergency room.   I will continue to follow her.         ---While she has been  vaccinated at this time for COVID-19 I strongly recommend that she continue Coronavirus precaution including: Wearing mask when in public and when in contact with persons who have not been immunized, social distancing when needing to be in public, handwashing practice, wiping items touched in public such as gas pumps, door handles, shopping carts, etc.  --Recommend yearly flu vaccination  --Recommend Covid 19 booster when available  --Recommend Pneumovax vaccination  --Have discussed signs and symptoms of acute exacerbation and why it is important to monitor for bronchial infections. To call office should she develop signs of acute exacerbation/bronchial infection  --I will continue to follow in pulmonary clinic      I affirm this is a Patient initiated episode with an established patient who has not had a related appointment within my department in the past 7 days or scheduled within the next 24 hours. I have spent 15 minutes reviewing previous notes, test results and communicating with patient discussing the diagnosis and importance of treatment plan.     Note: not billable if this call serves to triage the patient into an appointment for the relevant concern      Zheng Leonard MD

## 2022-05-02 DIAGNOSIS — J44.9 COPD, VERY SEVERE (HCC): Chronic | ICD-10-CM

## 2022-05-02 RX ORDER — FUROSEMIDE 40 MG/1
TABLET ORAL
Qty: 90 TABLET | Refills: 0 | OUTPATIENT
Start: 2022-05-02

## 2022-05-02 RX ORDER — CELECOXIB 200 MG/1
CAPSULE ORAL
Qty: 90 CAPSULE | Refills: 0 | OUTPATIENT
Start: 2022-05-02

## 2022-05-02 RX ORDER — GABAPENTIN 400 MG/1
400 CAPSULE ORAL EVERY EVENING
Qty: 90 CAPSULE | Refills: 0 | OUTPATIENT
Start: 2022-05-02 | End: 2022-07-31

## 2022-05-02 RX ORDER — MONTELUKAST SODIUM 10 MG/1
TABLET ORAL
Qty: 90 TABLET | Refills: 1 | OUTPATIENT
Start: 2022-05-02

## 2022-05-03 ENCOUNTER — NURSE ONLY (OUTPATIENT)
Dept: FAMILY MEDICINE CLINIC | Age: 77
End: 2022-05-03
Payer: MEDICARE

## 2022-05-03 ENCOUNTER — TELEPHONE (OUTPATIENT)
Dept: FAMILY MEDICINE CLINIC | Age: 77
End: 2022-05-03

## 2022-05-03 ENCOUNTER — ANTI-COAG VISIT (OUTPATIENT)
Dept: FAMILY MEDICINE CLINIC | Age: 77
End: 2022-05-03

## 2022-05-03 VITALS — SYSTOLIC BLOOD PRESSURE: 122 MMHG | OXYGEN SATURATION: 95 % | DIASTOLIC BLOOD PRESSURE: 56 MMHG | HEART RATE: 76 BPM

## 2022-05-03 DIAGNOSIS — Z86.718 HISTORY OF DVT OF LOWER EXTREMITY: ICD-10-CM

## 2022-05-03 DIAGNOSIS — I10 ESSENTIAL HYPERTENSION: ICD-10-CM

## 2022-05-03 DIAGNOSIS — Z79.01 ANTICOAGULANT LONG-TERM USE: ICD-10-CM

## 2022-05-03 DIAGNOSIS — Z86.711 HISTORY OF PULMONARY EMBOLISM: ICD-10-CM

## 2022-05-03 LAB
INTERNATIONAL NORMALIZATION RATIO, POC: 2.8
PROTHROMBIN TIME, POC: NORMAL

## 2022-05-03 PROCEDURE — 85610 PROTHROMBIN TIME: CPT | Performed by: FAMILY MEDICINE

## 2022-05-03 PROCEDURE — 99999 PR OFFICE/OUTPT VISIT,PROCEDURE ONLY: CPT | Performed by: PHYSICIAN ASSISTANT

## 2022-05-03 NOTE — TELEPHONE ENCOUNTER
Jb Duncan had nurse visit today - bp check. Also pt brought in list of readings, scanned into media    bp 122/56 p 76 o2 98% .  Pt taking meds as directed

## 2022-05-03 NOTE — TELEPHONE ENCOUNTER
Spoke with Dung Vasques regarding a billing statement he received from [employer] stating she owed money to [facility]. [facility] billing department and was told that Liz Williamson does not have a balance due at this time. Dung Stage voiced understanding and stated that he may call the insurance company for clarification on the letter they received.

## 2022-05-16 ENCOUNTER — PATIENT MESSAGE (OUTPATIENT)
Dept: FAMILY MEDICINE CLINIC | Age: 77
End: 2022-05-16

## 2022-05-16 DIAGNOSIS — J44.9 COPD, VERY SEVERE (HCC): ICD-10-CM

## 2022-05-16 RX ORDER — GABAPENTIN 400 MG/1
400 CAPSULE ORAL EVERY EVENING
Qty: 90 CAPSULE | Refills: 0 | Status: SHIPPED | OUTPATIENT
Start: 2022-05-16 | End: 2022-07-29

## 2022-05-16 RX ORDER — FUROSEMIDE 40 MG/1
40 TABLET ORAL DAILY
Qty: 90 TABLET | Refills: 0 | Status: SHIPPED | OUTPATIENT
Start: 2022-05-16 | End: 2022-09-08 | Stop reason: CLARIF

## 2022-05-16 RX ORDER — ESOMEPRAZOLE MAGNESIUM 40 MG/1
40 CAPSULE, DELAYED RELEASE ORAL 2 TIMES DAILY
Qty: 180 CAPSULE | Refills: 0 | Status: SHIPPED | OUTPATIENT
Start: 2022-05-16 | End: 2022-09-15 | Stop reason: SDUPTHER

## 2022-05-16 NOTE — TELEPHONE ENCOUNTER
From: Marthena Burkitt  To: [facility]  Sent: 5/16/2022 11:33 AM EDT  Subject: Prescription for Sodium Chloride Inhalation Solution 0.9 percent, 3ml. I need this prescription to go with an Albuterol Sulfate Solution single use vials. [facility]. Thank you.

## 2022-05-17 RX ORDER — ALBUTEROL SULFATE 2.5 MG/3ML
2.5 SOLUTION RESPIRATORY (INHALATION) 4 TIMES DAILY PRN
Qty: 120 EACH | Refills: 5 | Status: SHIPPED | OUTPATIENT
Start: 2022-05-17 | End: 2022-11-04

## 2022-05-24 DIAGNOSIS — F41.9 ANXIETY: ICD-10-CM

## 2022-05-24 RX ORDER — HYDROXYZINE HYDROCHLORIDE 25 MG/1
25 TABLET, FILM COATED ORAL NIGHTLY
Qty: 30 TABLET | Refills: 2 | OUTPATIENT
Start: 2022-05-24 | End: 2022-06-23

## 2022-05-31 ENCOUNTER — HOSPITAL ENCOUNTER (OUTPATIENT)
Age: 77
Discharge: HOME | End: 2022-05-31
Payer: MEDICARE

## 2022-05-31 ENCOUNTER — ANTI-COAG VISIT (OUTPATIENT)
Dept: FAMILY MEDICINE CLINIC | Age: 77
End: 2022-05-31

## 2022-05-31 ENCOUNTER — NURSE ONLY (OUTPATIENT)
Dept: FAMILY MEDICINE CLINIC | Age: 77
End: 2022-05-31
Payer: MEDICARE

## 2022-05-31 DIAGNOSIS — Z86.711 HISTORY OF PULMONARY EMBOLISM: ICD-10-CM

## 2022-05-31 DIAGNOSIS — Z86.718 HISTORY OF DVT OF LOWER EXTREMITY: ICD-10-CM

## 2022-05-31 DIAGNOSIS — Z79.01 ANTICOAGULANT LONG-TERM USE: ICD-10-CM

## 2022-05-31 LAB
ANION GAP SERPL CALCULATED.3IONS-SCNC: 12 MMOL/L (ref 4–16)
BUN BLDV-MCNC: 18 MG/DL (ref 6–23)
CALCIUM SERPL-MCNC: 9.4 MG/DL (ref 8.3–10.6)
CHLORIDE BLD-SCNC: 98 MMOL/L (ref 99–110)
CO2: 31 MMOL/L (ref 21–32)
CREAT SERPL-MCNC: 0.7 MG/DL (ref 0.6–1.1)
GFR AFRICAN AMERICAN: >60 ML/MIN/1.73M2
GFR NON-AFRICAN AMERICAN: >60 ML/MIN/1.73M2
GLUCOSE BLD-MCNC: 97 MG/DL (ref 70–99)
INTERNATIONAL NORMALIZATION RATIO, POC: 1.4
POTASSIUM SERPL-SCNC: 4 MMOL/L (ref 3.5–5.1)
PROTHROMBIN TIME, POC: ABNORMAL
SODIUM BLD-SCNC: 141 MMOL/L (ref 135–145)

## 2022-05-31 PROCEDURE — 80048 BASIC METABOLIC PNL TOTAL CA: CPT

## 2022-05-31 PROCEDURE — 36415 COLL VENOUS BLD VENIPUNCTURE: CPT

## 2022-05-31 PROCEDURE — 85610 PROTHROMBIN TIME: CPT | Performed by: FAMILY MEDICINE

## 2022-05-31 PROCEDURE — 99999 PR OFFICE/OUTPT VISIT,PROCEDURE ONLY: CPT

## 2022-06-02 ENCOUNTER — OFFICE VISIT (OUTPATIENT)
Dept: PULMONOLOGY | Age: 77
End: 2022-06-02
Payer: MEDICARE

## 2022-06-02 VITALS
HEART RATE: 89 BPM | SYSTOLIC BLOOD PRESSURE: 122 MMHG | HEIGHT: 65 IN | DIASTOLIC BLOOD PRESSURE: 60 MMHG | WEIGHT: 150 LBS | BODY MASS INDEX: 24.99 KG/M2 | OXYGEN SATURATION: 88 %

## 2022-06-02 DIAGNOSIS — J96.11 CHRONIC HYPOXEMIC RESPIRATORY FAILURE (HCC): Chronic | ICD-10-CM

## 2022-06-02 DIAGNOSIS — Z87.891 FORMER SMOKER: ICD-10-CM

## 2022-06-02 DIAGNOSIS — R06.02 SHORTNESS OF BREATH: ICD-10-CM

## 2022-06-02 DIAGNOSIS — I27.20 MILD PULMONARY HYPERTENSION (HCC): ICD-10-CM

## 2022-06-02 DIAGNOSIS — J44.9 COPD, VERY SEVERE (HCC): Primary | Chronic | ICD-10-CM

## 2022-06-02 DIAGNOSIS — R91.1 PULMONARY NODULE: Chronic | ICD-10-CM

## 2022-06-02 PROCEDURE — G8400 PT W/DXA NO RESULTS DOC: HCPCS | Performed by: INTERNAL MEDICINE

## 2022-06-02 PROCEDURE — G8427 DOCREV CUR MEDS BY ELIG CLIN: HCPCS | Performed by: INTERNAL MEDICINE

## 2022-06-02 PROCEDURE — 1036F TOBACCO NON-USER: CPT | Performed by: INTERNAL MEDICINE

## 2022-06-02 PROCEDURE — 1123F ACP DISCUSS/DSCN MKR DOCD: CPT | Performed by: INTERNAL MEDICINE

## 2022-06-02 PROCEDURE — G8420 CALC BMI NORM PARAMETERS: HCPCS | Performed by: INTERNAL MEDICINE

## 2022-06-02 PROCEDURE — 1090F PRES/ABSN URINE INCON ASSESS: CPT | Performed by: INTERNAL MEDICINE

## 2022-06-02 PROCEDURE — 3023F SPIROM DOC REV: CPT | Performed by: INTERNAL MEDICINE

## 2022-06-02 PROCEDURE — 99213 OFFICE O/P EST LOW 20 MIN: CPT | Performed by: INTERNAL MEDICINE

## 2022-06-02 RX ORDER — HYDROXYZINE HYDROCHLORIDE 25 MG/1
TABLET, FILM COATED ORAL
COMMUNITY
Start: 2022-05-02 | End: 2022-07-22

## 2022-06-02 RX ORDER — TORSEMIDE 20 MG/1
20 TABLET ORAL DAILY
COMMUNITY
Start: 2022-05-26

## 2022-06-02 NOTE — PROGRESS NOTES
SUBJECTIVE:  Chief Complaint: Very severe/stage IV COPD, chronic hypoxemic respiratory failure, mild pulmonary hypertension, shortness of breath, former smoker  Gabbie Orourke has done fairly well over the past 6 weeks. She notices mildly improved shortness of breath but this is still her major problem. She has had no recent bronchitic infections since I last treated her near the end of April 2022. She continues on Breztri 2 inhalations twice a day, oral theophylline 225 mg twice a day, 5 mg prednisone daily, Mucinex, albuterol rescue and albuterol solution for nebulizer and wears oxygen 24 hours a day. She has had COVID-19 infection in the past and is received all 3 Moderna COVID-19 vaccinations      ROS:  Constitution:  HEENT: Negative for ear, throat pain  Cardiovascular: Negative for chest pain, syncope, edema  Pulmonary: See HPI  Musculoskeletal: Negative for DVT, myalgias, arthralgias    OBJECTIVE:  /60   Pulse 89   Ht 5' 5\" (1.651 m)   Wt 150 lb (68 kg)   SpO2 (!) 88% Comment: 2 liters O2  BMI 24.96 kg/m²      Physical Exam:  Constitutional:  She appears well developed and well-nourished. Always appear to be mildly short of breath even at rest.  Wearing nasal oxygen  Neck:  Supple, No palpable lymphadenopathy, No JVD  Cardiovascular:  S1, S2 Normal, Regular rhythm, no murmurs or gallops, No pericardial  rubs. Pulmonary: Diminished breath sounds throughout all lung fields with a few scattered expiratory wheezes  Abdomen: Not examined  Extremities: no edema, No DVT  Neurologic: Oriented x3, No focal deficits    Radiology: Chest x-ray on 3/20/2022 showed subtle nodular opacity in the right upper lobe which may correlate to the finding on recent CT  PFT: Office spirometry on 7/21/2021 demonstrated a very severe/stage IV COPD with no significant response to bronchodilators      Echocardiogram: 1/3/2022  Left ventricular systolic function is normal.   Ejection fraction is visually estimated at 50-55%. Mild aortic stenosis with mean gradient of 11 mmHg. Mild to moderate tricuspid regurgitation; RVSP: 40 mmHg. No evidence of any pericardial effusion. Grade I diastolic dysfunction   ASSESSMENT:    1. COPD, very severe ([address]    2. Chronic hypoxemic respiratory failure (HCC)    3. Pulmonary nodule    4. Mild pulmonary hypertension ([address]    5. Shortness of breath    6. Former smoker          PLAN:   I will make no change in her current bronchodilator therapy and she is up-to-date with her medications. Because of my anticipated [facility] later this summer [employer] [facility] will continue to follow her for her severe COPD and chronic hypoxemic respiratory failure. We have discussed the need to maintain yearly flu immunization, pneumococcal vaccination. We have discussed Coronavirus precaution including handwashing practice, wiping items touched in public such as gas pumps, door handles, shopping carts, etc. Self monitoring for infection - fever, chills, cough, SOB. Should they develop symptoms they should call office for further instructions. Return in about 4 months (around 9/19/2022) for Recheck for COPD, Recheck for Shortness of Breath, chronic hypoxemic respiratory failure. This dictation was performed with a verbal recognition program and it was checked for errors. It is possible that there are still dictated errors within this office note. Any errors should be brought immediately to my attention for correction. All efforts were made to ensure that this office note is accurate.

## 2022-06-03 DIAGNOSIS — J44.9 COPD, VERY SEVERE (HCC): ICD-10-CM

## 2022-06-03 RX ORDER — ALBUTEROL SULFATE 90 UG/1
AEROSOL, METERED RESPIRATORY (INHALATION)
Qty: 1 EACH | Refills: 5 | Status: SHIPPED | OUTPATIENT
Start: 2022-06-03

## 2022-06-14 ENCOUNTER — NURSE ONLY (OUTPATIENT)
Dept: FAMILY MEDICINE CLINIC | Age: 77
End: 2022-06-14
Payer: MEDICARE

## 2022-06-14 ENCOUNTER — ANTI-COAG VISIT (OUTPATIENT)
Dept: FAMILY MEDICINE CLINIC | Age: 77
End: 2022-06-14

## 2022-06-14 DIAGNOSIS — Z86.718 HISTORY OF DVT OF LOWER EXTREMITY: ICD-10-CM

## 2022-06-14 DIAGNOSIS — Z79.01 ANTICOAGULANT LONG-TERM USE: ICD-10-CM

## 2022-06-14 DIAGNOSIS — Z86.711 HISTORY OF PULMONARY EMBOLISM: ICD-10-CM

## 2022-06-14 LAB
INTERNATIONAL NORMALIZATION RATIO, POC: 2.1
PROTHROMBIN TIME, POC: 0

## 2022-06-14 PROCEDURE — 85610 PROTHROMBIN TIME: CPT | Performed by: FAMILY MEDICINE

## 2022-06-14 PROCEDURE — 99999 PR OFFICE/OUTPT VISIT,PROCEDURE ONLY: CPT | Performed by: STUDENT IN AN ORGANIZED HEALTH CARE EDUCATION/TRAINING PROGRAM

## 2022-07-09 ENCOUNTER — APPOINTMENT (OUTPATIENT)
Dept: GENERAL RADIOLOGY | Age: 77
End: 2022-07-09
Payer: MEDICARE

## 2022-07-09 ENCOUNTER — HOSPITAL ENCOUNTER (EMERGENCY)
Age: 77
Discharge: HOME | End: 2022-07-09
Attending: EMERGENCY MEDICINE
Payer: MEDICARE

## 2022-07-09 VITALS
BODY MASS INDEX: 24.99 KG/M2 | HEART RATE: 65 BPM | TEMPERATURE: 98 F | WEIGHT: 150 LBS | DIASTOLIC BLOOD PRESSURE: 69 MMHG | RESPIRATION RATE: 14 BRPM | SYSTOLIC BLOOD PRESSURE: 150 MMHG | OXYGEN SATURATION: 98 % | HEIGHT: 65 IN

## 2022-07-09 DIAGNOSIS — R91.1 PULMONARY NODULE: ICD-10-CM

## 2022-07-09 DIAGNOSIS — S46.911A STRAIN OF RIGHT SHOULDER, INITIAL ENCOUNTER: Primary | ICD-10-CM

## 2022-07-09 PROCEDURE — 73060 X-RAY EXAM OF HUMERUS: CPT

## 2022-07-09 PROCEDURE — 73030 X-RAY EXAM OF SHOULDER: CPT

## 2022-07-09 PROCEDURE — 99283 EMERGENCY DEPT VISIT LOW MDM: CPT

## 2022-07-09 RX ORDER — TRAMADOL HYDROCHLORIDE 50 MG/1
50 TABLET ORAL EVERY 6 HOURS PRN
Qty: 15 TABLET | Refills: 0 | Status: SHIPPED | OUTPATIENT
Start: 2022-07-09 | End: 2022-07-14

## 2022-07-09 ASSESSMENT — PAIN - FUNCTIONAL ASSESSMENT: PAIN_FUNCTIONAL_ASSESSMENT: 0-10

## 2022-07-09 ASSESSMENT — PAIN DESCRIPTION - ORIENTATION: ORIENTATION: RIGHT

## 2022-07-09 ASSESSMENT — PAIN SCALES - GENERAL: PAINLEVEL_OUTOF10: 8

## 2022-07-09 ASSESSMENT — PAIN DESCRIPTION - DESCRIPTORS: DESCRIPTORS: ACHING

## 2022-07-09 NOTE — ED TRIAGE NOTES
Pt arrives with complaints of right shoulder pain. Admits to lifting up on her oxygen tank and heard a popping sound to right upper arm. She states she has always had issues with her right shoulder but this feels different, she states that her daughter noticed that he right shoulder is dropping down more than the left.

## 2022-07-09 NOTE — ED PROVIDER NOTES
EMERGENCY DEPARTMENT ENCOUNTER      CHIEF COMPLAINT:   Right shoulder pain    HPI: Amy Clements is a 68 y.o. female who presents to the Emergency Department complaining of right shoulder pain. The patient states that approximately 1 week ago, she tried to lift up her oxygen tank and felt mild pain in her right shoulder. She states that it seemed to resolve until yesterday when she tried to pull up her underwear. She states that she had increased pain in her right shoulder at that time. It has been persistent. It is achy. It radiates to the right upper arm. The pain is constant. It is worse with movement and better with rest. The patient denies fevers, chills, chest pain, shortness of breath, abdominal pain, numbness, tingling, weakness, or any other complaints. REVIEW OF SYSTEMS:  CONSTITUTIONAL:  Denies fever, chills, weight loss or weakness  EYES:  Denies photophobia or discharge  ENT:  Denies sore throat or ear pain  CARDIOVASCULAR:  Denies chest pain, palpitations or swelling  RESPIRATORY:  Denies cough or shortness of breath  GI: Denies abdominal pain, nausea, vomiting, or diarrhea  MUSCULOSKELETAL: See HPI  SKIN:  No rash  NEUROLOGIC:  Denies headache, focal weakness or sensory changes  All systems negative except as marked. \"Remaining review of systems reviewed and negative. I have reviewed the nursing triage documentation and agree unless otherwise noted below. \"    PAST MEDICAL HISTORY:   Past Medical History:   Diagnosis Date    Anticoagulant long-term use     Arthritis     [facility]     Chronic hypoxemic respiratory failure ([facility] 2/6/2018    Community acquired pneumonia of right upper lobe of lung 3/19/2022    COPD [facility]chronic obstructive pulmonary disease) [facility]     Coronary atherosclerosis     COVID-19 virus detected 1/18/2021    DVT (deep venous thrombosis) [facility]     Former smoker 11/22/2021    Gastroesophageal reflux disease     Hiatal hernia     Lung infiltrate on CT on file    Lack of Transportation (Non-Medical): Not on file   Physical Activity:     Days of Exercise per Week: Not on file    Minutes of Exercise per Session: Not on file   Stress:     Feeling of Stress : Not on file   Social Connections:     Frequency of Communication with Friends and Family: Not on file    Frequency of Social Gatherings with Friends and Family: Not on file    Attends Rastafari Services: Not on file    Active Member of [address] [place] [address] or Organizations: Not on file    Attends Club or Organization Meetings: Not on file    Marital Status: Not on file   Intimate Partner Violence:     Fear of Current or Ex-Partner: Not on file    Emotionally Abused: Not on file    Physically Abused: Not on file    Sexually Abused: Not on file   Housing Stability:     Unable to Pay for Housing in the Last Year: Not on file    Number of [place] in the Last Year: Not on file    Unstable Housing in the Last Year: Not on file       ALLERGIES: Codeine, Darvon [propoxyphene hcl], Lamisil [terbinafine hcl], Morphine, Neosporin [neomycin-polymyxin-gramicidin], and Pcn [penicillins]    PHYSICAL EXAM:  VITAL SIGNS:   ED Triage Vitals [07/09/22 0931]   Enc Vitals Group      BP (!) 150/69      Heart Rate 65      Resp 14      Temp 98 °F (36.7 °C)      Temp Source Infrared      SpO2 98 %      Weight 150 lb (68 kg)      Height 5' 5\" (1.651 m)      Head Circumference       Peak Flow       Pain Score       Pain Loc       Pain Edu? Excl. in [address]? Constitutional:  Non-toxic appearance  HENT: Normocephalic, Atraumatic  Eyes: PERRL, conjunctiva normal   Neck: Normal range of motion, No tenderness, Supple, No stridor, No lymphadenopathy  Cardiovascular:  Normal heart rate, Normal rhythm  Pulmonary/Chest:  Normal breath sounds, No respiratory distress, No wheezing  Abdomen: Bowel sounds normal, Soft, No tenderness, No masses, No pulsatile masses  Extremities:  There is mild anterior shoulder tenderness to palpation, there is tenderness to palpation of the right mid humerus, no deformity, no bruising, the peripheral pulses are strong, Capillary refill is brisk, there is normal motor and sensory function, the hand is pink, warm, and well perfused, there is no cyanosis  Skin:  Warm, Dry, No erythema, No rash      EKG:    None    Radiology / Procedures:       XR HUMERUS RIGHT (MIN 2 VIEWS) (Preliminary result)  Result time 07/09/22 11:43:50  Preliminary result by Brielle Ray MD (07/09/22 11:43:50)                Impression:    1. Linear lucencies are noted within the medial epicondyle of the distal   humerus and likely related to skin folds.  Consider dedicated elbow films if   there remains a high clinical suspicion for acute fracture. 2. Otherwise no acute osseous abnormality in the right shoulder or right   humerus. 3. Degenerative changes in the right shoulder. 4. 1.8 cm nodular opacity in the right upper lung which may be minimally   increased in size when compared to 03/18/2022 CT.  Recommend further   evaluation with nonemergent CT. Narrative:    EXAMINATION:   TWO X-RAY VIEWS OF THE RIGHT SHOULDER; TWO X-RAY VIEWS OF THE RIGHT HUMERUS     7/9/2022 9:59 am     COMPARISON:   03/22/2022 chest radiograph     HISTORY:   ORDERING SYSTEM PROVIDED HISTORY:  Right shoulder pain   TECHNOLOGIST PROVIDED HISTORY:   Right Shoulder - XR Portable   Reason for Exam:  Right shoulder pain   Reason for Exam:  Right shoulder pain     ORDERING SYSTEM PROVIDED HISTORY:  Right arm pain   TECHNOLOGIST PROVIDED HISTORY:   Reason for Exam:  Right arm pain     FINDINGS:   Right Shoulder:  Osteopenia.  Moderate degenerative changes of the [facility] joint   with mild degenerative changes of the glenohumeral joint.  There is a 1.8 cm   somewhat nodular opacity in the right upper lung.  The visualized lung is   otherwise clear.  Soft tissues are otherwise unremarkable.  No acute fracture   or dislocation.      The Right Humerus:  Diffuse osteopenia. Hector Ziegler is a 1.8 cm nodular opacity in   the right upper lung.  No acute fracture or dislocation.  Joint spaces and   alignment are otherwise maintained.  Linear area of lucency is noted within   the medial epicondyle on the single view may be related to skin folds.                 Preliminary result by Gay Cazares MD (07/09/22 10:36:54)                Impression:    1. Linear lucencies are noted within the medial epicondyle of the distal   humerus and likely related to skin fold.  Consider dedicated elbow films if   there remains a high clinical suspicion for acute fracture. 2. Otherwise no acute osseous abnormality in the right shoulder or right   humerus. 3. Degenerative changes in the right shoulder. 4. 1.8 cm nodular opacity in the right upper lung which may be minimally   increased in size when compared to 03/18/2022 CT.  Recommend further   evaluation with nonemergent CT.                       XR SHOULDER RIGHT (MIN 2 VIEWS) (Preliminary result)  Result time 07/09/22 11:43:50  Preliminary result by Gay Cazares MD (07/09/22 11:43:50)                Impression:    1. Linear lucencies are noted within the medial epicondyle of the distal   humerus and likely related to skin folds.  Consider dedicated elbow films if   there remains a high clinical suspicion for acute fracture. 2. Otherwise no acute osseous abnormality in the right shoulder or right   humerus. 3. Degenerative changes in the right shoulder. 4. 1.8 cm nodular opacity in the right upper lung which may be minimally   increased in size when compared to 03/18/2022 CT.  Recommend further   evaluation with nonemergent CT.              Narrative:    EXAMINATION:   TWO X-RAY VIEWS OF THE RIGHT SHOULDER; TWO X-RAY VIEWS OF THE RIGHT HUMERUS     7/9/2022 9:59 am     COMPARISON:   03/22/2022 chest radiograph     HISTORY:   ORDERING SYSTEM PROVIDED HISTORY:  Right shoulder pain   TECHNOLOGIST PROVIDED HISTORY:   Right Shoulder - XR Portable   Reason for Exam:  Right shoulder pain   Reason for Exam:  Right shoulder pain     ORDERING SYSTEM PROVIDED HISTORY:  Right arm pain   TECHNOLOGIST PROVIDED HISTORY:   Reason for Exam:  Right arm pain     FINDINGS:   Right Shoulder:  Osteopenia.  Moderate degenerative changes of the [facility] joint   with mild degenerative changes of the glenohumeral joint.  There is a 1.8 cm   somewhat nodular opacity in the right upper lung.  The visualized lung is   otherwise clear.  Soft tissues are otherwise unremarkable.  No acute fracture   or dislocation. The Right Humerus:  Diffuse osteopenia. Sedrick Duff is a 1.8 cm nodular opacity in   the right upper lung.  No acute fracture or dislocation.  Joint spaces and   alignment are otherwise maintained.  Linear area of lucency is noted within   the medial epicondyle on the single view may be related to skin folds.                 Preliminary result by Roselyn Graham MD (07/09/22 10:36:54)                Impression:    1. Linear lucencies are noted within the medial epicondyle of the distal   humerus and likely related to skin fold.  Consider dedicated elbow films if   there remains a high clinical suspicion for acute fracture. 2. Otherwise no acute osseous abnormality in the right shoulder or right   humerus. 3. Degenerative changes in the right shoulder. 4. 1.8 cm nodular opacity in the right upper lung which may be minimally   increased in size when compared to 03/18/2022 CT.  Recommend further   evaluation with nonemergent CT. ED COURSE & MEDICAL DECISION MAKING:  Pertinent Labs & Imaging studies reviewed. (See chart for details)  On exam, the patient is afebrile and nontoxic appearing. She is mildly hypertensive but is asymptomatic. She is otherwise hemodynamically stable and neurologically intact.   X-rays of the right shoulder and humerus are obtained and there is a linear lucency noted within the medial epicondyle of the distal humerus which is likely related to skin folds. Otherwise no acute osseous abnormality of the right shoulder or right humerus is noted. There are degenerative changes in the right shoulder. There is a 1.8 cm nodular opacity in the right upper lung which may be minimally increased in size when compared to 3/18/2022 CT. Recommend further evaluation with nonemergent CT. This is discussed with the patient and her daughter and the patient is to discuss this with her primary care physician or pulmonologist.  The patient declined needing anything for pain in the emergency department. I suspect that the patient has a right shoulder sprain/strain. I have a low suspicion for DVT, acute fracture, dislocation, compartment syndrome, necrotizing fasciitis, or neurovascular injury. I feel that the patient is stable for outpatient management with follow up in 2-3 days. Return precautions are given. The patient verbalized understanding, was agreeable with plan, and the patient was discharged home in stable condition. Clinical Impression:  1. Strain of right shoulder, initial encounter    2. Pulmonary nodule        Disposition referral (if applicable):  [place]ushra [place] Cid [place]  [id number] [facility]    Schedule an appointment as soon as possible for a visit       [address] [place] [address] Rd  [phone]  Go to   If symptoms worsen      Disposition medications (if applicable):  Discharge Medication List as of 7/9/2022 10:51 AM      START taking these medications    Details   traMADol (ULTRAM) 50 MG tablet Take 1 tablet by mouth every 6 hours as needed for Pain for up to 5 days. , Disp-15 tablet, R-0Normal               Comment: Please note this report has been produced using speech recognition software and may contain errors related to that system including errors in grammar, punctuation, and spelling, as well as words and phrases that may be inappropriate. If there are any questions or concerns please feel free to contact the dictating provider for clarification.         Singh Lancaster MD  07/09/22 Candy Dodge

## 2022-07-11 ENCOUNTER — SCHEDULED TELEPHONE ENCOUNTER (OUTPATIENT)
Dept: PULMONOLOGY | Age: 77
End: 2022-07-11
Payer: MEDICARE

## 2022-07-11 ENCOUNTER — TELEPHONE (OUTPATIENT)
Dept: PULMONOLOGY | Age: 77
End: 2022-07-11

## 2022-07-11 DIAGNOSIS — R06.02 SHORTNESS OF BREATH: ICD-10-CM

## 2022-07-11 DIAGNOSIS — Z87.891 FORMER SMOKER: ICD-10-CM

## 2022-07-11 DIAGNOSIS — R91.1 PULMONARY NODULE: Primary | Chronic | ICD-10-CM

## 2022-07-11 DIAGNOSIS — R91.8 LUNG INFILTRATE ON CT: Chronic | ICD-10-CM

## 2022-07-11 DIAGNOSIS — J96.11 CHRONIC HYPOXEMIC RESPIRATORY FAILURE (HCC): Chronic | ICD-10-CM

## 2022-07-11 DIAGNOSIS — J44.9 COPD, VERY SEVERE (HCC): Chronic | ICD-10-CM

## 2022-07-11 PROCEDURE — 99442 PR PHYS/QHP TELEPHONE EVALUATION 11-20 MIN: CPT | Performed by: INTERNAL MEDICINE

## 2022-07-11 NOTE — PROGRESS NOTES
[place] [address] [facility]   Telephone Visit Note      Henrique Santiago is a 68 y.o. female evaluated via my chart telephone visit on 7/11/2022. Consent:  Pursuant to emergency declaration under the [address] in the [employer], [address]5 waiver authorization in the [facility] Act, this telephone visit was completed with patient's consent, to reduce the patient's risk of exposure to COVID-19 and provide necessary medical care. She and/or health care decision maker is aware that that she may receive a bill for this telephone service, depending on her insurance coverage, and has provided verbal consent to proceed. Patient is at his residency and Provider is currently in Pulmonary Clinic at [place] [address] [facility]. Documentation:  Monique Kirstendaina states that after undergoing x-rays of her right shoulder on 7/9/2022 she was told that she has an expanding nodular lesion in her right upper lobe in the area of the previous community-acquired pneumonia which was noted in March 2022. She states that she is not having more symptoms and denies worsening shortness of breath, chest pain, hemoptysis or purulent sputum expectoration. She continues on Breztri 2 inhalations twice a day, oral theophylline, 5 mg prednisone daily, Mucinex, albuterol and albuterol solution for nebulizer and wears oxygen 24 hours a day. Monique Car states they are practicing social distancing, wearing a mask when out in public, washing hands frequently or using hand . Denies any fever, chills, malaise, change in sensation of taste or smell, headache or lightheadedness. Denies any known contacts with persons with respiratory infection, positive for coronavirus or under investigation for possible coronavirus exposure.       Past Medical History:   Diagnosis Date    Anticoagulant long-term use     Arthritis     [facility]     Chronic hypoxemic respiratory failure ([facility] 2/6/2018    Community acquired pneumonia of right upper lobe of lung 3/19/2022    [facility]     Coronary atherosclerosis     COVID-19 virus detected 1/18/2021    [facility]     Former smoker 11/22/2021    Gastroesophageal reflux disease     Hiatal hernia     Lung infiltrate on CT 1/22/2019    On home oxygen therapy     on 24/7    Osteopenia     Phlebitis     Pulmonary nodule 7/5/2016    S/P left heart catheterization by percutaneous approach 6/27/2013    [facility] to [facility] 11/14/2017    Shortness of breath 9/23/2019    Shortness of breath 1/18/2021    Shortness of breath 11/22/2021    Wears glasses        Medication and allergies have been reviewed    Social and family history are unchanged since last visit    ROS:   Constitutional: No fever, no chills   Cardiovascular: No chest pain, no palpitations. Pulmonary: Mild cough, moderate SOB, no wheeze    Physical exam not complete due to telephone visit  Alert and oriented  No conversational dyspnea, no cough, no audible wheeze  Normal mentation     Radiology: Right shoulder x-ray 7/9/2022  1. Linear lucencies are noted within the medial epicondyle of the distal   humerus and likely related to skin folds.  Consider dedicated elbow films if   there remains a high clinical suspicion for acute fracture. 2. Otherwise no acute osseous abnormality in the right shoulder or right   humerus. 3. Degenerative changes in the right shoulder. 4. 1.8 cm nodular opacity in the right upper lung which may be minimally   increased in size when compared to 03/18/2022 CT.  Recommend further   evaluation with nonemergent CT. CT chest with contrast 3/18/2022 showed severe emphysema, bilateral pleural and parenchymal scarring. New patchy right upper lobe groundglass/airspace opacity most likely representing an evolving pneumonia.   Noncalcified nodule involving left lower lobe has decreased in size compared to 2019    Diagnosis:    ICD-10-CM    1. Pulmonary nodule  R91.1 CT CHEST WO CONTRAST   2. Lung infiltrate on CT  R91.8 CT CHEST WO CONTRAST   3. COPD, very severe ([address]  J44.9 CT CHEST WO CONTRAST   4. Chronic hypoxemic respiratory failure (HCC)  J96.11    5. Shortness of breath  R06.02    6. Former smoker  Z87.891           Plan:  I discussed with Geovanny Sommers about obtaining a noncontrast CT chest to follow-up on the right upper lobe infiltrative changes which may represent scar from her previous pneumonia or the possibility of a low-grade evolving neoplasm. When those results are available I will contact Geovanny Sommers and talk to her over the phone. ---While she has been  vaccinated at this time for COVID-19 I strongly recommend that she continue Coronavirus precaution including: Wearing mask when in public and when in contact with persons who have not been immunized, social distancing when needing to be in public, handwashing practice, wiping items touched in public such as gas pumps, door handles, shopping carts, etc.  --Recommend yearly flu vaccination  --Recommend Covid 19 booster when available  --Recommend Pneumovax vaccination  --Have discussed signs and symptoms of acute exacerbation and why it is important to monitor for bronchial infections. To call office should she develop signs of acute exacerbation/bronchial infection  --I will continue to follow in pulmonary clinic      I affirm this is a Patient initiated episode with an established patient who has not had a related appointment within my department in the past 7 days or scheduled within the next 24 hours. I have spent 15 minutes reviewing previous notes, test results and communicating with patient discussing the diagnosis and importance of treatment plan.     Note: not billable if this call serves to triage the patient into an appointment for the relevant concern      Oswaldo Orr MD

## 2022-07-11 NOTE — TELEPHONE ENCOUNTER
Patient states that she recently had a XR done of her shoulder and they saw a nodule on there. She states that she knows in the past you guys had discussed her having a pulmonary nodule but she wanted to make sure that this is not changing in size or a new nodule that has appeared.   Please advise

## 2022-07-12 ENCOUNTER — NURSE ONLY (OUTPATIENT)
Dept: FAMILY MEDICINE CLINIC | Age: 77
End: 2022-07-12
Payer: MEDICARE

## 2022-07-12 DIAGNOSIS — Z86.711 HISTORY OF PULMONARY EMBOLISM: ICD-10-CM

## 2022-07-12 DIAGNOSIS — Z79.01 ANTICOAGULANT LONG-TERM USE: ICD-10-CM

## 2022-07-12 DIAGNOSIS — Z86.718 HISTORY OF DVT OF LOWER EXTREMITY: Primary | ICD-10-CM

## 2022-07-12 LAB — INTERNATIONAL NORMALIZATION RATIO, POC: 2.5

## 2022-07-12 PROCEDURE — 85610 PROTHROMBIN TIME: CPT | Performed by: STUDENT IN AN ORGANIZED HEALTH CARE EDUCATION/TRAINING PROGRAM

## 2022-07-13 DIAGNOSIS — I10 ESSENTIAL HYPERTENSION: ICD-10-CM

## 2022-07-13 RX ORDER — POTASSIUM CHLORIDE 750 MG/1
TABLET, EXTENDED RELEASE ORAL
Qty: 180 TABLET | Refills: 1 | Status: SHIPPED | OUTPATIENT
Start: 2022-07-13 | End: 2022-09-08

## 2022-07-18 ENCOUNTER — HOSPITAL ENCOUNTER (OUTPATIENT)
Dept: CT IMAGING | Age: 77
Discharge: HOME | End: 2022-07-18
Payer: MEDICARE

## 2022-07-18 DIAGNOSIS — J44.9 COPD, VERY SEVERE (HCC): Chronic | ICD-10-CM

## 2022-07-18 DIAGNOSIS — R91.8 LUNG INFILTRATE ON CT: Chronic | ICD-10-CM

## 2022-07-18 DIAGNOSIS — R91.1 PULMONARY NODULE: Chronic | ICD-10-CM

## 2022-07-18 PROCEDURE — 71250 CT THORAX DX C-: CPT

## 2022-07-20 ENCOUNTER — SCHEDULED TELEPHONE ENCOUNTER (OUTPATIENT)
Dept: PULMONOLOGY | Age: 77
End: 2022-07-20
Payer: MEDICARE

## 2022-07-20 ENCOUNTER — TELEPHONE (OUTPATIENT)
Dept: PULMONOLOGY | Age: 77
End: 2022-07-20

## 2022-07-20 DIAGNOSIS — R06.02 SHORTNESS OF BREATH: ICD-10-CM

## 2022-07-20 DIAGNOSIS — R91.8 LUNG INFILTRATE ON CT: Primary | Chronic | ICD-10-CM

## 2022-07-20 DIAGNOSIS — Z87.891 FORMER SMOKER: ICD-10-CM

## 2022-07-20 DIAGNOSIS — J96.11 CHRONIC HYPOXEMIC RESPIRATORY FAILURE (HCC): Chronic | ICD-10-CM

## 2022-07-20 DIAGNOSIS — J44.9 COPD, VERY SEVERE (HCC): Chronic | ICD-10-CM

## 2022-07-20 DIAGNOSIS — R91.1 PULMONARY NODULE: Chronic | ICD-10-CM

## 2022-07-20 DIAGNOSIS — I27.20 MILD PULMONARY HYPERTENSION (HCC): ICD-10-CM

## 2022-07-20 PROCEDURE — 99213 OFFICE O/P EST LOW 20 MIN: CPT | Performed by: INTERNAL MEDICINE

## 2022-07-20 PROCEDURE — 1123F ACP DISCUSS/DSCN MKR DOCD: CPT | Performed by: INTERNAL MEDICINE

## 2022-07-20 NOTE — PROGRESS NOTES
[place] [address] [facility]   Telephone Visit Note      Mancel Hamman is a 68 y.o. female evaluated via my chart telephone visit on 7/20/2022. Consent:  Pursuant to emergency declaration under the [address] in the [employer], 1135 waiver authorization in the [facility] Act, this telephone visit was completed with patient's consent, to reduce the patient's risk of exposure to COVID-19 and provide necessary medical care. She and/or health care decision maker is aware that that she may receive a bill for this telephone service, depending on her insurance coverage, and has provided verbal consent to proceed. Patient is at his residency and Provider is currently in Pulmonary Clinic at [place] [address] [facility]. Documentation:  Vicki Wilkinson states that she is feeling well and not having any issues concerning worsening cough, chest congestion, sputum expectoration, hemoptysis. She also denies any fever or chills and denies any purulent sputum expectoration. She had been told following x-rays of her right shoulder that there was a infiltrate noted on her x-rays in the right upper lobe. Because of that I have obtained a CT chest which now demonstrates a new atypical masslike infiltrate in the lower portion of the right upper lobe with complete resolution of her previous right upper lobe more apical infiltrate noted in March of this year when she had right upper lobe pneumonia. Vicki Wilkinson continues on Comiso 2 inhalations twice a day, oral theophylline, 5 mg prednisone daily, Mucinex, albuterol MDI and albuterol solution for nebulizer. She also continues to wear oxygen 24 hours a day. Vicki Wilkinson states they are practicing social distancing, wearing a mask when out in public, washing hands frequently or using hand . Denies any fever, chills, malaise, change in sensation of taste or smell, headache or lightheadedness.   Denies any known contacts with persons with respiratory infection, positive for coronavirus or under investigation for possible coronavirus exposure. Past Medical History:   Diagnosis Date    Anticoagulant long-term use     Arthritis     Cancer ([facility] [address]     Chronic hypoxemic respiratory failure ([facility] [address] 2/6/2018    Community acquired pneumonia of right upper lobe of lung 3/19/2022    COPD (chronic obstructive pulmonary disease) ([facility] [address]     Coronary atherosclerosis     COVID-19 virus detected 1/18/2021    DVT (deep venous thrombosis) ([facility] [address]     Former smoker 11/22/2021    Gastroesophageal reflux disease     Hiatal hernia     Lung infiltrate on CT 1/22/2019    On home oxygen therapy     on 24/7    Osteopenia     Phlebitis     Pulmonary nodule 7/5/2016    S/P left heart catheterization by percutaneous approach 6/27/2013    Sepsis due to pneumonia ([facility] [address] 11/14/2017    Shortness of breath 9/23/2019    Shortness of breath 1/18/2021    Shortness of breath 11/22/2021    Wears glasses        Medication and allergies have been reviewed    Social and family history are unchanged since last visit    ROS:   Constitutional: No fever, no chills   Cardiovascular: No chest pain, no palpitations. Pulmonary: Mild mostly nonproductive cough, mild persistent SOB, no wheeze    Physical exam not complete due to telephone visit  Alert and oriented  No conversational dyspnea, no cough, no audible wheeze  Normal mentation       Diagnosis:    ICD-10-CM    1. Lung infiltrate on CT  R91.8       2. COPD, very severe ([address]  J44.9       3. Pulmonary nodule  R91.1       4. Shortness of breath  R06.02       5. Chronic hypoxemic respiratory failure (HCC)  J96.11       6. Mild pulmonary hypertension (HCC)  I27.20       7. Former smoker  Z87.891              Plan:  I discussed with Star Christensen about proceeding to a PET CT. If this masslike infiltrate is hypermetabolic then I will recommend transthoracic needle biopsy.   Obviously with the severity of her lung disease she is not a good surgical candidate and we will try to avoid any surgical intervention. Because she remains asymptomatic I am not can start her on antibiotics or make any major changes in her current bronchodilator therapy. I will continue to follow her.         ---While she has been  vaccinated at this time for COVID-19 I strongly recommend that she continue Coronavirus precaution including: Wearing mask when in public and when in contact with persons who have not been immunized, social distancing when needing to be in public, handwashing practice, wiping items touched in public such as gas pumps, door handles, shopping carts, etc.  --Recommend yearly flu vaccination  --Recommend Covid 19 booster when available  --Recommend Pneumovax vaccination  --Have discussed signs and symptoms of acute exacerbation and why it is important to monitor for bronchial infections. To call office should she develop signs of acute exacerbation/bronchial infection  --I will continue to follow in pulmonary clinic      I affirm this is a Patient initiated episode with an established patient who has not had a related appointment within my department in the past 7 days or scheduled within the next 24 hours. I have spent 20 minutes reviewing previous notes, test results and communicating with patient discussing the diagnosis and importance of treatment plan.     Note: not billable if this call serves to triage the patient into an appointment for the relevant concern      Joel Barba MD

## 2022-07-22 RX ORDER — HYDROXYZINE HYDROCHLORIDE 25 MG/1
TABLET, FILM COATED ORAL
Qty: 30 TABLET | Refills: 2 | Status: SHIPPED | OUTPATIENT
Start: 2022-07-22 | End: 2022-10-25

## 2022-07-27 DIAGNOSIS — J44.9 COPD, VERY SEVERE (HCC): Chronic | ICD-10-CM

## 2022-07-27 RX ORDER — BACLOFEN 20 MG
TABLET ORAL
Qty: 90 TABLET | Refills: 0 | Status: SHIPPED | OUTPATIENT
Start: 2022-07-27 | End: 2022-09-15 | Stop reason: SDUPTHER

## 2022-07-27 RX ORDER — CELECOXIB 200 MG/1
CAPSULE ORAL
Qty: 90 CAPSULE | Refills: 0 | Status: SHIPPED | OUTPATIENT
Start: 2022-07-27 | End: 2022-08-19 | Stop reason: SDUPTHER

## 2022-07-27 RX ORDER — CELECOXIB 200 MG/1
CAPSULE ORAL
Qty: 90 CAPSULE | Refills: 0 | Status: SHIPPED | OUTPATIENT
Start: 2022-07-27 | End: 2022-09-15 | Stop reason: SDUPTHER

## 2022-07-27 RX ORDER — BUDESONIDE, GLYCOPYRROLATE, AND FORMOTEROL FUMARATE 160; 9; 4.8 UG/1; UG/1; UG/1
2 AEROSOL, METERED RESPIRATORY (INHALATION) 2 TIMES DAILY
Qty: 1 EACH | Refills: 11 | Status: SHIPPED | OUTPATIENT
Start: 2022-07-27

## 2022-07-27 RX ORDER — MONTELUKAST SODIUM 10 MG/1
TABLET ORAL
Qty: 90 TABLET | Refills: 0 | Status: SHIPPED | OUTPATIENT
Start: 2022-07-27 | End: 2022-09-15 | Stop reason: SDUPTHER

## 2022-07-29 DIAGNOSIS — E78.00 PURE HYPERCHOLESTEROLEMIA: ICD-10-CM

## 2022-07-29 DIAGNOSIS — E03.9 ACQUIRED HYPOTHYROIDISM: ICD-10-CM

## 2022-07-29 DIAGNOSIS — J44.9 COPD, VERY SEVERE (HCC): ICD-10-CM

## 2022-07-29 RX ORDER — ROFLUMILAST 500 UG/1
TABLET ORAL
Qty: 90 TABLET | Refills: 1 | Status: SHIPPED | OUTPATIENT
Start: 2022-07-29 | End: 2022-09-15 | Stop reason: SDUPTHER

## 2022-07-29 RX ORDER — GABAPENTIN 400 MG/1
400 CAPSULE ORAL EVERY EVENING
Qty: 90 CAPSULE | Refills: 0 | Status: SHIPPED | OUTPATIENT
Start: 2022-07-29 | End: 2022-08-19 | Stop reason: SDUPTHER

## 2022-07-29 RX ORDER — ROSUVASTATIN CALCIUM 20 MG/1
TABLET, COATED ORAL
Qty: 90 TABLET | Refills: 1 | Status: SHIPPED | OUTPATIENT
Start: 2022-07-29

## 2022-07-29 RX ORDER — THEOPHYLLINE 450 MG/1
TABLET, EXTENDED RELEASE ORAL
Qty: 90 TABLET | Refills: 1 | Status: SHIPPED | OUTPATIENT
Start: 2022-07-29

## 2022-07-29 RX ORDER — RISEDRONATE SODIUM 35 MG/1
TABLET, FILM COATED ORAL
Qty: 12 TABLET | Refills: 1 | Status: SHIPPED | OUTPATIENT
Start: 2022-07-29 | End: 2022-10-24 | Stop reason: SDUPTHER

## 2022-07-29 RX ORDER — LEVOTHYROXINE SODIUM 0.05 MG/1
TABLET ORAL
Qty: 90 TABLET | Refills: 1 | Status: SHIPPED | OUTPATIENT
Start: 2022-07-29 | End: 2022-09-15 | Stop reason: SDUPTHER

## 2022-08-04 ENCOUNTER — HOSPITAL ENCOUNTER (OUTPATIENT)
Dept: PET IMAGING | Age: 77
Discharge: HOME | End: 2022-08-04
Payer: MEDICARE

## 2022-08-04 DIAGNOSIS — R91.8 LUNG INFILTRATE ON CT: Chronic | ICD-10-CM

## 2022-08-04 DIAGNOSIS — I27.20 MILD PULMONARY HYPERTENSION (HCC): ICD-10-CM

## 2022-08-04 DIAGNOSIS — R91.1 PULMONARY NODULE: Chronic | ICD-10-CM

## 2022-08-04 DIAGNOSIS — R06.02 SHORTNESS OF BREATH: ICD-10-CM

## 2022-08-04 DIAGNOSIS — Z87.891 FORMER SMOKER: ICD-10-CM

## 2022-08-04 DIAGNOSIS — J96.11 CHRONIC HYPOXEMIC RESPIRATORY FAILURE (HCC): Chronic | ICD-10-CM

## 2022-08-04 DIAGNOSIS — J44.9 COPD, VERY SEVERE (HCC): Chronic | ICD-10-CM

## 2022-08-04 PROCEDURE — A9552 F18 FDG: HCPCS | Performed by: INTERNAL MEDICINE

## 2022-08-04 PROCEDURE — 78815 PET IMAGE W/CT SKULL-THIGH: CPT

## 2022-08-04 PROCEDURE — 3430000000 HC RX DIAGNOSTIC RADIOPHARMACEUTICAL: Performed by: INTERNAL MEDICINE

## 2022-08-04 PROCEDURE — 2580000003 HC RX 258: Performed by: INTERNAL MEDICINE

## 2022-08-04 RX ORDER — SODIUM CHLORIDE 0.9 % (FLUSH) 0.9 %
10 SYRINGE (ML) INJECTION PRN
Status: COMPLETED | OUTPATIENT
Start: 2022-08-04 | End: 2022-08-04

## 2022-08-04 RX ORDER — FLUDEOXYGLUCOSE F 18 200 MCI/ML
15.96 INJECTION, SOLUTION INTRAVENOUS
Status: COMPLETED | OUTPATIENT
Start: 2022-08-04 | End: 2022-08-04

## 2022-08-04 RX ADMIN — SODIUM CHLORIDE, PRESERVATIVE FREE 10 ML: 5 INJECTION INTRAVENOUS at 09:47

## 2022-08-04 RX ADMIN — FLUDEOXYGLUCOSE F 18 15.96 MILLICURIE: 200 INJECTION, SOLUTION INTRAVENOUS at 09:47

## 2022-08-08 NOTE — RESULT ENCOUNTER NOTE
I spoke to Mrs. Holli Landaverde over the phone and let her know that I had reviewed her PET/CT and the right midlung field opacity/infiltrative change had improved and had a low uptake with a PET/CT. This is most likely represents a benign lesion but does need to be followed and she will check with Dr. Alfonso Delacruz after her next appointment concerning follow-up.

## 2022-08-09 ENCOUNTER — NURSE ONLY (OUTPATIENT)
Dept: FAMILY MEDICINE CLINIC | Age: 77
End: 2022-08-09
Payer: MEDICARE

## 2022-08-09 DIAGNOSIS — Z86.711 HISTORY OF PULMONARY EMBOLISM: ICD-10-CM

## 2022-08-09 DIAGNOSIS — Z86.718 HISTORY OF DVT OF LOWER EXTREMITY: ICD-10-CM

## 2022-08-09 DIAGNOSIS — Z79.01 ANTICOAGULANT LONG-TERM USE: ICD-10-CM

## 2022-08-09 LAB
INTERNATIONAL NORMALIZATION RATIO, POC: 2.8
PROTHROMBIN TIME, POC: 34.2

## 2022-08-09 PROCEDURE — 85610 PROTHROMBIN TIME: CPT | Performed by: FAMILY MEDICINE

## 2022-08-09 PROCEDURE — 85610 PROTHROMBIN TIME: CPT | Performed by: STUDENT IN AN ORGANIZED HEALTH CARE EDUCATION/TRAINING PROGRAM

## 2022-08-19 RX ORDER — CELECOXIB 200 MG/1
CAPSULE ORAL
Qty: 90 CAPSULE | Refills: 0 | Status: SHIPPED | OUTPATIENT
Start: 2022-08-19 | End: 2022-09-08 | Stop reason: SDUPTHER

## 2022-08-19 RX ORDER — GABAPENTIN 400 MG/1
400 CAPSULE ORAL EVERY EVENING
Qty: 90 CAPSULE | Refills: 0 | Status: SHIPPED | OUTPATIENT
Start: 2022-08-19 | End: 2022-09-15 | Stop reason: SDUPTHER

## 2022-08-22 ENCOUNTER — TELEPHONE (OUTPATIENT)
Dept: PULMONOLOGY | Age: 77
End: 2022-08-22

## 2022-08-29 DIAGNOSIS — J44.9 COPD, VERY SEVERE (HCC): Chronic | ICD-10-CM

## 2022-08-29 DIAGNOSIS — I10 ESSENTIAL HYPERTENSION: ICD-10-CM

## 2022-08-29 RX ORDER — MONTELUKAST SODIUM 10 MG/1
TABLET ORAL
Qty: 90 TABLET | Refills: 0 | OUTPATIENT
Start: 2022-08-29

## 2022-08-29 RX ORDER — BACLOFEN 20 MG
TABLET ORAL
Qty: 90 TABLET | Refills: 0 | OUTPATIENT
Start: 2022-08-29

## 2022-08-29 RX ORDER — POTASSIUM CHLORIDE 750 MG/1
TABLET, EXTENDED RELEASE ORAL
Qty: 135 TABLET | Refills: 1 | OUTPATIENT
Start: 2022-08-29

## 2022-08-29 RX ORDER — PREDNISONE 1 MG/1
TABLET ORAL
Qty: 90 TABLET | Refills: 3 | Status: ON HOLD | OUTPATIENT
Start: 2022-08-29 | End: 2022-09-11 | Stop reason: SDUPTHER

## 2022-08-31 ENCOUNTER — TELEPHONE (OUTPATIENT)
Dept: PULMONOLOGY | Age: 77
End: 2022-08-31

## 2022-08-31 NOTE — TELEPHONE ENCOUNTER
Patient called asking for a refill of prednisone. I let her know that refill had been sent 8/29 and should be ready at the pharmacy.

## 2022-09-06 ENCOUNTER — NURSE ONLY (OUTPATIENT)
Dept: FAMILY MEDICINE CLINIC | Age: 77
End: 2022-09-06
Payer: MEDICARE

## 2022-09-06 ENCOUNTER — ANTI-COAG VISIT (OUTPATIENT)
Dept: FAMILY MEDICINE CLINIC | Age: 77
End: 2022-09-06

## 2022-09-06 DIAGNOSIS — Z86.711 HISTORY OF PULMONARY EMBOLISM: ICD-10-CM

## 2022-09-06 DIAGNOSIS — Z79.01 ANTICOAGULANT LONG-TERM USE: Primary | ICD-10-CM

## 2022-09-06 DIAGNOSIS — Z79.01 ANTICOAGULANT LONG-TERM USE: ICD-10-CM

## 2022-09-06 DIAGNOSIS — Z86.718 HISTORY OF DVT OF LOWER EXTREMITY: ICD-10-CM

## 2022-09-06 LAB
INTERNATIONAL NORMALIZATION RATIO, POC: 2.8
PROTHROMBIN TIME, POC: 33.5

## 2022-09-06 PROCEDURE — 99999 PR OFFICE/OUTPT VISIT,PROCEDURE ONLY: CPT | Performed by: STUDENT IN AN ORGANIZED HEALTH CARE EDUCATION/TRAINING PROGRAM

## 2022-09-06 PROCEDURE — 85610 PROTHROMBIN TIME: CPT | Performed by: FAMILY MEDICINE

## 2022-09-08 ENCOUNTER — HOSPITAL ENCOUNTER (INPATIENT)
Age: 77
LOS: 3 days | Discharge: HOME | DRG: 193 | End: 2022-09-11
Attending: EMERGENCY MEDICINE | Admitting: STUDENT IN AN ORGANIZED HEALTH CARE EDUCATION/TRAINING PROGRAM
Payer: MEDICARE

## 2022-09-08 ENCOUNTER — APPOINTMENT (OUTPATIENT)
Dept: GENERAL RADIOLOGY | Age: 77
DRG: 193 | End: 2022-09-08
Payer: MEDICARE

## 2022-09-08 ENCOUNTER — APPOINTMENT (OUTPATIENT)
Dept: CT IMAGING | Age: 77
DRG: 193 | End: 2022-09-08
Payer: MEDICARE

## 2022-09-08 DIAGNOSIS — R91.1 PULMONARY NODULE, RIGHT: ICD-10-CM

## 2022-09-08 DIAGNOSIS — J18.9 MULTIFOCAL PNEUMONIA: Primary | ICD-10-CM

## 2022-09-08 DIAGNOSIS — J44.1 COPD WITH ACUTE EXACERBATION (HCC): ICD-10-CM

## 2022-09-08 DIAGNOSIS — J18.9 COMMUNITY ACQUIRED PNEUMONIA OF RIGHT UPPER LOBE OF LUNG: ICD-10-CM

## 2022-09-08 DIAGNOSIS — J44.1 COPD EXACERBATION (HCC): ICD-10-CM

## 2022-09-08 DIAGNOSIS — E87.6 HYPOKALEMIA: ICD-10-CM

## 2022-09-08 LAB
ADENOVIRUS DETECTION BY PCR: NOT DETECTED
ALBUMIN SERPL-MCNC: 3.3 GM/DL (ref 3.4–5)
ALP BLD-CCNC: 69 IU/L (ref 40–129)
ALT SERPL-CCNC: 18 U/L (ref 10–40)
ANION GAP SERPL CALCULATED.3IONS-SCNC: 8 MMOL/L (ref 4–16)
AST SERPL-CCNC: 25 IU/L (ref 15–37)
BASOPHILS ABSOLUTE: 0 K/CU MM
BASOPHILS RELATIVE PERCENT: 0.2 % (ref 0–1)
BILIRUB SERPL-MCNC: 0.5 MG/DL (ref 0–1)
BORDETELLA PARAPERTUSSIS BY PCR: NOT DETECTED
BORDETELLA PERTUSSIS PCR: NOT DETECTED
BUN BLDV-MCNC: 15 MG/DL (ref 6–23)
CALCIUM SERPL-MCNC: 9.1 MG/DL (ref 8.3–10.6)
CHLAMYDOPHILA PNEUMONIA PCR: NOT DETECTED
CHLORIDE BLD-SCNC: 98 MMOL/L (ref 99–110)
CO2: 31 MMOL/L (ref 21–32)
CORONAVIRUS 229E PCR: NOT DETECTED
CORONAVIRUS HKU1 PCR: NOT DETECTED
CORONAVIRUS NL63 PCR: NOT DETECTED
CORONAVIRUS OC43 PCR: NOT DETECTED
CREAT SERPL-MCNC: 0.5 MG/DL (ref 0.6–1.1)
D DIMER: 294 NG/ML(DDU)
DIFFERENTIAL TYPE: ABNORMAL
DOSE AMOUNT: ABNORMAL
DOSE TIME: ABNORMAL
EOSINOPHILS ABSOLUTE: 0 K/CU MM
EOSINOPHILS RELATIVE PERCENT: 0.3 % (ref 0–3)
GFR AFRICAN AMERICAN: >60 ML/MIN/1.73M2
GFR NON-AFRICAN AMERICAN: >60 ML/MIN/1.73M2
GLUCOSE BLD-MCNC: 101 MG/DL (ref 70–99)
HCT VFR BLD CALC: 39.6 % (ref 37–47)
HEMOGLOBIN: 13 GM/DL (ref 12.5–16)
HUMAN METAPNEUMOVIRUS PCR: NOT DETECTED
IMMATURE NEUTROPHIL %: 0.3 % (ref 0–0.43)
INFLUENZA A BY PCR: NOT DETECTED
INFLUENZA A H1 (2009) PCR: NOT DETECTED
INFLUENZA A H1 PANDEMIC PCR: NOT DETECTED
INFLUENZA A H3 PCR: NOT DETECTED
INFLUENZA B BY PCR: NOT DETECTED
INR BLD: 3.01 INDEX
LACTATE: 0.8 MMOL/L (ref 0.4–2)
LYMPHOCYTES ABSOLUTE: 1 K/CU MM
LYMPHOCYTES RELATIVE PERCENT: 8.3 % (ref 24–44)
MAGNESIUM: 1.9 MG/DL (ref 1.8–2.4)
MCH RBC QN AUTO: 31.6 PG (ref 27–31)
MCHC RBC AUTO-ENTMCNC: 32.8 % (ref 32–36)
MCV RBC AUTO: 96.1 FL (ref 78–100)
MONOCYTES ABSOLUTE: 1.3 K/CU MM
MONOCYTES RELATIVE PERCENT: 11.1 % (ref 0–4)
MYCOPLASMA PNEUMONIAE PCR: NOT DETECTED
NUCLEATED RBC %: 0 %
PARAINFLUENZA 1 PCR: NOT DETECTED
PARAINFLUENZA 2 PCR: NOT DETECTED
PARAINFLUENZA 3 PCR: NOT DETECTED
PARAINFLUENZA 4 PCR: NOT DETECTED
PDW BLD-RTO: 13.8 % (ref 11.7–14.9)
PLATELET # BLD: 191 K/CU MM (ref 140–440)
PMV BLD AUTO: 10.3 FL (ref 7.5–11.1)
POTASSIUM SERPL-SCNC: 3 MMOL/L (ref 3.5–5.1)
PRO-BNP: 266 PG/ML
PROCALCITONIN: 0.12
PROCALCITONIN: 0.13
PROTHROMBIN TIME: 39.3 SECONDS (ref 11.7–14.5)
RBC # BLD: 4.12 M/CU MM (ref 4.2–5.4)
RHINOVIRUS ENTEROVIRUS PCR: NOT DETECTED
RSV PCR: NOT DETECTED
SARS-COV-2, NAAT: NOT DETECTED
SARS-COV-2: NOT DETECTED
SEGMENTED NEUTROPHILS ABSOLUTE COUNT: 9.6 K/CU MM
SEGMENTED NEUTROPHILS RELATIVE PERCENT: 79.8 % (ref 36–66)
SODIUM BLD-SCNC: 137 MMOL/L (ref 135–145)
THEOPHYLLINE LEVEL: 5.8 UG/ML (ref 10–20)
TOTAL IMMATURE NEUTOROPHIL: 0.04 K/CU MM
TOTAL NUCLEATED RBC: 0 K/CU MM
TOTAL PROTEIN: 6 GM/DL (ref 6.4–8.2)
TROPONIN T: <0.01 NG/ML
TROPONIN T: <0.01 NG/ML
WBC # BLD: 12.1 K/CU MM (ref 4–10.5)

## 2022-09-08 PROCEDURE — 83605 ASSAY OF LACTIC ACID: CPT

## 2022-09-08 PROCEDURE — 6370000000 HC RX 637 (ALT 250 FOR IP): Performed by: NURSE PRACTITIONER

## 2022-09-08 PROCEDURE — 84145 PROCALCITONIN (PCT): CPT

## 2022-09-08 PROCEDURE — 71275 CT ANGIOGRAPHY CHEST: CPT

## 2022-09-08 PROCEDURE — 36415 COLL VENOUS BLD VENIPUNCTURE: CPT

## 2022-09-08 PROCEDURE — 85379 FIBRIN DEGRADATION QUANT: CPT

## 2022-09-08 PROCEDURE — 96374 THER/PROPH/DIAG INJ IV PUSH: CPT

## 2022-09-08 PROCEDURE — 2700000000 HC OXYGEN THERAPY PER DAY

## 2022-09-08 PROCEDURE — 83880 ASSAY OF NATRIURETIC PEPTIDE: CPT

## 2022-09-08 PROCEDURE — 87040 BLOOD CULTURE FOR BACTERIA: CPT

## 2022-09-08 PROCEDURE — 84484 ASSAY OF TROPONIN QUANT: CPT

## 2022-09-08 PROCEDURE — 2580000003 HC RX 258: Performed by: EMERGENCY MEDICINE

## 2022-09-08 PROCEDURE — 80053 COMPREHEN METABOLIC PANEL: CPT

## 2022-09-08 PROCEDURE — 85610 PROTHROMBIN TIME: CPT

## 2022-09-08 PROCEDURE — 94761 N-INVAS EAR/PLS OXIMETRY MLT: CPT

## 2022-09-08 PROCEDURE — 93005 ELECTROCARDIOGRAM TRACING: CPT | Performed by: EMERGENCY MEDICINE

## 2022-09-08 PROCEDURE — 87899 AGENT NOS ASSAY W/OPTIC: CPT

## 2022-09-08 PROCEDURE — 6360000002 HC RX W HCPCS: Performed by: EMERGENCY MEDICINE

## 2022-09-08 PROCEDURE — 85025 COMPLETE CBC W/AUTO DIFF WBC: CPT

## 2022-09-08 PROCEDURE — 71045 X-RAY EXAM CHEST 1 VIEW: CPT

## 2022-09-08 PROCEDURE — 1200000000 HC SEMI PRIVATE

## 2022-09-08 PROCEDURE — 80198 ASSAY OF THEOPHYLLINE: CPT

## 2022-09-08 PROCEDURE — 6370000000 HC RX 637 (ALT 250 FOR IP): Performed by: EMERGENCY MEDICINE

## 2022-09-08 PROCEDURE — 99285 EMERGENCY DEPT VISIT HI MDM: CPT

## 2022-09-08 PROCEDURE — 6360000004 HC RX CONTRAST MEDICATION: Performed by: EMERGENCY MEDICINE

## 2022-09-08 PROCEDURE — 94664 DEMO&/EVAL PT USE INHALER: CPT

## 2022-09-08 PROCEDURE — 94640 AIRWAY INHALATION TREATMENT: CPT

## 2022-09-08 PROCEDURE — 87635 SARS-COV-2 COVID-19 AMP PRB: CPT

## 2022-09-08 PROCEDURE — 83735 ASSAY OF MAGNESIUM: CPT

## 2022-09-08 PROCEDURE — 0202U NFCT DS 22 TRGT SARS-COV-2: CPT

## 2022-09-08 RX ORDER — IPRATROPIUM BROMIDE AND ALBUTEROL SULFATE 2.5; .5 MG/3ML; MG/3ML
1 SOLUTION RESPIRATORY (INHALATION) ONCE
Status: COMPLETED | OUTPATIENT
Start: 2022-09-08 | End: 2022-09-08

## 2022-09-08 RX ORDER — ACETAMINOPHEN 650 MG/1
650 SUPPOSITORY RECTAL EVERY 6 HOURS PRN
Status: DISCONTINUED | OUTPATIENT
Start: 2022-09-08 | End: 2022-09-11 | Stop reason: HOSPADM

## 2022-09-08 RX ORDER — CELECOXIB 200 MG/1
200 CAPSULE ORAL DAILY
Status: DISCONTINUED | OUTPATIENT
Start: 2022-09-08 | End: 2022-09-11 | Stop reason: HOSPADM

## 2022-09-08 RX ORDER — MONTELUKAST SODIUM 10 MG/1
10 TABLET ORAL NIGHTLY
Status: DISCONTINUED | OUTPATIENT
Start: 2022-09-08 | End: 2022-09-11 | Stop reason: HOSPADM

## 2022-09-08 RX ORDER — HYDROXYZINE HYDROCHLORIDE 25 MG/1
25 TABLET, FILM COATED ORAL NIGHTLY PRN
Status: DISCONTINUED | OUTPATIENT
Start: 2022-09-08 | End: 2022-09-11 | Stop reason: HOSPADM

## 2022-09-08 RX ORDER — PREDNISONE 20 MG/1
40 TABLET ORAL ONCE
Status: COMPLETED | OUTPATIENT
Start: 2022-09-08 | End: 2022-09-08

## 2022-09-08 RX ORDER — PREDNISONE 20 MG/1
40 TABLET ORAL DAILY
Status: DISCONTINUED | OUTPATIENT
Start: 2022-09-09 | End: 2022-09-11 | Stop reason: HOSPADM

## 2022-09-08 RX ORDER — IPRATROPIUM BROMIDE AND ALBUTEROL SULFATE 2.5; .5 MG/3ML; MG/3ML
2 SOLUTION RESPIRATORY (INHALATION)
Status: DISCONTINUED | OUTPATIENT
Start: 2022-09-08 | End: 2022-09-08

## 2022-09-08 RX ORDER — ALBUTEROL SULFATE 90 UG/1
2 AEROSOL, METERED RESPIRATORY (INHALATION) EVERY 4 HOURS
Status: DISCONTINUED | OUTPATIENT
Start: 2022-09-08 | End: 2022-09-11 | Stop reason: HOSPADM

## 2022-09-08 RX ORDER — DILTIAZEM HYDROCHLORIDE 60 MG/1
60 TABLET, FILM COATED ORAL EVERY 8 HOURS SCHEDULED
Status: DISCONTINUED | OUTPATIENT
Start: 2022-09-08 | End: 2022-09-11 | Stop reason: HOSPADM

## 2022-09-08 RX ORDER — GUAIFENESIN AND DEXTROMETHORPHAN HYDROBROMIDE 600; 30 MG/1; MG/1
1 TABLET, EXTENDED RELEASE ORAL 2 TIMES DAILY
Status: DISCONTINUED | OUTPATIENT
Start: 2022-09-08 | End: 2022-09-11 | Stop reason: HOSPADM

## 2022-09-08 RX ORDER — GABAPENTIN 400 MG/1
400 CAPSULE ORAL EVERY EVENING
Status: DISCONTINUED | OUTPATIENT
Start: 2022-09-08 | End: 2022-09-11 | Stop reason: HOSPADM

## 2022-09-08 RX ORDER — ACETAMINOPHEN 325 MG/1
650 TABLET ORAL EVERY 6 HOURS PRN
Status: DISCONTINUED | OUTPATIENT
Start: 2022-09-08 | End: 2022-09-11 | Stop reason: HOSPADM

## 2022-09-08 RX ORDER — POLYETHYLENE GLYCOL 3350 17 G/17G
17 POWDER, FOR SOLUTION ORAL DAILY PRN
Status: DISCONTINUED | OUTPATIENT
Start: 2022-09-08 | End: 2022-09-11 | Stop reason: HOSPADM

## 2022-09-08 RX ORDER — IPRATROPIUM BROMIDE AND ALBUTEROL SULFATE 2.5; .5 MG/3ML; MG/3ML
SOLUTION RESPIRATORY (INHALATION)
Status: DISCONTINUED
Start: 2022-09-08 | End: 2022-09-08

## 2022-09-08 RX ORDER — TORSEMIDE 20 MG/1
20 TABLET ORAL DAILY
Status: DISCONTINUED | OUTPATIENT
Start: 2022-09-08 | End: 2022-09-11 | Stop reason: HOSPADM

## 2022-09-08 RX ORDER — IPRATROPIUM BROMIDE AND ALBUTEROL SULFATE 2.5; .5 MG/3ML; MG/3ML
1 SOLUTION RESPIRATORY (INHALATION)
Status: DISCONTINUED | OUTPATIENT
Start: 2022-09-08 | End: 2022-09-08

## 2022-09-08 RX ORDER — BENZONATATE 100 MG/1
100 CAPSULE ORAL 3 TIMES DAILY PRN
Status: DISCONTINUED | OUTPATIENT
Start: 2022-09-08 | End: 2022-09-11 | Stop reason: HOSPADM

## 2022-09-08 RX ORDER — SODIUM CHLORIDE 0.9 % (FLUSH) 0.9 %
5-40 SYRINGE (ML) INJECTION EVERY 12 HOURS SCHEDULED
Status: DISCONTINUED | OUTPATIENT
Start: 2022-09-08 | End: 2022-09-11 | Stop reason: HOSPADM

## 2022-09-08 RX ORDER — LEVOTHYROXINE SODIUM 0.05 MG/1
50 TABLET ORAL DAILY
Status: DISCONTINUED | OUTPATIENT
Start: 2022-09-08 | End: 2022-09-11 | Stop reason: HOSPADM

## 2022-09-08 RX ORDER — DICYCLOMINE HYDROCHLORIDE 10 MG/1
10 CAPSULE ORAL 2 TIMES DAILY
Status: DISCONTINUED | OUTPATIENT
Start: 2022-09-08 | End: 2022-09-11 | Stop reason: HOSPADM

## 2022-09-08 RX ORDER — ROSUVASTATIN CALCIUM 20 MG/1
20 TABLET, COATED ORAL NIGHTLY
Status: DISCONTINUED | OUTPATIENT
Start: 2022-09-08 | End: 2022-09-11 | Stop reason: HOSPADM

## 2022-09-08 RX ORDER — THEOPHYLLINE 450 MG/1
225 TABLET, EXTENDED RELEASE ORAL 2 TIMES DAILY
Status: DISCONTINUED | OUTPATIENT
Start: 2022-09-08 | End: 2022-09-11 | Stop reason: HOSPADM

## 2022-09-08 RX ORDER — SODIUM CHLORIDE 9 MG/ML
INJECTION, SOLUTION INTRAVENOUS PRN
Status: DISCONTINUED | OUTPATIENT
Start: 2022-09-08 | End: 2022-09-11 | Stop reason: HOSPADM

## 2022-09-08 RX ORDER — SODIUM CHLORIDE 0.9 % (FLUSH) 0.9 %
5-40 SYRINGE (ML) INJECTION PRN
Status: DISCONTINUED | OUTPATIENT
Start: 2022-09-08 | End: 2022-09-11 | Stop reason: HOSPADM

## 2022-09-08 RX ORDER — PANTOPRAZOLE SODIUM 40 MG/1
40 TABLET, DELAYED RELEASE ORAL
Status: DISCONTINUED | OUTPATIENT
Start: 2022-09-09 | End: 2022-09-11 | Stop reason: HOSPADM

## 2022-09-08 RX ADMIN — ACETAMINOPHEN 650 MG: 325 TABLET ORAL at 23:59

## 2022-09-08 RX ADMIN — GUAIFENESIN AND DEXTROMETHORPHAN HYDROBROMIDE 1 TABLET: 600; 30 TABLET, EXTENDED RELEASE ORAL at 21:30

## 2022-09-08 RX ADMIN — IPRATROPIUM BROMIDE AND ALBUTEROL SULFATE 1 AMPULE: .5; 3 SOLUTION RESPIRATORY (INHALATION) at 10:26

## 2022-09-08 RX ADMIN — CEFTRIAXONE SODIUM 1000 MG: 1 INJECTION, POWDER, FOR SOLUTION INTRAMUSCULAR; INTRAVENOUS at 12:00

## 2022-09-08 RX ADMIN — PREDNISONE 40 MG: 20 TABLET ORAL at 09:42

## 2022-09-08 RX ADMIN — IOPAMIDOL 125 ML: 755 INJECTION, SOLUTION INTRAVENOUS at 10:31

## 2022-09-08 RX ADMIN — MONTELUKAST 10 MG: 10 TABLET, FILM COATED ORAL at 21:30

## 2022-09-08 RX ADMIN — Medication 2 PUFF: at 15:04

## 2022-09-08 RX ADMIN — DILTIAZEM HYDROCHLORIDE 60 MG: 60 TABLET, FILM COATED ORAL at 21:25

## 2022-09-08 RX ADMIN — DICYCLOMINE HYDROCHLORIDE 10 MG: 10 CAPSULE ORAL at 21:28

## 2022-09-08 RX ADMIN — ROSUVASTATIN CALCIUM 20 MG: 20 TABLET, COATED ORAL at 21:30

## 2022-09-08 RX ADMIN — Medication 2 PUFF: at 19:03

## 2022-09-08 RX ADMIN — ALBUTEROL SULFATE 2 PUFF: 90 AEROSOL, METERED RESPIRATORY (INHALATION) at 19:03

## 2022-09-08 RX ADMIN — POTASSIUM BICARBONATE 40 MEQ: 782 TABLET, EFFERVESCENT ORAL at 08:05

## 2022-09-08 RX ADMIN — ALBUTEROL SULFATE 2 PUFF: 90 AEROSOL, METERED RESPIRATORY (INHALATION) at 15:03

## 2022-09-08 ASSESSMENT — PAIN DESCRIPTION - ORIENTATION
ORIENTATION: LEFT;POSTERIOR;UPPER
ORIENTATION: LEFT;POSTERIOR;UPPER

## 2022-09-08 ASSESSMENT — PAIN DESCRIPTION - DESCRIPTORS
DESCRIPTORS: STABBING
DESCRIPTORS: SHARP
DESCRIPTORS: STABBING

## 2022-09-08 ASSESSMENT — PAIN DESCRIPTION - LOCATION
LOCATION: ABDOMEN
LOCATION: ABDOMEN
LOCATION: CHEST

## 2022-09-08 ASSESSMENT — PAIN SCALES - GENERAL
PAINLEVEL_OUTOF10: 0
PAINLEVEL_OUTOF10: 6
PAINLEVEL_OUTOF10: 0

## 2022-09-08 ASSESSMENT — PAIN DESCRIPTION - FREQUENCY: FREQUENCY: INTERMITTENT

## 2022-09-08 ASSESSMENT — PAIN DESCRIPTION - PAIN TYPE: TYPE: ACUTE PAIN

## 2022-09-08 ASSESSMENT — PAIN - FUNCTIONAL ASSESSMENT: PAIN_FUNCTIONAL_ASSESSMENT: 0-10

## 2022-09-08 NOTE — H&P
History and Physical      Name:  Ck Angela /Age/Sex: 1945  (68 y.o. female)   MRN & CSN:  [id number] & [id number] Admission Date/Time: 2022  6:27 AM   Location:  [facility] PCP: [address] Day: 1           Assessment and Plan:   # Multifocal pneumonia, Suspected-CTA chest noting new areas of mild predominately peripheral consolidation involving both left upper and lower lobes concerning for multifocal pneumonia. Rapid COVID negative,  Blood cx obtained. Initiated on IV ceftriaxone and Zithromax, will continue, check pro-jhonny, urine antigens, sputum cx if able  MDI's, supplemental O2 wean to baseline, anti-tussive/expectorant medications as needed,CBC in am    # Possible COPD exacerbation -Pt with hx of severe stage IV COPD. ED provider noted bronchospasms currently without bronchospasms status post breathing treatments and steroids. We will continue oral dosing of steroids-taper, empiric IV antibiotics as noted, MDIs, resume pts Singulair and theoph, respiratory care as noted    # Acute on chronic hypoxic respiratory failure in setting of above-CTA chest negative for PE. Baseline O2 2 L, reportedly desatted to mid 80's with ambulation requiring 3 L per nasal cannula. Will check VBG's, continue respiratory care as noted above, continuous pulse oximetry. # Chest pain - CP is reproducible, suspect musculoskeletal, ? pleuritic component. EKG with nonspecific ST changes. Trop neg x2, will repeat trop, monitor on tele, pain management     # Leukocytosis in setting of above-patient afebrile, rep rate variable not consistently above 20, lactic acid 0.8. Pro-Jhonny pending. Blood cultures were obtained in ED. We will continue current empiric IV antibiotics as noted, repeat CBC in a.m. # Hypokalemia-replacement ordered, mg 1.9.  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 # Acquired hypothyroidism - Resume levothyroxine, check tsh  # Mixed Hyperlipidemia - on statin  # GERD - ON ppi    Present on Admission:   Multifocal pneumonia             Diet No diet orders on file   DVT Prophylaxis [] Lovenox, []  Heparin, [] SCDs, [] Ambulation  [x] Long term AC   GI Prophylaxis [x] PPI,  [] H2 Blocker,  [] Carafate,  [] Diet/Tube Feeds   Code Status Full code    [address] Patient plans to return home upon discharge         Chief Complaint: Chest Pain      History obtained from EHR and patient, daughter  History of Present Illness:   Henrique Santiago is a 68 y.o. female  with history of severe COPD, chronic hypoxic respiratory failure on O2 @ 2l, DVT\PE on chronic anticoagulation, essential hypertension, TIA, pulmonary nodule, hypothyroidism, GERD, former tobacco abuse among other comorbidities who presents with chest pain and cough. The patient reports a nonproductive cough over the past 2 days. She states this morning she woke up with left sided non radiating chest\rib pain underneath her left breast worsened with taking a deep breath and activity. She is not able to characterize her pain however states it was constant in nature. She denies associated n/v and diaphoresis. She denies palpitations. She does report mild shortness of breath worsened with exertion which was slightly worse than her norm. She reports chronic lower extremity swelling no worsening, denies leg pain. She denies recent sick contacts, fever or chills.  She denies any other acute issues. She was evaluated emergency department. She presented afebrile pulse 85 respirations 23 blood pressure 180/80 O2 sat 96% on 2 L per nasal cannula. She reportedly desatted with minimal activity therefore she was increased to 3 L per nasal cannula. CTA of the chest performed showednew areas of mild predominately peripheral consolidation to the left lung involving both left upper and lower lobes concerning for multifocal pneumonia. Recommend follow-up to resolution. There is a new noncalcified pulmonary nodule to the right lower lobe measuring 6 mm. Recommend follow-up noncontrast CT chest at 6-12 months and at 18-24 months. New trace to small left pleural effusion. Irregular predominantly linear slight consolidation to right upper lobe appears stable from recent PET-CT 08/04/2022 and improved from prior CT chest 07/18/2022. There is some superimposed mild bronchiectasis in this region. Findings are felt most likely on the basis of some postinflammatory scarring with some traction bronchiectasis; other chronic findings noted below. EKG showed sinus rhythm with PVCs nonspecific ST changes, troponin negative x2. . WBC 12.1, hemoglobin 13.0, hematocrit 39.6 platelets 916. Rapid COVID-negative. Chemistry panel showed sodium 137 potassium 3.0 chloride 98 CO2 31 BUN 18 creatinine 0.5 magnesium 1.9 glucose 101 calcium 9.1. LFTs show albumin 3.3 otherwise unremarkable. INR 3 PTT 39.3.  UA negative for infection. The patient was noted to have bronchospasms on presentation. The patient was given nebs, steroids. Blood cultures were obtained and she was initiated on empric IV Ceftriaxone and Zithromax. She has been admitted for further management. Ten point ROS: reviewed and are negative, unless as noted in above HPI.     Objective:   No intake or output data in the 24 hours ending 09/08/22 1247     Vitals:   Vitals:    09/08/22 0930 09/08/22 1000 09/08/22 1027 09/08/22 1100   BP: (!) 161/66 (!) 175/81  (!) 141/55   Pulse: 95 92 95 92   Resp: 18 23 19 20   Temp:    98.7 °F (37.1 °C)   TempSrc:    Oral   SpO2: 97% 100% 99% 98%   Weight:       Height:           Physical Exam: 09/08/22     GEN -Awake  appearing female, in NAD. Appears given age. EYES -anicteric, conjunctiva pink. HENT -Head is normocephalic, atraumatic. MM are moist. No evidence of thrush. NECK -Supple, no apparent thyromegaly or masses. RESP -Decreased breath sounds, resp sl labored on exertion, few fine crackles in posterior lower lobes  Symmetric chest movement   C/V -S1/S2 auscultated. RRR without appreciable M/R/G. No JVD. Cap refill <3 sec. No peripheral edema. GI -Abdomen is soft non distended, non tender to palpation. + BS. No masses or guarding.  -No CVA/ flank tenderness. LYMPH- No petechiae or ecchymoses. MS -No gross joint deformities. SKIN -chronic venous stasis skin changes to BLE. , warm, dry. NEURO-Cranial nerves appear grossly intact, normal speech, no lateralizing weakness. PSYC-Awake, alert, oriented x 4. Appropriate affect.     Past Medical History:      Past Medical History:   Diagnosis Date    Anticoagulant long-term use     Arthritis     Cancer ([address]     Chronic hypoxemic respiratory failure ([address] 2/6/2018    Community acquired pneumonia of right upper lobe of lung 3/19/2022    COPD (chronic obstructive pulmonary disease) (HCC)     Coronary atherosclerosis     COVID-19 virus detected 1/18/2021    DVT (deep venous thrombosis) ([address]     Former smoker 11/22/2021    Gastroesophageal reflux disease     Hiatal hernia     Lung infiltrate on CT 1/22/2019    On home oxygen therapy     on 24/7    Osteopenia     Phlebitis     Pulmonary nodule 7/5/2016    S/P left heart catheterization by percutaneous approach 6/27/2013    Sepsis due to pneumonia ([address] 11/14/2017    Shortness of breath 9/23/2019    Shortness of breath 1/18/2021    Shortness of breath 11/22/2021    Wears glasses [facility] reviewed  Past Surgical  History:    has a past surgical history that includes Appendectomy; Tonsillectomy; Tubal ligation; Vein Surgery; bronchoscopy (); Colonoscopy; and Endoscopy, colon, diagnostic. Surgical history reviewed  Family  History:   family history includes COPD in her father; Cancer in her father and sister; Heart Disease in her father, mother, and sister. Family history reviewed  Social History:     Social History     Socioeconomic History    Marital status:      Spouse name: None    Number of children: None    Years of education: None    Highest education level: None   Tobacco Use    Smoking status: Former     Packs/day: 1.50     Years: 26.00     Pack years: 39.00     Types: Cigarettes     Start date:      Quit date: 1991     Years since quittin.8    Smokeless tobacco: Never   Vaping Use    Vaping Use: Never used   Substance and Sexual Activity    Alcohol use: No    Drug use: No    Sexual activity: Not Currently     Partners: Male      reports that she quit smoking about 30 years ago. Her smoking use included cigarettes. She started smoking about 57 years ago. She has a 39.00 pack-year smoking history. She has never used smokeless tobacco.   reports no history of alcohol use. reports no history of drug use. Social history reviewed  Allergies: Allergies   Allergen Reactions    Codeine Swelling    Darvon [Propoxyphene Hcl] Swelling    Lamisil [Terbinafine Hcl]     Morphine Swelling    Neosporin [Neomycin-Polymyxin-Gramicidin]     Pcn [Penicillins] Swelling       Home Medications:     Prior to Admission medications    Medication Sig Start Date End Date Taking? Authorizing Provider   WARFARIN SODIUM PO Take by mouth nightly 22 Per anti-coag clinic patient takes 6 mg, 6 mg, 3 mg takes at night last dose 6 mg dose for 22 6 mg dose for  3 mg. Patient follows anti-coag monthly schedule.    Yes Historical Provider, MD Potassium Chloride (KLOR-CON PO) Take by mouth See Admin Instructions 09/08/22 patient alternates days with 1-10 MEQ tablet then, 2-10 MEQ(20 MEQ) every other day. Yes Historical Provider, MD   predniSONE (DELTASONE) 5 MG tablet TAKE 1 TABLET BY MOUTH EVERY DAY  Patient taking differently: Take 5 mg by mouth daily TAKE 1 TABLET BY MOUTH EVERY DAY 8/29/22   Karma Min MD   gabapentin (NEURONTIN) 400 MG capsule Take 1 capsule by mouth every evening for 90 days.  8/19/22 11/17/22  Jes Sharma DO   risedronate (ACTONEL) 35 MG tablet TAKE 1 TABLET BY MOUTH EVERY 7 DAYS PATIENT TAKES ON SUNDAY  Patient taking differently: Take 35 mg by mouth every 7 days TAKE 1 TABLET BY MOUTH EVERY 7 DAYS PATIENT TAKES ON SUNDAY 7/29/22   IMANI Butler CNP   DALIRESP 500 MCG tablet TAKE 1 TABLET BY MOUTH EVERY DAY  Patient taking differently: Take 500 mcg by mouth daily 7/29/22 10/27/22  IMANI Butler CNP   levothyroxine (SYNTHROID) 50 MCG tablet TAKE 1 TABLET BY MOUTH EVERY DAY  Patient taking differently: Take 50 mcg by mouth Daily 7/29/22   IMANI Butler CNP   rosuvastatin (CRESTOR) 20 MG tablet TAKE 1 TABLET BY MOUTH EVERY DAY EVERY NIGHT  Patient taking differently: Take 20 mg by mouth nightly TAKE 1 TABLET BY MOUTH EVERY DAY EVERY NIGHT 7/29/22   IMANI Butler CNP   theophylline (THEODUR) 450 MG extended release tablet TAKE 1/2 TABLET BY MOUTH TWICE A DAY  Patient taking differently: Take 225 mg by mouth 2 times daily TAKE 1/2 TABLET BY MOUTH TWICE A DAY 7/29/22   IMANI Butler CNP   montelukast (SINGULAIR) 10 MG tablet TAKE 1 TABLET BY MOUTH EVERY DAY EVERY NIGHT  Patient taking differently: Take 10 mg by mouth nightly TAKE 1 TABLET BY MOUTH EVERY DAY EVERY NIGHT 7/27/22   Jes Sharma DO   celecoxib (CELEBREX) 200 MG capsule TAKE 1 CAPSULE BY MOUTH EVERY DAY  Patient taking differently: Take 200 mg by mouth daily TAKE 1 CAPSULE BY MOUTH EVERY DAY 7/27/22   Jes Sharma DO Magnesium Oxide 500 MG TABS TAKE 1 TABLET BY MOUTH EVERY DAY  Patient taking differently: Take 500 mg by mouth daily TAKE 1 TABLET BY MOUTH EVERY DAY 7/27/22   Jes Sharma DO   Budeson-Glycopyrrol-Formoterol (BREZTRI AEROSPHERE) 160-9-4.8 MCG/ACT AERO Inhale 2 sprays into the lungs 2 times daily 7/27/22   Carilyn Cheadle, MD   hydrOXYzine HCl (ATARAX) 25 MG tablet TAKE 1 TABLET BY MOUTH NIGHTLY  Patient taking differently: Take 25 mg by mouth nightly as needed 7/22/22   Jes Sharma DO   albuterol sulfate  (90 Base) MCG/ACT inhaler INHALE 2 PUFF FOUR TIMES A DAY AS NEEDED  Patient taking differently: Inhale 2 puffs into the lungs every 4 hours as needed INHALE 2 PUFF FOUR TIMES A DAY AS NEEDED 6/3/22   Jes Sharma DO   torsemide (DEMADEX) 20 MG tablet Take 20 mg by mouth daily 5/26/22   Historical Provider, MD   albuterol (PROVENTIL) (2.5 MG/3ML) 0.083% nebulizer solution Take 3 mLs by nebulization 4 times daily as needed for Wheezing 5/17/22 9/8/22  CHELSIE Samayoa   esomeprazole (NEXIUM) 40 MG delayed release capsule Take 1 capsule by mouth in the morning and at bedtime  Patient taking differently: Take 40 mg by mouth every morning (before breakfast) 5/16/22 9/8/22  Jes Sharma DO   ipratropium (ATROVENT HFA) 17 MCG/ACT inhaler Inhale 2 puffs into the lungs every 6-8 hours as needed    Historical Provider, MD   dilTIAZem (CARDIZEM) 60 MG tablet Take 1 tablet by mouth every 8 hours 1/4/22   Melissa Lowery MD   Dextromethorphan-guaiFENesin [facility] DM)  MG TB12 Take 1 to 2 tablet by mouth every 12 hours (maximum: 4 tablets/24 hours). Drink plenty of water. Patient taking differently: Take 1 tablet by mouth 2 times daily Take 1 to 2 tablet by mouth every 12 hours (maximum: 4 tablets/24 hours). Drink plenty of water.  11/18/20   Aldenshilpi Perez PA-C   nitroGLYCERIN (NITROSTAT) 0.4 MG SL tablet Place 1 tablet under the tongue every 5 minutes as needed for Chest pain 5/27/18   Laurent Floyd Nigel Tompkins MD   dicyclomine (BENTYL) 10 MG capsule Take 10 mg by mouth 2 times daily 5/17/18   Historical Provider, MD   Biotin 37043 MCG TABS Take 10,000 mcg by mouth daily    Historical Provider, MD   b complex vitamins capsule Take 1 capsule by mouth daily    Historical Provider, MD   Calcium Carb-Cholecalciferol (CALCIUM + D3) 600-200 MG-UNIT TABS Take 1 tablet by mouth 2 times daily    Historical Provider, MD   Multiple Vitamins-Minerals (MULTIVITAMIN PO) Take 1 tablet by mouth daily    Historical Provider, MD   OXYGEN Inhale 2 L/hr into the lungs continuous. Historical Provider, MD         Medications:   Medications:    azithromycin  500 mg IntraVENous Once      Infusions:   PRN Meds:      Data:     Laboratory this visit:  Reviewed  Recent Labs     09/08/22  0644   WBC 12.1*   HGB 13.0   HCT 39.6         Recent Labs     09/08/22  0644      K 3.0*   CL 98*   CO2 31   BUN 15   CREATININE 0.5*     Recent Labs     09/08/22  0644   AST 25   ALT 18   BILITOT 0.5   ALKPHOS 69     Recent Labs     09/06/22  1041 09/08/22  0644   INR 2.8 3.01         Radiology this visit:  Reviewed. XR CHEST PORTABLE    Result Date: 9/8/2022  EXAMINATION: ONE XRAY VIEW OF THE CHEST 9/8/2022 6:33 am COMPARISON: PET-CT 08/04/2022, CT chest 07/18/2022, x-ray chest 03/28/2022 HISTORY: ORDERING SYSTEM PROVIDED HISTORY: cp TECHNOLOGIST PROVIDED HISTORY: Reason for exam:->cp Reason for Exam: chest pain Additional signs and symptoms: cough FINDINGS: Normal heart size and pulmonary vasculature. Hyperinflated lungs compatible with emphysema. Faint opacity in the right upper lung suggestive of resolving pneumonia. No definite new consolidation. No effusion or pneumothorax. No evidence of acute process. Emphysema. Faint opacity in the right upper lung suggesting continued resolution of right upper lobe pneumonia.      CTA PULMONARY W CONTRAST    Result Date: 9/8/2022  EXAMINATION: CTA OF THE CHEST 9/8/2022 10:29 am TECHNIQUE: CTA of the chest was performed after the administration of intravenous contrast.  Multiplanar reformatted images are provided for review. MIP images are provided for review. Automated exposure control, iterative reconstruction, and/or weight based adjustment of the mA/kV was utilized to reduce the radiation dose to as low as reasonably achievable. COMPARISON: Chest x-ray earlier today. Whole-body PET-CT 08/04/2022. CT chest 07/18/2022. HISTORY: ORDERING SYSTEM PROVIDED HISTORY: cp TECHNOLOGIST PROVIDED HISTORY: Reason for exam:-> Decision Support Exception - unselect if not a suspected or confirmed emergency medical condition->Emergency Medical Condition (MA) Reason for Exam: chest pain Additional signs and symptoms: 125ml Isovue 370 FINDINGS: Pulmonary Arteries: Pulmonary arteries are adequately opacified for evaluation. No evidence of intraluminal filling defect to suggest pulmonary embolism. Stable mildly prominent main pulmonary artery as can be seen with pulmonary hypertension. Mediastinum: Heart is upper limits of normal in size. No pericardial effusion. Atherosclerotic changes to the thoracic aorta. No evidence for thoracic aortic aneurysm. Coronary artery calcifications. No mediastinal or hilar lymphadenopathy noted. Lungs/pleura: Severe background emphysema redemonstrated. Irregular predominantly linear slight consolidation to right upper lobe appears stable from relatively recent PET-CT and improved with less of a nodular configuration as compared to prior CT chest 07/18/2018. There is some mild bronchiectasis within the same region and findings are felt most likely to reflect some postinflammatory scarring with some traction bronchiectasis. There are some new areas of predominantly peripheral consolidation noted to the left lung involving both left upper and lower lobes. There is also a new trace to small left pleural effusion.  There is a new noncalcified pulmonary nodule to the right lower lobe measuring 6 mm (image 57, axial sequence, series 3). No pneumothoraces. Central airways appear patent. Mild bronchial wall thickening. Upper Abdomen: Limited images to the upper abdomen demonstrate no acute or suspicious abnormality. Soft Tissues/Bones: No acute bone or soft tissue abnormality. No suspicious focal bony lesions. Stable chronic compression deformity to a midthoracic vertebral body. No evidence for pulmonary embolism. There are some new areas of mild predominately peripheral consolidation to the left lung involving both left upper and lower lobes concerning for multifocal pneumonia. Recommend follow-up to resolution. There is a new noncalcified pulmonary nodule to the right lower lobe measuring 6 mm. Recommend follow-up noncontrast CT chest at 6-12 months and at 18-24 months. New trace to small left pleural effusion. Irregular predominantly linear slight consolidation to right upper lobe appears stable from recent PET-CT 08/04/2022 and improved from prior CT chest 07/18/2022. There is some superimposed mild bronchiectasis in this region. Findings are felt most likely on the basis of some postinflammatory scarring with some traction bronchiectasis. Attention can be paid on follow-up. Severe emphysema. Atherosclerosis to include coronary artery disease. Stable mild prominence of main pulmonary artery which can be seen with pulmonary hypertension. Mild bronchial wall thickening which can be seen with bronchitis, asthma and in smokers amongst other etiologies.            EKG this visit:  personally reviewed         Electronically signed by IMANI Stahl CNP on 9/8/2022 at 12:47 PM

## 2022-09-08 NOTE — ED TRIAGE NOTES
Pt came via Medic d/t left mid chest pain onset this am, cough X2 days. Baseline oxygen dependent on 2 lpm. Ha c/o SOB but has COPD.  Alert/oriented

## 2022-09-08 NOTE — ED PROVIDER NOTES
Emergency Department Encounter  Location: [address] [place] [address] EMERGENCY DEPARTMENT    Patient: Catalino Zelaya  MRN: [id number]  : 1945  Date of evaluation: 2022  ED Provider: Mark Beal DO    Chief Complaint:    Chest Pain    [place]:  Catalino Zelaya is a 68 y.o. female that presents to the emergency department with complaint of left lateral chest pain. States that she woke up with the discomfort this morning. Describes that she felt short of breath and uncomfortable with this. Does report a mild cough over the past few days but denies associated fever or chills. Had a stable appetite without acute change in bowel habits. Reports a single episode of vomiting several days ago but none since then. Does require 2L NC home oxygen secondary to COPD. Patient states she has a history of \"broken heart syndrome\". Last echo in 2021 showed normal LV systolic function with grade 1 diastolic dysfunction, mild aortic stenosis, and mild to moderate tricuspid agitation. She also has a history of DVT and PE and indicates she is compliant with her Coumadin. ROS:  At least 10 systems reviewed and are acutely negative unless otherwise noted in the HPI.     Past Medical History:   Diagnosis Date    Anticoagulant long-term use     Arthritis     Cancer ([address]     Chronic hypoxemic respiratory failure ([address] 2018    Community acquired pneumonia of right upper lobe of lung 3/19/2022    COPD (chronic obstructive pulmonary disease) ([address]     Coronary atherosclerosis     COVID-19 virus detected 2021    DVT (deep venous thrombosis) ([address]     Former smoker 2021    Gastroesophageal reflux disease     Hiatal hernia     Lung infiltrate on CT 2019    On home oxygen therapy     on     Osteopenia     Phlebitis     Pulmonary nodule 2016    S/P left heart catheterization by percutaneous approach 2013    Sepsis due to pneumonia ([address] 2017    Shortness of breath 2019    Shortness of breath 2021    Shortness of breath 2021    Wears glasses      Past Surgical History:   Procedure Laterality Date    Duarte Vance    COLONOSCOPY      ENDOSCOPY, COLON, DIAGNOSTIC      TONSILLECTOMY      TUBAL LIGATION      VEIN SURGERY       Family History   Problem Relation Age of Onset    Heart Disease Mother     Cancer Father         lung ca    Heart Disease Father     COPD Father     Cancer Sister     Heart Disease Sister      Social History     Socioeconomic History    Marital status:      Spouse name: Not on file    Number of children: Not on file    Years of education: Not on file    Highest education level: Not on file   Occupational History    Not on file   Tobacco Use    Smoking status: Former     Packs/day: 1.50     Years: 26.00     Pack years: 39.00     Types: Cigarettes     Start date: 65     Quit date: 1991     Years since quittin.8    Smokeless tobacco: Never   Vaping Use    Vaping Use: Never used   Substance and Sexual Activity    Alcohol use: No    Drug use: No    Sexual activity: Not Currently     Partners: Male   Other Topics Concern    Not on file   Social History Narrative    Not on file     Social Determinants of Health     Financial Resource Strain: Not on file   Food Insecurity: Not on file   Transportation Needs: Not on file   Physical Activity: Not on file   Stress: Not on file   Social Connections: Not on file   Intimate Partner Violence: Not on file   Housing Stability: Not on file     Current Facility-Administered Medications   Medication Dose Route Frequency Provider Last Rate Last Admin    ipratropium-albuterol (DUONEB) 0.5-2.5 (3) MG/3ML nebulizer solution             ipratropium-albuterol (DUONEB) nebulizer solution 2 ampule  2 ampule Inhalation Q4H EHSAN La DO        cefTRIAXone (ROCEPHIN) 1,000 mg in dextrose 5 % 50 mL IVPB mini-bag  1,000 mg IntraVENous Once David LUCERO DO Abhinav 100 mL/hr at 09/08/22 1200 1,000 mg at 09/08/22 1200    azithromycin (ZITHROMAX) 500 mg in dextrose 5 % 250 mL IVPB ([id number])  500 mg IntraVENous Once Karlene Walters DO         Current Outpatient Medications   Medication Sig Dispense Refill    WARFARIN SODIUM PO Take by mouth nightly 09/08/22 Per anti-coag clinic patient takes 6 mg, 6 mg, 3 mg takes at night last dose 6 mg dose for 09/08/22 6 mg dose for 0909/22 3 mg. Patient follows anti-coag monthly schedule. Potassium Chloride (KLOR-CON PO) Take by mouth See Admin Instructions 09/08/22 patient alternates days with 1-10 MEQ tablet then, 2-10 MEQ(20 MEQ) every other day. predniSONE (DELTASONE) 5 MG tablet TAKE 1 TABLET BY MOUTH EVERY DAY (Patient taking differently: Take 5 mg by mouth daily TAKE 1 TABLET BY MOUTH EVERY DAY) 90 tablet 3    gabapentin (NEURONTIN) 400 MG capsule Take 1 capsule by mouth every evening for 90 days.  90 capsule 0    risedronate (ACTONEL) 35 MG tablet TAKE 1 TABLET BY MOUTH EVERY 7 DAYS PATIENT TAKES ON SUNDAY (Patient taking differently: Take 35 mg by mouth every 7 days TAKE 1 TABLET BY MOUTH EVERY 7 DAYS PATIENT TAKES ON SUNDAY) 12 tablet 1    DALIRESP 500 MCG tablet TAKE 1 TABLET BY MOUTH EVERY DAY (Patient taking differently: Take 500 mcg by mouth daily) 90 tablet 1    levothyroxine (SYNTHROID) 50 MCG tablet TAKE 1 TABLET BY MOUTH EVERY DAY (Patient taking differently: Take 50 mcg by mouth Daily) 90 tablet 1    rosuvastatin (CRESTOR) 20 MG tablet TAKE 1 TABLET BY MOUTH EVERY DAY EVERY NIGHT (Patient taking differently: Take 20 mg by mouth nightly TAKE 1 TABLET BY MOUTH EVERY DAY EVERY NIGHT) 90 tablet 1    theophylline (THEODUR) 450 MG extended release tablet TAKE 1/2 TABLET BY MOUTH TWICE A DAY (Patient taking differently: Take 225 mg by mouth 2 times daily TAKE 1/2 TABLET BY MOUTH TWICE A DAY) 90 tablet 1    montelukast (SINGULAIR) 10 MG tablet TAKE 1 TABLET BY MOUTH EVERY DAY EVERY NIGHT (Patient taking differently: Take 10 mg by mouth nightly TAKE 1 TABLET BY MOUTH EVERY DAY EVERY NIGHT) 90 tablet 0    celecoxib (CELEBREX) 200 MG capsule TAKE 1 CAPSULE BY MOUTH EVERY DAY (Patient taking differently: Take 200 mg by mouth daily TAKE 1 CAPSULE BY MOUTH EVERY DAY) 90 capsule 0    Magnesium Oxide 500 MG TABS TAKE 1 TABLET BY MOUTH EVERY DAY (Patient taking differently: Take 500 mg by mouth daily TAKE 1 TABLET BY MOUTH EVERY DAY) 90 tablet 0    Budeson-Glycopyrrol-Formoterol (BREZTRI AEROSPHERE) 160-9-4.8 MCG/ACT AERO Inhale 2 sprays into the lungs 2 times daily 1 each 11    hydrOXYzine HCl (ATARAX) 25 MG tablet TAKE 1 TABLET BY MOUTH NIGHTLY (Patient taking differently: Take 25 mg by mouth nightly as needed) 30 tablet 2    albuterol sulfate  (90 Base) MCG/ACT inhaler INHALE 2 PUFF FOUR TIMES A DAY AS NEEDED (Patient taking differently: Inhale 2 puffs into the lungs every 4 hours as needed INHALE 2 PUFF FOUR TIMES A DAY AS NEEDED) 1 each 5    torsemide (DEMADEX) 20 MG tablet Take 20 mg by mouth daily      albuterol (PROVENTIL) (2.5 MG/3ML) 0.083% nebulizer solution Take 3 mLs by nebulization 4 times daily as needed for Wheezing 120 each 5    esomeprazole (NEXIUM) 40 MG delayed release capsule Take 1 capsule by mouth in the morning and at bedtime (Patient taking differently: Take 40 mg by mouth every morning (before breakfast)) 180 capsule 0    ipratropium (ATROVENT HFA) 17 MCG/ACT inhaler Inhale 2 puffs into the lungs every 6-8 hours as needed      dilTIAZem (CARDIZEM) 60 MG tablet Take 1 tablet by mouth every 8 hours 120 tablet 3    Dextromethorphan-guaiFENesin (MUCINEX DM)  MG TB12 Take 1 to 2 tablet by mouth every 12 hours (maximum: 4 tablets/24 hours). Drink plenty of water. (Patient taking differently: Take 1 tablet by mouth 2 times daily Take 1 to 2 tablet by mouth every 12 hours (maximum: 4 tablets/24 hours).  Drink plenty of water.) 28 tablet 0    nitroGLYCERIN (NITROSTAT) 0.4 MG SL tablet Place 1 tablet under the tongue every 5 minutes as needed for Chest pain 25 tablet 1    dicyclomine (BENTYL) 10 MG capsule Take 10 mg by mouth 2 times daily      Biotin 08682 MCG TABS Take 10,000 mcg by mouth daily      b complex vitamins capsule Take 1 capsule by mouth daily      Calcium Carb-Cholecalciferol (CALCIUM + D3) 600-200 MG-UNIT TABS Take 1 tablet by mouth 2 times daily      Multiple Vitamins-Minerals (MULTIVITAMIN PO) Take 1 tablet by mouth daily      OXYGEN Inhale 2 L/hr into the lungs continuous. Allergies   Allergen Reactions    Codeine Swelling    Darvon [Propoxyphene Hcl] Swelling    Lamisil [Terbinafine Hcl]     Morphine Swelling    Neosporin [Neomycin-Polymyxin-Gramicidin]     Pcn [Penicillins] Swelling       Nursing Notes Reviewed    Physical Exam:  ED Triage Vitals [09/08/22 0634]   Enc Vitals Group      BP (!) 180/80      Heart Rate 85      Resp 23      Temp 98.5 °F (36.9 °C)      Temp Source Oral      SpO2 96 %      Weight 145 lb (65.8 kg)      Height 5' 4.8\" (1.646 m)      Head Circumference       Peak Flow       Pain Score       Pain Loc       Pain Edu? Excl. in [address]? GENERAL APPEARANCE: Awake and alert. Cooperative. Frail elderly female. HEAD: Normocephalic. Atraumatic. EYES: EOM's grossly intact. Sclera anicteric. ENT: Tolerates saliva. No trismus. NECK: Supple. Trachea midline. CARDIO: RRR. Radial pulse 2+. LUNGS: Respirations mildly labored. Occasional wheeze throughout. ABDOMEN: Soft. Non-distended. Non-tender. Sats between 94 and 96% on 3 L nasal cannula. EXTREMITIES: No acute deformities. No lower extremity tenderness, edema or asymmetry. SKIN: Warm and dry. NEUROLOGICAL: No gross facial drooping. Moves all 4 extremities spontaneously. PSYCHIATRIC: Normal mood.      Labs:  Results for orders placed or performed during the hospital encounter of 09/08/22   COVID-19, Rapid    Specimen: Nasopharyngeal   Result Value Ref Range    SARS-CoV-2, NAAT NOT DETECTED NOT DETECTED   CBC with Auto Differential   Result Value Ref Range    WBC 12.1 (H) 4.0 - 10.5 K/CU MM    RBC 4.12 (L) 4.2 - 5.4 M/CU MM    Hemoglobin 13.0 12.5 - 16.0 GM/DL    Hematocrit 39.6 37 - 47 %    MCV 96.1 78 - 100 FL    MCH 31.6 (H) 27 - 31 PG    MCHC 32.8 32.0 - 36.0 %    RDW 13.8 11.7 - 14.9 %    Platelets 069 113 - 098 K/CU MM    MPV 10.3 7.5 - 11.1 FL    Differential Type AUTOMATED DIFFERENTIAL     Segs Relative 79.8 (H) 36 - 66 %    Lymphocytes % 8.3 (L) 24 - 44 %    Monocytes % 11.1 (H) 0 - 4 %    Eosinophils % 0.3 0 - 3 %    Basophils % 0.2 0 - 1 %    Segs Absolute 9.6 K/CU MM    Lymphocytes Absolute 1.0 K/CU MM    Monocytes Absolute 1.3 K/CU MM    Eosinophils Absolute 0.0 K/CU MM    Basophils Absolute 0.0 K/CU MM    Nucleated RBC % 0.0 %    Total Nucleated RBC 0.0 K/CU MM    Total Immature Neutrophil 0.04 K/CU MM    Immature Neutrophil % 0.3 0 - 0.43 %   Comprehensive Metabolic Panel w/ Reflex to MG   Result Value Ref Range    Sodium 137 135 - 145 MMOL/L    Potassium 3.0 (LL) 3.5 - 5.1 MMOL/L    Chloride 98 (L) 99 - 110 mMol/L    CO2 31 21 - 32 MMOL/L    BUN 15 6 - 23 MG/DL    Creatinine 0.5 (L) 0.6 - 1.1 MG/DL    Glucose 101 (H) 70 - 99 MG/DL    Calcium 9.1 8.3 - 10.6 MG/DL    Albumin 3.3 (L) 3.4 - 5.0 GM/DL    Total Protein 6.0 (L) 6.4 - 8.2 GM/DL    Total Bilirubin 0.5 0.0 - 1.0 MG/DL    ALT 18 10 - 40 U/L    AST 25 15 - 37 IU/L    Alkaline Phosphatase 69 40 - 129 IU/L    GFR Non-African American >60 >60 mL/min/1.73m2    GFR African American >60 >60 mL/min/1.73m2    Anion Gap 8 4 - 16   Troponin   Result Value Ref Range    Troponin T <0.010 <0.01 NG/ML   Protime-INR   Result Value Ref Range    Protime 39.3 (H) 11.7 - 14.5 SECONDS    INR 3.01 INDEX   Brain Natriuretic Peptide   Result Value Ref Range    Pro-.0 <300 PG/ML   Theophylline   Result Value Ref Range    Theophylline Lvl 5.8 (L) 10 - 20 ug/mL    DOSE AMOUNT DOSE AMT.  GIVEN - UNKNOWN     DOSE TIME DOSE TIME GIVEN - UNKNOWN D-Dimer, Quantitative   Result Value Ref Range    D-Dimer, Quant 294 (H) <230 NG/mL(DDU)   Magnesium   Result Value Ref Range    Magnesium 1.9 1.8 - 2.4 mg/dl   Troponin   Result Value Ref Range    Troponin T <0.010 <0.01 NG/ML   EKG 12 Lead   Result Value Ref Range    Ventricular Rate 83 BPM    Atrial Rate 83 BPM    P-R Interval 126 ms    QRS Duration 114 ms    Q-T Interval 378 ms    QTc Calculation (Bazett) 444 ms    P Axis 73 degrees    R Axis -34 degrees    T Axis 80 degrees    Diagnosis       Sinus rhythm with frequent premature ventricular complexes and fusion complexes with ventricular escape complexes  Left axis deviation  Left ventricular hypertrophy with repolarization abnormality  Abnormal ECG  When compared with ECG of 17-MAR-2022 10:11,  fusion complexes are now present  Sinus rhythm is now with ventricular escape complexes         EKG (if obtained): (All EKG's are interpreted by myself in the absence of a cardiologist)  Sinus rhythm at 83. Left axis with good R wave progression. No ST elevation or depression. Occasional PVC noted. Overall morphology appears similar to prior tracing. Radiographs (if obtained):  [] The following radiograph was interpreted by myself in the absence of a radiologist:  [x] Radiologist's Report reviewed at time of ED visit:  XR CHEST PORTABLE    Result Date: 9/8/2022  EXAMINATION: ONE XRAY VIEW OF THE CHEST 9/8/2022 6:33 am COMPARISON: PET-CT 08/04/2022, CT chest 07/18/2022, x-ray chest 03/28/2022 HISTORY: ORDERING SYSTEM PROVIDED HISTORY: cp TECHNOLOGIST PROVIDED HISTORY: Reason for exam:->cp Reason for Exam: chest pain Additional signs and symptoms: cough FINDINGS: Normal heart size and pulmonary vasculature. Hyperinflated lungs compatible with emphysema. Faint opacity in the right upper lung suggestive of resolving pneumonia. No definite new consolidation. No effusion or pneumothorax. No evidence of acute process. Emphysema.  Faint opacity in the right upper lung suggesting continued resolution of right upper lobe pneumonia. CTA PULMONARY W CONTRAST    Result Date: 9/8/2022  EXAMINATION: CTA OF THE CHEST 9/8/2022 10:29 am TECHNIQUE: CTA of the chest was performed after the administration of intravenous contrast.  Multiplanar reformatted images are provided for review. MIP images are provided for review. Automated exposure control, iterative reconstruction, and/or weight based adjustment of the mA/kV was utilized to reduce the radiation dose to as low as reasonably achievable. COMPARISON: Chest x-ray earlier today. Whole-body PET-CT 08/04/2022. CT chest 07/18/2022. HISTORY: ORDERING SYSTEM PROVIDED HISTORY: cp TECHNOLOGIST PROVIDED HISTORY: Reason for exam:->cp Decision Support Exception - unselect if not a suspected or confirmed emergency medical condition->Emergency Medical Condition (MA) Reason for Exam: chest pain Additional signs and symptoms: 125ml Isovue 370 FINDINGS: Pulmonary Arteries: Pulmonary arteries are adequately opacified for evaluation. No evidence of intraluminal filling defect to suggest pulmonary embolism. Stable mildly prominent main pulmonary artery as can be seen with pulmonary hypertension. Mediastinum: Heart is upper limits of normal in size. No pericardial effusion. Atherosclerotic changes to the thoracic aorta. No evidence for thoracic aortic aneurysm. Coronary artery calcifications. No mediastinal or hilar lymphadenopathy noted. Lungs/pleura: Severe background emphysema redemonstrated. Irregular predominantly linear slight consolidation to right upper lobe appears stable from relatively recent PET-CT and improved with less of a nodular configuration as compared to prior CT chest 07/18/2018. There is some mild bronchiectasis within the same region and findings are felt most likely to reflect some postinflammatory scarring with some traction bronchiectasis.  There are some new areas of predominantly peripheral consolidation noted to the left lung involving both left upper and lower lobes. There is also a new trace to small left pleural effusion. There is a new noncalcified pulmonary nodule to the right lower lobe measuring 6 mm (image 57, axial sequence, series 3). No pneumothoraces. Central airways appear patent. Mild bronchial wall thickening. Upper Abdomen: Limited images to the upper abdomen demonstrate no acute or suspicious abnormality. Soft Tissues/Bones: No acute bone or soft tissue abnormality. No suspicious focal bony lesions. Stable chronic compression deformity to a midthoracic vertebral body. No evidence for pulmonary embolism. There are some new areas of mild predominately peripheral consolidation to the left lung involving both left upper and lower lobes concerning for multifocal pneumonia. Recommend follow-up to resolution. There is a new noncalcified pulmonary nodule to the right lower lobe measuring 6 mm. Recommend follow-up noncontrast CT chest at 6-12 months and at 18-24 months. New trace to small left pleural effusion. Irregular predominantly linear slight consolidation to right upper lobe appears stable from recent PET-CT 08/04/2022 and improved from prior CT chest 07/18/2022. There is some superimposed mild bronchiectasis in this region. Findings are felt most likely on the basis of some postinflammatory scarring with some traction bronchiectasis. Attention can be paid on follow-up. Severe emphysema. Atherosclerosis to include coronary artery disease. Stable mild prominence of main pulmonary artery which can be seen with pulmonary hypertension. Mild bronchial wall thickening which can be seen with bronchitis, asthma and in smokers amongst other etiologies. ED Course and MDM:  Patient's noted to have mildly labored respirations here. Some occasional wheezing noted. She is given duo nebs as well as prednisone for suspected COPD exacerbation.   She states she feels a little bit better on reassessment. Mentation remains clear. Respirations are fairly comfortable. However, when patient got up to use the bedside commode, she became fairly labored per RN. Saturations dropped to the mid 80s. Once settled in bed, sats in the mid 90s on 3 L nasal cannula. Patient's labs reviewed. She does have a mild leukocytosis. Lactic acid pending as well as blood cultures. Imaging is concerning for multifocal pneumonia involving the left lung as well as a new noncalcified pulmonary nodule in the right lower lobe. Patient has been given Rocephin and Zithromax here. Remaining labs are notable for stable renal function. She is fairly hypokalemic and is given oral replacement here. Magnesium within normal limits at 1.9. Due to patient's increased oxygen requirement and comorbidities, care discussed with hospitalist.  We will plan to admit for further care. Patient aware and agreeable to proceed    Final Impression:  1. Multifocal pneumonia    2. COPD with acute exacerbation (HCC)    3. Pulmonary nodule, right    4. Hypokalemia      DISPOSITION Admitted 09/08/2022 12:05:05 PM      Patient referred to: No follow-up provider specified.   Discharge medications:  New Prescriptions    No medications on file     (Please note that portions of this note may have been completed with a voice recognition program. Efforts were made to edit the dictations but occasionally words are mis-transcribed.)    Baljinder Funk, DO Baljinder Funk DO  09/08/22 [id number]

## 2022-09-08 NOTE — ED NOTES
Medication History  [facility]    Patient Name: Drake Younger 1945     Medication history has been completed by: Maya Fowler CPhT    Source(s) of information: patient, daughter and insurance claims     Primary Care Physician: Karina Groves DO     Pharmacy: [facility]    Allergies as of 09/08/2022 - Fully Reviewed 09/08/2022   Allergen Reaction Noted    Codeine Swelling 12/22/2011    Darvon [propoxyphene hcl] Swelling 12/22/2011    Lamisil [terbinafine hcl]  06/04/2019    Morphine Swelling 12/22/2011    Neosporin [neomycin-polymyxin-gramicidin]  06/04/2019    Pcn [penicillins] Swelling 12/22/2011        Prior to Admission medications    Medication Sig Start Date End Date Taking? Authorizing Provider   WARFARIN SODIUM PO Take by mouth nightly 09/08/22 Per anti-coag clinic patient takes 6 mg, 6 mg, 3 mg takes at night last dose 6 mg dose for 09/08/22 6 mg dose for 0909/22 3 mg. Patient follows anti-coag monthly schedule. Yes Historical Provider, MD   Potassium Chloride (KLOR-CON PO) Take by mouth See Admin Instructions 09/08/22 patient alternates days with 1-10 MEQ tablet then, 2-10 MEQ(20 MEQ) every other day. Yes Historical Provider, MD   predniSONE (DELTASONE) 5 MG tablet Take 5 mg by mouth daily TAKE 1 TABLET BY MOUTH EVERY DAY 8/29/22   Daniela Murray MD   gabapentin (NEURONTIN) 400 MG capsule Take 1 capsule by mouth every evening for 90 days.  8/19/22 11/17/22  Jes Sharma DO   risedronate (ACTONEL) 35 MG tablet Take 35 mg by mouth every 7 days PATIENT TAKES ON SUNDAY 7/29/22   IMANI Bond CNP   DALIRESP 500 MCG tablet TAKE 1 TABLET BY MOUTH EVERY DAY  Take 500 mcg by mouth daily 7/29/22 10/27/22  IMANI Bond CNP   levothyroxine (SYNTHROID) 50 MCG tablet TAKE 1 TABLET BY MOUTH EVERY DAY  Take 50 mcg by mouth Daily 7/29/22   IMANI Bond CNP   rosuvastatin (CRESTOR) 20 MG tablet Take 20 mg by mouth nightly TAKE 1 TABLET BY MOUTH EVERY DAY EVERY NIGHT 7/29/22   IMANI Gandara CNP   theophylline (THEODUR) 450 MG extended release tablet TAKE 1/2 TABLET BY MOUTH TWICE A DAY  Take 225 mg by mouth 2 times daily  7/29/22   IMANI Gandara CNP   montelukast (SINGULAIR) 10 MG tablet Take 10 mg by mouth nightly TAKE 1 TABLET BY MOUTH EVERY DAY EVERY NIGHT 7/27/22   Jes Sharma, DO   celecoxib (CELEBREX) 200 MG capsule Take 200 mg by mouth daily TAKE 1 CAPSULE BY MOUTH EVERY DAY 7/27/22   Jes Sharma, DO   Magnesium Oxide 500 MG TABS Take 500 mg by mouth daily TAKE 1 TABLET BY MOUTH EVERY DAY 7/27/22   Jes Sharma, DO   Budeson-Glycopyrrol-Formoterol (BREZTRI AEROSPHERE) 160-9-4.8 MCG/ACT AERO Inhale 2 sprays into the lungs 2 times daily 7/27/22   Charles Morales MD   hydrOXYzine HCl (ATARAX) 25 MG tablet Take 25 mg by mouth nightly as needed 7/22/22   Jes Sharma, DO   albuterol sulfate  (90 Base) MCG/ACT inhaler Inhale 2 puffs into the lungs every 4 hours as needed INHALE 2 PUFF FOUR TIMES A DAY AS NEEDED 6/3/22   Jes Sharma, DO   torsemide (DEMADEX) 20 MG tablet Take 20 mg by mouth daily 5/26/22   Historical Provider, MD   albuterol (PROVENTIL) (2.5 MG/3ML) 0.083% nebulizer solution Take 3 mLs by nebulization 4 times daily as needed for Wheezing 5/17/22   CHELSIE Fragoso   esomeprazole (NEXIUM) 40 MG delayed release capsule Take 40 mg by mouth every morning (before breakfast) 5/16/22   Jes Sharma, DO   ipratropium (ATROVENT HFA) 17 MCG/ACT inhaler Inhale 2 puffs into the lungs every 6-8 hours as needed    Historical Provider, MD   dilTIAZem (CARDIZEM) 60 MG tablet Take 1 tablet by mouth every 8 hours 1/4/22   Jared Pittman MD   Dextromethorphan-guaiFENesin [facility] DM)  MG TB12 Take 1 tablet by mouth 2 times daily Take 1 to 2 tablet by mouth every 12 hours (maximum: 4 tablets/24 hours). Drink plenty of water.  11/18/20   Alexis Moralez PA-C   nitroGLYCERIN (NITROSTAT) 0.4 MG SL tablet Place 1 tablet under the tongue every 5 minutes as needed for Chest pain 5/27/18   Lynnette Cartwright MD   dicyclomine (BENTYL) 10 MG capsule Take 10 mg by mouth 2 times daily 5/17/18   Historical Provider, MD   Biotin 63787 MCG TABS Take 10,000 mcg by mouth daily    Historical Provider, MD   b complex vitamins capsule Take 1 capsule by mouth daily    Historical Provider, MD   Calcium Carb-Cholecalciferol (CALCIUM + D3) 600-200 MG-UNIT TABS Take 1 tablet by mouth 2 times daily    Historical Provider, MD   Multiple Vitamins-Minerals (MULTIVITAMIN PO) Take 1 tablet by mouth daily    Historical Provider, MD   OXYGEN Inhale 2 L/hr into the lungs continuous. Historical Provider, MD     Medications added or changed (ex. new medication, dosage change, interval change, formulation change): Warfarin dosage clarified per anti-coag note 09/06/22  Klor-con dosage clarified patient alternates days with 10 MEQ tablet then, 2-10 MEQ(20 MEQ) every other day. Medications removed from list (include reason, ex. noncompliance, medication cost, therapy complete etc.):   Azithromycin therapy complete  Celecoxib duplicate   Furosemide on torsemide    Medications requiring reconciliation with provider:         Comments:  Medication list reviewed with patient and insurance claims verified. Warfarin dosage updated see notes. Klor-con dosage updated see notes. Patient reports she has taken no meds piror to ER visit.     To my knowledge the above medication history is accurate as of 9/8/2022 8:43 AM.   Bobby Zaldivar CPhT   9/8/2022 8:43 AM

## 2022-09-08 NOTE — ED NOTES
Green top, Lactic acid, Blood Cultures 1 drawn by this Paramedic at [id number] and sent to lab at 1144.     LRARY Kaye  09/08/22 1143

## 2022-09-08 NOTE — ED NOTES
Pt spo2 dropped down to 85% while up on bedside commode. Pt encouraged to take deep breaths, o2 increased to 2L. Pt back in bed, spo2 up to 91% and is steadily coming back up.  Pt on tele, call light within reach, RT Diana Herrera called for breathing tx.     Jagdeep De La Cruz RN  09/08/22 9721

## 2022-09-08 NOTE — ED NOTES
This RN spoke with [facility] on 1N- she states she did not know she was getting a patient and will have to call back.       Vikki Das RN  09/08/22 [id number]

## 2022-09-09 LAB
ANION GAP SERPL CALCULATED.3IONS-SCNC: 7 MMOL/L (ref 4–16)
APTT: 38 SECONDS (ref 25.1–37.1)
BASOPHILS ABSOLUTE: 0 K/CU MM
BASOPHILS RELATIVE PERCENT: 0.3 % (ref 0–1)
BUN BLDV-MCNC: 13 MG/DL (ref 6–23)
CALCIUM SERPL-MCNC: 9.5 MG/DL (ref 8.3–10.6)
CHLORIDE BLD-SCNC: 97 MMOL/L (ref 99–110)
CO2: 35 MMOL/L (ref 21–32)
CREAT SERPL-MCNC: 0.6 MG/DL (ref 0.6–1.1)
DIFFERENTIAL TYPE: ABNORMAL
EKG ATRIAL RATE: 83 BPM
EKG DIAGNOSIS: NORMAL
EKG P AXIS: 73 DEGREES
EKG P-R INTERVAL: 126 MS
EKG Q-T INTERVAL: 378 MS
EKG QRS DURATION: 114 MS
EKG QTC CALCULATION (BAZETT): 444 MS
EKG R AXIS: -34 DEGREES
EKG T AXIS: 80 DEGREES
EKG VENTRICULAR RATE: 83 BPM
EOSINOPHILS ABSOLUTE: 0 K/CU MM
EOSINOPHILS RELATIVE PERCENT: 0.1 % (ref 0–3)
GFR AFRICAN AMERICAN: >60 ML/MIN/1.73M2
GFR NON-AFRICAN AMERICAN: >60 ML/MIN/1.73M2
GLUCOSE BLD-MCNC: 208 MG/DL (ref 70–99)
HCT VFR BLD CALC: 42 % (ref 37–47)
HEMOGLOBIN: 13.4 GM/DL (ref 12.5–16)
IMMATURE NEUTROPHIL %: 0.4 % (ref 0–0.43)
INR BLD: 2.22 INDEX
LYMPHOCYTES ABSOLUTE: 1.1 K/CU MM
LYMPHOCYTES RELATIVE PERCENT: 10.8 % (ref 24–44)
MAGNESIUM: 2 MG/DL (ref 1.8–2.4)
MCH RBC QN AUTO: 31.4 PG (ref 27–31)
MCHC RBC AUTO-ENTMCNC: 31.9 % (ref 32–36)
MCV RBC AUTO: 98.4 FL (ref 78–100)
MONOCYTES ABSOLUTE: 0.9 K/CU MM
MONOCYTES RELATIVE PERCENT: 9 % (ref 0–4)
NUCLEATED RBC %: 0 %
PDW BLD-RTO: 14 % (ref 11.7–14.9)
PLATELET # BLD: 225 K/CU MM (ref 140–440)
PMV BLD AUTO: 10.4 FL (ref 7.5–11.1)
POTASSIUM SERPL-SCNC: 3.4 MMOL/L (ref 3.5–5.1)
PROCALCITONIN: 0.12
PROTHROMBIN TIME: 28.9 SECONDS (ref 11.7–14.5)
RBC # BLD: 4.27 M/CU MM (ref 4.2–5.4)
SEGMENTED NEUTROPHILS ABSOLUTE COUNT: 8.3 K/CU MM
SEGMENTED NEUTROPHILS RELATIVE PERCENT: 79.4 % (ref 36–66)
SODIUM BLD-SCNC: 139 MMOL/L (ref 135–145)
TOTAL IMMATURE NEUTOROPHIL: 0.04 K/CU MM
TOTAL NUCLEATED RBC: 0 K/CU MM
WBC # BLD: 10.4 K/CU MM (ref 4–10.5)

## 2022-09-09 PROCEDURE — 93010 ELECTROCARDIOGRAM REPORT: CPT | Performed by: INTERNAL MEDICINE

## 2022-09-09 PROCEDURE — 80048 BASIC METABOLIC PNL TOTAL CA: CPT

## 2022-09-09 PROCEDURE — 2580000003 HC RX 258: Performed by: NURSE PRACTITIONER

## 2022-09-09 PROCEDURE — 1200000000 HC SEMI PRIVATE

## 2022-09-09 PROCEDURE — 85610 PROTHROMBIN TIME: CPT

## 2022-09-09 PROCEDURE — 6360000002 HC RX W HCPCS: Performed by: EMERGENCY MEDICINE

## 2022-09-09 PROCEDURE — 87070 CULTURE OTHR SPECIMN AEROBIC: CPT

## 2022-09-09 PROCEDURE — 94761 N-INVAS EAR/PLS OXIMETRY MLT: CPT

## 2022-09-09 PROCEDURE — 85730 THROMBOPLASTIN TIME PARTIAL: CPT

## 2022-09-09 PROCEDURE — 6360000002 HC RX W HCPCS: Performed by: NURSE PRACTITIONER

## 2022-09-09 PROCEDURE — 6370000000 HC RX 637 (ALT 250 FOR IP): Performed by: NURSE PRACTITIONER

## 2022-09-09 PROCEDURE — 83735 ASSAY OF MAGNESIUM: CPT

## 2022-09-09 PROCEDURE — 2700000000 HC OXYGEN THERAPY PER DAY

## 2022-09-09 PROCEDURE — 87449 NOS EACH ORGANISM AG IA: CPT

## 2022-09-09 PROCEDURE — 84145 PROCALCITONIN (PCT): CPT

## 2022-09-09 PROCEDURE — 2580000003 HC RX 258: Performed by: EMERGENCY MEDICINE

## 2022-09-09 PROCEDURE — 6370000000 HC RX 637 (ALT 250 FOR IP): Performed by: INTERNAL MEDICINE

## 2022-09-09 PROCEDURE — 94640 AIRWAY INHALATION TREATMENT: CPT

## 2022-09-09 PROCEDURE — 87205 SMEAR GRAM STAIN: CPT

## 2022-09-09 PROCEDURE — 36415 COLL VENOUS BLD VENIPUNCTURE: CPT

## 2022-09-09 PROCEDURE — 85025 COMPLETE CBC W/AUTO DIFF WBC: CPT

## 2022-09-09 RX ORDER — POTASSIUM CHLORIDE 20 MEQ/1
40 TABLET, EXTENDED RELEASE ORAL ONCE
Status: COMPLETED | OUTPATIENT
Start: 2022-09-09 | End: 2022-09-09

## 2022-09-09 RX ORDER — WARFARIN SODIUM 2.5 MG/1
5 TABLET ORAL DAILY
Status: DISCONTINUED | OUTPATIENT
Start: 2022-09-09 | End: 2022-09-11 | Stop reason: HOSPADM

## 2022-09-09 RX ADMIN — AZITHROMYCIN MONOHYDRATE 500 MG: 500 INJECTION, POWDER, LYOPHILIZED, FOR SOLUTION INTRAVENOUS at 15:36

## 2022-09-09 RX ADMIN — POTASSIUM CHLORIDE 40 MEQ: 1500 TABLET, EXTENDED RELEASE ORAL at 12:33

## 2022-09-09 RX ADMIN — LEVOTHYROXINE SODIUM 50 MCG: 0.05 TABLET ORAL at 05:46

## 2022-09-09 RX ADMIN — Medication 2 PUFF: at 20:46

## 2022-09-09 RX ADMIN — Medication 2 PUFF: at 07:44

## 2022-09-09 RX ADMIN — THEOPHYLLINE 225 MG: 450 TABLET, EXTENDED RELEASE ORAL at 23:20

## 2022-09-09 RX ADMIN — THEOPHYLLINE 225 MG: 450 TABLET, EXTENDED RELEASE ORAL at 00:00

## 2022-09-09 RX ADMIN — GABAPENTIN 400 MG: 400 CAPSULE ORAL at 17:59

## 2022-09-09 RX ADMIN — SODIUM CHLORIDE, PRESERVATIVE FREE 10 ML: 5 INJECTION INTRAVENOUS at 23:21

## 2022-09-09 RX ADMIN — CELECOXIB 200 MG: 200 CAPSULE ORAL at 08:41

## 2022-09-09 RX ADMIN — MONTELUKAST 10 MG: 10 TABLET, FILM COATED ORAL at 23:19

## 2022-09-09 RX ADMIN — Medication 2 PUFF: at 15:57

## 2022-09-09 RX ADMIN — PREDNISONE 40 MG: 20 TABLET ORAL at 08:41

## 2022-09-09 RX ADMIN — SODIUM CHLORIDE, PRESERVATIVE FREE 5 ML: 5 INJECTION INTRAVENOUS at 02:55

## 2022-09-09 RX ADMIN — WARFARIN SODIUM 5 MG: 2.5 TABLET ORAL at 17:59

## 2022-09-09 RX ADMIN — GUAIFENESIN AND DEXTROMETHORPHAN HYDROBROMIDE 1 TABLET: 600; 30 TABLET, EXTENDED RELEASE ORAL at 08:42

## 2022-09-09 RX ADMIN — ALBUTEROL SULFATE 2 PUFF: 90 AEROSOL, METERED RESPIRATORY (INHALATION) at 01:24

## 2022-09-09 RX ADMIN — ALBUTEROL SULFATE 2 PUFF: 90 AEROSOL, METERED RESPIRATORY (INHALATION) at 15:56

## 2022-09-09 RX ADMIN — AZITHROMYCIN MONOHYDRATE 500 MG: 500 INJECTION, POWDER, LYOPHILIZED, FOR SOLUTION INTRAVENOUS at 15:39

## 2022-09-09 RX ADMIN — THEOPHYLLINE 225 MG: 450 TABLET, EXTENDED RELEASE ORAL at 08:40

## 2022-09-09 RX ADMIN — ALBUTEROL SULFATE 2 PUFF: 90 AEROSOL, METERED RESPIRATORY (INHALATION) at 07:43

## 2022-09-09 RX ADMIN — CEFTRIAXONE SODIUM 1000 MG: 1 INJECTION, POWDER, FOR SOLUTION INTRAMUSCULAR; INTRAVENOUS at 12:40

## 2022-09-09 RX ADMIN — TORSEMIDE 20 MG: 20 TABLET ORAL at 08:42

## 2022-09-09 RX ADMIN — GUAIFENESIN AND DEXTROMETHORPHAN HYDROBROMIDE 1 TABLET: 600; 30 TABLET, EXTENDED RELEASE ORAL at 23:19

## 2022-09-09 RX ADMIN — ROSUVASTATIN CALCIUM 20 MG: 20 TABLET, COATED ORAL at 23:19

## 2022-09-09 RX ADMIN — DILTIAZEM HYDROCHLORIDE 60 MG: 60 TABLET, FILM COATED ORAL at 23:57

## 2022-09-09 RX ADMIN — Medication 2 PUFF: at 12:06

## 2022-09-09 RX ADMIN — ALBUTEROL SULFATE 2 PUFF: 90 AEROSOL, METERED RESPIRATORY (INHALATION) at 20:46

## 2022-09-09 RX ADMIN — SODIUM CHLORIDE, PRESERVATIVE FREE 5 ML: 5 INJECTION INTRAVENOUS at 10:05

## 2022-09-09 RX ADMIN — DILTIAZEM HYDROCHLORIDE 60 MG: 60 TABLET, FILM COATED ORAL at 05:46

## 2022-09-09 RX ADMIN — DICYCLOMINE HYDROCHLORIDE 10 MG: 10 CAPSULE ORAL at 08:41

## 2022-09-09 RX ADMIN — PANTOPRAZOLE SODIUM 40 MG: 40 TABLET, DELAYED RELEASE ORAL at 05:46

## 2022-09-09 RX ADMIN — DICYCLOMINE HYDROCHLORIDE 10 MG: 10 CAPSULE ORAL at 23:19

## 2022-09-09 RX ADMIN — Medication 2 PUFF: at 01:24

## 2022-09-09 RX ADMIN — ALBUTEROL SULFATE 2 PUFF: 90 AEROSOL, METERED RESPIRATORY (INHALATION) at 12:05

## 2022-09-09 ASSESSMENT — ENCOUNTER SYMPTOMS
SHORTNESS OF BREATH: 1
CHOKING: 0
WHEEZING: 0
NAUSEA: 0
EYE PAIN: 0
CONSTIPATION: 0
ABDOMINAL PAIN: 0
COUGH: 1
STRIDOR: 0
CHEST TIGHTNESS: 0
DIARRHEA: 0
BLOOD IN STOOL: 0
VOMITING: 0
BACK PAIN: 0
ABDOMINAL DISTENTION: 0

## 2022-09-09 NOTE — PROGRESS NOTES
In-Patient Progress Note    Patient:  Reza Sams 68 y.o. female MRN: [id number]     Date of Service: 9/9/2022    Hospital Day: 2      Chief complaint: had concerns including Chest Pain. Subjective   Patient seen and examined in the AM.  She states she is feeling better than yesterday. Still with cough and green sputum production. She was saturating 98% on 2 L/min. She states that when she went to the bathroom she did feel winded but it has improved compared to when she first came in. She also states she has Lt. Chest pain - worse on inspiration, 5/10 in intensity, sharp stabbing in character, without any numbness, tingling, sweating. Non-radiating. See ROS    Assessment and Plan   Reza Sams, a 68 y.o. 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     Assessment  Multifocal Pneumonia  Peripheral consolidation TAJ and LLL on CTA chest  On ceftriaxone and azithromycin   Procal 0.131 -> 0.123  Viral panel -ve    COPD Exacerbation 2/2 #1  See #1  On prednisone 40mg OD X 5 days, chronically on 5mg OD  On breathing treatment    Acute on Chronic Hypoxic Respiratory failure - Stable  On 2L/min chronically   Was briefly on 3L/min in the ER but already back to baseline     Lt Chest pain 2/2 likely pleuritic 2/2 #1 vs MSK 2/2 #1 - Improved  Improving     Leukocytosis 2/2 #1, #2 - Resolved  WBC 12.4 on presentation, down to 10.4    Hypokalemia 2/2 likely decreased oral intake   Initial K - 3, improving to 3.4     Elevated D Dimer, PE ruled out  CTA -ve for PE     Hyperglycemia 2/2 likely Iatrogenic 2/2 steroids     H/O HTN/HLD  H/O DVT/PE on warfarin  INR therapeutic, 2-3 range  Pharmacy to dose     H/O Pulm nodule with a new 6mm RLL nodule  Will require a CT in 6-12 months     Hypothyroidism   On synthroid     H/O GERD      Plan  Continue IV abx  Send sputum C/S  Pending pan culture  Replace K  Plan for oral abx tomorrow if patient is feeling better and hopefully discharge      # Peptic ulcer prophylaxis: Pantoprazole  # DVT Prophylaxis: Warfarin  #CODE STATUS: Full      Current living situation: Home  Expected Disposition: Home  Estimated discharge date: 24-48 hours. Patient needs continuous IV abx due to pneumonia. Pending final cultures. Review of System     Review of Systems   Constitutional:  Negative for chills, fatigue, fever and unexpected weight change. Eyes:  Negative for pain and visual disturbance. Respiratory:  Positive for cough (with green sputum) and shortness of breath. Negative for choking, chest tightness, wheezing and stridor. Cardiovascular:  Positive for chest pain (Lt chest) and leg swelling. Negative for palpitations. Gastrointestinal:  Negative for abdominal distention, abdominal pain, blood in stool, constipation, diarrhea, nausea and vomiting. Genitourinary:  Negative for decreased urine volume, dysuria, frequency, hematuria and urgency. Musculoskeletal:  Negative for arthralgias, back pain and myalgias. Skin:  Negative for pallor and rash. Neurological:  Negative for dizziness, tremors, syncope, speech difficulty, weakness, light-headedness, numbness and headaches. Psychiatric/Behavioral:  Negative for agitation. I have reviewed all pertinent PMHx, PSHx, FamHx, SocialHx, medications, and allergies and updated history as appropriate. Synthego umm    Physical Exam   VITAL SIGNS:  BP (!) 134/56   Pulse 81   Temp 97.7 °F (36.5 °C) (Oral)   Resp 19   Ht 5' 4.8\" (1.646 m)   Wt 145 lb (65.8 kg)   SpO2 98%   BMI 24.28 kg/m²   Tmax over 24 hours:  Temp (24hrs), Av.7 °F (37.1 °C), Min:97.7 °F (36.5 °C), Max:99.3 °F (37.4 °C)      Patient Vitals for the past 6 hrs:   BP Temp Temp src Pulse Resp SpO2   09/09/22 0808 (!) 134/56 97.7 °F (36.5 °C) Oral 81 19 98 %   09/09/22 0747 -- -- -- 84 20 --   09/09/22 0744 -- -- -- 81 24 97 %       No intake or output data in the 24 hours ending 09/09/22 1152  Wt Readings from Last 2 Encounters:   09/08/22 145 lb (65.8 kg)   07/09/22 150 lb (68 kg)     Body mass index is 24.28 kg/m². Physical Exam  Vitals and nursing note reviewed. Constitutional:       General: She is not in acute distress. Appearance: She is not ill-appearing, toxic-appearing or diaphoretic. Interventions: Nasal cannula in place. Comments: On 2L/min NC   HENT:      Head: Normocephalic and atraumatic. Right Ear: External ear normal.      Left Ear: External ear normal.      Nose: Nose normal.      Mouth/Throat:      Pharynx: Oropharynx is clear. No oropharyngeal exudate or posterior oropharyngeal erythema. Eyes:      General: No scleral icterus. Right eye: No discharge. Left eye: No discharge. Conjunctiva/sclera: Conjunctivae normal.   Cardiovascular:      Rate and Rhythm: Normal rate. Rhythm irregular. Extrasystoles are present. Pulses: Normal pulses. Heart sounds: Normal heart sounds. No murmur heard. No friction rub. No gallop. Pulmonary:      Effort: Pulmonary effort is normal. No respiratory distress. Breath sounds: Normal breath sounds. Decreased air movement and transmitted upper airway sounds present. No stridor. No decreased breath sounds, wheezing, rhonchi or rales. Chest:      Chest wall: Tenderness (Lt. chest) present. Abdominal:      General: Abdomen is protuberant. Bowel sounds are normal. There is no distension. Palpations: Abdomen is soft. There is no mass. Tenderness: There is no abdominal tenderness. There is no guarding or rebound. Hernia: No hernia is present. Musculoskeletal:      Right lower leg: Edema present. Left lower leg: Edema present. Skin:     General: Skin is warm. Capillary Refill: Capillary refill takes less than 2 seconds. Comments: Chronic appearing stasis changes   Neurological:      Mental Status: She is alert and oriented to person, place, and time. Psychiatric:         Mood and Affect: Mood normal.         Current Medications      warfarin  5 mg Oral Daily    potassium chloride  40 mEq Oral Once    azithromycin  500 mg IntraVENous Once    sodium chloride flush  5-40 mL IntraVENous 2 times per day    pantoprazole  40 mg Oral QAM AC    cefTRIAXone (ROCEPHIN) IV  1,000 mg IntraVENous Q24H    azithromycin  500 mg IntraVENous Q24H    albuterol sulfate HFA  2 puff Inhalation Q4H    And    ipratropium  2 puff Inhalation Q4H    predniSONE  40 mg Oral Daily    celecoxib  200 mg Oral Daily    Mucinex DM  1 tablet Oral BID    dicyclomine  10 mg Oral BID    dilTIAZem  60 mg Oral 3 times per day    gabapentin  400 mg Oral QPM    levothyroxine  50 mcg Oral Daily    montelukast  10 mg Oral Nightly    rosuvastatin  20 mg Oral Nightly    torsemide  20 mg Oral Daily    theophylline  225 mg Oral BID         Labs and Imaging Studies   Laboratory findings:  XR CHEST PORTABLE    Result Date: 9/8/2022  EXAMINATION: ONE XRAY VIEW OF THE CHEST 9/8/2022 6:33 am COMPARISON: PET-CT 08/04/2022, CT chest 07/18/2022, x-ray chest 03/28/2022 HISTORY: ORDERING SYSTEM PROVIDED HISTORY: cp TECHNOLOGIST PROVIDED HISTORY: Reason for exam:->cp Reason for Exam: chest pain Additional signs and symptoms: cough FINDINGS: Normal heart size and pulmonary vasculature. Hyperinflated lungs compatible with emphysema. Faint opacity in the right upper lung suggestive of resolving pneumonia. No definite new consolidation. No effusion or pneumothorax. No evidence of acute process. Emphysema. Faint opacity in the right upper lung suggesting continued resolution of right upper lobe pneumonia.      CTA PULMONARY W CONTRAST    Result Date: 9/8/2022  EXAMINATION: CTA OF THE CHEST 9/8/2022 10:29 am TECHNIQUE: CTA of the chest was performed after the administration of intravenous contrast.  Multiplanar reformatted images are provided for review. MIP images are provided for review. Automated exposure control, iterative reconstruction, and/or weight based adjustment of the mA/kV was utilized to reduce the radiation dose to as low as reasonably achievable. COMPARISON: Chest x-ray earlier today. Whole-body PET-CT 08/04/2022. CT chest 07/18/2022. HISTORY: ORDERING SYSTEM PROVIDED HISTORY: cp TECHNOLOGIST PROVIDED HISTORY: Reason for exam:-> Decision Support Exception - unselect if not a suspected or confirmed emergency medical condition->Emergency Medical Condition (MA) Reason for Exam: chest pain Additional signs and symptoms: 125ml Isovue 370 FINDINGS: Pulmonary Arteries: Pulmonary arteries are adequately opacified for evaluation. No evidence of intraluminal filling defect to suggest pulmonary embolism. Stable mildly prominent main pulmonary artery as can be seen with pulmonary hypertension. Mediastinum: Heart is upper limits of normal in size. No pericardial effusion. Atherosclerotic changes to the thoracic aorta. No evidence for thoracic aortic aneurysm. Coronary artery calcifications. No mediastinal or hilar lymphadenopathy noted. Lungs/pleura: Severe background emphysema redemonstrated. Irregular predominantly linear slight consolidation to right upper lobe appears stable from relatively recent PET-CT and improved with less of a nodular configuration as compared to prior CT chest 07/18/2018. There is some mild bronchiectasis within the same region and findings are felt most likely to reflect some postinflammatory scarring with some traction bronchiectasis. There are some new areas of predominantly peripheral consolidation noted to the left lung involving both left upper and lower lobes. There is also a new trace to small left pleural effusion.  There is a new noncalcified pulmonary nodule to the right lower lobe measuring 6 mm (image 57, axial sequence, series 3). No pneumothoraces. Central airways appear patent. Mild bronchial wall thickening. Upper Abdomen: Limited images to the upper abdomen demonstrate no acute or suspicious abnormality. Soft Tissues/Bones: No acute bone or soft tissue abnormality. No suspicious focal bony lesions. Stable chronic compression deformity to a midthoracic vertebral body. No evidence for pulmonary embolism. There are some new areas of mild predominately peripheral consolidation to the left lung involving both left upper and lower lobes concerning for multifocal pneumonia. Recommend follow-up to resolution. There is a new noncalcified pulmonary nodule to the right lower lobe measuring 6 mm. Recommend follow-up noncontrast CT chest at 6-12 months and at 18-24 months. New trace to small left pleural effusion. Irregular predominantly linear slight consolidation to right upper lobe appears stable from recent PET-CT 08/04/2022 and improved from prior CT chest 07/18/2022. There is some superimposed mild bronchiectasis in this region. Findings are felt most likely on the basis of some postinflammatory scarring with some traction bronchiectasis. Attention can be paid on follow-up. Severe emphysema. Atherosclerosis to include coronary artery disease. Stable mild prominence of main pulmonary artery which can be seen with pulmonary hypertension. Mild bronchial wall thickening which can be seen with bronchitis, asthma and in smokers amongst other etiologies.        Recent Results (from the past 24 hour(s))   Respiratory Panel, Molecular, with COVID-19 (Restricted: peds pts or suitable admitted adults)    Collection Time: 09/08/22 12:14 PM    Specimen: Nasopharyngeal   Result Value Ref Range    Adenovirus Detection by PCR NOT DETECTED NOT DETECTED    Coronavirus 229E PCR NOT DETECTED NOT DETECTED    Coronavirus HKU1 PCR NOT DETECTED NOT DETECTED    Coronavirus NL63 PCR NOT DETECTED NOT DETECTED Coronavirus OC43 PCR NOT DETECTED NOT DETECTED    SARS-CoV-2 NOT DETECTED NOT DETECTED    Human Metapneumovirus PCR NOT DETECTED NOT DETECTED    Rhinovirus Enterovirus PCR NOT DETECTED NOT DETECTED    Influenza A by PCR NOT DETECTED NOT DETECTED    Influenza A H1 Pandemic PCR NOT DETECTED NOT DETECTED    Influenza A H1 (2009) PCR NOT DETECTED NOT DETECTED    Influenza A H3 PCR NOT DETECTED NOT DETECTED    Influenza B by PCR NOT DETECTED NOT DETECTED    Parainfluenza 1 PCR NOT DETECTED NOT DETECTED    Parainfluenza 2 PCR NOT DETECTED NOT DETECTED    Parainfluenza 3 PCR NOT DETECTED NOT DETECTED    Parainfluenza 4 PCR NOT DETECTED NOT DETECTED    RSV PCR NOT DETECTED NOT DETECTED    Bordetella parapertussis by PCR NOT DETECTED NOT DETECTED    B Pertussis by PCR NOT DETECTED NOT DETECTED    Chlamydophila Pneumonia PCR NOT DETECTED NOT DETECTED    Mycoplasma pneumo by PCR NOT DETECTED NOT DETECTED   Procalcitonin    Collection Time: 09/08/22  2:33 PM   Result Value Ref Range    Procalcitonin 1.906    Basic Metabolic Panel w/ Reflex to MG    Collection Time: 09/09/22  7:43 AM   Result Value Ref Range    Sodium 139 135 - 145 MMOL/L    Potassium 3.4 (L) 3.5 - 5.1 MMOL/L    Chloride 97 (L) 99 - 110 mMol/L    CO2 35 (H) 21 - 32 MMOL/L    Anion Gap 7 4 - 16    BUN 13 6 - 23 MG/DL    Creatinine 0.6 0.6 - 1.1 MG/DL    Glucose 208 (H) 70 - 99 MG/DL    Calcium 9.5 8.3 - 10.6 MG/DL    GFR Non-African American >60 >60 mL/min/1.73m2    GFR African American >60 >60 mL/min/1.73m2   Procalcitonin    Collection Time: 09/09/22  7:43 AM   Result Value Ref Range    Procalcitonin 0.123    CBC with Auto Differential    Collection Time: 09/09/22  7:43 AM   Result Value Ref Range    WBC 10.4 4.0 - 10.5 K/CU MM    RBC 4.27 4.2 - 5.4 M/CU MM    Hemoglobin 13.4 12.5 - 16.0 GM/DL    Hematocrit 42.0 37 - 47 %    MCV 98.4 78 - 100 FL    MCH 31.4 (H) 27 - 31 PG    MCHC 31.9 (L) 32.0 - 36.0 %    RDW 14.0 11.7 - 14.9 %    Platelets 396 834 - 138 K/CU MM    MPV 10.4 7.5 - 11.1 FL    Differential Type AUTOMATED DIFFERENTIAL     Segs Relative 79.4 (H) 36 - 66 %    Lymphocytes % 10.8 (L) 24 - 44 %    Monocytes % 9.0 (H) 0 - 4 %    Eosinophils % 0.1 0 - 3 %    Basophils % 0.3 0 - 1 %    Segs Absolute 8.3 K/CU MM    Lymphocytes Absolute 1.1 K/CU MM    Monocytes Absolute 0.9 K/CU MM    Eosinophils Absolute 0.0 K/CU MM    Basophils Absolute 0.0 K/CU MM    Nucleated RBC % 0.0 %    Total Nucleated RBC 0.0 K/CU MM    Total Immature Neutrophil 0.04 K/CU MM    Immature Neutrophil % 0.4 0 - 0.43 %   APTT    Collection Time: 09/09/22  7:43 AM   Result Value Ref Range    aPTT 38.0 (H) 25.1 - 37.1 SECONDS   Protime-INR    Collection Time: 09/09/22  7:43 AM   Result Value Ref Range    Protime 28.9 (H) 11.7 - 14.5 SECONDS    INR 2.22 INDEX   Magnesium    Collection Time: 09/09/22  7:43 AM   Result Value Ref Range    Magnesium 2.0 1.8 - 2.4 mg/dl           Electronically signed by Laura Ch MD on 9/9/2022 at 11:52 AM

## 2022-09-09 NOTE — PLAN OF CARE
Problem: Discharge Planning  Goal: Discharge to home or other facility with appropriate resources  Outcome: Progressing  Flowsheets (Taken 9/8/2022 1346 by Wilma Hays RN)  Discharge to home or other facility with appropriate resources: Identify barriers to discharge with patient and caregiver     Problem: Pain  Goal: Verbalizes/displays adequate comfort level or baseline comfort level  Outcome: Progressing     Problem: Safety - Adult  Goal: Free from fall injury  Outcome: Progressing

## 2022-09-09 NOTE — CARE COORDINATION
Spoke with patient regarding discharge planning. Patient is from home with her spouse and reported being independent with all mobility and ADL's. Patient stated that she has 2 walkers  she can use if needed. Patient stated that she wears home O2. Patient has PCP  and insurance to assist with RX coverage. Patient plans to return home on discharge .  No needs

## 2022-09-09 NOTE — PLAN OF CARE
Problem: Discharge Planning  Goal: Discharge to home or other facility with appropriate resources  9/9/2022 1152 by Ruchi White RN  Outcome: Progressing  9/9/2022 0010 by Horace Oppenheim, LPN  Outcome: Progressing  Flowsheets (Taken 9/8/2022 1346 by Josie Kern RN)  Discharge to home or other facility with appropriate resources: Identify barriers to discharge with patient and caregiver     Problem: Pain  Goal: Verbalizes/displays adequate comfort level or baseline comfort level  9/9/2022 1152 by Ruchi White RN  Outcome: Progressing  9/9/2022 0010 by Horace Oppenheim, LPN  Outcome: Progressing     Problem: Safety - Adult  Goal: Free from fall injury  9/9/2022 1152 by Ruchi White RN  Outcome: Progressing  9/9/2022 0010 by Horace Oppenheim, LPN  Outcome: Progressing

## 2022-09-10 LAB
ANION GAP SERPL CALCULATED.3IONS-SCNC: 7 MMOL/L (ref 4–16)
BASOPHILS ABSOLUTE: 0 K/CU MM
BASOPHILS RELATIVE PERCENT: 0.3 % (ref 0–1)
BUN BLDV-MCNC: 14 MG/DL (ref 6–23)
CALCIUM SERPL-MCNC: 9.4 MG/DL (ref 8.3–10.6)
CHLORIDE BLD-SCNC: 98 MMOL/L (ref 99–110)
CO2: 34 MMOL/L (ref 21–32)
CREAT SERPL-MCNC: 0.7 MG/DL (ref 0.6–1.1)
DIFFERENTIAL TYPE: ABNORMAL
EOSINOPHILS ABSOLUTE: 0 K/CU MM
EOSINOPHILS RELATIVE PERCENT: 0.2 % (ref 0–3)
GFR AFRICAN AMERICAN: >60 ML/MIN/1.73M2
GFR NON-AFRICAN AMERICAN: >60 ML/MIN/1.73M2
GLUCOSE BLD-MCNC: 96 MG/DL (ref 70–99)
HCT VFR BLD CALC: 41.5 % (ref 37–47)
HEMOGLOBIN: 13.2 GM/DL (ref 12.5–16)
IMMATURE NEUTROPHIL %: 0.7 % (ref 0–0.43)
INR BLD: 2.09 INDEX
LEGIONELLA URINARY AG: NEGATIVE
LYMPHOCYTES ABSOLUTE: 1.8 K/CU MM
LYMPHOCYTES RELATIVE PERCENT: 18.2 % (ref 24–44)
MAGNESIUM: 2.1 MG/DL (ref 1.8–2.4)
MCH RBC QN AUTO: 30.8 PG (ref 27–31)
MCHC RBC AUTO-ENTMCNC: 31.8 % (ref 32–36)
MCV RBC AUTO: 96.7 FL (ref 78–100)
MONOCYTES ABSOLUTE: 0.9 K/CU MM
MONOCYTES RELATIVE PERCENT: 8.8 % (ref 0–4)
NUCLEATED RBC %: 0 %
PDW BLD-RTO: 13.8 % (ref 11.7–14.9)
PLATELET # BLD: 253 K/CU MM (ref 140–440)
PMV BLD AUTO: 10.1 FL (ref 7.5–11.1)
POTASSIUM SERPL-SCNC: 3.9 MMOL/L (ref 3.5–5.1)
PROTHROMBIN TIME: 27.1 SECONDS (ref 11.7–14.5)
RBC # BLD: 4.29 M/CU MM (ref 4.2–5.4)
SEGMENTED NEUTROPHILS ABSOLUTE COUNT: 7.1 K/CU MM
SEGMENTED NEUTROPHILS RELATIVE PERCENT: 71.8 % (ref 36–66)
SODIUM BLD-SCNC: 139 MMOL/L (ref 135–145)
STREP PNEUMONIAE ANTIGEN: NORMAL
TOTAL IMMATURE NEUTOROPHIL: 0.07 K/CU MM
TOTAL NUCLEATED RBC: 0 K/CU MM
WBC # BLD: 9.9 K/CU MM (ref 4–10.5)

## 2022-09-10 PROCEDURE — 36415 COLL VENOUS BLD VENIPUNCTURE: CPT

## 2022-09-10 PROCEDURE — 85025 COMPLETE CBC W/AUTO DIFF WBC: CPT

## 2022-09-10 PROCEDURE — 6370000000 HC RX 637 (ALT 250 FOR IP): Performed by: NURSE PRACTITIONER

## 2022-09-10 PROCEDURE — 80048 BASIC METABOLIC PNL TOTAL CA: CPT

## 2022-09-10 PROCEDURE — 85610 PROTHROMBIN TIME: CPT

## 2022-09-10 PROCEDURE — 94640 AIRWAY INHALATION TREATMENT: CPT

## 2022-09-10 PROCEDURE — 2580000003 HC RX 258: Performed by: NURSE PRACTITIONER

## 2022-09-10 PROCEDURE — 1200000000 HC SEMI PRIVATE

## 2022-09-10 PROCEDURE — 83735 ASSAY OF MAGNESIUM: CPT

## 2022-09-10 PROCEDURE — 2700000000 HC OXYGEN THERAPY PER DAY

## 2022-09-10 PROCEDURE — 94761 N-INVAS EAR/PLS OXIMETRY MLT: CPT

## 2022-09-10 PROCEDURE — 6360000002 HC RX W HCPCS: Performed by: NURSE PRACTITIONER

## 2022-09-10 RX ADMIN — MONTELUKAST 10 MG: 10 TABLET, FILM COATED ORAL at 21:52

## 2022-09-10 RX ADMIN — Medication 2 PUFF: at 00:10

## 2022-09-10 RX ADMIN — SODIUM CHLORIDE, PRESERVATIVE FREE 10 ML: 5 INJECTION INTRAVENOUS at 09:21

## 2022-09-10 RX ADMIN — TORSEMIDE 20 MG: 20 TABLET ORAL at 09:20

## 2022-09-10 RX ADMIN — DICYCLOMINE HYDROCHLORIDE 10 MG: 10 CAPSULE ORAL at 21:51

## 2022-09-10 RX ADMIN — ALBUTEROL SULFATE 2 PUFF: 90 AEROSOL, METERED RESPIRATORY (INHALATION) at 14:47

## 2022-09-10 RX ADMIN — ALBUTEROL SULFATE 2 PUFF: 90 AEROSOL, METERED RESPIRATORY (INHALATION) at 19:27

## 2022-09-10 RX ADMIN — GABAPENTIN 400 MG: 400 CAPSULE ORAL at 18:51

## 2022-09-10 RX ADMIN — ALBUTEROL SULFATE 2 PUFF: 90 AEROSOL, METERED RESPIRATORY (INHALATION) at 00:10

## 2022-09-10 RX ADMIN — Medication 2 PUFF: at 08:55

## 2022-09-10 RX ADMIN — CEFTRIAXONE SODIUM 1000 MG: 1 INJECTION, POWDER, FOR SOLUTION INTRAMUSCULAR; INTRAVENOUS at 12:12

## 2022-09-10 RX ADMIN — THEOPHYLLINE 225 MG: 450 TABLET, EXTENDED RELEASE ORAL at 09:23

## 2022-09-10 RX ADMIN — AZITHROMYCIN MONOHYDRATE 500 MG: 500 INJECTION, POWDER, LYOPHILIZED, FOR SOLUTION INTRAVENOUS at 12:57

## 2022-09-10 RX ADMIN — ALBUTEROL SULFATE 2 PUFF: 90 AEROSOL, METERED RESPIRATORY (INHALATION) at 11:47

## 2022-09-10 RX ADMIN — CELECOXIB 200 MG: 200 CAPSULE ORAL at 09:22

## 2022-09-10 RX ADMIN — LEVOTHYROXINE SODIUM 50 MCG: 0.05 TABLET ORAL at 06:16

## 2022-09-10 RX ADMIN — ALBUTEROL SULFATE 2 PUFF: 90 AEROSOL, METERED RESPIRATORY (INHALATION) at 08:55

## 2022-09-10 RX ADMIN — Medication 2 PUFF: at 11:47

## 2022-09-10 RX ADMIN — PANTOPRAZOLE SODIUM 40 MG: 40 TABLET, DELAYED RELEASE ORAL at 06:16

## 2022-09-10 RX ADMIN — ALBUTEROL SULFATE 2 PUFF: 90 AEROSOL, METERED RESPIRATORY (INHALATION) at 03:54

## 2022-09-10 RX ADMIN — DILTIAZEM HYDROCHLORIDE 60 MG: 60 TABLET, FILM COATED ORAL at 12:51

## 2022-09-10 RX ADMIN — Medication 2 PUFF: at 19:27

## 2022-09-10 RX ADMIN — DILTIAZEM HYDROCHLORIDE 60 MG: 60 TABLET, FILM COATED ORAL at 21:51

## 2022-09-10 RX ADMIN — Medication 2 PUFF: at 03:54

## 2022-09-10 RX ADMIN — THEOPHYLLINE 225 MG: 450 TABLET, EXTENDED RELEASE ORAL at 21:52

## 2022-09-10 RX ADMIN — GUAIFENESIN AND DEXTROMETHORPHAN HYDROBROMIDE 1 TABLET: 600; 30 TABLET, EXTENDED RELEASE ORAL at 09:20

## 2022-09-10 RX ADMIN — Medication 2 PUFF: at 14:47

## 2022-09-10 RX ADMIN — ROSUVASTATIN CALCIUM 20 MG: 20 TABLET, COATED ORAL at 21:52

## 2022-09-10 RX ADMIN — SODIUM CHLORIDE, PRESERVATIVE FREE 10 ML: 5 INJECTION INTRAVENOUS at 21:55

## 2022-09-10 RX ADMIN — DILTIAZEM HYDROCHLORIDE 60 MG: 60 TABLET, FILM COATED ORAL at 06:16

## 2022-09-10 RX ADMIN — PREDNISONE 40 MG: 20 TABLET ORAL at 09:20

## 2022-09-10 RX ADMIN — WARFARIN SODIUM 5 MG: 2.5 TABLET ORAL at 18:51

## 2022-09-10 RX ADMIN — DICYCLOMINE HYDROCHLORIDE 10 MG: 10 CAPSULE ORAL at 09:20

## 2022-09-10 RX ADMIN — GUAIFENESIN AND DEXTROMETHORPHAN HYDROBROMIDE 1 TABLET: 600; 30 TABLET, EXTENDED RELEASE ORAL at 21:52

## 2022-09-10 ASSESSMENT — ENCOUNTER SYMPTOMS
EYE PAIN: 0
COUGH: 1
ABDOMINAL DISTENTION: 0
CONSTIPATION: 0
BACK PAIN: 0
CHOKING: 0
SHORTNESS OF BREATH: 1
VOMITING: 0
WHEEZING: 0
NAUSEA: 0
DIARRHEA: 0
STRIDOR: 0
BLOOD IN STOOL: 0
CHEST TIGHTNESS: 0
ABDOMINAL PAIN: 0

## 2022-09-10 NOTE — PROGRESS NOTES
In-Patient Progress Note    Patient:  Bayron Isaac 68 y.o. female MRN: [id number]     Date of Service: 9/10/2022    Hospital Day: 3      Chief complaint: had concerns including Chest Pain. Subjective   Patient seen and examined in the AM.  She states she is feeling better than yesterday. Still with cough but now without sputum production. She was saturating 98% on 2 L/min. Lt. Chest pain has improved    Family at bedside. See ROS    Assessment and Plan   Bayron Isaac, a 68 y.o. 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     Assessment  Multifocal Pneumonia  Peripheral consolidation TAJ and LLL on CTA chest  On ceftriaxone and azithromycin   Procal 0.131 -> 0.123  Viral panel -ve  Respiratory culture pending    COPD Exacerbation 2/2 #1  See #1  On prednisone 40mg OD X 5 days, chronically on 5mg OD  On breathing treatment    Acute on Chronic Hypoxic Respiratory failure - Stable  On 2L/min chronically   Was briefly on 3L/min in the ER but already back to baseline     Lt Chest pain 2/2 likely pleuritic 2/2 #1 vs MSK 2/2 #1 - Improved  Improving     Leukocytosis 2/2 #1, #2 - Resolved  WBC 12.4 on presentation, down to 9.9    Hypokalemia 2/2 likely decreased oral intake - Resolved  Initial K - 3, improving to 3.9    Elevated D Dimer, PE ruled out  CTA -ve for PE     Hyperglycemia 2/2 likely Iatrogenic 2/2 steroids     H/O HTN/HLD  H/O DVT/PE on warfarin  INR therapeutic, 2-3 range  Pharmacy to dose     H/O Pulm nodule with a new 6mm RLL nodule  Will require a CT in 6-12 months     Hypothyroidism   On synthroid     H/O GERD      Plan  Continue IV abx  Pending final pan culture  Plan for oral abx tomorrow if patient is feeling better and hopefully discharge      # Peptic ulcer prophylaxis: Pantoprazole  # DVT Prophylaxis: Warfarin  #CODE STATUS: Full      Current living situation: Home  Expected Disposition: Home  Estimated discharge date: 2022. Patient needs continuous IV abx due to pneumonia. Pending final cultures. Plan to transition to oral abx tomorrow      Review of System     Review of Systems   Constitutional:  Negative for chills, fatigue, fever and unexpected weight change. Eyes:  Negative for pain and visual disturbance. Respiratory:  Positive for cough (dry) and shortness of breath. Negative for choking, chest tightness, wheezing and stridor. Cardiovascular:  Positive for leg swelling. Negative for chest pain and palpitations. Gastrointestinal:  Negative for abdominal distention, abdominal pain, blood in stool, constipation, diarrhea, nausea and vomiting. Genitourinary:  Negative for decreased urine volume, dysuria, frequency, hematuria and urgency. Musculoskeletal:  Negative for arthralgias, back pain and myalgias. Skin:  Negative for pallor and rash. Neurological:  Negative for dizziness, tremors, syncope, speech difficulty, weakness, light-headedness, numbness and headaches. Psychiatric/Behavioral:  Negative for agitation. I have reviewed all pertinent PMHx, PSHx, FamHx, SocialHx, medications, and allergies and updated history as appropriate.       Physical Exam   VITAL SIGNS:  BP (!) 140/71   Pulse 84   Temp 98 °F (36.7 °C) (Oral)   Resp 18   Ht 5' 4\" (1.626 m)   Wt 154 lb 8.7 oz (70.1 kg)   SpO2 93%   BMI 26.53 kg/m²   Tmax over 24 hours:  Temp (24hrs), Av °F (36.7 °C), Min:97.5 °F (36.4 °C), Max:98.4 °F (36.9 °C)      Patient Vitals for the past 6 hrs:   BP Temp Temp src Pulse Resp SpO2   09/10/22 0745 (!) 140/71 98 °F (36.7 °C) Oral 84 18 93 %         No intake or output data in the 24 hours ending 09/10/22 1242  Wt Readings from Last 2 Encounters:   09/10/22 154 lb 8.7 oz (70.1 kg) 07/09/22 150 lb (68 kg)     Body mass index is 26.53 kg/m². Physical Exam  Vitals and nursing note reviewed. Constitutional:       General: She is not in acute distress. Appearance: She is not ill-appearing, toxic-appearing or diaphoretic. Interventions: Nasal cannula in place. Comments: On 2L/min NC   HENT:      Head: Normocephalic and atraumatic. Right Ear: External ear normal.      Left Ear: External ear normal.      Nose: Nose normal.      Mouth/Throat:      Pharynx: Oropharynx is clear. No oropharyngeal exudate or posterior oropharyngeal erythema. Eyes:      General: No scleral icterus. Right eye: No discharge. Left eye: No discharge. Conjunctiva/sclera: Conjunctivae normal.   Cardiovascular:      Rate and Rhythm: Normal rate. Rhythm irregular. Extrasystoles are present. Pulses: Normal pulses. Heart sounds: Normal heart sounds. No murmur heard. No friction rub. No gallop. Pulmonary:      Effort: Pulmonary effort is normal. No respiratory distress. Breath sounds: Decreased air movement and transmitted upper airway sounds present. No stridor. Examination of the right-lower field reveals rhonchi. Examination of the left-lower field reveals rhonchi. Rhonchi present. No decreased breath sounds, wheezing or rales. Chest:      Chest wall: No tenderness. Abdominal:      General: Abdomen is protuberant. Bowel sounds are normal. There is no distension. Palpations: Abdomen is soft. There is no mass. Tenderness: There is no abdominal tenderness. There is no guarding or rebound. Hernia: No hernia is present. Musculoskeletal:      Right lower leg: Edema present. Left lower leg: Edema present. Skin:     General: Skin is warm. Capillary Refill: Capillary refill takes less than 2 seconds. Comments: Chronic appearing stasis changes   Neurological:      Mental Status: She is alert and oriented to person, place, and time. Psychiatric:         Mood and Affect: Mood normal.         Current Medications      warfarin  5 mg Oral Daily    sodium chloride flush  5-40 mL IntraVENous 2 times per day    pantoprazole  40 mg Oral QAM AC    cefTRIAXone (ROCEPHIN) IV  1,000 mg IntraVENous Q24H    azithromycin  500 mg IntraVENous Q24H    albuterol sulfate HFA  2 puff Inhalation Q4H    And    ipratropium  2 puff Inhalation Q4H    predniSONE  40 mg Oral Daily    celecoxib  200 mg Oral Daily    Mucinex DM  1 tablet Oral BID    dicyclomine  10 mg Oral BID    dilTIAZem  60 mg Oral 3 times per day    gabapentin  400 mg Oral QPM    levothyroxine  50 mcg Oral Daily    montelukast  10 mg Oral Nightly    rosuvastatin  20 mg Oral Nightly    torsemide  20 mg Oral Daily    theophylline  225 mg Oral BID         Labs and Imaging Studies   Laboratory findings:  XR CHEST PORTABLE    Result Date: 9/8/2022  EXAMINATION: ONE XRAY VIEW OF THE CHEST 9/8/2022 6:33 am COMPARISON: PET-CT 08/04/2022, CT chest 07/18/2022, x-ray chest 03/28/2022 HISTORY: ORDERING SYSTEM PROVIDED HISTORY: cp TECHNOLOGIST PROVIDED HISTORY: Reason for exam:->cp Reason for Exam: chest pain Additional signs and symptoms: cough FINDINGS: Normal heart size and pulmonary vasculature. Hyperinflated lungs compatible with emphysema. Faint opacity in the right upper lung suggestive of resolving pneumonia. No definite new consolidation. No effusion or pneumothorax. No evidence of acute process. Emphysema. Faint opacity in the right upper lung suggesting continued resolution of right upper lobe pneumonia. CTA PULMONARY W CONTRAST    Result Date: 9/8/2022  EXAMINATION: CTA OF THE CHEST 9/8/2022 10:29 am TECHNIQUE: CTA of the chest was performed after the administration of intravenous contrast.  Multiplanar reformatted images are provided for review. MIP images are provided for review.  Automated exposure control, iterative reconstruction, and/or weight based adjustment of the mA/kV was utilized to reduce the radiation dose to as low as reasonably achievable. COMPARISON: Chest x-ray earlier today. Whole-body PET-CT 08/04/2022. CT chest 07/18/2022. HISTORY: ORDERING SYSTEM PROVIDED HISTORY: cp TECHNOLOGIST PROVIDED HISTORY: Reason for exam:->cp Decision Support Exception - unselect if not a suspected or confirmed emergency medical condition->Emergency Medical Condition (MA) Reason for Exam: chest pain Additional signs and symptoms: 125ml Isovue 370 FINDINGS: Pulmonary Arteries: Pulmonary arteries are adequately opacified for evaluation. No evidence of intraluminal filling defect to suggest pulmonary embolism. Stable mildly prominent main pulmonary artery as can be seen with pulmonary hypertension. Mediastinum: Heart is upper limits of normal in size. No pericardial effusion. Atherosclerotic changes to the thoracic aorta. No evidence for thoracic aortic aneurysm. Coronary artery calcifications. No mediastinal or hilar lymphadenopathy noted. Lungs/pleura: Severe background emphysema redemonstrated. Irregular predominantly linear slight consolidation to right upper lobe appears stable from relatively recent PET-CT and improved with less of a nodular configuration as compared to prior CT chest 07/18/2018. There is some mild bronchiectasis within the same region and findings are felt most likely to reflect some postinflammatory scarring with some traction bronchiectasis. There are some new areas of predominantly peripheral consolidation noted to the left lung involving both left upper and lower lobes. There is also a new trace to small left pleural effusion. There is a new noncalcified pulmonary nodule to the right lower lobe measuring 6 mm (image 57, axial sequence, series 3). No pneumothoraces. Central airways appear patent. Mild bronchial wall thickening. Upper Abdomen: Limited images to the upper abdomen demonstrate no acute or suspicious abnormality.  Soft Tissues/Bones: No acute bone or soft tissue abnormality. No suspicious focal bony lesions. Stable chronic compression deformity to a midthoracic vertebral body. No evidence for pulmonary embolism. There are some new areas of mild predominately peripheral consolidation to the left lung involving both left upper and lower lobes concerning for multifocal pneumonia. Recommend follow-up to resolution. There is a new noncalcified pulmonary nodule to the right lower lobe measuring 6 mm. Recommend follow-up noncontrast CT chest at 6-12 months and at 18-24 months. New trace to small left pleural effusion. Irregular predominantly linear slight consolidation to right upper lobe appears stable from recent PET-CT 08/04/2022 and improved from prior CT chest 07/18/2022. There is some superimposed mild bronchiectasis in this region. Findings are felt most likely on the basis of some postinflammatory scarring with some traction bronchiectasis. Attention can be paid on follow-up. Severe emphysema. Atherosclerosis to include coronary artery disease. Stable mild prominence of main pulmonary artery which can be seen with pulmonary hypertension. Mild bronchial wall thickening which can be seen with bronchitis, asthma and in smokers amongst other etiologies.        Recent Results (from the past 24 hour(s))   Culture, Respiratory    Collection Time: 09/09/22  2:28 PM    Specimen: Sputum   Result Value Ref Range    Specimen SPUTUM EXPECTORANT     Special Requests NONE     Culture Prelim Report Further report to follow    Protime-INR    Collection Time: 09/10/22  6:33 AM   Result Value Ref Range    Protime 27.1 (H) 11.7 - 14.5 SECONDS    INR 2.09 INDEX   CBC with Auto Differential    Collection Time: 09/10/22  8:36 AM   Result Value Ref Range    WBC 9.9 4.0 - 10.5 K/CU MM    RBC 4.29 4.2 - 5.4 M/CU MM    Hemoglobin 13.2 12.5 - 16.0 GM/DL    Hematocrit 41.5 37 - 47 %    MCV 96.7 78 - 100 FL    MCH 30.8 27 - 31 PG    MCHC 31.8 (L) 32.0 - 36.0 %    RDW 13.8 11.7 -

## 2022-09-10 NOTE — PROGRESS NOTES
PHARMACY ANTICOAGULATION MONITORING SERVICE    Will Garcia is a 68 y.o. female on warfarin therapy. Pharmacy consulted by Marsha Estrada for monitoring and adjustment of treatment.     INR goal: 2-3  Warfarin dose prior to admission: 6mg/6mg/3mg and repeat    Pertinent Laboratory Values   Recent Labs     09/08/22  0644 09/09/22  0743 09/10/22  0633 09/10/22  0836   INR 3.01 2.22 2.09  --    HGB 13.0 13.4  --  13.2   HCT 39.6 42.0  --  41.5    225  --  253         Assessment/Plan:  Possible DDI:  Azithromycin may increase effects of warfarin  Prednisone increased to 40 mg daily (home dose 5 mg) may increase effects of warfarin  INR 2.09, therapeutic  Continue warfarin 5mg daily with a therapeutic INR  Monitor INR trends closely while on concurrent antibiotic therapy  Pharmacy will continue to monitor and adjust warfarin therapy as indicated    Thank you for the consult,  Zina Penn [facility], PharmD  9/10/2022 5:06 PM

## 2022-09-11 VITALS
SYSTOLIC BLOOD PRESSURE: 121 MMHG | HEART RATE: 71 BPM | OXYGEN SATURATION: 98 % | DIASTOLIC BLOOD PRESSURE: 58 MMHG | RESPIRATION RATE: 18 BRPM | BODY MASS INDEX: 26.69 KG/M2 | WEIGHT: 156.31 LBS | TEMPERATURE: 98.8 F | HEIGHT: 64 IN

## 2022-09-11 LAB
INR BLD: 2.18 INDEX
PROTHROMBIN TIME: 28.3 SECONDS (ref 11.7–14.5)

## 2022-09-11 PROCEDURE — 94640 AIRWAY INHALATION TREATMENT: CPT

## 2022-09-11 PROCEDURE — 6370000000 HC RX 637 (ALT 250 FOR IP): Performed by: NURSE PRACTITIONER

## 2022-09-11 PROCEDURE — 36415 COLL VENOUS BLD VENIPUNCTURE: CPT

## 2022-09-11 PROCEDURE — 2700000000 HC OXYGEN THERAPY PER DAY

## 2022-09-11 PROCEDURE — 2580000003 HC RX 258: Performed by: NURSE PRACTITIONER

## 2022-09-11 PROCEDURE — 85610 PROTHROMBIN TIME: CPT

## 2022-09-11 PROCEDURE — 94761 N-INVAS EAR/PLS OXIMETRY MLT: CPT

## 2022-09-11 RX ORDER — PREDNISONE 1 MG/1
TABLET ORAL
Qty: 30 TABLET | Refills: 0 | Status: SHIPPED | OUTPATIENT
Start: 2022-09-14

## 2022-09-11 RX ORDER — AZITHROMYCIN 250 MG/1
250 TABLET, FILM COATED ORAL SEE ADMIN INSTRUCTIONS
Qty: 3 TABLET | Refills: 0 | Status: SHIPPED | OUTPATIENT
Start: 2022-09-11 | End: 2022-09-14

## 2022-09-11 RX ORDER — CEFPODOXIME PROXETIL 200 MG/1
200 TABLET, FILM COATED ORAL 2 TIMES DAILY
Qty: 10 TABLET | Refills: 0 | Status: SHIPPED | OUTPATIENT
Start: 2022-09-11 | End: 2022-09-16

## 2022-09-11 RX ORDER — PREDNISONE 20 MG/1
40 TABLET ORAL DAILY
Qty: 4 TABLET | Refills: 0 | Status: SHIPPED | OUTPATIENT
Start: 2022-09-12 | End: 2022-09-14

## 2022-09-11 RX ADMIN — PANTOPRAZOLE SODIUM 40 MG: 40 TABLET, DELAYED RELEASE ORAL at 06:46

## 2022-09-11 RX ADMIN — TORSEMIDE 20 MG: 20 TABLET ORAL at 09:03

## 2022-09-11 RX ADMIN — ALBUTEROL SULFATE 2 PUFF: 90 AEROSOL, METERED RESPIRATORY (INHALATION) at 08:41

## 2022-09-11 RX ADMIN — DILTIAZEM HYDROCHLORIDE 60 MG: 60 TABLET, FILM COATED ORAL at 06:46

## 2022-09-11 RX ADMIN — PREDNISONE 40 MG: 20 TABLET ORAL at 09:03

## 2022-09-11 RX ADMIN — GUAIFENESIN AND DEXTROMETHORPHAN HYDROBROMIDE 1 TABLET: 600; 30 TABLET, EXTENDED RELEASE ORAL at 09:04

## 2022-09-11 RX ADMIN — ALBUTEROL SULFATE 2 PUFF: 90 AEROSOL, METERED RESPIRATORY (INHALATION) at 03:30

## 2022-09-11 RX ADMIN — Medication 2 PUFF: at 11:52

## 2022-09-11 RX ADMIN — Medication 2 PUFF: at 08:41

## 2022-09-11 RX ADMIN — LEVOTHYROXINE SODIUM 50 MCG: 0.05 TABLET ORAL at 06:46

## 2022-09-11 RX ADMIN — ALBUTEROL SULFATE 2 PUFF: 90 AEROSOL, METERED RESPIRATORY (INHALATION) at 11:52

## 2022-09-11 RX ADMIN — DICYCLOMINE HYDROCHLORIDE 10 MG: 10 CAPSULE ORAL at 09:03

## 2022-09-11 RX ADMIN — SODIUM CHLORIDE, PRESERVATIVE FREE 10 ML: 5 INJECTION INTRAVENOUS at 09:07

## 2022-09-11 RX ADMIN — THEOPHYLLINE 225 MG: 450 TABLET, EXTENDED RELEASE ORAL at 09:06

## 2022-09-11 RX ADMIN — CELECOXIB 200 MG: 200 CAPSULE ORAL at 09:04

## 2022-09-11 RX ADMIN — Medication 2 PUFF: at 03:30

## 2022-09-11 ASSESSMENT — PAIN SCALES - GENERAL: PAINLEVEL_OUTOF10: 0

## 2022-09-11 NOTE — DISCHARGE SUMMARY
Discharge Summary    Name:  Margaret Lutz /Age/Sex: 1945  (68 y.o. female)   MRN & CSN:  [id number] & [id number] Admission Date/Time: 2022  6:27 AM   Attending:  No att. providers found Discharging Physician: Deangelo Leyva MD       Admission Diagnosis:   Multifocal Pneumonia  Possible COPD Exacerbation  Acute on Chronic Hypoxic Respiratory Failure   Chest pain likely Pleuritic   Leukocytosis  Hypokalemia  Elevated D Dimer  H/O HTN  H/O DVT/PE  H/O Pulm Nodule   H/O Hypothyroidism   H/O HLD   H/O GERD    Discharge Diagnosis:  Multifocal Pneumonia  COPD Exacerbation 2/2 #1  Acute on Chronic Hypoxic Respiratory failure - Stable  Lt Chest pain 2/2 likely pleuritic 2/2 #1 vs MSK 2/2 #1 - Improved  Leukocytosis 2/2 #1, #2 - Resolved  Hypokalemia 2/2 likely decreased oral intake - Resolved  Elevated D Dimer, PE ruled out  Hyperglycemia 2/2 likely Iatrogenic 2/2 steroids   H/O HTN/HLD  H/O DVT/PE on warfarin  H/O Pulm nodule with a new 6mm RLL nodule  Hypothyroidism   H/O GERD      Discharge Exam  BP (!) 121/58   Pulse 71   Temp 98.8 °F (37.1 °C) (Oral)   Resp 18   Ht 5' 4\" (1.626 m)   Wt 156 lb 4.9 oz (70.9 kg)   SpO2 98%   BMI 26.83 kg/m²   Physical Exam  Vitals and nursing note reviewed. Constitutional:       General: She is not in acute distress. Appearance: She is not ill-appearing, toxic-appearing or diaphoretic. Interventions: Nasal cannula in place. Comments: On 2L/min NC   HENT:      Head: Normocephalic and atraumatic. Right Ear: External ear normal.      Left Ear: External ear normal.      Nose: Nose normal.      Mouth/Throat:      Pharynx: Oropharynx is clear. No oropharyngeal exudate or posterior oropharyngeal erythema. Eyes:      General: No scleral icterus. Right eye: No discharge. Left eye: No discharge. Conjunctiva/sclera: Conjunctivae normal.   Cardiovascular:      Rate and Rhythm: Normal rate. Rhythm irregular.  Extrasystoles are present. Pulses: Normal pulses. Heart sounds: Normal heart sounds. No murmur heard. No friction rub. No gallop. Pulmonary:      Effort: Pulmonary effort is normal. No respiratory distress. Breath sounds: Decreased air movement and transmitted upper airway sounds present. No stridor. Examination of the right-lower field reveals rhonchi. Examination of the left-lower field reveals rhonchi. Rhonchi (Improved) present. No decreased breath sounds, wheezing or rales. Chest:      Chest wall: No tenderness. Abdominal:      General: Abdomen is protuberant. Bowel sounds are normal. There is no distension. Palpations: Abdomen is soft. There is no mass. Tenderness: There is no abdominal tenderness. There is no guarding or rebound. Hernia: No hernia is present. Musculoskeletal:      Right lower leg: Edema present. Left lower leg: Edema present. Skin:     General: Skin is warm. Capillary Refill: Capillary refill takes less than 2 seconds. Comments: Chronic appearing stasis changes   Neurological:      Mental Status: She is alert and oriented to person, place, and time. Psychiatric:         Mood and Affect: Mood normal.         Hospital Course:   Dejuan Dhaliwal is a 68 y.o.  female  who presents with Multifocal pneumonia    Pt. Admitted on 9/8/2022    Per H+P:   68 y.o. female  with history of severe COPD, chronic hypoxic respiratory failure on O2 @ 2l, DVT\PE on chronic anticoagulation, essential hypertension, TIA, pulmonary nodule, hypothyroidism, GERD, former tobacco abuse among other comorbidities who presents with chest pain and cough. The patient reports a nonproductive cough over the past 2 days. She states this morning she woke up with left sided non radiating chest\rib pain underneath her left breast worsened with taking a deep breath and activity. She is not able to characterize her pain however states it was constant in nature.  She denies associated n/v and diaphoresis. She denies palpitations. She does report mild shortness of breath worsened with exertion which was slightly worse than her norm. She reports chronic lower extremity swelling no worsening, denies leg pain. She denies recent sick contacts, fever or chills. She denies any other acute issues. She was evaluated emergency department. She presented afebrile pulse 85 respirations 23 blood pressure 180/80 O2 sat 96% on 2 L per nasal cannula. She reportedly desatted with minimal activity therefore she was increased to 3 L per nasal cannula. CTA of the chest performed showednew areas of mild predominately peripheral consolidation to the left lung involving both left upper and lower lobes concerning for multifocal pneumonia. Recommend follow-up to resolution. There is a new noncalcified pulmonary nodule to the right lower lobe measuring 6 mm. Recommend follow-up noncontrast CT chest at 6-12 months and at 18-24 months. New trace to small left pleural effusion. Irregular predominantly linear slight consolidation to right upper lobe appears stable from recent PET-CT 08/04/2022 and improved from prior CT chest 07/18/2022. There is some superimposed mild bronchiectasis in this region. Findings are felt most likely on the basis of some postinflammatory scarring with some traction bronchiectasis; other chronic findings noted below. EKG showed sinus rhythm with PVCs nonspecific ST changes, troponin negative x2. . WBC 12.1, hemoglobin 13.0, hematocrit 39.6 platelets 204. Rapid COVID-negative. Chemistry panel showed sodium 137 potassium 3.0 chloride 98 CO2 31 BUN 18 creatinine 0.5 magnesium 1.9 glucose 101 calcium 9.1. LFTs show albumin 3.3 otherwise unremarkable. INR 3 PTT 39.3.  UA negative for infection. The patient was noted to have bronchospasms on presentation. The patient was given nebs, steroids. Blood cultures were obtained and she was initiated on empric IV Ceftriaxone and Zithromax.  Patient admitted for further management. During her stay, she went down on her O2 requirement back to baseline of 2L/min. Her cough also became more dry from the green sputum she had initially. Patient's cultures were negative except 1/4 bottles of blood culture was +ve for micrococcus luteus - this was thought to be a contaminant. Strep. pneumo and legionella antigen -ve. Patient was deemed stable enough for discharge. The patient expressed appropriate understanding of and agreement with the discharge recommendations, medications, and plan. Consults this admission:  [address]      Discharge Instruction:   Handoff to PCP:     Follow up appointments: PCP within 1 week. INR clinic within 1-2 days. Primary care physician: Instructions as given to patient:   Be compliant with medications  Please take antibiotics as prescribed. Please follow up with your primary care doctor within 1 week of discharge. Please follow up with INR clinic within 1-2 days. Please take the prednisone 40mg daily for 3 more days. Once you're done with the 40mg dose, you can resume your home prednisone 5mg once a day starting 9/14/2022     Diet:  regular diet   Activity: activity as tolerated  Disposition: Discharged to:   [x]Home, [][facility], []SNF, []Acute Rehab, []Hospice   Condition on discharge: Stable    Discharge Medications:        Medication List        START taking these medications      cefpodoxime 200 MG tablet  Commonly known as: VANTIN  Take 1 tablet by mouth 2 times daily for 5 days            CHANGE how you take these medications      * albuterol (2.5 MG/3ML) 0.083% nebulizer solution  Commonly known as: PROVENTIL  Take 3 mLs by nebulization 4 times daily as needed for Wheezing  What changed: Another medication with the same name was changed. Make sure you understand how and when to take each.      * albuterol sulfate  (90 Base) MCG/ACT inhaler  Commonly known as: PROVENTIL;VENTOLIN;PROAIR  INHALE 2 PUFF FOUR TIMES A DAY AS NEEDED  What changed: See the new instructions. azithromycin 250 MG tablet  Commonly known as: ZITHROMAX  Take 1 tablet by mouth See Admin Instructions for 3 days  What changed:   how much to take  how to take this  when to take this  additional instructions     celecoxib 200 MG capsule  Commonly known as: CELEBREX  TAKE 1 CAPSULE BY MOUTH EVERY DAY  What changed:   how much to take  how to take this  when to take this     Daliresp 500 MCG tablet  Generic drug: Roflumilast  TAKE 1 TABLET BY MOUTH EVERY DAY  What changed: See the new instructions. esomeprazole 40 MG delayed release capsule  Commonly known as: NEXIUM  Take 1 capsule by mouth in the morning and at bedtime  What changed: when to take this     hydrOXYzine HCl 25 MG tablet  Commonly known as: ATARAX  TAKE 1 TABLET BY MOUTH NIGHTLY  What changed: See the new instructions. levothyroxine 50 MCG tablet  Commonly known as: SYNTHROID  TAKE 1 TABLET BY MOUTH EVERY DAY  What changed: See the new instructions. Magnesium Oxide 500 MG Tabs  TAKE 1 TABLET BY MOUTH EVERY DAY  What changed:   how much to take  how to take this  when to take this     montelukast 10 MG tablet  Commonly known as: SINGULAIR  TAKE 1 TABLET BY MOUTH EVERY DAY EVERY NIGHT  What changed:   how much to take  how to take this  when to take this     Mucinex DM  MG Tb12  Take 1 to 2 tablet by mouth every 12 hours (maximum: 4 tablets/24 hours). Drink plenty of water. What changed:   how much to take  how to take this  when to take this     * predniSONE 20 MG tablet  Commonly known as: DELTASONE  Take 2 tablets by mouth daily for 2 doses  Start taking on: September 12, 2022  What changed: You were already taking a medication with the same name, and this prescription was added. Make sure you understand how and when to take each.      * predniSONE 5 MG tablet  Commonly known as: DELTASONE  TAKE 1 TABLET BY MOUTH EVERY DAY  Start taking on: September 14, 2022  What changed: These instructions start on September 14, 2022. If you are unsure what to do until then, ask your doctor or other care provider. risedronate 35 MG tablet  Commonly known as: ACTONEL  TAKE 1 TABLET BY MOUTH EVERY 7 DAYS PATIENT TAKES ON SUNDAY  What changed: See the new instructions. rosuvastatin 20 MG tablet  Commonly known as: CRESTOR  TAKE 1 TABLET BY MOUTH EVERY DAY EVERY NIGHT  What changed: See the new instructions. theophylline 450 MG extended release tablet  Commonly known as: THEODUR  TAKE 1/2 TABLET BY MOUTH TWICE A DAY  What changed: See the new instructions. WARFARIN SODIUM PO  Take as directed. If you are unsure how to take this medication, talk to your nurse or doctor. What changed: Another medication with the same name was removed. Continue taking this medication, and follow the directions you see here. * This list has 4 medication(s) that are the same as other medications prescribed for you. Read the directions carefully, and ask your doctor or other care provider to review them with you. CONTINUE taking these medications      b complex vitamins capsule     Biotin 57632 MCG Tabs     Breztri Aerosphere 160-9-4.8 MCG/ACT Aero  Generic drug: Budeson-Glycopyrrol-Formoterol  Inhale 2 sprays into the lungs 2 times daily     Calcium + D3 600-200 MG-UNIT Tabs tablet     dicyclomine 10 MG capsule  Commonly known as: BENTYL     dilTIAZem 60 MG tablet  Commonly known as: CARDIZEM  Take 1 tablet by mouth every 8 hours     gabapentin 400 MG capsule  Commonly known as: NEURONTIN  Take 1 capsule by mouth every evening for 90 days.      ipratropium 17 MCG/ACT inhaler  Commonly known as: ATROVENT HFA     KLOR-CON PO     MULTIVITAMIN PO     nitroGLYCERIN 0.4 MG SL tablet  Commonly known as: NITROSTAT  Place 1 tablet under the tongue every 5 minutes as needed for Chest pain     OXYGEN     torsemide 20 MG tablet  Commonly known as: DEMADEX            STOP taking these medications      Klor-Con M10 10 MEQ extended release tablet  Generic drug: potassium chloride               Where to Get Your Medications        These medications were sent to [facility]/pharmacy #[id number] [place] [place] - F [phone]  [address] [place] [address] [address], [address]      Phone: [phone]   azithromycin 250 MG tablet  cefpodoxime 200 MG tablet  predniSONE 20 MG tablet  predniSONE 5 MG tablet         Objective Findings at Discharge:       BMP/CBC  Recent Labs     09/09/22  0743 09/10/22  0836    139   K 3.4* 3.9   CL 97* 98*   CO2 35* 34*   BUN 13 14   CREATININE 0.6 0.7   WBC 10.4 9.9   HCT 42.0 41.5    253       IMAGING:    CTA Pulm with contrast 9/8/2022   No evidence for pulmonary embolism. There are some new areas of mild predominately peripheral consolidation to   the left lung involving both left upper and lower lobes concerning for   multifocal pneumonia. Recommend follow-up to resolution. There is a new noncalcified pulmonary nodule to the right lower lobe   measuring 6 mm. Recommend follow-up noncontrast CT chest at 6-12 months and   at 18-24 months. New trace to small left pleural effusion. Irregular predominantly linear slight consolidation to right upper lobe   appears stable from recent PET-CT 08/04/2022 and improved from prior CT chest   07/18/2022. There is some superimposed mild bronchiectasis in this region. Findings are felt most likely on the basis of some postinflammatory scarring   with some traction bronchiectasis. Attention can be paid on follow-up. Severe emphysema. Atherosclerosis to include coronary artery disease. Stable mild prominence of main pulmonary artery which can be seen with   pulmonary hypertension. Mild bronchial wall thickening which can be seen with bronchitis, asthma and   in smokers amongst other etiologies. CXR 9/8/2022   No evidence of acute process. Emphysema. Faint opacity in the right upper lung suggesting continued resolution of   right upper lobe pneumonia. Additional Information: Patient seen and examined day of discharge.  For more information regarding patient's care please contact Kenneth Gómez [facility] records [phone]    Discharge Time of 40 minutes    Electronically signed by Raffy Avendaño MD on 9/11/2022 at 2:15 PM

## 2022-09-11 NOTE — DISCHARGE INSTRUCTIONS
Internal Medicine Discharge Instruction    Discharge to:  Home  Diet: Regular  Activity: As tolerated       Be compliant with medications  Please take antibiotics as prescribed. Please follow up with your primary care doctor within 1 week of discharge. Please follow up with INR clinic within 1-2 days. Please take the prednisone 40mg daily for 3 more days.  Once you're done with the 40mg dose, you can resume your home prednisone 5mg once a day starting 9/14/2022         Electronically signed by Adriana Franco MD on 9/11/2022 at 11:38 AM

## 2022-09-12 ENCOUNTER — TELEPHONE (OUTPATIENT)
Dept: FAMILY MEDICINE CLINIC | Age: 77
End: 2022-09-12

## 2022-09-12 LAB
CULTURE: ABNORMAL
CULTURE: ABNORMAL
CULTURE: NORMAL
GRAM SMEAR: NORMAL
Lab: ABNORMAL
Lab: NORMAL
SPECIMEN: ABNORMAL
SPECIMEN: NORMAL

## 2022-09-12 NOTE — TELEPHONE ENCOUNTER
[facility] Initial Follow Up Call    Outreach made within 2 business days of discharge: Yes    Patient: Steve Das Patient : 1945   MRN: [id number]  Reason for Admission: There are no discharge diagnoses documented for the most recent discharge. Discharge Date: 22       Spoke with: Self    Discharge department/facility: [facility]    TCM Interactive Patient Contact:  Was patient able to fill all prescriptions: Yes  Was patient instructed to bring all medications to the follow-up visit: Yes  Is patient taking all medications as directed in the discharge summary?  Yes  Does patient understand their discharge instructions: Yes  Does patient have questions or concerns that need addressed prior to 7-14 day follow up office visit: no    Scheduled appointment with PCP within 7-14 days    Follow Up  Future Appointments   Date Time Provider Judi Menjivar   9/15/2022  2:00 PM Barry Garza [facility]   2022  2:00 PM Martha Gaona MD [facility]   10/4/2022  1:30 PM Jes Sharma [facility]       Heather Maravilla MA

## 2022-09-13 LAB
CULTURE: NORMAL
Lab: NORMAL
SPECIMEN: NORMAL

## 2022-09-15 ENCOUNTER — OFFICE VISIT (OUTPATIENT)
Dept: FAMILY MEDICINE CLINIC | Age: 77
End: 2022-09-15
Payer: MEDICARE

## 2022-09-15 VITALS
SYSTOLIC BLOOD PRESSURE: 122 MMHG | OXYGEN SATURATION: 94 % | HEIGHT: 64 IN | WEIGHT: 148 LBS | DIASTOLIC BLOOD PRESSURE: 56 MMHG | BODY MASS INDEX: 25.27 KG/M2 | HEART RATE: 87 BPM

## 2022-09-15 DIAGNOSIS — Z86.711 HISTORY OF PULMONARY EMBOLISM: ICD-10-CM

## 2022-09-15 DIAGNOSIS — Z86.718 HISTORY OF DVT OF LOWER EXTREMITY: ICD-10-CM

## 2022-09-15 DIAGNOSIS — J44.1 COPD EXACERBATION (HCC): ICD-10-CM

## 2022-09-15 DIAGNOSIS — I80.01: ICD-10-CM

## 2022-09-15 DIAGNOSIS — Z13.220 LIPID SCREENING: ICD-10-CM

## 2022-09-15 DIAGNOSIS — J44.9 COPD, VERY SEVERE (HCC): Chronic | ICD-10-CM

## 2022-09-15 DIAGNOSIS — E87.6 HYPOKALEMIA: ICD-10-CM

## 2022-09-15 DIAGNOSIS — Z79.01 ANTICOAGULANT LONG-TERM USE: ICD-10-CM

## 2022-09-15 DIAGNOSIS — J18.9 MULTIFOCAL PNEUMONIA: Primary | ICD-10-CM

## 2022-09-15 DIAGNOSIS — E03.9 ACQUIRED HYPOTHYROIDISM: ICD-10-CM

## 2022-09-15 DIAGNOSIS — M19.90 ARTHRITIS: ICD-10-CM

## 2022-09-15 DIAGNOSIS — Z09 HOSPITAL DISCHARGE FOLLOW-UP: ICD-10-CM

## 2022-09-15 DIAGNOSIS — K21.9 GASTROESOPHAGEAL REFLUX DISEASE, UNSPECIFIED WHETHER ESOPHAGITIS PRESENT: ICD-10-CM

## 2022-09-15 LAB
INTERNATIONAL NORMALIZATION RATIO, POC: 2.8
PROTHROMBIN TIME, POC: 34.2

## 2022-09-15 PROCEDURE — 1111F DSCHRG MED/CURRENT MED MERGE: CPT | Performed by: STUDENT IN AN ORGANIZED HEALTH CARE EDUCATION/TRAINING PROGRAM

## 2022-09-15 PROCEDURE — 85610 PROTHROMBIN TIME: CPT | Performed by: STUDENT IN AN ORGANIZED HEALTH CARE EDUCATION/TRAINING PROGRAM

## 2022-09-15 PROCEDURE — 99214 OFFICE O/P EST MOD 30 MIN: CPT | Performed by: STUDENT IN AN ORGANIZED HEALTH CARE EDUCATION/TRAINING PROGRAM

## 2022-09-15 RX ORDER — LEVOTHYROXINE SODIUM 0.05 MG/1
TABLET ORAL
Qty: 90 TABLET | Refills: 1 | Status: SHIPPED | OUTPATIENT
Start: 2022-09-15 | End: 2022-10-25 | Stop reason: DRUGHIGH

## 2022-09-15 RX ORDER — MONTELUKAST SODIUM 10 MG/1
TABLET ORAL
Qty: 90 TABLET | Refills: 0 | Status: SHIPPED | OUTPATIENT
Start: 2022-09-15

## 2022-09-15 RX ORDER — ROFLUMILAST 500 UG/1
TABLET ORAL
Qty: 90 TABLET | Refills: 1 | Status: SHIPPED | OUTPATIENT
Start: 2022-09-15

## 2022-09-15 RX ORDER — ESOMEPRAZOLE MAGNESIUM 40 MG/1
40 CAPSULE, DELAYED RELEASE ORAL 2 TIMES DAILY
Qty: 180 CAPSULE | Refills: 1 | Status: SHIPPED | OUTPATIENT
Start: 2022-09-15 | End: 2023-03-14

## 2022-09-15 RX ORDER — GABAPENTIN 400 MG/1
400 CAPSULE ORAL EVERY EVENING
Qty: 90 CAPSULE | Refills: 0 | Status: SHIPPED | OUTPATIENT
Start: 2022-09-15 | End: 2022-10-24 | Stop reason: SDUPTHER

## 2022-09-15 RX ORDER — CELECOXIB 200 MG/1
CAPSULE ORAL
Qty: 90 CAPSULE | Refills: 0 | Status: SHIPPED | OUTPATIENT
Start: 2022-09-15 | End: 2022-10-24 | Stop reason: SDUPTHER

## 2022-09-15 RX ORDER — BACLOFEN 20 MG
TABLET ORAL
Qty: 90 TABLET | Refills: 0 | Status: SHIPPED | OUTPATIENT
Start: 2022-09-15

## 2022-09-15 ASSESSMENT — PATIENT HEALTH QUESTIONNAIRE - PHQ9
SUM OF ALL RESPONSES TO PHQ QUESTIONS 1-9: 0
SUM OF ALL RESPONSES TO PHQ QUESTIONS 1-9: 0
1. LITTLE INTEREST OR PLEASURE IN DOING THINGS: 0
SUM OF ALL RESPONSES TO PHQ QUESTIONS 1-9: 0
2. FEELING DOWN, DEPRESSED OR HOPELESS: 0
SUM OF ALL RESPONSES TO PHQ9 QUESTIONS 1 & 2: 0
SUM OF ALL RESPONSES TO PHQ QUESTIONS 1-9: 0

## 2022-09-15 NOTE — PROGRESS NOTES
Confirmed current coumadin 6/6/3 mg. Inr 2.8 keep same recheck 4 weeks     Post-Discharge Transitional Care  Follow Up      Douglas Winters   YOB: 1945    Date of Office Visit:  9/15/2022  Date of Hospital Admission: 10/14/22  Date of Hospital Discharge: 10/14/22  Risk of hospital readmission (high >=14%. Medium >=10%) :Readmission Risk Score: 14.9      Care management risk score Rising risk (score 2-5) and Complex Care (Scores >=6): No Risk Score On File     Non face to face  following discharge, date last encounter closed (first attempt may have been earlier): *No documented post hospital discharge outreach found in the last 14 days    Call initiated 2 business days of discharge: *No response recorded in the last 14 days    ASSESSMENT/PLAN:   Multifocal pneumonia  COPD exacerbation ([facility] [address]  Anticoagulant long-term use  -     POCT INR  History of pulmonary embolism  -     POCT INR  History of DVT of lower extremity  -     POCT INR  Hospital discharge follow-up  -     OR DISCHARGE MEDS RECONCILED W/ CURRENT OUTPATIENT MED LIST  Lipid screening  -     LIPID PANEL; Future  Acquired hypothyroidism  -     Basic Metabolic Panel; Future  -     TSH with Reflex to FT4; Future  -     levothyroxine (SYNTHROID) 50 MCG tablet; TAKE 1 TABLET BY MOUTH EVERY DAY, Disp-90 tablet, R-1Normal  Hypokalemia  -     Basic Metabolic Panel; Future  -     Magnesium Oxide 500 MG TABS; TAKE 1 TABLET BY MOUTH EVERY DAY, Disp-90 tablet, R-0Normal  COPD, very severe (HCC)  -     montelukast (SINGULAIR) 10 MG tablet; TAKE 1 TABLET BY MOUTH EVERY DAY EVERY NIGHT, Disp-90 tablet, R-0Normal  -     Roflumilast (DALIRESP) 500 MCG tablet; TAKE 1 TABLET BY MOUTH EVERY DAY, Disp-90 tablet, R-1Normal  Arthritis  -     gabapentin (NEURONTIN) 400 MG capsule; Take 1 capsule by mouth every evening for 90 days. , Disp-90 capsule, R-0Normal  -     celecoxib (CELEBREX) 200 MG capsule; TAKE 1 CAPSULE BY MOUTH EVERY DAY, Disp-90 capsule, R-0Normal  Gastroesophageal reflux disease, unspecified whether esophagitis present  -     esomeprazole (NEXIUM) 40 MG delayed release capsule; Take 1 capsule by mouth in the morning and at bedtime, Disp-180 capsule, R-1Normal  Chronic phlebitis of superficial vein of right lower extremity      Medical Decision Making: moderate complexity  Return for change appt. to late oct or early nov. .    On this date 9/15/2022 I have spent 55 minutes reviewing previous notes, test results and face to face with the patient discussing the diagnosis and importance of compliance with the treatment plan as well as documenting on the day of the visit. Obtain prevnar 20 with next visit  Repeat K levels now off of [employer] to follow with Pulmonoogy she is established with office  INR therapeutic    Subjective:   HPI:  Follow up of Hospital problems/diagnosis(es): multifocal pneumonia, COPD exacerbation  69 yo F presents s/p recent discharge [facility]. Multifocal PNA started on cefpodoxime outpatient, she is tolerating. Breathing improved  Tolerating meals, Normal Bms. Following with Pulmonary Dr. Eun Ogdenite formerly Dr. Estrellita Nixon course: Discharge summary reviewed- see chart. She states today breathing worse as she has been more ambulatory.          Interval history/Current status: stable    Patient Active Problem List   Diagnosis    Phlebitis    COPD, very severe ([address]    Essential hypertension    TIA (transient ischemic attack)    Vertigo, central    Anticoagulant long-term use    Pulmonary nodule    Arthritis    Coronary atherosclerosis    Leukocytosis    Chronic hypoxemic respiratory failure (HCC)    Lung infiltrate on CT    Acquired hypothyroidism    Pure hypercholesterolemia    History of DVT of lower extremity    History of pulmonary embolism    Gastroesophageal reflux disease    COVID-19 virus detected    Shortness of breath    Former smoker    Pneumonia due to influenza A virus    Acute respiratory failure with hypoxia and hypercapnia (HCC)    Mild pulmonary hypertension (HCC)    Acute exacerbation of chronic obstructive pulmonary disease (COPD) ([facility] [address]    Community acquired pneumonia of right upper lobe of lung    COPD exacerbation ([facility] [address]    Multifocal pneumonia    Chronic phlebitis of superficial vein of right lower extremity       Medications listed as ordered at the time of discharge from hospital     Medication List            Accurate as of September 15, 2022 11:59 PM. If you have any questions, ask your nurse or doctor. CHANGE how you take these medications      esomeprazole 40 MG delayed release capsule  Commonly known as: NEXIUM  Take 1 capsule by mouth in the morning and at bedtime  What changed: when to take this            CONTINUE taking these medications      * albuterol (2.5 MG/3ML) 0.083% nebulizer solution  Commonly known as: PROVENTIL  Take 3 mLs by nebulization 4 times daily as needed for Wheezing     * albuterol sulfate  (90 Base) MCG/ACT inhaler  Commonly known as: PROVENTIL;VENTOLIN;PROAIR  INHALE 2 PUFF FOUR TIMES A DAY AS NEEDED     b complex vitamins capsule     Biotin 64014 MCG Tabs     Breztri Aerosphere 160-9-4.8 MCG/ACT Aero  Generic drug: Budeson-Glycopyrrol-Formoterol  Inhale 2 sprays into the lungs 2 times daily     Calcium + D3 600-200 MG-UNIT Tabs tablet     cefpodoxime 200 MG tablet  Commonly known as: VANTIN  Take 1 tablet by mouth 2 times daily for 5 days     celecoxib 200 MG capsule  Commonly known as: CELEBREX  TAKE 1 CAPSULE BY MOUTH EVERY DAY     dicyclomine 10 MG capsule  Commonly known as: BENTYL     dilTIAZem 60 MG tablet  Commonly known as: CARDIZEM  Take 1 tablet by mouth every 8 hours     gabapentin 400 MG capsule  Commonly known as: NEURONTIN  Take 1 capsule by mouth every evening for 90 days.      hydrOXYzine HCl 25 MG tablet  Commonly known as: ATARAX  TAKE 1 TABLET BY MOUTH NIGHTLY     ipratropium 17 MCG/ACT inhaler  Commonly known as: ATROVENT HFA KLOR-CON PO     levothyroxine 50 MCG tablet  Commonly known as: SYNTHROID  TAKE 1 TABLET BY MOUTH EVERY DAY     Magnesium Oxide 500 MG Tabs  TAKE 1 TABLET BY MOUTH EVERY DAY     montelukast 10 MG tablet  Commonly known as: SINGULAIR  TAKE 1 TABLET BY MOUTH EVERY DAY EVERY NIGHT     Mucinex DM  MG Tb12  Take 1 to 2 tablet by mouth every 12 hours (maximum: 4 tablets/24 hours). Drink plenty of water. MULTIVITAMIN PO     nitroGLYCERIN 0.4 MG SL tablet  Commonly known as: NITROSTAT  Place 1 tablet under the tongue every 5 minutes as needed for Chest pain     OXYGEN     predniSONE 5 MG tablet  Commonly known as: DELTASONE  TAKE 1 TABLET BY MOUTH EVERY DAY     risedronate 35 MG tablet  Commonly known as: ACTONEL  TAKE 1 TABLET BY MOUTH EVERY 7 DAYS PATIENT TAKES ON SUNDAY     Roflumilast 500 MCG tablet  Commonly known as: Daliresp  TAKE 1 TABLET BY MOUTH EVERY DAY     rosuvastatin 20 MG tablet  Commonly known as: CRESTOR  TAKE 1 TABLET BY MOUTH EVERY DAY EVERY NIGHT     theophylline 450 MG extended release tablet  Commonly known as: THEODUR  TAKE 1/2 TABLET BY MOUTH TWICE A DAY     torsemide 20 MG tablet  Commonly known as: DEMADEX     WARFARIN SODIUM PO  Take as directed by the anticoagulation clinic. If you are unsure how to take this medication, talk to your nurse or doctor. * This list has 2 medication(s) that are the same as other medications prescribed for you. Read the directions carefully, and ask your doctor or other care provider to review them with you.                    Where to Get Your Medications        These medications were sent to [facility]pharmacy #[id number], [address] [phone]  [address] [place] [address], [address]      Phone: [phone]   celecoxib 200 MG capsule  esomeprazole 40 MG delayed release capsule  gabapentin 400 MG capsule  levothyroxine 50 MCG tablet  Magnesium Oxide 500 MG Tabs  montelukast 10 MG tablet  Roflumilast 500 MCG tablet           Medications marked \"taking\" at this time  Outpatient Medications Marked as Taking for the 9/15/22 encounter (Office Visit) with Jennyfer Trujillo Edith, DO   Medication Sig Dispense Refill    levothyroxine (SYNTHROID) 50 MCG tablet TAKE 1 TABLET BY MOUTH EVERY DAY 90 tablet 1    montelukast (SINGULAIR) 10 MG tablet TAKE 1 TABLET BY MOUTH EVERY DAY EVERY NIGHT 90 tablet 0    gabapentin (NEURONTIN) 400 MG capsule Take 1 capsule by mouth every evening for 90 days. 90 capsule 0    Roflumilast (DALIRESP) 500 MCG tablet TAKE 1 TABLET BY MOUTH EVERY DAY 90 tablet 1    celecoxib (CELEBREX) 200 MG capsule TAKE 1 CAPSULE BY MOUTH EVERY DAY 90 capsule 0    Magnesium Oxide 500 MG TABS TAKE 1 TABLET BY MOUTH EVERY DAY 90 tablet 0    esomeprazole (NEXIUM) 40 MG delayed release capsule Take 1 capsule by mouth in the morning and at bedtime 180 capsule 1    predniSONE (DELTASONE) 5 MG tablet TAKE 1 TABLET BY MOUTH EVERY DAY 30 tablet 0    [] cefpodoxime (VANTIN) 200 MG tablet Take 1 tablet by mouth 2 times daily for 5 days 10 tablet 0    WARFARIN SODIUM PO Take by mouth nightly 22 Per anti-coag clinic patient takes 6 mg, 6 mg, 3 mg takes at night last dose 6 mg dose for 22 6 mg dose for  3 mg. Patient follows anti-coag monthly schedule.       risedronate (ACTONEL) 35 MG tablet TAKE 1 TABLET BY MOUTH EVERY 7 DAYS PATIENT TAKES ON  12 tablet 1    rosuvastatin (CRESTOR) 20 MG tablet TAKE 1 TABLET BY MOUTH EVERY DAY EVERY NIGHT 90 tablet 1    theophylline (THEODUR) 450 MG extended release tablet TAKE 1/2 TABLET BY MOUTH TWICE A DAY 90 tablet 1    Budeson-Glycopyrrol-Formoterol (BREZTRI AEROSPHERE) 160-9-4.8 MCG/ACT AERO Inhale 2 sprays into the lungs 2 times daily 1 each 11    hydrOXYzine HCl (ATARAX) 25 MG tablet TAKE 1 TABLET BY MOUTH NIGHTLY 30 tablet 2    albuterol sulfate  (90 Base) MCG/ACT inhaler INHALE 2 PUFF FOUR TIMES A DAY AS NEEDED 1 each 5 torsemide (DEMADEX) 20 MG tablet Take 20 mg by mouth daily      albuterol (PROVENTIL) (2.5 MG/3ML) 0.083% nebulizer solution Take 3 mLs by nebulization 4 times daily as needed for Wheezing 120 each 5    ipratropium (ATROVENT HFA) 17 MCG/ACT inhaler Inhale 2 puffs into the lungs every 6-8 hours as needed      dilTIAZem (CARDIZEM) 60 MG tablet Take 1 tablet by mouth every 8 hours 120 tablet 3    Dextromethorphan-guaiFENesin (MUCINEX DM)  MG TB12 Take 1 to 2 tablet by mouth every 12 hours (maximum: 4 tablets/24 hours). Drink plenty of water. 28 tablet 0    nitroGLYCERIN (NITROSTAT) 0.4 MG SL tablet Place 1 tablet under the tongue every 5 minutes as needed for Chest pain 25 tablet 1    dicyclomine (BENTYL) 10 MG capsule Take 10 mg by mouth 2 times daily      Biotin 16476 MCG TABS Take 10,000 mcg by mouth daily      b complex vitamins capsule Take 1 capsule by mouth daily      Calcium Carb-Cholecalciferol (CALCIUM + D3) 600-200 MG-UNIT TABS Take 1 tablet by mouth 2 times daily      Multiple Vitamins-Minerals (MULTIVITAMIN PO) Take 1 tablet by mouth daily      OXYGEN Inhale 2 L/hr into the lungs continuous. Medications patient taking as of now reconciled against medications ordered at time of hospital discharge: Yes        Objective:    BP (!) 122/56 (Site: Right Upper Arm, Position: Sitting, Cuff Size: Medium Adult)   Pulse 87   Ht 5' 4\" (1.626 m)   Wt 148 lb (67.1 kg)   SpO2 94% Comment: on 2L of portable O2  BMI 25.40 kg/m²         An electronic signature was used to authenticate this note.   --Jes Sharma, DO

## 2022-09-20 ENCOUNTER — OFFICE VISIT (OUTPATIENT)
Dept: PULMONOLOGY | Age: 77
End: 2022-09-20
Payer: MEDICARE

## 2022-09-20 VITALS
BODY MASS INDEX: 24.99 KG/M2 | SYSTOLIC BLOOD PRESSURE: 104 MMHG | HEART RATE: 87 BPM | HEIGHT: 65 IN | OXYGEN SATURATION: 90 % | WEIGHT: 150 LBS | DIASTOLIC BLOOD PRESSURE: 80 MMHG

## 2022-09-20 DIAGNOSIS — R06.02 SHORTNESS OF BREATH: ICD-10-CM

## 2022-09-20 DIAGNOSIS — Z87.891 FORMER SMOKER: ICD-10-CM

## 2022-09-20 DIAGNOSIS — R91.1 PULMONARY NODULE: Chronic | ICD-10-CM

## 2022-09-20 DIAGNOSIS — J96.11 CHRONIC HYPOXEMIC RESPIRATORY FAILURE (HCC): Chronic | ICD-10-CM

## 2022-09-20 DIAGNOSIS — J44.9 COPD, VERY SEVERE (HCC): Chronic | ICD-10-CM

## 2022-09-20 DIAGNOSIS — J18.9 MULTIFOCAL PNEUMONIA: Primary | ICD-10-CM

## 2022-09-20 DIAGNOSIS — I27.20 MILD PULMONARY HYPERTENSION (HCC): ICD-10-CM

## 2022-09-20 PROCEDURE — 1036F TOBACCO NON-USER: CPT | Performed by: INTERNAL MEDICINE

## 2022-09-20 PROCEDURE — 99213 OFFICE O/P EST LOW 20 MIN: CPT | Performed by: INTERNAL MEDICINE

## 2022-09-20 PROCEDURE — 1090F PRES/ABSN URINE INCON ASSESS: CPT | Performed by: INTERNAL MEDICINE

## 2022-09-20 PROCEDURE — 1111F DSCHRG MED/CURRENT MED MERGE: CPT | Performed by: INTERNAL MEDICINE

## 2022-09-20 PROCEDURE — G8400 PT W/DXA NO RESULTS DOC: HCPCS | Performed by: INTERNAL MEDICINE

## 2022-09-20 PROCEDURE — G8420 CALC BMI NORM PARAMETERS: HCPCS | Performed by: INTERNAL MEDICINE

## 2022-09-20 PROCEDURE — G8427 DOCREV CUR MEDS BY ELIG CLIN: HCPCS | Performed by: INTERNAL MEDICINE

## 2022-09-20 PROCEDURE — 3023F SPIROM DOC REV: CPT | Performed by: INTERNAL MEDICINE

## 2022-09-20 PROCEDURE — 1123F ACP DISCUSS/DSCN MKR DOCD: CPT | Performed by: INTERNAL MEDICINE

## 2022-09-20 NOTE — PROGRESS NOTES
deficits    Radiology: CTA chest 9/8/2022 showed no evidence of pulm embolism. Peripheral consolidation of the left lung suggesting multifocal pneumonia. New noncalcified pulm nodule to the right lower lobe measuring 6 mm. New trace to small left pleural effusion  PFT: Office spirometry in 7/21/2021 demonstrated a very severe/stage IV obstructive lung defect with no significant response of bronchodilators. Overall her lung function had remained stable or minimally improved over the previous year      Echocardiogram: 1/3/2022   Left ventricular systolic function is normal.   Ejection fraction is visually estimated at 50-55%. Mild aortic stenosis with mean gradient of 11 mmHg. Mild to moderate tricuspid regurgitation; RVSP: 40 mmHg. No evidence of any pericardial effusion. Grade I diastolic dysfunction . ASSESSMENT:    1. Multifocal pneumonia    2. COPD, very severe ([address]    3. Pulmonary nodule    4. Shortness of breath    5. Chronic hypoxemic respiratory failure (HCC)    6. Former smoker    7. Mild pulmonary hypertension (HCC)          PLAN:  I will make no change in her current bronchodilator therapy. If she develops more bronchospasm over the next several weeks I would add another course of oral prednisone. I will plan on repeating her CT chest in 3 months to follow-up on the multiple lung nodules in the make sure she has complete clearing of her recent left-sided multifocal pneumonia. We have discussed the need to maintain yearly flu immunization, pneumococcal vaccination. We have discussed Coronavirus precaution including handwashing practice, wiping items touched in public such as gas pumps, door handles, shopping carts, etc. Self monitoring for infection - fever, chills, cough, SOB. Should they develop symptoms they should call office for further instructions.     Return in about 3 months (around 12/20/2022) for Recheck for COPD, Recheck for Shortness of Breath, recheck for lung nodule. This dictation was performed with a verbal recognition program and it was checked for errors. It is possible that there are still dictated errors within this office note. Any errors should be brought immediately to my attention for correction. All efforts were made to ensure that this office note is accurate.

## 2022-10-03 LAB — MAMMOGRAPHY, EXTERNAL: NORMAL

## 2022-10-07 ENCOUNTER — APPOINTMENT (OUTPATIENT)
Dept: CT IMAGING | Age: 77
End: 2022-10-07
Payer: MEDICARE

## 2022-10-07 ENCOUNTER — APPOINTMENT (OUTPATIENT)
Dept: GENERAL RADIOLOGY | Age: 77
End: 2022-10-07
Payer: MEDICARE

## 2022-10-07 ENCOUNTER — HOSPITAL ENCOUNTER (EMERGENCY)
Age: 77
Discharge: HOME | End: 2022-10-07
Attending: EMERGENCY MEDICINE
Payer: MEDICARE

## 2022-10-07 VITALS
DIASTOLIC BLOOD PRESSURE: 63 MMHG | RESPIRATION RATE: 28 BRPM | BODY MASS INDEX: 24.46 KG/M2 | OXYGEN SATURATION: 99 % | SYSTOLIC BLOOD PRESSURE: 157 MMHG | WEIGHT: 147 LBS | TEMPERATURE: 98.2 F | HEART RATE: 99 BPM

## 2022-10-07 DIAGNOSIS — J44.1 COPD EXACERBATION (HCC): ICD-10-CM

## 2022-10-07 DIAGNOSIS — J18.9 PNEUMONIA DUE TO INFECTIOUS ORGANISM, UNSPECIFIED LATERALITY, UNSPECIFIED PART OF LUNG: Primary | ICD-10-CM

## 2022-10-07 DIAGNOSIS — R06.89 DYSPNEA AND RESPIRATORY ABNORMALITIES: ICD-10-CM

## 2022-10-07 DIAGNOSIS — R06.00 DYSPNEA AND RESPIRATORY ABNORMALITIES: ICD-10-CM

## 2022-10-07 DIAGNOSIS — R91.1 PULMONARY NODULE: ICD-10-CM

## 2022-10-07 LAB
ALBUMIN SERPL-MCNC: 3.7 GM/DL (ref 3.4–5)
ALP BLD-CCNC: 72 IU/L (ref 40–129)
ALT SERPL-CCNC: 23 U/L (ref 10–40)
ANION GAP SERPL CALCULATED.3IONS-SCNC: 7 MMOL/L (ref 4–16)
AST SERPL-CCNC: 29 IU/L (ref 15–37)
BASOPHILS ABSOLUTE: 0 K/CU MM
BASOPHILS RELATIVE PERCENT: 0.3 % (ref 0–1)
BILIRUB SERPL-MCNC: 0.4 MG/DL (ref 0–1)
BILIRUBIN URINE: NEGATIVE
BLOOD, URINE: NEGATIVE
BUN BLDV-MCNC: 19 MG/DL (ref 6–23)
CALCIUM SERPL-MCNC: 9.6 MG/DL (ref 8.3–10.6)
CHLORIDE BLD-SCNC: 94 MMOL/L (ref 99–110)
CLARITY: CLEAR
CO2: 33 MMOL/L (ref 21–32)
COLOR: YELLOW
CREAT SERPL-MCNC: 0.7 MG/DL (ref 0.6–1.1)
DIFFERENTIAL TYPE: ABNORMAL
EOSINOPHILS ABSOLUTE: 0 K/CU MM
EOSINOPHILS RELATIVE PERCENT: 0.6 % (ref 0–3)
GFR AFRICAN AMERICAN: >60 ML/MIN/1.73M2
GFR NON-AFRICAN AMERICAN: >60 ML/MIN/1.73M2
GLUCOSE BLD-MCNC: 123 MG/DL (ref 70–99)
GLUCOSE, URINE: NEGATIVE MG/DL
HCT VFR BLD CALC: 41.5 % (ref 37–47)
HEMOGLOBIN: 13.3 GM/DL (ref 12.5–16)
IMMATURE NEUTROPHIL %: 0.3 % (ref 0–0.43)
KETONES, URINE: NEGATIVE MG/DL
LEUKOCYTE ESTERASE, URINE: NEGATIVE
LYMPHOCYTES ABSOLUTE: 0.7 K/CU MM
LYMPHOCYTES RELATIVE PERCENT: 9.6 % (ref 24–44)
MCH RBC QN AUTO: 31.1 PG (ref 27–31)
MCHC RBC AUTO-ENTMCNC: 32 % (ref 32–36)
MCV RBC AUTO: 97 FL (ref 78–100)
MONOCYTES ABSOLUTE: 0.5 K/CU MM
MONOCYTES RELATIVE PERCENT: 7.5 % (ref 0–4)
NITRITE URINE, QUANTITATIVE: NEGATIVE
NUCLEATED RBC %: 0 %
PDW BLD-RTO: 14.5 % (ref 11.7–14.9)
PH, URINE: 7.5
PLATELET # BLD: 189 K/CU MM (ref 140–440)
PMV BLD AUTO: 10.2 FL (ref 7.5–11.1)
POTASSIUM SERPL-SCNC: 4 MMOL/L (ref 3.5–5.1)
PRO-BNP: 145.3 PG/ML
PROTEIN UA: NEGATIVE MG/DL
RBC # BLD: 4.28 M/CU MM (ref 4.2–5.4)
SARS-COV-2, NAAT: NOT DETECTED
SEGMENTED NEUTROPHILS ABSOLUTE COUNT: 5.9 K/CU MM
SEGMENTED NEUTROPHILS RELATIVE PERCENT: 81.7 % (ref 36–66)
SODIUM BLD-SCNC: 134 MMOL/L (ref 135–145)
SOURCE: NORMAL
SPECIFIC GRAVITY UA: <1.005
TOTAL IMMATURE NEUTOROPHIL: 0.02 K/CU MM
TOTAL NUCLEATED RBC: 0 K/CU MM
TOTAL PROTEIN: 6.2 GM/DL (ref 6.4–8.2)
TROPONIN T: <0.01 NG/ML
UROBILINOGEN, URINE: 0.2 MG/DL
WBC # BLD: 7.2 K/CU MM (ref 4–10.5)

## 2022-10-07 PROCEDURE — 85025 COMPLETE CBC W/AUTO DIFF WBC: CPT

## 2022-10-07 PROCEDURE — 84484 ASSAY OF TROPONIN QUANT: CPT

## 2022-10-07 PROCEDURE — 99285 EMERGENCY DEPT VISIT HI MDM: CPT

## 2022-10-07 PROCEDURE — 2700000000 HC OXYGEN THERAPY PER DAY

## 2022-10-07 PROCEDURE — 2580000003 HC RX 258: Performed by: EMERGENCY MEDICINE

## 2022-10-07 PROCEDURE — 6370000000 HC RX 637 (ALT 250 FOR IP): Performed by: NURSE PRACTITIONER

## 2022-10-07 PROCEDURE — 6360000004 HC RX CONTRAST MEDICATION: Performed by: EMERGENCY MEDICINE

## 2022-10-07 PROCEDURE — 71045 X-RAY EXAM CHEST 1 VIEW: CPT

## 2022-10-07 PROCEDURE — 94640 AIRWAY INHALATION TREATMENT: CPT

## 2022-10-07 PROCEDURE — 94664 DEMO&/EVAL PT USE INHALER: CPT

## 2022-10-07 PROCEDURE — 96375 TX/PRO/DX INJ NEW DRUG ADDON: CPT

## 2022-10-07 PROCEDURE — 83880 ASSAY OF NATRIURETIC PEPTIDE: CPT

## 2022-10-07 PROCEDURE — 6360000002 HC RX W HCPCS: Performed by: NURSE PRACTITIONER

## 2022-10-07 PROCEDURE — 93005 ELECTROCARDIOGRAM TRACING: CPT | Performed by: EMERGENCY MEDICINE

## 2022-10-07 PROCEDURE — 96365 THER/PROPH/DIAG IV INF INIT: CPT

## 2022-10-07 PROCEDURE — 6360000002 HC RX W HCPCS: Performed by: EMERGENCY MEDICINE

## 2022-10-07 PROCEDURE — 87635 SARS-COV-2 COVID-19 AMP PRB: CPT

## 2022-10-07 PROCEDURE — 80053 COMPREHEN METABOLIC PANEL: CPT

## 2022-10-07 PROCEDURE — 71275 CT ANGIOGRAPHY CHEST: CPT

## 2022-10-07 PROCEDURE — 81003 URINALYSIS AUTO W/O SCOPE: CPT

## 2022-10-07 RX ORDER — METHYLPREDNISOLONE SODIUM SUCCINATE 125 MG/2ML
60 INJECTION, POWDER, LYOPHILIZED, FOR SOLUTION INTRAMUSCULAR; INTRAVENOUS ONCE
Status: COMPLETED | OUTPATIENT
Start: 2022-10-07 | End: 2022-10-07

## 2022-10-07 RX ORDER — PREDNISONE 10 MG/1
60 TABLET ORAL DAILY
Qty: 30 TABLET | Refills: 0 | Status: SHIPPED | OUTPATIENT
Start: 2022-10-08 | End: 2022-10-13

## 2022-10-07 RX ORDER — ALBUTEROL SULFATE 90 UG/1
2 AEROSOL, METERED RESPIRATORY (INHALATION) ONCE
Status: COMPLETED | OUTPATIENT
Start: 2022-10-07 | End: 2022-10-07

## 2022-10-07 RX ORDER — AZITHROMYCIN 250 MG/1
250 TABLET, FILM COATED ORAL DAILY
Qty: 4 TABLET | Refills: 0 | Status: SHIPPED | OUTPATIENT
Start: 2022-10-07 | End: 2022-10-11

## 2022-10-07 RX ORDER — BENZONATATE 100 MG/1
100 CAPSULE ORAL 3 TIMES DAILY PRN
Qty: 10 CAPSULE | Refills: 0 | Status: SHIPPED | OUTPATIENT
Start: 2022-10-07 | End: 2022-10-14

## 2022-10-07 RX ORDER — ALBUTEROL SULFATE 90 UG/1
2 AEROSOL, METERED RESPIRATORY (INHALATION) EVERY 4 HOURS PRN
Qty: 18 G | Refills: 1 | Status: SHIPPED | OUTPATIENT
Start: 2022-10-07 | End: 2022-11-06

## 2022-10-07 RX ORDER — GUAIFENESIN/DEXTROMETHORPHAN 100-10MG/5
5 SYRUP ORAL 4 TIMES DAILY PRN
Qty: 120 ML | Refills: 0 | Status: SHIPPED | OUTPATIENT
Start: 2022-10-07 | End: 2022-10-17

## 2022-10-07 RX ADMIN — ALBUTEROL SULFATE 2 PUFF: 90 AEROSOL, METERED RESPIRATORY (INHALATION) at 17:58

## 2022-10-07 RX ADMIN — METHYLPREDNISOLONE SODIUM SUCCINATE 60 MG: 125 INJECTION, POWDER, FOR SOLUTION INTRAMUSCULAR; INTRAVENOUS at 18:08

## 2022-10-07 RX ADMIN — IOPAMIDOL 75 ML: 755 INJECTION, SOLUTION INTRAVENOUS at 19:19

## 2022-10-07 RX ADMIN — DEXTROSE MONOHYDRATE 500 MG: 50 INJECTION, SOLUTION INTRAVENOUS at 21:43

## 2022-10-07 ASSESSMENT — ENCOUNTER SYMPTOMS
SHORTNESS OF BREATH: 1
TROUBLE SWALLOWING: 0
BACK PAIN: 1
NAUSEA: 0
SORE THROAT: 0
VOMITING: 0
DIARRHEA: 0
EYE PAIN: 0
ABDOMINAL PAIN: 0
EYE REDNESS: 0

## 2022-10-07 NOTE — ED PROVIDER NOTES
[address] ENCOUNTER        Pt Name: Jane Mccarthy  MRN: [id number]  [place] 1945  Date of evaluation: 10/7/2022  Provider: IMANI Patel - AIME  PCP: Ella Sidhu DO  Note Started: 5:08 PM EDT       I have seen and evaluated this patient with my supervising physician No att. providers found. Triage CHIEF COMPLAINT       Chief Complaint   Patient presents with    Back Pain    Shortness of Breath         HISTORY OF PRESENT ILLNESS   (Location/Symptom, Timing/Onset, Context/Setting, Quality, Duration, Modifying Factors, Severity)  Note limiting factors. Chief Complaint: Back pain, shortness of breath. Jane Mccarthy is a 68 y.o. female who presents to the emergency department with right thoracic back pain and associated shortness of breath. She noticed minor symptoms yesterday but today they have increased significantly. She denies any nausea, vomiting, diarrhea or fever. She has not had a productive cough. No urinary difficulty. No chest pain. Her back and increases with inspiration. The patient has a history of COPD. She was hospitalized approximately a month ago at this facility for pneumonia, COPD exacerbation and hypokalemia. Nursing Notes were all reviewed and agreed with or any disagreements were addressed in the HPI. REVIEW OF SYSTEMS    (2-9 systems for level 4, 10 or more for level 5)     Review of Systems   Constitutional:  Negative for appetite change, fatigue and fever. HENT:  Negative for congestion, dental problem, ear pain, hearing loss, sore throat and trouble swallowing. Eyes:  Negative for pain, redness and visual disturbance. Respiratory:  Positive for shortness of breath. Cardiovascular:  Negative for chest pain and leg swelling. Gastrointestinal:  Negative for abdominal pain, diarrhea, nausea and vomiting.    Genitourinary:  Negative for difficulty urinating, dysuria, frequency, hematuria and urgency. Musculoskeletal:  Positive for back pain. Negative for neck pain. Skin:  Negative for rash. Neurological:  Negative for speech difficulty, weakness, numbness and headaches. Psychiatric/Behavioral:  Negative for behavioral problems. PAST MEDICAL HISTORY     Past Medical History:   Diagnosis Date    Anticoagulant long-term use     Arthritis     Cancer ([facility] [address]     Chronic hypoxemic respiratory failure ([facility] [address] 2/6/2018    Community acquired pneumonia of right upper lobe of lung 3/19/2022    COPD (chronic obstructive pulmonary disease) ([facility])     Coronary atherosclerosis     COVID-19 virus detected 1/18/2021    DVT (deep venous thrombosis) ([facility])     Former smoker 11/22/2021    Gastroesophageal reflux disease     Hiatal hernia     Lung infiltrate on CT 1/22/2019    On home oxygen therapy     on 24/7    Osteopenia     Phlebitis     Pulmonary nodule 7/5/2016    S/P left heart catheterization by percutaneous approach 6/27/2013    Sepsis due to pneumonia ([facility] [address] 11/14/2017    Shortness of breath 9/23/2019    Shortness of breath 1/18/2021    Shortness of breath 11/22/2021    Wears glasses        SURGICAL HISTORY     Past Surgical History:   Procedure Laterality Date    Riddhi Perez    COLONOSCOPY      ENDOSCOPY, COLON, DIAGNOSTIC      TONSILLECTOMY      TUBAL LIGATION      [facility] Medication List as of 10/7/2022 10:43 PM        CONTINUE these medications which have NOT CHANGED    Details   levothyroxine (SYNTHROID) 50 MCG tablet TAKE 1 TABLET BY MOUTH EVERY DAY, Disp-90 tablet, R-1Normal      montelukast (SINGULAIR) 10 MG tablet TAKE 1 TABLET BY MOUTH EVERY DAY EVERY NIGHT, Disp-90 tablet, R-0Normal      gabapentin (NEURONTIN) 400 MG capsule Take 1 capsule by mouth every evening for 90 days. , Disp-90 capsule, R-0Normal      Roflumilast (DALIRESP) 500 MCG tablet TAKE 1 TABLET BY MOUTH EVERY DAY, Disp-90 tablet, R-1Normal      celecoxib (CELEBREX) 200 MG capsule TAKE 1 CAPSULE BY MOUTH EVERY DAY, Disp-90 capsule, R-0Normal      Magnesium Oxide 500 MG TABS TAKE 1 TABLET BY MOUTH EVERY DAY, Disp-90 tablet, R-0Normal      esomeprazole (NEXIUM) 40 MG delayed release capsule Take 1 capsule by mouth in the morning and at bedtime, Disp-180 capsule, R-1Normal      !! predniSONE (DELTASONE) 5 MG tablet TAKE 1 TABLET BY MOUTH EVERY DAY, Disp-30 tablet, R-0Normal      WARFARIN SODIUM PO Take by mouth nightly 09/08/22 Per anti-coag clinic patient takes 6 mg, 6 mg, 3 mg takes at night last dose 6 mg dose for 09/08/22 6 mg dose for 0909/22 3 mg. Patient follows anti-coag monthly schedule. Historical Med      Potassium Chloride (KLOR-CON PO) Take by mouth See Admin Instructions 09/08/22 patient alternates days with 1-10 MEQ tablet then, 2-10 MEQ(20 MEQ) every other day. Historical Med      risedronate (ACTONEL) 35 MG tablet TAKE 1 TABLET BY MOUTH EVERY 7 DAYS PATIENT TAKES ON SUNDAY, Disp-12 tablet, R-1Normal      rosuvastatin (CRESTOR) 20 MG tablet TAKE 1 TABLET BY MOUTH EVERY DAY EVERY NIGHT, Disp-90 tablet, R-1Normal      theophylline (THEODUR) 450 MG extended release tablet TAKE 1/2 TABLET BY MOUTH TWICE A DAY, Disp-90 tablet, R-1Normal      Budeson-Glycopyrrol-Formoterol (BREZTRI AEROSPHERE) 160-9-4.8 MCG/ACT AERO Inhale 2 sprays into the lungs 2 times daily, Disp-1 each, R-11Normal      hydrOXYzine HCl (ATARAX) 25 MG tablet TAKE 1 TABLET BY MOUTH NIGHTLY, Disp-30 tablet, R-2Normal      !! albuterol sulfate  (90 Base) MCG/ACT inhaler INHALE 2 PUFF FOUR TIMES A DAY AS NEEDED, Disp-1 each, R-5Normal      torsemide (DEMADEX) 20 MG tablet Take 20 mg by mouth dailyHistorical Med      albuterol (PROVENTIL) (2.5 MG/3ML) 0.083% nebulizer solution Take 3 mLs by nebulization 4 times daily as needed for Wheezing, Disp-120 each, R-5Normal      ipratropium (ATROVENT HFA) 17 MCG/ACT inhaler Inhale 2 puffs into the lungs every 6-8 hours as neededHistorical Med      dilTIAZem (CARDIZEM) 60 MG tablet Take 1 tablet by mouth every 8 hours, Disp-120 tablet, R-3Normal      Dextromethorphan-guaiFENesin (MUCINEX DM)  MG TB12 Take 1 to 2 tablet by mouth every 12 hours (maximum: 4 tablets/24 hours). Drink plenty of water., Disp-28 tablet,R-0Normal      nitroGLYCERIN (NITROSTAT) 0.4 MG SL tablet Place 1 tablet under the tongue every 5 minutes as needed for Chest pain, Disp-25 tablet, R-1Print      dicyclomine (BENTYL) 10 MG capsule Take 10 mg by mouth 2 times dailyHistorical Med      Biotin 73284 MCG TABS Take 10,000 mcg by mouth dailyHistorical Med      b complex vitamins capsule Take 1 capsule by mouth dailyHistorical Med      Calcium Carb-Cholecalciferol (CALCIUM + D3) 600-200 MG-UNIT TABS Take 1 tablet by mouth 2 times dailyHistorical Med      Multiple Vitamins-Minerals (MULTIVITAMIN PO) Take 1 tablet by mouth dailyHistorical Med      OXYGEN Inhale 2 L/hr into the lungs continuous. Historical Med       !! - Potential duplicate medications found. Please discuss with provider.           ALLERGIES     Codeine, Darvon [propoxyphene hcl], Lamisil [terbinafine hcl], Morphine, Neosporin [neomycin-polymyxin-gramicidin], and Pcn [penicillins]    FAMILYHISTORY       Family History   Problem Relation Age of Onset    Heart Disease Mother     Cancer Father         lung ca    Heart Disease Father     COPD Father     Cancer Sister     Heart Disease Sister         SOCIAL HISTORY       Social History     Socioeconomic History    Marital status:      Spouse name: None    Number of children: None    Years of education: None    Highest education level: None   Tobacco Use    Smoking status: Former     Packs/day: 1.50     Years: 26.00     Pack years: 39.00     Types: Cigarettes     Start date: 65     Quit date: 1991     Years since quittin.9    Smokeless tobacco: Never   Vaping Use    Vaping Use: Never used Substance and Sexual Activity    Alcohol use: No    Drug use: No    Sexual activity: Not Currently     Partners: Male       SCREENINGS    [place] Coma Scale  Eye Opening: Spontaneous  Best Verbal Response: Oriented  Best Motor Response: Obeys commands  [place] Coma Scale Score: 15        PHYSICAL EXAM    (up to 7 for level 4, 8 or more for level 5)     ED Triage Vitals [10/07/22 1648]   BP Temp Temp src Heart Rate Resp SpO2 Height Weight   (!) 226/87 98.2 °F (36.8 °C) -- 87 18 94 % -- 147 lb (66.7 kg)       Physical Exam  Vitals and nursing note reviewed. HENT:      Head: Normocephalic and atraumatic. Nose: Nose normal.      Mouth/Throat:      Mouth: Mucous membranes are moist.   Eyes:      Extraocular Movements: Extraocular movements intact. Conjunctiva/sclera: Conjunctivae normal.   Cardiovascular:      Rate and Rhythm: Normal rate and regular rhythm. Heart sounds: Normal heart sounds. No murmur heard. No gallop. Pulmonary:      Effort: Pulmonary effort is normal. No tachypnea or respiratory distress. Breath sounds: Decreased air movement present. No stridor. Examination of the right-upper field reveals decreased breath sounds. Examination of the left-upper field reveals decreased breath sounds. Examination of the right-middle field reveals decreased breath sounds. Examination of the left-middle field reveals decreased breath sounds. Examination of the right-lower field reveals decreased breath sounds. Examination of the left-lower field reveals decreased breath sounds. Decreased breath sounds present. No wheezing, rhonchi or rales. Abdominal:      Palpations: Abdomen is soft. Tenderness: There is no guarding. Musculoskeletal:         General: Normal range of motion. Cervical back: Normal range of motion. Thoracic back: Tenderness present. Back:       Right lower leg: No tenderness. No edema. Left lower leg: No tenderness. No edema.    Skin:     General: Skin is warm and dry. Neurological:      General: No focal deficit present. Mental Status: She is alert. Psychiatric:         Mood and Affect: Mood normal.       DIAGNOSTIC RESULTS   LABS:    Labs Reviewed   CBC WITH AUTO DIFFERENTIAL - Abnormal; Notable for the following components:       Result Value    MCH 31.1 (*)     Segs Relative 81.7 (*)     Lymphocytes % 9.6 (*)     Monocytes % 7.5 (*)     All other components within normal limits   COMPREHENSIVE METABOLIC PANEL - Abnormal; Notable for the following components:    Sodium 134 (*)     Chloride 94 (*)     CO2 33 (*)     Glucose 123 (*)     Total Protein 6.2 (*)     All other components within normal limits   COVID-19, RAPID   URINALYSIS   TROPONIN   BRAIN NATRIURETIC PEPTIDE       When ordered, only abnormal lab results are displayed. All other labs were within normal range or not returned as of this dictation. EKG: When ordered, EKG's are interpreted by the Emergency Department Physician in the absence of a cardiologist.  Please see their note for interpretation of EKG. RADIOLOGY:   Non-plain film images such as CT, Ultrasound and MRI are read by the radiologist. Plain radiographic images are visualized andpreliminarily interpreted by the  ED Provider with the below findings:    Per Radiologist interpretation below, if available at the time of this note:    CTA PULMONARY W CONTRAST   Final Result   1. No pulmonary embolism. 2. New patchy right upper lobe opacities which could reflect pneumonia. Patchy opacities in the left upper lobe appear improved. 3. The new 6 mm right lower lobe nodule is no longer identified, so CT   follow-up is not necessary. 4. Emphysema and areas of fibrosis, as before. XR CHEST PORTABLE   Final Result   1. No acute cardiopulmonary process identified.              PROCEDURES   Unless otherwise noted below, none     Procedures    CRITICAL CARE TIME     N/A    CONSULTS:  None      EMERGENCY DEPARTMENT COURSE and DIFFERENTIAL DIAGNOSIS/MDM:   Vitals:    Vitals:    10/07/22 2132 10/07/22 2202 10/07/22 2232 10/07/22 2253   BP: (!) 173/74 (!) 168/75 (!) 145/70 (!) 157/63   Pulse: 80 81 90 99   Resp: 18 18 13 28   Temp:       SpO2: 97% 98% 99% 99%   Weight:           Is this patient to be included in the SEP-1 Core Measure due to severe sepsis or septic shock? No   Exclusion criteria - the patient is NOT to be included for SEP-1 Core Measure due to:  2+ SIRS criteria are not met     This is an alert and oriented x4, 68 y.o. yo female who presents emergency department with upper thoracic back pain and associated shortness of breath. Patient is on O2 at all times at 2 L/min via nasal cannula for emphysema. Patient has nonlabored respirations. Vital signs within acceptable parameters. Patient is afebrile. Pink and dry. She does have diminished lung sounds throughout. Though her O2 saturation is 97% on 2 L of O2 via nasal cannula. Has tenderness to the right thoracic area. There is no midline tenderness noted. Has no focal neurodeficits noted. Assessment as noted above. EKG obtained. Patient was given thefollowing medications:  Medications   methylPREDNISolone sodium (SOLU-MEDROL) injection 60 mg (60 mg IntraVENous Given 10/7/22 1808)   albuterol sulfate HFA (PROVENTIL;VENTOLIN;PROAIR) 108 (90 Base) MCG/ACT inhaler 2 puff (2 puffs Inhalation Given 10/7/22 1758)   iopamidol (ISOVUE-370) 76 % injection 75 mL (75 mLs IntraVENous Given 10/7/22 1919)   azithromycin (ZITHROMAX) 500 mg in dextrose 5 % 250 mL IVPB ([id number]) (0 mg IntraVENous Stopped 10/7/22 2243)     2030    I have signed out Travis Renae 's emergency department care to Dr. Neil Jiménez. We discussed the pertinent history, physical exam, completed/pending test results (if applicable) and current treatment plan. Please refer to his/her chart for the patient's remaining emergency department course and final disposition.        FINAL IMPRESSION 1. Pneumonia due to infectious organism, unspecified laterality, unspecified part of lung    2. Dyspnea and respiratory abnormalities    3. Pulmonary nodule    4. COPD exacerbation [facility])          DISPOSITION/PLAN   DISPOSITION Decision To Discharge 10/07/2022 11:04:25 PM      PATIENT REFERREDTO:  Sincere Bullard, DO  [address] [facility] [address]   [phone]    Schedule an appointment as soon as possible for a visit in 1 day      [facility] Emergency Department  [place] [id number] [phone]    If symptoms worsen    DISCHARGE MEDICATIONS:  Discharge Medication List as of 10/7/2022 10:43 PM        START taking these medications    Details   azithromycin (ZITHROMAX) 250 MG tablet Take 1 tablet by mouth daily for 4 days, Disp-4 tablet, R-0Print      guaiFENesin-dextromethorphan (ROBITUSSIN DM) 100-10 MG/5ML syrup Take 5 mLs by mouth 4 times daily as needed for Cough, Disp-120 mL, R-0Print      benzonatate (TESSALON PERLES) 100 MG capsule Take 1 capsule by mouth 3 times daily as needed for Cough, Disp-10 capsule, R-0Print      !! predniSONE (DELTASONE) 10 MG tablet Take 6 tablets by mouth daily for 5 days, Disp-30 tablet, R-0Print      !! albuterol sulfate HFA (PROVENTIL HFA) 108 (90 Base) MCG/ACT inhaler Inhale 2 puffs into the lungs every 4 hours as needed for Wheezing or Shortness of Breath With spacer (and mask if indicated). Thanks. , Disp-18 g, R-1Print       !! - Potential duplicate medications found. Please discuss with provider. DISCONTINUED MEDICATIONS:  Discharge Medication List as of 10/7/2022 10:43 PM            Comment: Please note this report has been produced using speech recognition software and may contain errors related to that system including errors in grammar, punctuation, and spelling, as well as words and phrases that may be inappropriate.  If there are any questions or concerns please feel free to contact the dictating provider for clarification.     IMANI Ruiz CNP (electronically signed)      IMANI Ruiz CNP  10/12/22 [id number]

## 2022-10-07 NOTE — ED PROVIDER NOTES
I independently examined and evaluated Jason Cortez. In brief their history revealed  68 y.o. female who presents to the emergency department with right thoracic back pain and associated shortness of breath. She noticed minor symptoms yesterday but today they have increased significantly. She denies any nausea, vomiting, diarrhea or fever. She has not had a productive cough. No urinary difficulty. No chest pain. Her back and increases with inspiration. The patient has a history of COPD. She was hospitalized approximately a month ago at this facility for pneumonia, COPD exacerbation and hypokalemia. Their focused exam revealed alert oriented female resting in bed no distress normocephalic atraumatic sclera clear. Lungs have coarse breath sounds bilaterally some wheezing. No stridor no drooling. Cranial nerves grossly intact abdomen soft nontender bowel sounds present. 5 5 strength throughout skin has no rash or swelling heart regular rate and rhythm 2+ pulses throughout    ED course: Patient seen with NP please see her note. Patient here with shortness of breath back pain has COPD wears 2 L of oxygen all time. On her home O2. She denies any fevers or chest pain or abdominal pain. Denies any other questions or concerns. Patient was given breathing treatments, steroids, medication. Work-up performed she appears to have pneumonia and pulmonary nodules. She is aware of her pulmonary nodules. I gave her azithromycin here breathing treatment steroids cardiac work-up negative EKG is negative. At this time she is okay for outpatient follow-up her curb 65 score is 1 she feels okay with plan she feels like if she gets worse she will come back but she is okay with going home with antibiotics steroids breathing treatments. Otherwise patient stable appears nontoxic nonseptic I do not think additional labs or imaging are admission. COVID is negative. Stable. Discharge.   She was given first dose of antibiotics here. Aakash Grijalva (or their surrogate) has been informed of the pulmonary nodule and the need to have this followed on a long-term basis by their primary care physician as this could become cancerous on the future. They voice understanding and agree with this plan. All diagnostic, treatment, and disposition decisions were made by myself in conjunction with the Advanced Practice Provider. For all further details of the patient's emergency department visit, please see the Advanced Practice Provider's documentation. 12 lead EKG per my interpretation:  Normal Sinus Rhythm 76 PVC's  Lutz is   Left axis deviation  QTc is   438  There is no specific T wave changes appreciated. There is no specific ST wave changes appreciated.     Prior EKG to compare with was not available         Rebeka Zuniga, DO  10/07/22 [address], DO  10/07/22 220

## 2022-10-08 LAB
EKG ATRIAL RATE: 76 BPM
EKG DIAGNOSIS: NORMAL
EKG P AXIS: 63 DEGREES
EKG P-R INTERVAL: 138 MS
EKG Q-T INTERVAL: 390 MS
EKG QRS DURATION: 114 MS
EKG QTC CALCULATION (BAZETT): 438 MS
EKG R AXIS: -37 DEGREES
EKG T AXIS: 67 DEGREES
EKG VENTRICULAR RATE: 76 BPM

## 2022-10-08 PROCEDURE — 93010 ELECTROCARDIOGRAM REPORT: CPT | Performed by: INTERNAL MEDICINE

## 2022-10-08 NOTE — ED NOTES
Discharge packet reviewed with patient, including prescriptions. Patient verbalized understanding and denies questions.       Kelli Morejon RN  10/07/22 5044

## 2022-10-10 ENCOUNTER — TELEPHONE (OUTPATIENT)
Dept: PULMONOLOGY | Age: 77
End: 2022-10-10

## 2022-10-10 NOTE — TELEPHONE ENCOUNTER
Pt wanted you to know that she was in the hospital on 10/7/2022. Pt states the doctor found a small amount of pneumonia. She was prescribed azithromycin and prednisone.

## 2022-10-14 ENCOUNTER — APPOINTMENT (OUTPATIENT)
Dept: GENERAL RADIOLOGY | Age: 77
End: 2022-10-14
Payer: MEDICARE

## 2022-10-14 ENCOUNTER — HOSPITAL ENCOUNTER (EMERGENCY)
Age: 77
Discharge: HOME | End: 2022-10-14
Attending: EMERGENCY MEDICINE
Payer: MEDICARE

## 2022-10-14 ENCOUNTER — NURSE ONLY (OUTPATIENT)
Dept: FAMILY MEDICINE CLINIC | Age: 77
End: 2022-10-14
Payer: MEDICARE

## 2022-10-14 VITALS
OXYGEN SATURATION: 95 % | DIASTOLIC BLOOD PRESSURE: 66 MMHG | SYSTOLIC BLOOD PRESSURE: 145 MMHG | TEMPERATURE: 98.1 F | RESPIRATION RATE: 16 BRPM | HEART RATE: 70 BPM

## 2022-10-14 DIAGNOSIS — Z79.01 ANTICOAGULANT LONG-TERM USE: Primary | ICD-10-CM

## 2022-10-14 DIAGNOSIS — R10.9 FLANK PAIN: Primary | ICD-10-CM

## 2022-10-14 DIAGNOSIS — J18.9 PNEUMONIA OF RIGHT LUNG DUE TO INFECTIOUS ORGANISM, UNSPECIFIED PART OF LUNG: ICD-10-CM

## 2022-10-14 LAB
ANION GAP SERPL CALCULATED.3IONS-SCNC: 12 MMOL/L (ref 4–16)
BACTERIA: ABNORMAL /HPF
BASOPHILS ABSOLUTE: 0 K/CU MM
BASOPHILS RELATIVE PERCENT: 0.3 % (ref 0–1)
BILIRUBIN URINE: NEGATIVE MG/DL
BLOOD, URINE: NEGATIVE
BUN BLDV-MCNC: 24 MG/DL (ref 6–23)
CALCIUM SERPL-MCNC: 10 MG/DL (ref 8.3–10.6)
CAST TYPE: ABNORMAL /HPF
CHLORIDE BLD-SCNC: 96 MMOL/L (ref 99–110)
CLARITY: CLEAR
CO2: 33 MMOL/L (ref 21–32)
COLOR: YELLOW
CREAT SERPL-MCNC: 0.9 MG/DL (ref 0.6–1.1)
CRYSTAL TYPE: NEGATIVE /HPF
D DIMER: <0.47 UG/ML (FEU)
DIFFERENTIAL TYPE: ABNORMAL
EKG ATRIAL RATE: 70 BPM
EKG DIAGNOSIS: NORMAL
EKG P AXIS: 65 DEGREES
EKG P-R INTERVAL: 126 MS
EKG Q-T INTERVAL: 400 MS
EKG QRS DURATION: 114 MS
EKG QTC CALCULATION (BAZETT): 432 MS
EKG R AXIS: -38 DEGREES
EKG T AXIS: 64 DEGREES
EKG VENTRICULAR RATE: 70 BPM
EOSINOPHILS ABSOLUTE: 0 K/CU MM
EOSINOPHILS RELATIVE PERCENT: 0.4 % (ref 0–3)
EPITHELIAL CELLS, UA: 2 /HPF
GFR AFRICAN AMERICAN: >60 ML/MIN/1.73M2
GFR NON-AFRICAN AMERICAN: >60 ML/MIN/1.73M2
GLUCOSE BLD-MCNC: 118 MG/DL (ref 70–99)
GLUCOSE, URINE: NEGATIVE MG/DL
HCT VFR BLD CALC: 42.1 % (ref 37–47)
HEMOGLOBIN: 13.8 GM/DL (ref 12.5–16)
IMMATURE NEUTROPHIL %: 1.1 % (ref 0–0.43)
INTERNATIONAL NORMALIZATION RATIO, POC: 4
KETONES, URINE: ABNORMAL MG/DL
LEUKOCYTE ESTERASE, URINE: ABNORMAL
LYMPHOCYTES ABSOLUTE: 2.3 K/CU MM
LYMPHOCYTES RELATIVE PERCENT: 20.6 % (ref 24–44)
MCH RBC QN AUTO: 31.1 PG (ref 27–31)
MCHC RBC AUTO-ENTMCNC: 32.8 % (ref 32–36)
MCV RBC AUTO: 94.8 FL (ref 78–100)
MONOCYTES ABSOLUTE: 1 K/CU MM
MONOCYTES RELATIVE PERCENT: 8.7 % (ref 0–4)
NITRITE URINE, QUANTITATIVE: NEGATIVE
PDW BLD-RTO: 14.5 % (ref 11.7–14.9)
PH, URINE: 5.5 (ref 5–8)
PLATELET # BLD: 247 K/CU MM (ref 140–440)
PMV BLD AUTO: 9.8 FL (ref 7.5–11.1)
POTASSIUM SERPL-SCNC: 3.5 MMOL/L (ref 3.5–5.1)
PROTEIN UA: NEGATIVE MG/DL
RBC # BLD: 4.44 M/CU MM (ref 4.2–5.4)
RBC URINE: 0 /HPF (ref 0–6)
SEGMENTED NEUTROPHILS ABSOLUTE COUNT: 7.8 K/CU MM
SEGMENTED NEUTROPHILS RELATIVE PERCENT: 68.9 % (ref 36–66)
SODIUM BLD-SCNC: 141 MMOL/L (ref 135–145)
SPECIFIC GRAVITY UA: 1.02 (ref 1–1.03)
TOTAL IMMATURE NEUTOROPHIL: 0.12 K/CU MM
UROBILINOGEN, URINE: 0.2 MG/DL (ref 0.2–1)
WBC # BLD: 11.3 K/CU MM (ref 4–10.5)
WBC UA: 4 /HPF (ref 0–5)

## 2022-10-14 PROCEDURE — 81001 URINALYSIS AUTO W/SCOPE: CPT

## 2022-10-14 PROCEDURE — 93005 ELECTROCARDIOGRAM TRACING: CPT | Performed by: EMERGENCY MEDICINE

## 2022-10-14 PROCEDURE — 80048 BASIC METABOLIC PNL TOTAL CA: CPT

## 2022-10-14 PROCEDURE — 71046 X-RAY EXAM CHEST 2 VIEWS: CPT

## 2022-10-14 PROCEDURE — 85379 FIBRIN DEGRADATION QUANT: CPT

## 2022-10-14 PROCEDURE — 84145 PROCALCITONIN (PCT): CPT

## 2022-10-14 PROCEDURE — 85025 COMPLETE CBC W/AUTO DIFF WBC: CPT

## 2022-10-14 PROCEDURE — 85610 PROTHROMBIN TIME: CPT | Performed by: PHYSICIAN ASSISTANT

## 2022-10-14 PROCEDURE — 93010 ELECTROCARDIOGRAM REPORT: CPT | Performed by: INTERNAL MEDICINE

## 2022-10-14 PROCEDURE — 99285 EMERGENCY DEPT VISIT HI MDM: CPT

## 2022-10-14 RX ORDER — DOXYCYCLINE HYCLATE 100 MG
100 TABLET ORAL 2 TIMES DAILY
Qty: 20 TABLET | Refills: 0 | Status: SHIPPED | OUTPATIENT
Start: 2022-10-14 | End: 2022-10-24

## 2022-10-14 ASSESSMENT — PAIN - FUNCTIONAL ASSESSMENT
PAIN_FUNCTIONAL_ASSESSMENT: 0-10
PAIN_FUNCTIONAL_ASSESSMENT: ACTIVITIES ARE NOT PREVENTED

## 2022-10-14 ASSESSMENT — PAIN SCALES - GENERAL: PAINLEVEL_OUTOF10: 5

## 2022-10-14 ASSESSMENT — PAIN DESCRIPTION - FREQUENCY: FREQUENCY: CONTINUOUS

## 2022-10-14 ASSESSMENT — PAIN DESCRIPTION - ORIENTATION: ORIENTATION: RIGHT;MID;UPPER

## 2022-10-14 ASSESSMENT — PAIN DESCRIPTION - LOCATION: LOCATION: BACK

## 2022-10-14 ASSESSMENT — PAIN DESCRIPTION - DESCRIPTORS: DESCRIPTORS: SORE;ACHING

## 2022-10-14 ASSESSMENT — PAIN DESCRIPTION - PAIN TYPE: TYPE: ACUTE PAIN

## 2022-10-14 NOTE — ED PROVIDER NOTES
Emergency Department Encounter    Patient: Tiffani Perez  MRN: [id number]  : 1945  Date of Evaluation: 10/14/2022  ED Provider:  Shaji Abad MD      Triage Chief Complaint:   Back Pain (Pt was seen at [facility] last Friday for right mid-upper back pain and diagnosed with pneumonia. Pt was treated with z-pack and steroids. Pt states she finished those Wednesday. Started having the back pain again last night. Currently rates pain at 5/10 when she coughs and moves. Ambulated to bed per self, AOx4, breathing unlabored, wears home O2 ATC, skin warm and dry. )    [place]:  Tiffani Perez is a 68 y.o. female that presents complaining of 1 day history of cough and right lower thoracic/upper lumbar pain which is only present with cough and deep inspiration. Patient states that she was recently diagnosed with pneumonia and treated with azithromycin. Symptoms improved and then returned the day prior to presentation. Patient denies any fever. No productive cough. Patient has a history of COPD and has some chronic dyspnea but states that she is not worsened from her baseline. No chest pain. Patient denies any recent trauma or injury. Symptoms are intermittent and moderate in severity. Patient states the pain may be worse with movement. No midline back pain. No urinary frequency, urgency, dysuria, hematuria. No radiation of the discomfort. No numbness tingling or weakness, saddle anesthesia, no bowel or bladder dysfunction, no rash.       ROS - see HPI, below listed is current ROS at time of my eval:  General:  No fevers  ENT:  No sore throat, no nasal congestion  Cardiovascular:  No chest pain  Respiratory:  No new or changed shortness of breath  Gastrointestinal:  No pain, no nausea, no vomiting, no diarrhea  Musculoskeletal:  + back pain  Skin:  No rash, no pruritis  Neurologic:  No headache, no extremity numbness, no extremity tingling, no extremity weakness  Genitourinary:  No dysuria, no hematuria  Endocrine:  No unexpected weight gain, no unexpected weight loss  Extremities:  no edema, no pain    Past Medical History:   Diagnosis Date    Anticoagulant long-term use     Arthritis     Cancer ([facility] [address]     Chronic hypoxemic respiratory failure ([facility] [address] 2018    Community acquired pneumonia of right upper lobe of lung 3/19/2022    COPD (chronic obstructive pulmonary disease) (HCC)     Coronary atherosclerosis     COVID-19 virus detected 2021    DVT (deep venous thrombosis) (HCC)     Former smoker 2021    Gastroesophageal reflux disease     Hiatal hernia     Lung infiltrate on CT 2019    On home oxygen therapy     on     Osteopenia     Phlebitis     Pulmonary nodule 2016    S/P left heart catheterization by percutaneous approach 2013    Sepsis due to pneumonia ([facility] [address] 2017    Shortness of breath 2019    Shortness of breath 2021    Shortness of breath 2021    Wears glasses      Past Surgical History:   Procedure Laterality Date    Waqar Preston    COLONOSCOPY      ENDOSCOPY, COLON, DIAGNOSTIC      TONSILLECTOMY      TUBAL LIGATION      VEIN SURGERY       Family History   Problem Relation Age of Onset    Heart Disease Mother     Cancer Father         lung ca    Heart Disease Father     COPD Father     Cancer Sister     Heart Disease Sister      Social History     Socioeconomic History    Marital status:      Spouse name: Not on file    Number of children: Not on file    Years of education: Not on file    Highest education level: Not on file   Occupational History    Not on file   Tobacco Use    Smoking status: Former     Packs/day: 1.50     Years: 26.00     Pack years: 39.00     Types: Cigarettes     Start date:      Quit date: 1991     Years since quittin.9    Smokeless tobacco: Never   Vaping Use    Vaping Use: Never used   Substance and Sexual Activity    Alcohol use: No    Drug use: No    Sexual activity: Not Currently     Partners: Male   Other Topics Concern    Not on file   Social History Narrative    Not on file     Social Determinants of Health     Financial Resource Strain: Not on file   Food Insecurity: Not on file   Transportation Needs: Not on file   Physical Activity: Not on file   Stress: Not on file   Social Connections: Not on file   Intimate Partner Violence: Not on file   Housing Stability: Not on file     No current facility-administered medications for this encounter. Current Outpatient Medications   Medication Sig Dispense Refill    doxycycline hyclate (VIBRA-TABS) 100 MG tablet Take 1 tablet by mouth 2 times daily for 10 days 20 tablet 0    guaiFENesin-dextromethorphan (ROBITUSSIN DM) 100-10 MG/5ML syrup Take 5 mLs by mouth 4 times daily as needed for Cough 120 mL 0    benzonatate (TESSALON PERLES) 100 MG capsule Take 1 capsule by mouth 3 times daily as needed for Cough 10 capsule 0    albuterol sulfate HFA (PROVENTIL HFA) 108 (90 Base) MCG/ACT inhaler Inhale 2 puffs into the lungs every 4 hours as needed for Wheezing or Shortness of Breath With spacer (and mask if indicated). Thanks. 18 g 1    levothyroxine (SYNTHROID) 50 MCG tablet TAKE 1 TABLET BY MOUTH EVERY DAY 90 tablet 1    montelukast (SINGULAIR) 10 MG tablet TAKE 1 TABLET BY MOUTH EVERY DAY EVERY NIGHT 90 tablet 0    gabapentin (NEURONTIN) 400 MG capsule Take 1 capsule by mouth every evening for 90 days.  90 capsule 0    Roflumilast (DALIRESP) 500 MCG tablet TAKE 1 TABLET BY MOUTH EVERY DAY 90 tablet 1    celecoxib (CELEBREX) 200 MG capsule TAKE 1 CAPSULE BY MOUTH EVERY DAY 90 capsule 0    Magnesium Oxide 500 MG TABS TAKE 1 TABLET BY MOUTH EVERY DAY 90 tablet 0    esomeprazole (NEXIUM) 40 MG delayed release capsule Take 1 capsule by mouth in the morning and at bedtime 180 capsule 1    predniSONE (DELTASONE) 5 MG tablet TAKE 1 TABLET BY MOUTH EVERY DAY 30 tablet 0    WARFARIN SODIUM PO Take by mouth nightly 09/08/22 Per anti-coag clinic patient takes 6 mg, 6 mg, 3 mg takes at night last dose 6 mg dose for 09/08/22 6 mg dose for 0909/22 3 mg. Patient follows anti-coag monthly schedule. Potassium Chloride (KLOR-CON PO) Take by mouth See Admin Instructions 09/08/22 patient alternates days with 1-10 MEQ tablet then, 2-10 MEQ(20 MEQ) every other day. (Patient not taking: No sig reported)      risedronate (ACTONEL) 35 MG tablet TAKE 1 TABLET BY MOUTH EVERY 7 DAYS PATIENT TAKES ON SUNDAY 12 tablet 1    rosuvastatin (CRESTOR) 20 MG tablet TAKE 1 TABLET BY MOUTH EVERY DAY EVERY NIGHT 90 tablet 1    theophylline (THEODUR) 450 MG extended release tablet TAKE 1/2 TABLET BY MOUTH TWICE A DAY 90 tablet 1    Budeson-Glycopyrrol-Formoterol (BREZTRI AEROSPHERE) 160-9-4.8 MCG/ACT AERO Inhale 2 sprays into the lungs 2 times daily 1 each 11    hydrOXYzine HCl (ATARAX) 25 MG tablet TAKE 1 TABLET BY MOUTH NIGHTLY 30 tablet 2    albuterol sulfate  (90 Base) MCG/ACT inhaler INHALE 2 PUFF FOUR TIMES A DAY AS NEEDED 1 each 5    torsemide (DEMADEX) 20 MG tablet Take 20 mg by mouth daily      albuterol (PROVENTIL) (2.5 MG/3ML) 0.083% nebulizer solution Take 3 mLs by nebulization 4 times daily as needed for Wheezing 120 each 5    ipratropium (ATROVENT HFA) 17 MCG/ACT inhaler Inhale 2 puffs into the lungs every 6-8 hours as needed      dilTIAZem (CARDIZEM) 60 MG tablet Take 1 tablet by mouth every 8 hours 120 tablet 3    Dextromethorphan-guaiFENesin (MUCINEX DM)  MG TB12 Take 1 to 2 tablet by mouth every 12 hours (maximum: 4 tablets/24 hours). Drink plenty of water.  28 tablet 0    nitroGLYCERIN (NITROSTAT) 0.4 MG SL tablet Place 1 tablet under the tongue every 5 minutes as needed for Chest pain 25 tablet 1    dicyclomine (BENTYL) 10 MG capsule Take 10 mg by mouth 2 times daily      Biotin 62709 MCG TABS Take 10,000 mcg by mouth daily      b complex vitamins capsule Take 1 capsule by mouth daily      Calcium Carb-Cholecalciferol (CALCIUM + D3) 600-200 MG-UNIT TABS Take 1 tablet by mouth 2 times daily      Multiple Vitamins-Minerals (MULTIVITAMIN PO) Take 1 tablet by mouth daily      OXYGEN Inhale 2 L/hr into the lungs continuous. Allergies   Allergen Reactions    Codeine Swelling    Darvon [Propoxyphene Hcl] Swelling    Lamisil [Terbinafine Hcl]     Morphine Swelling    Neosporin [Neomycin-Polymyxin-Gramicidin]     Pcn [Penicillins] Swelling       Nursing Notes Reviewed    Physical Exam:  Triage VS:    ED Triage Vitals   Enc Vitals Group      BP       Pulse       Resp       Temp       Temp src       SpO2       Weight       Height       Head Circumference       Peak Flow       Pain Score       Pain Loc       Pain Edu? Excl. in [address]? My pulse ox interpretation is - normal    General appearance:  No acute distress. Skin:  Warm. Dry. No Rash   Eye:  Extraocular movements intact. Ears, nose, mouth and throat:  Oral mucosa moist   Neck:  Trachea midline. Extremity:  No swelling. Normal ROM     Heart:  Regular in rhythm with no murmurs rubs or gallops  Perfusion:  intact  Respiratory:   Respirations nonlabored. Patient is on her baseline chronic supplemental O2. Patient does have diffuse mild wheeze. No discernible crackles. Abdominal:  Normal bowel sounds. Soft. Nontender. Non distended. Back:  No CVA tenderness to palpation, no midline spinal tenderness to palpation or step-offs, no paraspinal muscle tenderness to palpation, no spasm             Neurological:  Alert and oriented times 3.       I have reviewed and interpreted all of the currently available lab results from this visit (if applicable):  Results for orders placed or performed during the hospital encounter of 10/14/22   D-Dimer, Rapid   Result Value Ref Range    D-Dimer, Quant <0.47 <0.47 ug/mL (FEU)   CBC with Auto Differential   Result Value Ref Range    WBC 11.3 (H) 4.0 - 10.5 K/CU MM    RBC 4.44 4.2 - 5.4 M/CU MM    Hemoglobin 13.8 12.5 - 16.0 GM/DL Hematocrit 42.1 37 - 47 %    MCV 94.8 78 - 100 FL    MCH 31.1 (H) 27 - 31 PG    MCHC 32.8 32.0 - 36.0 %    RDW 14.5 11.7 - 14.9 %    Platelets 816 193 - 744 K/CU MM    MPV 9.8 7.5 - 11.1 FL    Differential Type AUTOMATED DIFFERENTIAL     Segs Relative 68.9 (H) 36 - 66 %    Lymphocytes % 20.6 (L) 24 - 44 %    Monocytes % 8.7 (H) 0 - 4 %    Eosinophils % 0.4 0 - 3 %    Basophils % 0.3 0 - 1 %    Segs Absolute 7.8 K/CU MM    Lymphocytes Absolute 2.3 K/CU MM    Monocytes Absolute 1.0 K/CU MM    Eosinophils Absolute 0.0 K/CU MM    Basophils Absolute 0.0 K/CU MM    Immature Neutrophil % 1.1 (H) 0 - 0.43 %    Total Immature Neutrophil 0.12 K/CU MM   Basic Metabolic Panel   Result Value Ref Range    Sodium 141 135 - 145 MMOL/L    Potassium 3.5 3.5 - 5.1 MMOL/L    Chloride 96 (L) 99 - 110 mMol/L    CO2 33 (H) 21 - 32 MMOL/L    Anion Gap 12 4 - 16    BUN 24 (H) 6 - 23 MG/DL    Creatinine 0.9 0.6 - 1.1 MG/DL    Glucose 118 (H) 70 - 99 MG/DL    Calcium 10.0 8.3 - 10.6 MG/DL    GFR Non-African American >60 >60 mL/min/1.73m2    GFR African American >60 >60 mL/min/1.73m2   Urinalysis   Result Value Ref Range    Color, UA YELLOW YELLOW    Clarity, UA CLEAR CLEAR    Glucose, Urine NEGATIVE NEGATIVE MG/DL    Bilirubin Urine NEGATIVE NEGATIVE MG/DL    Ketones, Urine TRACE (A) NEGATIVE MG/DL    Specific Gravity, UA 1.020 1.001 - 1.035    Blood, Urine NEGATIVE NEGATIVE    pH, Urine 5.5 5.0 - 8.0    Protein, UA NEGATIVE NEGATIVE MG/DL    Urobilinogen, Urine 0.2 0.2 - 1.0 MG/DL    Nitrite Urine, Quantitative NEGATIVE NEGATIVE    Leukocyte Esterase, Urine SMALL NUMBER OR AMOUNT OBSERVED (A) NEGATIVE   Microscopic Urinalysis   Result Value Ref Range    RBC, UA 0 0 - 6 /HPF    WBC, UA 4 0 - 5 /HPF    Epithelial Cells, UA 2 /HPF    Cast Type NO CAST FORMS SEEN NO CAST FORMS SEEN /HPF    Bacteria, UA OCCASIONAL (A) NEGATIVE /HPF    Crystal Type NEGATIVE NEGATIVE /HPF   EKG 12 Lead   Result Value Ref Range    Ventricular Rate 70 BPM    Atrial Rate 70 BPM    P-R Interval 126 ms    QRS Duration 114 ms    Q-T Interval 400 ms    QTc Calculation (Bazett) 432 ms    P Axis 65 degrees    R Axis -38 degrees    T Axis 64 degrees    Diagnosis       Sinus rhythm with frequent premature ventricular complexes  Left axis deviation  Incomplete right bundle branch block  Left ventricular hypertrophy with repolarization abnormality  Abnormal ECG  When compared with ECG of 07-OCT-2022 17:15,  premature atrial complexes are no longer present  Minimal criteria for Septal infarct are no longer present        Radiographs (if obtained):  Radiologist's Report Reviewed:  XR CHEST PORTABLE    Result Date: 10/7/2022  EXAMINATION: ONE XRAY VIEW OF THE CHEST 10/7/2022 5:29 pm COMPARISON: Chest x-ray September 8, 2022; CT chest July 18, 2022 HISTORY: ORDERING SYSTEM PROVIDED HISTORY: sob TECHNOLOGIST PROVIDED HISTORY: Reason for exam:->sob Reason for Exam: SOB FINDINGS: Cardiac silhouette is stable. Chronic interstitial changes of the lungs with similar focal airspace opacity at the right upper lobe corresponding to findings on recent CT chest from July 18, 2022. No foci of new consolidation are identified. No pleural effusion. No pneumothorax. Osseous structures appear stable. 1. No acute cardiopulmonary process identified. CTA PULMONARY W CONTRAST    Result Date: 10/7/2022  EXAMINATION: CTA OF THE CHEST 10/7/2022 6:19 pm TECHNIQUE: CTA of the chest was performed after the administration of intravenous contrast.  Multiplanar reformatted images are provided for review. MIP images are provided for review. Automated exposure control, iterative reconstruction, and/or weight based adjustment of the mA/kV was utilized to reduce the radiation dose to as low as reasonably achievable. COMPARISON: CT 09/08/2022 and radiograph 10/07/2022.  HISTORY: ORDERING SYSTEM PROVIDED HISTORY: Short of breath TECHNOLOGIST PROVIDED HISTORY: Reason for exam:->Short of breath Decision Support Exception - unselect if not a suspected or confirmed emergency medical condition->Emergency Medical Condition (MA) Reason for Exam: Short of breath Additional signs and symptoms: no Relevant Medical/Surgical History: 75 ml isovue 370 used FINDINGS: Pulmonary Arteries: There is no pulmonary embolism. The central pulmonary arteries are enlarged. The main pulmonary artery measures up to 3.5 cm. Mediastinum: No evidence of mediastinal lymphadenopathy. The heart and pericardium demonstrate no acute abnormality. There is no acute abnormality of the thoracic aorta. Lungs/pleura: Emphysematous changes and areas of fibrosis are again noted. Scarring and bronchiectasis posteriorly in the right upper lobe is again noted. There are new patchy opacities in the right upper lobe laterally. Peripheral patchy left upper lobe opacities are improved. The 6 mm right lower lobe nodule on the comparison study that was new is no longer identified. There is no pleural effusion. Upper Abdomen: Limited images of the upper abdomen are unremarkable. Soft Tissues/Bones: No destructive bone lesion is identified. A midthoracic compression fracture appears unchanged. 1. No pulmonary embolism. 2. New patchy right upper lobe opacities which could reflect pneumonia. Patchy opacities in the left upper lobe appear improved. 3. The new 6 mm right lower lobe nodule is no longer identified, so CT follow-up is not necessary. 4. Emphysema and areas of fibrosis, as before. EKG as interpreted by the emergency physician: Rate 70, sinus rhythm, left axis deviation, normal intervals, no acute ST-T wave changes. Patient does have frequent PVCs. MDM:     Patient presented with right mid back pain. There is no evidence for more malignant injury/pathology, such as, but not limited to, cauda equina syndrome, acute spinal cord pathology, Spinal epidural abscess. No evidence of acute neurologic, vascular, infectious emergency involving the spine. Patient did not have any history of trauma. Abdomen was benign with no evidence of acute surgical abdomen or peritonitis. Patient had recent diagnosis of pneumonia which was treated with steroid burst as well as antibiotics. Laboratory evaluation was noted for leukocytosis to 11. This may be secondary to the patient's recent steroid use. Chest x-ray does show continued right infiltrate. This may represent possible delayed radiographic clearance given consistent location with previous pneumonia. UA with small amount of leuk esterase and occasional bacteria. No clear evidence of UTI. D-dimer is negative with no evidence of PE. Patient has no rash to suggest varicella-zoster. In shared decision-making with the patient, patient opted for treatment with doxycycline which was provided. This would be adequate coverage for UTI. Patient states that she will wait 1 to 2 days to see if her symptoms improve as she was seen some improvement in symptoms in the emergency department. If symptoms worsen or do not improve, patient will start antibiotics otherwise patient will hold off on antibiotics due to possible delayed radiographic clearance and to prevent antibiotic resistance. EKG without evidence of ACS or arrhythmia. I completed a structured, evidence-based clinical evaluation to screen for acute non-traumatic spinal emergencies. The patient has a normal detailed neurologic exam and a low red flag score. Patient does not have any urinary complaints or abdominal complaints. Patient taking medications at home with no significant improvement. The evidence indicates that the patient is very low risk for an acute spinal emergency and this is consistent with my clinical intuition, therefore, it is in the patients best interest not to do additional emergent testing. Patient will be given medications, I do believe the patient is a good candidate for outpatient symptomatic treatment.   The patient was instructed on this treatment and the course that this type of back pain typically follows, I also discussed worrisome symptoms to monitor for at home including, but not limited to, numbness or weakness in the lower extremities, bowel or bladder dysfunction, and numbness in the groin. Patient will be discharged with prescriptions, instructions for symptomatic treatment, monitoring and outpatient follow-up, patient understands and agrees, return warnings given      Clinical Impression:  1. Flank pain    2. Pneumonia of right lung due to infectious organism, unspecified part of lung      Disposition referral (if applicable):  Jared Ronquillo DO  [address] [facility]  [phone]    In 2 days    Disposition medications (if applicable):  Discharge Medication List as of 10/14/2022  1:15 PM        START taking these medications    Details   doxycycline hyclate (VIBRA-TABS) 100 MG tablet Take 1 tablet by mouth 2 times daily for 10 days, Disp-20 tablet, R-0Normal           ED Provider Disposition Time  DISPOSITION Decision To Discharge 10/14/2022 01:12:14 PM      Comment: Please note this report has been produced using speech recognition software and may contain errors related to that system including errors in grammar, punctuation, and spelling, as well as words and phrases that may be inappropriate. Efforts were made to edit the dictations.         Shannan Cruz MD  10/14/22 [id number]       Shannan Cruz MD  10/14/22 [id number]

## 2022-10-14 NOTE — DISCHARGE INSTRUCTIONS
Turn immediately to the emergency department if you experience new or worsened symptoms, chest pain, shortness of breath, fever, or for any other concerns

## 2022-10-15 LAB — PROCALCITONIN: 0.1

## 2022-10-24 DIAGNOSIS — E03.9 ACQUIRED HYPOTHYROIDISM: ICD-10-CM

## 2022-10-24 DIAGNOSIS — Z13.220 LIPID SCREENING: ICD-10-CM

## 2022-10-24 DIAGNOSIS — E87.6 HYPOKALEMIA: ICD-10-CM

## 2022-10-24 DIAGNOSIS — M19.90 ARTHRITIS: ICD-10-CM

## 2022-10-24 LAB
ANION GAP SERPL CALCULATED.3IONS-SCNC: 15 MMOL/L (ref 3–16)
BUN BLDV-MCNC: 20 MG/DL (ref 7–20)
CALCIUM SERPL-MCNC: 9.6 MG/DL (ref 8.3–10.6)
CHLORIDE BLD-SCNC: 97 MMOL/L (ref 99–110)
CHOLESTEROL, TOTAL: 209 MG/DL (ref 0–199)
CO2: 32 MMOL/L (ref 21–32)
CREAT SERPL-MCNC: 0.9 MG/DL (ref 0.6–1.2)
GFR SERPL CREATININE-BSD FRML MDRD: >60 ML/MIN/{1.73_M2}
GLUCOSE BLD-MCNC: 86 MG/DL (ref 70–99)
HDLC SERPL-MCNC: 86 MG/DL (ref 40–60)
LDL CHOLESTEROL CALCULATED: 98 MG/DL
POTASSIUM SERPL-SCNC: 3.3 MMOL/L (ref 3.5–5.1)
SODIUM BLD-SCNC: 144 MMOL/L (ref 136–145)
T4 FREE: 1.4 NG/DL (ref 0.9–1.8)
TRIGL SERPL-MCNC: 126 MG/DL (ref 0–150)
TSH REFLEX FT4: 10.06 UIU/ML (ref 0.27–4.2)
VLDLC SERPL CALC-MCNC: 25 MG/DL

## 2022-10-24 RX ORDER — RISEDRONATE SODIUM 35 MG/1
TABLET, FILM COATED ORAL
Qty: 12 TABLET | Refills: 1 | Status: SHIPPED | OUTPATIENT
Start: 2022-10-24

## 2022-10-24 RX ORDER — GABAPENTIN 400 MG/1
400 CAPSULE ORAL EVERY EVENING
Qty: 90 CAPSULE | Refills: 0 | Status: SHIPPED | OUTPATIENT
Start: 2022-10-24 | End: 2023-01-22

## 2022-10-24 RX ORDER — GABAPENTIN 400 MG/1
400 CAPSULE ORAL EVERY EVENING
Qty: 90 CAPSULE | Refills: 0 | OUTPATIENT
Start: 2022-10-24 | End: 2023-01-22

## 2022-10-24 RX ORDER — CELECOXIB 200 MG/1
CAPSULE ORAL
Qty: 90 CAPSULE | Refills: 0 | Status: SHIPPED | OUTPATIENT
Start: 2022-10-24

## 2022-10-24 RX ORDER — CELECOXIB 200 MG/1
CAPSULE ORAL
Qty: 90 CAPSULE | Refills: 0 | OUTPATIENT
Start: 2022-10-24

## 2022-10-25 DIAGNOSIS — E03.9 ACQUIRED HYPOTHYROIDISM: Primary | ICD-10-CM

## 2022-10-25 RX ORDER — HYDROXYZINE HYDROCHLORIDE 25 MG/1
TABLET, FILM COATED ORAL
Qty: 90 TABLET | Refills: 0 | Status: SHIPPED | OUTPATIENT
Start: 2022-10-25

## 2022-10-25 RX ORDER — LEVOTHYROXINE SODIUM 0.07 MG/1
75 TABLET ORAL DAILY
Qty: 90 TABLET | Refills: 1 | Status: SHIPPED | OUTPATIENT
Start: 2022-10-25

## 2022-10-28 ENCOUNTER — HOSPITAL ENCOUNTER (OUTPATIENT)
Age: 77
Discharge: HOME | End: 2022-10-28
Payer: MEDICARE

## 2022-10-28 ENCOUNTER — SCHEDULED TELEPHONE ENCOUNTER (OUTPATIENT)
Dept: PULMONOLOGY | Age: 77
End: 2022-10-28
Payer: MEDICARE

## 2022-10-28 ENCOUNTER — NURSE ONLY (OUTPATIENT)
Dept: FAMILY MEDICINE CLINIC | Age: 77
End: 2022-10-28
Payer: MEDICARE

## 2022-10-28 ENCOUNTER — HOSPITAL ENCOUNTER (OUTPATIENT)
Dept: GENERAL RADIOLOGY | Age: 77
Discharge: HOME | End: 2022-10-28
Payer: MEDICARE

## 2022-10-28 DIAGNOSIS — Z87.891 FORMER SMOKER: ICD-10-CM

## 2022-10-28 DIAGNOSIS — J96.11 CHRONIC HYPOXEMIC RESPIRATORY FAILURE (HCC): Chronic | ICD-10-CM

## 2022-10-28 DIAGNOSIS — Z86.711 HISTORY OF PULMONARY EMBOLISM: ICD-10-CM

## 2022-10-28 DIAGNOSIS — R91.8 LUNG INFILTRATE ON CT: Chronic | ICD-10-CM

## 2022-10-28 DIAGNOSIS — J18.9 COMMUNITY ACQUIRED PNEUMONIA OF RIGHT UPPER LOBE OF LUNG: ICD-10-CM

## 2022-10-28 DIAGNOSIS — Z86.718 HISTORY OF DVT OF LOWER EXTREMITY: ICD-10-CM

## 2022-10-28 DIAGNOSIS — J44.9 COPD, VERY SEVERE (HCC): Chronic | ICD-10-CM

## 2022-10-28 DIAGNOSIS — J18.9 MULTIFOCAL PNEUMONIA: Primary | ICD-10-CM

## 2022-10-28 DIAGNOSIS — Z79.01 ANTICOAGULANT LONG-TERM USE: ICD-10-CM

## 2022-10-28 DIAGNOSIS — R06.02 SHORTNESS OF BREATH: ICD-10-CM

## 2022-10-28 DIAGNOSIS — J18.9 MULTIFOCAL PNEUMONIA: ICD-10-CM

## 2022-10-28 DIAGNOSIS — I27.20 MILD PULMONARY HYPERTENSION (HCC): ICD-10-CM

## 2022-10-28 LAB
INTERNATIONAL NORMALIZATION RATIO, POC: 2.2
PROTHROMBIN TIME, POC: 26.9

## 2022-10-28 PROCEDURE — 1123F ACP DISCUSS/DSCN MKR DOCD: CPT | Performed by: INTERNAL MEDICINE

## 2022-10-28 PROCEDURE — 71046 X-RAY EXAM CHEST 2 VIEWS: CPT

## 2022-10-28 PROCEDURE — 85610 PROTHROMBIN TIME: CPT | Performed by: FAMILY MEDICINE

## 2022-10-28 PROCEDURE — 99213 OFFICE O/P EST LOW 20 MIN: CPT | Performed by: INTERNAL MEDICINE

## 2022-10-28 RX ORDER — LEVOFLOXACIN 500 MG/1
500 TABLET, FILM COATED ORAL DAILY
Qty: 5 TABLET | Refills: 0 | Status: SHIPPED | OUTPATIENT
Start: 2022-10-28 | End: 2022-11-02

## 2022-10-28 RX ORDER — PREDNISONE 1 MG/1
TABLET ORAL
Qty: 38 TABLET | Refills: 0 | Status: SHIPPED | OUTPATIENT
Start: 2022-10-28

## 2022-10-28 NOTE — PROGRESS NOTES
Confirmed current coumadin 6/3/3 mg. Inr 2.2. Patient just started levaquin today and will take this for 5 days, per Dr. Sanz Hand patient is okay to keep dose the same and recheck INR in 4 weeks.

## 2022-10-28 NOTE — PROGRESS NOTES
[place] [address] [facility]   Telephone Visit Note      Cheyanne Baker is a 68 y.o. female evaluated via my chart telephone visit on 10/28/2022. Consent:  Pursuant to emergency declaration under the [address] in the [employer], 1135 waiver authorization in the [facility] Act, this telephone visit was completed with patient's consent, to reduce the patient's risk of exposure to COVID-19 and provide necessary medical care. She and/or health care decision maker is aware that that she may receive a bill for this telephone service, depending on her insurance coverage, and has provided verbal consent to proceed. Patient is at his residency and Provider is currently in Pulmonary Clinic at [place] [address] [facility]. Documentation:  Jimy Guillen states that she continues noting back discomfort and shortness of breath. Over the past 3 weeks she has been in the emergency room on 2 occasions including 10/7/2022 and 10/14/2022. X-rays suggested a right upper lobe pneumonia and initially she was placed on prednisone and azithromycin. Because she failed to improve she went back to the emergency room on 10/1422 and had a CT chest which confirmed a persistent right upper lobe infiltrate and she was switched to doxycycline. She continues complaining of back pain but states that she is not having hemoptysis and fever or chills. She mentions that her chest pain does have a mild pleuritic component. .  Her WBC was elevated 11.3 on 10/14/2022. Her D-dimer was less than 0.47 obtained on 10/14/2022 and this suggested that pulm embolus was unlikely. COVID-19 testing on 10/7/2022 was negative    Jimy Guillen states they are practicing social distancing, wearing a mask when out in public, washing hands frequently or using hand . Denies any fever, chills, malaise, change in sensation of taste or smell, headache or lightheadedness.   Denies any known contacts with persons with respiratory infection, positive for coronavirus or under investigation for possible coronavirus exposure. Past Medical History:   Diagnosis Date    Anticoagulant long-term use     Arthritis     Cancer ([facility]     Chronic hypoxemic respiratory failure ([facility] 2/6/2018    Community acquired pneumonia of right upper lobe of lung 3/19/2022    COPD (chronic obstructive pulmonary disease) ([facility]     Coronary atherosclerosis     COVID-19 virus detected 1/18/2021    DVT (deep venous thrombosis) ([facility]     Former smoker 11/22/2021    Gastroesophageal reflux disease     Hiatal hernia     Lung infiltrate on CT 1/22/2019    On home oxygen therapy     on 24/7    Osteopenia     Phlebitis     Pulmonary nodule 7/5/2016    S/P left heart catheterization by percutaneous approach 6/27/2013    Sepsis due to pneumonia ([facility] 11/14/2017    Shortness of breath 9/23/2019    Shortness of breath 1/18/2021    Shortness of breath 11/22/2021    Wears glasses        Medication and allergies have been reviewed    Social and family history are unchanged since last visit    ROS:   Constitutional: No fever, no chills   Cardiovascular: Mild right-sided pleuritic chest pain, no palpitations. Pulmonary: Mild mostly nonproductive cough, moderate SOB, no wheeze    Physical exam not complete due to telephone visit  Alert and oriented  No conversational dyspnea, no cough, no audible wheeze  Normal mentation       Diagnosis:    ICD-10-CM    1. Multifocal pneumonia  J18.9 XR CHEST (2 VW)      2. Lung infiltrate on CT  R91.8 XR CHEST (2 VW)      3. COPD, very severe ([address]  J44.9 XR CHEST (2 VW)      4. Community acquired pneumonia of right upper lobe of lung  J18.9 XR CHEST (2 VW)      5. Chronic hypoxemic respiratory failure (HCC)  J96.11 XR CHEST (2 VW)      6. Shortness of breath  R06.02 XR CHEST (2 VW)      7. Mild pulmonary hypertension (HCC)  I27.20       8.  Former smoker  Z87.891 XR CHEST (2 VW)             Plan:  I discussed with Sawyer Pearson the fact that she apparently has a slowly resolving right upper lobe pneumonia which I presume was community-acquired. I would like to repeat her chest x-ray but I also will put her back on a tapering course of oral prednisone and start her on Levaquin 500 mg once a day for 5 days. I will continue to follow her closely. At any time if her shortness of breath or chest discomfort significantly worsens then I told her to go directly to the emergency room once again. At that time I would consider hospitalization with IV antibiotics steroids and aggressive bronchopulmonary toilet. ---While she has been  vaccinated at this time for COVID-19 I strongly recommend that she continue Coronavirus precaution including: Wearing mask when in public and when in contact with persons who have not been immunized, social distancing when needing to be in public, handwashing practice, wiping items touched in public such as gas pumps, door handles, shopping carts, etc.  --Recommend yearly flu vaccination  --Recommend Covid 19 booster when available  --Recommend Pneumovax vaccination  --Have discussed signs and symptoms of acute exacerbation and why it is important to monitor for bronchial infections. To call office should she develop signs of acute exacerbation/bronchial infection  --I will continue to follow in pulmonary clinic      I affirm this is a Patient initiated episode with an established patient who has not had a related appointment within my department in the past 7 days or scheduled within the next 24 hours. I have spent 20 minutes reviewing previous notes, test results and communicating with patient discussing the diagnosis and importance of treatment plan.     Note: not billable if this call serves to triage the patient into an appointment for the relevant concern      Naya Avila MD

## 2022-10-31 NOTE — RESULT ENCOUNTER NOTE
Spoke to Mrs. Charlott Rubinstein over the phone and reviewed her most recent chest x-ray on 10/28/2022. I told her that there was still some infiltrative changes in the right upper lobe unchanged and that the report mentions that this could be scarring. She mentions that she is feeling better but still having some minimal back pain. I told her that if her symptoms worsen she should go directly emergency room or contact her primary care physician or call our office and talk to Dr. Giovanny Narvaez. Certainly if things get significantly worse I told her to go directly back to the emergency room. I will consider repeating her CT chest in near future if her back pain persists and infiltrative changes do not improve.

## 2022-11-04 ENCOUNTER — HOSPITAL ENCOUNTER (OUTPATIENT)
Age: 77
Discharge: HOME | End: 2022-11-04
Payer: MEDICARE

## 2022-11-04 ENCOUNTER — OFFICE VISIT (OUTPATIENT)
Dept: FAMILY MEDICINE CLINIC | Age: 77
End: 2022-11-04
Payer: MEDICARE

## 2022-11-04 ENCOUNTER — HOSPITAL ENCOUNTER (OUTPATIENT)
Dept: GENERAL RADIOLOGY | Age: 77
Discharge: HOME | End: 2022-11-04
Payer: MEDICARE

## 2022-11-04 VITALS
DIASTOLIC BLOOD PRESSURE: 66 MMHG | BODY MASS INDEX: 24.16 KG/M2 | OXYGEN SATURATION: 94 % | SYSTOLIC BLOOD PRESSURE: 120 MMHG | HEIGHT: 65 IN | HEART RATE: 74 BPM | WEIGHT: 145 LBS

## 2022-11-04 DIAGNOSIS — M54.9 UPPER BACK PAIN: ICD-10-CM

## 2022-11-04 DIAGNOSIS — J18.9 MULTIFOCAL PNEUMONIA: ICD-10-CM

## 2022-11-04 DIAGNOSIS — M54.9 UPPER BACK PAIN: Primary | ICD-10-CM

## 2022-11-04 PROCEDURE — G8427 DOCREV CUR MEDS BY ELIG CLIN: HCPCS | Performed by: PHYSICIAN ASSISTANT

## 2022-11-04 PROCEDURE — G8420 CALC BMI NORM PARAMETERS: HCPCS | Performed by: PHYSICIAN ASSISTANT

## 2022-11-04 PROCEDURE — 72072 X-RAY EXAM THORAC SPINE 3VWS: CPT

## 2022-11-04 PROCEDURE — 3078F DIAST BP <80 MM HG: CPT | Performed by: PHYSICIAN ASSISTANT

## 2022-11-04 PROCEDURE — 1036F TOBACCO NON-USER: CPT | Performed by: PHYSICIAN ASSISTANT

## 2022-11-04 PROCEDURE — 1123F ACP DISCUSS/DSCN MKR DOCD: CPT | Performed by: PHYSICIAN ASSISTANT

## 2022-11-04 PROCEDURE — G8400 PT W/DXA NO RESULTS DOC: HCPCS | Performed by: PHYSICIAN ASSISTANT

## 2022-11-04 PROCEDURE — G8484 FLU IMMUNIZE NO ADMIN: HCPCS | Performed by: PHYSICIAN ASSISTANT

## 2022-11-04 PROCEDURE — 3074F SYST BP LT 130 MM HG: CPT | Performed by: PHYSICIAN ASSISTANT

## 2022-11-04 PROCEDURE — 99214 OFFICE O/P EST MOD 30 MIN: CPT | Performed by: PHYSICIAN ASSISTANT

## 2022-11-04 PROCEDURE — 1090F PRES/ABSN URINE INCON ASSESS: CPT | Performed by: PHYSICIAN ASSISTANT

## 2022-11-04 RX ORDER — TIZANIDINE 2 MG/1
2 TABLET ORAL 4 TIMES DAILY PRN
Qty: 40 TABLET | Refills: 0 | OUTPATIENT
Start: 2022-11-04

## 2022-11-04 RX ORDER — TIZANIDINE 2 MG/1
2 TABLET ORAL 4 TIMES DAILY PRN
Qty: 40 TABLET | Refills: 0 | Status: SHIPPED | OUTPATIENT
Start: 2022-11-04

## 2022-11-04 NOTE — PROGRESS NOTES
11/4/2022    [facility]    Chief Complaint   Patient presents with    Follow-up    Back Pain     Started about a week ago, has tried heating pad, tylenol, and Aspercreme. HPI  History was obtained from pt. Mai Fernandez is a 68 y.o. female with a PMHx as listed below who presents today for folllow up for pneumonia. She just finished levaquin and is finishing prednisone. Over the past month she has been hospitalized and had 3 different antibiotics    Pt having back pain along the upper spine since last Saturday. No known cause. Hasnt really been coughing much. sometimes hurts to take a deep breath. Pt using tylenol and aspercreme. Seems to help. Pt has been on 02 the last 25 years. 1. Upper back pain    2.  Multifocal pneumonia         REVIEW OF SYMPTOMS    Review of Systems    PAST MEDICAL HISTORY  Past Medical History:   Diagnosis Date    Anticoagulant long-term use     Arthritis     Cancer ([address]     Chronic hypoxemic respiratory failure ([facility] 2/6/2018    Community acquired pneumonia of right upper lobe of lung 3/19/2022    COPD (chronic obstructive pulmonary disease) ([facility])     Coronary atherosclerosis     COVID-19 virus detected 1/18/2021    DVT (deep venous thrombosis) ([facility])     Former smoker 11/22/2021    Gastroesophageal reflux disease     Hiatal hernia     Lung infiltrate on CT 1/22/2019    On home oxygen therapy     on 24/7    Osteopenia     Phlebitis     Pulmonary nodule 7/5/2016    S/P left heart catheterization by percutaneous approach 6/27/2013    Sepsis due to pneumonia ([address] 11/14/2017    Shortness of breath 9/23/2019    Shortness of breath 1/18/2021    Shortness of breath 11/22/2021    Wears glasses        FAMILY HISTORY  Family History   Problem Relation Age of Onset    Heart Disease Mother     Cancer Father         lung ca    Heart Disease Father     COPD Father     Cancer Sister     Heart Disease Sister        SOCIAL HISTORY  Social History     Socioeconomic History    Marital status:    Tobacco Use    Smoking status: Former     Packs/day: 1.50     Years: 26.00     Pack years: 39.00     Types: Cigarettes     Start date:      Quit date: 1991     Years since quittin.0    Smokeless tobacco: Never   Vaping Use    Vaping Use: Never used   Substance and Sexual Activity    Alcohol use: No    Drug use: No    Sexual activity: Not Currently     Partners: Male        SURGICAL HISTORY  Past Surgical History:   Procedure Laterality Date    Autumn Stacy    COLONOSCOPY      ENDOSCOPY, COLON, DIAGNOSTIC      TONSILLECTOMY      TUBAL LIGATION      VEIN SURGERY                   CURRENT MEDICATIONS  Current Outpatient Medications   Medication Sig Dispense Refill    tiZANidine (ZANAFLEX) 2 MG tablet Take 1 tablet by mouth 4 times daily as needed (back pain) 40 tablet 0    predniSONE (DELTASONE) 5 MG tablet Take 6 PO now, 5 PO qam for 2 days, 3 PO qam for 3 days, 2 PO qam for 4 days, 1 PO qam for 5 days, then STOP 38 tablet 0    hydrOXYzine HCl (ATARAX) 25 MG tablet TAKE 1 TABLET BY MOUTH NIGHTLY 90 tablet 0    levothyroxine (SYNTHROID) 75 MCG tablet Take 1 tablet by mouth daily 90 tablet 1    risedronate (ACTONEL) 35 MG tablet TAKE 1 TABLET BY MOUTH EVERY 7 DAYS PATIENT TAKES ON  12 tablet 1    gabapentin (NEURONTIN) 400 MG capsule Take 1 capsule by mouth every evening for 90 days. 90 capsule 0    celecoxib (CELEBREX) 200 MG capsule TAKE 1 CAPSULE BY MOUTH EVERY DAY 90 capsule 0    albuterol sulfate HFA (PROVENTIL HFA) 108 (90 Base) MCG/ACT inhaler Inhale 2 puffs into the lungs every 4 hours as needed for Wheezing or Shortness of Breath With spacer (and mask if indicated). Thanks.  18 g 1    montelukast (SINGULAIR) 10 MG tablet TAKE 1 TABLET BY MOUTH EVERY DAY EVERY NIGHT 90 tablet 0    Roflumilast (DALIRESP) 500 MCG tablet TAKE 1 TABLET BY MOUTH EVERY DAY 90 tablet 1    Magnesium Oxide 500 MG TABS TAKE 1 TABLET BY MOUTH EVERY DAY 90 tablet 0 esomeprazole (NEXIUM) 40 MG delayed release capsule Take 1 capsule by mouth in the morning and at bedtime 180 capsule 1    predniSONE (DELTASONE) 5 MG tablet TAKE 1 TABLET BY MOUTH EVERY DAY 30 tablet 0    WARFARIN SODIUM PO Take by mouth nightly 09/08/22 Per anti-coag clinic patient takes 6 mg, 6 mg, 3 mg takes at night last dose 6 mg dose for 09/08/22 6 mg dose for 0909/22 3 mg. Patient follows anti-coag monthly schedule. Potassium Chloride (KLOR-CON PO) Take by mouth See Admin Instructions 09/08/22 patient alternates days with 1-10 MEQ tablet then, 2-10 MEQ(20 MEQ) every other day. rosuvastatin (CRESTOR) 20 MG tablet TAKE 1 TABLET BY MOUTH EVERY DAY EVERY NIGHT 90 tablet 1    theophylline (THEODUR) 450 MG extended release tablet TAKE 1/2 TABLET BY MOUTH TWICE A DAY 90 tablet 1    Budeson-Glycopyrrol-Formoterol (BREZTRI AEROSPHERE) 160-9-4.8 MCG/ACT AERO Inhale 2 sprays into the lungs 2 times daily 1 each 11    albuterol sulfate  (90 Base) MCG/ACT inhaler INHALE 2 PUFF FOUR TIMES A DAY AS NEEDED 1 each 5    torsemide (DEMADEX) 20 MG tablet Take 20 mg by mouth daily      albuterol (PROVENTIL) (2.5 MG/3ML) 0.083% nebulizer solution Take 3 mLs by nebulization 4 times daily as needed for Wheezing 120 each 5    ipratropium (ATROVENT HFA) 17 MCG/ACT inhaler Inhale 2 puffs into the lungs every 6-8 hours as needed      dilTIAZem (CARDIZEM) 60 MG tablet Take 1 tablet by mouth every 8 hours 120 tablet 3    Dextromethorphan-guaiFENesin (MUCINEX DM)  MG TB12 Take 1 to 2 tablet by mouth every 12 hours (maximum: 4 tablets/24 hours). Drink plenty of water.  28 tablet 0    nitroGLYCERIN (NITROSTAT) 0.4 MG SL tablet Place 1 tablet under the tongue every 5 minutes as needed for Chest pain 25 tablet 1    dicyclomine (BENTYL) 10 MG capsule Take 10 mg by mouth 2 times daily      Biotin 50793 MCG TABS Take 10,000 mcg by mouth daily      b complex vitamins capsule Take 1 capsule by mouth daily      Calcium Carb-Cholecalciferol (CALCIUM + D3) 600-200 MG-UNIT TABS Take 1 tablet by mouth 2 times daily      Multiple Vitamins-Minerals (MULTIVITAMIN PO) Take 1 tablet by mouth daily      OXYGEN Inhale 2 L/hr into the lungs continuous. No current facility-administered medications for this visit. ALLERGIES  Allergies   Allergen Reactions    Codeine Swelling    Darvon [Propoxyphene Hcl] Swelling    Lamisil [Terbinafine Hcl]     Morphine Swelling    Neosporin [Neomycin-Polymyxin-Gramicidin]     Pcn [Penicillins] Swelling       PHYSICAL EXAM    /66 (Site: Right Upper Arm, Position: Sitting, Cuff Size: Medium Adult)   Pulse 74   Ht 5' 5\" (1.651 m)   Wt 145 lb (65.8 kg)   SpO2 94%   BMI 24.13 kg/m²     Physical Exam  Constitutional:       Appearance: Normal appearance. She is normal weight. HENT:      Head: Normocephalic and atraumatic. Right Ear: Tympanic membrane and external ear normal.      Left Ear: Tympanic membrane and external ear normal.      Nose: No rhinorrhea. Mouth/Throat:      Mouth: Mucous membranes are moist.      Pharynx: Oropharynx is clear. No oropharyngeal exudate or posterior oropharyngeal erythema. Eyes:      General: No scleral icterus. Extraocular Movements: Extraocular movements intact. Conjunctiva/sclera: Conjunctivae normal.      Pupils: Pupils are equal, round, and reactive to light. Cardiovascular:      Rate and Rhythm: Normal rate and regular rhythm. Pulses: Normal pulses. Heart sounds: Normal heart sounds. No murmur heard. No friction rub. No gallop. Pulmonary:      Effort: Pulmonary effort is normal.      Breath sounds: No wheezing, rhonchi or rales. Comments: Quiet chest and prolonged expiratory phase  Abdominal:      General: Bowel sounds are normal. There is no distension. Palpations: Abdomen is soft. There is no mass. Tenderness: There is no abdominal tenderness.  There is no right CVA tenderness, left CVA tenderness, guarding or rebound. Musculoskeletal:         General: Tenderness present. No deformity. Normal range of motion. Cervical back: Normal range of motion and neck supple. No rigidity. No muscular tenderness. Right lower leg: No edema. Left lower leg: No edema. Comments: Paraspinal tenderness of the thoracic spine   Skin:     General: Skin is warm and dry. Capillary Refill: Capillary refill takes less than 2 seconds. Findings: No bruising, erythema or rash. Neurological:      General: No focal deficit present. Mental Status: She is alert and oriented to person, place, and time. Coordination: Coordination normal.      Gait: Gait normal.   Psychiatric:         Mood and Affect: Mood normal.         Behavior: Behavior normal.       ASSESSMENT & PLAN    1. Upper back pain  Like to get an x-ray to rule out a compression fracture  Recommend heat, Tylenol, NSAIDs and muscle relaxer  - XR THORACIC SPINE (3 VIEWS); Future  - tiZANidine (ZANAFLEX) 2 MG tablet; Take 1 tablet by mouth 4 times daily as needed (back pain)  Dispense: 40 tablet; Refill: 0    2. Multifocal pneumonia  At this point the patient does not improve she has failed 3 outpatient oral antibiotics and steroids. If she worsens or fails to improve she should go to the emergency department for admission for IV antibiotics  Patient understands and agrees with plan of care    Return if symptoms worsen or fail to improve. Electronically signed by Noemy Majano on 11/4/2022      Comment: Please note this report has been produced using speech recognition software and may contain errors related to that system including errors in grammar, punctuation, and spelling, as well as words and phrases that may be inappropriate. If there are any questions or concerns please feel free to contact the dictating provider for clarification.

## 2022-11-10 DIAGNOSIS — J44.9 COPD, VERY SEVERE (HCC): ICD-10-CM

## 2022-11-11 RX ORDER — THEOPHYLLINE 450 MG/1
TABLET, EXTENDED RELEASE ORAL
Qty: 90 TABLET | Refills: 1 | OUTPATIENT
Start: 2022-11-11

## 2022-11-23 ENCOUNTER — NURSE ONLY (OUTPATIENT)
Dept: FAMILY MEDICINE CLINIC | Age: 77
End: 2022-11-23
Payer: MEDICARE

## 2022-11-23 DIAGNOSIS — Z86.718 HISTORY OF DVT OF LOWER EXTREMITY: ICD-10-CM

## 2022-11-23 DIAGNOSIS — Z86.711 HISTORY OF PULMONARY EMBOLISM: ICD-10-CM

## 2022-11-23 DIAGNOSIS — Z79.01 ANTICOAGULANT LONG-TERM USE: ICD-10-CM

## 2022-11-23 LAB
INTERNATIONAL NORMALIZATION RATIO, POC: 2
PROTHROMBIN TIME, POC: 24.2

## 2022-11-23 PROCEDURE — 85610 PROTHROMBIN TIME: CPT | Performed by: FAMILY MEDICINE

## 2022-11-23 NOTE — PROGRESS NOTES
Confirmed current coumadin 6/3/3 mg. Inr 2.0. Patient to keep dose the same and recheck INR in 4 weeks. 43M, h.o bell's p.w upper and lower facial drooping of the left side, no sign of vinson, or meza hunt, no travel or rashes, unlikely stroke. will give prednisone, and valcyclovir and eye drops for bell's.

## 2022-12-04 DIAGNOSIS — J44.9 COPD, VERY SEVERE (HCC): Chronic | ICD-10-CM

## 2022-12-04 DIAGNOSIS — E87.6 HYPOKALEMIA: ICD-10-CM

## 2022-12-04 DIAGNOSIS — E78.00 PURE HYPERCHOLESTEROLEMIA: ICD-10-CM

## 2022-12-04 DIAGNOSIS — J44.9 COPD, VERY SEVERE (HCC): ICD-10-CM

## 2022-12-04 DIAGNOSIS — I10 ESSENTIAL HYPERTENSION: ICD-10-CM

## 2022-12-05 ENCOUNTER — TELEPHONE (OUTPATIENT)
Dept: PULMONOLOGY | Age: 77
End: 2022-12-05

## 2022-12-05 RX ORDER — THEOPHYLLINE 450 MG/1
TABLET, EXTENDED RELEASE ORAL
Qty: 90 TABLET | Refills: 1 | OUTPATIENT
Start: 2022-12-05

## 2022-12-05 RX ORDER — ROSUVASTATIN CALCIUM 20 MG/1
TABLET, COATED ORAL
Qty: 90 TABLET | Refills: 1 | OUTPATIENT
Start: 2022-12-05

## 2022-12-05 RX ORDER — BACLOFEN 20 MG
TABLET ORAL
Qty: 90 TABLET | Refills: 0 | Status: SHIPPED | OUTPATIENT
Start: 2022-12-05

## 2022-12-05 RX ORDER — POTASSIUM CHLORIDE 750 MG/1
TABLET, EXTENDED RELEASE ORAL
Qty: 135 TABLET | Refills: 1 | OUTPATIENT
Start: 2022-12-05

## 2022-12-05 RX ORDER — HYDROXYZINE HYDROCHLORIDE 25 MG/1
TABLET, FILM COATED ORAL
Qty: 90 TABLET | Refills: 0 | OUTPATIENT
Start: 2022-12-05

## 2022-12-05 RX ORDER — MONTELUKAST SODIUM 10 MG/1
TABLET ORAL
Qty: 90 TABLET | Refills: 0 | Status: SHIPPED | OUTPATIENT
Start: 2022-12-05

## 2022-12-12 ENCOUNTER — OFFICE VISIT (OUTPATIENT)
Dept: PULMONOLOGY | Age: 77
End: 2022-12-12
Payer: MEDICARE

## 2022-12-12 VITALS
BODY MASS INDEX: 24.06 KG/M2 | HEIGHT: 65 IN | OXYGEN SATURATION: 95 % | WEIGHT: 144.4 LBS | SYSTOLIC BLOOD PRESSURE: 120 MMHG | DIASTOLIC BLOOD PRESSURE: 64 MMHG | HEART RATE: 70 BPM

## 2022-12-12 DIAGNOSIS — R91.1 PULMONARY NODULE: Chronic | ICD-10-CM

## 2022-12-12 DIAGNOSIS — R91.8 LUNG INFILTRATE ON CT: Chronic | ICD-10-CM

## 2022-12-12 DIAGNOSIS — J44.9 COPD, VERY SEVERE (HCC): Primary | Chronic | ICD-10-CM

## 2022-12-12 DIAGNOSIS — I27.20 MILD PULMONARY HYPERTENSION (HCC): ICD-10-CM

## 2022-12-12 DIAGNOSIS — Z87.891 FORMER SMOKER: ICD-10-CM

## 2022-12-12 DIAGNOSIS — J96.11 CHRONIC HYPOXEMIC RESPIRATORY FAILURE (HCC): Chronic | ICD-10-CM

## 2022-12-12 DIAGNOSIS — R06.02 SHORTNESS OF BREATH: ICD-10-CM

## 2022-12-12 PROCEDURE — 1123F ACP DISCUSS/DSCN MKR DOCD: CPT | Performed by: INTERNAL MEDICINE

## 2022-12-12 PROCEDURE — 3023F SPIROM DOC REV: CPT | Performed by: INTERNAL MEDICINE

## 2022-12-12 PROCEDURE — G8400 PT W/DXA NO RESULTS DOC: HCPCS | Performed by: INTERNAL MEDICINE

## 2022-12-12 PROCEDURE — 1036F TOBACCO NON-USER: CPT | Performed by: INTERNAL MEDICINE

## 2022-12-12 PROCEDURE — G8484 FLU IMMUNIZE NO ADMIN: HCPCS | Performed by: INTERNAL MEDICINE

## 2022-12-12 PROCEDURE — G8427 DOCREV CUR MEDS BY ELIG CLIN: HCPCS | Performed by: INTERNAL MEDICINE

## 2022-12-12 PROCEDURE — 3074F SYST BP LT 130 MM HG: CPT | Performed by: INTERNAL MEDICINE

## 2022-12-12 PROCEDURE — 3078F DIAST BP <80 MM HG: CPT | Performed by: INTERNAL MEDICINE

## 2022-12-12 PROCEDURE — 1090F PRES/ABSN URINE INCON ASSESS: CPT | Performed by: INTERNAL MEDICINE

## 2022-12-12 PROCEDURE — 99213 OFFICE O/P EST LOW 20 MIN: CPT | Performed by: INTERNAL MEDICINE

## 2022-12-12 PROCEDURE — G8420 CALC BMI NORM PARAMETERS: HCPCS | Performed by: INTERNAL MEDICINE

## 2022-12-12 NOTE — PROGRESS NOTES
SUBJECTIVE:  Chief Complaint: Very severe/stage IV COPD, chronic hypoxemic respiratory failure, shortness of breath, pulmonary nodule, lung infiltrate on CT, mild pulm hypertension, former smoker  Luis Beckman states that she has had no recent bronchitic infections and denies coughing up purulent sputum, fever or chills. She still is having some discomfort in the right scapular region but it is not every day. She denies hemoptysis or pleuritic chest pain. She continues note very significant dyspnea on exertion and sometimes is short of breath even at rest.  She continues on Breztri, long-acting theophylline, 5 mg prednisone daily, albuterol per MDI or albuterol solution per nebulizer as needed. She also continues wearing oxygen 24 hours a day. She has had all 5 COVID-19 vaccinations including the newest hybrid COVID-19 vaccination. She also has received the influenza vaccine. She mentions that her appetite is not good and that she is continued to slowly lose weight. ROS:  Constitution:  HEENT: Negative for ear, throat pain  Cardiovascular: Negative for chest pain, syncope, edema  Pulmonary: See HPI  Musculoskeletal: Negative for DVT, myalgias, arthralgias    OBJECTIVE:  /64   Pulse 70   Ht 5' 5\" (1.651 m)   Wt 144 lb 6.4 oz (65.5 kg)   SpO2 95%   BMI 24.03 kg/m²      Physical Exam:  Constitutional:  She appears well developed and well-nourished. Wearing nasal oxygen. Does not appear to be in significant respiratory distress at rest and not using accessory muscles of respiration  Neck:  Supple, No palpable lymphadenopathy, No JVD  Cardiovascular:  S1, S2 Normal, Regular rhythm, no murmurs or gallops, No pericardial  rubs.   Pulmonary: Diminished breath sounds throughout all lung areas without wheezing or rhonchi  Abdomen: Not examined  Extremities: no edema, No DVT  Neurologic: Oriented x3, No focal deficits    Radiology: Chest x-ray on 10/28/2022 showed COPD with right upper lobe density likely representing scarring  CTA chest 10/7/2022  1. No pulmonary embolism. 2. New patchy right upper lobe opacities which could reflect pneumonia. Patchy opacities in the left upper lobe appear improved. 3. The new 6 mm right lower lobe nodule is no longer identified, so CT   follow-up is not necessary. 4. Emphysema and areas of fibrosis, as before. PET/CT 8/4/2022    1. Low-level activity associated with the irregular opacity in the right   lung that is becoming less dense, potentially an area of resolving   atelectasis and/or pneumonia. The changing appearance and low level of   activity is less suggestive of malignancy. Continued follow-up CT scan to   ensure appropriate resolution     PFT: Office spirometry on 7/21/2021 demonstrated a very severe/stage IV obstructive lung defect with no significant response to bronchodilators      Echocardiogram: 1/3/2022   Left ventricular systolic function is normal.   Ejection fraction is visually estimated at 50-55%. Mild aortic stenosis with mean gradient of 11 mmHg. Mild to moderate tricuspid regurgitation; RVSP: 40 mmHg. No evidence of any pericardial effusion. Grade I diastolic dysfunction  ASSESSMENT:    1. COPD, very severe ([address]    2. Chronic hypoxemic respiratory failure (HCC)    3. Shortness of breath    4. Pulmonary nodule    5. Lung infiltrate on CT    6. Mild pulmonary hypertension ([address]    7. Former smoker          PLAN:  I will make no change in her current bronchodilator therapy. She is up-to-date with her inhaled medications presently. Because of her persistent right scapular pain and history of persistent infiltrative changes in the right upper lobe I would like to repeat her CT chest in several weeks. [facility] will continue to follow her closely. We have discussed the need to maintain yearly flu immunization, pneumococcal vaccination.  We have discussed Coronavirus precaution including handwashing practice, wiping items touched in public such as gas pumps, door handles, shopping carts, etc. Self monitoring for infection - fever, chills, cough, SOB. Should they develop symptoms they should call office for further instructions. Return in about 4 weeks (around 1/9/2023) for Recheck for COPD, Recheck for Shortness of Breath, chronic hypoxemic respiratory failure. This dictation was performed with a verbal recognition program and it was checked for errors. It is possible that there are still dictated errors within this office note. Any errors should be brought immediately to my attention for correction. All efforts were made to ensure that this office note is accurate.

## 2022-12-21 ENCOUNTER — ANTI-COAG VISIT (OUTPATIENT)
Dept: FAMILY MEDICINE CLINIC | Age: 77
End: 2022-12-21

## 2022-12-21 ENCOUNTER — NURSE ONLY (OUTPATIENT)
Dept: FAMILY MEDICINE CLINIC | Age: 77
End: 2022-12-21

## 2022-12-21 DIAGNOSIS — G45.9 TIA (TRANSIENT ISCHEMIC ATTACK): Primary | ICD-10-CM

## 2022-12-21 DIAGNOSIS — Z79.01 ANTICOAGULANT LONG-TERM USE: ICD-10-CM

## 2022-12-21 DIAGNOSIS — K21.9 GASTROESOPHAGEAL REFLUX DISEASE, UNSPECIFIED WHETHER ESOPHAGITIS PRESENT: ICD-10-CM

## 2022-12-21 DIAGNOSIS — Z86.711 HISTORY OF PULMONARY EMBOLISM: ICD-10-CM

## 2022-12-21 DIAGNOSIS — E03.9 ACQUIRED HYPOTHYROIDISM: ICD-10-CM

## 2022-12-21 DIAGNOSIS — Z86.718 HISTORY OF DVT OF LOWER EXTREMITY: ICD-10-CM

## 2022-12-21 DIAGNOSIS — J44.9 COPD, VERY SEVERE (HCC): ICD-10-CM

## 2022-12-21 LAB
INTERNATIONAL NORMALIZATION RATIO, POC: 2.4
PROTHROMBIN TIME, POC: 29
T4 FREE: 1.3 NG/DL (ref 0.9–1.8)
TSH REFLEX: 3.12 UIU/ML (ref 0.27–4.2)

## 2022-12-21 PROCEDURE — 99999 PR OFFICE/OUTPT VISIT,PROCEDURE ONLY: CPT | Performed by: STUDENT IN AN ORGANIZED HEALTH CARE EDUCATION/TRAINING PROGRAM

## 2022-12-21 RX ORDER — WARFARIN SODIUM 3 MG/1
3 TABLET ORAL DAILY
Qty: 30 TABLET | Refills: 1 | Status: SHIPPED | OUTPATIENT
Start: 2022-12-21

## 2022-12-21 RX ORDER — ESOMEPRAZOLE MAGNESIUM 40 MG/1
40 CAPSULE, DELAYED RELEASE ORAL 2 TIMES DAILY
Qty: 180 CAPSULE | Refills: 1 | Status: SHIPPED | OUTPATIENT
Start: 2022-12-21 | End: 2023-06-19

## 2022-12-21 RX ORDER — ALBUTEROL SULFATE 2.5 MG/3ML
2.5 SOLUTION RESPIRATORY (INHALATION) EVERY 4 HOURS PRN
Qty: 120 EACH | Refills: 1 | Status: SHIPPED | OUTPATIENT
Start: 2022-12-21 | End: 2023-01-20

## 2022-12-21 NOTE — PROGRESS NOTES
Confirmed current coumadin 6/3/3 mg. Inr 2.4. Patient to keep dose the same and recheck INR in 4 weeks.

## 2023-01-05 ENCOUNTER — APPOINTMENT (OUTPATIENT)
Dept: GENERAL RADIOLOGY | Age: 78
End: 2023-01-05
Payer: MEDICARE

## 2023-01-05 ENCOUNTER — HOSPITAL ENCOUNTER (EMERGENCY)
Age: 78
Discharge: HOME | End: 2023-01-05
Payer: MEDICARE

## 2023-01-05 VITALS
OXYGEN SATURATION: 94 % | BODY MASS INDEX: 24.13 KG/M2 | SYSTOLIC BLOOD PRESSURE: 169 MMHG | RESPIRATION RATE: 14 BRPM | TEMPERATURE: 98 F | WEIGHT: 145 LBS | HEART RATE: 93 BPM | DIASTOLIC BLOOD PRESSURE: 68 MMHG

## 2023-01-05 DIAGNOSIS — R07.9 CHEST PAIN, UNSPECIFIED TYPE: ICD-10-CM

## 2023-01-05 DIAGNOSIS — J44.1 COPD EXACERBATION (HCC): Primary | ICD-10-CM

## 2023-01-05 DIAGNOSIS — R06.00 DYSPNEA AND RESPIRATORY ABNORMALITIES: ICD-10-CM

## 2023-01-05 DIAGNOSIS — R06.89 DYSPNEA AND RESPIRATORY ABNORMALITIES: ICD-10-CM

## 2023-01-05 LAB
ALBUMIN SERPL-MCNC: 3.7 GM/DL (ref 3.4–5)
ALP BLD-CCNC: 74 IU/L (ref 40–128)
ALT SERPL-CCNC: 18 U/L (ref 10–40)
ANION GAP SERPL CALCULATED.3IONS-SCNC: 10 MMOL/L (ref 4–16)
APTT: 31.7 SECONDS (ref 25.1–37.1)
AST SERPL-CCNC: 22 IU/L (ref 15–37)
BASE EXCESS MIXED: 8.4 (ref 0–2.3)
BASOPHILS ABSOLUTE: 0 K/CU MM
BASOPHILS RELATIVE PERCENT: 0.3 % (ref 0–1)
BILIRUB SERPL-MCNC: 0.3 MG/DL (ref 0–1)
BUN BLDV-MCNC: 17 MG/DL (ref 6–23)
CALCIUM SERPL-MCNC: 9.7 MG/DL (ref 8.3–10.6)
CHLORIDE BLD-SCNC: 101 MMOL/L (ref 99–110)
CO2: 31 MMOL/L (ref 21–32)
COMMENT: ABNORMAL
CREAT SERPL-MCNC: 0.6 MG/DL (ref 0.6–1.1)
DIFFERENTIAL TYPE: ABNORMAL
EKG ATRIAL RATE: 82 BPM
EKG DIAGNOSIS: NORMAL
EKG P AXIS: 68 DEGREES
EKG P-R INTERVAL: 100 MS
EKG Q-T INTERVAL: 374 MS
EKG QRS DURATION: 106 MS
EKG QTC CALCULATION (BAZETT): 436 MS
EKG R AXIS: -45 DEGREES
EKG T AXIS: 75 DEGREES
EKG VENTRICULAR RATE: 82 BPM
EOSINOPHILS ABSOLUTE: 0 K/CU MM
EOSINOPHILS RELATIVE PERCENT: 0.1 % (ref 0–3)
GFR SERPL CREATININE-BSD FRML MDRD: >60 ML/MIN/1.73M2
GLUCOSE BLD-MCNC: 107 MG/DL (ref 70–99)
HCO3 VENOUS: 34.9 MMOL/L (ref 19–25)
HCT VFR BLD CALC: 42.3 % (ref 37–47)
HEMOGLOBIN: 13.3 GM/DL (ref 12.5–16)
IMMATURE NEUTROPHIL %: 0.4 % (ref 0–0.43)
INR BLD: 2.25 INDEX
LYMPHOCYTES ABSOLUTE: 1.3 K/CU MM
LYMPHOCYTES RELATIVE PERCENT: 10.7 % (ref 24–44)
MCH RBC QN AUTO: 31 PG (ref 27–31)
MCHC RBC AUTO-ENTMCNC: 31.4 % (ref 32–36)
MCV RBC AUTO: 98.6 FL (ref 78–100)
MONOCYTES ABSOLUTE: 0.9 K/CU MM
MONOCYTES RELATIVE PERCENT: 7.4 % (ref 0–4)
NUCLEATED RBC %: 0 %
O2 SAT, VEN: 66 % (ref 50–70)
PCO2, VEN: 55 MMHG (ref 38–52)
PDW BLD-RTO: 14.4 % (ref 11.7–14.9)
PH VENOUS: 7.41 (ref 7.32–7.42)
PLATELET # BLD: 182 K/CU MM (ref 140–440)
PMV BLD AUTO: 10 FL (ref 7.5–11.1)
PO2, VEN: 37 MMHG (ref 28–48)
POTASSIUM SERPL-SCNC: 3.6 MMOL/L (ref 3.5–5.1)
PRO-BNP: 241.3 PG/ML
PROTHROMBIN TIME: 29.3 SECONDS (ref 11.7–14.5)
RAPID INFLUENZA  B AGN: NEGATIVE
RAPID INFLUENZA A AGN: NEGATIVE
RBC # BLD: 4.29 M/CU MM (ref 4.2–5.4)
SARS-COV-2, NAAT: NOT DETECTED
SEGMENTED NEUTROPHILS ABSOLUTE COUNT: 9.7 K/CU MM
SEGMENTED NEUTROPHILS RELATIVE PERCENT: 81.1 % (ref 36–66)
SODIUM BLD-SCNC: 142 MMOL/L (ref 135–145)
SOURCE: NORMAL
TOTAL IMMATURE NEUTOROPHIL: 0.05 K/CU MM
TOTAL NUCLEATED RBC: 0 K/CU MM
TOTAL PROTEIN: 6.2 GM/DL (ref 6.4–8.2)
TROPONIN T: <0.01 NG/ML
WBC # BLD: 11.9 K/CU MM (ref 4–10.5)

## 2023-01-05 PROCEDURE — 83880 ASSAY OF NATRIURETIC PEPTIDE: CPT

## 2023-01-05 PROCEDURE — 93005 ELECTROCARDIOGRAM TRACING: CPT | Performed by: PHYSICIAN ASSISTANT

## 2023-01-05 PROCEDURE — 85025 COMPLETE CBC W/AUTO DIFF WBC: CPT

## 2023-01-05 PROCEDURE — 87635 SARS-COV-2 COVID-19 AMP PRB: CPT

## 2023-01-05 PROCEDURE — 6360000002 HC RX W HCPCS: Performed by: PHYSICIAN ASSISTANT

## 2023-01-05 PROCEDURE — 93010 ELECTROCARDIOGRAM REPORT: CPT | Performed by: INTERNAL MEDICINE

## 2023-01-05 PROCEDURE — 96374 THER/PROPH/DIAG INJ IV PUSH: CPT

## 2023-01-05 PROCEDURE — 85730 THROMBOPLASTIN TIME PARTIAL: CPT

## 2023-01-05 PROCEDURE — 82805 BLOOD GASES W/O2 SATURATION: CPT

## 2023-01-05 PROCEDURE — 85610 PROTHROMBIN TIME: CPT

## 2023-01-05 PROCEDURE — 94640 AIRWAY INHALATION TREATMENT: CPT

## 2023-01-05 PROCEDURE — 84484 ASSAY OF TROPONIN QUANT: CPT

## 2023-01-05 PROCEDURE — 6370000000 HC RX 637 (ALT 250 FOR IP): Performed by: PHYSICIAN ASSISTANT

## 2023-01-05 PROCEDURE — 99285 EMERGENCY DEPT VISIT HI MDM: CPT

## 2023-01-05 PROCEDURE — 80053 COMPREHEN METABOLIC PANEL: CPT

## 2023-01-05 PROCEDURE — 71045 X-RAY EXAM CHEST 1 VIEW: CPT

## 2023-01-05 PROCEDURE — 87804 INFLUENZA ASSAY W/OPTIC: CPT

## 2023-01-05 RX ORDER — METHYLPREDNISOLONE SODIUM SUCCINATE 125 MG/2ML
125 INJECTION, POWDER, LYOPHILIZED, FOR SOLUTION INTRAMUSCULAR; INTRAVENOUS ONCE
Status: COMPLETED | OUTPATIENT
Start: 2023-01-05 | End: 2023-01-05

## 2023-01-05 RX ORDER — AZITHROMYCIN 250 MG/1
250 TABLET, FILM COATED ORAL DAILY
Qty: 4 TABLET | Refills: 0 | Status: SHIPPED | OUTPATIENT
Start: 2023-01-06 | End: 2023-01-10

## 2023-01-05 RX ORDER — AZITHROMYCIN 250 MG/1
500 TABLET, FILM COATED ORAL ONCE
Status: COMPLETED | OUTPATIENT
Start: 2023-01-05 | End: 2023-01-05

## 2023-01-05 RX ORDER — PREDNISONE 20 MG/1
40 TABLET ORAL DAILY
Qty: 10 TABLET | Refills: 0 | Status: SHIPPED | OUTPATIENT
Start: 2023-01-05 | End: 2023-01-10

## 2023-01-05 RX ORDER — IPRATROPIUM BROMIDE AND ALBUTEROL SULFATE 2.5; .5 MG/3ML; MG/3ML
2 SOLUTION RESPIRATORY (INHALATION) ONCE
Status: COMPLETED | OUTPATIENT
Start: 2023-01-05 | End: 2023-01-05

## 2023-01-05 RX ADMIN — IPRATROPIUM BROMIDE AND ALBUTEROL SULFATE 2 AMPULE: 2.5; .5 SOLUTION RESPIRATORY (INHALATION) at 13:42

## 2023-01-05 RX ADMIN — METHYLPREDNISOLONE SODIUM SUCCINATE 125 MG: 125 INJECTION, POWDER, FOR SOLUTION INTRAMUSCULAR; INTRAVENOUS at 13:41

## 2023-01-05 RX ADMIN — AZITHROMYCIN MONOHYDRATE 500 MG: 250 TABLET ORAL at 15:40

## 2023-01-05 NOTE — ED TRIAGE NOTES
Patient to ER via ems for c/o sob. Patient has hx of copd and typically wears 2L NC at all times. States sob has worsened from baseline over the last week.

## 2023-01-05 NOTE — ED PROVIDER NOTES
[address] ENCOUNTER        Pt Name: Lisa Ray  MRN: [id number]  [place] 1945  Date of evaluation: 1/5/2023  Provider: Joaquim Miranda PA-C  PCP: Hendrick Ahumada, DO  Note Started: 11:35 AM EST 1/5/23      MAIRA. I have evaluated this patient. My supervising physician was available for consultation. CHIEF COMPLAINT       Chief Complaint   Patient presents with    Shortness of Breath     Hx of copd and always 2L NC. States sob worse over last week. HISTORY OF PRESENT ILLNESS: 1 or more Elements     History From: Patient, family members,   Limitations to history : None    Lisa Ray is a 68 y.o. female who presents known history of COPD. She presents with worsening, she is describing some mid chest pain, pressure, and right sided chest pain, she noticed that its been worsening when she walks and she dyspnea is worsened when she walks prompted her visit to the emergency department. She is on 2 L nasal cannula chronically for COPD and hypoxia but has not needed any additional oxygen demands. She denies any URI symptoms cough, mopped assist abdominal pain, nausea vomiting lower extremity pain or swelling. Nursing Notes were all reviewed and agreed with or any disagreements were addressed in the HPI. REVIEW OF SYSTEMS :      Review of Systems   Constitutional:  Negative for chills and fever. Respiratory:  Positive for shortness of breath. Negative for cough. Cardiovascular:  Positive for chest pain. Negative for leg swelling. Neurological:  Negative for syncope. Psychiatric/Behavioral:  Negative for confusion. Positives and Pertinent negatives as per HPI.      SURGICAL HISTORY     Past Surgical History:   Procedure Laterality Date    Gregorio Dye    COLONOSCOPY      ENDOSCOPY, COLON, DIAGNOSTIC      TONSILLECTOMY      TUBAL LIGATION [facility] Medication List as of 1/5/2023  4:40 PM        CONTINUE these medications which have NOT CHANGED    Details   warfarin (COUMADIN) 3 MG tablet Take 1 tablet by mouth daily, Disp-30 tablet, R-1Normal      esomeprazole (NEXIUM) 40 MG delayed release capsule Take 1 capsule by mouth in the morning and at bedtime, Disp-180 capsule, R-1Normal      albuterol (PROVENTIL) (2.5 MG/3ML) 0.083% nebulizer solution Take 3 mLs by nebulization every 4 hours as needed for Wheezing, Disp-120 each, R-1Normal      montelukast (SINGULAIR) 10 MG tablet TAKE 1 TABLET BY MOUTH EVERY DAY EVERY NIGHT, Disp-90 tablet, R-0Normal      Magnesium Oxide 500 MG TABS TAKE 1 TABLET BY MOUTH EVERY DAY, Disp-90 tablet, R-0Normal      tiZANidine (ZANAFLEX) 2 MG tablet Take 1 tablet by mouth 4 times daily as needed (back pain), Disp-40 tablet, R-0Normal      hydrOXYzine HCl (ATARAX) 25 MG tablet TAKE 1 TABLET BY MOUTH NIGHTLY, Disp-90 tablet, R-0Normal      levothyroxine (SYNTHROID) 75 MCG tablet Take 1 tablet by mouth daily, Disp-90 tablet, R-1Normal      risedronate (ACTONEL) 35 MG tablet TAKE 1 TABLET BY MOUTH EVERY 7 DAYS PATIENT TAKES ON SUNDAY, Disp-12 tablet, R-1Normal      gabapentin (NEURONTIN) 400 MG capsule Take 1 capsule by mouth every evening for 90 days. , Disp-90 capsule, R-0Normal      celecoxib (CELEBREX) 200 MG capsule TAKE 1 CAPSULE BY MOUTH EVERY DAY, Disp-90 capsule, R-0Normal      Roflumilast (DALIRESP) 500 MCG tablet TAKE 1 TABLET BY MOUTH EVERY DAY, Disp-90 tablet, R-1Normal      Potassium Chloride (KLOR-CON PO) Take by mouth See Admin Instructions 09/08/22 patient alternates days with 1-10 MEQ tablet then, 2-10 MEQ(20 MEQ) every other day. Historical Med      rosuvastatin (CRESTOR) 20 MG tablet TAKE 1 TABLET BY MOUTH EVERY DAY EVERY NIGHT, Disp-90 tablet, R-1Normal      theophylline (THEODUR) 450 MG extended release tablet TAKE 1/2 TABLET BY MOUTH TWICE A DAY, Disp-90 tablet, R-1Normal      Budeson-Glycopyrrol-Formoterol (BREZTRI AEROSPHERE) 160-9-4.8 MCG/ACT AERO Inhale 2 sprays into the lungs 2 times daily, Disp-1 each, R-11Normal      albuterol sulfate  (90 Base) MCG/ACT inhaler INHALE 2 PUFF FOUR TIMES A DAY AS NEEDED, Disp-1 each, R-5Normal      torsemide (DEMADEX) 20 MG tablet Take 20 mg by mouth dailyHistorical Med      ipratropium (ATROVENT HFA) 17 MCG/ACT inhaler Inhale 2 puffs into the lungs every 6-8 hours as neededHistorical Med      dilTIAZem (CARDIZEM) 60 MG tablet Take 1 tablet by mouth every 8 hours, Disp-120 tablet, R-3Normal      Dextromethorphan-guaiFENesin (MUCINEX DM)  MG TB12 Take 1 to 2 tablet by mouth every 12 hours (maximum: 4 tablets/24 hours). Drink plenty of water., Disp-28 tablet,R-0Normal      nitroGLYCERIN (NITROSTAT) 0.4 MG SL tablet Place 1 tablet under the tongue every 5 minutes as needed for Chest pain, Disp-25 tablet, R-1Print      dicyclomine (BENTYL) 10 MG capsule Take 10 mg by mouth 2 times dailyHistorical Med      Biotin 08679 MCG TABS Take 10,000 mcg by mouth dailyHistorical Med      b complex vitamins capsule Take 1 capsule by mouth dailyHistorical Med      Calcium Carb-Cholecalciferol (CALCIUM + D3) 600-200 MG-UNIT TABS Take 1 tablet by mouth 2 times dailyHistorical Med      Multiple Vitamins-Minerals (MULTIVITAMIN PO) Take 1 tablet by mouth dailyHistorical Med      OXYGEN Inhale 2 L/hr into the lungs continuous. Historical Med             ALLERGIES     Codeine, Darvon [propoxyphene hcl], Lamisil [terbinafine hcl], Morphine, Neosporin [neomycin-polymyxin-gramicidin], and Pcn [penicillins]    FAMILYHISTORY       Family History   Problem Relation Age of Onset    Heart Disease Mother     Cancer Father         lung ca    Heart Disease Father     COPD Father     Cancer Sister     Heart Disease Sister         SOCIAL HISTORY       Social History     Tobacco Use    Smoking status: Former     Packs/day: 1.50     Years: 26.00     Pack years: 39.00     Types: Cigarettes     Start date: 65     Quit date: 1991     Years since quittin.1    Smokeless tobacco: Never   Vaping Use    Vaping Use: Never used   Substance Use Topics    Alcohol use: No    Drug use: No       SCREENINGS                         CIWA Assessment  BP: (!) 169/68  Heart Rate: 93           PHYSICAL EXAM  1 or more Elements     ED Triage Vitals [23 1120]   BP Temp Temp src Heart Rate Resp SpO2 Height Weight   (!) 168/72 98 °F (36.7 °C) -- 94 20 97 % -- --       Physical Exam  Constitutional:       General: She is not in acute distress. Appearance: Normal appearance. She is not ill-appearing. HENT:      Head: Normocephalic and atraumatic. Nose: No rhinorrhea. Eyes:      Extraocular Movements: Extraocular movements intact. Cardiovascular:      Rate and Rhythm: Normal rate. Pulmonary:      Effort: No tachypnea or accessory muscle usage. Breath sounds: No stridor. Wheezing (mild) present. No rhonchi. Chest:      Chest wall: No tenderness. Musculoskeletal:      Right lower leg: No tenderness. No edema. Left lower leg: No tenderness. No edema. Neurological:      Mental Status: She is alert.            DIAGNOSTIC RESULTS   LABS:    Labs Reviewed   CBC WITH AUTO DIFFERENTIAL - Abnormal; Notable for the following components:       Result Value    WBC 11.9 (*)     MCHC 31.4 (*)     Segs Relative 81.1 (*)     Lymphocytes % 10.7 (*)     Monocytes % 7.4 (*)     All other components within normal limits   COMPREHENSIVE METABOLIC PANEL - Abnormal; Notable for the following components:    Glucose 107 (*)     Total Protein 6.2 (*)     All other components within normal limits   BLOOD GAS, VENOUS - Abnormal; Notable for the following components:    pCO2, Mychal 55 (*)     Base Exc, Mixed 8.4 (*)     HCO3, Venous 34.9 (*)     All other components within normal limits   PROTIME/INR & PTT - Abnormal; Notable for the following components:    Protime 29.3 (*)     All other components within normal limits   RAPID INFLUENZA A/B ANTIGENS   COVID-19, RAPID   TROPONIN   BRAIN NATRIURETIC PEPTIDE       When ordered only abnormal lab results are displayed. All other labs were within normal range or not returned as of this dictation. EK lead EKG per my interpretation (otherwise, When ordered, EKG's are interpreted by the Emergency Department Physician in the absence of a cardiologist.  Please see their note for interpretation of EKG. )  Normal Sinus Rhythm 82  Axis is   Left axis deviation  QTc is   436  OH interval 100  There is no specific T wave changes appreciated. There is no specific ST wave changes appreciated. Prior EKG to compare with was not available     RADIOLOGY:   Non-plain film images such as CT, Ultrasound and MRI are read by the radiologist. Plain radiographic images are visualized and preliminarily interpreted by the ED Provider with the below findings:        Interpretation per the Radiologist below, if available at the time of this note:    XR CHEST PORTABLE   Final Result   No acute process. No results found. No results found.     PROCEDURES   Unless otherwise noted below, none     Procedures    CRITICAL CARE TIME (.cctime)        has a past medical history of Anticoagulant long-term use, Arthritis, Cancer ([address] Chronic hypoxemic respiratory failure ([address] (2018), Community acquired pneumonia of right upper lobe of lung (3/19/2022), COPD (chronic obstructive pulmonary disease) ([address] Coronary atherosclerosis, COVID-19 virus detected (2021), DVT (deep venous thrombosis) ([address] Former smoker (2021), Gastroesophageal reflux disease, Hiatal hernia, Lung infiltrate on CT (2019), On home oxygen therapy, Osteopenia, Phlebitis, Pulmonary nodule (2016), S/P left heart catheterization by percutaneous approach (2013), Sepsis due to pneumonia ([address] (2017), Shortness of breath (2019), Shortness of breath (1/18/2021), Shortness of breath (11/22/2021), and Wears glasses. EMERGENCY DEPARTMENT COURSE and DIFFERENTIAL DIAGNOSIS/MDM:     Patient seen shortly after arrival, seen in hallway bed, alert oriented no acute distress, mild wheezing on examination, not hypoxic, dyspnea work-up initiated. Vitals:    Vitals:    01/05/23 1532 01/05/23 1602 01/05/23 1632 01/05/23 1645   BP: (!) 168/66 (!) 176/64 (!) 169/68    Pulse: 94 92 92 93   Resp: 16 15 14 14   Temp:       SpO2: 94% 94% 94% 94%   Weight:           Patient was given the following medications:  Medications   methylPREDNISolone sodium (SOLU-MEDROL) injection 125 mg (125 mg IntraVENous Given 1/5/23 1341)   ipratropium-albuterol (DUONEB) nebulizer solution 2 ampule (2 ampules Inhalation Given 1/5/23 1342)   azithromycin (ZITHROMAX) tablet 500 mg (500 mg Oral Given 1/5/23 1540)             Is this patient to be included in the SEP-1 Core Measure due to severe sepsis or septic shock? No   Exclusion criteria - the patient is NOT to be included for SEP-1 Core Measure due to:  2+ SIRS criteria are not met    Chronic Conditions affecting care:    has a past medical history of Anticoagulant long-term use, Arthritis, Cancer ([facility] Chronic hypoxemic respiratory failure ([facility] (2/6/2018), Community acquired pneumonia of right upper lobe of lung (3/19/2022), COPD (chronic obstructive pulmonary disease) ([address] Coronary atherosclerosis, COVID-19 virus detected (1/18/2021), DVT (deep venous thrombosis) ([address] Former smoker (11/22/2021), Gastroesophageal reflux disease, Hiatal hernia, Lung infiltrate on CT (1/22/2019), On home oxygen therapy, Osteopenia, Phlebitis, Pulmonary nodule (7/5/2016), S/P left heart catheterization by percutaneous approach (6/27/2013), Sepsis due to pneumonia ([address] (11/14/2017), Shortness of breath (9/23/2019), Shortness of breath (1/18/2021), Shortness of breath (11/22/2021), and Wears glasses.     CONSULTS: (Who and What was discussed)  None              Records Reviewed (Source): [facility], pulmonology lab outpatient    CC/HPI Summary, DDx, ED Course, and Reassessment:     Patient did respond well to breathing treatments repeat examination, states her symptoms improved. I asked her if she felt this way when she had come in emergency department she has had no. She is on her baseline oxygen with no increased oxygen demands, denies any new cough, fever. DDX: COVID-19, viral respiratory infection, chronic bronchitis, COPD exacerbation, bacterial pneumonia, ACS, PE    Disposition Considerations (tests considered but not done, Admit vs D/C, Shared Decision Making, Pt Expectation of Test or Tx.): Consideration for CTA pulmonary chest evaluate for pulmonary edema infectious process was considered, however patient is anticoagulated, she is at her baseline oxygen saturation, and improvement after treatment here, additionally she is not meeting sepsis criteria has been afebrile low suspicion for infectious process. Consideration for admission, however patient appears to be at baseline denying any concerns and feels comfortable to be discharged home. Follow-up plan return precautions provided discussed in detail patient in agreement. I am the Primary Clinician of Record. FINAL IMPRESSION      1. COPD exacerbation ([address]    2. Dyspnea and respiratory abnormalities    3.  Chest pain, unspecified type          DISPOSITION/PLAN     DISPOSITION Decision To Discharge 01/05/2023 04:53:22 PM      PATIENT REFERRED TO:  Tyson Joya MD  [place] [address], [facility] [phone]    In 2 days  For reevaluation    Patrice Aguilar DO  [address] [facility] [address]  [phone]    In 2 days      DISCHARGE MEDICATIONS:  Discharge Medication List as of 1/5/2023  4:40 PM        START taking these medications    Details   azithromycin (ZITHROMAX) 250 MG tablet Take 1 tablet by mouth daily for 4 days, Disp-4 tablet, R-0Normal      predniSONE (DELTASONE) 20 MG tablet Take 2 tablets by mouth daily for 5 days, Disp-10 tablet, R-0Normal             DISCONTINUED MEDICATIONS:  Discharge Medication List as of 1/5/2023  4:40 PM                 (Please note that portions of this note were completed with a voice recognition program.  Efforts were made to edit the dictations but occasionally words are mis-transcribed.)    Ever Mckeon PA-C (electronically signed)      Ever Mckeon PA-C  01/06/23 0050       Ever Mckeon PA-C  01/06/23 0282

## 2023-01-05 NOTE — ED NOTES
Discharge instructions reviewed with patient. Medications and follow up were discussed.  Patient denies further questions and verbalizes understanding      Alyson Guthrie RN  01/05/23 2979

## 2023-01-05 NOTE — DISCHARGE INSTRUCTIONS
Please call your pulmonologist and/or your primary care provider to discuss your visit to the emergency department, to schedule an appointment this week for reevaluation. Continue prednisone and azithromycin provided to you today. Return emergency department with any worsening symptoms, difficulty breathing, passing out, worsening chest pain, fevers, worsening symptoms or any new concerns.

## 2023-01-06 ENCOUNTER — SCHEDULED TELEPHONE ENCOUNTER (OUTPATIENT)
Dept: PULMONOLOGY | Age: 78
End: 2023-01-06

## 2023-01-06 DIAGNOSIS — I27.20 MILD PULMONARY HYPERTENSION (HCC): ICD-10-CM

## 2023-01-06 DIAGNOSIS — R06.02 SHORTNESS OF BREATH: ICD-10-CM

## 2023-01-06 DIAGNOSIS — J44.1 ACUTE EXACERBATION OF CHRONIC OBSTRUCTIVE PULMONARY DISEASE (COPD) (HCC): Primary | ICD-10-CM

## 2023-01-06 DIAGNOSIS — J44.9 COPD, VERY SEVERE (HCC): Chronic | ICD-10-CM

## 2023-01-06 DIAGNOSIS — Z87.891 FORMER SMOKER: ICD-10-CM

## 2023-01-06 DIAGNOSIS — J96.11 CHRONIC HYPOXEMIC RESPIRATORY FAILURE (HCC): Chronic | ICD-10-CM

## 2023-01-06 ASSESSMENT — ENCOUNTER SYMPTOMS
COUGH: 0
SHORTNESS OF BREATH: 1

## 2023-01-06 NOTE — PROGRESS NOTES
[place] [address] Pulmonary   Telephone Visit Note      Maki Glaser is a 68 y.o. female evaluated via my chart telephone visit on 1/6/2023. Consent:  Pursuant to emergency declaration under the [address] in the [employer], [address]5 waiver authorization in the [facility] Act, this telephone visit was completed with patient's consent, to reduce the patient's risk of exposure to COVID-19 and provide necessary medical care. She and/or health care decision maker is aware that that she may receive a bill for this telephone service, depending on her insurance coverage, and has provided verbal consent to proceed. Patient is at his residency and Provider is currently in Pulmonary Clinic at [place] [address] Pulmonary. Documentation:  Trish Pearson states that she had contacted our office on 1/5/2023 and we were to see her in the office but on her way she became extremely short of breath and went to the emergency room. She was evaluated there and discharged on azithromycin and oral prednisone. She is being treated for exacerbation of COPD. She states that she continues to note cough and chest congestion but is not expectorating purulent sputum. She denies fever or chills. All her inhaled medications remain the same and she continues on long-acting theophylline orally and 5 mg of prednisone daily. She also continues wear oxygen 24 hours a day. .  While in the ER she was tested for influenza which was negative and also had negative COVID-19 screening. Trish Pearson states they are practicing social distancing, wearing a mask when out in public, washing hands frequently or using hand . Denies any fever, chills, malaise, change in sensation of taste or smell, headache or lightheadedness. Denies any known contacts with persons with respiratory infection, positive for coronavirus or under investigation for possible coronavirus exposure.       Past Medical History:   Diagnosis Date    Anticoagulant long-term use     Arthritis     Cancer ([facility]     Chronic hypoxemic respiratory failure ([facility] 2/6/2018    Community acquired pneumonia of right upper lobe of lung 3/19/2022    COPD (chronic obstructive pulmonary disease) (HCC)     Coronary atherosclerosis     COVID-19 virus detected 1/18/2021    DVT (deep venous thrombosis) ([facility]     Former smoker 11/22/2021    Gastroesophageal reflux disease     Hiatal hernia     Lung infiltrate on CT 1/22/2019    On home oxygen therapy     on 24/7    Osteopenia     Phlebitis     Pulmonary nodule 7/5/2016    S/P left heart catheterization by percutaneous approach 6/27/2013    Sepsis due to pneumonia ([facility] 11/14/2017    Shortness of breath 9/23/2019    Shortness of breath 1/18/2021    Shortness of breath 11/22/2021    Wears glasses        Medication and allergies have been reviewed    Social and family history are unchanged since last visit    ROS:   Constitutional: No fever, no chills   Cardiovascular: No chest pain but she states that her chest is sore, no palpitations. Pulmonary: Mild mostly nonproductive cough, moderate SOB, no wheeze    Physical exam not complete due to telephone visit  Alert and oriented  No conversational dyspnea, no cough, no audible wheeze  Normal mentation       Diagnosis:    ICD-10-CM    1. Acute exacerbation of chronic obstructive pulmonary disease (COPD) ([facility]  J44.1       2. COPD, very severe ([facility]  J44.9       3. Chronic hypoxemic respiratory failure (HCC)  J96.11       4. Shortness of breath  R06.02       5. Former smoker  Z87.891       10. Mild pulmonary hypertension (HCC)  I27.20              Plan:  I discussed with Sirisha Stark I told her to complete her azithromycin and prednisone. If she still having issues next week I asked her to call our office and I would consider placing her on a tapering course of oral prednisone.   If she begins to expectorate purulent sputum next week I also would think about prolong her antibiotic coverage. [facility] will continue to follow her.         ---While she has been  vaccinated at this time for COVID-19 I strongly recommend that she continue Coronavirus precaution including: Wearing mask when in public and when in contact with persons who have not been immunized, social distancing when needing to be in public, handwashing practice, wiping items touched in public such as gas pumps, door handles, shopping carts, etc.  --Recommend yearly flu vaccination  --Recommend Covid 19 booster when available  --Recommend Pneumovax vaccination  --Have discussed signs and symptoms of acute exacerbation and why it is important to monitor for bronchial infections. To call office should she develop signs of acute exacerbation/bronchial infection  --I will continue to follow in pulmonary clinic      I affirm this is a Patient initiated episode with an established patient who has not had a related appointment within my department in the past 7 days or scheduled within the next 24 hours. I have spent 15 minutes reviewing previous notes, test results and communicating with patient discussing the diagnosis and importance of treatment plan.     Note: not billable if this call serves to triage the patient into an appointment for the relevant concern      Rashaun Cortez MD

## 2023-01-16 ENCOUNTER — TELEPHONE (OUTPATIENT)
Dept: PULMONOLOGY | Age: 78
End: 2023-01-16

## 2023-01-17 DIAGNOSIS — J44.9 COPD, VERY SEVERE (HCC): ICD-10-CM

## 2023-01-17 RX ORDER — THEOPHYLLINE 450 MG/1
TABLET, EXTENDED RELEASE ORAL
Qty: 90 TABLET | Refills: 1 | Status: SHIPPED | OUTPATIENT
Start: 2023-01-17

## 2023-01-18 ENCOUNTER — NURSE ONLY (OUTPATIENT)
Dept: FAMILY MEDICINE CLINIC | Age: 78
End: 2023-01-18

## 2023-01-18 DIAGNOSIS — Z86.718 HISTORY OF DVT OF LOWER EXTREMITY: ICD-10-CM

## 2023-01-18 DIAGNOSIS — Z79.01 ANTICOAGULANT LONG-TERM USE: ICD-10-CM

## 2023-01-18 DIAGNOSIS — Z86.711 HISTORY OF PULMONARY EMBOLISM: ICD-10-CM

## 2023-01-18 LAB
INTERNATIONAL NORMALIZATION RATIO, POC: 3.1
PROTHROMBIN TIME, POC: 37.5

## 2023-01-18 RX ORDER — WARFARIN SODIUM 3 MG/1
TABLET ORAL
Qty: 30 TABLET | Refills: 1 | OUTPATIENT
Start: 2023-01-18

## 2023-01-18 NOTE — PROGRESS NOTES
Confirmed current coumadin 6/3/3 mg. Inr 3.1. Patient to take 3mg tonight instead of 6mg and keep dose the same and recheck INR in 2 weeks.

## 2023-01-23 DIAGNOSIS — M19.90 ARTHRITIS: ICD-10-CM

## 2023-01-23 RX ORDER — GABAPENTIN 400 MG/1
400 CAPSULE ORAL EVERY EVENING
Qty: 90 CAPSULE | Refills: 0 | Status: SHIPPED | OUTPATIENT
Start: 2023-01-23 | End: 2023-04-23

## 2023-01-23 RX ORDER — CELECOXIB 200 MG/1
CAPSULE ORAL
Qty: 90 CAPSULE | Refills: 0 | Status: SHIPPED | OUTPATIENT
Start: 2023-01-23

## 2023-01-24 ENCOUNTER — APPOINTMENT (OUTPATIENT)
Dept: GENERAL RADIOLOGY | Age: 78
DRG: 189 | End: 2023-01-24
Payer: MEDICARE

## 2023-01-24 ENCOUNTER — HOSPITAL ENCOUNTER (INPATIENT)
Age: 78
LOS: 2 days | Discharge: HOME | DRG: 189 | End: 2023-01-26
Attending: EMERGENCY MEDICINE
Payer: MEDICARE

## 2023-01-24 DIAGNOSIS — J44.1 COPD EXACERBATION (HCC): ICD-10-CM

## 2023-01-24 DIAGNOSIS — J44.9 COPD, VERY SEVERE (HCC): Primary | ICD-10-CM

## 2023-01-24 DIAGNOSIS — J96.02 ACUTE RESPIRATORY FAILURE WITH HYPERCAPNIA (HCC): ICD-10-CM

## 2023-01-24 DIAGNOSIS — J44.1 COPD EXACERBATION (HCC): Primary | ICD-10-CM

## 2023-01-24 DIAGNOSIS — J44.1 ACUTE EXACERBATION OF CHRONIC OBSTRUCTIVE PULMONARY DISEASE (COPD) (HCC): ICD-10-CM

## 2023-01-24 PROBLEM — J96.22 ACUTE ON CHRONIC RESPIRATORY FAILURE WITH HYPERCAPNIA (HCC): Status: ACTIVE | Noted: 2023-01-24

## 2023-01-24 LAB
ALBUMIN SERPL-MCNC: 3.6 GM/DL (ref 3.4–5)
ALP BLD-CCNC: 71 IU/L (ref 40–129)
ALT SERPL-CCNC: 22 U/L (ref 10–40)
ANION GAP SERPL CALCULATED.3IONS-SCNC: 5 MMOL/L (ref 4–16)
AST SERPL-CCNC: 25 IU/L (ref 15–37)
BASE EXCESS MIXED: 11.8 (ref 0–2.3)
BASE EXCESS MIXED: 13 (ref 0–2.3)
BASOPHILS ABSOLUTE: 0 K/CU MM
BASOPHILS RELATIVE PERCENT: 0.4 % (ref 0–1)
BILIRUB SERPL-MCNC: 0.3 MG/DL (ref 0–1)
BUN BLDV-MCNC: 17 MG/DL (ref 6–23)
CALCIUM SERPL-MCNC: 9.8 MG/DL (ref 8.3–10.6)
CHLORIDE BLD-SCNC: 98 MMOL/L (ref 99–110)
CO2: 36 MMOL/L (ref 21–32)
COMMENT: ABNORMAL
COMMENT: ABNORMAL
CREAT SERPL-MCNC: 0.7 MG/DL (ref 0.6–1.1)
DIFFERENTIAL TYPE: ABNORMAL
EOSINOPHILS ABSOLUTE: 0 K/CU MM
EOSINOPHILS RELATIVE PERCENT: 0.5 % (ref 0–3)
GFR SERPL CREATININE-BSD FRML MDRD: >60 ML/MIN/1.73M2
GLUCOSE BLD-MCNC: 104 MG/DL (ref 70–99)
HCO3 VENOUS: 37.7 MMOL/L (ref 19–25)
HCO3 VENOUS: 43.3 MMOL/L (ref 19–25)
HCT VFR BLD CALC: 40.7 % (ref 37–47)
HEMOGLOBIN: 12.7 GM/DL (ref 12.5–16)
IMMATURE NEUTROPHIL %: 0.5 % (ref 0–0.43)
INR BLD: 1.49 INDEX
LIPASE: 71 IU/L (ref 13–60)
LYMPHOCYTES ABSOLUTE: 1.5 K/CU MM
LYMPHOCYTES RELATIVE PERCENT: 18.6 % (ref 24–44)
MAGNESIUM: 2.3 MG/DL (ref 1.8–2.4)
MCH RBC QN AUTO: 30.3 PG (ref 27–31)
MCHC RBC AUTO-ENTMCNC: 31.2 % (ref 32–36)
MCV RBC AUTO: 97.1 FL (ref 78–100)
MONOCYTES ABSOLUTE: 0.7 K/CU MM
MONOCYTES RELATIVE PERCENT: 8.8 % (ref 0–4)
NUCLEATED RBC %: 0 %
O2 SAT, VEN: 78.6 % (ref 50–70)
O2 SAT, VEN: 94.8 % (ref 50–70)
PCO2, VEN: 53 MMHG (ref 38–52)
PCO2, VEN: 86 MMHG (ref 38–52)
PDW BLD-RTO: 14 % (ref 11.7–14.9)
PH VENOUS: 7.31 (ref 7.32–7.42)
PH VENOUS: 7.46 (ref 7.32–7.42)
PLATELET # BLD: 228 K/CU MM (ref 140–440)
PMV BLD AUTO: 10.1 FL (ref 7.5–11.1)
PO2, VEN: 200 MMHG (ref 28–48)
PO2, VEN: 51 MMHG (ref 28–48)
POTASSIUM SERPL-SCNC: 4.4 MMOL/L (ref 3.5–5.1)
PRO-BNP: 319.3 PG/ML
PROTHROMBIN TIME: 19.3 SECONDS (ref 11.7–14.5)
RBC # BLD: 4.19 M/CU MM (ref 4.2–5.4)
SEGMENTED NEUTROPHILS ABSOLUTE COUNT: 5.8 K/CU MM
SEGMENTED NEUTROPHILS RELATIVE PERCENT: 71.2 % (ref 36–66)
SODIUM BLD-SCNC: 139 MMOL/L (ref 135–145)
TOTAL IMMATURE NEUTOROPHIL: 0.04 K/CU MM
TOTAL NUCLEATED RBC: 0 K/CU MM
TOTAL PROTEIN: 6.1 GM/DL (ref 6.4–8.2)
TROPONIN T: <0.01 NG/ML
TROPONIN T: <0.01 NG/ML
WBC # BLD: 8.1 K/CU MM (ref 4–10.5)

## 2023-01-24 PROCEDURE — 99285 EMERGENCY DEPT VISIT HI MDM: CPT

## 2023-01-24 PROCEDURE — 2060000000 HC ICU INTERMEDIATE R&B

## 2023-01-24 PROCEDURE — 83880 ASSAY OF NATRIURETIC PEPTIDE: CPT

## 2023-01-24 PROCEDURE — 93005 ELECTROCARDIOGRAM TRACING: CPT | Performed by: EMERGENCY MEDICINE

## 2023-01-24 PROCEDURE — 93005 ELECTROCARDIOGRAM TRACING: CPT | Performed by: NURSE PRACTITIONER

## 2023-01-24 PROCEDURE — 0202U NFCT DS 22 TRGT SARS-COV-2: CPT

## 2023-01-24 PROCEDURE — 71045 X-RAY EXAM CHEST 1 VIEW: CPT

## 2023-01-24 PROCEDURE — 94660 CPAP INITIATION&MGMT: CPT

## 2023-01-24 PROCEDURE — 83735 ASSAY OF MAGNESIUM: CPT

## 2023-01-24 PROCEDURE — 80053 COMPREHEN METABOLIC PANEL: CPT

## 2023-01-24 PROCEDURE — 85610 PROTHROMBIN TIME: CPT

## 2023-01-24 PROCEDURE — 83690 ASSAY OF LIPASE: CPT

## 2023-01-24 PROCEDURE — 82805 BLOOD GASES W/O2 SATURATION: CPT

## 2023-01-24 PROCEDURE — 96374 THER/PROPH/DIAG INJ IV PUSH: CPT

## 2023-01-24 PROCEDURE — 84484 ASSAY OF TROPONIN QUANT: CPT

## 2023-01-24 PROCEDURE — 6370000000 HC RX 637 (ALT 250 FOR IP): Performed by: EMERGENCY MEDICINE

## 2023-01-24 PROCEDURE — 6360000002 HC RX W HCPCS: Performed by: EMERGENCY MEDICINE

## 2023-01-24 PROCEDURE — 94640 AIRWAY INHALATION TREATMENT: CPT

## 2023-01-24 PROCEDURE — 85025 COMPLETE CBC W/AUTO DIFF WBC: CPT

## 2023-01-24 RX ORDER — IPRATROPIUM BROMIDE AND ALBUTEROL SULFATE 2.5; .5 MG/3ML; MG/3ML
1 SOLUTION RESPIRATORY (INHALATION) ONCE
Status: COMPLETED | OUTPATIENT
Start: 2023-01-24 | End: 2023-01-24

## 2023-01-24 RX ORDER — PREDNISONE 1 MG/1
5 TABLET ORAL DAILY
Status: ON HOLD | COMMUNITY
End: 2023-01-26 | Stop reason: HOSPADM

## 2023-01-24 RX ORDER — PREDNISONE 20 MG/1
20 TABLET ORAL 2 TIMES DAILY
Qty: 10 TABLET | Refills: 0 | Status: SHIPPED | OUTPATIENT
Start: 2023-01-24 | End: 2023-01-24

## 2023-01-24 RX ORDER — AZITHROMYCIN 250 MG/1
250 TABLET, FILM COATED ORAL SEE ADMIN INSTRUCTIONS
Qty: 6 TABLET | Refills: 0 | Status: SHIPPED | OUTPATIENT
Start: 2023-01-24 | End: 2023-01-24 | Stop reason: ALTCHOICE

## 2023-01-24 RX ORDER — METHYLPREDNISOLONE SODIUM SUCCINATE 125 MG/2ML
60 INJECTION, POWDER, LYOPHILIZED, FOR SOLUTION INTRAMUSCULAR; INTRAVENOUS ONCE
Status: COMPLETED | OUTPATIENT
Start: 2023-01-24 | End: 2023-01-24

## 2023-01-24 RX ADMIN — IPRATROPIUM BROMIDE AND ALBUTEROL SULFATE 1 AMPULE: 2.5; .5 SOLUTION RESPIRATORY (INHALATION) at 18:30

## 2023-01-24 RX ADMIN — IPRATROPIUM BROMIDE AND ALBUTEROL SULFATE 1 AMPULE: 2.5; .5 SOLUTION RESPIRATORY (INHALATION) at 18:32

## 2023-01-24 RX ADMIN — METHYLPREDNISOLONE SODIUM SUCCINATE 60 MG: 125 INJECTION, POWDER, FOR SOLUTION INTRAMUSCULAR; INTRAVENOUS at 19:49

## 2023-01-24 ASSESSMENT — PAIN SCALES - GENERAL: PAINLEVEL_OUTOF10: 0

## 2023-01-24 ASSESSMENT — ENCOUNTER SYMPTOMS
VOMITING: 0
NAUSEA: 0
CHEST TIGHTNESS: 1
SHORTNESS OF BREATH: 1
COUGH: 1
ABDOMINAL PAIN: 0

## 2023-01-24 ASSESSMENT — PAIN - FUNCTIONAL ASSESSMENT: PAIN_FUNCTIONAL_ASSESSMENT: NONE - DENIES PAIN

## 2023-01-25 LAB
ADENOVIRUS DETECTION BY PCR: NOT DETECTED
BASOPHILS ABSOLUTE: 0 K/CU MM
BASOPHILS RELATIVE PERCENT: 0.1 % (ref 0–1)
BORDETELLA PARAPERTUSSIS BY PCR: NOT DETECTED
BORDETELLA PERTUSSIS PCR: NOT DETECTED
CHLAMYDOPHILA PNEUMONIA PCR: NOT DETECTED
CORONAVIRUS 229E PCR: NOT DETECTED
CORONAVIRUS HKU1 PCR: NOT DETECTED
CORONAVIRUS NL63 PCR: NOT DETECTED
CORONAVIRUS OC43 PCR: NOT DETECTED
DIFFERENTIAL TYPE: ABNORMAL
EKG ATRIAL RATE: 64 BPM
EKG ATRIAL RATE: 65 BPM
EKG DIAGNOSIS: NORMAL
EKG DIAGNOSIS: NORMAL
EKG P AXIS: 25 DEGREES
EKG P AXIS: 58 DEGREES
EKG P-R INTERVAL: 132 MS
EKG P-R INTERVAL: 82 MS
EKG Q-T INTERVAL: 400 MS
EKG Q-T INTERVAL: 406 MS
EKG QRS DURATION: 102 MS
EKG QRS DURATION: 110 MS
EKG QTC CALCULATION (BAZETT): 416 MS
EKG QTC CALCULATION (BAZETT): 418 MS
EKG R AXIS: -38 DEGREES
EKG R AXIS: -41 DEGREES
EKG T AXIS: 45 DEGREES
EKG T AXIS: 54 DEGREES
EKG VENTRICULAR RATE: 64 BPM
EKG VENTRICULAR RATE: 65 BPM
EOSINOPHILS ABSOLUTE: 0 K/CU MM
EOSINOPHILS RELATIVE PERCENT: 0 % (ref 0–3)
HCT VFR BLD CALC: 41.5 % (ref 37–47)
HEMOGLOBIN: 13.2 GM/DL (ref 12.5–16)
HUMAN METAPNEUMOVIRUS PCR: NOT DETECTED
IMMATURE NEUTROPHIL %: 0.4 % (ref 0–0.43)
INFLUENZA A BY PCR: NOT DETECTED
INFLUENZA A H1 (2009) PCR: NOT DETECTED
INFLUENZA A H1 PANDEMIC PCR: NOT DETECTED
INFLUENZA A H3 PCR: NOT DETECTED
INFLUENZA B BY PCR: NOT DETECTED
INR BLD: 1.13 INDEX
LV EF: 55 %
LVEF MODALITY: NORMAL
LYMPHOCYTES ABSOLUTE: 0.4 K/CU MM
LYMPHOCYTES RELATIVE PERCENT: 4.9 % (ref 24–44)
MCH RBC QN AUTO: 30.7 PG (ref 27–31)
MCHC RBC AUTO-ENTMCNC: 31.8 % (ref 32–36)
MCV RBC AUTO: 96.5 FL (ref 78–100)
MONOCYTES ABSOLUTE: 0.2 K/CU MM
MONOCYTES RELATIVE PERCENT: 2.1 % (ref 0–4)
MYCOPLASMA PNEUMONIAE PCR: NOT DETECTED
NUCLEATED RBC %: 0 %
PARAINFLUENZA 1 PCR: NOT DETECTED
PARAINFLUENZA 2 PCR: NOT DETECTED
PARAINFLUENZA 3 PCR: NOT DETECTED
PARAINFLUENZA 4 PCR: NOT DETECTED
PDW BLD-RTO: 13.9 % (ref 11.7–14.9)
PLATELET # BLD: 245 K/CU MM (ref 140–440)
PMV BLD AUTO: 10.1 FL (ref 7.5–11.1)
PROTHROMBIN TIME: 14.6 SECONDS (ref 11.7–14.5)
RBC # BLD: 4.3 M/CU MM (ref 4.2–5.4)
RHINOVIRUS ENTEROVIRUS PCR: NOT DETECTED
RSV PCR: NOT DETECTED
SARS-COV-2: NOT DETECTED
SEGMENTED NEUTROPHILS ABSOLUTE COUNT: 6.6 K/CU MM
SEGMENTED NEUTROPHILS RELATIVE PERCENT: 92.5 % (ref 36–66)
TOTAL IMMATURE NEUTOROPHIL: 0.03 K/CU MM
TOTAL NUCLEATED RBC: 0 K/CU MM
WBC # BLD: 7.2 K/CU MM (ref 4–10.5)

## 2023-01-25 PROCEDURE — 6370000000 HC RX 637 (ALT 250 FOR IP): Performed by: NURSE PRACTITIONER

## 2023-01-25 PROCEDURE — 94640 AIRWAY INHALATION TREATMENT: CPT

## 2023-01-25 PROCEDURE — 94761 N-INVAS EAR/PLS OXIMETRY MLT: CPT

## 2023-01-25 PROCEDURE — 6360000002 HC RX W HCPCS: Performed by: NURSE PRACTITIONER

## 2023-01-25 PROCEDURE — 1200000000 HC SEMI PRIVATE

## 2023-01-25 PROCEDURE — 6370000000 HC RX 637 (ALT 250 FOR IP): Performed by: STUDENT IN AN ORGANIZED HEALTH CARE EDUCATION/TRAINING PROGRAM

## 2023-01-25 PROCEDURE — 99222 1ST HOSP IP/OBS MODERATE 55: CPT | Performed by: INTERNAL MEDICINE

## 2023-01-25 PROCEDURE — 93010 ELECTROCARDIOGRAM REPORT: CPT | Performed by: INTERNAL MEDICINE

## 2023-01-25 PROCEDURE — 85025 COMPLETE CBC W/AUTO DIFF WBC: CPT

## 2023-01-25 PROCEDURE — 2580000003 HC RX 258: Performed by: NURSE PRACTITIONER

## 2023-01-25 PROCEDURE — 2700000000 HC OXYGEN THERAPY PER DAY

## 2023-01-25 PROCEDURE — 85610 PROTHROMBIN TIME: CPT

## 2023-01-25 PROCEDURE — 36415 COLL VENOUS BLD VENIPUNCTURE: CPT

## 2023-01-25 PROCEDURE — 93308 TTE F-UP OR LMTD: CPT

## 2023-01-25 RX ORDER — ONDANSETRON 4 MG/1
4 TABLET, ORALLY DISINTEGRATING ORAL EVERY 8 HOURS PRN
Status: DISCONTINUED | OUTPATIENT
Start: 2023-01-25 | End: 2023-01-26 | Stop reason: HOSPADM

## 2023-01-25 RX ORDER — HYDROXYZINE HYDROCHLORIDE 25 MG/1
25 TABLET, FILM COATED ORAL NIGHTLY
Status: DISCONTINUED | OUTPATIENT
Start: 2023-01-25 | End: 2023-01-26 | Stop reason: HOSPADM

## 2023-01-25 RX ORDER — CELECOXIB 200 MG/1
200 CAPSULE ORAL DAILY
Status: DISCONTINUED | OUTPATIENT
Start: 2023-01-25 | End: 2023-01-26 | Stop reason: HOSPADM

## 2023-01-25 RX ORDER — LEVOTHYROXINE SODIUM 0.07 MG/1
75 TABLET ORAL
Status: DISCONTINUED | OUTPATIENT
Start: 2023-01-25 | End: 2023-01-26 | Stop reason: HOSPADM

## 2023-01-25 RX ORDER — DICYCLOMINE HYDROCHLORIDE 10 MG/1
10 CAPSULE ORAL 2 TIMES DAILY
Status: DISCONTINUED | OUTPATIENT
Start: 2023-01-25 | End: 2023-01-26 | Stop reason: HOSPADM

## 2023-01-25 RX ORDER — PANTOPRAZOLE SODIUM 40 MG/1
40 TABLET, DELAYED RELEASE ORAL
Status: DISCONTINUED | OUTPATIENT
Start: 2023-01-25 | End: 2023-01-26 | Stop reason: HOSPADM

## 2023-01-25 RX ORDER — DILTIAZEM HYDROCHLORIDE 60 MG/1
60 TABLET, FILM COATED ORAL EVERY 8 HOURS SCHEDULED
Status: DISCONTINUED | OUTPATIENT
Start: 2023-01-25 | End: 2023-01-26 | Stop reason: HOSPADM

## 2023-01-25 RX ORDER — SODIUM CHLORIDE 0.9 % (FLUSH) 0.9 %
5-40 SYRINGE (ML) INJECTION PRN
Status: DISCONTINUED | OUTPATIENT
Start: 2023-01-25 | End: 2023-01-26 | Stop reason: HOSPADM

## 2023-01-25 RX ORDER — THEOPHYLLINE ANHYDROUS 80 MG/15ML
150 SOLUTION ORAL EVERY 8 HOURS
Status: DISCONTINUED | OUTPATIENT
Start: 2023-01-25 | End: 2023-01-25

## 2023-01-25 RX ORDER — SODIUM CHLORIDE 0.9 % (FLUSH) 0.9 %
5-40 SYRINGE (ML) INJECTION EVERY 12 HOURS SCHEDULED
Status: DISCONTINUED | OUTPATIENT
Start: 2023-01-25 | End: 2023-01-26 | Stop reason: HOSPADM

## 2023-01-25 RX ORDER — GABAPENTIN 400 MG/1
400 CAPSULE ORAL EVERY EVENING
Status: DISCONTINUED | OUTPATIENT
Start: 2023-01-25 | End: 2023-01-26 | Stop reason: HOSPADM

## 2023-01-25 RX ORDER — ONDANSETRON 2 MG/ML
4 INJECTION INTRAMUSCULAR; INTRAVENOUS EVERY 6 HOURS PRN
Status: DISCONTINUED | OUTPATIENT
Start: 2023-01-25 | End: 2023-01-26 | Stop reason: HOSPADM

## 2023-01-25 RX ORDER — THEOPHYLLINE 450 MG/1
225 TABLET, EXTENDED RELEASE ORAL 2 TIMES DAILY
Status: DISCONTINUED | OUTPATIENT
Start: 2023-01-25 | End: 2023-01-26 | Stop reason: HOSPADM

## 2023-01-25 RX ORDER — AZITHROMYCIN 250 MG/1
500 TABLET, FILM COATED ORAL DAILY
Status: DISCONTINUED | OUTPATIENT
Start: 2023-01-25 | End: 2023-01-26 | Stop reason: HOSPADM

## 2023-01-25 RX ORDER — METHYLPREDNISOLONE SODIUM SUCCINATE 40 MG/ML
40 INJECTION, POWDER, LYOPHILIZED, FOR SOLUTION INTRAMUSCULAR; INTRAVENOUS EVERY 12 HOURS
Status: DISCONTINUED | OUTPATIENT
Start: 2023-01-25 | End: 2023-01-26 | Stop reason: HOSPADM

## 2023-01-25 RX ORDER — GUAIFENESIN AND DEXTROMETHORPHAN HYDROBROMIDE 600; 30 MG/1; MG/1
1 TABLET, EXTENDED RELEASE ORAL 2 TIMES DAILY
Status: DISCONTINUED | OUTPATIENT
Start: 2023-01-25 | End: 2023-01-26 | Stop reason: HOSPADM

## 2023-01-25 RX ORDER — ACETAMINOPHEN 325 MG/1
650 TABLET ORAL EVERY 6 HOURS PRN
Status: DISCONTINUED | OUTPATIENT
Start: 2023-01-25 | End: 2023-01-26 | Stop reason: HOSPADM

## 2023-01-25 RX ORDER — SODIUM CHLORIDE 9 MG/ML
INJECTION, SOLUTION INTRAVENOUS PRN
Status: DISCONTINUED | OUTPATIENT
Start: 2023-01-25 | End: 2023-01-26 | Stop reason: HOSPADM

## 2023-01-25 RX ORDER — MONTELUKAST SODIUM 10 MG/1
10 TABLET ORAL NIGHTLY
Status: DISCONTINUED | OUTPATIENT
Start: 2023-01-25 | End: 2023-01-26 | Stop reason: HOSPADM

## 2023-01-25 RX ORDER — ESOMEPRAZOLE MAGNESIUM 40 MG/1
40 CAPSULE, DELAYED RELEASE ORAL 2 TIMES DAILY
Status: DISCONTINUED | OUTPATIENT
Start: 2023-01-25 | End: 2023-01-25 | Stop reason: CLARIF

## 2023-01-25 RX ORDER — POLYETHYLENE GLYCOL 3350 17 G/17G
17 POWDER, FOR SOLUTION ORAL DAILY PRN
Status: DISCONTINUED | OUTPATIENT
Start: 2023-01-25 | End: 2023-01-26 | Stop reason: HOSPADM

## 2023-01-25 RX ORDER — THEOPHYLLINE 450 MG/1
225 TABLET, EXTENDED RELEASE ORAL 2 TIMES DAILY
Status: DISCONTINUED | OUTPATIENT
Start: 2023-01-25 | End: 2023-01-25

## 2023-01-25 RX ORDER — ROSUVASTATIN CALCIUM 20 MG/1
20 TABLET, COATED ORAL NIGHTLY
Status: DISCONTINUED | OUTPATIENT
Start: 2023-01-25 | End: 2023-01-26 | Stop reason: HOSPADM

## 2023-01-25 RX ORDER — THEOPHYLLINE 450 MG/1
225 TABLET, EXTENDED RELEASE ORAL 2 TIMES DAILY
Status: CANCELLED | OUTPATIENT
Start: 2023-01-25

## 2023-01-25 RX ORDER — ROFLUMILAST 500 UG/1
500 TABLET ORAL DAILY
Status: DISCONTINUED | OUTPATIENT
Start: 2023-01-25 | End: 2023-01-26 | Stop reason: HOSPADM

## 2023-01-25 RX ORDER — VITAMIN B COMPLEX
1 CAPSULE ORAL DAILY
Status: DISCONTINUED | OUTPATIENT
Start: 2023-01-25 | End: 2023-01-26 | Stop reason: HOSPADM

## 2023-01-25 RX ORDER — IPRATROPIUM BROMIDE AND ALBUTEROL SULFATE 2.5; .5 MG/3ML; MG/3ML
1 SOLUTION RESPIRATORY (INHALATION) EVERY 4 HOURS PRN
Status: DISCONTINUED | OUTPATIENT
Start: 2023-01-25 | End: 2023-01-26 | Stop reason: HOSPADM

## 2023-01-25 RX ORDER — IPRATROPIUM BROMIDE AND ALBUTEROL SULFATE 2.5; .5 MG/3ML; MG/3ML
1 SOLUTION RESPIRATORY (INHALATION)
Status: DISCONTINUED | OUTPATIENT
Start: 2023-01-25 | End: 2023-01-26 | Stop reason: HOSPADM

## 2023-01-25 RX ORDER — BUDESONIDE AND FORMOTEROL FUMARATE DIHYDRATE 160; 4.5 UG/1; UG/1
2 AEROSOL RESPIRATORY (INHALATION) 2 TIMES DAILY
Status: DISCONTINUED | OUTPATIENT
Start: 2023-01-25 | End: 2023-01-26 | Stop reason: HOSPADM

## 2023-01-25 RX ADMIN — IPRATROPIUM BROMIDE AND ALBUTEROL SULFATE 1 AMPULE: 2.5; .5 SOLUTION RESPIRATORY (INHALATION) at 03:39

## 2023-01-25 RX ADMIN — LEVOTHYROXINE SODIUM 75 MCG: 0.07 TABLET ORAL at 05:43

## 2023-01-25 RX ADMIN — GUAIFENESIN AND DEXTROMETHORPHAN HYDROBROMIDE 1 TABLET: 600; 30 TABLET, EXTENDED RELEASE ORAL at 10:01

## 2023-01-25 RX ADMIN — SODIUM CHLORIDE, PRESERVATIVE FREE 10 ML: 5 INJECTION INTRAVENOUS at 19:58

## 2023-01-25 RX ADMIN — Medication 1 TABLET: at 03:57

## 2023-01-25 RX ADMIN — GUAIFENESIN AND DEXTROMETHORPHAN HYDROBROMIDE 1 TABLET: 600; 30 TABLET, EXTENDED RELEASE ORAL at 03:57

## 2023-01-25 RX ADMIN — IPRATROPIUM BROMIDE AND ALBUTEROL SULFATE 1 AMPULE: 2.5; .5 SOLUTION RESPIRATORY (INHALATION) at 21:24

## 2023-01-25 RX ADMIN — DICYCLOMINE HYDROCHLORIDE 10 MG: 10 CAPSULE ORAL at 10:01

## 2023-01-25 RX ADMIN — MONTELUKAST 10 MG: 10 TABLET, FILM COATED ORAL at 21:48

## 2023-01-25 RX ADMIN — DILTIAZEM HYDROCHLORIDE 60 MG: 60 TABLET, FILM COATED ORAL at 21:48

## 2023-01-25 RX ADMIN — THEOPHYLLINE 225 MG: 450 TABLET, EXTENDED RELEASE ORAL at 21:55

## 2023-01-25 RX ADMIN — HYDROXYZINE HYDROCHLORIDE 25 MG: 50 TABLET, FILM COATED ORAL at 00:52

## 2023-01-25 RX ADMIN — IPRATROPIUM BROMIDE AND ALBUTEROL SULFATE 1 AMPULE: 2.5; .5 SOLUTION RESPIRATORY (INHALATION) at 12:17

## 2023-01-25 RX ADMIN — METHYLPREDNISOLONE SODIUM SUCCINATE 40 MG: 40 INJECTION, POWDER, FOR SOLUTION INTRAMUSCULAR; INTRAVENOUS at 10:01

## 2023-01-25 RX ADMIN — ROFLUMILAST 500 MCG: 500 TABLET ORAL at 10:00

## 2023-01-25 RX ADMIN — Medication 1 CAPSULE: at 10:00

## 2023-01-25 RX ADMIN — BUDESONIDE AND FORMOTEROL FUMARATE DIHYDRATE 2 PUFF: 160; 4.5 AEROSOL RESPIRATORY (INHALATION) at 08:49

## 2023-01-25 RX ADMIN — IPRATROPIUM BROMIDE AND ALBUTEROL SULFATE 1 AMPULE: 2.5; .5 SOLUTION RESPIRATORY (INHALATION) at 16:12

## 2023-01-25 RX ADMIN — TIOTROPIUM BROMIDE INHALATION SPRAY 2 PUFF: 3.12 SPRAY, METERED RESPIRATORY (INHALATION) at 08:51

## 2023-01-25 RX ADMIN — PANTOPRAZOLE SODIUM 40 MG: 40 TABLET, DELAYED RELEASE ORAL at 16:40

## 2023-01-25 RX ADMIN — AZITHROMYCIN MONOHYDRATE 500 MG: 250 TABLET ORAL at 03:57

## 2023-01-25 RX ADMIN — WARFARIN SODIUM 4.25 MG: 2.5 TABLET ORAL at 17:46

## 2023-01-25 RX ADMIN — DICYCLOMINE HYDROCHLORIDE 10 MG: 10 CAPSULE ORAL at 21:48

## 2023-01-25 RX ADMIN — IPRATROPIUM BROMIDE AND ALBUTEROL SULFATE 1 AMPULE: 2.5; .5 SOLUTION RESPIRATORY (INHALATION) at 08:48

## 2023-01-25 RX ADMIN — HYDROXYZINE HYDROCHLORIDE 25 MG: 50 TABLET, FILM COATED ORAL at 21:48

## 2023-01-25 RX ADMIN — Medication 1 TABLET: at 21:48

## 2023-01-25 RX ADMIN — Medication 1 TABLET: at 10:01

## 2023-01-25 RX ADMIN — METHYLPREDNISOLONE SODIUM SUCCINATE 40 MG: 40 INJECTION, POWDER, FOR SOLUTION INTRAMUSCULAR; INTRAVENOUS at 19:57

## 2023-01-25 RX ADMIN — DILTIAZEM HYDROCHLORIDE 60 MG: 60 TABLET, FILM COATED ORAL at 05:43

## 2023-01-25 RX ADMIN — CELECOXIB 200 MG: 200 CAPSULE ORAL at 10:01

## 2023-01-25 RX ADMIN — GABAPENTIN 400 MG: 400 CAPSULE ORAL at 17:45

## 2023-01-25 RX ADMIN — DILTIAZEM HYDROCHLORIDE 60 MG: 60 TABLET, FILM COATED ORAL at 14:08

## 2023-01-25 RX ADMIN — GUAIFENESIN AND DEXTROMETHORPHAN HYDROBROMIDE 1 TABLET: 600; 30 TABLET, EXTENDED RELEASE ORAL at 21:48

## 2023-01-25 RX ADMIN — IPRATROPIUM BROMIDE AND ALBUTEROL SULFATE 1 AMPULE: 2.5; .5 SOLUTION RESPIRATORY (INHALATION) at 01:15

## 2023-01-25 RX ADMIN — BUDESONIDE AND FORMOTEROL FUMARATE DIHYDRATE 2 PUFF: 160; 4.5 AEROSOL RESPIRATORY (INHALATION) at 21:24

## 2023-01-25 RX ADMIN — ROSUVASTATIN CALCIUM 20 MG: 20 TABLET, COATED ORAL at 21:48

## 2023-01-25 RX ADMIN — PANTOPRAZOLE SODIUM 40 MG: 40 TABLET, DELAYED RELEASE ORAL at 05:43

## 2023-01-25 RX ADMIN — SODIUM CHLORIDE, PRESERVATIVE FREE 10 ML: 5 INJECTION INTRAVENOUS at 10:04

## 2023-01-25 NOTE — H&P
V2.0  History and Physical      Name:  Stefano Lord /Age/Sex: 1945  (68 y.o. female)   MRN & CSN:  [id number] & [id number] Encounter Date/Time: 2023 9:10 PM EST   Location:  ED20/ED-20 PCP: [address] Day: 1    Assessment and Plan:   Stefano Lord is a 68 y.o. female with a past medical history of chronic respiratory failure, COPD, CAD , GERD, chronic anticoagulation who presents with complaint of shortness of breath, wheezing, history of DVTs. Acute on chronic respiratory failure with hypercapnia 2/2 COPD exacerbation  Chronic hypoxic respiratory failure-severe COPD stage IV; chronically on 2 L nasal cannula. Patient was 99% oxygen saturation on 2 L upon ER arrival.  Patient was recently treated for COPD exacerbation earlier this month with azithromycin and steroid burst.  -Inpatient  -Continue BiPAP  -VBG-pH 7.31, PCO2 86, PO2 51.  -DuoNebs every 4 hours while awake for 48 hours  -Solu-Medrol 40 mg IV twice daily initial dose of 80 mg given admission  -Continuous pulse ox  -Azithromycin 500 mg orally for 3 days for further anti-inflammatory effects  -Magnesium pending  -EKG stat on admission  -Respiratory per protocol  -Viral respiratory panel pending on admission.-Prednisone 5 mg daily per outpatient regimen we will hold while on IV steroids.  -Follow-up pulmonology outpatient-Dr. Angely Rosado, consider consult and/or close follow-up pending improvement of patient's symptoms  -Continue theophylline per outpatient regimen  -Continue Singulair  -Continue outpatient MDI    Hypothyroidism: Continue levothyroxine daily    GERD: Continue PPI    History of DVT: Chronic anticoagulation  -Continue Coumadin   -IP to pharmacy for Coumadin dosing  -PT/INR daily. Mixed hyperlipidemia: Continue statin therapy    No longer present pulmonary nodule-right lower lobe 6 mm noncalcified was noted on CTA chest 2022.   On a follow-up CTA chest  10/7/2022pulmonary nodule 6 mm on the right lower lobe with no longer identified. CAD s/p heart cath continue statin therapy and aspirin    Chronic Conditions: Continue all home medications except as stated above or contraindicated. Normal weight BMI 23.30: lifestyle modifications  -Follow-up PCP for longitudinal care    Former smoker    This patient was seen and examined in conjunction with Dr. Rashi Fenton. He was agreeable with the plan and management as dictated above. Hospital Problems             Last Modified POA    * (Principal) Acute respiratory failure with hypercapnia ([address] 1/24/2023 Yes       Disposition:   Current Living situation: At home with spouse  Expected Disposition: Home  Estimated D/C: 3 to 4 days    Diet Regular   DVT Prophylaxis [x] coumadin []  Heparin, [] SCDs, [] Ambulation,  [] Eliquis, [] Xarelto   Code Status Full   Surrogate Decision Maker/ POA Spouse and daughters     History from:     patient, spouse, family member -daughter, Quality of history:  good historian      History of Present Illness:     Chief Complaint: Acute respiratory failure with hypercapnia ([address]  Beto Ramsay is a 68 y.o. female who presents with complaint of shortness of breath that is worsened over the past 2 days. Patient states she has a history of COPD chronic renal failure chronically on 2 L oxygen nasal cannula continuously. Patient follows with pulmonology Dr. Meli Gamble. Stated she started not to feel well yesterday became more short of breath and was wheezing. Patient denies any fever, chills or body aches. No known ill contacts. States the great-grandchildren was over over the weekend but no one appeared sick. Patient states she has been compliant with her medication regimen. Patient denies abdominal pain nausea, vomiting or diarrhea. No chest pain or palpitations. No dysuria, urgency or frequency. Review of Systems:   Review of Systems   Constitutional:  Negative for chills and fever. HENT:  Negative for congestion. Respiratory:  Positive for cough, chest tightness and shortness of breath. Cardiovascular:  Negative for chest pain. Gastrointestinal:  Negative for abdominal pain, nausea and vomiting. Genitourinary:  Negative for dysuria. Musculoskeletal:  Negative for arthralgias and myalgias. Skin: Negative. Neurological:  Negative for dizziness and light-headedness. Hematological:  Bruises/bleeds easily. Psychiatric/Behavioral: Negative. Objective:   No intake or output data in the 24 hours ending 01/24/23 2110   Vitals:   Vitals:    01/24/23 1830 01/24/23 1832 01/24/23 1952 01/24/23 2030   BP:   (!) 158/53    Pulse:   64    Resp:   18 14   Temp:       SpO2: 98% 98% 96%    Weight:           Medications Prior to Admission     Prior to Admission medications    Medication Sig Start Date End Date Taking? Authorizing Provider   predniSONE (DELTASONE) 5 MG tablet Take 5 mg by mouth daily   Yes Historical Provider, MD   gabapentin (NEURONTIN) 400 MG capsule Take 1 capsule by mouth every evening for 90 days.  1/23/23 4/23/23 Yes Jes Sharma DO   celecoxib (CELEBREX) 200 MG capsule TAKE 1 CAPSULE BY MOUTH EVERY DAY 1/23/23  Yes Jes Sharma DO   theophylline (THEODUR) 450 MG extended release tablet TAKE 1/2 TABLET BY MOUTH TWICE A DAY 1/17/23  Yes Jes Sharma DO   warfarin (COUMADIN) 3 MG tablet Take 1 tablet by mouth daily 12/21/22  Yes Jes Sharma DO   esomeprazole (NEXIUM) 40 MG delayed release capsule Take 1 capsule by mouth in the morning and at bedtime 12/21/22 6/19/23 Yes Jes Sharma DO   albuterol (PROVENTIL) (2.5 MG/3ML) 0.083% nebulizer solution Take 3 mLs by nebulization every 4 hours as needed for Wheezing 12/21/22 1/24/23 Yes Jes Sharma DO   montelukast (SINGULAIR) 10 MG tablet TAKE 1 TABLET BY MOUTH EVERY DAY EVERY NIGHT 12/5/22  Yes Jes Sharma DO   Magnesium Oxide 500 MG TABS TAKE 1 TABLET BY MOUTH EVERY DAY 12/5/22  Yes Jes Sharma DO   hydrOXYzine HCl (ATARAX) 25 MG tablet TAKE 1 TABLET BY MOUTH NIGHTLY 10/25/22  Yes CHELSIE Cheney   levothyroxine (SYNTHROID) 75 MCG tablet Take 1 tablet by mouth daily 10/25/22  Yes IMANI Miller CNP   risedronate (ACTONEL) 35 MG tablet TAKE 1 TABLET BY MOUTH EVERY 7 DAYS PATIENT TAKES ON ROSALIND 10/24/22  Yes IMANI Miller CNP   albuterol sulfate HFA (PROVENTIL HFA) 108 (90 Base) MCG/ACT inhaler Inhale 2 puffs into the lungs every 4 hours as needed for Wheezing or Shortness of Breath With spacer (and mask if indicated). Thanks. 10/7/22 1/24/23 Yes Shashank Guerrero,    Roflumilast (DALIRESP) 500 MCG tablet TAKE 1 TABLET BY MOUTH EVERY DAY 9/15/22  Yes Jes Sharma,    Potassium Chloride (KLOR-CON PO) Take by mouth See Admin Instructions 09/08/22 patient alternates days with 1-10 MEQ tablet then, 2-10 MEQ(20 MEQ) every other day. Yes Historical Provider, MD   rosuvastatin (CRESTOR) 20 MG tablet TAKE 1 TABLET BY MOUTH EVERY DAY EVERY NIGHT 7/29/22  Yes IMANI Miller CNP   Budeson-Glycopyrrol-Formoterol (BREZTRI AEROSPHERE) 160-9-4.8 MCG/ACT AERO Inhale 2 sprays into the lungs 2 times daily 7/27/22  Yes Glenny Ledbetter MD   torsemide (DEMADEX) 20 MG tablet Take 20 mg by mouth daily 5/26/22  Yes Historical Provider, MD   ipratropium (ATROVENT HFA) 17 MCG/ACT inhaler Inhale 2 puffs into the lungs every 6-8 hours as needed   Yes Historical Provider, MD   dilTIAZem (CARDIZEM) 60 MG tablet Take 1 tablet by mouth every 8 hours 1/4/22  Yes Sai Zelaya MD   Dextromethorphan-guaiFENesin [facility] DM)  MG TB12 Take 1 to 2 tablet by mouth every 12 hours (maximum: 4 tablets/24 hours). Drink plenty of water.  11/18/20  Yes Elsa Sales, PA-C   dicyclomine (BENTYL) 10 MG capsule Take 10 mg by mouth 2 times daily 5/17/18  Yes Historical Provider, MD   Biotin 75352 MCG TABS Take 10,000 mcg by mouth daily   Yes Historical Provider, MD   b complex vitamins capsule Take 1 capsule by mouth daily   Yes Historical Provider, MD   Calcium Carb-Cholecalciferol (CALCIUM + D3) 600-200 MG-UNIT TABS Take 1 tablet by mouth 2 times daily   Yes Historical Provider, MD   Multiple Vitamins-Minerals (MULTIVITAMIN PO) Take 1 tablet by mouth daily   Yes Historical Provider, MD   OXYGEN Inhale 2 L/hr into the lungs continuous. Yes Historical Provider, MD   tiZANidine (ZANAFLEX) 2 MG tablet Take 1 tablet by mouth 4 times daily as needed (back pain) 11/4/22   CHELSIE Guevara   nitroGLYCERIN (NITROSTAT) 0.4 MG SL tablet Place 1 tablet under the tongue every 5 minutes as needed for Chest pain 5/27/18   Apolinar Rubin MD       Physical Exam:       GEN Awake female, sitting upright in bed in no apparent distress. Appears given age. EYES   No scleral erythema, discharge, or conjunctivitis. HENT Mucous membranes are moist.  NECK Supple  RESP on BiPAP, decreased breath sounds  CARDIO/VASC    Regular rate without appreciable murmurs. No peripheral edema. GI Abdomen is soft without significant tenderness, masses, or guarding.   Escamilla catheter is not present. MSK No gross joint deformities. SKIN Normal coloration, warm, dry. NEURO Cranial nerves appear grossly intact, normal speech, no lateralizing weakness. PSYCH Awake, alert, oriented x 4. Affect appropriate.       Past Medical History:   PMHx   Past Medical History:   Diagnosis Date    Anticoagulant long-term use     Arthritis     [address]     Chronic hypoxemic respiratory failure ([facility] 2/6/2018    Community acquired pneumonia of right upper lobe of lung 3/19/2022    COPD (chronic obstructive pulmonary disease) ([facility] [facility]     Coronary atherosclerosis     COVID-19 virus detected 1/18/2021    DVT (deep venous thrombosis) ([facility] [facility]     Former smoker 11/22/2021    Gastroesophageal reflux disease     Hiatal hernia     Lung infiltrate on CT 1/22/2019    On home oxygen therapy     on 24/7    Osteopenia     Phlebitis     Pulmonary nodule 7/5/2016    S/P left heart catheterization by percutaneous approach 2013    Sepsis due to pneumonia ([facility] 2017    Shortness of breath 2019    Shortness of breath 2021    Shortness of breath 2021    Wears glasses      PSHX:  has a past surgical history that includes Appendectomy; Tonsillectomy; Tubal ligation; Vein Surgery; bronchoscopy (); Colonoscopy; and Endoscopy, colon, diagnostic. Allergies: Allergies   Allergen Reactions    Codeine Swelling    Darvon [Propoxyphene Hcl] Swelling    Lamisil [Terbinafine Hcl]     Morphine Swelling    Neosporin [Neomycin-Polymyxin-Gramicidin]     Pcn [Penicillins] Swelling     Fam HX:  family history includes COPD in her father; Cancer in her father and sister; Heart Disease in her father, mother, and sister.   Soc HX:   Social History     Socioeconomic History    Marital status:      Spouse name: None    Number of children: None    Years of education: None    Highest education level: None   Tobacco Use    Smoking status: Former     Packs/day: 1.50     Years: 26.00     Pack years: 39.00     Types: Cigarettes     Start date:      Quit date: 1991     Years since quittin.2    Smokeless tobacco: Never   Vaping Use    Vaping Use: Never used   Substance and Sexual Activity    Alcohol use: No    Drug use: No    Sexual activity: Not Currently     Partners: Male       Medications:   Medications:    Infusions:   PRN Meds:     Labs    CBC:   Recent Labs     23  1905   WBC 8.1   HGB 12.7        BMP:    Recent Labs     23  1905      K 4.4   CL 98*   CO2 36*   BUN 17   CREATININE 0.7   GLUCOSE 104*     Hepatic:   Recent Labs     23  1905   AST 25   ALT 22   BILITOT 0.3   ALKPHOS 71     Lipids:   Lab Results   Component Value Date/Time    CHOL 209 10/24/2022 09:19 AM    HDL 86 10/24/2022 09:19 AM    TRIG 126 10/24/2022 09:19 AM     Hemoglobin A1C: No results found for: LABA1C  TSH: No results found for: TSH  Troponin:   Lab Results   Component Value Date/Time TROPONINT <0.010 01/24/2023 07:05 PM    TROPONINT <0.010 01/05/2023 11:46 AM    TROPONINT <0.010 10/07/2022 05:19 PM     Lactic Acid: No results for input(s): LACTA in the last 72 hours. BNP:   Recent Labs     01/24/23  1905   PROBNP 319.3*     UA:  Lab Results   Component Value Date/Time    NITRU NEGATIVE 10/14/2022 11:50 AM    COLORU YELLOW 10/14/2022 11:50 AM    WBCUA 4 10/14/2022 11:50 AM    RBCUA 0 10/14/2022 11:50 AM    MUCUS RARE 12/18/2018 11:37 AM    TRICHOMONAS NONE SEEN 12/18/2018 11:37 AM    BACTERIA OCCASIONAL 10/14/2022 11:50 AM    CLARITYU CLEAR 10/14/2022 11:50 AM    SPECGRAV 1.020 10/14/2022 11:50 AM    LEUKOCYTESUR SMALL NUMBER OR AMOUNT OBSERVED 10/14/2022 11:50 AM    UROBILINOGEN 0.2 10/14/2022 11:50 AM    BILIRUBINUR NEGATIVE 10/14/2022 11:50 AM    BLOODU NEGATIVE 10/14/2022 11:50 AM    KETUA TRACE 10/14/2022 11:50 AM     Urine Cultures: No results found for: Mary العلي  Blood Cultures: No results found for: BC  No results found for: BLOODCULT2  Organism: No results found for: ORG    Imaging/Diagnostics Last 24 Hours   XR CHEST PORTABLE    Result Date: 1/24/2023  EXAMINATION: ONE XRAY VIEW OF THE CHEST 1/24/2023 6:55 pm COMPARISON: 01/05/2023 HISTORY: Acute chest pain. FINDINGS: Normal cardiomediastinal silhouette. Stable emphysema and fibrotic changes. Evidence of chronic granulomatous disease. Stable blunting of the right costophrenic angle likely due to scarring. No acute airspace disease, pleural effusion, or pneumothorax. No acute abnormality. I discussed this patient with the ED provider and Dr. Citlalli Mack. I did a review of patient's medical records, lab results and imaging conducted today. I personally reviewed patient's vital signs including pulse ox and EKG is pending on admission.      Electronically signed by Jaynee Castleman, APRN - CNP on 1/24/2023 at 9:10 PM

## 2023-01-25 NOTE — ED NOTES
ED TO INPATIENT SBAR HANDOFF    Patient Name: Beto Ramsay   :  1945  68 y.o. MRN:  [id number]  Preferred Name [facility]  ED Room #:  ED20/ED-20  Family/Caregiver Present yes   Restraints no   Sitter no   Sepsis Risk Score Sepsis Risk Score: 0.83    Situation  Code Status: Prior No additional code details. Allergies: Codeine, Darvon [propoxyphene hcl], Lamisil [terbinafine hcl], Morphine, Neosporin [neomycin-polymyxin-gramicidin], and Pcn [penicillins]  Weight: Patient Vitals for the past 96 hrs (Last 3 readings):   Weight   23 1821 140 lb (63.5 kg)     Arrived from: home  Chief Complaint:   Chief Complaint   Patient presents with    Shortness of Breath     Hx of COPD and emphysema- took 2 breathing treatments PTA with little relief, wears 2LPM/NC at all times     Hospital Problem/Diagnosis:  Principal Problem:    Acute on chronic respiratory failure with hypercapnia ([address]  Resolved Problems:    * No resolved hospital problems. *    Imaging:   XR CHEST PORTABLE   Final Result   No acute abnormality.            Abnormal labs:   Abnormal Labs Reviewed   COMPREHENSIVE METABOLIC PANEL - Abnormal; Notable for the following components:       Result Value    Chloride 98 (*)     CO2 36 (*)     Glucose 104 (*)     Total Protein 6.1 (*)     All other components within normal limits   LIPASE - Abnormal; Notable for the following components:    Lipase 71 (*)     All other components within normal limits   CBC WITH AUTO DIFFERENTIAL - Abnormal; Notable for the following components:    RBC 4.19 (*)     MCHC 31.2 (*)     Segs Relative 71.2 (*)     Lymphocytes % 18.6 (*)     Monocytes % 8.8 (*)     Immature Neutrophil % 0.5 (*)     All other components within normal limits   BRAIN NATRIURETIC PEPTIDE - Abnormal; Notable for the following components:    Pro-.3 (*)     All other components within normal limits   BLOOD GAS, VENOUS - Abnormal; Notable for the following components:    pH, Mychal 7.31 (*) pCO2, Mychal 86 (*)     pO2, Mychal 51 (*)     Base Exc, Mixed 13 (*)     HCO3, Venous 43.3 (*)     O2 Sat, Mychal 78.6 (*)     All other components within normal limits   BLOOD GAS, VENOUS - Abnormal; Notable for the following components:    pH, Mychal 7.46 (*)     pCO2, Mychal 53 (*)     pO2, Mychal 200 (*)     Base Exc, Mixed 11.8 (*)     HCO3, Venous 37.7 (*)     O2 Sat, Mychal 94.8 (*)     All other components within normal limits     Critical values: no     Abnormal Assessment Findings:     Background  History:   Past Medical History:   Diagnosis Date    Anticoagulant long-term use     Arthritis     Cancer ([facility] [address]     Chronic hypoxemic respiratory failure ([facility] [address] 2/6/2018    Community acquired pneumonia of right upper lobe of lung 3/19/2022    COPD (chronic obstructive pulmonary disease) (HCC)     Coronary atherosclerosis     COVID-19 virus detected 1/18/2021    DVT (deep venous thrombosis) ([facility] [address] [facility]     Former smoker 11/22/2021    Gastroesophageal reflux disease     Hiatal hernia     Lung infiltrate on CT 1/22/2019    On home oxygen therapy     on 24/7    Osteopenia     Phlebitis     Pulmonary nodule 7/5/2016    S/P left heart catheterization by percutaneous approach 6/27/2013    Sepsis due to pneumonia ([facility] [address] 11/14/2017    Shortness of breath 9/23/2019    Shortness of breath 1/18/2021    Shortness of breath 11/22/2021    Wears glasses        Assessment    Vitals/MEWS: MEWS Score: 0  Level of Consciousness: Alert (0)   Vitals:    01/24/23 1832 01/24/23 1952 01/24/23 2030 01/24/23 2232   BP:  (!) 158/53  (!) 168/67   Pulse:  64  67   Resp:  18 14 14   Temp:    97.6 °F (36.4 °C)   TempSrc:    Oral   SpO2: 98% 96%  99%   Weight:         FiO2 (%):   O2 Flow Rate: O2 Device: Nasal cannula O2 Flow Rate (L/min): 2 L/min  Cardiac Rhythm: SR  Pain Assessment:  [x] Verbal [] Ricardo Caller Scale  Pain Scale: Pain Assessment  Pain Assessment: None - Denies Pain  Pain Level: 0  Last documented pain score (0-10 scale) Pain Level: 0  Last documented pain medication administered: none  Mental Status: oriented  NIH Score: NIH     C-SSRS: Risk of Suicide: No Risk  Bedside swallow:    Warren Coma Scale (GCS): [place] Coma Scale  Eye Opening: Spontaneous  Best Verbal Response: Oriented  Best Motor Response: Obeys commands  [place] Coma Scale Score: 15  Active LDA's:   Peripheral IV 01/24/23 Right Antecubital (Active)   Site Assessment Clean, dry & intact 01/24/23 1910     PO Status: Regular  Pertinent or High Risk Medications/Drips: no   o If Yes, please provide details: none  Pending Blood Product Administration: no     You may also review the ED PT Care Timeline found under the Summary Nursing Index tab. Recommendation    Pending orders   Plan for Discharge (if known): Additional Comments: a/o x4, ambulatory to bedside commode.     If any further questions, please call Sending RN at [phone]    Electronically signed by: Electronically signed by Mary Linares RN on 1/24/2023 at 10:45 PM     Mary Linares RN  01/24/23 1222

## 2023-01-25 NOTE — PROGRESS NOTES
Pt voices concern about bruising on her left arm, states the \"the blood pressure cuff from earlier may have caused this\"  Pt states she takes blood thinners at home  Hospitalist notified

## 2023-01-25 NOTE — CARE COORDINATION
Attempted visit; patient appears asleep. Will revisit. Joel Centeno RN     Chart reviewed. Patient is from home; fairly independent. She does have home O2 - CM DME and has walker. Patient has a PCP and insurance that assists with Rx when needed. CM will remain available should needs arise.  Joel Centeno RN

## 2023-01-25 NOTE — ED PROVIDER NOTES
Emergency Department Encounter    Patient: Bishop Hirsch  MRN: [id number]  : 1945  Date of Evaluation: 2023  ED Provider:  Summer Luna DO    Triage Chief Complaint:   Shortness of Breath (Hx of COPD and emphysema- took 2 breathing treatments PTA with little relief, wears 2LPM/NC at all times)    [place]:  Bishop Hirsch is a 68 y.o. female that presents the emergency department complaint of shortness of breath. Patient states she has severe COPD. Patient that she did not feel well yesterday short of breath wheezing. Patient that she is on 2 L oxygen nasal cannula around-the-clock. She denies no fever chills cough sore throat runny nose earache. She states today increased shortness of breath wheezing got worse. States no nausea vomit diarrhea no chest pain abdominal pain no dysuria hematuria. She is a swelling in her feet. States she is on water. .  States hypertension no heart disease heart stents did have broken heart syndrome in the past.  Patient states no coughing up anything. Patient here for evaluation.     ROS - see HPI, below listed is current ROS at time of my eval:  General:  No fevers, no chills, no weakness  Eyes:  No recent vison changes, no discharge  ENT:  No sore throat, no nasal congestion, no hearing changes  Cardiovascular:  No chest pain, no palpitations  Respiratory: Positive for shortness of breath, no cough, positive for wheezing  Gastrointestinal:  No pain, no nausea, no vomiting, no diarrhea  Musculoskeletal:  No muscle pain, no joint pain  Skin:  No rash, no pruritis, no easy bruising  Neurologic:  No speech problems, no headache, no extremity numbness, no extremity tingling, no extremity weakness  Psychiatric:  No anxiety  Genitourinary:  No dysuria, no hematuria  Endocrine:  No unexpected weight gain, no unexpected weight loss  Extremities:  no edema, no pain    Past Medical History:   Diagnosis Date    Anticoagulant long-term use     Arthritis     Cancer [facility])     Chronic hypoxemic respiratory failure ([facility] [address] 2018    Community acquired pneumonia of right upper lobe of lung 3/19/2022    COPD (chronic obstructive pulmonary disease) ([facility])     Coronary atherosclerosis     COVID-19 virus detected 2021    DVT (deep venous thrombosis) ([facility])     Former smoker 2021    Gastroesophageal reflux disease     Hiatal hernia     Lung infiltrate on CT 2019    On home oxygen therapy     on     Osteopenia     Phlebitis     Pulmonary nodule 2016    S/P left heart catheterization by percutaneous approach 2013    Sepsis due to pneumonia ([facility] [address] 2017    Shortness of breath 2019    Shortness of breath 2021    Shortness of breath 2021    Wears glasses      Past Surgical History:   Procedure Laterality Date    Bev Wilson    COLONOSCOPY      ENDOSCOPY, COLON, DIAGNOSTIC      TONSILLECTOMY      TUBAL LIGATION      VEIN SURGERY       Family History   Problem Relation Age of Onset    Heart Disease Mother     Cancer Father         lung ca    Heart Disease Father     COPD Father     Cancer Sister     Heart Disease Sister      Social History     Socioeconomic History    Marital status:      Spouse name: Not on file    Number of children: Not on file    Years of education: Not on file    Highest education level: Not on file   Occupational History    Not on file   Tobacco Use    Smoking status: Former     Packs/day: 1.50     Years: 26.00     Pack years: 39.00     Types: Cigarettes     Start date:      Quit date: 1991     Years since quittin.2    Smokeless tobacco: Never   Vaping Use    Vaping Use: Never used   Substance and Sexual Activity    Alcohol use: No    Drug use: No    Sexual activity: Not Currently     Partners: Male   Other Topics Concern    Not on file   Social History Narrative    Not on file     Social Determinants of Health     Financial Resource Strain: Not on file   Food Insecurity: Not on file   Transportation Needs: Not on file   Physical Activity: Not on file   Stress: Not on file   Social Connections: Not on file   Intimate Partner Violence: Not on file   Housing Stability: Not on file     No current facility-administered medications for this encounter. Current Outpatient Medications   Medication Sig Dispense Refill    predniSONE (DELTASONE) 20 MG tablet Take 1 tablet by mouth 2 times daily for 5 days 10 tablet 0    azithromycin (ZITHROMAX) 250 MG tablet Take 1 tablet by mouth See Admin Instructions for 5 days 500mg on day 1 followed by 250mg on days 2 - 5 6 tablet 0    gabapentin (NEURONTIN) 400 MG capsule Take 1 capsule by mouth every evening for 90 days.  90 capsule 0    celecoxib (CELEBREX) 200 MG capsule TAKE 1 CAPSULE BY MOUTH EVERY DAY 90 capsule 0    theophylline (THEODUR) 450 MG extended release tablet TAKE 1/2 TABLET BY MOUTH TWICE A DAY 90 tablet 1    warfarin (COUMADIN) 3 MG tablet Take 1 tablet by mouth daily 30 tablet 1    esomeprazole (NEXIUM) 40 MG delayed release capsule Take 1 capsule by mouth in the morning and at bedtime 180 capsule 1    albuterol (PROVENTIL) (2.5 MG/3ML) 0.083% nebulizer solution Take 3 mLs by nebulization every 4 hours as needed for Wheezing 120 each 1    montelukast (SINGULAIR) 10 MG tablet TAKE 1 TABLET BY MOUTH EVERY DAY EVERY NIGHT 90 tablet 0    Magnesium Oxide 500 MG TABS TAKE 1 TABLET BY MOUTH EVERY DAY 90 tablet 0    tiZANidine (ZANAFLEX) 2 MG tablet Take 1 tablet by mouth 4 times daily as needed (back pain) 40 tablet 0    hydrOXYzine HCl (ATARAX) 25 MG tablet TAKE 1 TABLET BY MOUTH NIGHTLY 90 tablet 0    levothyroxine (SYNTHROID) 75 MCG tablet Take 1 tablet by mouth daily 90 tablet 1    risedronate (ACTONEL) 35 MG tablet TAKE 1 TABLET BY MOUTH EVERY 7 DAYS PATIENT TAKES ON SUNDAY 12 tablet 1    albuterol sulfate HFA (PROVENTIL HFA) 108 (90 Base) MCG/ACT inhaler Inhale 2 puffs into the lungs every 4 hours as needed for Wheezing or Shortness of Breath With spacer (and mask if indicated). Thanks. 18 g 1    Roflumilast (DALIRESP) 500 MCG tablet TAKE 1 TABLET BY MOUTH EVERY DAY 90 tablet 1    Potassium Chloride (KLOR-CON PO) Take by mouth See Admin Instructions 09/08/22 patient alternates days with 1-10 MEQ tablet then, 2-10 MEQ(20 MEQ) every other day. rosuvastatin (CRESTOR) 20 MG tablet TAKE 1 TABLET BY MOUTH EVERY DAY EVERY NIGHT 90 tablet 1    Budeson-Glycopyrrol-Formoterol (BREZTRI AEROSPHERE) 160-9-4.8 MCG/ACT AERO Inhale 2 sprays into the lungs 2 times daily 1 each 11    albuterol sulfate  (90 Base) MCG/ACT inhaler INHALE 2 PUFF FOUR TIMES A DAY AS NEEDED 1 each 5    torsemide (DEMADEX) 20 MG tablet Take 20 mg by mouth daily      ipratropium (ATROVENT HFA) 17 MCG/ACT inhaler Inhale 2 puffs into the lungs every 6-8 hours as needed      dilTIAZem (CARDIZEM) 60 MG tablet Take 1 tablet by mouth every 8 hours 120 tablet 3    Dextromethorphan-guaiFENesin (MUCINEX DM)  MG TB12 Take 1 to 2 tablet by mouth every 12 hours (maximum: 4 tablets/24 hours). Drink plenty of water. 28 tablet 0    nitroGLYCERIN (NITROSTAT) 0.4 MG SL tablet Place 1 tablet under the tongue every 5 minutes as needed for Chest pain 25 tablet 1    dicyclomine (BENTYL) 10 MG capsule Take 10 mg by mouth 2 times daily      Biotin 64759 MCG TABS Take 10,000 mcg by mouth daily      b complex vitamins capsule Take 1 capsule by mouth daily      Calcium Carb-Cholecalciferol (CALCIUM + D3) 600-200 MG-UNIT TABS Take 1 tablet by mouth 2 times daily      Multiple Vitamins-Minerals (MULTIVITAMIN PO) Take 1 tablet by mouth daily      OXYGEN Inhale 2 L/hr into the lungs continuous.        Allergies   Allergen Reactions    Codeine Swelling    Darvon [Propoxyphene Hcl] Swelling    Lamisil [Terbinafine Hcl]     Morphine Swelling    Neosporin [Neomycin-Polymyxin-Gramicidin]     Pcn [Penicillins] Swelling       Nursing Notes Reviewed    Physical Exam:  Triage VS:    ED Triage Vitals   Enc Vitals Group      BP 01/24/23 1818 (!) 171/54      Heart Rate 01/24/23 1818 69      Resp 01/24/23 1818 22      Temp 01/24/23 1818 98.5 °F (36.9 °C)      Temp src --       SpO2 01/24/23 1818 99 %      Weight 01/24/23 1821 140 lb (63.5 kg)      Height --       Head Circumference --       Peak Flow --       Pain Score --       Pain Loc --       Pain Edu? --       Excl. in [address]? --        My pulse ox interpretation is - normal    General appearance:  No acute distress. Skin:  Warm. Dry. Eye:  Extraocular movements intact. Ears, nose, mouth and throat:  Oral mucosa moist   Neck:  Trachea midline. Extremity:  No swelling. Normal ROM     Heart:  Regular rate and rhythm, normal S1 & S2, no extra heart sounds. Perfusion:  intact  Respiratory: Scattered expiratory wheezing bilaterally, tight breath sounds bilaterally. Respirations nonlabored. Abdominal:  Normal bowel sounds. Soft. Nontender. Non distended. Back:  No CVA tenderness to palpation     Neurological:  Alert and oriented times 3. No focal neuro deficits.              Psychiatric:  Appropriate    I have reviewed and interpreted all of the currently available lab results from this visit (if applicable):  Results for orders placed or performed during the hospital encounter of 01/24/23   Comprehensive Metabolic Panel   Result Value Ref Range    Sodium 139 135 - 145 MMOL/L    Potassium 4.4 3.5 - 5.1 MMOL/L    Chloride 98 (L) 99 - 110 mMol/L    CO2 36 (H) 21 - 32 MMOL/L    BUN 17 6 - 23 MG/DL    Creatinine 0.7 0.6 - 1.1 MG/DL    Est, Glom Filt Rate >60 >60 mL/min/1.73m2    Glucose 104 (H) 70 - 99 MG/DL    Calcium 9.8 8.3 - 10.6 MG/DL    Albumin 3.6 3.4 - 5.0 GM/DL    Total Protein 6.1 (L) 6.4 - 8.2 GM/DL    Total Bilirubin 0.3 0.0 - 1.0 MG/DL    ALT 22 10 - 40 U/L    AST 25 15 - 37 IU/L    Alkaline Phosphatase 71 40 - 129 IU/L    Anion Gap 5 4 - 16   Troponin   Result Value Ref Range    Troponin T <0.010 <0.01 NG/ML   Lipase   Result Value Ref Range    Lipase 71 (H) 13 - 60 IU/L   CBC with Auto Differential   Result Value Ref Range    WBC 8.1 4.0 - 10.5 K/CU MM    RBC 4.19 (L) 4.2 - 5.4 M/CU MM    Hemoglobin 12.7 12.5 - 16.0 GM/DL    Hematocrit 40.7 37 - 47 %    MCV 97.1 78 - 100 FL    MCH 30.3 27 - 31 PG    MCHC 31.2 (L) 32.0 - 36.0 %    RDW 14.0 11.7 - 14.9 %    Platelets 228 276 - 135 K/CU MM    MPV 10.1 7.5 - 11.1 FL    Differential Type AUTOMATED DIFFERENTIAL     Segs Relative 71.2 (H) 36 - 66 %    Lymphocytes % 18.6 (L) 24 - 44 %    Monocytes % 8.8 (H) 0 - 4 %    Eosinophils % 0.5 0 - 3 %    Basophils % 0.4 0 - 1 %    Segs Absolute 5.8 K/CU MM    Lymphocytes Absolute 1.5 K/CU MM    Monocytes Absolute 0.7 K/CU MM    Eosinophils Absolute 0.0 K/CU MM    Basophils Absolute 0.0 K/CU MM    Nucleated RBC % 0.0 %    Total Nucleated RBC 0.0 K/CU MM    Total Immature Neutrophil 0.04 K/CU MM    Immature Neutrophil % 0.5 (H) 0 - 0.43 %   Brain Natriuretic Peptide   Result Value Ref Range    Pro-.3 (H) <300 PG/ML   Blood Gas, Venous   Result Value Ref Range    pH, Mychal 7.31 (L) 7.32 - 7.42    pCO2, Mychal 86 (H) 38 - 52 mmHG    pO2, Mychal 51 (H) 28 - 48 mmHG    Base Exc, Mixed 13 (H) 0 - 2.3    HCO3, Venous 43.3 (H) 19 - 25 MMOL/L    O2 Sat, Mychal 78.6 (H) 50 - 70 %    Comment VBG       Radiographs (if obtained):  Radiologist's Report Reviewed:  XR CHEST PORTABLE    Result Date: 1/24/2023  EXAMINATION: ONE XRAY VIEW OF THE CHEST 1/24/2023 6:55 pm COMPARISON: 01/05/2023 HISTORY: Acute chest pain. FINDINGS: Normal cardiomediastinal silhouette. Stable emphysema and fibrotic changes. Evidence of chronic granulomatous disease. Stable blunting of the right costophrenic angle likely due to scarring. No acute airspace disease, pleural effusion, or pneumothorax. No acute abnormality.        EKG (if obtained): (All EKG's are interpreted by myself in the absence of a cardiologist)    Medications   ipratropium-albuterol (DUONEB) nebulizer solution 1 ampule (1 ampule Inhalation Given 1/24/23 1832)   ipratropium-albuterol (DUONEB) nebulizer solution 1 ampule (1 ampule Inhalation Given 1/24/23 1830)   methylPREDNISolone sodium (SOLU-MEDROL) injection 60 mg (60 mg IntraVENous Given 1/24/23 1949)       MDM:  Patient is a 70-year-old female with history of cancer arthritis coronary artery disease COPD chronic home oxygen former smoker osteopenia acid reflux chronic respiratory failure who presents to the emergency department for 2 days of shortness of breath wheezing not feeling well. She is on 2 L oxygen around-the-clock. She states she has not had to increase her home oxygen. Patient gives a history. Patient states no sick contacts and she denies any tobacco use no fever chills cough no nausea vomiting chest pain abdominal pain no dysuria hematuria. Patient is some swelling in her feet. Patient states she has been doing her breathing treatments no relief in symptoms. Physical exam patient tight breath sounds with scattered wheezing noted. Differential diagnosis does include pneumothorax, pulmonary embolus, pneumonia, COPD exacerbation. Patient placed on cardiac monitor. EKG per my interpretation sinus rhythm short UT interval left axis deviation incomplete right bundle branch block, ventricular rate of 64, UT interval 110, QRS duration 108, QT/QTc 406 and 418, no ST elevation noted. Laboratory studies were ordered and chest x-ray. Patient ordered DuoNeb breathing treatments x2 and Solu-Medrol 60 mg IV. Venous blood gas was ordered as well. Laboratory studies with normal sodium potassium kidney function calcium. Patient glucose 104. Patient elevated CO2 36. Patient normal liver enzymes, negative troponin. Patient lipase slightly elevated 71. Patient denies abdominal pain. Patient normal white blood cell count hemoglobin platelets. BNP at 319.3.   Patient with blood gas showing venous pH of 7.31, PCO2 of 86 and PO2 51, base excess 13, venous bicarb 43.3, venous oxygen saturation 78.6. Patient is a BiPAP for CO2 retention. BiPAP was ordered. Chest x-ray per my interpretation no pneumonia or fluid on the lungs. Patient updated on labs and imaging study findings. Patient will need admission for further evaluation treatment and management of COPD exacerbation and respiratory failure with hypercapnia on BiPAP. Hospitalist has been consulted for admission. Clinical Impression:  1. Acute exacerbation of chronic obstructive pulmonary disease (COPD) ([facility] [address]    2. Acute respiratory failure with hypercapnia [facility])          ED Provider Disposition Time  DISPOSITION Decision To Admit 01/24/2023 08:07:56 PM      Comment: Please note this report has been produced using speech recognition software and may contain errors related to that system including errors in grammar, punctuation, and spelling, as well as words and phrases that may be inappropriate. Efforts were made to edit the dictations.         Minnie Dow DO  01/25/23 0542

## 2023-01-25 NOTE — CONSULTS
Pulmonary Consult Note      Reason for Consult: copd exaccerbation  Requesting Physician: Dhruv Ann MD    Subjective:   CHIEF COMPLAINT :SOB    Patient Active Problem List    Diagnosis Date Noted    Acute on chronic respiratory failure with hypercapnia ([address] 01/24/2023     Priority: Medium    Chronic phlebitis of superficial vein of right lower extremity 09/15/2022     Priority: Medium    Multifocal pneumonia 09/08/2022     Priority: Medium    Community acquired pneumonia of right upper lobe of lung 03/19/2022    COPD exacerbation ([address] 03/19/2022    Acute exacerbation of chronic obstructive pulmonary disease (COPD) ([address] 03/17/2022    Mild pulmonary hypertension ([address] 01/24/2022    Pneumonia due to influenza A virus 12/31/2021    Acute respiratory failure with hypoxia and hypercapnia (HCC)     Shortness of breath 11/22/2021    Former smoker 11/22/2021    COVID-19 virus detected 01/18/2021    History of DVT of lower extremity 12/04/2020    History of pulmonary embolism 12/04/2020    Gastroesophageal reflux disease     Pure hypercholesterolemia 12/02/2019    Acquired hypothyroidism 08/01/2019    Lung infiltrate on CT 01/22/2019    Chronic hypoxemic respiratory failure ([address] 02/06/2018    Leukocytosis 11/15/2017    Pulmonary nodule 07/05/2016    TIA (transient ischemic attack) 06/24/2015    Vertigo, central 06/24/2015     Overview Note:     Possible        Anticoagulant long-term use 06/24/2015    Arthritis 02/19/2014    Coronary atherosclerosis 02/19/2014    COPD, very severe ([address] 06/28/2013    Essential hypertension 06/28/2013    Phlebitis 06/27/2013        HPI:                The patient is a 68 y.o. female with significant past medical history of chronic respiratory failure, COPD- 39 pk yr smoking, CAD , GERD, chronic anticoagulation   presents with complaints of SOB x 2 days. She has cough, phlegm-clear to yellow, no hemoptysis, no loss of weight,fair appetite. , no sick exposure, no recent travel Her CXR showed no acute abnormality. She sees Dr. Raoul Fisher She is on 2 L.min of oxygen At this time she is lying in the bed. She is in mild resp distress.       Past Medical History:      Diagnosis Date    Anticoagulant long-term use     Arthritis     Cancer ([facility]     Chronic hypoxemic respiratory failure ([facility] 2/6/2018    Community acquired pneumonia of right upper lobe of lung 3/19/2022    COPD (chronic obstructive pulmonary disease) (HCC)     Coronary atherosclerosis     COVID-19 virus detected 1/18/2021    DVT (deep venous thrombosis) (HCC)     Former smoker 11/22/2021    Gastroesophageal reflux disease     Hiatal hernia     Lung infiltrate on CT 1/22/2019    On home oxygen therapy     on 24/7    Osteopenia     Phlebitis     Pulmonary nodule 7/5/2016    S/P left heart catheterization by percutaneous approach 6/27/2013    Sepsis due to pneumonia ([facility] 11/14/2017    Shortness of breath 9/23/2019    Shortness of breath 1/18/2021    Shortness of breath 11/22/2021    Wears glasses       Past Surgical History:        Procedure Laterality Date    Lily Grow    Dr Raoul Fisher    COLONOSCOPY      ENDOSCOPY, COLON, DIAGNOSTIC      TONSILLECTOMY      TUBAL LIGATION      VEIN SURGERY       Current Medications:     b complex vitamins  1 capsule Oral Daily    oyster shell calcium w/D  1 tablet Oral BID    celecoxib  200 mg Oral Daily    Mucinex DM  1 tablet Oral BID    dicyclomine  10 mg Oral BID    dilTIAZem  60 mg Oral 3 times per day    gabapentin  400 mg Oral QPM    hydrOXYzine HCl  25 mg Oral Nightly    levothyroxine  75 mcg Oral QAM AC    montelukast  10 mg Oral Nightly    Roflumilast  500 mcg Oral Daily    rosuvastatin  20 mg Oral Nightly    sodium chloride flush  5-40 mL IntraVENous 2 times per day    methylPREDNISolone  40 mg IntraVENous Q12H    ipratropium-albuterol  1 ampule Inhalation Q4H WA    azithromycin  500 mg Oral Daily    pantoprazole  40 mg Oral BID AC    budesonide-formoterol  2 puff Inhalation BID    And    tiotropium  2 puff Inhalation Daily    warfarin  4.25 mg Oral Daily    theophylline  225 mg Oral BID     Allergies:    Social History:    TOBACCO:   reports that she quit smoking about 31 years ago. Her smoking use included cigarettes. She started smoking about 58 years ago. She has a 39.00 pack-year smoking history. She has never used smokeless tobacco.  ETOH:   reports no history of alcohol use. Patient currently lives independently    Family History:       Problem Relation Age of Onset    Heart Disease Mother     Cancer Father         lung ca    Heart Disease Father     COPD Father     Cancer Sister     Heart Disease Sister        REVIEW OF SYSTEMS:    CONSTITUTIONAL:  negative for fevers, chills, diaphoresis, activity change, appetite change, fatigue, night sweats and unexpected weight change.    EYES:  negative for blurred vision, eye discharge, visual disturbance and icterus  HEENT:  negative for hearing loss, tinnitus, ear drainage, sinus pressure, nasal congestion, epistaxis and snoring  RESPIRATORY:  See HPI  CARDIOVASCULAR:  negative for chest pain, palpitations, exertional chest pressure/discomfort, edema, syncope  GASTROINTESTINAL:  negative for nausea, vomiting, diarrhea, constipation, blood in stool and abdominal pain  GENITOURINARY:  negative for frequency, dysuria, urinary incontinence, decreased urine volume, and hematuria  HEMATOLOGIC/LYMPHATIC:  negative for easy bruising, bleeding and lymphadenopathy  ALLERGIC/IMMUNOLOGIC:  negative for recurrent infections, angioedema, anaphylaxis and drug reactions  ENDOCRINE:  negative for weight changes and diabetic symptoms including polyuria, polydipsia and polyphagia  MUSCULOSKELETAL:  negative for  pain, joint swelling, decreased range of motion and muscle weakness  NEUROLOGICAL:  negative for headaches, slurred speech, unilateral weakness  PSYCHIATRIC/BEHAVIORAL: negative for hallucinations, behavioral problems, confusion and agitation. Objective:   PHYSICAL EXAM:      VITALS:  BP (!) 151/68   Pulse 83   Temp 98 °F (36.7 °C) (Oral)   Resp 19   Ht 5' 5\" (1.651 m)   Wt 142 lb 14.4 oz (64.8 kg)   SpO2 97%   BMI 23.78 kg/m²   24HR INTAKE/OUTPUT:  No intake or output data in the 24 hours ending 23 1236  CURRENT PULSE OXIMETRY:  SpO2: 97 %  24HR PULSE OXIMETRY RANGE:  SpO2  Av.1 %  Min: 96 %  Max: 100 %    CONSTITUTIONAL:  awake, alert, cooperative, no apparent distress, and appears stated age  NECK:  Supple, symmetrical, trachea midline, no adenopathy, thyroid symmetric, not enlarged and no tenderness, skin normal  LUNGS:  Occasional basal crackles  CARDIOVASCULAR:  normal S1 and S2, no edema and no JVD  ABDOMEN:  normal bowel sounds, non-distended and no masses palpated, and no tenderness to palpation. No hepatospleenomegaly  LYMPHADENOPATHY:  no axillary or supraclavicular adenopathy. No cervical adnenopathy  PSYCHIATRIC: Oriented to person place and time. No obvious depression or anxiety. MUSCULOSKELETAL: No obvious misalignment or effusion of the joints. No clubbing, cyanosis of the digits. SKIN:  normal skin color, texture, turgor and no redness, warmth, or swelling.  No palpable nodules    DATA:    Old records have been reviewed  CBC with Differential:    Lab Results   Component Value Date/Time    WBC 7.2 2023 07:54 AM    RBC 4.30 2023 07:54 AM    HGB 13.2 2023 07:54 AM    HCT 41.5 2023 07:54 AM     2023 07:54 AM    MCV 96.5 2023 07:54 AM    MCH 30.7 2023 07:54 AM    MCHC 31.8 2023 07:54 AM    RDW 13.9 2023 07:54 AM    SEGSPCT 92.5 2023 07:54 AM    BANDSPCT 2 2022 06:52 AM    LYMPHOPCT 4.9 2023 07:54 AM    MONOPCT 2.1 2023 07:54 AM    EOSPCT 0.0 10/24/2011 05:16 AM    BASOPCT 0.1 2023 07:54 AM    MONOSABS 0.2 2023 07:54 AM    LYMPHSABS 0.4 2023 07:54 AM    EOSABS 0.0 2023 07:54 AM    BASOSABS 0.0 2023 07:54 AM    DIFFTYPE AUTOMATED DIFFERENTIAL 01/25/2023 07:54 AM     BMP:    Lab Results   Component Value Date/Time     01/24/2023 07:05 PM    K 4.4 01/24/2023 07:05 PM    CL 98 01/24/2023 07:05 PM    CO2 36 01/24/2023 07:05 PM    BUN 17 01/24/2023 07:05 PM    CREATININE 0.7 01/24/2023 07:05 PM    CALCIUM 9.8 01/24/2023 07:05 PM    GFRAA >60 10/14/2022 11:25 AM    LABGLOM >60 01/24/2023 07:05 PM    GLUCOSE 104 01/24/2023 07:05 PM     Hepatic Function Panel:    Lab Results   Component Value Date/Time    ALKPHOS 71 01/24/2023 07:05 PM    ALT 22 01/24/2023 07:05 PM    AST 25 01/24/2023 07:05 PM    PROT 6.1 01/24/2023 07:05 PM    PROT 6.9 10/21/2011 12:33 PM    BILITOT 0.3 01/24/2023 07:05 PM     ABG:    Lab Results   Component Value Date/Time    PLH2DHG 27.9 03/18/2022 11:30 AM    CDJ5ULV 45.0 03/18/2022 11:30 AM    PO2ART 89 03/18/2022 11:30 AM       Cultures:   Pending      Radiology Review:    Normal cardiomediastinal silhouette. Stable emphysema and fibrotic changes. Evidence of chronic granulomatous disease. Stable blunting of the right   costophrenic angle likely due to scarring. No acute airspace disease,   pleural effusion, or pneumothorax.              Assessment/Plan       Patient Active Problem List    Diagnosis Date Noted    Acute on chronic respiratory failure with hypercapnia ([facility] 01/24/2023     Priority: Medium    Chronic phlebitis of superficial vein of right lower extremity 09/15/2022     Priority: Medium    Multifocal pneumonia 09/08/2022     Priority: Medium    Community acquired pneumonia of right upper lobe of lung 03/19/2022    COPD exacerbation ([address] 03/19/2022    Acute exacerbation of chronic obstructive pulmonary disease (COPD) ([address] 03/17/2022    Mild pulmonary hypertension ([address] 01/24/2022    Pneumonia due to influenza A virus 12/31/2021    Acute respiratory failure with hypoxia and hypercapnia ([facility]     Shortness of breath 11/22/2021    Former smoker 11/22/2021    COVID-19 virus detected 01/18/2021    History of DVT of lower extremity 12/04/2020    History of pulmonary embolism 12/04/2020    Gastroesophageal reflux disease     Pure hypercholesterolemia 12/02/2019    Acquired hypothyroidism 08/01/2019    Lung infiltrate on CT 01/22/2019    Chronic hypoxemic respiratory failure ([address] 02/06/2018    Leukocytosis 11/15/2017    Pulmonary nodule 07/05/2016    TIA (transient ischemic attack) 06/24/2015    Vertigo, central 06/24/2015     Overview Note:     Possible        Anticoagulant long-term use 06/24/2015    Arthritis 02/19/2014    Coronary atherosclerosis 02/19/2014    COPD, very severe ([address] 06/28/2013    Essential hypertension 06/28/2013    Phlebitis 06/27/2013     Acute on chronic Hypoxic Hypercapneic resp failure  AECOPD  Cig smoker  Grade I Diastolic dysfunction  Mild Pulmonary HTN  H/o DVT on  Coumadin  CAD  GERD  Hypothyroidism       PLAN  1 Abx  2 f/u C&S  3 Inhalers  4 Solumedrol  5 BIPAP qhs and prn  6 Keep sats > 92%  7.c/w Coumadin  8 c/w present management        Electronically signed by Stanley Edouard MD on 1/25/2023 at 12:36 PM

## 2023-01-25 NOTE — PROGRESS NOTES
01/25/23 0945   Encounter Summary   Encounter Overview/Reason  Initial Encounter   Service Provided For: Patient   Referral/Consult From: [facility]   Support System Spouse   Last Encounter  01/25/23  (Spiritual Care visit with prayer)   Complexity of Encounter Low   Begin Time 0940   End Time  0949   Total Time Calculated 9 min   Encounter    Type Initial Screen/Assessment   Spiritual/Emotional needs   Type Spiritual Support   Assessment/Intervention/Outcome   Assessment Calm;Coping; Hopeful;Peaceful  (Woman of strong fiath)   Intervention Active listening;Discussed relationship with God;Nurtured Hope;Prayer (assurance of)/[place];Sustaining Presence/Ministry of presence;Read/Provided Scripture   Outcome Acceptance;Comfort;Coping;Encouraged;Expressed feelings, needs, and concerns;Expressed feelings of [place], Peace and/or Love;Expressed Gratitude; Optimistic   Plan and Referrals   Plan/Referrals Continue to visit, (comment)

## 2023-01-25 NOTE — ED NOTES
Gave report to DANNY Groves. Care transferred at this time.       Josseline Miller RN  01/25/23 [id number]

## 2023-01-25 NOTE — PROGRESS NOTES
V2.0  [facility]      Name:  Eugenio Nelson /Age/Sex: 1945  (68 y.o. female)   MRN & CSN:  [id number] & [id number] Encounter Date/Time: 2023 12:08 PM EST    Location:  -A PCP: Janice Ellington, [address] [place] [address] Day: 2    Assessment and Plan:   Eugenio Nelson is a 68 y.o. female with pmh of hypothyroidism, history of DVT, hyperlipidemia, CAD s/p PCI, chronic respiratory failure on 2 L of O2 secondary to COPD, who presents with Acute on chronic respiratory failure with hypercapnia ([facility]      # Acute on chronic respiratory failure with hypercapnia secondary to COPD exacerbation: Patient with history of COPD on 2 L of O2 presented with worsening shortness of breath, wheezing especially when she moves around, initially requiring 4 L of O2, VBG showed pH 7.31, PCO2 86, PO2 51.,  Viral panel negative, started on Solu-Medrol 40 mg IV twice daily, DuoNebs, azithromycin, RT per protocol, BiPAP and consulted pulmonology, pending evaluation    # CAD s/p PCI on aspirin and statin, continue, last echo on 1/3/2022 showed EF 50 to 55% with RVSP 40 and grade 1 diastolic dysfunction, , troponin negative, will obtain echocardiogram, and consult cardiology if needed. # History of DVT on Coumadin, continue Coumadin per pharmacy with PT/INR daily    # Hypothyroidism continue levothyroxine    # GERD continue PPI    # Hyperlipidemia continue statin    Diet ADULT DIET;  Regular   DVT Prophylaxis [] Lovenox, []  Heparin, [] SCDs, [] Ambulation,  [] Eliquis, [] Xarelto  [x] Coumadin   Code Status Full Code   Disposition From: Home  Expected Disposition: Home  Estimated Date of Discharge: TBD  Patient requires continued admission due to acute respiratory failure COPD exacerbation   Surrogate Decision Maker/ POA      Subjective:     Chief Complaint: Shortness of Breath (Hx of COPD and emphysema- took 2 breathing treatments PTA with little relief, wears 2LPM/NC at all times) Patient seen and examined at bedside. Patient states she feels little better but still wheezing and states that if she walk around she feel more short of breath denies any fever         Review of Systems:    Review of Systems    As above    Objective:   No intake or output data in the 24 hours ending 01/25/23 1208     Vitals:   Vitals:    01/25/23 0945   BP: (!) 151/68   Pulse: 83   Resp: 19   Temp: 98 °F (36.7 °C)   SpO2: 97%       Physical Exam:     General: Afebrile, no distress on 3 L of O2  Eyes: EOMI  ENT: neck supple, no JVD  Cardiovascular: W7-Q0 normal systolic murmur  Respiratory: Air entry decreased bilaterally with bilateral expiratory wheeze  Gastrointestinal: Soft, non tender bowel sounds normal  Genitourinary: no suprapubic tenderness  Musculoskeletal: No edema  Skin: warm, dry  Neuro: Alert. Oriented no focal deficit  Psych: Mood appropriate.      Medications:   Medications:    b complex vitamins  1 capsule Oral Daily    oyster shell calcium w/D  1 tablet Oral BID    celecoxib  200 mg Oral Daily    Mucinex DM  1 tablet Oral BID    dicyclomine  10 mg Oral BID    dilTIAZem  60 mg Oral 3 times per day    gabapentin  400 mg Oral QPM    hydrOXYzine HCl  25 mg Oral Nightly    levothyroxine  75 mcg Oral QAM AC    montelukast  10 mg Oral Nightly    Roflumilast  500 mcg Oral Daily    rosuvastatin  20 mg Oral Nightly    sodium chloride flush  5-40 mL IntraVENous 2 times per day    methylPREDNISolone  40 mg IntraVENous Q12H    ipratropium-albuterol  1 ampule Inhalation Q4H WA    azithromycin  500 mg Oral Daily    pantoprazole  40 mg Oral BID AC    budesonide-formoterol  2 puff Inhalation BID    And    tiotropium  2 puff Inhalation Daily    warfarin  4.25 mg Oral Daily    theophylline  225 mg Oral BID      Infusions:    sodium chloride       PRN Meds: sodium chloride flush, 5-40 mL, PRN  sodium chloride, , PRN  ondansetron, 4 mg, Q8H PRN   Or  ondansetron, 4 mg, Q6H PRN  polyethylene glycol, 17 g, Daily PRN  acetaminophen, 650 mg, Q6H PRN   Or  acetaminophen, 650 mg, Q6H PRN  ipratropium-albuterol, 1 ampule, Q4H PRN        Labs      Recent Results (from the past 24 hour(s))   EKG 12 Lead    Collection Time: 01/24/23  6:25 PM   Result Value Ref Range    Ventricular Rate 65 BPM    Atrial Rate 65 BPM    P-R Interval 82 ms    QRS Duration 102 ms    Q-T Interval 400 ms    QTc Calculation (Bazett) 416 ms    P Axis 25 degrees    R Axis -38 degrees    T Axis 54 degrees    Diagnosis       Sinus rhythm with short AZ with occasional premature ventricular complexes  Left axis deviation  Voltage criteria for left ventricular hypertrophy  Nonspecific ST abnormality  Abnormal ECG  When compared with ECG of 05-JAN-2023 12:05,  premature ventricular complexes are now present  premature atrial complexes are no longer present  Incomplete right bundle branch block is no longer present  Minimal criteria for Septal infarct are no longer present     Blood Gas, Venous    Collection Time: 01/24/23  6:30 PM   Result Value Ref Range    pH, Mychal 7.31 (L) 7.32 - 7.42    pCO2, Mychal 86 (H) 38 - 52 mmHG    pO2, Mychal 51 (H) 28 - 48 mmHG    Base Exc, Mixed 13 (H) 0 - 2.3    HCO3, Venous 43.3 (H) 19 - 25 MMOL/L    O2 Sat, Mychal 78.6 (H) 50 - 70 %    Comment VBG    Comprehensive Metabolic Panel    Collection Time: 01/24/23  7:05 PM   Result Value Ref Range    Sodium 139 135 - 145 MMOL/L    Potassium 4.4 3.5 - 5.1 MMOL/L    Chloride 98 (L) 99 - 110 mMol/L    CO2 36 (H) 21 - 32 MMOL/L    BUN 17 6 - 23 MG/DL    Creatinine 0.7 0.6 - 1.1 MG/DL    Est, Glom Filt Rate >60 >60 mL/min/1.73m2    Glucose 104 (H) 70 - 99 MG/DL    Calcium 9.8 8.3 - 10.6 MG/DL    Albumin 3.6 3.4 - 5.0 GM/DL    Total Protein 6.1 (L) 6.4 - 8.2 GM/DL    Total Bilirubin 0.3 0.0 - 1.0 MG/DL    ALT 22 10 - 40 U/L    AST 25 15 - 37 IU/L    Alkaline Phosphatase 71 40 - 129 IU/L    Anion Gap 5 4 - 16   Troponin    Collection Time: 01/24/23  7:05 PM   Result Value Ref Range    Troponin T <0.010 <0.01 NG/ML   Lipase    Collection Time: 01/24/23  7:05 PM   Result Value Ref Range    Lipase 71 (H) 13 - 60 IU/L   CBC with Auto Differential    Collection Time: 01/24/23  7:05 PM   Result Value Ref Range    WBC 8.1 4.0 - 10.5 K/CU MM    RBC 4.19 (L) 4.2 - 5.4 M/CU MM    Hemoglobin 12.7 12.5 - 16.0 GM/DL    Hematocrit 40.7 37 - 47 %    MCV 97.1 78 - 100 FL    MCH 30.3 27 - 31 PG    MCHC 31.2 (L) 32.0 - 36.0 %    RDW 14.0 11.7 - 14.9 %    Platelets 934 091 - 322 K/CU MM    MPV 10.1 7.5 - 11.1 FL    Differential Type AUTOMATED DIFFERENTIAL     Segs Relative 71.2 (H) 36 - 66 %    Lymphocytes % 18.6 (L) 24 - 44 %    Monocytes % 8.8 (H) 0 - 4 %    Eosinophils % 0.5 0 - 3 %    Basophils % 0.4 0 - 1 %    Segs Absolute 5.8 K/CU MM    Lymphocytes Absolute 1.5 K/CU MM    Monocytes Absolute 0.7 K/CU MM    Eosinophils Absolute 0.0 K/CU MM    Basophils Absolute 0.0 K/CU MM    Nucleated RBC % 0.0 %    Total Nucleated RBC 0.0 K/CU MM    Total Immature Neutrophil 0.04 K/CU MM    Immature Neutrophil % 0.5 (H) 0 - 0.43 %   Brain Natriuretic Peptide    Collection Time: 01/24/23  7:05 PM   Result Value Ref Range    Pro-.3 (H) <300 PG/ML   Blood Gas, Venous    Collection Time: 01/24/23  9:30 PM   Result Value Ref Range    pH, Mychal 7.46 (H) 7.32 - 7.42    pCO2, Mychal 53 (H) 38 - 52 mmHG    pO2, Mychal 200 (H) 28 - 48 mmHG    Base Exc, Mixed 11.8 (H) 0 - 2.3    HCO3, Venous 37.7 (H) 19 - 25 MMOL/L    O2 Sat, Mychal 94.8 (H) 50 - 70 %    Comment VBG    EKG 12 Lead    Collection Time: 01/24/23  9:47 PM   Result Value Ref Range    Ventricular Rate 64 BPM    Atrial Rate 64 BPM    P-R Interval 132 ms    QRS Duration 110 ms    Q-T Interval 406 ms    QTc Calculation (Bazett) 418 ms    P Axis 58 degrees    R Axis -41 degrees    T Axis 45 degrees    Diagnosis       Normal sinus rhythm  Left axis deviation  Voltage criteria for left ventricular hypertrophy  Cannot rule out Septal infarct , age undetermined  Abnormal ECG  When compared with ECG of 24-JAN-2023 18:25,  premature ventricular complexes are no longer present  OH interval has increased  Minimal criteria for Septal infarct are now present     Protime-INR    Collection Time: 01/24/23 10:20 PM   Result Value Ref Range    Protime 19.3 (H) 11.7 - 14.5 SECONDS    INR 1.49 INDEX   Troponin    Collection Time: 01/24/23 10:32 PM   Result Value Ref Range    Troponin T <0.010 <0.01 NG/ML   Magnesium    Collection Time: 01/24/23 10:32 PM   Result Value Ref Range    Magnesium 2.3 1.8 - 2.4 mg/dl   Respiratory Panel, Molecular, with COVID-19 (Restricted: peds pts or suitable admitted adults)    Collection Time: 01/24/23 10:42 PM    Specimen: Nasopharyngeal   Result Value Ref Range    Adenovirus Detection by PCR NOT DETECTED NOT DETECTED    Coronavirus 229E PCR NOT DETECTED NOT DETECTED    Coronavirus HKU1 PCR NOT DETECTED NOT DETECTED    Coronavirus NL63 PCR NOT DETECTED NOT DETECTED    Coronavirus OC43 PCR NOT DETECTED NOT DETECTED    SARS-CoV-2 NOT DETECTED NOT DETECTED    Human Metapneumovirus PCR NOT DETECTED NOT DETECTED    Rhinovirus Enterovirus PCR NOT DETECTED NOT DETECTED    Influenza A by PCR NOT DETECTED NOT DETECTED    Influenza A H1 Pandemic PCR NOT DETECTED NOT DETECTED    Influenza A H1 (2009) PCR NOT DETECTED NOT DETECTED    Influenza A H3 PCR NOT DETECTED NOT DETECTED    Influenza B by PCR NOT DETECTED NOT DETECTED    Parainfluenza 1 PCR NOT DETECTED NOT DETECTED    Parainfluenza 2 PCR NOT DETECTED NOT DETECTED    Parainfluenza 3 PCR NOT DETECTED NOT DETECTED    Parainfluenza 4 PCR NOT DETECTED NOT DETECTED    RSV PCR NOT DETECTED NOT DETECTED    Bordetella parapertussis by PCR NOT DETECTED NOT DETECTED    B Pertussis by PCR NOT DETECTED NOT DETECTED    Chlamydophila Pneumonia PCR NOT DETECTED NOT DETECTED    Mycoplasma pneumo by PCR NOT DETECTED NOT DETECTED   CBC with Auto Differential    Collection Time: 01/25/23  7:54 AM   Result Value Ref Range    WBC 7.2 4.0 - 10.5 K/CU MM    RBC 4.30 4.2 - 5.4 M/CU MM    Hemoglobin 13.2 12.5 - 16.0 GM/DL    Hematocrit 41.5 37 - 47 %    MCV 96.5 78 - 100 FL    MCH 30.7 27 - 31 PG    MCHC 31.8 (L) 32.0 - 36.0 %    RDW 13.9 11.7 - 14.9 %    Platelets 947 304 - 752 K/CU MM    MPV 10.1 7.5 - 11.1 FL    Differential Type AUTOMATED DIFFERENTIAL     Segs Relative 92.5 (H) 36 - 66 %    Lymphocytes % 4.9 (L) 24 - 44 %    Monocytes % 2.1 0 - 4 %    Eosinophils % 0.0 0 - 3 %    Basophils % 0.1 0 - 1 %    Segs Absolute 6.6 K/CU MM    Lymphocytes Absolute 0.4 K/CU MM    Monocytes Absolute 0.2 K/CU MM    Eosinophils Absolute 0.0 K/CU MM    Basophils Absolute 0.0 K/CU MM    Nucleated RBC % 0.0 %    Total Nucleated RBC 0.0 K/CU MM    Total Immature Neutrophil 0.03 K/CU MM    Immature Neutrophil % 0.4 0 - 0.43 %   Protime-INR    Collection Time: 01/25/23  7:54 AM   Result Value Ref Range    Protime 14.6 (H) 11.7 - 14.5 SECONDS    INR 1.13 INDEX        Imaging/Diagnostics Last 24 Hours   XR CHEST PORTABLE    Result Date: 1/24/2023  EXAMINATION: ONE XRAY VIEW OF THE CHEST 1/24/2023 6:55 pm COMPARISON: 01/05/2023 HISTORY: Acute chest pain. FINDINGS: Normal cardiomediastinal silhouette. Stable emphysema and fibrotic changes. Evidence of chronic granulomatous disease. Stable blunting of the right costophrenic angle likely due to scarring. No acute airspace disease, pleural effusion, or pneumothorax. No acute abnormality.        Electronically signed by Reggie Chiu MD on 1/25/2023 at 12:08 PM

## 2023-01-25 NOTE — PROGRESS NOTES
PHARMACY ANTICOAGULATION MONITORING SERVICE    Will Garcia is a 68 y.o. female on warfarin therapy for history of DVT/PE. Pharmacy consulted by RASHMI Alonzo for monitoring and adjustment of treatment. Indication for anticoagulation: History of PE/DVT  INR goal: 2-3  Warfarin dose prior to admission: 6 mg/3 mg/3 mg and repeat    Pertinent Laboratory Values   Recent Labs     01/24/23  1905 01/24/23  2220   INR  --  1.49   HGB 12.7  --    HCT 40.7  --      --        Assessment/Plan:  Drug Interactions:   Celecoxib, levothyroxine, (Home meds)  Azithromycin, methylprednisolone - both meds can increase INR  INR sub-therapeutic @ 1.49  Adjust warfarin to 4.25 mg daily   Pharmacy will continue to monitor and adjust warfarin therapy as indicated    Thank you for the consult.   Patsy Zimmer, [facility]  1/25/2023 6:09 AM

## 2023-01-26 VITALS
WEIGHT: 146.39 LBS | BODY MASS INDEX: 24.39 KG/M2 | SYSTOLIC BLOOD PRESSURE: 140 MMHG | TEMPERATURE: 98 F | HEART RATE: 97 BPM | DIASTOLIC BLOOD PRESSURE: 77 MMHG | RESPIRATION RATE: 18 BRPM | HEIGHT: 65 IN | OXYGEN SATURATION: 98 %

## 2023-01-26 LAB
ANION GAP SERPL CALCULATED.3IONS-SCNC: 8 MMOL/L (ref 4–16)
BASOPHILS ABSOLUTE: 0 K/CU MM
BASOPHILS RELATIVE PERCENT: 0.1 % (ref 0–1)
BUN BLDV-MCNC: 19 MG/DL (ref 6–23)
CALCIUM SERPL-MCNC: 9.8 MG/DL (ref 8.3–10.6)
CHLORIDE BLD-SCNC: 99 MMOL/L (ref 99–110)
CO2: 31 MMOL/L (ref 21–32)
CREAT SERPL-MCNC: 0.6 MG/DL (ref 0.6–1.1)
DIFFERENTIAL TYPE: ABNORMAL
EOSINOPHILS ABSOLUTE: 0 K/CU MM
EOSINOPHILS RELATIVE PERCENT: 0 % (ref 0–3)
GFR SERPL CREATININE-BSD FRML MDRD: >60 ML/MIN/1.73M2
GLUCOSE BLD-MCNC: 89 MG/DL (ref 70–99)
HCT VFR BLD CALC: 40.3 % (ref 37–47)
HEMOGLOBIN: 13 GM/DL (ref 12.5–16)
IMMATURE NEUTROPHIL %: 0.6 % (ref 0–0.43)
INR BLD: 1.07 INDEX
LYMPHOCYTES ABSOLUTE: 0.4 K/CU MM
LYMPHOCYTES RELATIVE PERCENT: 3.7 % (ref 24–44)
MCH RBC QN AUTO: 30.8 PG (ref 27–31)
MCHC RBC AUTO-ENTMCNC: 32.3 % (ref 32–36)
MCV RBC AUTO: 95.5 FL (ref 78–100)
MONOCYTES ABSOLUTE: 0.7 K/CU MM
MONOCYTES RELATIVE PERCENT: 5.7 % (ref 0–4)
NUCLEATED RBC %: 0 %
PDW BLD-RTO: 14.1 % (ref 11.7–14.9)
PLATELET # BLD: 237 K/CU MM (ref 140–440)
PMV BLD AUTO: 9.6 FL (ref 7.5–11.1)
POTASSIUM SERPL-SCNC: 4.3 MMOL/L (ref 3.5–5.1)
PROCALCITONIN: 0.06
PROTHROMBIN TIME: 13.8 SECONDS (ref 11.7–14.5)
RBC # BLD: 4.22 M/CU MM (ref 4.2–5.4)
SEGMENTED NEUTROPHILS ABSOLUTE COUNT: 10.3 K/CU MM
SEGMENTED NEUTROPHILS RELATIVE PERCENT: 89.9 % (ref 36–66)
SODIUM BLD-SCNC: 138 MMOL/L (ref 135–145)
TOTAL IMMATURE NEUTOROPHIL: 0.07 K/CU MM
TOTAL NUCLEATED RBC: 0 K/CU MM
WBC # BLD: 11.5 K/CU MM (ref 4–10.5)

## 2023-01-26 PROCEDURE — 6370000000 HC RX 637 (ALT 250 FOR IP): Performed by: STUDENT IN AN ORGANIZED HEALTH CARE EDUCATION/TRAINING PROGRAM

## 2023-01-26 PROCEDURE — 94761 N-INVAS EAR/PLS OXIMETRY MLT: CPT

## 2023-01-26 PROCEDURE — 6370000000 HC RX 637 (ALT 250 FOR IP): Performed by: NURSE PRACTITIONER

## 2023-01-26 PROCEDURE — 94640 AIRWAY INHALATION TREATMENT: CPT

## 2023-01-26 PROCEDURE — 80048 BASIC METABOLIC PNL TOTAL CA: CPT

## 2023-01-26 PROCEDURE — 84145 PROCALCITONIN (PCT): CPT

## 2023-01-26 PROCEDURE — 85610 PROTHROMBIN TIME: CPT

## 2023-01-26 PROCEDURE — 36415 COLL VENOUS BLD VENIPUNCTURE: CPT

## 2023-01-26 PROCEDURE — 2580000003 HC RX 258: Performed by: NURSE PRACTITIONER

## 2023-01-26 PROCEDURE — 2700000000 HC OXYGEN THERAPY PER DAY

## 2023-01-26 PROCEDURE — 85025 COMPLETE CBC W/AUTO DIFF WBC: CPT

## 2023-01-26 PROCEDURE — 6360000002 HC RX W HCPCS: Performed by: NURSE PRACTITIONER

## 2023-01-26 RX ORDER — AZITHROMYCIN 500 MG/1
500 TABLET, FILM COATED ORAL DAILY
Qty: 1 TABLET | Refills: 0 | Status: SHIPPED | OUTPATIENT
Start: 2023-01-27 | End: 2023-01-28

## 2023-01-26 RX ORDER — PREDNISONE 20 MG/1
40 TABLET ORAL DAILY
Qty: 10 TABLET | Refills: 0 | Status: SHIPPED | OUTPATIENT
Start: 2023-01-26 | End: 2023-01-31 | Stop reason: ALTCHOICE

## 2023-01-26 RX ADMIN — GUAIFENESIN AND DEXTROMETHORPHAN HYDROBROMIDE 1 TABLET: 600; 30 TABLET, EXTENDED RELEASE ORAL at 08:39

## 2023-01-26 RX ADMIN — DICYCLOMINE HYDROCHLORIDE 10 MG: 10 CAPSULE ORAL at 08:39

## 2023-01-26 RX ADMIN — ROFLUMILAST 500 MCG: 500 TABLET ORAL at 08:42

## 2023-01-26 RX ADMIN — SODIUM CHLORIDE, PRESERVATIVE FREE 10 ML: 5 INJECTION INTRAVENOUS at 08:39

## 2023-01-26 RX ADMIN — CELECOXIB 200 MG: 200 CAPSULE ORAL at 08:39

## 2023-01-26 RX ADMIN — AZITHROMYCIN MONOHYDRATE 500 MG: 250 TABLET ORAL at 08:39

## 2023-01-26 RX ADMIN — METHYLPREDNISOLONE SODIUM SUCCINATE 40 MG: 40 INJECTION, POWDER, FOR SOLUTION INTRAMUSCULAR; INTRAVENOUS at 08:39

## 2023-01-26 RX ADMIN — Medication 1 TABLET: at 08:39

## 2023-01-26 RX ADMIN — DILTIAZEM HYDROCHLORIDE 60 MG: 60 TABLET, FILM COATED ORAL at 06:05

## 2023-01-26 RX ADMIN — IPRATROPIUM BROMIDE AND ALBUTEROL SULFATE 1 AMPULE: 2.5; .5 SOLUTION RESPIRATORY (INHALATION) at 02:59

## 2023-01-26 RX ADMIN — IPRATROPIUM BROMIDE AND ALBUTEROL SULFATE 1 AMPULE: 2.5; .5 SOLUTION RESPIRATORY (INHALATION) at 11:30

## 2023-01-26 RX ADMIN — TIOTROPIUM BROMIDE INHALATION SPRAY 2 PUFF: 3.12 SPRAY, METERED RESPIRATORY (INHALATION) at 08:03

## 2023-01-26 RX ADMIN — PANTOPRAZOLE SODIUM 40 MG: 40 TABLET, DELAYED RELEASE ORAL at 06:09

## 2023-01-26 RX ADMIN — BUDESONIDE AND FORMOTEROL FUMARATE DIHYDRATE 2 PUFF: 160; 4.5 AEROSOL RESPIRATORY (INHALATION) at 08:03

## 2023-01-26 RX ADMIN — Medication 1 CAPSULE: at 08:39

## 2023-01-26 RX ADMIN — LEVOTHYROXINE SODIUM 75 MCG: 0.07 TABLET ORAL at 06:09

## 2023-01-26 RX ADMIN — IPRATROPIUM BROMIDE AND ALBUTEROL SULFATE 1 AMPULE: 2.5; .5 SOLUTION RESPIRATORY (INHALATION) at 08:03

## 2023-01-26 RX ADMIN — THEOPHYLLINE 225 MG: 450 TABLET, EXTENDED RELEASE ORAL at 08:39

## 2023-01-26 NOTE — PLAN OF CARE
Problem: Discharge Planning  Goal: Discharge to home or other facility with appropriate resources  1/26/2023 1233 by Ros Whyte RN  Outcome: Adequate for Discharge  1/26/2023 0101 by Abilio Parra RN  Outcome: Progressing  Flowsheets (Taken 1/25/2023 1316 by Scheryl Seip, LPN)  Discharge to home or other facility with appropriate resources:   Identify barriers to discharge with patient and caregiver   Arrange for needed discharge resources and transportation as appropriate   Identify discharge learning needs (meds, wound care, etc)   Refer to discharge planning if patient needs post-hospital services based on physician order or complex needs related to functional status, cognitive ability or social support system     Problem: ABCDS Injury Assessment  Goal: Absence of physical injury  1/26/2023 1233 by Ros Whyte RN  Outcome: Adequate for Discharge  1/26/2023 0101 by Abilio Parra RN  Outcome: Progressing  Flowsheets (Taken 1/25/2023 1323 by Scheryl Seip, LPN)  Absence of Physical Injury: Implement safety measures based on patient assessment     Problem: Safety - Adult  Goal: Free from fall injury  1/26/2023 1233 by Ros Whyte RN  Outcome: Adequate for Discharge  1/26/2023 0101 by Abilio Parra RN  Outcome: Progressing

## 2023-01-26 NOTE — DISCHARGE SUMMARY
V2.0  Discharge Summary    Name:  Maki Glaser /Age/Sex: 1945 (68 y.o. female)   Admit Date: 2023  Discharge Date: 23    MRN & CSN:  [id number] & [id number] Encounter Date and Time 23 1:35 PM EST    Attending:  No att. providers found Discharging Provider: Jackie Ramirez MD       Hospital Course:     Brief HPI: Maki Glaser is a 68 y.o. female with pmh of hypothyroidism, history of DVT, hyperlipidemia, CAD s/p PCI, chronic respiratory failure on 2 L of O2 secondary to COPD, who presents with Acute on chronic respiratory failure with hypercapnia (HCC)    Brief Problem Based Course:   # Acute on chronic respiratory failure with hypercapnia secondary to COPD exacerbation: Patient with history of COPD on 2 L of O2 presented with worsening shortness of breath, wheezing especially when she moves around, initially requiring 4 L of O2, VBG showed pH 7.31, PCO2 86, PO2 51.,  Viral panel negative, started on Solu-Medrol 40 mg IV twice daily, DuoNebs, azithromycin, RT per protocol, BiPAP and consulted pulmonology, recommended to continue antibiotic, Solu-Medrol, inhalers, BiPAP as needed patient's symptoms improved significantly with no wheezing, discharged in a stable condition to follow-up with PCP and pulmonology as soon as possible. # CAD s/p PCI on aspirin and statin, continue, last echo on 1/3/2022 showed EF 50 to 55% with RVSP 40 and grade 1 diastolic dysfunction, , troponin negative, obtained echocardiogram EF 55% mitral annular calcification otherwise normal.     # History of DVT on Coumadin,      # Hypothyroidism continue levothyroxine     # GERD continue PPI     # Hyperlipidemia continue statin    Discharge patient in a stable condition to follow-up with PCP and pulmonology as soon as possible. The patient expressed appropriate understanding of, and agreement with the discharge recommendations, medications, and plan.      Consults this admission:  IP CONSULT TO PHARMACY  IP CONSULT TO PULMONOLOGY    Discharge Diagnosis:   Acute on chronic respiratory failure with hypercapnia (HCC)  COPD exacerbation  CAD s/p PCI  History of DVT  Hypothyroidism    Discharge Instruction:   Follow up appointments: PCP, pulmonology  Primary care physician: Delmy Castillo,  within 2 weeks  Diet: regular diet   Activity: activity as tolerated  Disposition: Discharged to:   [x]Home, [][facility], []SNF, []Acute Rehab, []Hospice   Condition on discharge: Stable  Labs and Tests to be Followed up as an outpatient by PCP or Specialist:     Discharge Medications:        Medication List        CHANGE how you take these medications      azithromycin 500 MG tablet  Commonly known as: ZITHROMAX  Take 1 tablet by mouth daily for 1 dose  Start taking on: January 27, 2023  What changed:   medication strength  how much to take  when to take this  additional instructions     predniSONE 20 MG tablet  Commonly known as: DELTASONE  Take 2 tablets by mouth daily for 5 days  What changed:   medication strength  how much to take            CONTINUE taking these medications      * albuterol sulfate  (90 Base) MCG/ACT inhaler  Commonly known as: Proventil HFA  Inhale 2 puffs into the lungs every 4 hours as needed for Wheezing or Shortness of Breath With spacer (and mask if indicated). Thanks.      * albuterol (2.5 MG/3ML) 0.083% nebulizer solution  Commonly known as: PROVENTIL  Take 3 mLs by nebulization every 4 hours as needed for Wheezing     b complex vitamins capsule     Biotin 06277 MCG Tabs     Breztri Aerosphere 160-9-4.8 MCG/ACT Aero  Generic drug: Budeson-Glycopyrrol-Formoterol  Inhale 2 sprays into the lungs 2 times daily     Calcium + D3 600-200 MG-UNIT Tabs tablet     celecoxib 200 MG capsule  Commonly known as: CELEBREX  TAKE 1 CAPSULE BY MOUTH EVERY DAY     dicyclomine 10 MG capsule  Commonly known as: BENTYL     dilTIAZem 60 MG tablet  Commonly known as: CARDIZEM  Take 1 tablet by mouth every 8 hours esomeprazole 40 MG delayed release capsule  Commonly known as: NEXIUM  Take 1 capsule by mouth in the morning and at bedtime     gabapentin 400 MG capsule  Commonly known as: NEURONTIN  Take 1 capsule by mouth every evening for 90 days. hydrOXYzine HCl 25 MG tablet  Commonly known as: ATARAX  TAKE 1 TABLET BY MOUTH NIGHTLY     ipratropium 17 MCG/ACT inhaler  Commonly known as: ATROVENT HFA     KLOR-CON PO     levothyroxine 75 MCG tablet  Commonly known as: SYNTHROID  Take 1 tablet by mouth daily     Magnesium Oxide 500 MG Tabs  TAKE 1 TABLET BY MOUTH EVERY DAY     montelukast 10 MG tablet  Commonly known as: SINGULAIR  TAKE 1 TABLET BY MOUTH EVERY DAY EVERY NIGHT     Mucinex DM  MG Tb12  Take 1 to 2 tablet by mouth every 12 hours (maximum: 4 tablets/24 hours). Drink plenty of water. MULTIVITAMIN PO     nitroGLYCERIN 0.4 MG SL tablet  Commonly known as: NITROSTAT  Place 1 tablet under the tongue every 5 minutes as needed for Chest pain     OXYGEN     risedronate 35 MG tablet  Commonly known as: ACTONEL  TAKE 1 TABLET BY MOUTH EVERY 7 DAYS PATIENT TAKES ON SUNDAY     Roflumilast 500 MCG tablet  Commonly known as: Daliresp  TAKE 1 TABLET BY MOUTH EVERY DAY     rosuvastatin 20 MG tablet  Commonly known as: CRESTOR  TAKE 1 TABLET BY MOUTH EVERY DAY EVERY NIGHT     theophylline 450 MG extended release tablet  Commonly known as: THEODUR  TAKE 1/2 TABLET BY MOUTH TWICE A DAY     tiZANidine 2 MG tablet  Commonly known as: ZANAFLEX  Take 1 tablet by mouth 4 times daily as needed (back pain)     torsemide 20 MG tablet  Commonly known as: DEMADEX     warfarin 3 MG tablet  Commonly known as: Coumadin  Take as directed. If you are unsure how to take this medication, talk to your nurse or doctor. Original instructions: Take 1 tablet by mouth daily           * This list has 2 medication(s) that are the same as other medications prescribed for you.  Read the directions carefully, and ask your doctor or other care provider to review them with you. Where to Get Your Medications        These medications were sent to [address], [address] [facility] [address], [address]      Phone: [phone]   azithromycin 500 MG tablet  predniSONE 20 MG tablet        Objective Findings at Discharge:   BP (!) 140/77   Pulse 97   Temp 98 °F (36.7 °C) (Oral)   Resp 18   Ht 5' 5\" (1.651 m)   Wt 146 lb 6.2 oz (66.4 kg)   SpO2 98%   BMI 24.36 kg/m²       Physical Exam:   General: Afebrile, no distress on 2 L of O2  Eyes: EOMI  ENT: neck supple, no JVD  Cardiovascular: B5-X8 normal systolic murmur  Respiratory: Air entry good bilaterally no wheezing no crackles  Gastrointestinal: Soft, non tender bowel sounds normal  Genitourinary: no suprapubic tenderness  Musculoskeletal: No edema  Skin: warm, dry  Neuro: Alert. Oriented no focal deficit  Psych: Mood appropriate. Labs and Imaging   XR CHEST PORTABLE    Result Date: 1/24/2023  EXAMINATION: ONE XRAY VIEW OF THE CHEST 1/24/2023 6:55 pm COMPARISON: 01/05/2023 HISTORY: Acute chest pain. FINDINGS: Normal cardiomediastinal silhouette. Stable emphysema and fibrotic changes. Evidence of chronic granulomatous disease. Stable blunting of the right costophrenic angle likely due to scarring. No acute airspace disease, pleural effusion, or pneumothorax. No acute abnormality.        CBC:   Recent Labs     01/24/23  1905 01/25/23  0754 01/26/23  0826   WBC 8.1 7.2 11.5*   HGB 12.7 13.2 13.0    245 237     BMP:    Recent Labs     01/24/23  1905 01/26/23  0826    138   K 4.4 4.3   CL 98* 99   CO2 36* 31   BUN 17 19   CREATININE 0.7 0.6   GLUCOSE 104* 89     Hepatic:   Recent Labs     01/24/23 1905   AST 25   ALT 22   BILITOT 0.3   ALKPHOS 71     Lipids:   Lab Results   Component Value Date/Time    CHOL 209 10/24/2022 09:19 AM    HDL 86 10/24/2022 09:19 AM TRIG 126 10/24/2022 09:19 AM     Hemoglobin A1C: No results found for: LABA1C  TSH: No results found for: TSH  Troponin:   Lab Results   Component Value Date/Time    TROPONINT <0.010 01/24/2023 10:32 PM    TROPONINT <0.010 01/24/2023 07:05 PM    TROPONINT <0.010 01/05/2023 11:46 AM     Lactic Acid: No results for input(s): LACTA in the last 72 hours.   BNP:   Recent Labs     01/24/23  1905   PROBNP 319.3*     UA:  Lab Results   Component Value Date/Time    NITRU NEGATIVE 10/14/2022 11:50 AM    COLORU YELLOW 10/14/2022 11:50 AM    WBCUA 4 10/14/2022 11:50 AM    RBCUA 0 10/14/2022 11:50 AM    MUCUS RARE 12/18/2018 11:37 AM    TRICHOMONAS NONE SEEN 12/18/2018 11:37 AM    BACTERIA OCCASIONAL 10/14/2022 11:50 AM    CLARITYU CLEAR 10/14/2022 11:50 AM    SPECGRAV 1.020 10/14/2022 11:50 AM    LEUKOCYTESUR SMALL NUMBER OR AMOUNT OBSERVED 10/14/2022 11:50 AM    UROBILINOGEN 0.2 10/14/2022 11:50 AM    BILIRUBINUR NEGATIVE 10/14/2022 11:50 AM    BLOODU NEGATIVE 10/14/2022 11:50 AM    KETUA TRACE 10/14/2022 11:50 AM     Urine Cultures: No results found for: LABURIN  Blood Cultures: No results found for: BC  No results found for: BLOODCULT2  Organism: No results found for: ORG    Time Spent Discharging patient 33 minutes    Electronically signed by Radha Pollard MD on 1/26/2023 at 1:35 PM

## 2023-01-26 NOTE — PROGRESS NOTES
Outpatient Pharmacy Progress Note for Meds-to-Beds    Total number of Prescriptions Filled: 1  The following medications were dispensed to the patient during the discharge process:  Azithromycin    Additional Documentation:  Medication(s) were delivered to the patient's room prior to discharge      Thank you for letting us serve your patients.   [address]    [address], [address] [facility]    Phone: [phone]    Fax: [phone]

## 2023-01-26 NOTE — PLAN OF CARE
Problem: Discharge Planning  Goal: Discharge to home or other facility with appropriate resources  Outcome: Progressing  Flowsheets (Taken 1/25/2023 1316 by Dacia Man LPN)  Discharge to home or other facility with appropriate resources:   Identify barriers to discharge with patient and caregiver   Arrange for needed discharge resources and transportation as appropriate   Identify discharge learning needs (meds, wound care, etc)   Refer to discharge planning if patient needs post-hospital services based on physician order or complex needs related to functional status, cognitive ability or social support system     Problem: ABCDS Injury Assessment  Goal: Absence of physical injury  Outcome: Progressing  Flowsheets (Taken 1/25/2023 1323 by Dacia Man LPN)  Absence of Physical Injury: Implement safety measures based on patient assessment     Problem: Safety - Adult  Goal: Free from fall injury  Outcome: Progressing

## 2023-01-26 NOTE — PROGRESS NOTES
D/c instructions reviewed with pt and . All questions answered. IV removed. Prescriptions delivered by OP pharmacy. Pt taken to personal car via SHANELLE Wiseman.

## 2023-01-31 ENCOUNTER — OFFICE VISIT (OUTPATIENT)
Dept: FAMILY MEDICINE CLINIC | Age: 78
End: 2023-01-31
Payer: MEDICARE

## 2023-01-31 VITALS
BODY MASS INDEX: 23.69 KG/M2 | DIASTOLIC BLOOD PRESSURE: 88 MMHG | OXYGEN SATURATION: 97 % | SYSTOLIC BLOOD PRESSURE: 162 MMHG | HEIGHT: 65 IN | HEART RATE: 83 BPM | WEIGHT: 142.2 LBS

## 2023-01-31 DIAGNOSIS — J44.1 COPD EXACERBATION (HCC): ICD-10-CM

## 2023-01-31 DIAGNOSIS — Z09 HOSPITAL DISCHARGE FOLLOW-UP: Primary | ICD-10-CM

## 2023-01-31 DIAGNOSIS — Z86.718 HISTORY OF DVT OF LOWER EXTREMITY: Primary | ICD-10-CM

## 2023-01-31 PROCEDURE — 1090F PRES/ABSN URINE INCON ASSESS: CPT | Performed by: PHYSICIAN ASSISTANT

## 2023-01-31 PROCEDURE — 1123F ACP DISCUSS/DSCN MKR DOCD: CPT | Performed by: PHYSICIAN ASSISTANT

## 2023-01-31 PROCEDURE — 99214 OFFICE O/P EST MOD 30 MIN: CPT | Performed by: PHYSICIAN ASSISTANT

## 2023-01-31 PROCEDURE — G8427 DOCREV CUR MEDS BY ELIG CLIN: HCPCS | Performed by: PHYSICIAN ASSISTANT

## 2023-01-31 PROCEDURE — 1036F TOBACCO NON-USER: CPT | Performed by: PHYSICIAN ASSISTANT

## 2023-01-31 PROCEDURE — 1111F DSCHRG MED/CURRENT MED MERGE: CPT | Performed by: PHYSICIAN ASSISTANT

## 2023-01-31 PROCEDURE — 3077F SYST BP >= 140 MM HG: CPT | Performed by: PHYSICIAN ASSISTANT

## 2023-01-31 PROCEDURE — 3023F SPIROM DOC REV: CPT | Performed by: PHYSICIAN ASSISTANT

## 2023-01-31 PROCEDURE — 3079F DIAST BP 80-89 MM HG: CPT | Performed by: PHYSICIAN ASSISTANT

## 2023-01-31 PROCEDURE — G8400 PT W/DXA NO RESULTS DOC: HCPCS | Performed by: PHYSICIAN ASSISTANT

## 2023-01-31 PROCEDURE — G8420 CALC BMI NORM PARAMETERS: HCPCS | Performed by: PHYSICIAN ASSISTANT

## 2023-01-31 PROCEDURE — G8484 FLU IMMUNIZE NO ADMIN: HCPCS | Performed by: PHYSICIAN ASSISTANT

## 2023-01-31 RX ORDER — WARFARIN SODIUM 3 MG/1
3 TABLET ORAL DAILY
Qty: 45 TABLET | Refills: 1 | Status: SHIPPED | OUTPATIENT
Start: 2023-01-31

## 2023-01-31 RX ORDER — PREDNISONE 1 MG/1
5 TABLET ORAL DAILY
COMMUNITY

## 2023-01-31 RX ORDER — POTASSIUM CHLORIDE 750 MG/1
TABLET, EXTENDED RELEASE ORAL
Qty: 90 TABLET | Refills: 0 | Status: SHIPPED | OUTPATIENT
Start: 2023-01-31

## 2023-01-31 NOTE — PROGRESS NOTES
1/31/2023    Sangeetha David    Chief Complaint   Patient presents with    Follow-Up from [address] 1-23-23 thru 1-26-23 ,  dx COPD flare , [facility]       HPI  History was obtained from patient. Alma Calle is a 68 y.o. female who presents today for hospital follow-up. Patient had a 3-day admission. She was discharged home 5 days ago on 1/26/2023. In brief, patient was diagnosed with acute on chronic respiratory failure secondary to COPD exacerbation. Viral panel negative. Pulmonology consulted. Patient was discharged home in stable condition with Rx for Zithromax and prednisone 40 mg daily for 5 days and advised to follow-up with PCP and pulmonology. She sees Dr. Cruz Fernandez in 10 days. She has no complaints today. States she is feeling better and back to baseline. She would like a refill of potassium today. She states that she has been on this most of her life. She states that she alternates 10 M EQ and 20 M EQ each day. All of her COPD medications remain the same as they were prior to admission which includes oxygen 24 hours a day, long-acting theophylline, and 5 mg of prednisone daily.       REVIEW OF SYMPTOMS    Constitutional:  Denies fever, chills, weight loss or weakness  Eyes:  Denies photophobia or vision changes  ENT:  Denies cold symptoms  Cardiovascular:  Denies chest pain, palpitations or swelling  Respiratory: Admits chronic cough and shortness of breath, no baseline change  GI:  Denies abdominal pain, nausea, vomiting, or diarrhea  Skin:  No rashes  Neurologic:  Denies headache, focal weakness, or sensory changes  Lymphatic:  Denies swollen glands    PAST MEDICAL HISTORY  Past Medical History:   Diagnosis Date    Anticoagulant long-term use     Arthritis     Cancer ([facility] [address]     Chronic hypoxemic respiratory failure ([facility] [address] 2/6/2018    Community acquired pneumonia of right upper lobe of lung 3/19/2022    COPD (chronic obstructive pulmonary disease) (HCC)     Coronary atherosclerosis COVID-19 virus detected 2021    DVT (deep venous thrombosis) (HCC)     Former smoker 2021    Gastroesophageal reflux disease     Hiatal hernia     Lung infiltrate on CT 2019    On home oxygen therapy     on     Osteopenia     Phlebitis     Pulmonary nodule 2016    S/P left heart catheterization by percutaneous approach 2013    Sepsis due to pneumonia ([address] 2017    Shortness of breath 2019    Shortness of breath 2021    Shortness of breath 2021    Wears glasses        FAMILY HISTORY  Family History   Problem Relation Age of Onset    Heart Disease Mother     Cancer Father         lung ca    Heart Disease Father     COPD Father     Cancer Sister     Heart Disease Sister        SOCIAL HISTORY  Social History     Socioeconomic History    Marital status:    Tobacco Use    Smoking status: Former     Packs/day: 1.50     Years: 26.00     Pack years: 39.00     Types: Cigarettes     Start date:      Quit date: 1991     Years since quittin.2    Smokeless tobacco: Never   Vaping Use    Vaping Use: Never used   Substance and Sexual Activity    Alcohol use: No    Drug use: No    Sexual activity: Not Currently     Partners: Male        SURGICAL HISTORY  Past Surgical History:   Procedure Laterality Date    Zev Ulloa    COLONOSCOPY      ENDOSCOPY, COLON, DIAGNOSTIC      TONSILLECTOMY      TUBAL LIGATION      VEIN SURGERY         CURRENT MEDICATIONS  Current Outpatient Medications   Medication Sig Dispense Refill    predniSONE (DELTASONE) 5 MG tablet Take 5 mg by mouth daily      potassium chloride (KLOR-CON M) 10 MEQ extended release tablet Alternate 1 tablet and 2 tablets daily 90 tablet 0    gabapentin (NEURONTIN) 400 MG capsule Take 1 capsule by mouth every evening for 90 days.  90 capsule 0    celecoxib (CELEBREX) 200 MG capsule TAKE 1 CAPSULE BY MOUTH EVERY DAY 90 capsule 0    theophylline (THEODUR) 450 MG extended release tablet TAKE 1/2 TABLET BY MOUTH TWICE A DAY 90 tablet 1    warfarin (COUMADIN) 3 MG tablet Take 1 tablet by mouth daily 30 tablet 1    esomeprazole (NEXIUM) 40 MG delayed release capsule Take 1 capsule by mouth in the morning and at bedtime 180 capsule 1    albuterol (PROVENTIL) (2.5 MG/3ML) 0.083% nebulizer solution Take 3 mLs by nebulization every 4 hours as needed for Wheezing 120 each 1    montelukast (SINGULAIR) 10 MG tablet TAKE 1 TABLET BY MOUTH EVERY DAY EVERY NIGHT 90 tablet 0    Magnesium Oxide 500 MG TABS TAKE 1 TABLET BY MOUTH EVERY DAY 90 tablet 0    hydrOXYzine HCl (ATARAX) 25 MG tablet TAKE 1 TABLET BY MOUTH NIGHTLY 90 tablet 0    levothyroxine (SYNTHROID) 75 MCG tablet Take 1 tablet by mouth daily 90 tablet 1    risedronate (ACTONEL) 35 MG tablet TAKE 1 TABLET BY MOUTH EVERY 7 DAYS PATIENT TAKES ON SUNDAY 12 tablet 1    albuterol sulfate HFA (PROVENTIL HFA) 108 (90 Base) MCG/ACT inhaler Inhale 2 puffs into the lungs every 4 hours as needed for Wheezing or Shortness of Breath With spacer (and mask if indicated). Thanks. 18 g 1    Roflumilast (DALIRESP) 500 MCG tablet TAKE 1 TABLET BY MOUTH EVERY DAY 90 tablet 1    Potassium Chloride (KLOR-CON PO) Take by mouth See Admin Instructions 09/08/22 patient alternates days with 1-10 MEQ tablet then, 2-10 MEQ(20 MEQ) every other day.       rosuvastatin (CRESTOR) 20 MG tablet TAKE 1 TABLET BY MOUTH EVERY DAY EVERY NIGHT 90 tablet 1    Budeson-Glycopyrrol-Formoterol (BREZTRI AEROSPHERE) 160-9-4.8 MCG/ACT AERO Inhale 2 sprays into the lungs 2 times daily 1 each 11    torsemide (DEMADEX) 20 MG tablet Take 20 mg by mouth daily      ipratropium (ATROVENT HFA) 17 MCG/ACT inhaler Inhale 2 puffs into the lungs every 6-8 hours as needed      dilTIAZem (CARDIZEM) 60 MG tablet Take 1 tablet by mouth every 8 hours 120 tablet 3    nitroGLYCERIN (NITROSTAT) 0.4 MG SL tablet Place 1 tablet under the tongue every 5 minutes as needed for Chest pain 25 tablet 1 dicyclomine (BENTYL) 10 MG capsule Take 10 mg by mouth 2 times daily      Biotin 62131 MCG TABS Take 10,000 mcg by mouth daily      b complex vitamins capsule Take 1 capsule by mouth daily      Calcium Carb-Cholecalciferol (CALCIUM + D3) 600-200 MG-UNIT TABS Take 1 tablet by mouth 2 times daily      Multiple Vitamins-Minerals (MULTIVITAMIN PO) Take 1 tablet by mouth daily      OXYGEN Inhale 2 L/hr into the lungs continuous. tiZANidine (ZANAFLEX) 2 MG tablet Take 1 tablet by mouth 4 times daily as needed (back pain) (Patient not taking: Reported on 1/31/2023) 40 tablet 0    Dextromethorphan-guaiFENesin (MUCINEX DM)  MG TB12 Take 1 to 2 tablet by mouth every 12 hours (maximum: 4 tablets/24 hours). Drink plenty of water. 28 tablet 0     No current facility-administered medications for this visit. ALLERGIES  Allergies   Allergen Reactions    Codeine Swelling    Darvon [Propoxyphene Hcl] Swelling    Lamisil [Terbinafine Hcl]     Morphine Swelling    Neosporin [Neomycin-Polymyxin-Gramicidin]     Pcn [Penicillins] Swelling       PHYSICAL EXAM    BP (!) 162/88   Pulse 83   Ht 5' 5\" (1.651 m)   Wt 142 lb 3.2 oz (64.5 kg)   SpO2 97%   BMI 23.66 kg/m²     Constitutional:  Well developed, well nourished. Pleasant and cooperative. No acute distress. HENT:  Normocephalic, atraumatic, bilateral external ears normal, bilateral ear canals and TMs normal, oropharynx moist.  Nose normal.  Eyes:  conjunctiva normal, no discharge, no scleral icterus  Neck:  No tenderness, supple  Lymphatic:  No lymphadenopathy noted  Cardiovascular:  Normal heart rate, normal rhythm, no murmurs, gallops or rubs  Thorax & Lungs: Decreased breath sounds in all lung fields. No wheezing, rales, rhonchi  Skin: Chronic venous stasis rash BLE  Extremities: Mild edema bilateral feet only. Nontender. No pitting. No cyanosis. Pulses intact.     Neurologic:  Alert & oriented   Psychiatric:  Affect normal, mood normal    ASSESSMENT & Aditi Wade was seen today for follow-up from hospital.    Diagnoses and all orders for this visit:    Hospital discharge follow-up    COPD exacerbation ([address]    Other orders  -     potassium chloride (KLOR-CON M) 10 MEQ extended release tablet; Alternate 1 tablet and 2 tablets daily     Doing well, reports back to baseline state of health. No complaints. Patient to follow-up with pulmonology in 10 days as planned. ED for any sudden worsening. Medications Discontinued During This Encounter   Medication Reason    predniSONE (DELTASONE) 20 MG tablet Therapy completed        No follow-ups on file. Plan of care reviewed with patient who verbalizes understanding and wishes to continue. Please note that this chart was generated using dragon dictation software. Although every effort was made to ensure the accuracy of this automated transcription, some errors in transcription may have occurred.     Electronically signed by Antonio Palacios PA-C on 1/31/2023

## 2023-02-01 ENCOUNTER — NURSE ONLY (OUTPATIENT)
Dept: FAMILY MEDICINE CLINIC | Age: 78
End: 2023-02-01

## 2023-02-01 DIAGNOSIS — Z86.711 HISTORY OF PULMONARY EMBOLISM: ICD-10-CM

## 2023-02-01 DIAGNOSIS — Z79.01 ANTICOAGULANT LONG-TERM USE: ICD-10-CM

## 2023-02-01 DIAGNOSIS — Z86.718 HISTORY OF DVT OF LOWER EXTREMITY: ICD-10-CM

## 2023-02-01 LAB — INTERNATIONAL NORMALIZATION RATIO, POC: 2.8

## 2023-02-07 ENCOUNTER — OFFICE VISIT (OUTPATIENT)
Dept: FAMILY MEDICINE CLINIC | Age: 78
End: 2023-02-07
Payer: MEDICARE

## 2023-02-07 VITALS
OXYGEN SATURATION: 86 % | SYSTOLIC BLOOD PRESSURE: 150 MMHG | WEIGHT: 141.8 LBS | HEART RATE: 66 BPM | DIASTOLIC BLOOD PRESSURE: 60 MMHG | TEMPERATURE: 97.6 F | BODY MASS INDEX: 23.6 KG/M2

## 2023-02-07 DIAGNOSIS — K14.9 TONGUE IRRITATION: ICD-10-CM

## 2023-02-07 DIAGNOSIS — Z79.01 ANTICOAGULANT LONG-TERM USE: Primary | ICD-10-CM

## 2023-02-07 LAB
INTERNATIONAL NORMALIZATION RATIO, POC: 3.3
PROTHROMBIN TIME, POC: 39.8

## 2023-02-07 PROCEDURE — G8420 CALC BMI NORM PARAMETERS: HCPCS | Performed by: NURSE PRACTITIONER

## 2023-02-07 PROCEDURE — 3078F DIAST BP <80 MM HG: CPT | Performed by: NURSE PRACTITIONER

## 2023-02-07 PROCEDURE — 3077F SYST BP >= 140 MM HG: CPT | Performed by: NURSE PRACTITIONER

## 2023-02-07 PROCEDURE — 85610 PROTHROMBIN TIME: CPT | Performed by: NURSE PRACTITIONER

## 2023-02-07 PROCEDURE — 1123F ACP DISCUSS/DSCN MKR DOCD: CPT | Performed by: NURSE PRACTITIONER

## 2023-02-07 PROCEDURE — 1111F DSCHRG MED/CURRENT MED MERGE: CPT | Performed by: NURSE PRACTITIONER

## 2023-02-07 PROCEDURE — 99213 OFFICE O/P EST LOW 20 MIN: CPT | Performed by: NURSE PRACTITIONER

## 2023-02-07 PROCEDURE — G8427 DOCREV CUR MEDS BY ELIG CLIN: HCPCS | Performed by: NURSE PRACTITIONER

## 2023-02-07 PROCEDURE — G8400 PT W/DXA NO RESULTS DOC: HCPCS | Performed by: NURSE PRACTITIONER

## 2023-02-07 PROCEDURE — 1090F PRES/ABSN URINE INCON ASSESS: CPT | Performed by: NURSE PRACTITIONER

## 2023-02-07 PROCEDURE — G8484 FLU IMMUNIZE NO ADMIN: HCPCS | Performed by: NURSE PRACTITIONER

## 2023-02-07 PROCEDURE — 1036F TOBACCO NON-USER: CPT | Performed by: NURSE PRACTITIONER

## 2023-02-07 ASSESSMENT — ENCOUNTER SYMPTOMS
CHEST TIGHTNESS: 0
VOMITING: 0
WHEEZING: 0
SORE THROAT: 0
SINUS PRESSURE: 0
DIARRHEA: 0
RHINORRHEA: 0
SHORTNESS OF BREATH: 0
SINUS PAIN: 0
COUGH: 0
NAUSEA: 0

## 2023-02-07 NOTE — PROGRESS NOTES
Janessa Davidson   77 y.o.  female  [id number]      Chief Complaint   Patient presents with    Other     Thinks has open area on tongue        Subjective:  77 y.o.female is here for a follow up. She has the following chronic/acute medical problems:  Patient Active Problem List   Diagnosis    Phlebitis    COPD, very severe (HCC)    Essential hypertension    TIA (transient ischemic attack)    Vertigo, central    Anticoagulant long-term use    Pulmonary nodule    Arthritis    Coronary atherosclerosis    Leukocytosis    Chronic hypoxemic respiratory failure (HCC)    Lung infiltrate on CT    Acquired hypothyroidism    Pure hypercholesterolemia    History of DVT of lower extremity    History of pulmonary embolism    Gastroesophageal reflux disease    COVID-19 virus detected    Shortness of breath    Former smoker    Pneumonia due to influenza A virus    Acute respiratory failure with hypoxia and hypercapnia (HCC)    Mild pulmonary hypertension (HCC)    Acute exacerbation of chronic obstructive pulmonary disease (COPD) (HCC)    Community acquired pneumonia of right upper lobe of lung    COPD exacerbation (HCC)    Multifocal pneumonia    Chronic phlebitis of superficial vein of right lower extremity    Acute on chronic respiratory failure with hypercapnia (HCC)       Patient states on Sunday she was brushing her teeth and she spit out blood. Patient states then yesterday she bit in a chip and noticed blood on the chip. Patient states then she looked in her mouth and noticed an area on the tip of her tongue. Patient states she is wondering if her INR is elevated since she is on Coumadin.       Review of Systems   Constitutional:  Negative for appetite change, chills, fatigue and fever.   HENT:  Negative for congestion, ear pain, postnasal drip, rhinorrhea, sinus pressure, sinus pain, sneezing and sore throat.    Respiratory:  Negative for cough, chest tightness, shortness of breath and wheezing.    Cardiovascular:  Negative  for chest pain and palpitations. Gastrointestinal:  Negative for diarrhea, nausea and vomiting. Skin:  Negative for rash. Neurological:  Negative for dizziness, light-headedness and headaches. Current Outpatient Medications   Medication Sig Dispense Refill    predniSONE (DELTASONE) 5 MG tablet Take 5 mg by mouth daily      potassium chloride (KLOR-CON M) 10 MEQ extended release tablet Alternate 1 tablet and 2 tablets daily 90 tablet 0    warfarin (COUMADIN) 3 MG tablet Take 1 tablet by mouth daily 45 tablet 1    gabapentin (NEURONTIN) 400 MG capsule Take 1 capsule by mouth every evening for 90 days. 90 capsule 0    celecoxib (CELEBREX) 200 MG capsule TAKE 1 CAPSULE BY MOUTH EVERY DAY 90 capsule 0    theophylline (THEODUR) 450 MG extended release tablet TAKE 1/2 TABLET BY MOUTH TWICE A DAY 90 tablet 1    esomeprazole (NEXIUM) 40 MG delayed release capsule Take 1 capsule by mouth in the morning and at bedtime 180 capsule 1    albuterol (PROVENTIL) (2.5 MG/3ML) 0.083% nebulizer solution Take 3 mLs by nebulization every 4 hours as needed for Wheezing 120 each 1    montelukast (SINGULAIR) 10 MG tablet TAKE 1 TABLET BY MOUTH EVERY DAY EVERY NIGHT 90 tablet 0    Magnesium Oxide 500 MG TABS TAKE 1 TABLET BY MOUTH EVERY DAY 90 tablet 0    hydrOXYzine HCl (ATARAX) 25 MG tablet TAKE 1 TABLET BY MOUTH NIGHTLY 90 tablet 0    levothyroxine (SYNTHROID) 75 MCG tablet Take 1 tablet by mouth daily 90 tablet 1    risedronate (ACTONEL) 35 MG tablet TAKE 1 TABLET BY MOUTH EVERY 7 DAYS PATIENT TAKES ON SUNDAY 12 tablet 1    albuterol sulfate HFA (PROVENTIL HFA) 108 (90 Base) MCG/ACT inhaler Inhale 2 puffs into the lungs every 4 hours as needed for Wheezing or Shortness of Breath With spacer (and mask if indicated). Thanks.  18 g 1    Roflumilast (DALIRESP) 500 MCG tablet TAKE 1 TABLET BY MOUTH EVERY DAY 90 tablet 1    Potassium Chloride (KLOR-CON PO) Take by mouth See Admin Instructions 09/08/22 patient alternates days with 1-10 MEQ tablet then, 2-10 MEQ(20 MEQ) every other day. rosuvastatin (CRESTOR) 20 MG tablet TAKE 1 TABLET BY MOUTH EVERY DAY EVERY NIGHT 90 tablet 1    Budeson-Glycopyrrol-Formoterol (BREZTRI AEROSPHERE) 160-9-4.8 MCG/ACT AERO Inhale 2 sprays into the lungs 2 times daily 1 each 11    torsemide (DEMADEX) 20 MG tablet Take 20 mg by mouth daily      ipratropium (ATROVENT HFA) 17 MCG/ACT inhaler Inhale 2 puffs into the lungs every 6-8 hours as needed      dilTIAZem (CARDIZEM) 60 MG tablet Take 1 tablet by mouth every 8 hours 120 tablet 3    Dextromethorphan-guaiFENesin (MUCINEX DM)  MG TB12 Take 1 to 2 tablet by mouth every 12 hours (maximum: 4 tablets/24 hours). Drink plenty of water. 28 tablet 0    nitroGLYCERIN (NITROSTAT) 0.4 MG SL tablet Place 1 tablet under the tongue every 5 minutes as needed for Chest pain 25 tablet 1    dicyclomine (BENTYL) 10 MG capsule Take 10 mg by mouth 2 times daily      Biotin 36631 MCG TABS Take 10,000 mcg by mouth daily      b complex vitamins capsule Take 1 capsule by mouth daily      Calcium Carb-Cholecalciferol (CALCIUM + D3) 600-200 MG-UNIT TABS Take 1 tablet by mouth 2 times daily      Multiple Vitamins-Minerals (MULTIVITAMIN PO) Take 1 tablet by mouth daily      OXYGEN Inhale 2 L/hr into the lungs continuous. No current facility-administered medications for this visit. Past medical, family,surgical history reviewed today. Objective:  BP (!) 150/60   Pulse 66   Temp 97.6 °F (36.4 °C) (Oral)   Wt 141 lb 12.8 oz (64.3 kg)   SpO2 (!) 86% Comment: Pt is on 2L of O2  BMI 23.60 kg/m²   BP Readings from Last 3 Encounters:   02/07/23 (!) 150/60   01/31/23 (!) 162/88   01/26/23 (!) 140/77     Wt Readings from Last 3 Encounters:   02/07/23 141 lb 12.8 oz (64.3 kg)   01/31/23 142 lb 3.2 oz (64.5 kg)   01/26/23 146 lb 6.2 oz (66.4 kg)         Physical Exam  Constitutional:       Appearance: Normal appearance. HENT:      Head: Normocephalic.       Mouth/Throat: Comments: No abnormal areas noted on the tongue. No bleeding noted in mouth. Cardiovascular:      Rate and Rhythm: Normal rate and regular rhythm. Heart sounds: Normal heart sounds. Pulmonary:      Effort: Pulmonary effort is normal.      Breath sounds: Normal breath sounds. Musculoskeletal:      Cervical back: Neck supple. Skin:     General: Skin is warm and dry. Neurological:      Mental Status: She is alert and oriented to person, place, and time. Psychiatric:         Mood and Affect: Mood normal.         Behavior: Behavior normal.       Lab Results   Component Value Date    WBC 11.5 (H) 01/26/2023    HGB 13.0 01/26/2023    HCT 40.3 01/26/2023    MCV 95.5 01/26/2023     01/26/2023     Lab Results   Component Value Date     01/26/2023    K 4.3 01/26/2023    CL 99 01/26/2023    CO2 31 01/26/2023    BUN 19 01/26/2023    CREATININE 0.6 01/26/2023    GLUCOSE 89 01/26/2023    CALCIUM 9.8 01/26/2023    PROT 6.1 (L) 01/24/2023    LABALBU 3.6 01/24/2023    BILITOT 0.3 01/24/2023    ALKPHOS 71 01/24/2023    AST 25 01/24/2023    ALT 22 01/24/2023    LABGLOM >60 01/26/2023    GFRAA >60 10/14/2022    AGRATIO 2.7 (H) 12/01/2021    GLOB 1.8 06/04/2020     Lab Results   Component Value Date    CHOL 209 (H) 10/24/2022    CHOL 175 12/01/2021    CHOL 190 06/04/2020     Lab Results   Component Value Date    TRIG 126 10/24/2022    TRIG 87 12/01/2021    TRIG 121 06/04/2020     Lab Results   Component Value Date    HDL 86 (H) 10/24/2022    HDL 77 (H) 12/01/2021    HDL 86 (H) 06/04/2020     Lab Results   Component Value Date    LDLCALC 98 10/24/2022    LDLCALC 81 12/01/2021    LDLCALC 80 06/04/2020     No results found for: LABA1C  Lab Results   Component Value Date    TSHFT4 10.06 (H) 10/24/2022    TSHHS 1.030 12/31/2021         ASSESSMENT/PLAN:    1. Anticoagulant long-term use  POC INR 3.3. Advised to skip 3 mg dose of coumadin tonight then resume normal dosing.  Follow up with PCP in 1 week for POC INR check.   - POCT INR    2. Tongue irritation  Explained may have had a little irritation from brushing teeth. Discussed no abnormalities, lesions, or bleeding of the tongue noted. Follow up with PCP. The patients records were reviewed and discussed. Time was given for questions . All questions were answered to the patients satisfaction. A total time of 25 was spent with at least 50% related to counseling and coordination of care. No orders of the defined types were placed in this encounter. Medications Discontinued During This Encounter   Medication Reason    tiZANidine (ZANAFLEX) 2 MG tablet Therapy completed             Care discussed with patient. Questions answered. Patient verbalizes understanding and agrees with plan. After visit summary provided. Advised to call for any problems, questions, or concerns. Return if symptoms worsen or fail to improve.                                              Signed:  IMANI Pal CNP  02/07/23  12:40 PM

## 2023-02-10 ENCOUNTER — OFFICE VISIT (OUTPATIENT)
Dept: PULMONOLOGY | Age: 78
End: 2023-02-10

## 2023-02-10 VITALS
SYSTOLIC BLOOD PRESSURE: 128 MMHG | DIASTOLIC BLOOD PRESSURE: 60 MMHG | OXYGEN SATURATION: 94 % | HEART RATE: 50 BPM | HEIGHT: 65 IN | BODY MASS INDEX: 23.82 KG/M2 | WEIGHT: 143 LBS | RESPIRATION RATE: 16 BRPM

## 2023-02-10 DIAGNOSIS — J44.9 COPD, VERY SEVERE (HCC): Primary | Chronic | ICD-10-CM

## 2023-02-10 DIAGNOSIS — R91.1 PULMONARY NODULE: Chronic | ICD-10-CM

## 2023-02-10 DIAGNOSIS — I27.20 MILD PULMONARY HYPERTENSION (HCC): ICD-10-CM

## 2023-02-10 DIAGNOSIS — R06.02 SHORTNESS OF BREATH: ICD-10-CM

## 2023-02-10 DIAGNOSIS — Z87.891 FORMER SMOKER: ICD-10-CM

## 2023-02-10 DIAGNOSIS — J96.11 CHRONIC HYPOXEMIC RESPIRATORY FAILURE (HCC): Chronic | ICD-10-CM

## 2023-02-10 RX ORDER — PREDNISONE 1 MG/1
5 TABLET ORAL DAILY
Qty: 90 TABLET | Refills: 3 | Status: SHIPPED | OUTPATIENT
Start: 2023-02-10

## 2023-02-10 NOTE — PROGRESS NOTES
SUBJECTIVE:  Chief Complaint: Very severe/stage IV COPD, chronic hypoxemic respiratory failure, pulmonary nodule, mild pulm hypertension, former smoker  Reji Lim was recently hospitalized at [facility] following an episode of acute hypoxemic respiratory failure and exacerbation of COPD. She was hospitalized for several days and then discharged home on azithromycin and a tapering course of oral prednisone. She is feeling fairly well now other than being tired. She also is concerned about her blood pressure being elevated. She continues on Breztri, long-acting theophylline, low-dose prednisone at 5 mg daily and she has albuterol MDI or albuterol solution per nebulizer as needed. She also continues on oxygen at 2 L/min 24 hours a day      ROS:  Constitution:  HEENT: Negative for ear, throat pain  Cardiovascular: Negative for chest pain, syncope, edema  Pulmonary: See HPI  Musculoskeletal: Negative for DVT, myalgias, arthralgias    OBJECTIVE:  /60   Pulse 50   Resp 16   Ht 5' 5\" (1.651 m)   Wt 143 lb (64.9 kg)   SpO2 94%   BMI 23.80 kg/m²      Physical Exam:  Constitutional:  She appears well developed and well-nourished. Wearing nasal oxygen and always in minimal respiratory distress at rest and she does use accessory muscles of respiration  Neck:  Supple, No palpable lymphadenopathy, No JVD  Cardiovascular:  S1, S2 Normal, Regular rhythm, no murmurs or gallops, No pericardial  rubs. Pulmonary: Diminished breath sounds throughout all lung fields without wheezing or rhonchi  Abdomen: Not examined  Extremities: no edema, No DVT  Neurologic: Oriented x3, No focal deficits    Radiology: Chest x-ray on 1/24/2023 showed no acute abnormality  PFT: Office spirometry on 7/21/2021 demonstrated a very severe/stage IV obstructive lung defect with no significant response to bronchodilators      Echocardiogram: Limited echo on 1/25/2023   This is a limited echo.    Left ventricular systolic function is normal.   Ejection fraction is visually estimated at 55%. Mitral annular calcification is present. No evidence of any pericardial effusion. No mention of RVSP  ASSESSMENT:    1. COPD, very severe ([address]    2. Chronic hypoxemic respiratory failure (HCC)    3. Pulmonary nodule    4. Shortness of breath    5. Mild pulmonary hypertension ([address]    6. Former smoker          PLAN:  I will make no change in her current therapy. I did renew her daily oral prednisone. [facility] will continue to follow her    We have discussed the need to maintain yearly flu immunization, pneumococcal vaccination. We have discussed Coronavirus precaution including handwashing practice, wiping items touched in public such as gas pumps, door handles, shopping carts, etc. Self monitoring for infection - fever, chills, cough, SOB. Should they develop symptoms they should call office for further instructions. Return in about 2 months (around 4/24/2023) for Recheck for COPD, Recheck for Shortness of Breath, chronic hypoxemic respiratory failure. This dictation was performed with a verbal recognition program and it was checked for errors. It is possible that there are still dictated errors within this office note. Any errors should be brought immediately to my attention for correction. All efforts were made to ensure that this office note is accurate.

## 2023-02-14 ENCOUNTER — NURSE ONLY (OUTPATIENT)
Dept: FAMILY MEDICINE CLINIC | Age: 78
End: 2023-02-14

## 2023-02-14 ENCOUNTER — ANTI-COAG VISIT (OUTPATIENT)
Dept: FAMILY MEDICINE CLINIC | Age: 78
End: 2023-02-14

## 2023-02-14 DIAGNOSIS — Z86.711 HISTORY OF PULMONARY EMBOLISM: ICD-10-CM

## 2023-02-14 DIAGNOSIS — Z86.718 HISTORY OF DVT OF LOWER EXTREMITY: ICD-10-CM

## 2023-02-14 DIAGNOSIS — Z79.01 ANTICOAGULANT LONG-TERM USE: ICD-10-CM

## 2023-02-14 DIAGNOSIS — Z79.01 ANTICOAGULANT LONG-TERM USE: Primary | ICD-10-CM

## 2023-02-14 LAB
INTERNATIONAL NORMALIZATION RATIO, POC: 2.5
PROTHROMBIN TIME, POC: 30.1

## 2023-02-14 PROCEDURE — 99999 PR OFFICE/OUTPT VISIT,PROCEDURE ONLY: CPT | Performed by: STUDENT IN AN ORGANIZED HEALTH CARE EDUCATION/TRAINING PROGRAM

## 2023-02-14 NOTE — PROGRESS NOTES
Confirmed current coumadin 6/3/3 mg.  INR 2.8 , pt to take 6 mg, 3 mg, 3 mg alternating , recheck in 4 weeks

## 2023-03-01 SDOH — ECONOMIC STABILITY: INCOME INSECURITY: HOW HARD IS IT FOR YOU TO PAY FOR THE VERY BASICS LIKE FOOD, HOUSING, MEDICAL CARE, AND HEATING?: NOT HARD AT ALL

## 2023-03-01 SDOH — ECONOMIC STABILITY: FOOD INSECURITY: WITHIN THE PAST 12 MONTHS, THE FOOD YOU BOUGHT JUST DIDN'T LAST AND YOU DIDN'T HAVE MONEY TO GET MORE.: NEVER TRUE

## 2023-03-01 SDOH — ECONOMIC STABILITY: TRANSPORTATION INSECURITY
IN THE PAST 12 MONTHS, HAS LACK OF TRANSPORTATION KEPT YOU FROM MEETINGS, WORK, OR FROM GETTING THINGS NEEDED FOR DAILY LIVING?: NO

## 2023-03-01 SDOH — ECONOMIC STABILITY: FOOD INSECURITY: WITHIN THE PAST 12 MONTHS, YOU WORRIED THAT YOUR FOOD WOULD RUN OUT BEFORE YOU GOT MONEY TO BUY MORE.: NEVER TRUE

## 2023-03-01 SDOH — ECONOMIC STABILITY: HOUSING INSECURITY
IN THE LAST 12 MONTHS, WAS THERE A TIME WHEN YOU DID NOT HAVE A STEADY PLACE TO SLEEP OR SLEPT IN A SHELTER (INCLUDING NOW)?: NO

## 2023-03-03 ENCOUNTER — TELEPHONE (OUTPATIENT)
Dept: FAMILY MEDICINE CLINIC | Age: 78
End: 2023-03-03

## 2023-03-03 ENCOUNTER — OFFICE VISIT (OUTPATIENT)
Dept: FAMILY MEDICINE CLINIC | Age: 78
End: 2023-03-03

## 2023-03-03 VITALS
SYSTOLIC BLOOD PRESSURE: 119 MMHG | HEIGHT: 65 IN | HEART RATE: 92 BPM | DIASTOLIC BLOOD PRESSURE: 71 MMHG | WEIGHT: 142 LBS | RESPIRATION RATE: 20 BRPM | BODY MASS INDEX: 23.66 KG/M2 | OXYGEN SATURATION: 92 %

## 2023-03-03 DIAGNOSIS — M81.0 AGE-RELATED OSTEOPOROSIS WITHOUT CURRENT PATHOLOGICAL FRACTURE: ICD-10-CM

## 2023-03-03 DIAGNOSIS — M19.90 ARTHRITIS: ICD-10-CM

## 2023-03-03 DIAGNOSIS — E03.9 ACQUIRED HYPOTHYROIDISM: ICD-10-CM

## 2023-03-03 DIAGNOSIS — Z86.718 HISTORY OF DVT OF LOWER EXTREMITY: ICD-10-CM

## 2023-03-03 DIAGNOSIS — F41.9 ANXIETY: ICD-10-CM

## 2023-03-03 DIAGNOSIS — E87.6 HYPOKALEMIA: ICD-10-CM

## 2023-03-03 DIAGNOSIS — E78.00 PURE HYPERCHOLESTEROLEMIA: ICD-10-CM

## 2023-03-03 DIAGNOSIS — J44.9 COPD, VERY SEVERE (HCC): Primary | ICD-10-CM

## 2023-03-03 DIAGNOSIS — I10 ESSENTIAL HYPERTENSION: ICD-10-CM

## 2023-03-03 LAB — INTERNATIONAL NORMALIZATION RATIO, POC: 2.5

## 2023-03-03 RX ORDER — BACLOFEN 20 MG
TABLET ORAL
Qty: 90 TABLET | Refills: 0 | Status: SHIPPED | OUTPATIENT
Start: 2023-03-03

## 2023-03-03 RX ORDER — RISEDRONATE SODIUM 35 MG/1
TABLET, FILM COATED ORAL
Qty: 12 TABLET | Refills: 1 | Status: SHIPPED | OUTPATIENT
Start: 2023-03-03

## 2023-03-03 RX ORDER — MONTELUKAST SODIUM 10 MG/1
TABLET ORAL
Qty: 90 TABLET | Refills: 0 | Status: SHIPPED | OUTPATIENT
Start: 2023-03-03

## 2023-03-03 RX ORDER — LEVOTHYROXINE SODIUM 0.07 MG/1
75 TABLET ORAL DAILY
Qty: 90 TABLET | Refills: 1 | Status: SHIPPED | OUTPATIENT
Start: 2023-03-03

## 2023-03-03 RX ORDER — THEOPHYLLINE 450 MG/1
TABLET, EXTENDED RELEASE ORAL
Qty: 90 TABLET | Refills: 1 | Status: SHIPPED | OUTPATIENT
Start: 2023-03-03

## 2023-03-03 RX ORDER — HYDROXYZINE HYDROCHLORIDE 25 MG/1
TABLET, FILM COATED ORAL
Qty: 90 TABLET | Refills: 0 | Status: SHIPPED | OUTPATIENT
Start: 2023-03-03

## 2023-03-03 RX ORDER — ROFLUMILAST 500 UG/1
TABLET ORAL
Qty: 90 TABLET | Refills: 1 | Status: SHIPPED | OUTPATIENT
Start: 2023-03-03

## 2023-03-03 RX ORDER — POTASSIUM CHLORIDE 750 MG/1
TABLET, EXTENDED RELEASE ORAL
Qty: 145 TABLET | Refills: 1 | Status: SHIPPED | OUTPATIENT
Start: 2023-03-03

## 2023-03-03 RX ORDER — WARFARIN SODIUM 3 MG/1
TABLET ORAL
Qty: 180 TABLET | Refills: 1 | Status: SHIPPED | OUTPATIENT
Start: 2023-03-03 | End: 2023-09-01

## 2023-03-03 RX ORDER — ROSUVASTATIN CALCIUM 20 MG/1
TABLET, COATED ORAL
Qty: 90 TABLET | Refills: 1 | Status: SHIPPED | OUTPATIENT
Start: 2023-03-03

## 2023-03-03 RX ORDER — GABAPENTIN 400 MG/1
400 CAPSULE ORAL EVERY EVENING
Qty: 90 CAPSULE | Refills: 0 | Status: SHIPPED | OUTPATIENT
Start: 2023-03-03 | End: 2023-06-01

## 2023-03-03 RX ORDER — ALBUTEROL SULFATE 2.5 MG/3ML
2.5 SOLUTION RESPIRATORY (INHALATION) EVERY 4 HOURS PRN
Qty: 540 EACH | Refills: 1 | Status: SHIPPED | OUTPATIENT
Start: 2023-03-03 | End: 2023-04-02

## 2023-03-03 NOTE — PROGRESS NOTES
3/12/2023    Monica Trujillo    Chief Complaint   Patient presents with    Follow-up     Presenting for routine office visit. Needs 6mg Coumadin. I did t see on list.       HPI  History was obtained from pateint. Jacqueline Humphreys is a 66 y.o. female with a PMHx as listed below who presents today for     Patient having issues walking long distances, she is interested in wheelchair   Recent discharge COPD exacerbation stage IV emphysema    1. COPD, very severe ([address]    2. Arthritis    3. Hypokalemia    4. Pure hypercholesterolemia    5. Anxiety    6. Acquired hypothyroidism    7. Age-related osteoporosis without current pathological fracture    8. History of DVT of lower extremity    9. Essential hypertension             REVIEW OF SYMPTOMS    Review of Systems   Constitutional:  Negative for chills and fatigue. HENT:  Negative for congestion and sore throat. Respiratory:  Negative for shortness of breath and wheezing. Cardiovascular:  Negative for chest pain and palpitations. Gastrointestinal:  Negative for abdominal pain and nausea. Genitourinary:  Negative for frequency and urgency. Neurological:  Negative for light-headedness.      PAST MEDICAL HISTORY  Past Medical History:   Diagnosis Date    Anticoagulant long-term use     Arthritis     Cancer ([address]     Chronic hypoxemic respiratory failure ([address] 2/6/2018    Community acquired pneumonia of right upper lobe of lung 3/19/2022    COPD (chronic obstructive pulmonary disease) ([address]     Coronary atherosclerosis     COVID-19 virus detected 1/18/2021    DVT (deep venous thrombosis) ([address]     Former smoker 11/22/2021    Gastroesophageal reflux disease     Hiatal hernia     Lung infiltrate on CT 1/22/2019    On home oxygen therapy     on 24/7    Osteopenia     Phlebitis     Pulmonary nodule 7/5/2016    S/P left heart catheterization by percutaneous approach 6/27/2013    Sepsis due to pneumonia ([address] 11/14/2017    Shortness of breath 9/23/2019    Shortness of breath 2021    Shortness of breath 2021    Wears glasses        FAMILY HISTORY  Family History   Problem Relation Age of Onset    Heart Disease Mother     Cancer Father         lung ca    Heart Disease Father     COPD Father     Cancer Sister     Heart Disease Sister        SOCIAL HISTORY  Social History     Socioeconomic History    Marital status:      Spouse name: None    Number of children: None    Years of education: None    Highest education level: None   Tobacco Use    Smoking status: Former     Packs/day: 1.50     Years: 26.00     Pack years: 39.00     Types: Cigarettes     Start date:      Quit date: 1991     Years since quittin.3    Smokeless tobacco: Never   Vaping Use    Vaping Use: Never used   Substance and Sexual Activity    Alcohol use: No    Drug use: No    Sexual activity: Not Currently     Partners: Male        SURGICAL HISTORY  Past Surgical History:   Procedure Laterality Date    Monty Chavarria    COLONOSCOPY      ENDOSCOPY, COLON, DIAGNOSTIC      TONSILLECTOMY      TUBAL LIGATION      VEIN SURGERY                   CURRENT MEDICATIONS  Current Outpatient Medications   Medication Sig Dispense Refill    albuterol (PROVENTIL) (2.5 MG/3ML) 0.083% nebulizer solution Take 3 mLs by nebulization every 4 hours as needed for Wheezing 540 each 1    potassium chloride (KLOR-CON M) 10 MEQ extended release tablet Alternate 1 tablet and 2 tablets daily 145 tablet 1    gabapentin (NEURONTIN) 400 MG capsule Take 1 capsule by mouth every evening for 90 days.  90 capsule 0    hydrOXYzine HCl (ATARAX) 25 MG tablet TAKE 1 TABLET BY MOUTH NIGHTLY 90 tablet 0    levothyroxine (SYNTHROID) 75 MCG tablet Take 1 tablet by mouth daily 90 tablet 1    Magnesium Oxide 500 MG TABS TAKE 1 TABLET BY MOUTH EVERY DAY 90 tablet 0    montelukast (SINGULAIR) 10 MG tablet TAKE 1 TABLET BY MOUTH EVERY DAY EVERY NIGHT 90 tablet 0    risedronate (ACTONEL) 35 MG tablet TAKE 1 TABLET BY MOUTH EVERY 7 DAYS PATIENT TAKES ON SUNDAY 12 tablet 1    Roflumilast (DALIRESP) 500 MCG tablet TAKE 1 TABLET BY MOUTH EVERY DAY 90 tablet 1    rosuvastatin (CRESTOR) 20 MG tablet TAKE 1 TABLET BY MOUTH EVERY DAY EVERY NIGHT 90 tablet 1    theophylline (THEODUR) 450 MG extended release tablet TAKE 1/2 TABLET BY MOUTH TWICE A DAY 90 tablet 1    warfarin (COUMADIN) 3 MG tablet Take 1 tablet by mouth every M, Wed, Th, Sa, Sun Take 2 tablets by mouth every Tues, Friday 180 tablet 1    albuterol sulfate HFA (PROVENTIL;VENTOLIN;PROAIR) 108 (90 Base) MCG/ACT inhaler INHALE 2 PUFFS BY MOUTH FOUR TIMES A DAY AS NEEDED 18 g 1    predniSONE (DELTASONE) 5 MG tablet Take 1 tablet by mouth daily 90 tablet 3    esomeprazole (NEXIUM) 40 MG delayed release capsule Take 1 capsule by mouth in the morning and at bedtime 180 capsule 1    Potassium Chloride (KLOR-CON PO) Take by mouth See Admin Instructions 09/08/22 patient alternates days with 1-10 MEQ tablet then, 2-10 MEQ(20 MEQ) every other day. Budeson-Glycopyrrol-Formoterol (BREZTRI AEROSPHERE) 160-9-4.8 MCG/ACT AERO Inhale 2 sprays into the lungs 2 times daily 1 each 11    torsemide (DEMADEX) 20 MG tablet Take 20 mg by mouth daily      ipratropium (ATROVENT HFA) 17 MCG/ACT inhaler Inhale 2 puffs into the lungs every 6-8 hours as needed      dilTIAZem (CARDIZEM) 60 MG tablet Take 1 tablet by mouth every 8 hours 120 tablet 3    Dextromethorphan-guaiFENesin (MUCINEX DM)  MG TB12 Take 1 to 2 tablet by mouth every 12 hours (maximum: 4 tablets/24 hours). Drink plenty of water.  28 tablet 0    nitroGLYCERIN (NITROSTAT) 0.4 MG SL tablet Place 1 tablet under the tongue every 5 minutes as needed for Chest pain 25 tablet 1    dicyclomine (BENTYL) 10 MG capsule Take 10 mg by mouth 2 times daily      Biotin 45712 MCG TABS Take 10,000 mcg by mouth daily      b complex vitamins capsule Take 1 capsule by mouth daily      Calcium Carb-Cholecalciferol (CALCIUM + D3) 600-200 MG-UNIT TABS Take 1 tablet by mouth 2 times daily      Multiple Vitamins-Minerals (MULTIVITAMIN PO) Take 1 tablet by mouth daily      OXYGEN Inhale 2 L/hr into the lungs continuous. No current facility-administered medications for this visit. ALLERGIES  Allergies   Allergen Reactions    Codeine Swelling    Darvon [Propoxyphene Hcl] Swelling    Lamisil [Terbinafine Hcl]     Morphine Swelling    Neosporin [Neomycin-Polymyxin-Gramicidin]     Pcn [Penicillins] Swelling       PHYSICAL EXAM    /71   Pulse 92   Resp 20   Ht 5' 5\" (1.651 m)   Wt 142 lb (64.4 kg)   SpO2 92% Comment: on 2L NCO2  BMI 23.63 kg/m²     Physical Exam  Constitutional:       Appearance: Normal appearance. HENT:      Head: Normocephalic and atraumatic. Eyes:      Extraocular Movements: Extraocular movements intact. Pupils: Pupils are equal, round, and reactive to light. Cardiovascular:      Rate and Rhythm: Normal rate and regular rhythm. Pulses: Normal pulses. Heart sounds: No murmur heard. No friction rub. No gallop. Skin:     General: Skin is warm and dry. Neurological:      General: No focal deficit present. Mental Status: She is alert. Psychiatric:         Mood and Affect: Mood normal.         Behavior: Behavior normal.       ASSESSMENT & PLAN    1. COPD, very severe (HCC)    - albuterol (PROVENTIL) (2.5 MG/3ML) 0.083% nebulizer solution; Take 3 mLs by nebulization every 4 hours as needed for Wheezing  Dispense: 540 each; Refill: 1  - montelukast (SINGULAIR) 10 MG tablet; TAKE 1 TABLET BY MOUTH EVERY DAY EVERY NIGHT  Dispense: 90 tablet; Refill: 0  - Roflumilast (DALIRESP) 500 MCG tablet; TAKE 1 TABLET BY MOUTH EVERY DAY  Dispense: 90 tablet; Refill: 1  - theophylline (THEODUR) 450 MG extended release tablet; TAKE 1/2 TABLET BY MOUTH TWICE A DAY  Dispense: 90 tablet; Refill: 1  - DME Order for Wheel Chair as OP    2. Arthritis    - gabapentin (NEURONTIN) 400 MG capsule;  Take 1 capsule by mouth every evening for 90 days. Dispense: 90 capsule; Refill: 0  - DME Order for Wheel Chair as OP    3. Hypokalemia    - potassium chloride (KLOR-CON M) 10 MEQ extended release tablet; Alternate 1 tablet and 2 tablets daily  Dispense: 145 tablet; Refill: 1  - Magnesium Oxide 500 MG TABS; TAKE 1 TABLET BY MOUTH EVERY DAY  Dispense: 90 tablet; Refill: 0    4. Pure hypercholesterolemia    - rosuvastatin (CRESTOR) 20 MG tablet; TAKE 1 TABLET BY MOUTH EVERY DAY EVERY NIGHT  Dispense: 90 tablet; Refill: 1    5. Anxiety    - hydrOXYzine HCl (ATARAX) 25 MG tablet; TAKE 1 TABLET BY MOUTH NIGHTLY  Dispense: 90 tablet; Refill: 0    6. Acquired hypothyroidism    - levothyroxine (SYNTHROID) 75 MCG tablet; Take 1 tablet by mouth daily  Dispense: 90 tablet; Refill: 1    7. Age-related osteoporosis without current pathological fracture    - risedronate (ACTONEL) 35 MG tablet; TAKE 1 TABLET BY MOUTH EVERY 7 DAYS PATIENT TAKES ON SUNDAY  Dispense: 12 tablet; Refill: 1  - DEXA BONE DENSITY AXIAL SKELETON; Future    8. History of DVT of lower extremity    - warfarin (COUMADIN) 3 MG tablet; Take 1 tablet by mouth every M, Wed, Th, Sa, Sun Take 2 tablets by mouth every Tues, Friday  Dispense: 180 tablet; Refill: 1    Stop celebrex  F/u DEXA long hx of bisphosphonate use  INR therapeutic  Severe COPD we will order wheel chair PRN use  Hx. Polyps due for coonoscopy however recommend discuss with pulmonology   Anxiety stable  Due for repeat TSH    Steffen Roach requires the assistance of a standard wheelchair to successfully complete daily living tasks such as: toileting, bathing, dressing, and grooming; or any other daily living task in the home. A standard wheelchair is necessary due to the patient's impaired ambulation and mobility restrictions. The patient would not be able to resolve these daily living tasks using a cane or walker.   The patient can self-propel a standard wheelchair safely in their home and can maneuver within their home with adequate access. The patient has not expressed an unwillingness to use the wheelchair. Electronically signed by Pilar Austin DO on 3/12/2023 at 10:20 AM   Return in about 3 months (around 6/3/2023).          Electronically signed by Pilar Austin DO on 3/12/2023

## 2023-03-03 NOTE — TELEPHONE ENCOUNTER
----- Message from Chris Elsijayy sent at 3/3/2023  1:36 PM EST -----  Subject: Message to Provider    QUESTIONS  Information for Provider? Pt called in to scheduled lab work. Pt needs to   scheduled for her upcoming appt she has in 3 weeks. Please call her back   to get this taken care of please.  ---------------------------------------------------------------------------  --------------  [id number] [employer]  [phone]; OK to leave message on voicemail  ---------------------------------------------------------------------------  --------------  SCRIPT ANSWERS  Relationship to Patient?  Self

## 2023-03-03 NOTE — TELEPHONE ENCOUNTER
Spoke to patient per Dr. Kamille Pablo verbal. Patient can get blood work done a few days ahead of her June appointment. She voiced understanding. Patient/Caregiver provided printed discharge information.

## 2023-03-04 DIAGNOSIS — J44.9 CHRONIC OBSTRUCTIVE PULMONARY DISEASE, UNSPECIFIED (HCC): ICD-10-CM

## 2023-03-06 RX ORDER — ALBUTEROL SULFATE 90 UG/1
AEROSOL, METERED RESPIRATORY (INHALATION)
Qty: 18 G | Refills: 1 | Status: SHIPPED | OUTPATIENT
Start: 2023-03-06

## 2023-03-10 DIAGNOSIS — E87.6 HYPOKALEMIA: ICD-10-CM

## 2023-03-10 RX ORDER — POTASSIUM CHLORIDE 750 MG/1
TABLET, EXTENDED RELEASE ORAL
Qty: 90 TABLET | OUTPATIENT
Start: 2023-03-10

## 2023-03-12 ASSESSMENT — ENCOUNTER SYMPTOMS
ABDOMINAL PAIN: 0
SORE THROAT: 0
NAUSEA: 0
SHORTNESS OF BREATH: 0
WHEEZING: 0

## 2023-03-14 ENCOUNTER — HOSPITAL ENCOUNTER (OUTPATIENT)
Dept: WOMENS IMAGING | Age: 78
Discharge: HOME | End: 2023-03-14
Payer: MEDICARE

## 2023-03-14 DIAGNOSIS — M81.0 AGE-RELATED OSTEOPOROSIS WITHOUT CURRENT PATHOLOGICAL FRACTURE: ICD-10-CM

## 2023-03-14 PROCEDURE — 77080 DXA BONE DENSITY AXIAL: CPT

## 2023-03-20 DIAGNOSIS — J44.9 COPD, VERY SEVERE (HCC): Primary | ICD-10-CM

## 2023-03-20 RX ORDER — LEVALBUTEROL INHALATION SOLUTION 1.25 MG/3ML
1 SOLUTION RESPIRATORY (INHALATION) EVERY 4 HOURS PRN
Qty: 12 ML | Refills: 2 | Status: SHIPPED | OUTPATIENT
Start: 2023-03-20 | End: 2023-06-18

## 2023-03-21 DIAGNOSIS — M81.0 AGE-RELATED OSTEOPOROSIS WITHOUT CURRENT PATHOLOGICAL FRACTURE: Primary | ICD-10-CM

## 2023-03-21 DIAGNOSIS — E87.6 HYPOKALEMIA: ICD-10-CM

## 2023-03-21 DIAGNOSIS — I10 ESSENTIAL HYPERTENSION: ICD-10-CM

## 2023-03-23 ENCOUNTER — APPOINTMENT (OUTPATIENT)
Dept: GENERAL RADIOLOGY | Age: 78
End: 2023-03-23
Payer: MEDICARE

## 2023-03-23 ENCOUNTER — HOSPITAL ENCOUNTER (EMERGENCY)
Age: 78
Discharge: HOME | End: 2023-03-23
Attending: EMERGENCY MEDICINE
Payer: MEDICARE

## 2023-03-23 VITALS
HEART RATE: 91 BPM | DIASTOLIC BLOOD PRESSURE: 81 MMHG | RESPIRATION RATE: 20 BRPM | WEIGHT: 140 LBS | BODY MASS INDEX: 23.9 KG/M2 | HEIGHT: 64 IN | SYSTOLIC BLOOD PRESSURE: 196 MMHG | OXYGEN SATURATION: 99 % | TEMPERATURE: 98.6 F

## 2023-03-23 DIAGNOSIS — J44.1 COPD EXACERBATION (HCC): Primary | ICD-10-CM

## 2023-03-23 LAB
ANION GAP SERPL CALCULATED.3IONS-SCNC: 10 MMOL/L (ref 4–16)
APTT: 35.6 SECONDS (ref 25.1–37.1)
BASE EXCESS MIXED: 8.9 (ref 0–2.3)
BASE EXCESS: ABNORMAL (ref 0–2.4)
BASOPHILS ABSOLUTE: 0 K/CU MM
BASOPHILS RELATIVE PERCENT: 0.4 % (ref 0–1)
BUN SERPL-MCNC: 15 MG/DL (ref 6–23)
CALCIUM SERPL-MCNC: 9.7 MG/DL (ref 8.3–10.6)
CHLORIDE BLD-SCNC: 100 MMOL/L (ref 99–110)
CO2 CONTENT: 38 MMOL/L (ref 19–24)
CO2: 32 MMOL/L (ref 21–32)
CREAT SERPL-MCNC: 0.6 MG/DL (ref 0.6–1.1)
DIFFERENTIAL TYPE: ABNORMAL
EKG ATRIAL RATE: 85 BPM
EKG DIAGNOSIS: NORMAL
EKG P AXIS: 63 DEGREES
EKG P-R INTERVAL: 142 MS
EKG Q-T INTERVAL: 372 MS
EKG QRS DURATION: 112 MS
EKG QTC CALCULATION (BAZETT): 442 MS
EKG R AXIS: -43 DEGREES
EKG T AXIS: 75 DEGREES
EKG VENTRICULAR RATE: 85 BPM
EOSINOPHILS ABSOLUTE: 0 K/CU MM
EOSINOPHILS RELATIVE PERCENT: 0.1 % (ref 0–3)
GFR SERPL CREATININE-BSD FRML MDRD: >60 ML/MIN/1.73M2
GLUCOSE SERPL-MCNC: 89 MG/DL (ref 70–99)
HCO3 VENOUS: 36.2 MMOL/L (ref 19–25)
HCT VFR BLD CALC: 42.4 % (ref 37–47)
HEMOGLOBIN: 13.3 GM/DL (ref 12.5–16)
IMMATURE NEUTROPHIL %: 0.2 % (ref 0–0.43)
INR BLD: 2.85 INDEX
LYMPHOCYTES ABSOLUTE: 1.4 K/CU MM
LYMPHOCYTES RELATIVE PERCENT: 17.1 % (ref 24–44)
MAGNESIUM: 2 MG/DL (ref 1.8–2.4)
MCH RBC QN AUTO: 30 PG (ref 27–31)
MCHC RBC AUTO-ENTMCNC: 31.4 % (ref 32–36)
MCV RBC AUTO: 95.7 FL (ref 78–100)
MONOCYTES ABSOLUTE: 0.6 K/CU MM
MONOCYTES RELATIVE PERCENT: 7.5 % (ref 0–4)
O2 SAT, VEN: 87.6 % (ref 50–70)
PCO2, VEN: 58.7 MMHG (ref 38–52)
PDW BLD-RTO: 14.3 % (ref 11.7–14.9)
PH VENOUS: 7.4 (ref 7.32–7.42)
PLATELET # BLD: 216 K/CU MM (ref 140–440)
PMV BLD AUTO: 9.6 FL (ref 7.5–11.1)
PO2, VEN: 55.8 MMHG (ref 28–48)
POTASSIUM SERPL-SCNC: 3.9 MMOL/L (ref 3.5–5.1)
PRO-BNP: 329.8 PG/ML
PROTHROMBIN TIME: 29.3 SECONDS (ref 11.7–14.5)
RAPID INFLUENZA  B AGN: NEGATIVE
RAPID INFLUENZA A AGN: NEGATIVE
RBC # BLD: 4.43 M/CU MM (ref 4.2–5.4)
SARS-COV-2 RDRP RESP QL NAA+PROBE: NOT DETECTED
SEGMENTED NEUTROPHILS ABSOLUTE COUNT: 6.3 K/CU MM
SEGMENTED NEUTROPHILS RELATIVE PERCENT: 74.7 % (ref 36–66)
SODIUM BLD-SCNC: 142 MMOL/L (ref 135–145)
SOURCE, BLOOD GAS: ABNORMAL
SOURCE: NORMAL
TOTAL IMMATURE NEUTOROPHIL: 0.02 K/CU MM
TROPONIN T: <0.01 NG/ML
WBC # BLD: 8.4 K/CU MM (ref 4–10.5)

## 2023-03-23 PROCEDURE — 96375 TX/PRO/DX INJ NEW DRUG ADDON: CPT

## 2023-03-23 PROCEDURE — 85025 COMPLETE CBC W/AUTO DIFF WBC: CPT

## 2023-03-23 PROCEDURE — 83880 ASSAY OF NATRIURETIC PEPTIDE: CPT

## 2023-03-23 PROCEDURE — 87635 SARS-COV-2 COVID-19 AMP PRB: CPT

## 2023-03-23 PROCEDURE — 99285 EMERGENCY DEPT VISIT HI MDM: CPT

## 2023-03-23 PROCEDURE — 87804 INFLUENZA ASSAY W/OPTIC: CPT

## 2023-03-23 PROCEDURE — 80048 BASIC METABOLIC PNL TOTAL CA: CPT

## 2023-03-23 PROCEDURE — 85610 PROTHROMBIN TIME: CPT

## 2023-03-23 PROCEDURE — 87081 CULTURE SCREEN ONLY: CPT

## 2023-03-23 PROCEDURE — 6370000000 HC RX 637 (ALT 250 FOR IP): Performed by: EMERGENCY MEDICINE

## 2023-03-23 PROCEDURE — 6360000002 HC RX W HCPCS: Performed by: EMERGENCY MEDICINE

## 2023-03-23 PROCEDURE — 93010 ELECTROCARDIOGRAM REPORT: CPT | Performed by: INTERNAL MEDICINE

## 2023-03-23 PROCEDURE — 82805 BLOOD GASES W/O2 SATURATION: CPT

## 2023-03-23 PROCEDURE — 87430 STREP A AG IA: CPT

## 2023-03-23 PROCEDURE — 84484 ASSAY OF TROPONIN QUANT: CPT

## 2023-03-23 PROCEDURE — 83735 ASSAY OF MAGNESIUM: CPT

## 2023-03-23 PROCEDURE — 71045 X-RAY EXAM CHEST 1 VIEW: CPT

## 2023-03-23 PROCEDURE — 87040 BLOOD CULTURE FOR BACTERIA: CPT

## 2023-03-23 PROCEDURE — 85730 THROMBOPLASTIN TIME PARTIAL: CPT

## 2023-03-23 PROCEDURE — 96365 THER/PROPH/DIAG IV INF INIT: CPT

## 2023-03-23 PROCEDURE — 2500000003 HC RX 250 WO HCPCS: Performed by: EMERGENCY MEDICINE

## 2023-03-23 PROCEDURE — 93005 ELECTROCARDIOGRAM TRACING: CPT | Performed by: EMERGENCY MEDICINE

## 2023-03-23 RX ORDER — DEXAMETHASONE 4 MG/1
4 TABLET ORAL 3 TIMES DAILY
Qty: 15 TABLET | Refills: 0 | Status: SHIPPED | OUTPATIENT
Start: 2023-03-23 | End: 2023-03-28

## 2023-03-23 RX ORDER — IPRATROPIUM BROMIDE AND ALBUTEROL SULFATE 2.5; .5 MG/3ML; MG/3ML
1 SOLUTION RESPIRATORY (INHALATION)
Status: DISCONTINUED | OUTPATIENT
Start: 2023-03-23 | End: 2023-03-23

## 2023-03-23 RX ORDER — LORAZEPAM 1 MG/1
0.5 TABLET ORAL ONCE
Status: COMPLETED | OUTPATIENT
Start: 2023-03-23 | End: 2023-03-23

## 2023-03-23 RX ORDER — IPRATROPIUM BROMIDE AND ALBUTEROL SULFATE 2.5; .5 MG/3ML; MG/3ML
1 SOLUTION RESPIRATORY (INHALATION)
Status: DISCONTINUED | OUTPATIENT
Start: 2023-03-23 | End: 2023-03-23 | Stop reason: HOSPADM

## 2023-03-23 RX ORDER — AZITHROMYCIN 500 MG/1
500 TABLET, FILM COATED ORAL DAILY
Qty: 4 TABLET | Refills: 0 | Status: SHIPPED | OUTPATIENT
Start: 2023-03-23 | End: 2023-03-27

## 2023-03-23 RX ORDER — DEXAMETHASONE SODIUM PHOSPHATE 4 MG/ML
10 INJECTION, SOLUTION INTRA-ARTICULAR; INTRALESIONAL; INTRAMUSCULAR; INTRAVENOUS; SOFT TISSUE ONCE
Status: COMPLETED | OUTPATIENT
Start: 2023-03-23 | End: 2023-03-23

## 2023-03-23 RX ORDER — MAGNESIUM SULFATE 1 G/100ML
1000 INJECTION INTRAVENOUS ONCE
Status: COMPLETED | OUTPATIENT
Start: 2023-03-23 | End: 2023-03-23

## 2023-03-23 RX ORDER — AZITHROMYCIN 250 MG/1
500 TABLET, FILM COATED ORAL ONCE
Status: COMPLETED | OUTPATIENT
Start: 2023-03-23 | End: 2023-03-23

## 2023-03-23 RX ORDER — LORAZEPAM 0.5 MG/1
0.5 TABLET ORAL 3 TIMES DAILY PRN
Qty: 20 TABLET | Refills: 0 | Status: SHIPPED | OUTPATIENT
Start: 2023-03-23 | End: 2023-04-22

## 2023-03-23 RX ORDER — BUMETANIDE 0.25 MG/ML
1 INJECTION INTRAMUSCULAR; INTRAVENOUS ONCE
Status: COMPLETED | OUTPATIENT
Start: 2023-03-23 | End: 2023-03-23

## 2023-03-23 RX ADMIN — MAGNESIUM SULFATE IN DEXTROSE 1000 MG: 10 INJECTION, SOLUTION INTRAVENOUS at 17:27

## 2023-03-23 RX ADMIN — BUMETANIDE 1 MG: 0.25 INJECTION INTRAMUSCULAR; INTRAVENOUS at 16:58

## 2023-03-23 RX ADMIN — LORAZEPAM 0.5 MG: 1 TABLET ORAL at 17:53

## 2023-03-23 RX ADMIN — AZITHROMYCIN MONOHYDRATE 500 MG: 250 TABLET ORAL at 17:53

## 2023-03-23 RX ADMIN — DEXAMETHASONE SODIUM PHOSPHATE 10 MG: 4 INJECTION, SOLUTION INTRAMUSCULAR; INTRAVENOUS at 16:58

## 2023-03-23 RX ADMIN — IPRATROPIUM BROMIDE AND ALBUTEROL SULFATE 1 AMPULE: 2.5; .5 SOLUTION RESPIRATORY (INHALATION) at 17:10

## 2023-03-23 RX ADMIN — IPRATROPIUM BROMIDE AND ALBUTEROL SULFATE 1 AMPULE: 2.5; .5 SOLUTION RESPIRATORY (INHALATION) at 17:57

## 2023-03-23 ASSESSMENT — PAIN - FUNCTIONAL ASSESSMENT
PAIN_FUNCTIONAL_ASSESSMENT: NONE - DENIES PAIN
PAIN_FUNCTIONAL_ASSESSMENT: NONE - DENIES PAIN

## 2023-03-23 NOTE — DISCHARGE INSTRUCTIONS
You may raise your oxygen to 4 L if needed return to emergency department if you get worse at that time we need to get you admitted

## 2023-03-23 NOTE — ED NOTES
Discharge instructions and prescriptions were reviewed and the patient will follow up with the PCP. The patient voiced understanding of these instructions. The patient was taken to her car in a wheel chair.         Jacky Simon RN  03/23/23 [id number]

## 2023-03-23 NOTE — ED PROVIDER NOTES
eMERGENCY dEPARTMENT eNCOUnter        CHIEF COMPLAINT    Chief Complaint   Patient presents with    Shortness of Breath       HPI    Marcy Ramírez is a 66 y.o. female who presents with exacerbation of chronic shortness of breath, patient has a history of COPD chronically on oxygen 2 L by nasal cannula, she also has a history of DVT and PE currently on warfarin. She states that for last couple of days she appears to be having increasing shortness of breath she has been using her DuoNebs. Her  drove her here and she was quite short of breath while walking into the emergency department and her pulse oximetry dropped down to 88% on 2 L by nasal cannula she was placed on 4 L by nasal cannula. She was admitted for similar issue in January. She also complained of minor sore throat no sinus congestion denies any productive cough. Does have chronic ankle edema currently on torsemide. REVIEW OF SYSTEMS    Cardiac: No palpitations or syncope  Respiratory: +SOB and cough  Neurologic: No syncope or headache  GI: No Vomiting or diarrhea  General: No Fever or sweats  All other systems reviewed and are negative.     PAST MEDICAL & SURGICAL HISTORY    Past Medical History:   Diagnosis Date    Anticoagulant long-term use     Arthritis     Cancer ([address]     Chronic hypoxemic respiratory failure ([address] 2/6/2018    Community acquired pneumonia of right upper lobe of lung 3/19/2022    COPD (chronic obstructive pulmonary disease) ([address]     Coronary atherosclerosis     COVID-19 virus detected 1/18/2021    DVT (deep venous thrombosis) (HCC)     Former smoker 11/22/2021    Gastroesophageal reflux disease     Hiatal hernia     Lung infiltrate on CT 1/22/2019    On home oxygen therapy     on 24/7    Osteopenia     Phlebitis     Pulmonary nodule 7/5/2016    S/P left heart catheterization by percutaneous approach 6/27/2013    Sepsis due to pneumonia ([address] 11/14/2017    Shortness of breath 9/23/2019    Shortness of breath 1/18/2021 they all look in acceptable range she appears to be comfortable and I discussed in presence of her  outpatient treatment versus observation admit and she prefers outpatient treatment I will go ahead and start her on a course of Zithromax and dexamethasone which I have instructed her she can raise her oxygen to 4 L if needed and continue DuoNeb respiratory treatment I will give her some medication to get her relax few doses of Ativan would  be helpful    Patient was given beta agonist nebulizers, oxygen, IV steroids, and IV antibiotics. See chart for details of medications given during the ED stay. Reevaluation at no: Retractions mildly improved after medications. Telemetry Monitoring: The patient is kept on a telemetry monitor during the ED stay, interpreted by me, the patients monitor remained in a sinus rhythm at a sinus rate, 80  occasional ventricular ectopy.     Vitals:    03/23/23 1623 03/23/23 1626 03/23/23 1706   BP:  (!) 196/81    Pulse:  93 91   Resp:  24 20   TempSrc:  Oral    SpO2: (!) 82% 98% 99%   Weight:  140 lb (63.5 kg)    Height:  5' 4\" (1.626 m)            Differential Diagnosis: Acute COPD exacerbation, Hypoxemic Respiratory Distress, Pneumonia, Sepsis, Congestive Heart Failure, Myocardial Infarction, Pulmonary Embolus, ARDS, Pneumothorax, other        FINAL IMPRESSION    1. COPD exacerbation (HCC)          PLAN: discharge home        (Please note that this note was completed with a voice recognition program.  Every attempt was made to edit the dictations, but inevitably there remain words that are mis-transcribed.)          Ethan Rider MD  03/23/23 0511

## 2023-03-23 NOTE — ED NOTES
The patient ambulated to the bathroom with her oxygen and back. She did drop her saturation again to 78%. She quickly came back up to 95% when she rested. The call light is within reach and the  is at the bedside.                 Chance Velasquez RN  03/23/23 4949

## 2023-03-23 NOTE — ED NOTES
ED TO INPATIENT SBAR HANDOFF    Patient Name: Shayy Sterling   :  1945  66 y.o. MRN:  [id number]  Preferred Name  [facility]  ED Room #:  ED-10/E10  Family/Caregiver Present yes   Restraints no   Sitter no   Sepsis Risk Score Sepsis Risk Score: 4.57    Situation  Code Status: Prior No additional code details. Allergies: Codeine, Darvon [propoxyphene hcl], Lamisil [terbinafine hcl], Morphine, Neosporin [neomycin-polymyxin-gramicidin], and Pcn [penicillins]  Weight: Patient Vitals for the past 96 hrs (Last 3 readings):   Weight   23 1626 140 lb (63.5 kg)     Arrived from: home  Chief Complaint:   Chief Complaint   Patient presents with    Shortness of Breath     Imaging:   XR CHEST PORTABLE    (Results Pending)     Abnormal labs: Abnormal Labs Reviewed - No abnormal labs to display  Critical values: yes     Abnormal Assessment Findings: SOB with exertion. Pt usually wears 2 L o2 at all times. Pt ambulated to room from lobby and spo2 had dropped to 87% on 2 L. Pt increased to 4L briefly, Spo2 recovered quickly, is now back to 98% on 2L. Pt denies chest pain. Has had a sore throat for 2 days, and has swelling in bilateral lower extremities. Pt cardiologist is Dr. Mamie Simmons.      Background  History:   Past Medical History:   Diagnosis Date    Anticoagulant long-term use     Arthritis     [facility]     Chronic hypoxemic respiratory failure ([facility] 2018    Community acquired pneumonia of right upper lobe of lung 3/19/2022    COPD (chronic obstructive pulmonary disease) (HCC)     Coronary atherosclerosis     COVID-19 virus detected 2021    DVT [facility]deep [facility] thrombosis) [facility]     Former smoker 2021    Gastroesophageal reflux disease     Hiatal hernia     Lung infiltrate on CT 2019    On home oxygen therapy     on     Osteopenia     Phlebitis     Pulmonary nodule 2016    S/P left heart catheterization by percutaneous approach 2013    Sepsis due to

## 2023-03-24 ENCOUNTER — NURSE ONLY (OUTPATIENT)
Dept: FAMILY MEDICINE CLINIC | Age: 78
End: 2023-03-24

## 2023-03-24 DIAGNOSIS — M81.0 AGE-RELATED OSTEOPOROSIS WITHOUT CURRENT PATHOLOGICAL FRACTURE: ICD-10-CM

## 2023-03-24 DIAGNOSIS — Z86.711 HISTORY OF PULMONARY EMBOLISM: ICD-10-CM

## 2023-03-24 DIAGNOSIS — E87.6 HYPOKALEMIA: ICD-10-CM

## 2023-03-24 DIAGNOSIS — I10 ESSENTIAL HYPERTENSION: ICD-10-CM

## 2023-03-24 DIAGNOSIS — G45.9 TIA (TRANSIENT ISCHEMIC ATTACK): ICD-10-CM

## 2023-03-24 DIAGNOSIS — Z79.01 ANTICOAGULANT LONG-TERM USE: Primary | ICD-10-CM

## 2023-03-24 LAB
25(OH)D3 SERPL-MCNC: 36.1 NG/ML
ALBUMIN SERPL-MCNC: 3.8 G/DL (ref 3.4–5)
ALBUMIN/GLOB SERPL: 1.7 {RATIO} (ref 1.1–2.2)
ALP SERPL-CCNC: 73 U/L (ref 40–129)
ALT SERPL-CCNC: 16 U/L (ref 10–40)
ANION GAP SERPL CALCULATED.3IONS-SCNC: 17 MMOL/L (ref 3–16)
AST SERPL-CCNC: 23 U/L (ref 15–37)
BILIRUB SERPL-MCNC: 0.3 MG/DL (ref 0–1)
BUN SERPL-MCNC: 15 MG/DL (ref 7–20)
CALCIUM SERPL-MCNC: 9.5 MG/DL (ref 8.3–10.6)
CHLORIDE SERPL-SCNC: 96 MMOL/L (ref 99–110)
CO2 SERPL-SCNC: 30 MMOL/L (ref 21–32)
CREAT SERPL-MCNC: 0.8 MG/DL (ref 0.6–1.2)
GFR SERPLBLD CREATININE-BSD FMLA CKD-EPI: >60 ML/MIN/{1.73_M2}
GLUCOSE SERPL-MCNC: 162 MG/DL (ref 70–99)
INTERNATIONAL NORMALIZATION RATIO, POC: 2.7
POTASSIUM SERPL-SCNC: 3.6 MMOL/L (ref 3.5–5.1)
PROT SERPL-MCNC: 6 G/DL (ref 6.4–8.2)
PROTHROMBIN TIME, POC: 0
PTH-INTACT SERPL-MCNC: 128.6 PG/ML (ref 14–72)
SODIUM SERPL-SCNC: 143 MMOL/L (ref 136–145)
TSH SERPL DL<=0.005 MIU/L-ACNC: 0.37 UIU/ML (ref 0.27–4.2)

## 2023-03-26 LAB
CULTURE: NORMAL
Lab: NORMAL
SPECIMEN: NORMAL
STREP A DIRECT SCREEN: NEGATIVE

## 2023-03-27 DIAGNOSIS — E34.9 ELEVATED PARATHYROID HORMONE: Primary | ICD-10-CM

## 2023-03-28 LAB
CULTURE: NORMAL
CULTURE: NORMAL
Lab: NORMAL
Lab: NORMAL
SPECIMEN: NORMAL
SPECIMEN: NORMAL

## 2023-03-30 ENCOUNTER — TELEPHONE (OUTPATIENT)
Dept: PULMONOLOGY | Age: 78
End: 2023-03-30

## 2023-03-30 ENCOUNTER — OFFICE VISIT (OUTPATIENT)
Dept: FAMILY MEDICINE CLINIC | Age: 78
End: 2023-03-30
Payer: MEDICARE

## 2023-03-30 VITALS
WEIGHT: 135.2 LBS | HEIGHT: 64 IN | HEART RATE: 95 BPM | SYSTOLIC BLOOD PRESSURE: 152 MMHG | DIASTOLIC BLOOD PRESSURE: 80 MMHG | OXYGEN SATURATION: 96 % | BODY MASS INDEX: 23.08 KG/M2

## 2023-03-30 DIAGNOSIS — J44.1 COPD WITH ACUTE EXACERBATION (HCC): Primary | ICD-10-CM

## 2023-03-30 DIAGNOSIS — J96.11 CHRONIC HYPOXEMIC RESPIRATORY FAILURE (HCC): ICD-10-CM

## 2023-03-30 DIAGNOSIS — Z99.81 DEPENDENCE ON CONTINUOUS SUPPLEMENTAL OXYGEN: ICD-10-CM

## 2023-03-30 PROCEDURE — 1123F ACP DISCUSS/DSCN MKR DOCD: CPT | Performed by: PHYSICIAN ASSISTANT

## 2023-03-30 PROCEDURE — 3079F DIAST BP 80-89 MM HG: CPT | Performed by: PHYSICIAN ASSISTANT

## 2023-03-30 PROCEDURE — 99213 OFFICE O/P EST LOW 20 MIN: CPT | Performed by: PHYSICIAN ASSISTANT

## 2023-03-30 PROCEDURE — 3077F SYST BP >= 140 MM HG: CPT | Performed by: PHYSICIAN ASSISTANT

## 2023-03-30 RX ORDER — METHYLPREDNISOLONE 4 MG/1
TABLET ORAL
Qty: 1 KIT | Refills: 0 | Status: SHIPPED | OUTPATIENT
Start: 2023-03-30

## 2023-03-30 NOTE — TELEPHONE ENCOUNTER
Pt called in stating she is not feeling any better after her ER visit last week. I let her know that Dr. Johnny Dorsey is not currently in until later April. I also let her know I would contact her PCP office to relay this message. I spoke to them and they stated they would call her to schedule an ER follow up.

## 2023-03-30 NOTE — PROGRESS NOTES
Wheezing 12 mL 2    albuterol sulfate HFA (PROVENTIL;VENTOLIN;PROAIR) 108 (90 Base) MCG/ACT inhaler INHALE 2 PUFFS BY MOUTH FOUR TIMES A DAY AS NEEDED 18 g 1    albuterol (PROVENTIL) (2.5 MG/3ML) 0.083% nebulizer solution Take 3 mLs by nebulization every 4 hours as needed for Wheezing 540 each 1    potassium chloride (KLOR-CON M) 10 MEQ extended release tablet Alternate 1 tablet and 2 tablets daily 145 tablet 1    gabapentin (NEURONTIN) 400 MG capsule Take 1 capsule by mouth every evening for 90 days.  90 capsule 0    hydrOXYzine HCl (ATARAX) 25 MG tablet TAKE 1 TABLET BY MOUTH NIGHTLY 90 tablet 0    levothyroxine (SYNTHROID) 75 MCG tablet Take 1 tablet by mouth daily 90 tablet 1    Magnesium Oxide 500 MG TABS TAKE 1 TABLET BY MOUTH EVERY DAY 90 tablet 0    montelukast (SINGULAIR) 10 MG tablet TAKE 1 TABLET BY MOUTH EVERY DAY EVERY NIGHT 90 tablet 0    risedronate (ACTONEL) 35 MG tablet TAKE 1 TABLET BY MOUTH EVERY 7 DAYS PATIENT TAKES ON SUNDAY 12 tablet 1    Roflumilast (DALIRESP) 500 MCG tablet TAKE 1 TABLET BY MOUTH EVERY DAY 90 tablet 1    rosuvastatin (CRESTOR) 20 MG tablet TAKE 1 TABLET BY MOUTH EVERY DAY EVERY NIGHT 90 tablet 1    theophylline (THEODUR) 450 MG extended release tablet TAKE 1/2 TABLET BY MOUTH TWICE A DAY 90 tablet 1    warfarin (COUMADIN) 3 MG tablet Take 1 tablet by mouth every M, Wed, Th, Sa, Sun Take 2 tablets by mouth every Tues, Friday 180 tablet 1    predniSONE (DELTASONE) 5 MG tablet Take 1 tablet by mouth daily 90 tablet 3    esomeprazole (NEXIUM) 40 MG delayed release capsule Take 1 capsule by mouth in the morning and at bedtime 180 capsule 1    Budeson-Glycopyrrol-Formoterol (BREZTRI AEROSPHERE) 160-9-4.8 MCG/ACT AERO Inhale 2 sprays into the lungs 2 times daily 1 each 11    torsemide (DEMADEX) 20 MG tablet Take 20 mg by mouth daily      ipratropium (ATROVENT HFA) 17 MCG/ACT inhaler Inhale 2 puffs into the lungs every 6-8 hours as needed      dilTIAZem (CARDIZEM) 60 MG tablet Take 1

## 2023-03-31 ENCOUNTER — TELEPHONE (OUTPATIENT)
Dept: FAMILY MEDICINE CLINIC | Age: 78
End: 2023-03-31

## 2023-03-31 NOTE — TELEPHONE ENCOUNTER
Spoke to Doroteo Lee and explained that we placed a referral to endocrinology. Voiced understanding.

## 2023-03-31 NOTE — TELEPHONE ENCOUNTER
To Dr. Olga Angela-    Patient wants to know if she is going to be referred to a Rheumatologist.    Please advise.

## 2023-04-03 ENCOUNTER — TELEPHONE (OUTPATIENT)
Dept: FAMILY MEDICINE CLINIC | Age: 78
End: 2023-04-03

## 2023-04-03 NOTE — TELEPHONE ENCOUNTER
To [facility]--    Patient is taking the Medrol Manfred and azithromycin (ZITHROMAX) 250 MG tablet     Patient has light green congestion---she said she was getting worse so that is why she decided to take the antibiotic. She wanted you to know---the antibiotic, she had left over from 1/24/23 when you prescribed it.

## 2023-04-17 DIAGNOSIS — J44.9 CHRONIC OBSTRUCTIVE PULMONARY DISEASE, UNSPECIFIED (HCC): ICD-10-CM

## 2023-04-18 RX ORDER — ALBUTEROL SULFATE 90 UG/1
AEROSOL, METERED RESPIRATORY (INHALATION)
Qty: 8.5 EACH | Refills: 1 | Status: SHIPPED | OUTPATIENT
Start: 2023-04-18

## 2023-04-20 DIAGNOSIS — F41.9 ANXIETY: ICD-10-CM

## 2023-04-20 DIAGNOSIS — J44.9 COPD, VERY SEVERE (HCC): ICD-10-CM

## 2023-04-20 DIAGNOSIS — E87.6 HYPOKALEMIA: ICD-10-CM

## 2023-04-20 RX ORDER — BACLOFEN 20 MG
TABLET ORAL
Qty: 90 TABLET | Refills: 0 | OUTPATIENT
Start: 2023-04-20

## 2023-04-20 RX ORDER — HYDROXYZINE HYDROCHLORIDE 25 MG/1
TABLET, FILM COATED ORAL
Qty: 90 TABLET | Refills: 0 | OUTPATIENT
Start: 2023-04-20

## 2023-04-20 RX ORDER — MONTELUKAST SODIUM 10 MG/1
TABLET ORAL
Qty: 90 TABLET | Refills: 0 | OUTPATIENT
Start: 2023-04-20

## 2023-04-21 ENCOUNTER — NURSE ONLY (OUTPATIENT)
Dept: FAMILY MEDICINE CLINIC | Age: 78
End: 2023-04-21

## 2023-04-21 DIAGNOSIS — Z86.711 HISTORY OF PULMONARY EMBOLISM: ICD-10-CM

## 2023-04-21 DIAGNOSIS — G45.9 TIA (TRANSIENT ISCHEMIC ATTACK): ICD-10-CM

## 2023-04-21 DIAGNOSIS — Z79.01 ANTICOAGULANT LONG-TERM USE: ICD-10-CM

## 2023-04-21 LAB
INTERNATIONAL NORMALIZATION RATIO, POC: 3.3
PROTHROMBIN TIME, POC: 39.9

## 2023-04-21 NOTE — PROGRESS NOTES
INR: 3.3. Per Maria De Jesus take 3 mg tonight instead of 6, keep dose the same and recheck in 3 weeks. Confirmed dose 6/3/3mg alternating.

## 2023-04-24 ENCOUNTER — OFFICE VISIT (OUTPATIENT)
Dept: PULMONOLOGY | Age: 78
End: 2023-04-24
Payer: MEDICARE

## 2023-04-24 VITALS
DIASTOLIC BLOOD PRESSURE: 70 MMHG | BODY MASS INDEX: 23.05 KG/M2 | SYSTOLIC BLOOD PRESSURE: 138 MMHG | HEART RATE: 74 BPM | RESPIRATION RATE: 16 BRPM | WEIGHT: 135 LBS | OXYGEN SATURATION: 95 % | HEIGHT: 64 IN

## 2023-04-24 DIAGNOSIS — I27.20 MILD PULMONARY HYPERTENSION (HCC): ICD-10-CM

## 2023-04-24 DIAGNOSIS — R91.1 PULMONARY NODULE: Chronic | ICD-10-CM

## 2023-04-24 DIAGNOSIS — J44.9 COPD, VERY SEVERE (HCC): Primary | Chronic | ICD-10-CM

## 2023-04-24 DIAGNOSIS — J96.11 CHRONIC HYPOXEMIC RESPIRATORY FAILURE (HCC): Chronic | ICD-10-CM

## 2023-04-24 DIAGNOSIS — Z87.891 FORMER SMOKER: ICD-10-CM

## 2023-04-24 DIAGNOSIS — R06.02 SHORTNESS OF BREATH: ICD-10-CM

## 2023-04-24 PROCEDURE — 3075F SYST BP GE 130 - 139MM HG: CPT | Performed by: INTERNAL MEDICINE

## 2023-04-24 PROCEDURE — G8399 PT W/DXA RESULTS DOCUMENT: HCPCS | Performed by: INTERNAL MEDICINE

## 2023-04-24 PROCEDURE — 1036F TOBACCO NON-USER: CPT | Performed by: INTERNAL MEDICINE

## 2023-04-24 PROCEDURE — 3023F SPIROM DOC REV: CPT | Performed by: INTERNAL MEDICINE

## 2023-04-24 PROCEDURE — 1090F PRES/ABSN URINE INCON ASSESS: CPT | Performed by: INTERNAL MEDICINE

## 2023-04-24 PROCEDURE — G8420 CALC BMI NORM PARAMETERS: HCPCS | Performed by: INTERNAL MEDICINE

## 2023-04-24 PROCEDURE — 99213 OFFICE O/P EST LOW 20 MIN: CPT | Performed by: INTERNAL MEDICINE

## 2023-04-24 PROCEDURE — 1123F ACP DISCUSS/DSCN MKR DOCD: CPT | Performed by: INTERNAL MEDICINE

## 2023-04-24 PROCEDURE — 3078F DIAST BP <80 MM HG: CPT | Performed by: INTERNAL MEDICINE

## 2023-04-24 PROCEDURE — G8427 DOCREV CUR MEDS BY ELIG CLIN: HCPCS | Performed by: INTERNAL MEDICINE

## 2023-04-24 RX ORDER — PREDNISONE 1 MG/1
5 TABLET ORAL DAILY
Qty: 90 TABLET | Refills: 3 | Status: SHIPPED | OUTPATIENT
Start: 2023-04-24

## 2023-04-24 RX ORDER — BUDESONIDE, GLYCOPYRROLATE, AND FORMOTEROL FUMARATE 160; 9; 4.8 UG/1; UG/1; UG/1
2 AEROSOL, METERED RESPIRATORY (INHALATION) 2 TIMES DAILY
Qty: 1 EACH | Refills: 11 | Status: SHIPPED | OUTPATIENT
Start: 2023-04-24

## 2023-04-24 NOTE — PROGRESS NOTES
SUBJECTIVE:  Chief Complaint: Severe/stage IV COPD, chronic hypoxemic respiratory failure, pulmonary nodule, mild pulm hypertension, former smoker  Doyle Gil states that she has had no recent bronchitic infections and denies worsening shortness of breath or chest discomfort. About a month ago she was treated for bronchitis with antibiotics and oral steroids  She continues on breztri, long-acting theophylline, low-dose oral prednisone 5 mg a day. She also has an albuterol rescue inhaler or albuterol solution to use as needed. She continues on oxygen 24 hours a day. She denies worsening chest congestion or cough. She did have mild COVID 19 in 2021 but has had no COVID-19 infection since then. She is fully vaccinated      ROS:  Constitution:  HEENT: Negative for ear, throat pain  Cardiovascular: Negative for chest pain, syncope, edema  Pulmonary: See HPI  Musculoskeletal: Negative for DVT, myalgias, arthralgias    OBJECTIVE:  /70   Pulse 74   Resp 16   Ht 5' 4\" (1.626 m)   Wt 135 lb (61.2 kg) Comment: veral report  SpO2 95% Comment: Portable O2 on 2L  BMI 23.17 kg/m²      Physical Exam:  Constitutional:  She appears well developed and well-nourished. Does use accessory muscles of respiration but not in respiratory distress at rest.  Wearing nasal oxygen  Neck:  Supple, No palpable lymphadenopathy, No JVD  Cardiovascular:  S1, S2 Normal, Regular rhythm, no murmurs or gallops, No pericardial  rubs. Pulmonary: Diminished breath sounds throughout all lung areas without wheezing or rhonchi  Abdomen: Not examined  Extremities: no edema, No DVT  Neurologic: Oriented x3, No focal deficits    Radiology: Chest x-ray on 3/23/2023 showed stable chest x-ray with findings of severe emphysema and associated fibrosis.   No acute disease  PFT: Office spirometry on 7/21/2021 demonstrated a very severe/stage IV obstructive lung defect with no significant response to bronchodilators      Echocardiogram: 1/25/2023, limited

## 2023-04-27 ENCOUNTER — TELEPHONE (OUTPATIENT)
Dept: FAMILY MEDICINE CLINIC | Age: 78
End: 2023-04-27

## 2023-04-27 DIAGNOSIS — J44.9 COPD, VERY SEVERE (HCC): Primary | Chronic | ICD-10-CM

## 2023-04-27 NOTE — TELEPHONE ENCOUNTER
Order placed on your ledge
PT NEEDS A NEW NEBULIZER. HER CURRENT ONE IS 7 YRS OLD.  PLEASE ADVISE
spouse

## 2023-05-01 ENCOUNTER — TELEPHONE (OUTPATIENT)
Dept: FAMILY MEDICINE CLINIC | Age: 78
End: 2023-05-01

## 2023-05-01 DIAGNOSIS — J44.9 COPD, VERY SEVERE (HCC): Primary | ICD-10-CM

## 2023-05-01 NOTE — TELEPHONE ENCOUNTER
Patient called again about the Nebulizer----she would like a new one please-----also, she would like the order sent to [address] [place] [address] on [address] [facility]

## 2023-05-03 ENCOUNTER — TELEPHONE (OUTPATIENT)
Dept: FAMILY MEDICINE CLINIC | Age: 78
End: 2023-05-03

## 2023-05-04 NOTE — TELEPHONE ENCOUNTER
Faxed order and addended ov note 3/3 to ROBERTO REID and also called Arslan Ragsdale at [facility]

## 2023-05-12 ENCOUNTER — NURSE ONLY (OUTPATIENT)
Dept: FAMILY MEDICINE CLINIC | Age: 78
End: 2023-05-12

## 2023-05-12 DIAGNOSIS — I10 ESSENTIAL HYPERTENSION: ICD-10-CM

## 2023-05-12 DIAGNOSIS — E87.6 HYPOKALEMIA: ICD-10-CM

## 2023-05-12 DIAGNOSIS — M81.0 AGE-RELATED OSTEOPOROSIS WITHOUT CURRENT PATHOLOGICAL FRACTURE: ICD-10-CM

## 2023-05-12 DIAGNOSIS — Z86.711 HISTORY OF PULMONARY EMBOLISM: ICD-10-CM

## 2023-05-12 DIAGNOSIS — G45.9 TIA (TRANSIENT ISCHEMIC ATTACK): ICD-10-CM

## 2023-05-12 DIAGNOSIS — Z79.01 ANTICOAGULANT LONG-TERM USE: ICD-10-CM

## 2023-05-12 LAB
INTERNATIONAL NORMALIZATION RATIO, POC: 1.9
PROTHROMBIN TIME, POC: 0

## 2023-05-26 ENCOUNTER — TELEMEDICINE (OUTPATIENT)
Dept: FAMILY MEDICINE CLINIC | Age: 78
End: 2023-05-26

## 2023-05-26 DIAGNOSIS — Z00.00 MEDICARE ANNUAL WELLNESS VISIT, SUBSEQUENT: Primary | ICD-10-CM

## 2023-05-26 ASSESSMENT — PATIENT HEALTH QUESTIONNAIRE - PHQ9
SUM OF ALL RESPONSES TO PHQ QUESTIONS 1-9: 0
SUM OF ALL RESPONSES TO PHQ QUESTIONS 1-9: 0
2. FEELING DOWN, DEPRESSED OR HOPELESS: 0
1. LITTLE INTEREST OR PLEASURE IN DOING THINGS: 0
SUM OF ALL RESPONSES TO PHQ QUESTIONS 1-9: 0
SUM OF ALL RESPONSES TO PHQ9 QUESTIONS 1 & 2: 0
SUM OF ALL RESPONSES TO PHQ QUESTIONS 1-9: 0

## 2023-06-02 DIAGNOSIS — E87.6 HYPOKALEMIA: ICD-10-CM

## 2023-06-02 DIAGNOSIS — J44.9 CHRONIC OBSTRUCTIVE PULMONARY DISEASE, UNSPECIFIED (HCC): ICD-10-CM

## 2023-06-02 DIAGNOSIS — J44.9 COPD, VERY SEVERE (HCC): ICD-10-CM

## 2023-06-02 DIAGNOSIS — F41.9 ANXIETY: ICD-10-CM

## 2023-06-02 RX ORDER — BACLOFEN 20 MG
TABLET ORAL
Qty: 90 TABLET | Refills: 0 | Status: SHIPPED | OUTPATIENT
Start: 2023-06-02

## 2023-06-02 RX ORDER — ALBUTEROL SULFATE 90 UG/1
AEROSOL, METERED RESPIRATORY (INHALATION)
Qty: 8.5 EACH | Refills: 1 | Status: SHIPPED | OUTPATIENT
Start: 2023-06-02

## 2023-06-02 RX ORDER — HYDROXYZINE HYDROCHLORIDE 25 MG/1
TABLET, FILM COATED ORAL
Qty: 90 TABLET | Refills: 0 | Status: SHIPPED | OUTPATIENT
Start: 2023-06-02

## 2023-06-02 RX ORDER — MONTELUKAST SODIUM 10 MG/1
TABLET ORAL
Qty: 90 TABLET | Refills: 0 | Status: SHIPPED | OUTPATIENT
Start: 2023-06-02

## 2023-06-09 ENCOUNTER — NURSE ONLY (OUTPATIENT)
Dept: FAMILY MEDICINE CLINIC | Age: 78
End: 2023-06-09

## 2023-06-09 DIAGNOSIS — G45.9 TIA (TRANSIENT ISCHEMIC ATTACK): ICD-10-CM

## 2023-06-09 DIAGNOSIS — Z86.711 HISTORY OF PULMONARY EMBOLISM: ICD-10-CM

## 2023-06-09 DIAGNOSIS — Z79.01 ANTICOAGULANT LONG-TERM USE: ICD-10-CM

## 2023-06-09 LAB
INTERNATIONAL NORMALIZATION RATIO, POC: 1.7
PROTHROMBIN TIME, POC: 0

## 2023-06-20 ENCOUNTER — ANTI-COAG VISIT (OUTPATIENT)
Dept: FAMILY MEDICINE CLINIC | Age: 78
End: 2023-06-20

## 2023-06-20 ENCOUNTER — OFFICE VISIT (OUTPATIENT)
Dept: FAMILY MEDICINE CLINIC | Age: 78
End: 2023-06-20
Payer: MEDICARE

## 2023-06-20 ENCOUNTER — TELEPHONE (OUTPATIENT)
Dept: FAMILY MEDICINE CLINIC | Age: 78
End: 2023-06-20

## 2023-06-20 VITALS
SYSTOLIC BLOOD PRESSURE: 152 MMHG | DIASTOLIC BLOOD PRESSURE: 68 MMHG | HEART RATE: 73 BPM | HEIGHT: 64 IN | WEIGHT: 137 LBS | OXYGEN SATURATION: 93 % | BODY MASS INDEX: 23.39 KG/M2

## 2023-06-20 DIAGNOSIS — G45.9 TIA (TRANSIENT ISCHEMIC ATTACK): ICD-10-CM

## 2023-06-20 DIAGNOSIS — Z79.01 ANTICOAGULANT LONG-TERM USE: ICD-10-CM

## 2023-06-20 DIAGNOSIS — I10 ESSENTIAL HYPERTENSION: Primary | ICD-10-CM

## 2023-06-20 DIAGNOSIS — Z86.718 HISTORY OF DVT OF LOWER EXTREMITY: ICD-10-CM

## 2023-06-20 DIAGNOSIS — E03.9 ACQUIRED HYPOTHYROIDISM: ICD-10-CM

## 2023-06-20 DIAGNOSIS — E78.2 MODERATE MIXED HYPERLIPIDEMIA NOT REQUIRING STATIN THERAPY: ICD-10-CM

## 2023-06-20 DIAGNOSIS — G45.9 TIA (TRANSIENT ISCHEMIC ATTACK): Primary | ICD-10-CM

## 2023-06-20 DIAGNOSIS — J44.9 COPD, VERY SEVERE (HCC): Chronic | ICD-10-CM

## 2023-06-20 DIAGNOSIS — Z86.711 HISTORY OF PULMONARY EMBOLISM: ICD-10-CM

## 2023-06-20 LAB
INTERNATIONAL NORMALIZATION RATIO, POC: 1.4
PROTHROMBIN TIME, POC: 16.3

## 2023-06-20 PROCEDURE — 3023F SPIROM DOC REV: CPT | Performed by: STUDENT IN AN ORGANIZED HEALTH CARE EDUCATION/TRAINING PROGRAM

## 2023-06-20 PROCEDURE — 1090F PRES/ABSN URINE INCON ASSESS: CPT | Performed by: STUDENT IN AN ORGANIZED HEALTH CARE EDUCATION/TRAINING PROGRAM

## 2023-06-20 PROCEDURE — 1123F ACP DISCUSS/DSCN MKR DOCD: CPT | Performed by: STUDENT IN AN ORGANIZED HEALTH CARE EDUCATION/TRAINING PROGRAM

## 2023-06-20 PROCEDURE — 85610 PROTHROMBIN TIME: CPT | Performed by: STUDENT IN AN ORGANIZED HEALTH CARE EDUCATION/TRAINING PROGRAM

## 2023-06-20 PROCEDURE — 1036F TOBACCO NON-USER: CPT | Performed by: STUDENT IN AN ORGANIZED HEALTH CARE EDUCATION/TRAINING PROGRAM

## 2023-06-20 PROCEDURE — G8427 DOCREV CUR MEDS BY ELIG CLIN: HCPCS | Performed by: STUDENT IN AN ORGANIZED HEALTH CARE EDUCATION/TRAINING PROGRAM

## 2023-06-20 PROCEDURE — 3074F SYST BP LT 130 MM HG: CPT | Performed by: STUDENT IN AN ORGANIZED HEALTH CARE EDUCATION/TRAINING PROGRAM

## 2023-06-20 PROCEDURE — G8420 CALC BMI NORM PARAMETERS: HCPCS | Performed by: STUDENT IN AN ORGANIZED HEALTH CARE EDUCATION/TRAINING PROGRAM

## 2023-06-20 PROCEDURE — 3078F DIAST BP <80 MM HG: CPT | Performed by: STUDENT IN AN ORGANIZED HEALTH CARE EDUCATION/TRAINING PROGRAM

## 2023-06-20 PROCEDURE — G8399 PT W/DXA RESULTS DOCUMENT: HCPCS | Performed by: STUDENT IN AN ORGANIZED HEALTH CARE EDUCATION/TRAINING PROGRAM

## 2023-06-20 PROCEDURE — 99213 OFFICE O/P EST LOW 20 MIN: CPT | Performed by: STUDENT IN AN ORGANIZED HEALTH CARE EDUCATION/TRAINING PROGRAM

## 2023-06-20 RX ORDER — ALBUTEROL SULFATE 90 UG/1
AEROSOL, METERED RESPIRATORY (INHALATION)
Qty: 8.5 EACH | Refills: 1 | Status: SHIPPED | OUTPATIENT
Start: 2023-06-20

## 2023-06-20 ASSESSMENT — ENCOUNTER SYMPTOMS
ABDOMINAL PAIN: 0
SHORTNESS OF BREATH: 0
WHEEZING: 0
SORE THROAT: 0
NAUSEA: 0

## 2023-06-20 NOTE — PROGRESS NOTES
6/20/2023    Madison Roa    Chief Complaint   Patient presents with    3 Month Follow-Up     - no complaints         HPI  History was obtained from patient . Soto Jones is a 66 y.o. female with a PMHx as listed below who presents today for 3 month follow up. No acute complaints. INR subtherapuetic    Patient now in her wheelchair. Patient stopped celebrex. Pain ok off medication  Severe COPD she is following with Dr. Av Helton    1. COPD, very severe ([address]    2. Anticoagulant long-term use    3. TIA (transient ischemic attack)    4. History of pulmonary embolism    5. Chronic obstructive pulmonary disease, unspecified ([address]    6. Acquired hypothyroidism    7. Essential hypertension    8. Moderate mixed hyperlipidemia not requiring statin therapy             REVIEW OF SYMPTOMS    Review of Systems   Constitutional:  Negative for chills and fatigue. HENT:  Negative for congestion and sore throat. Respiratory:  Negative for shortness of breath and wheezing. Cardiovascular:  Negative for chest pain and palpitations. Gastrointestinal:  Negative for abdominal pain and nausea. Genitourinary:  Negative for frequency and urgency. Neurological:  Negative for light-headedness.      PAST MEDICAL HISTORY  Past Medical History:   Diagnosis Date    Anticoagulant long-term use     Arthritis     Cancer ([address]     Chronic hypoxemic respiratory failure ([address] 2/6/2018    Community acquired pneumonia of right upper lobe of lung 3/19/2022    COPD (chronic obstructive pulmonary disease) ([address]     Coronary atherosclerosis     COVID-19 virus detected 1/18/2021    DVT (deep venous thrombosis) ([address]     Former smoker 11/22/2021    Gastroesophageal reflux disease     Hiatal hernia     Lung infiltrate on CT 1/22/2019    On home oxygen therapy     on 24/7    Osteopenia     Phlebitis     Pulmonary nodule 7/5/2016    S/P left heart catheterization by percutaneous approach 6/27/2013    Sepsis due to pneumonia ([address] 11/14/2017

## 2023-06-20 NOTE — PATIENT INSTRUCTIONS
Take 9mg Coumadin today. Go up to 6mg daily except 3mg on Mondays only.  We will recheck your INR in 1 week

## 2023-06-20 NOTE — TELEPHONE ENCOUNTER
Spoke with pt confirmed current warfarin dose 6 mg m w f, 3 mg all other days.  Inr 1.4 per dr Mine Cameron tke 9 mg today only then change to 3 mg on m and 6 mg all other days    Left message for pt to return call to review new warfarin instructions

## 2023-06-20 NOTE — PROGRESS NOTES
Spoke with pt confirmed current warfarin dose 6 mg m w f, 3 mg all other days.  Inr 1.4 per dr Zeng Copjovany tke 9 mg today only then change to 3 mg on m and 6 mg all other days

## 2023-06-21 DIAGNOSIS — Z79.01 ANTICOAGULANT LONG-TERM USE: ICD-10-CM

## 2023-06-21 RX ORDER — WARFARIN SODIUM 6 MG/1
6 TABLET ORAL EVERY OTHER DAY
Qty: 90 TABLET | Refills: 0 | Status: SHIPPED | OUTPATIENT
Start: 2023-06-21

## 2023-06-21 RX ORDER — WARFARIN SODIUM 6 MG/1
TABLET ORAL
Qty: 30 TABLET | Refills: 0 | Status: SHIPPED | OUTPATIENT
Start: 2023-06-21

## 2023-06-23 PROBLEM — E78.2 MODERATE MIXED HYPERLIPIDEMIA NOT REQUIRING STATIN THERAPY: Status: ACTIVE | Noted: 2023-06-23

## 2023-06-27 ENCOUNTER — NURSE ONLY (OUTPATIENT)
Dept: FAMILY MEDICINE CLINIC | Age: 78
End: 2023-06-27
Payer: MEDICARE

## 2023-06-27 DIAGNOSIS — Z79.01 ANTICOAGULANT LONG-TERM USE: ICD-10-CM

## 2023-06-27 DIAGNOSIS — Z86.711 HISTORY OF PULMONARY EMBOLISM: ICD-10-CM

## 2023-06-27 DIAGNOSIS — G45.9 TIA (TRANSIENT ISCHEMIC ATTACK): ICD-10-CM

## 2023-06-27 LAB
INTERNATIONAL NORMALIZATION RATIO, POC: 3.6
PROTHROMBIN TIME, POC: NORMAL

## 2023-06-27 PROCEDURE — 85610 PROTHROMBIN TIME: CPT | Performed by: STUDENT IN AN ORGANIZED HEALTH CARE EDUCATION/TRAINING PROGRAM

## 2023-06-27 PROCEDURE — 99999 PR OFFICE/OUTPT VISIT,PROCEDURE ONLY: CPT | Performed by: STUDENT IN AN ORGANIZED HEALTH CARE EDUCATION/TRAINING PROGRAM

## 2023-07-01 DIAGNOSIS — J44.9 COPD, VERY SEVERE (HCC): ICD-10-CM

## 2023-07-03 RX ORDER — ALBUTEROL SULFATE 2.5 MG/3ML
SOLUTION RESPIRATORY (INHALATION)
Qty: 540 EACH | Refills: 1 | Status: SHIPPED | OUTPATIENT
Start: 2023-07-03

## 2023-07-13 DIAGNOSIS — F41.9 ANXIETY: ICD-10-CM

## 2023-07-13 DIAGNOSIS — Z86.718 HISTORY OF DVT OF LOWER EXTREMITY: ICD-10-CM

## 2023-07-13 DIAGNOSIS — J44.9 COPD, VERY SEVERE (HCC): ICD-10-CM

## 2023-07-13 DIAGNOSIS — G45.9 TIA (TRANSIENT ISCHEMIC ATTACK): ICD-10-CM

## 2023-07-13 DIAGNOSIS — Z79.01 ANTICOAGULANT LONG-TERM USE: ICD-10-CM

## 2023-07-13 DIAGNOSIS — E78.00 PURE HYPERCHOLESTEROLEMIA: ICD-10-CM

## 2023-07-13 DIAGNOSIS — M81.0 AGE-RELATED OSTEOPOROSIS WITHOUT CURRENT PATHOLOGICAL FRACTURE: ICD-10-CM

## 2023-07-13 RX ORDER — HYDROXYZINE HYDROCHLORIDE 25 MG/1
TABLET, FILM COATED ORAL
Qty: 90 TABLET | Refills: 1 | Status: SHIPPED | OUTPATIENT
Start: 2023-07-13

## 2023-07-13 RX ORDER — ROSUVASTATIN CALCIUM 20 MG/1
TABLET, COATED ORAL
Qty: 90 TABLET | Refills: 1 | Status: SHIPPED | OUTPATIENT
Start: 2023-07-13

## 2023-07-13 RX ORDER — ROFLUMILAST 500 UG/1
TABLET ORAL
Qty: 90 TABLET | Refills: 1 | Status: SHIPPED | OUTPATIENT
Start: 2023-07-13

## 2023-07-13 RX ORDER — WARFARIN SODIUM 6 MG/1
TABLET ORAL
Qty: 30 TABLET | Refills: 0 | Status: SHIPPED | OUTPATIENT
Start: 2023-07-13

## 2023-07-13 RX ORDER — MONTELUKAST SODIUM 10 MG/1
TABLET ORAL
Qty: 90 TABLET | Refills: 1 | Status: SHIPPED | OUTPATIENT
Start: 2023-07-13 | End: 2023-09-12 | Stop reason: SDUPTHER

## 2023-07-13 RX ORDER — RISEDRONATE SODIUM 35 MG/1
TABLET, FILM COATED ORAL
Qty: 12 TABLET | Refills: 1 | Status: SHIPPED | OUTPATIENT
Start: 2023-07-13

## 2023-07-18 ENCOUNTER — ANTI-COAG VISIT (OUTPATIENT)
Dept: FAMILY MEDICINE CLINIC | Age: 78
End: 2023-07-18

## 2023-07-18 ENCOUNTER — NURSE ONLY (OUTPATIENT)
Dept: FAMILY MEDICINE CLINIC | Age: 78
End: 2023-07-18
Payer: MEDICARE

## 2023-07-18 DIAGNOSIS — G45.9 TIA (TRANSIENT ISCHEMIC ATTACK): ICD-10-CM

## 2023-07-18 DIAGNOSIS — Z79.01 ANTICOAGULANT LONG-TERM USE: Primary | ICD-10-CM

## 2023-07-18 DIAGNOSIS — Z86.711 HISTORY OF PULMONARY EMBOLISM: ICD-10-CM

## 2023-07-18 DIAGNOSIS — Z79.01 ANTICOAGULANT LONG-TERM USE: ICD-10-CM

## 2023-07-18 DIAGNOSIS — Z86.718 HISTORY OF DVT OF LOWER EXTREMITY: ICD-10-CM

## 2023-07-18 LAB
INTERNATIONAL NORMALIZATION RATIO, POC: 3.6
PROTHROMBIN TIME, POC: 43.5

## 2023-07-18 PROCEDURE — 85610 PROTHROMBIN TIME: CPT | Performed by: STUDENT IN AN ORGANIZED HEALTH CARE EDUCATION/TRAINING PROGRAM

## 2023-07-18 NOTE — PROGRESS NOTES
Confirmed current warfarin dose 3 mg on Mon & Weds 6 mg all other days. Inr 3.6 . Per Dr Colin Duran change to hold today then take 3 mg on M, W ,F & Sun all other days take 6 mg.  Recheck in 7 days

## 2023-07-25 ENCOUNTER — OFFICE VISIT (OUTPATIENT)
Dept: FAMILY MEDICINE CLINIC | Age: 78
End: 2023-07-25
Payer: MEDICARE

## 2023-07-25 VITALS — HEIGHT: 64 IN | BODY MASS INDEX: 23.52 KG/M2 | RESPIRATION RATE: 16 BRPM

## 2023-07-25 DIAGNOSIS — Z86.718 HISTORY OF DVT OF LOWER EXTREMITY: Primary | ICD-10-CM

## 2023-07-25 LAB — INTERNATIONAL NORMALIZATION RATIO, POC: 1.3

## 2023-07-25 PROCEDURE — 85610 PROTHROMBIN TIME: CPT | Performed by: STUDENT IN AN ORGANIZED HEALTH CARE EDUCATION/TRAINING PROGRAM

## 2023-07-25 PROCEDURE — 99999 PR OFFICE/OUTPT VISIT,PROCEDURE ONLY: CPT | Performed by: STUDENT IN AN ORGANIZED HEALTH CARE EDUCATION/TRAINING PROGRAM

## 2023-07-25 NOTE — PROGRESS NOTES
INR today 1.3  Confirmed dose 3 mg on Mon and Wed 6 mg on all others. Per Dr. Paris Mask change to 3 mg on Mondays only and 6 mg all others. Re check in 2 weeks.

## 2023-08-07 ENCOUNTER — OFFICE VISIT (OUTPATIENT)
Dept: PULMONOLOGY | Age: 78
End: 2023-08-07
Payer: MEDICARE

## 2023-08-07 VITALS
BODY MASS INDEX: 23.92 KG/M2 | WEIGHT: 140.13 LBS | HEIGHT: 64 IN | OXYGEN SATURATION: 93 % | SYSTOLIC BLOOD PRESSURE: 132 MMHG | HEART RATE: 78 BPM | DIASTOLIC BLOOD PRESSURE: 80 MMHG | RESPIRATION RATE: 14 BRPM

## 2023-08-07 DIAGNOSIS — R06.02 SHORTNESS OF BREATH: ICD-10-CM

## 2023-08-07 DIAGNOSIS — J96.11 CHRONIC HYPOXEMIC RESPIRATORY FAILURE (HCC): Chronic | ICD-10-CM

## 2023-08-07 DIAGNOSIS — R91.1 PULMONARY NODULE: Chronic | ICD-10-CM

## 2023-08-07 DIAGNOSIS — I27.20 MILD PULMONARY HYPERTENSION (HCC): ICD-10-CM

## 2023-08-07 DIAGNOSIS — J44.9 COPD, VERY SEVERE (HCC): Primary | Chronic | ICD-10-CM

## 2023-08-07 DIAGNOSIS — Z87.891 FORMER SMOKER: ICD-10-CM

## 2023-08-07 PROCEDURE — G8427 DOCREV CUR MEDS BY ELIG CLIN: HCPCS | Performed by: INTERNAL MEDICINE

## 2023-08-07 PROCEDURE — 3023F SPIROM DOC REV: CPT | Performed by: INTERNAL MEDICINE

## 2023-08-07 PROCEDURE — G8399 PT W/DXA RESULTS DOCUMENT: HCPCS | Performed by: INTERNAL MEDICINE

## 2023-08-07 PROCEDURE — 1036F TOBACCO NON-USER: CPT | Performed by: INTERNAL MEDICINE

## 2023-08-07 PROCEDURE — 3079F DIAST BP 80-89 MM HG: CPT | Performed by: INTERNAL MEDICINE

## 2023-08-07 PROCEDURE — 99213 OFFICE O/P EST LOW 20 MIN: CPT | Performed by: INTERNAL MEDICINE

## 2023-08-07 PROCEDURE — 3075F SYST BP GE 130 - 139MM HG: CPT | Performed by: INTERNAL MEDICINE

## 2023-08-07 PROCEDURE — 1090F PRES/ABSN URINE INCON ASSESS: CPT | Performed by: INTERNAL MEDICINE

## 2023-08-07 PROCEDURE — G8420 CALC BMI NORM PARAMETERS: HCPCS | Performed by: INTERNAL MEDICINE

## 2023-08-07 PROCEDURE — 1123F ACP DISCUSS/DSCN MKR DOCD: CPT | Performed by: INTERNAL MEDICINE

## 2023-08-08 ENCOUNTER — ANTI-COAG VISIT (OUTPATIENT)
Dept: FAMILY MEDICINE CLINIC | Age: 78
End: 2023-08-08

## 2023-08-08 ENCOUNTER — NURSE ONLY (OUTPATIENT)
Dept: FAMILY MEDICINE CLINIC | Age: 78
End: 2023-08-08

## 2023-08-08 DIAGNOSIS — G45.9 TIA (TRANSIENT ISCHEMIC ATTACK): Primary | ICD-10-CM

## 2023-08-08 DIAGNOSIS — Z79.01 ANTICOAGULANT LONG-TERM USE: ICD-10-CM

## 2023-08-08 LAB
INTERNATIONAL NORMALIZATION RATIO, POC: 2.8
PROTHROMBIN TIME, POC: 33.9

## 2023-08-08 PROCEDURE — 99999 PR OFFICE/OUTPT VISIT,PROCEDURE ONLY: CPT | Performed by: FAMILY MEDICINE

## 2023-08-08 NOTE — PROGRESS NOTES
Confirmed current warfarin is 3 mg on Monday & 6 mg all other days.  Inr 2.8. keep same recheck in 4 weeks

## 2023-08-14 DIAGNOSIS — E03.9 ACQUIRED HYPOTHYROIDISM: ICD-10-CM

## 2023-08-15 RX ORDER — LEVOTHYROXINE SODIUM 0.07 MG/1
TABLET ORAL
Qty: 90 TABLET | Refills: 1 | Status: SHIPPED | OUTPATIENT
Start: 2023-08-15

## 2023-08-30 DIAGNOSIS — E87.6 HYPOKALEMIA: ICD-10-CM

## 2023-08-30 RX ORDER — BACLOFEN 20 MG
TABLET ORAL
Qty: 90 TABLET | Refills: 0 | Status: SHIPPED | OUTPATIENT
Start: 2023-08-30

## 2023-09-05 ENCOUNTER — NURSE ONLY (OUTPATIENT)
Dept: FAMILY MEDICINE CLINIC | Age: 78
End: 2023-09-05
Payer: MEDICARE

## 2023-09-05 ENCOUNTER — ANTI-COAG VISIT (OUTPATIENT)
Dept: FAMILY MEDICINE CLINIC | Age: 78
End: 2023-09-05

## 2023-09-05 DIAGNOSIS — Z79.01 ANTICOAGULANT LONG-TERM USE: ICD-10-CM

## 2023-09-05 DIAGNOSIS — Z86.711 HISTORY OF PULMONARY EMBOLISM: ICD-10-CM

## 2023-09-05 DIAGNOSIS — Z86.718 HISTORY OF DVT OF LOWER EXTREMITY: Primary | ICD-10-CM

## 2023-09-05 DIAGNOSIS — G45.9 TIA (TRANSIENT ISCHEMIC ATTACK): ICD-10-CM

## 2023-09-05 LAB
INTERNATIONAL NORMALIZATION RATIO, POC: 3.3
PROTHROMBIN TIME, POC: 39.2

## 2023-09-05 PROCEDURE — 99999 PR OFFICE/OUTPT VISIT,PROCEDURE ONLY: CPT | Performed by: STUDENT IN AN ORGANIZED HEALTH CARE EDUCATION/TRAINING PROGRAM

## 2023-09-05 PROCEDURE — 85610 PROTHROMBIN TIME: CPT | Performed by: STUDENT IN AN ORGANIZED HEALTH CARE EDUCATION/TRAINING PROGRAM

## 2023-09-05 NOTE — PROGRESS NOTES
Confirmed current warfarin is 3 mg on Monday & 6 mg all other days.  Inr 3.3. per Александр PA hold 24 hr change to 3 mg 2 days & 6 mg 5 days recheck in 1 week

## 2023-09-10 DIAGNOSIS — J44.9 CHRONIC OBSTRUCTIVE PULMONARY DISEASE, UNSPECIFIED (HCC): ICD-10-CM

## 2023-09-11 RX ORDER — ALBUTEROL SULFATE 90 UG/1
AEROSOL, METERED RESPIRATORY (INHALATION)
Qty: 18 EACH | Refills: 1 | Status: SHIPPED | OUTPATIENT
Start: 2023-09-11 | End: 2023-09-12 | Stop reason: SDUPTHER

## 2023-09-12 ENCOUNTER — NURSE ONLY (OUTPATIENT)
Dept: FAMILY MEDICINE CLINIC | Age: 78
End: 2023-09-12

## 2023-09-12 DIAGNOSIS — J44.9 CHRONIC OBSTRUCTIVE PULMONARY DISEASE, UNSPECIFIED (HCC): ICD-10-CM

## 2023-09-12 DIAGNOSIS — J44.9 COPD, VERY SEVERE (HCC): ICD-10-CM

## 2023-09-12 DIAGNOSIS — E78.00 PURE HYPERCHOLESTEROLEMIA: ICD-10-CM

## 2023-09-12 DIAGNOSIS — K21.9 GASTROESOPHAGEAL REFLUX DISEASE, UNSPECIFIED WHETHER ESOPHAGITIS PRESENT: ICD-10-CM

## 2023-09-12 DIAGNOSIS — E87.6 HYPOKALEMIA: ICD-10-CM

## 2023-09-12 RX ORDER — ALBUTEROL SULFATE 90 UG/1
AEROSOL, METERED RESPIRATORY (INHALATION)
Qty: 18 EACH | Refills: 1 | Status: SHIPPED | OUTPATIENT
Start: 2023-09-12

## 2023-09-12 RX ORDER — POTASSIUM CHLORIDE 750 MG/1
TABLET, EXTENDED RELEASE ORAL
Qty: 145 TABLET | Refills: 1 | Status: SHIPPED | OUTPATIENT
Start: 2023-09-12

## 2023-09-12 RX ORDER — PREDNISONE 5 MG/1
5 TABLET ORAL DAILY
Qty: 90 TABLET | Refills: 3 | Status: SHIPPED | OUTPATIENT
Start: 2023-09-12

## 2023-09-12 RX ORDER — ESOMEPRAZOLE MAGNESIUM 40 MG/1
40 CAPSULE, DELAYED RELEASE ORAL 2 TIMES DAILY
Qty: 180 CAPSULE | Refills: 1 | Status: SHIPPED | OUTPATIENT
Start: 2023-09-12 | End: 2024-03-10

## 2023-09-12 RX ORDER — ROSUVASTATIN CALCIUM 20 MG/1
TABLET, COATED ORAL
Qty: 90 TABLET | Refills: 1 | Status: SHIPPED | OUTPATIENT
Start: 2023-09-12

## 2023-09-12 RX ORDER — BUDESONIDE, GLYCOPYRROLATE, AND FORMOTEROL FUMARATE 160; 9; 4.8 UG/1; UG/1; UG/1
2 AEROSOL, METERED RESPIRATORY (INHALATION) 2 TIMES DAILY
Qty: 1 EACH | Refills: 11 | Status: SHIPPED | OUTPATIENT
Start: 2023-09-12

## 2023-09-12 RX ORDER — MONTELUKAST SODIUM 10 MG/1
TABLET ORAL
Qty: 90 TABLET | Refills: 1 | Status: SHIPPED | OUTPATIENT
Start: 2023-09-12

## 2023-09-12 NOTE — PROGRESS NOTES
Confirmed dose 3 mg x 2 days and 6 mg all other days. Per Александр change to 3 mg x 1 day per week and 6 mg all other days. Recheck in 2 weeks.

## 2023-09-19 ENCOUNTER — TELEPHONE (OUTPATIENT)
Dept: FAMILY MEDICINE CLINIC | Age: 78
End: 2023-09-19

## 2023-09-19 ENCOUNTER — OFFICE VISIT (OUTPATIENT)
Dept: FAMILY MEDICINE CLINIC | Age: 78
End: 2023-09-19
Payer: MEDICARE

## 2023-09-19 VITALS
HEIGHT: 64 IN | WEIGHT: 138.1 LBS | SYSTOLIC BLOOD PRESSURE: 136 MMHG | OXYGEN SATURATION: 95 % | BODY MASS INDEX: 23.58 KG/M2 | DIASTOLIC BLOOD PRESSURE: 56 MMHG | HEART RATE: 73 BPM

## 2023-09-19 DIAGNOSIS — I48.91 ATRIAL FIBRILLATION, UNSPECIFIED TYPE (HCC): ICD-10-CM

## 2023-09-19 DIAGNOSIS — I10 ESSENTIAL HYPERTENSION: Primary | ICD-10-CM

## 2023-09-19 DIAGNOSIS — L29.9 PRURITUS: ICD-10-CM

## 2023-09-19 DIAGNOSIS — G45.9 TIA (TRANSIENT ISCHEMIC ATTACK): ICD-10-CM

## 2023-09-19 DIAGNOSIS — E03.9 ACQUIRED HYPOTHYROIDISM: ICD-10-CM

## 2023-09-19 DIAGNOSIS — Z79.01 ANTICOAGULANT LONG-TERM USE: ICD-10-CM

## 2023-09-19 DIAGNOSIS — Z86.711 HISTORY OF PULMONARY EMBOLISM: ICD-10-CM

## 2023-09-19 DIAGNOSIS — E78.2 MODERATE MIXED HYPERLIPIDEMIA NOT REQUIRING STATIN THERAPY: ICD-10-CM

## 2023-09-19 DIAGNOSIS — J44.9 COPD, VERY SEVERE (HCC): Chronic | ICD-10-CM

## 2023-09-19 DIAGNOSIS — I10 ESSENTIAL HYPERTENSION: ICD-10-CM

## 2023-09-19 LAB
INTERNATIONAL NORMALIZATION RATIO, POC: 2.4
PROTHROMBIN TIME, POC: 28.3

## 2023-09-19 PROCEDURE — 3023F SPIROM DOC REV: CPT | Performed by: STUDENT IN AN ORGANIZED HEALTH CARE EDUCATION/TRAINING PROGRAM

## 2023-09-19 PROCEDURE — 99214 OFFICE O/P EST MOD 30 MIN: CPT | Performed by: STUDENT IN AN ORGANIZED HEALTH CARE EDUCATION/TRAINING PROGRAM

## 2023-09-19 PROCEDURE — G8420 CALC BMI NORM PARAMETERS: HCPCS | Performed by: STUDENT IN AN ORGANIZED HEALTH CARE EDUCATION/TRAINING PROGRAM

## 2023-09-19 PROCEDURE — 85610 PROTHROMBIN TIME: CPT | Performed by: STUDENT IN AN ORGANIZED HEALTH CARE EDUCATION/TRAINING PROGRAM

## 2023-09-19 PROCEDURE — G8399 PT W/DXA RESULTS DOCUMENT: HCPCS | Performed by: STUDENT IN AN ORGANIZED HEALTH CARE EDUCATION/TRAINING PROGRAM

## 2023-09-19 PROCEDURE — 1090F PRES/ABSN URINE INCON ASSESS: CPT | Performed by: STUDENT IN AN ORGANIZED HEALTH CARE EDUCATION/TRAINING PROGRAM

## 2023-09-19 PROCEDURE — 1036F TOBACCO NON-USER: CPT | Performed by: STUDENT IN AN ORGANIZED HEALTH CARE EDUCATION/TRAINING PROGRAM

## 2023-09-19 PROCEDURE — 3078F DIAST BP <80 MM HG: CPT | Performed by: STUDENT IN AN ORGANIZED HEALTH CARE EDUCATION/TRAINING PROGRAM

## 2023-09-19 PROCEDURE — G8427 DOCREV CUR MEDS BY ELIG CLIN: HCPCS | Performed by: STUDENT IN AN ORGANIZED HEALTH CARE EDUCATION/TRAINING PROGRAM

## 2023-09-19 PROCEDURE — 1123F ACP DISCUSS/DSCN MKR DOCD: CPT | Performed by: STUDENT IN AN ORGANIZED HEALTH CARE EDUCATION/TRAINING PROGRAM

## 2023-09-19 PROCEDURE — 3074F SYST BP LT 130 MM HG: CPT | Performed by: STUDENT IN AN ORGANIZED HEALTH CARE EDUCATION/TRAINING PROGRAM

## 2023-09-19 RX ORDER — DIAPER,BRIEF,INFANT-TODD,DISP
EACH MISCELLANEOUS
Qty: 60 G | Refills: 2 | Status: SHIPPED | OUTPATIENT
Start: 2023-09-19 | End: 2023-09-26

## 2023-09-19 NOTE — TELEPHONE ENCOUNTER
To  Dr. Cindy Franco-    Patient saw you today, but no one told her how much coumadin to take. She was taking  3mg on Monday and every other day, 6 mg. Her INR today was 2.4     Please call her back today so she can take the correct dosing of coumadin.

## 2023-09-19 NOTE — PROGRESS NOTES
9/28/2023    Rosibel Angulo    Chief Complaint   Patient presents with    3 Month Follow-Up     HTN    Office Visit for Anticoagulation Management    OTHER     Back itching, on going for a while but seems like its becoming more often, has tried lotion, says its a tingling feeling, would like to know about a cream to help. Foot Swelling     Both feet and lower legs are swelling. HPI  History was obtained from patient. Chele Resendez is a 66 y.o. female with a PMHx as listed below who presents today for follow up on chronic conditions    In wheelchair today, capable walking around house    Folowing with Dr. Rashid Aguilar severe COPD      1. Essential hypertension    2. COPD, very severe ([address])    3. Atrial fibrillation, unspecified type ([address])    4. Anticoagulant long-term use    5. TIA (transient ischemic attack)    6. History of pulmonary embolism    7. Pruritus             REVIEW OF SYMPTOMS    Review of Systems   Constitutional:  Negative for chills and fatigue. HENT:  Negative for congestion and sore throat. Respiratory:  Negative for shortness of breath and wheezing. Cardiovascular:  Negative for chest pain and palpitations. Gastrointestinal:  Negative for abdominal pain and nausea. Genitourinary:  Negative for frequency and urgency. Neurological:  Negative for light-headedness.        PAST MEDICAL HISTORY  Past Medical History:   Diagnosis Date    Anticoagulant long-term use     Arthritis     Cancer ([address])     Chronic hypoxemic respiratory failure ([address]) 2/6/2018    Community acquired pneumonia of right upper lobe of lung 3/19/2022    COPD (chronic obstructive pulmonary disease) ([address])     Coronary atherosclerosis     COVID-19 virus detected 1/18/2021    DVT (deep venous thrombosis) ([address])     Former smoker 11/22/2021    Gastroesophageal reflux disease     Hiatal hernia     Lung infiltrate on CT 1/22/2019    On home oxygen therapy     on 24/7    Osteopenia     Phlebitis     Pulmonary nodule 7/5/2016

## 2023-09-20 LAB
ALBUMIN SERPL-MCNC: 4 G/DL (ref 3.4–5)
ALBUMIN/GLOB SERPL: 2.4 {RATIO} (ref 1.1–2.2)
ALP SERPL-CCNC: 64 U/L (ref 40–129)
ALT SERPL-CCNC: 21 U/L (ref 10–40)
ANION GAP SERPL CALCULATED.3IONS-SCNC: 12 MMOL/L (ref 3–16)
AST SERPL-CCNC: 27 U/L (ref 15–37)
BASOPHILS # BLD: 0 K/UL (ref 0–0.2)
BASOPHILS NFR BLD: 0.2 %
BILIRUB SERPL-MCNC: 0.3 MG/DL (ref 0–1)
BUN SERPL-MCNC: 17 MG/DL (ref 7–20)
CALCIUM SERPL-MCNC: 9.6 MG/DL (ref 8.3–10.6)
CHLORIDE SERPL-SCNC: 100 MMOL/L (ref 99–110)
CHOLEST SERPL-MCNC: 197 MG/DL (ref 0–199)
CO2 SERPL-SCNC: 35 MMOL/L (ref 21–32)
CREAT SERPL-MCNC: 0.9 MG/DL (ref 0.6–1.2)
DEPRECATED RDW RBC AUTO: 15.1 % (ref 12.4–15.4)
EOSINOPHIL # BLD: 0.4 K/UL (ref 0–0.6)
EOSINOPHIL NFR BLD: 3.8 %
GFR SERPLBLD CREATININE-BSD FMLA CKD-EPI: >60 ML/MIN/{1.73_M2}
GLUCOSE SERPL-MCNC: 75 MG/DL (ref 70–99)
HCT VFR BLD AUTO: 40.6 % (ref 36–48)
HDLC SERPL-MCNC: 89 MG/DL (ref 40–60)
HGB BLD-MCNC: 13.7 G/DL (ref 12–16)
LDLC SERPL CALC-MCNC: 89 MG/DL
LYMPHOCYTES # BLD: 1.1 K/UL (ref 1–5.1)
LYMPHOCYTES NFR BLD: 11.8 %
MCH RBC QN AUTO: 31.7 PG (ref 26–34)
MCHC RBC AUTO-ENTMCNC: 33.8 G/DL (ref 31–36)
MCV RBC AUTO: 93.7 FL (ref 80–100)
MONOCYTES # BLD: 0.6 K/UL (ref 0–1.3)
MONOCYTES NFR BLD: 5.9 %
NEUTROPHILS # BLD: 7.4 K/UL (ref 1.7–7.7)
NEUTROPHILS NFR BLD: 78.3 %
PLATELET # BLD AUTO: 224 K/UL (ref 135–450)
PMV BLD AUTO: 8.8 FL (ref 5–10.5)
POTASSIUM SERPL-SCNC: 4.4 MMOL/L (ref 3.5–5.1)
PROT SERPL-MCNC: 5.7 G/DL (ref 6.4–8.2)
RBC # BLD AUTO: 4.34 M/UL (ref 4–5.2)
SODIUM SERPL-SCNC: 147 MMOL/L (ref 136–145)
TRIGL SERPL-MCNC: 94 MG/DL (ref 0–150)
TSH SERPL DL<=0.005 MIU/L-ACNC: 2.19 UIU/ML (ref 0.27–4.2)
VLDLC SERPL CALC-MCNC: 19 MG/DL
WBC # BLD AUTO: 9.4 K/UL (ref 4–11)

## 2023-09-28 ASSESSMENT — ENCOUNTER SYMPTOMS
NAUSEA: 0
SHORTNESS OF BREATH: 0
WHEEZING: 0
SORE THROAT: 0
ABDOMINAL PAIN: 0

## 2023-10-10 DIAGNOSIS — M19.90 ARTHRITIS: ICD-10-CM

## 2023-10-10 DIAGNOSIS — E87.6 HYPOKALEMIA: ICD-10-CM

## 2023-10-10 RX ORDER — GABAPENTIN 400 MG/1
400 CAPSULE ORAL EVERY EVENING
Qty: 90 CAPSULE | Refills: 0 | Status: SHIPPED | OUTPATIENT
Start: 2023-10-10 | End: 2024-01-08

## 2023-10-10 RX ORDER — POTASSIUM CHLORIDE 750 MG/1
TABLET, EXTENDED RELEASE ORAL
Qty: 90 TABLET | Refills: 2 | Status: SHIPPED | OUTPATIENT
Start: 2023-10-10

## 2023-10-16 DIAGNOSIS — Z86.718 HISTORY OF DVT OF LOWER EXTREMITY: ICD-10-CM

## 2023-10-16 RX ORDER — WARFARIN SODIUM 3 MG/1
TABLET ORAL
Qty: 180 TABLET | Refills: 1 | Status: SHIPPED | OUTPATIENT
Start: 2023-10-16

## 2023-10-17 ENCOUNTER — NURSE ONLY (OUTPATIENT)
Dept: FAMILY MEDICINE CLINIC | Age: 78
End: 2023-10-17
Payer: MEDICARE

## 2023-10-17 ENCOUNTER — ANTI-COAG VISIT (OUTPATIENT)
Dept: FAMILY MEDICINE CLINIC | Age: 78
End: 2023-10-17

## 2023-10-17 DIAGNOSIS — G45.9 TIA (TRANSIENT ISCHEMIC ATTACK): ICD-10-CM

## 2023-10-17 DIAGNOSIS — Z79.01 ANTICOAGULANT LONG-TERM USE: ICD-10-CM

## 2023-10-17 DIAGNOSIS — Z86.711 HISTORY OF PULMONARY EMBOLISM: ICD-10-CM

## 2023-10-17 DIAGNOSIS — Z86.718 HISTORY OF DVT OF LOWER EXTREMITY: Primary | ICD-10-CM

## 2023-10-17 LAB
INTERNATIONAL NORMALIZATION RATIO, POC: 3.3
PROTHROMBIN TIME, POC: 39.4

## 2023-10-17 PROCEDURE — 85610 PROTHROMBIN TIME: CPT | Performed by: STUDENT IN AN ORGANIZED HEALTH CARE EDUCATION/TRAINING PROGRAM

## 2023-10-17 NOTE — PROGRESS NOTES
Confirmed current warfarin is 3 mg on Monday & 6 mg all other days.  Inr 3.3. per per Dr Raina Lozada hold 1 dose  and then restart 3 mg x 1 day & 6 mg x 6 days, recheck in 4 weeks

## 2023-11-15 ENCOUNTER — NURSE ONLY (OUTPATIENT)
Dept: FAMILY MEDICINE CLINIC | Age: 78
End: 2023-11-15

## 2023-11-15 ENCOUNTER — ANTI-COAG VISIT (OUTPATIENT)
Dept: FAMILY MEDICINE CLINIC | Age: 78
End: 2023-11-15

## 2023-11-15 DIAGNOSIS — I48.91 ATRIAL FIBRILLATION, UNSPECIFIED TYPE (HCC): Primary | ICD-10-CM

## 2023-11-15 DIAGNOSIS — Z79.01 ANTICOAGULANT LONG-TERM USE: ICD-10-CM

## 2023-11-15 DIAGNOSIS — G45.9 TIA (TRANSIENT ISCHEMIC ATTACK): ICD-10-CM

## 2023-11-15 DIAGNOSIS — Z86.711 HISTORY OF PULMONARY EMBOLISM: ICD-10-CM

## 2023-11-15 DIAGNOSIS — Z23 NEED FOR PNEUMOCOCCAL VACCINE: Primary | ICD-10-CM

## 2023-11-15 LAB
INTERNATIONAL NORMALIZATION RATIO, POC: 2.8
PROTHROMBIN TIME, POC: 33.7

## 2023-11-15 NOTE — TELEPHONE ENCOUNTER
NEED NOTE ADDED TO OV STATING PT NEEDS A NEBULIZER.  LET PARIS KNOW WHEN THIS IS DONE AND SHE WILL PRINT FROM EP[employer] Smiley Robins is a 84 year old female presenting for   Chief Complaint   Patient presents with   • Follow-up     Denies Latex allergy or sensitivity.    Medication verified, no changes  Refills needed today: Yes    Health Maintenance Due   Topic Date Due   • Shingles Vaccine (1 of 2) Never done   • COVID-19 Vaccine (3 - Pfizer risk series) 05/25/2021   • Influenza Vaccine (1) 09/01/2023   • DM/CKD Microalbumin  11/04/2023       Patient is due for topics as listed above but is not proceeding with Immunization(s) COVID-19 and Influenza at this time.       Unaddressed Risk Adjusted HCC Categories and Diagnoses  HCC 88 - Angina Pectoris  - Unaddressed Dx:Coronary Artery Disease Of Native Artery Of Native Heart With Stable Angina Pectoris (Cmd)                Depression Screening:  Review Flowsheet  More data exists       9/7/2023   PHQ 2/9 Score   Adult PHQ 2 Score 0   Adult PHQ 2 Interpretation No further screening needed   Little interest or pleasure in activity? Not at all   Feeling down, depressed or hopeless? Not at all        Advance Directives:  on file

## 2023-11-17 DIAGNOSIS — J44.9 COPD, VERY SEVERE (HCC): ICD-10-CM

## 2023-11-17 DIAGNOSIS — J44.9 CHRONIC OBSTRUCTIVE PULMONARY DISEASE, UNSPECIFIED (HCC): ICD-10-CM

## 2023-11-17 RX ORDER — ALBUTEROL SULFATE 90 UG/1
AEROSOL, METERED RESPIRATORY (INHALATION)
Qty: 18 EACH | Refills: 1 | Status: SHIPPED | OUTPATIENT
Start: 2023-11-17

## 2023-11-17 RX ORDER — ALBUTEROL SULFATE 2.5 MG/3ML
SOLUTION RESPIRATORY (INHALATION)
Qty: 3 ML | Refills: 2 | Status: SHIPPED | OUTPATIENT
Start: 2023-11-17 | End: 2024-02-15

## 2023-11-19 DIAGNOSIS — Z79.01 ANTICOAGULANT LONG-TERM USE: ICD-10-CM

## 2023-11-20 RX ORDER — WARFARIN SODIUM 6 MG/1
TABLET ORAL
Qty: 90 TABLET | Refills: 0 | Status: SHIPPED | OUTPATIENT
Start: 2023-11-20

## 2023-11-21 ENCOUNTER — APPOINTMENT (OUTPATIENT)
Dept: CT IMAGING | Age: 78
End: 2023-11-21
Payer: MEDICARE

## 2023-11-21 ENCOUNTER — HOSPITAL ENCOUNTER (INPATIENT)
Age: 78
LOS: 3 days | Discharge: HOME | End: 2023-11-24
Attending: STUDENT IN AN ORGANIZED HEALTH CARE EDUCATION/TRAINING PROGRAM | Admitting: STUDENT IN AN ORGANIZED HEALTH CARE EDUCATION/TRAINING PROGRAM
Payer: MEDICARE

## 2023-11-21 ENCOUNTER — APPOINTMENT (OUTPATIENT)
Dept: GENERAL RADIOLOGY | Age: 78
End: 2023-11-21
Payer: MEDICARE

## 2023-11-21 ENCOUNTER — TELEPHONE (OUTPATIENT)
Dept: PULMONOLOGY | Age: 78
End: 2023-11-21

## 2023-11-21 DIAGNOSIS — R79.89 ELEVATED TROPONIN: ICD-10-CM

## 2023-11-21 DIAGNOSIS — R07.81 PLEURITIC CHEST PAIN: Primary | ICD-10-CM

## 2023-11-21 DIAGNOSIS — S22.040A COMPRESSION FRACTURE OF T4 VERTEBRA, INITIAL ENCOUNTER (HCC): ICD-10-CM

## 2023-11-21 LAB
ALBUMIN SERPL-MCNC: 3.8 GM/DL (ref 3.4–5)
ALP BLD-CCNC: 66 IU/L (ref 40–129)
ALT SERPL-CCNC: 23 U/L (ref 10–40)
ANION GAP SERPL CALCULATED.3IONS-SCNC: 11 MMOL/L (ref 4–16)
AST SERPL-CCNC: 25 IU/L (ref 15–37)
B PARAP IS1001 DNA NPH QL NAA+NON-PROBE: NOT DETECTED
B PERT.PT PRMT NPH QL NAA+NON-PROBE: NOT DETECTED
BASE EXCESS MIXED: 9.4 (ref 0–2.3)
BASOPHILS ABSOLUTE: 0 K/CU MM
BASOPHILS RELATIVE PERCENT: 0.5 % (ref 0–1)
BILIRUB SERPL-MCNC: 0.2 MG/DL (ref 0–1)
BUN SERPL-MCNC: 15 MG/DL (ref 6–23)
C PNEUM DNA NPH QL NAA+NON-PROBE: NOT DETECTED
CALCIUM SERPL-MCNC: 9.6 MG/DL (ref 8.3–10.6)
CHLORIDE BLD-SCNC: 96 MMOL/L (ref 99–110)
CO2: 34 MMOL/L (ref 21–32)
COMMENT: ABNORMAL
CREAT SERPL-MCNC: 0.8 MG/DL (ref 0.6–1.1)
DIFFERENTIAL TYPE: ABNORMAL
EOSINOPHILS ABSOLUTE: 0 K/CU MM
EOSINOPHILS RELATIVE PERCENT: 0.2 % (ref 0–3)
FLUAV H1 2009 PAN RNA NPH NAA+NON-PROBE: NOT DETECTED
FLUAV H1 RNA NPH QL NAA+NON-PROBE: NOT DETECTED
FLUAV H3 RNA NPH QL NAA+NON-PROBE: NOT DETECTED
FLUAV RNA NPH QL NAA+NON-PROBE: NOT DETECTED
FLUBV RNA NPH QL NAA+NON-PROBE: NOT DETECTED
GFR SERPL CREATININE-BSD FRML MDRD: >60 ML/MIN/1.73M2
GLUCOSE SERPL-MCNC: 203 MG/DL (ref 70–99)
HADV DNA NPH QL NAA+NON-PROBE: NOT DETECTED
HCO3 VENOUS: 40 MMOL/L (ref 19–25)
HCOV 229E RNA NPH QL NAA+NON-PROBE: NOT DETECTED
HCOV HKU1 RNA NPH QL NAA+NON-PROBE: NOT DETECTED
HCOV NL63 RNA NPH QL NAA+NON-PROBE: NOT DETECTED
HCOV OC43 RNA NPH QL NAA+NON-PROBE: NOT DETECTED
HCT VFR BLD CALC: 43.5 % (ref 37–47)
HEMOGLOBIN: 13.3 GM/DL (ref 12.5–16)
HMPV RNA NPH QL NAA+NON-PROBE: NOT DETECTED
HPIV1 RNA NPH QL NAA+NON-PROBE: NOT DETECTED
HPIV2 RNA NPH QL NAA+NON-PROBE: NOT DETECTED
HPIV3 RNA NPH QL NAA+NON-PROBE: NOT DETECTED
HPIV4 RNA NPH QL NAA+NON-PROBE: NOT DETECTED
IMMATURE NEUTROPHIL %: 0.6 % (ref 0–0.43)
INR BLD: 3 INDEX
LYMPHOCYTES ABSOLUTE: 2 K/CU MM
LYMPHOCYTES RELATIVE PERCENT: 24.6 % (ref 24–44)
M PNEUMO DNA NPH QL NAA+NON-PROBE: NOT DETECTED
MAGNESIUM: 1.9 MG/DL (ref 1.8–2.4)
MCH RBC QN AUTO: 30.4 PG (ref 27–31)
MCHC RBC AUTO-ENTMCNC: 30.6 % (ref 32–36)
MCV RBC AUTO: 99.3 FL (ref 78–100)
MONOCYTES ABSOLUTE: 0.7 K/CU MM
MONOCYTES RELATIVE PERCENT: 8.2 % (ref 0–4)
NUCLEATED RBC %: 0 %
O2 SAT, VEN: 57.1 % (ref 50–70)
PCO2, VEN: 87 MMHG (ref 38–52)
PDW BLD-RTO: 13.9 % (ref 11.7–14.9)
PH VENOUS: 7.27 (ref 7.32–7.42)
PLATELET # BLD: 231 K/CU MM (ref 140–440)
PMV BLD AUTO: 9.7 FL (ref 7.5–11.1)
PO2, VEN: 39 MMHG (ref 28–48)
POTASSIUM SERPL-SCNC: 3.8 MMOL/L (ref 3.5–5.1)
PRO-BNP: 607.1 PG/ML
PROTHROMBIN TIME: 31.4 SECONDS (ref 11.7–14.5)
RBC # BLD: 4.38 M/CU MM (ref 4.2–5.4)
RSV RNA NPH QL NAA+NON-PROBE: NOT DETECTED
RV+EV RNA NPH QL NAA+NON-PROBE: NOT DETECTED
SARS-COV-2 RNA NPH QL NAA+NON-PROBE: NOT DETECTED
SEGMENTED NEUTROPHILS ABSOLUTE COUNT: 5.4 K/CU MM
SEGMENTED NEUTROPHILS RELATIVE PERCENT: 65.9 % (ref 36–66)
SODIUM BLD-SCNC: 141 MMOL/L (ref 135–145)
TOTAL IMMATURE NEUTOROPHIL: 0.05 K/CU MM
TOTAL NUCLEATED RBC: 0 K/CU MM
TOTAL PROTEIN: 6.3 GM/DL (ref 6.4–8.2)
TROPONIN, HIGH SENSITIVITY: 20 NG/L (ref 0–14)
TROPONIN, HIGH SENSITIVITY: 21 NG/L (ref 0–14)
WBC # BLD: 8.3 K/CU MM (ref 4–10.5)

## 2023-11-21 PROCEDURE — 84484 ASSAY OF TROPONIN QUANT: CPT

## 2023-11-21 PROCEDURE — 71275 CT ANGIOGRAPHY CHEST: CPT

## 2023-11-21 PROCEDURE — 85610 PROTHROMBIN TIME: CPT

## 2023-11-21 PROCEDURE — 83735 ASSAY OF MAGNESIUM: CPT

## 2023-11-21 PROCEDURE — 6360000002 HC RX W HCPCS: Performed by: STUDENT IN AN ORGANIZED HEALTH CARE EDUCATION/TRAINING PROGRAM

## 2023-11-21 PROCEDURE — 6370000000 HC RX 637 (ALT 250 FOR IP): Performed by: STUDENT IN AN ORGANIZED HEALTH CARE EDUCATION/TRAINING PROGRAM

## 2023-11-21 PROCEDURE — 71045 X-RAY EXAM CHEST 1 VIEW: CPT

## 2023-11-21 PROCEDURE — 85025 COMPLETE CBC W/AUTO DIFF WBC: CPT

## 2023-11-21 PROCEDURE — 94640 AIRWAY INHALATION TREATMENT: CPT

## 2023-11-21 PROCEDURE — 83880 ASSAY OF NATRIURETIC PEPTIDE: CPT

## 2023-11-21 PROCEDURE — 2060000000 HC ICU INTERMEDIATE R&B

## 2023-11-21 PROCEDURE — 0202U NFCT DS 22 TRGT SARS-COV-2: CPT

## 2023-11-21 PROCEDURE — 82805 BLOOD GASES W/O2 SATURATION: CPT

## 2023-11-21 PROCEDURE — 80053 COMPREHEN METABOLIC PANEL: CPT

## 2023-11-21 PROCEDURE — 93005 ELECTROCARDIOGRAM TRACING: CPT | Performed by: STUDENT IN AN ORGANIZED HEALTH CARE EDUCATION/TRAINING PROGRAM

## 2023-11-21 PROCEDURE — 99285 EMERGENCY DEPT VISIT HI MDM: CPT

## 2023-11-21 PROCEDURE — 6360000004 HC RX CONTRAST MEDICATION: Performed by: STUDENT IN AN ORGANIZED HEALTH CARE EDUCATION/TRAINING PROGRAM

## 2023-11-21 RX ORDER — ONDANSETRON 4 MG/1
4 TABLET, ORALLY DISINTEGRATING ORAL EVERY 8 HOURS PRN
Status: DISCONTINUED | OUTPATIENT
Start: 2023-11-21 | End: 2023-11-24 | Stop reason: HOSPADM

## 2023-11-21 RX ORDER — DILTIAZEM HYDROCHLORIDE 60 MG/1
60 TABLET, FILM COATED ORAL EVERY 8 HOURS SCHEDULED
Status: DISCONTINUED | OUTPATIENT
Start: 2023-11-21 | End: 2023-11-24 | Stop reason: HOSPADM

## 2023-11-21 RX ORDER — LEVOTHYROXINE SODIUM 0.07 MG/1
75 TABLET ORAL
Status: DISCONTINUED | OUTPATIENT
Start: 2023-11-22 | End: 2023-11-24 | Stop reason: HOSPADM

## 2023-11-21 RX ORDER — IPRATROPIUM BROMIDE AND ALBUTEROL SULFATE 2.5; .5 MG/3ML; MG/3ML
1 SOLUTION RESPIRATORY (INHALATION)
Status: DISCONTINUED | OUTPATIENT
Start: 2023-11-21 | End: 2023-11-24 | Stop reason: HOSPADM

## 2023-11-21 RX ORDER — BUDESONIDE 0.25 MG/2ML
250 INHALANT ORAL
Status: DISCONTINUED | OUTPATIENT
Start: 2023-11-21 | End: 2023-11-24 | Stop reason: HOSPADM

## 2023-11-21 RX ORDER — ALBUTEROL SULFATE 2.5 MG/3ML
2.5 SOLUTION RESPIRATORY (INHALATION) EVERY 4 HOURS PRN
Status: DISCONTINUED | OUTPATIENT
Start: 2023-11-21 | End: 2023-11-24 | Stop reason: HOSPADM

## 2023-11-21 RX ORDER — PANTOPRAZOLE SODIUM 40 MG/1
40 TABLET, DELAYED RELEASE ORAL
Status: DISCONTINUED | OUTPATIENT
Start: 2023-11-22 | End: 2023-11-24 | Stop reason: HOSPADM

## 2023-11-21 RX ORDER — POLYETHYLENE GLYCOL 3350 17 G/17G
17 POWDER, FOR SOLUTION ORAL DAILY PRN
Status: DISCONTINUED | OUTPATIENT
Start: 2023-11-21 | End: 2023-11-24 | Stop reason: HOSPADM

## 2023-11-21 RX ORDER — ROSUVASTATIN CALCIUM 20 MG/1
20 TABLET, COATED ORAL NIGHTLY
Status: DISCONTINUED | OUTPATIENT
Start: 2023-11-21 | End: 2023-11-24 | Stop reason: HOSPADM

## 2023-11-21 RX ORDER — GABAPENTIN 400 MG/1
400 CAPSULE ORAL EVERY EVENING
Status: DISCONTINUED | OUTPATIENT
Start: 2023-11-21 | End: 2023-11-24 | Stop reason: HOSPADM

## 2023-11-21 RX ORDER — SODIUM CHLORIDE 0.9 % (FLUSH) 0.9 %
5-40 SYRINGE (ML) INJECTION PRN
Status: DISCONTINUED | OUTPATIENT
Start: 2023-11-21 | End: 2023-11-24 | Stop reason: HOSPADM

## 2023-11-21 RX ORDER — ACETAMINOPHEN 650 MG/1
650 SUPPOSITORY RECTAL EVERY 6 HOURS PRN
Status: DISCONTINUED | OUTPATIENT
Start: 2023-11-21 | End: 2023-11-24 | Stop reason: HOSPADM

## 2023-11-21 RX ORDER — MONTELUKAST SODIUM 10 MG/1
10 TABLET ORAL NIGHTLY
Status: DISCONTINUED | OUTPATIENT
Start: 2023-11-21 | End: 2023-11-24 | Stop reason: HOSPADM

## 2023-11-21 RX ORDER — SODIUM CHLORIDE 0.9 % (FLUSH) 0.9 %
5-40 SYRINGE (ML) INJECTION EVERY 12 HOURS SCHEDULED
Status: DISCONTINUED | OUTPATIENT
Start: 2023-11-21 | End: 2023-11-24 | Stop reason: HOSPADM

## 2023-11-21 RX ORDER — ROFLUMILAST 500 UG/1
500 TABLET ORAL DAILY
Status: DISCONTINUED | OUTPATIENT
Start: 2023-11-22 | End: 2023-11-24 | Stop reason: HOSPADM

## 2023-11-21 RX ORDER — PREDNISONE 10 MG/1
40 TABLET ORAL DAILY
Status: DISCONTINUED | OUTPATIENT
Start: 2023-11-22 | End: 2023-11-24 | Stop reason: HOSPADM

## 2023-11-21 RX ORDER — ONDANSETRON 2 MG/ML
4 INJECTION INTRAMUSCULAR; INTRAVENOUS EVERY 6 HOURS PRN
Status: DISCONTINUED | OUTPATIENT
Start: 2023-11-21 | End: 2023-11-24 | Stop reason: HOSPADM

## 2023-11-21 RX ORDER — IPRATROPIUM BROMIDE AND ALBUTEROL SULFATE 2.5; .5 MG/3ML; MG/3ML
1 SOLUTION RESPIRATORY (INHALATION)
Status: DISCONTINUED | OUTPATIENT
Start: 2023-11-21 | End: 2023-11-21

## 2023-11-21 RX ORDER — TORSEMIDE 20 MG/1
20 TABLET ORAL DAILY
Status: DISCONTINUED | OUTPATIENT
Start: 2023-11-22 | End: 2023-11-24 | Stop reason: HOSPADM

## 2023-11-21 RX ORDER — AZITHROMYCIN 250 MG/1
500 TABLET, FILM COATED ORAL EVERY EVENING
Status: COMPLETED | OUTPATIENT
Start: 2023-11-21 | End: 2023-11-23

## 2023-11-21 RX ORDER — SODIUM CHLORIDE 9 MG/ML
500 INJECTION, SOLUTION INTRAVENOUS PRN
Status: DISCONTINUED | OUTPATIENT
Start: 2023-11-21 | End: 2023-11-24 | Stop reason: HOSPADM

## 2023-11-21 RX ORDER — ACETAMINOPHEN 325 MG/1
650 TABLET ORAL EVERY 6 HOURS PRN
Status: DISCONTINUED | OUTPATIENT
Start: 2023-11-21 | End: 2023-11-24 | Stop reason: HOSPADM

## 2023-11-21 RX ORDER — IPRATROPIUM BROMIDE AND ALBUTEROL SULFATE 2.5; .5 MG/3ML; MG/3ML
1 SOLUTION RESPIRATORY (INHALATION) ONCE
Status: COMPLETED | OUTPATIENT
Start: 2023-11-21 | End: 2023-11-21

## 2023-11-21 RX ORDER — METHYLPREDNISOLONE SODIUM SUCCINATE 125 MG/2ML
125 INJECTION, POWDER, LYOPHILIZED, FOR SOLUTION INTRAMUSCULAR; INTRAVENOUS ONCE
Status: COMPLETED | OUTPATIENT
Start: 2023-11-21 | End: 2023-11-21

## 2023-11-21 RX ADMIN — IOPAMIDOL 80 ML: 755 INJECTION, SOLUTION INTRAVENOUS at 12:07

## 2023-11-21 RX ADMIN — DILTIAZEM HYDROCHLORIDE 60 MG: 60 TABLET, FILM COATED ORAL at 21:12

## 2023-11-21 RX ADMIN — BUDESONIDE 250 MCG: 0.25 INHALANT RESPIRATORY (INHALATION) at 19:59

## 2023-11-21 RX ADMIN — AZITHROMYCIN DIHYDRATE 500 MG: 250 TABLET ORAL at 18:26

## 2023-11-21 RX ADMIN — GABAPENTIN 400 MG: 400 CAPSULE ORAL at 18:26

## 2023-11-21 RX ADMIN — ACETAMINOPHEN 650 MG: 325 TABLET ORAL at 21:23

## 2023-11-21 RX ADMIN — IPRATROPIUM BROMIDE AND ALBUTEROL SULFATE 1 DOSE: 2.5; .5 SOLUTION RESPIRATORY (INHALATION) at 19:58

## 2023-11-21 RX ADMIN — METHYLPREDNISOLONE SODIUM SUCCINATE 125 MG: 125 INJECTION, POWDER, LYOPHILIZED, FOR SOLUTION INTRAMUSCULAR; INTRAVENOUS at 18:27

## 2023-11-21 RX ADMIN — MONTELUKAST 10 MG: 10 TABLET, FILM COATED ORAL at 21:12

## 2023-11-21 RX ADMIN — ROSUVASTATIN CALCIUM 20 MG: 20 TABLET, FILM COATED ORAL at 21:12

## 2023-11-21 RX ADMIN — WARFARIN SODIUM 3 MG: 2 TABLET ORAL at 18:26

## 2023-11-21 RX ADMIN — IPRATROPIUM BROMIDE AND ALBUTEROL SULFATE 1 DOSE: 2.5; .5 SOLUTION RESPIRATORY (INHALATION) at 14:31

## 2023-11-21 ASSESSMENT — ENCOUNTER SYMPTOMS
BACK PAIN: 0
DIARRHEA: 0
COLOR CHANGE: 0
SINUS PRESSURE: 0
SINUS PAIN: 0
COUGH: 0
SORE THROAT: 0
WHEEZING: 0
NAUSEA: 0
SHORTNESS OF BREATH: 1
VOMITING: 0
ABDOMINAL PAIN: 0
CONSTIPATION: 0

## 2023-11-21 ASSESSMENT — PAIN DESCRIPTION - LOCATION: LOCATION: BACK

## 2023-11-21 ASSESSMENT — PAIN SCALES - GENERAL: PAINLEVEL_OUTOF10: 3

## 2023-11-21 ASSESSMENT — PAIN DESCRIPTION - DESCRIPTORS: DESCRIPTORS: ACHING;CRUSHING;SHOOTING

## 2023-11-21 NOTE — PROGRESS NOTES
Reported VBG results to Dr Dalton Weems and set pt up on Vapotherm 40L/30% per his order. Pt SpO2 on these settings in 97%.

## 2023-11-21 NOTE — H&P
V2.0  History and Physical      Name:  Idris Ford /Age/Sex: 1945  (66 y.o. female)   MRN & CSN:  [id number] & [id number] Encounter Date/Time: 2023 3:42 PM EST   Location:  JO[facility]/JOSE-1 PCP: Edith,  [facility] Day: 1    History from:     patient    History of Present Illness:     Chief Complaint: COPD exacerbation ([address])    Idris Ford is a 66 y.o. female with pmh of COPD, chronic hypoxic respiratory failure, A-fib, history of PE, history of TIA who presents with pleuritic type chest pain particular with deep breaths. Patient denies increase sputum production or increased cough at this time. Was using home oxygen and has been afebrile. Patient did have a CTA that did not show any acute clots. VBG did show some acidosis with a pH of 7.28. She was placed on Vapotherm in the ED more for PEEP than oxygen requirements. Using low FiO2. Patient also given DuoNebs. Assessment and Plan:   Idris Ford is a 66 y.o. female with a pmh of OPD, chronic hypoxic respiratory failure, A-fib, history of PE, history of TIA  who presents with COPD exacerbation ([address])    COPD exacerbation  Acute on chronic hypoxic respiratory failure  No discernible trigger at this time. Patient is acidotic on VBG. Mentation normal.  Supplemental oxygen as needed: Vapotherm with goal to titrate to nasal cannula  Goal to titrate to 88-92%  Home O2 is 2L  Steroids: IV Solu-Medrol followed by p.o. prednisone  DuoNebs  Breathing treatments  Continue home controller inhalers, montelukast, and roflumilast  Respiratory panel sent to rule out viral etiology    A-fib  Continue anticoagulation  Continue rate control meds    Thoracic vertebral compression fracture  Osteoporosis  Incidentally found on CTA. Denies pain. Has a 30% height loss. Likely secondary to osteoporosis and chronic steroid use.   Neurosurgery consulted    Disposition:   Current Living situation: home  Expected Disposition: home  Estimated abnormality. Soft Tissues/Bones: Compression of T4 with approximately 33% height loss is new from comparison study, technically age indeterminate. Compression of T7 and T9 appears unchanged. No evidence of retropulsion. There is diffuse demineralization, which limits evaluation for acute fracture. Within this limitation: Subacute to chronic right posterolateral nondisplaced 7th rib fracture. Bilateral rib variations are noted, similar to prior. A new displaced rib fracture is not identified. No central or segmental pulmonary embolus identified. Evaluation of the distal arteries is limited. Mild bronchial wall thickening could represent bronchitis secondary to infectious or inflammatory etiologies, likely chronic. New compression deformity of T4 with approximately 30% height loss, technically age indeterminate. Recommend point tenderness. No evidence of retropulsion. XR CHEST PORTABLE    Result Date: 11/21/2023  EXAMINATION: ONE XRAY VIEW OF THE CHEST 11/21/2023 10:15 am COMPARISON: 03/23/2023 HISTORY: ORDERING SYSTEM PROVIDED HISTORY: Shortness of Breath TECHNOLOGIST PROVIDED HISTORY: Reason for exam:->Shortness of Breath Reason for Exam: sob Initial encounter FINDINGS: Calcified granuloma in the right upper lung. No focal consolidation, pleural effusion or pneumothorax. The cardiomediastinal silhouette is stable. No overt pulmonary edema. The osseous structures are stable. Osteopenia. No acute cardiopulmonary findings. Comment: Please note this report has been produced using speech recognition software and may contain errors related to that system including errors in grammar, punctuation, and spelling, as well as words and phrases that may be inappropriate. If there are any questions or concerns please feel free to contact the dictating provider for clarification.     Electronically signed by Slime Neves MD on 11/21/2023 at 3:42 PM

## 2023-11-21 NOTE — ED NOTES
Medication History  [facility]    Patient Name: William Hussein 1945     Medication history has been completed by: Liberty Thompson CPhT    Source(s) of information: patient and insurance claims     Primary Care Physician: Gabo Polo DO     [facility]    Allergies as of 11/21/2023 - Fully Reviewed 11/21/2023   Allergen Reaction Noted    Codeine Swelling 12/22/2011    Darvon [propoxyphene hcl] Swelling 12/22/2011    Lamisil [terbinafine hcl]  06/04/2019    Lisinopril Other (See Comments) 06/20/2023    Morphine Swelling 12/22/2011    Neosporin [neomycin-polymyxin-gramicidin]  06/04/2019    Pcn [penicillins] Swelling 12/22/2011        Prior to Admission medications    Medication Sig Start Date End Date Taking? Authorizing Provider   warfarin (COUMADIN) 6 MG tablet TAKE 1 PILL DAILY AS DIRECTED PER PATIENT INR LEVEL  Patient taking differently: Take by mouth nightly 11/21/23 Patient reports she takes 3 mg on Monday and 6 mg the rest of the week, takes at [facility] 11/20/23   Jes Sharma DO   albuterol (PROVENTIL) (2.5 MG/3ML) 0.083% nebulizer solution INHALE 3 ML BY NEBULIZATION EVERY 4 HOURS AS NEEDED FOR WHEEZE 11/17/23 2/15/24  Jes Sharma DO   albuterol sulfate HFA (PROVENTIL;VENTOLIN;PROAIR) 108 (90 Base) MCG/ACT inhaler INHALE 2 PUFFS BY MOUTH FOUR TIMES A DAY AS NEEDED 11/17/23   Jes Sharma DO   warfarin (COUMADIN) 3 MG tablet 11/21/23 Patient reports she takes 3 mg on Monday and 6 mg the rest of the week, takes at [facility] 10/16/23   Jes Sharma DO   gabapentin (NEURONTIN) 400 MG capsule Take 1 capsule by mouth every evening for 90 days.  10/10/23 1/8/24  Jes Sharma DO   potassium chloride (KLOR-CON M10) 10 MEQ extended release tablet ALTERNATE 1 TAB AND 2 TABS DAILY BY MOUTH 10/10/23   Maricel Trevino PA-C   predniSONE (DELTASONE) 5 MG tablet Take 1 tablet by mouth daily 9/12/23   IMANI Maddox - CNP   Budeson-Glycopyrrol-Formoterol (BREZTRI AEROSPHERE) 160-9-4.8 MCG/ACT AERO Inhale 2 sprays into the lungs in the morning and 2 sprays in the evening. 9/12/23   IMANI Harris CNP   montelukast (SINGULAIR) 10 MG tablet TAKE 1 TABLET BY MOUTH EVERY DAY EVERY NIGHT 9/12/23   Jes Sharma, DO   esomeprazole (NEXIUM) 40 MG delayed release capsule Take 1 capsule by mouth in the morning and at bedtime 9/12/23 3/10/24  Jes Sharma, DO   rosuvastatin (CRESTOR) 20 MG tablet TAKE 1 TABLET BY MOUTH EVERY DAY EVERY NIGHT 9/12/23   Jes Sharma, DO   magnesium Oxide ([facility] MAGNESIUM) 500 MG TABS TAKE 1 TABLET BY MOUTH EVERY DAY 8/30/23   CHELSIE Whitaker   levothyroxine (SYNTHROID) 75 MCG tablet TAKE 1 TABLET BY MOUTH EVERY DAY 8/15/23   Jes Sharma, DO   risedronate (ACTONEL) 35 MG tablet TAKE 1 TABLET BY MOUTH EVERY 7 DAYS PATIENT TAKES ON SUNDAY 7/13/23   Jes Sharma, DO   Roflumilast (DALIRESP) 500 MCG tablet TAKE 1 TABLET BY MOUTH EVERY DAY 7/13/23   Jes Sharma, DO   hydrOXYzine HCl (ATARAX) 25 MG tablet TAKE 1 TABLET BY MOUTH NIGHTLY 7/13/23   Jes Sharma, DO   theophylline (THEODUR) 450 MG extended release tablet TAKE 1/2 TABLET BY MOUTH TWICE A DAY 3/3/23   Jes Sharma DO   torsemide (DEMADEX) 20 MG tablet Take 1 tablet by mouth nightly 5/26/22   ProvidereRina MD   dilTIAZem (CARDIZEM) 60 MG tablet Take 1 tablet by mouth every 8 hours 1/4/22   Gonzalo Mg MD   Dextromethorphan-guaiFENesin [facility] DM)  MG TB12 Take 1 to 2 tablet by mouth every 12 hours (maximum: 4 tablets/24 hours). Drink plenty of water.  11/18/20   Rodríguez Ibarra PA-C   nitroGLYCERIN (NITROSTAT) 0.4 MG SL tablet Place 1 tablet under the tongue every 5 minutes as needed for Chest pain 5/27/18   Adore Campbell MD   dicyclomine (BENTYL) 10 MG capsule Take 1 capsule by mouth 2 times daily 5/17/18   ProviderReina MD   Biotin 42683 MCG TABS Take 10,000 mcg by mouth daily    ProviderReina MD   b complex vitamins capsule Take 1

## 2023-11-21 NOTE — TELEPHONE ENCOUNTER
Patient called in this morning stating that she was having difficulty breathing, that her chest hurts when she breathes in. Spoke with NP Lieutenant Patterson and she advised patient to go to the hospital and being seen. Patient voiced understanding stating it was getting harder to breathe, I told her to call 911 if it's that bad.

## 2023-11-21 NOTE — ED PROVIDER NOTES
Father         lung ca    Heart Disease Father     COPD Father     Cancer Sister     Heart Disease Sister      Social History     Socioeconomic History    Marital status:      Spouse name: Not on file    Number of children: Not on file    Years of education: Not on file    Highest education level: Not on file   Occupational History    Not on file   Tobacco Use    Smoking status: Former     Packs/day: 1.50     Years: 26.00     Additional pack years: 0.00     Total pack years: 39.00     Types: Cigarettes     Start date: 65     Quit date: 1991     Years since quittin.0    Smokeless tobacco: Never   Vaping Use    Vaping Use: Never used   Substance and Sexual Activity    Alcohol use: No    Drug use: No    Sexual activity: Not Currently     Partners: Male   Other Topics Concern    Not on file   Social History Narrative    Not on file     Social Determinants of Health     Financial Resource Strain: Low Risk  (2021)    Overall Financial Resource Strain (CARDIA)     Difficulty of Paying Living Expenses: Not hard at all   Food Insecurity: No Food Insecurity (2023)    Hunger Vital Sign     Worried About Running Out of Food in the Last Year: Never true     [address] in the Last Year: Never true   Transportation Needs: No Transportation Needs (2023)    Transportation Problems ([address] [place] [address])     In the past 12 months, has lack of reliable transportation kept you from medical appointments, meetings, work or from getting things needed for daily living?: Not on file   Physical Activity: Unknown (2023)    Exercise Vital Sign     Days of Exercise per Week: 0 days     Minutes of Exercise per Session: Patient refused   Stress: Not on file   Social Connections: Not on file   Intimate Partner Violence: Not on file   Housing Stability: [address]  (2023)    Housing Stability Vital Sign     Unable to Pay for Housing in the Last Year: No     Number of Places Lived in the Last Year: 1     Unstable conditions affecting care: DVT which will make PE more likely, CAD status post PCI which will make ACS more likely, chronic respiratory failure due to COPD which is likely cause of her symptoms    Discussion with Other Profesionals : Admitting Team      Social Determinants : None    Records Reviewed : Inpatient Notes    last discharge summary      Disposition Considerations (tests considered but not done, Shared Decision Making, Pt Expectation of Test or Tx.): Admit      I am the Primary Clinician of Record. Clinical Impression:  1. Pleuritic chest pain    2. Compression fracture of T4 vertebra, initial encounter ([facility])    3. Elevated troponin      Disposition referral (if applicable): [place] Salguero [address]  [phone]    Follow up in 6 week(s)  Follow-up with repeat xrays for T4 fracture    Disposition medications (if applicable):  Current Discharge Medication List        ED Provider Disposition Time  DISPOSITION Admitted 11/21/2023 03:05:13 PM        Comment: Please note this report has been produced using speech recognition software and may contain errors related to that system including errors in grammar, punctuation, and spelling, as well as words and phrases that may be inappropriate. Efforts were made to edit the dictations.         Goldie Turk MD  11/22/23 4840

## 2023-11-21 NOTE — PLAN OF CARE
Problem: Discharge Planning  Goal: Discharge to home or other facility with appropriate resources  Outcome: Progressing     Problem: Neurosensory - Adult  Goal: Achieves stable or improved neurological status  Outcome: Progressing  Goal: Achieves maximal functionality and self care  Outcome: Progressing     Problem: Respiratory - Adult  Goal: Achieves optimal ventilation and oxygenation  Outcome: Progressing     Problem: Cardiovascular - Adult  Goal: Maintains optimal cardiac output and hemodynamic stability  Outcome: Progressing  Goal: Absence of cardiac dysrhythmias or at baseline  Outcome: Progressing     Problem: Skin/Tissue Integrity - Adult  Goal: Skin integrity remains intact  Outcome: Progressing  Goal: Incisions, wounds, or drain sites healing without S/S of infection  Outcome: Progressing  Goal: Oral mucous membranes remain intact  Outcome: Progressing     Problem: Musculoskeletal - Adult  Goal: Return mobility to safest level of function  Outcome: Progressing  Goal: Maintain proper alignment of affected body part  Outcome: Progressing  Goal: Return ADL status to a safe level of function  Outcome: Progressing     Problem: Gastrointestinal - Adult  Goal: Minimal or absence of nausea and vomiting  Outcome: Progressing  Goal: Maintains or returns to baseline bowel function  Outcome: Progressing  Goal: Maintains adequate nutritional intake  Outcome: Progressing  Goal: Establish and maintain optimal ostomy function  Outcome: Progressing     Problem: Genitourinary - Adult  Goal: Absence of urinary retention  Outcome: Progressing     Problem: Infection - Adult  Goal: Absence of infection at discharge  Outcome: Progressing  Goal: Absence of infection during hospitalization  Outcome: Progressing  Goal: Absence of fever/infection during anticipated neutropenic period  Outcome: Progressing     Problem: Metabolic/Fluid and Electrolytes - Adult  Goal: Electrolytes maintained within normal limits  Outcome:

## 2023-11-21 NOTE — PROGRESS NOTES
4 Eyes Skin Assessment     NAME:  Nubia Blankenship  YOB: 1945  MEDICAL RECORD NUMBER:  [id number]    The patient is being assessed for  Admission    I agree that at least one RN has performed a thorough Head to Toe Skin Assessment on the patient. ALL assessment sites listed below have been assessed. Areas assessed by both nurses:    Head, Face, Ears, Shoulders, Back, Chest, Arms, Elbows, Hands, Sacrum. Buttock, Coccyx, Ischium, and Legs. Feet and Heels      PT has zero wound noted upon admission PT has scattered bruising to bi-lateral Upper extremities and lower extremities. Does the Patient have a Wound?  No noted wound(s)       Gilbert Prevention initiated by RN: Yes  Wound Care Orders initiated by RN: No    Pressure Injury (Stage 3,4, Unstageable, DTI, NWPT, and Complex wounds) if present, place Wound referral order by RN under : No    New Ostomies, if present place, Ostomy referral order under : No     Nurse 1 eSignature: Electronically signed by Joshua Sandoval RN on 11/21/23 at 5:36 PM EST    **SHARE this note so that the co-signing nurse can place an eSignature**    Nurse 2 eSignature: Electronically signed by Margaret Marquez RN on 11/21/23 at 5:58 PM EST

## 2023-11-21 NOTE — ED NOTES
ED TO INPATIENT SBAR HANDOFF    Patient Name: Raciel Romeo   :  1945  66 y.o. Preferred Name  Bettye Boss  Family/Caregiver Present yes   Restraints no   C-SSRS: Risk of Suicide: No Risk  Sitter no   Sepsis Risk Score Sepsis Risk Score: 0.85      Situation  Chief Complaint   Patient presents with    Shortness of Breath     Brief Description of Patient's Condition: Patient arrived ambulatory from home with c/o shortness of breath. Patient is on high lakshmi oxygen, normally on 2L NC at home. Patient complaining she is very hot, I tried calling to get a fan in her room unsuccessfully. Patient is AOX4, respirations equal and unlabored, skin PWD. Mental Status: alert  Arrived from: home    Imaging:   CTA CHEST W CONTRAST   Preliminary Result   No central or segmental pulmonary embolus identified. Evaluation of the   distal arteries is limited. Mild bronchial wall thickening could represent bronchitis secondary to   infectious or inflammatory etiologies, likely chronic. New compression deformity of T4 with approximately 30% height loss,   technically age indeterminate. Recommend point tenderness. No evidence of   retropulsion. XR CHEST PORTABLE   Final Result   No acute cardiopulmonary findings.            Abnormal labs:   Abnormal Labs Reviewed   CBC WITH AUTO DIFFERENTIAL - Abnormal; Notable for the following components:       Result Value    MCHC 30.6 (*)     Monocytes % 8.2 (*)     Immature Neutrophil % 0.6 (*)     All other components within normal limits   COMPREHENSIVE METABOLIC PANEL - Abnormal; Notable for the following components:    Chloride 96 (*)     CO2 34 (*)     Glucose 203 (*)     Total Protein 6.3 (*)     All other components within normal limits   BRAIN NATRIURETIC PEPTIDE - Abnormal; Notable for the following components:    Pro-.1 (*)     All other components within normal limits   TROPONIN - Abnormal; Notable for the following components:    Troponin, High Sensitivity

## 2023-11-21 NOTE — CONSULTS
PHARMACY ANTICOAGULATION MONITORING SERVICE    William Hussein is a 66 y.o. female on warfarin therapy for Afib. Pharmacy consulted by Dr. Pato Isaac for monitoring and adjustment of treatment. Indication for anticoagulation: Afib  INR goal: 2-3  Warfarin dose prior to admission: 6mg daily except for 3mg on Mondays (takes dose at bedtime)    Pertinent Laboratory Values   Recent Labs     11/21/23  1019   INR 3.0   HGB 13.3   HCT 43.5          Assessment/Plan:  Drug Interactions:   Home meds: levothyroxine, rosuvastatin, torsemide  Pt started azithromycin and methylprednisolone/prednisone which may increase wafarin effects. INR at the high end of therapeutic on admission @3. Noted 3mg dose taken last night. HGB normal.  Resume at a lower dose of warfarin 3mg daily tonight and follow INR trends tomorrow. Will consider increasing back to home dose of 6mg daily tomorrow per INR trends  Pharmacy will continue to monitor and adjust warfarin therapy as indicated    Thank you for the consult.   Beto Simons, [facility]  11/21/2023 4:21 PM

## 2023-11-21 NOTE — PROGRESS NOTES
PT has zero wounds noted upon admission she is alert and oriented and has scattered bruising to bi-lateral Upper extremities and bilateral LE upon admission. PT also noted to have T-4 fracture neuro surgery consulted.

## 2023-11-22 ENCOUNTER — APPOINTMENT (OUTPATIENT)
Dept: GENERAL RADIOLOGY | Age: 78
End: 2023-11-22
Payer: MEDICARE

## 2023-11-22 LAB
ANION GAP SERPL CALCULATED.3IONS-SCNC: 9 MMOL/L (ref 4–16)
BASOPHILS ABSOLUTE: 0 K/CU MM
BASOPHILS RELATIVE PERCENT: 0.2 % (ref 0–1)
BUN SERPL-MCNC: 15 MG/DL (ref 6–23)
CALCIUM SERPL-MCNC: 10 MG/DL (ref 8.3–10.6)
CHLORIDE BLD-SCNC: 99 MMOL/L (ref 99–110)
CO2: 31 MMOL/L (ref 21–32)
CREAT SERPL-MCNC: 0.6 MG/DL (ref 0.6–1.1)
DIFFERENTIAL TYPE: ABNORMAL
EKG ATRIAL RATE: 72 BPM
EKG DIAGNOSIS: NORMAL
EKG P AXIS: 80 DEGREES
EKG P-R INTERVAL: 126 MS
EKG Q-T INTERVAL: 388 MS
EKG QRS DURATION: 116 MS
EKG QTC CALCULATION (BAZETT): 424 MS
EKG R AXIS: -38 DEGREES
EKG T AXIS: 86 DEGREES
EKG VENTRICULAR RATE: 72 BPM
EOSINOPHILS ABSOLUTE: 0 K/CU MM
EOSINOPHILS RELATIVE PERCENT: 0 % (ref 0–3)
GFR SERPL CREATININE-BSD FRML MDRD: >60 ML/MIN/1.73M2
GLUCOSE SERPL-MCNC: 130 MG/DL (ref 70–99)
HCT VFR BLD CALC: 43.5 % (ref 37–47)
HEMOGLOBIN: 14 GM/DL (ref 12.5–16)
IMMATURE NEUTROPHIL %: 0.4 % (ref 0–0.43)
INR BLD: 2.6 INDEX
LYMPHOCYTES ABSOLUTE: 0.4 K/CU MM
LYMPHOCYTES RELATIVE PERCENT: 8.1 % (ref 24–44)
MCH RBC QN AUTO: 30.7 PG (ref 27–31)
MCHC RBC AUTO-ENTMCNC: 32.2 % (ref 32–36)
MCV RBC AUTO: 95.4 FL (ref 78–100)
MONOCYTES ABSOLUTE: 0 K/CU MM
MONOCYTES RELATIVE PERCENT: 0.7 % (ref 0–4)
NUCLEATED RBC %: 0 %
PDW BLD-RTO: 13.7 % (ref 11.7–14.9)
PLATELET # BLD: 221 K/CU MM (ref 140–440)
PMV BLD AUTO: 9.8 FL (ref 7.5–11.1)
POTASSIUM SERPL-SCNC: 4.7 MMOL/L (ref 3.5–5.1)
PROTHROMBIN TIME: 27.7 SECONDS (ref 11.7–14.5)
RBC # BLD: 4.56 M/CU MM (ref 4.2–5.4)
SEGMENTED NEUTROPHILS ABSOLUTE COUNT: 4.9 K/CU MM
SEGMENTED NEUTROPHILS RELATIVE PERCENT: 90.6 % (ref 36–66)
SODIUM BLD-SCNC: 139 MMOL/L (ref 135–145)
TOTAL IMMATURE NEUTOROPHIL: 0.02 K/CU MM
TOTAL NUCLEATED RBC: 0 K/CU MM
WBC # BLD: 5.5 K/CU MM (ref 4–10.5)

## 2023-11-22 PROCEDURE — 93010 ELECTROCARDIOGRAM REPORT: CPT | Performed by: INTERNAL MEDICINE

## 2023-11-22 PROCEDURE — 2060000000 HC ICU INTERMEDIATE R&B

## 2023-11-22 PROCEDURE — 6360000002 HC RX W HCPCS: Performed by: STUDENT IN AN ORGANIZED HEALTH CARE EDUCATION/TRAINING PROGRAM

## 2023-11-22 PROCEDURE — 2580000003 HC RX 258: Performed by: STUDENT IN AN ORGANIZED HEALTH CARE EDUCATION/TRAINING PROGRAM

## 2023-11-22 PROCEDURE — 85610 PROTHROMBIN TIME: CPT

## 2023-11-22 PROCEDURE — 85025 COMPLETE CBC W/AUTO DIFF WBC: CPT

## 2023-11-22 PROCEDURE — 94761 N-INVAS EAR/PLS OXIMETRY MLT: CPT

## 2023-11-22 PROCEDURE — 72070 X-RAY EXAM THORAC SPINE 2VWS: CPT

## 2023-11-22 PROCEDURE — 6370000000 HC RX 637 (ALT 250 FOR IP): Performed by: STUDENT IN AN ORGANIZED HEALTH CARE EDUCATION/TRAINING PROGRAM

## 2023-11-22 PROCEDURE — 99222 1ST HOSP IP/OBS MODERATE 55: CPT | Performed by: PHYSICIAN ASSISTANT

## 2023-11-22 PROCEDURE — 94664 DEMO&/EVAL PT USE INHALER: CPT

## 2023-11-22 PROCEDURE — 94640 AIRWAY INHALATION TREATMENT: CPT

## 2023-11-22 PROCEDURE — 80048 BASIC METABOLIC PNL TOTAL CA: CPT

## 2023-11-22 PROCEDURE — 36415 COLL VENOUS BLD VENIPUNCTURE: CPT

## 2023-11-22 PROCEDURE — 2700000000 HC OXYGEN THERAPY PER DAY

## 2023-11-22 RX ORDER — WARFARIN SODIUM 6 MG/1
6 TABLET ORAL
Status: DISCONTINUED | OUTPATIENT
Start: 2023-11-22 | End: 2023-11-23 | Stop reason: DRUGHIGH

## 2023-11-22 RX ORDER — HYDROXYZINE HYDROCHLORIDE 10 MG/1
10 TABLET, FILM COATED ORAL 3 TIMES DAILY PRN
Status: DISCONTINUED | OUTPATIENT
Start: 2023-11-22 | End: 2023-11-24 | Stop reason: HOSPADM

## 2023-11-22 RX ORDER — OXYCODONE HYDROCHLORIDE 5 MG/1
5 TABLET ORAL EVERY 6 HOURS PRN
Status: DISCONTINUED | OUTPATIENT
Start: 2023-11-22 | End: 2023-11-24 | Stop reason: HOSPADM

## 2023-11-22 RX ORDER — LOSARTAN POTASSIUM 25 MG/1
50 TABLET ORAL DAILY
Status: DISCONTINUED | OUTPATIENT
Start: 2023-11-22 | End: 2023-11-24 | Stop reason: HOSPADM

## 2023-11-22 RX ADMIN — PANTOPRAZOLE SODIUM 40 MG: 40 TABLET, DELAYED RELEASE ORAL at 08:43

## 2023-11-22 RX ADMIN — BUDESONIDE 250 MCG: 0.25 INHALANT RESPIRATORY (INHALATION) at 08:17

## 2023-11-22 RX ADMIN — ACETAMINOPHEN 650 MG: 325 TABLET ORAL at 08:43

## 2023-11-22 RX ADMIN — LEVOTHYROXINE SODIUM 75 MCG: 0.07 TABLET ORAL at 08:43

## 2023-11-22 RX ADMIN — ALBUTEROL SULFATE 2.5 MG: 2.5 SOLUTION RESPIRATORY (INHALATION) at 05:31

## 2023-11-22 RX ADMIN — DILTIAZEM HYDROCHLORIDE 60 MG: 60 TABLET, FILM COATED ORAL at 14:58

## 2023-11-22 RX ADMIN — WARFARIN SODIUM 6 MG: 6 TABLET ORAL at 17:45

## 2023-11-22 RX ADMIN — TORSEMIDE 20 MG: 20 TABLET ORAL at 08:43

## 2023-11-22 RX ADMIN — OXYCODONE HYDROCHLORIDE 5 MG: 5 TABLET ORAL at 21:01

## 2023-11-22 RX ADMIN — IPRATROPIUM BROMIDE AND ALBUTEROL SULFATE 1 DOSE: 2.5; .5 SOLUTION RESPIRATORY (INHALATION) at 15:41

## 2023-11-22 RX ADMIN — HYDROXYZINE HYDROCHLORIDE 10 MG: 10 TABLET ORAL at 22:20

## 2023-11-22 RX ADMIN — ROFLUMILAST 500 MCG: 500 TABLET ORAL at 08:43

## 2023-11-22 RX ADMIN — SODIUM CHLORIDE, PRESERVATIVE FREE 10 ML: 5 INJECTION INTRAVENOUS at 22:26

## 2023-11-22 RX ADMIN — IPRATROPIUM BROMIDE AND ALBUTEROL SULFATE 1 DOSE: 2.5; .5 SOLUTION RESPIRATORY (INHALATION) at 12:02

## 2023-11-22 RX ADMIN — AZITHROMYCIN DIHYDRATE 500 MG: 250 TABLET ORAL at 17:45

## 2023-11-22 RX ADMIN — MONTELUKAST 10 MG: 10 TABLET, FILM COATED ORAL at 20:45

## 2023-11-22 RX ADMIN — PREDNISONE 40 MG: 10 TABLET ORAL at 08:43

## 2023-11-22 RX ADMIN — LOSARTAN POTASSIUM 50 MG: 25 TABLET, FILM COATED ORAL at 18:11

## 2023-11-22 RX ADMIN — ROSUVASTATIN CALCIUM 20 MG: 20 TABLET, FILM COATED ORAL at 20:45

## 2023-11-22 RX ADMIN — GABAPENTIN 400 MG: 400 CAPSULE ORAL at 17:45

## 2023-11-22 RX ADMIN — DILTIAZEM HYDROCHLORIDE 60 MG: 60 TABLET, FILM COATED ORAL at 08:43

## 2023-11-22 RX ADMIN — IPRATROPIUM BROMIDE AND ALBUTEROL SULFATE 1 DOSE: 2.5; .5 SOLUTION RESPIRATORY (INHALATION) at 08:17

## 2023-11-22 RX ADMIN — SODIUM CHLORIDE, PRESERVATIVE FREE 10 ML: 5 INJECTION INTRAVENOUS at 08:43

## 2023-11-22 RX ADMIN — BUDESONIDE 250 MCG: 0.25 INHALANT RESPIRATORY (INHALATION) at 19:21

## 2023-11-22 RX ADMIN — DILTIAZEM HYDROCHLORIDE 60 MG: 60 TABLET, FILM COATED ORAL at 20:45

## 2023-11-22 RX ADMIN — IPRATROPIUM BROMIDE AND ALBUTEROL SULFATE 1 DOSE: 2.5; .5 SOLUTION RESPIRATORY (INHALATION) at 19:20

## 2023-11-22 ASSESSMENT — PAIN SCALES - WONG BAKER
WONGBAKER_NUMERICALRESPONSE: 0
WONGBAKER_NUMERICALRESPONSE: 6
WONGBAKER_NUMERICALRESPONSE: 0

## 2023-11-22 ASSESSMENT — PAIN - FUNCTIONAL ASSESSMENT
PAIN_FUNCTIONAL_ASSESSMENT: ACTIVITIES ARE NOT PREVENTED
PAIN_FUNCTIONAL_ASSESSMENT: PREVENTS OR INTERFERES SOME ACTIVE ACTIVITIES AND ADLS

## 2023-11-22 ASSESSMENT — PAIN DESCRIPTION - ORIENTATION
ORIENTATION: UPPER
ORIENTATION: UPPER

## 2023-11-22 ASSESSMENT — PAIN DESCRIPTION - FREQUENCY
FREQUENCY: CONTINUOUS
FREQUENCY: CONTINUOUS

## 2023-11-22 ASSESSMENT — PAIN DESCRIPTION - DIRECTION
RADIATING_TOWARDS: UPPER BACK
RADIATING_TOWARDS: UPPER BACK

## 2023-11-22 ASSESSMENT — PAIN DESCRIPTION - ONSET
ONSET: ON-GOING
ONSET: ON-GOING

## 2023-11-22 ASSESSMENT — PAIN SCALES - GENERAL
PAINLEVEL_OUTOF10: 6
PAINLEVEL_OUTOF10: 2
PAINLEVEL_OUTOF10: 0
PAINLEVEL_OUTOF10: 0

## 2023-11-22 ASSESSMENT — PAIN DESCRIPTION - PAIN TYPE
TYPE: ACUTE PAIN
TYPE: ACUTE PAIN

## 2023-11-22 ASSESSMENT — PAIN DESCRIPTION - LOCATION
LOCATION: BACK
LOCATION: BACK

## 2023-11-22 ASSESSMENT — PAIN DESCRIPTION - DESCRIPTORS
DESCRIPTORS: ACHING;GNAWING
DESCRIPTORS: ACHING

## 2023-11-22 NOTE — PROGRESS NOTES
PHARMACY ANTICOAGULATION MONITORING SERVICE    Vimal Tsang is a 66 y.o. female on warfarin therapy for Afib. Pharmacy consulted by Dr. Wes Monsalve for monitoring and adjustment of treatment. Indication for anticoagulation: Afib  INR goal: 2-3  Warfarin dose prior to admission: 6mg daily except for 3mg on Mondays (takes dose at bedtime)    Pertinent Laboratory Values   Recent Labs     11/21/23  1019 11/22/23  0310   INR 3.0 2.6   HGB 13.3 14.0   HCT 43.5 43.5    221     INR MONITORING  Recent Labs     11/21/23  1019 11/22/23  0310   INR 3.0 2.6         Assessment/Plan:  Drug Interactions:   Home meds: levothyroxine, rosuvastatin, torsemide  Pt started azithromycin and methylprednisolone/prednisone which may increase wafarin effects. INR 2.6 today  Resume home dose of 6mg daily and 3mg on [address] will continue to monitor and adjust warfarin therapy as indicated    Thank you for the consult.   Krupa Theodore, [facility]  11/22/2023 2:05 PM

## 2023-11-22 NOTE — CONSULTS
Neurosurgery   Consult Note      Reason for Consult: T4 fracture, age indeterminate   Consulting Physician: Brenda Valenzuela MD  Attending Physician: Wali Up MD   Date of Admission: 11/21/2023  Subjective:   CHIEF COMPLAINT: midback pain    HPI:  66 y.o. 1945  Who presented to the ED 11/21/23 with complaints of mid back pain that started a day prior to presentation per the patient. .  Per chart review patient's complaint in the ER with shortness of breath and rib cage pain. Patient had a CTA of the chest that was performed that showed an age-indeterminate T4 fracture and stable T7 and T9 fractures when compared with CT imaging in October 2022. During workup in the ER she was found to have some acidosis on VBG, was placed on Vapotherm. Patient does have COPD at baseline. She has been seen on 2 E. She states that she does have pain in between her shoulder blades, does not have any tenderness to palpation in her neck or in her low back. She is able to flex extend and rotate her neck without any issues. She denies any recent trauma or falls, states her most recent fall was roughly 5 years ago. She denies any heavy lifting or increase in activity over the past few days. She does not have any numbness/tingling or weakness in any of her extremities. She does use a cane at times around her home. She has no saddle paresthesias or bowel/bladder incontinence. Patient is on warfarin. PMHx positive for cancer, COPD, hiatal hernia, osteopenia, sepsis due to PNA, GERD. PSHx positive for appendectomy, bronchoscopy, tonsillectomy, vein surgery.                      Past Medical and Surgical History:       Diagnosis Date    Anticoagulant long-term use     Arthritis     Cancer ([address])     Chronic hypoxemic respiratory failure ([address]) 2/6/2018    Community acquired pneumonia of right upper lobe of lung 3/19/2022    COPD (chronic obstructive pulmonary disease) (HCC)     Coronary atherosclerosis

## 2023-11-22 NOTE — ACP (ADVANCE CARE PLANNING)
Advance Care Planning     Advance Care Planning Inpatient Note  [facility] Department    Today's Date: 11/22/2023  Unit: [facility] ICU STEPDOWN    Received request from patient. Upon review of chart and communication with care team, patient's decision making abilities are not in question. . Patient and Spouse was/were present in the room during visit. Goals of ACP Conversation:  Discuss advance care planning documents    Health Care Decision Makers:       Primary Decision Maker: Deepa Mail Spouse - [phone]    Secondary Decision Maker: Brian Sutherland - Child - [phone]    Secondary Decision Maker: Marisa Gonzalez Child - [phone]  Summary:  Verified Documents  Verified that ACP/Healthcare POA documents are present, complete, witnessed & notarized as forms require. Then verified accuracy of Healthcare Decision Maker(s) contact information. Last, ensure Decision Maker(s) is designated Primary, Secondary or Supplemental. (Optional):[id number]}  Advance Care Planning Documents (Patient Wishes):  Healthcare Power of /Advance Directive Appointment of [address] [place]  Living Will/Advance Directive   DNR Specifications also listed on form    Assessment:  Patient and  talked openly and freely regarding their wishes, which are documented in the form drafted by their . Care Preferences Communicated:   No    Outcomes/Plan:  ACP Discussion: Completed  Existing advance directive reviewed with patient; no changes to patient's previously recorded wishes. Copy of advance directive given to staff to scan into medical record. Routed ACP note to attending provider or other IDT member.   Teach Back Method used to verify the patient's and/or Healthcare Decision Maker's understanding of key information in the advance directive documents    Electronically signed by Monique Montanez, [address] [place] [address] on 11/22/2023 at 11:49 AM

## 2023-11-22 NOTE — DISCHARGE INSTRUCTIONS
Avoid excessive bending/twisting of thoracic spine. Limit weight restrictions to less than 10 pounds for 3 months. Do not bend to pick objects up off the ground. Follow-up in office in 6 weeks with repeat xrays. Notify the neurosurgical office urgently if you begin to develop any worsening pain at fracture site, numbness or tingling in extremities, weakness in extremities, or loss of bowel or bladder control.

## 2023-11-22 NOTE — PROGRESS NOTES
Patient weaned to High Flow NC. Currently on 5L HFNC, SpO2 96%.  Patient tolerating well.     11/22/23 0826   Oxygen Therapy/Pulse Ox   O2 Therapy Oxygen   O2 Device High flow nasal cannula   O2 Flow Rate (L/min) 5 L/min   FiO2  44 %   Pulse 91   Respirations 16   SpO2 96 %   Pulse Oximeter Device Mode Continuous   Pulse Oximeter Device Location Finger

## 2023-11-22 NOTE — PROGRESS NOTES
11/22/23 1145   Encounter Summary   Encounter Overview/Reason  Advance Care Planning   Service Provided For: Patient and family together   Referral/Consult From: Nurse   Support System Spouse   Last Encounter  11/22/23  (Thorough discussion on ACP; all questions answered to patient's satisfaction.)   Complexity of Encounter High   Begin Time 1040   End Time  1147   Total Time Calculated 67 min   Spiritual/Emotional needs   Type Spiritual Support   Advance Care Planning   Type ACP conversation; Completed AD/ACP document(s)   Assessment/Intervention/Outcome   Assessment Calm;Coping; Hopeful   Intervention Active listening;Nurtured Hope;Prayer (assurance of)/[place];Sustaining Presence/Ministry of presence   Outcome Comfort;Coping;Encouraged;Expressed Gratitude   Plan and Referrals   Plan/Referrals Continue Support (comment)  (Patient informed how to contact )

## 2023-11-22 NOTE — CARE COORDINATION
CM to see pt. Pt sleeping. Will revisit. Chart reviewed. Patient is from home; fairly independent. She does have home O2 - CM DME and has walker. Patient has a PCP and insurance. CM available should needs arise.

## 2023-11-23 LAB
ANION GAP SERPL CALCULATED.3IONS-SCNC: 8 MMOL/L (ref 4–16)
BASOPHILS ABSOLUTE: 0 K/CU MM
BASOPHILS RELATIVE PERCENT: 0.2 % (ref 0–1)
BUN SERPL-MCNC: 20 MG/DL (ref 6–23)
CALCIUM SERPL-MCNC: 8.9 MG/DL (ref 8.3–10.6)
CHLORIDE BLD-SCNC: 102 MMOL/L (ref 99–110)
CO2: 32 MMOL/L (ref 21–32)
CREAT SERPL-MCNC: 0.7 MG/DL (ref 0.6–1.1)
DIFFERENTIAL TYPE: ABNORMAL
EOSINOPHILS ABSOLUTE: 0 K/CU MM
EOSINOPHILS RELATIVE PERCENT: 0 % (ref 0–3)
GFR SERPL CREATININE-BSD FRML MDRD: >60 ML/MIN/1.73M2
GLUCOSE SERPL-MCNC: 91 MG/DL (ref 70–99)
HCT VFR BLD CALC: 40.1 % (ref 37–47)
HEMOGLOBIN: 12.6 GM/DL (ref 12.5–16)
IMMATURE NEUTROPHIL %: 0.3 % (ref 0–0.43)
INR BLD: 3.8 INDEX
LYMPHOCYTES ABSOLUTE: 1.2 K/CU MM
LYMPHOCYTES RELATIVE PERCENT: 10.4 % (ref 24–44)
MCH RBC QN AUTO: 30.6 PG (ref 27–31)
MCHC RBC AUTO-ENTMCNC: 31.4 % (ref 32–36)
MCV RBC AUTO: 97.3 FL (ref 78–100)
MONOCYTES ABSOLUTE: 0.8 K/CU MM
MONOCYTES RELATIVE PERCENT: 6.9 % (ref 0–4)
NUCLEATED RBC %: 0 %
PDW BLD-RTO: 14 % (ref 11.7–14.9)
PLATELET # BLD: 224 K/CU MM (ref 140–440)
PMV BLD AUTO: 10.2 FL (ref 7.5–11.1)
POTASSIUM SERPL-SCNC: 4 MMOL/L (ref 3.5–5.1)
PROTHROMBIN TIME: 37.5 SECONDS (ref 11.7–14.5)
RBC # BLD: 4.12 M/CU MM (ref 4.2–5.4)
SEGMENTED NEUTROPHILS ABSOLUTE COUNT: 9.5 K/CU MM
SEGMENTED NEUTROPHILS RELATIVE PERCENT: 82.2 % (ref 36–66)
SODIUM BLD-SCNC: 142 MMOL/L (ref 135–145)
TOTAL IMMATURE NEUTOROPHIL: 0.04 K/CU MM
TOTAL NUCLEATED RBC: 0 K/CU MM
WBC # BLD: 11.5 K/CU MM (ref 4–10.5)

## 2023-11-23 PROCEDURE — 80048 BASIC METABOLIC PNL TOTAL CA: CPT

## 2023-11-23 PROCEDURE — 94761 N-INVAS EAR/PLS OXIMETRY MLT: CPT

## 2023-11-23 PROCEDURE — 6360000002 HC RX W HCPCS: Performed by: STUDENT IN AN ORGANIZED HEALTH CARE EDUCATION/TRAINING PROGRAM

## 2023-11-23 PROCEDURE — 85610 PROTHROMBIN TIME: CPT

## 2023-11-23 PROCEDURE — 85025 COMPLETE CBC W/AUTO DIFF WBC: CPT

## 2023-11-23 PROCEDURE — 2700000000 HC OXYGEN THERAPY PER DAY

## 2023-11-23 PROCEDURE — 36415 COLL VENOUS BLD VENIPUNCTURE: CPT

## 2023-11-23 PROCEDURE — 94640 AIRWAY INHALATION TREATMENT: CPT

## 2023-11-23 PROCEDURE — 6370000000 HC RX 637 (ALT 250 FOR IP): Performed by: STUDENT IN AN ORGANIZED HEALTH CARE EDUCATION/TRAINING PROGRAM

## 2023-11-23 PROCEDURE — 2580000003 HC RX 258: Performed by: STUDENT IN AN ORGANIZED HEALTH CARE EDUCATION/TRAINING PROGRAM

## 2023-11-23 PROCEDURE — 97162 PT EVAL MOD COMPLEX 30 MIN: CPT

## 2023-11-23 PROCEDURE — 97530 THERAPEUTIC ACTIVITIES: CPT

## 2023-11-23 PROCEDURE — 1200000000 HC SEMI PRIVATE

## 2023-11-23 RX ORDER — LIDOCAINE 4 G/G
1 PATCH TOPICAL DAILY
Status: DISCONTINUED | OUTPATIENT
Start: 2023-11-23 | End: 2023-11-24 | Stop reason: HOSPADM

## 2023-11-23 RX ADMIN — LEVOTHYROXINE SODIUM 75 MCG: 0.07 TABLET ORAL at 05:33

## 2023-11-23 RX ADMIN — GABAPENTIN 400 MG: 400 CAPSULE ORAL at 17:24

## 2023-11-23 RX ADMIN — IPRATROPIUM BROMIDE AND ALBUTEROL SULFATE 1 DOSE: 2.5; .5 SOLUTION RESPIRATORY (INHALATION) at 08:13

## 2023-11-23 RX ADMIN — ROFLUMILAST 500 MCG: 500 TABLET ORAL at 09:19

## 2023-11-23 RX ADMIN — PANTOPRAZOLE SODIUM 40 MG: 40 TABLET, DELAYED RELEASE ORAL at 05:33

## 2023-11-23 RX ADMIN — PREDNISONE 40 MG: 10 TABLET ORAL at 09:19

## 2023-11-23 RX ADMIN — SODIUM CHLORIDE, PRESERVATIVE FREE 10 ML: 5 INJECTION INTRAVENOUS at 20:33

## 2023-11-23 RX ADMIN — DILTIAZEM HYDROCHLORIDE 60 MG: 60 TABLET, FILM COATED ORAL at 05:33

## 2023-11-23 RX ADMIN — OXYCODONE HYDROCHLORIDE 5 MG: 5 TABLET ORAL at 20:23

## 2023-11-23 RX ADMIN — AZITHROMYCIN DIHYDRATE 500 MG: 250 TABLET ORAL at 17:25

## 2023-11-23 RX ADMIN — MONTELUKAST 10 MG: 10 TABLET, FILM COATED ORAL at 20:23

## 2023-11-23 RX ADMIN — ONDANSETRON 4 MG: 2 INJECTION INTRAMUSCULAR; INTRAVENOUS at 22:15

## 2023-11-23 RX ADMIN — IPRATROPIUM BROMIDE AND ALBUTEROL SULFATE 1 DOSE: 2.5; .5 SOLUTION RESPIRATORY (INHALATION) at 16:44

## 2023-11-23 RX ADMIN — IPRATROPIUM BROMIDE AND ALBUTEROL SULFATE 1 DOSE: 2.5; .5 SOLUTION RESPIRATORY (INHALATION) at 11:57

## 2023-11-23 RX ADMIN — BUDESONIDE 250 MCG: 0.25 INHALANT RESPIRATORY (INHALATION) at 20:53

## 2023-11-23 RX ADMIN — DILTIAZEM HYDROCHLORIDE 60 MG: 60 TABLET, FILM COATED ORAL at 20:23

## 2023-11-23 RX ADMIN — ROSUVASTATIN CALCIUM 20 MG: 20 TABLET, FILM COATED ORAL at 20:23

## 2023-11-23 RX ADMIN — IPRATROPIUM BROMIDE AND ALBUTEROL SULFATE 1 DOSE: 2.5; .5 SOLUTION RESPIRATORY (INHALATION) at 20:53

## 2023-11-23 RX ADMIN — DILTIAZEM HYDROCHLORIDE 60 MG: 60 TABLET, FILM COATED ORAL at 14:40

## 2023-11-23 RX ADMIN — SODIUM CHLORIDE, PRESERVATIVE FREE 10 ML: 5 INJECTION INTRAVENOUS at 09:19

## 2023-11-23 RX ADMIN — BUDESONIDE 250 MCG: 0.25 INHALANT RESPIRATORY (INHALATION) at 08:13

## 2023-11-23 ASSESSMENT — PAIN SCALES - GENERAL
PAINLEVEL_OUTOF10: 6
PAINLEVEL_OUTOF10: 1

## 2023-11-23 ASSESSMENT — PAIN DESCRIPTION - LOCATION: LOCATION: BACK

## 2023-11-23 ASSESSMENT — PAIN SCALES - WONG BAKER: WONGBAKER_NUMERICALRESPONSE: 0

## 2023-11-23 NOTE — PLAN OF CARE
Problem: Discharge Planning  Goal: Discharge to home or other facility with appropriate resources  Outcome: Progressing  Flowsheets (Taken 11/23/2023 0800)  Discharge to home or other facility with appropriate resources:   Identify barriers to discharge with patient and caregiver   Arrange for needed discharge resources and transportation as appropriate   Identify discharge learning needs (meds, wound care, etc)   Refer to discharge planning if patient needs post-hospital services based on physician order or complex needs related to functional status, cognitive ability or social support system     Problem: Neurosensory - Adult  Goal: Achieves stable or improved neurological status  Outcome: Progressing  Flowsheets (Taken 11/23/2023 0800)  Achieves stable or improved neurological status:   Assess for and report changes in neurological status   Maintain blood pressure and fluid volume within ordered parameters to optimize cerebral perfusion and minimize risk of hemorrhage   Initiate measures to prevent increased intracranial pressure   Monitor temperature, glucose, and sodium.  Initiate appropriate interventions as ordered  Goal: Achieves maximal functionality and self care  Outcome: Progressing  Flowsheets (Taken 11/23/2023 0800)  Achieves maximal functionality and self care:   Monitor swallowing and airway patency with patient fatigue and changes in neurological status   Encourage and assist patient to increase activity and self care with guidance from physical therapy/occupational therapy   Encourage visually impaired, hearing impaired and aphasic patients to use assistive/communication devices     Problem: Respiratory - Adult  Goal: Achieves optimal ventilation and oxygenation  Outcome: Progressing  Flowsheets (Taken 11/23/2023 0800)  Achieves optimal ventilation and oxygenation:   Assess for changes in respiratory status   Assess for changes in mentation and behavior   Position to facilitate oxygenation and infection/condition  Goal: Absence of fever/infection during anticipated neutropenic period  Outcome: Progressing  Flowsheets (Taken 11/23/2023 0800)  Absence of fever/infection during anticipated neutropenic period:   Monitor white blood cell count   Administer growth factors as ordered   Implement neutropenic guidelines     Problem: Metabolic/Fluid and Electrolytes - Adult  Goal: Electrolytes maintained within normal limits  Outcome: Progressing  Flowsheets (Taken 11/23/2023 0800)  Electrolytes maintained within normal limits:   Monitor labs and assess patient for signs and symptoms of electrolyte imbalances   Administer electrolyte replacement as ordered   Monitor response to electrolyte replacements, including repeat lab results as appropriate   Fluid restriction as ordered   Instruct patient on fluid and nutrition restrictions as appropriate  Goal: Hemodynamic stability and optimal renal function maintained  Outcome: Progressing  Flowsheets (Taken 11/23/2023 0800)  Hemodynamic stability and optimal renal function maintained:   Monitor labs and assess for signs and symptoms of volume excess or deficit   Monitor intake, output and patient weight   Monitor urine specific gravity, serum osmolarity and serum sodium as indicated or ordered   Monitor response to interventions for patient's volume status, including labs, urine output, blood pressure (other measures as available)   Encourage oral intake as appropriate   Instruct patient on fluid and nutrition restrictions as appropriate     Problem: Hematologic - Adult  Goal: Maintains hematologic stability  Outcome: Progressing  Flowsheets (Taken 11/23/2023 0800)  Maintains hematologic stability:   Assess for signs and symptoms of bleeding or hemorrhage   Monitor labs for bleeding or clotting disorders   Administer blood products/factors as ordered     Problem: Safety - Adult  Goal: Free from fall injury  Outcome: Progressing     Problem: ABCDS Injury Assessment  Goal:

## 2023-11-23 NOTE — PROGRESS NOTES
Patients daughter voiced concern on patients discharge and would like to have [facility] [address] [address] for her. Patients daughter states \"mom doesn't leave the house much and it would make it easier on her if she is to do rehab for it to be home health. \"

## 2023-11-23 NOTE — PROGRESS NOTES
PHARMACY ANTICOAGULATION MONITORING SERVICE    Renetta Forrester is a 66 y.o. female on warfarin therapy. Pharmacy consulted by Dr. Alan Dunn for monitoring and adjustment of treatment. Indication for anticoagulation: Atrial Fibrillation; History of DVT  INR goal: 2-3  Warfarin dose prior to admission: 6mg daily except for 3mg on Mondays (takes dose at bedtime)    Pertinent Laboratory Values   Recent Labs     11/21/23  1019 11/22/23  0310 11/23/23  0504   INR 3.0 2.6 3.8   HGB 13.3 14.0 12.6   HCT 43.5 43.5 40.1    221 224     INR MONITORING  Recent Labs     11/21/23  1019 11/22/23  0310 11/23/23  0504   INR 3.0 2.6 3.8       Assessment/Plan:  Drug Interactions:   Enhanced anticoagulant effect: Azithromycin, Prednisone, Levothyroxine (home medication), Rosuvastatin (home medication), Torsemide (home medication)  Decreased anticoagulant effect: None  Added bleed risk: None  Pt started azithromycin and methylprednisolone/prednisone which may increase wafarin effects. INR supra-therapeutic today  Hold warfarin this evening  Patient was initiated on azithromycin and prednisone which can increase anticoagulant effect of warfarin  Pharmacy will continue to monitor and adjust warfarin therapy as indicated    Thank you for the consult.   Carolina Valdez, [facility], PharmD, BCPS  11/23/2023 7:54 AM

## 2023-11-23 NOTE — CONSULTS
Shari Jaramillott, 4 wheeled, Oxygen (2L)  Has the patient had two or more falls in the past year or any fall with injury in the past year?: No  ADL Assistance: Independent  Homemaking Assistance: Needs assistance ( splits duties)  Homemaking Responsibilities: No ( assists)  Ambulation Assistance: Independent  Transfer Assistance: Independent  Active : No  Patient's  Info:  provides transportation    Examination of body systems (includes body structures/functions, activity/participation limitations):  Observation:  Semi-Fowlers positioin upon arrival and agreeable to therapy  Vision:  glasses  Hearing:  [employer] [facility]  Cardiopulmonary:  on 4L nasal canula  Cognition: alert and oriented x4, see OT/SLP note for further evaluation. Musculoskeletal  ROM R/L:  WFL. Strength R/L:  gross 4-/5 in BLE, minimal in function and endurance. Mobility:  Rolling L/R:  SUP  Supine to sit:  SUP  Transfers: CGA without an AD  Sitting balance:  good. Standing balance:  fair/fair-.    Gait: 80' without an AD and CGA/Nicolle. Patient presents with continuous, step thru, wide PAMELA with intermittent bouts of crossing feet, decreased step/stride length, decreased toe clearance B    [facility] 6 Clicks Inpatient Mobility:  AM-PAC Inpatient Mobility Raw Score : 21    Safety: patient left in bed, call light within reach, RN notified, gait belt used. Assessment:  Patient is a 67 y/o female who was admitted for mid back pain. Patient reports that her spouse primarily handles the household tasks, but she does assist when she can sit down. Patient reports that she performed dressing ambulation independently. Patient currently reports feeling \"wobbly\" during ambulation, therapist providing education on the importance of utilizing an AD to promote greater stability, patient agreed. Patient presents with decreased LE strength, decreased activity tolerance/decreased endurance, and impaired balance.  Patient would benefit from acute PT services with discharge home with HHPT to further address impairments. Complexity: moderate    Prognosis: Good, no significant barriers to participation at this time.    General Plan: 3-5 times per week       Equipment: none at this time    Goals:  Short Term Goals  Time Frame for Short Term Goals: 1 week  Short Term Goal 1: Patient will perform all aspects of bed mobility with mod-I  Short Term Goal 2: Patient will perform functional transfers with FWW or LRAD and SUP  Short Term Goal 3: Patient will ambulate 150' with FWW or LRAD and SUP       Treatment plan:  Bed mobility, transfers, balance, gait, TA, TX, neuromuscular re-education    Recommendations for NURSING mobility: ambulate 4-6x/a day with gait belt and FWW    Time:   Time in: 1450  Time out: 1514  Timed treatment minutes: 9  Total time: 24    Electronically signed by:    Chase Boss, PT  11/23/2023, 3:51 PM

## 2023-11-23 NOTE — PROGRESS NOTES
V2.0    [facility] Progress Note      Name:  Clara Friedman /Age/Sex: 1945  (66 y.o. female)   MRN & CSN:  [id number] & [id number] Encounter Date/Time: 2023 7:38 AM EST   Location:  -A PCP: Jos Ness DO     Attending:Randal Coates MD       Hospital Day: 3    Assessment and Recommendations   Clara Friedman is a 66 y.o. female with pmh of  hyperlipidemia, CAD s/p PCI, chronic respiratory failure on 2 L of O2 secondary to COPD, hypothyroidism, history of DVT, Khyphosis who presents with COPD exacerbation ([address])        # Acute on chronic hypoxic and hypercapnic respiratory failure / COPD exacerbation: Presented with upper back pain and found to be  hypoxic 89%, baseline O2 2 L, VBG showed PCO2 87, PO2 39, respiratory panel negative, started on high flow oxygen, IV steroids tapered to p.o. prednisone, DuoNebs, continued home inhalers, incentive spirometry, continue to monitor. # T4 compression fracture: Patient presented with upper back pain, no history of trauma,  CTA showed T4 compression fracture with 30% loss of weight, continued pain medication, consulted neurosurgery, evaluated and recommended to continue PT/OT and pain medication as there are no red flag signs and kyphotic for any brace. Lidocaine patch ordered. # Paroxysmal A-fib: EKG showed sinus rhythm, on Cardizem and warfarin continue on telemetry. # CAD s/p PCI: last echo on 23 showed EF 55%  mitral annular calcification otherwise normal.     # History of DVT on Coumadin,      # Hypothyroidism continue levothyroxine     # GERD continue PPI     # Hyperlipidemia continue statin    Comment: Please note this report has been produced using speech recognition software and may contain errors related to that system including errors in grammar, punctuation, and spelling, as well as words and phrases that may be inappropriate.  If there are any questions or concerns please feel free to contact the dictating heart strain. Evaluation of the segmental and subsegmental arteries is limited secondary to streak artifact. No central pulmonary embolus is identified. Mediastinum: The heart is unchanged in size. No acute aortic abnormality identified. Atherosclerosis of the aorta and coronary arteries. No discrete lymphadenopathy by size criteria. Lungs/pleura: No pleural effusion or pneumothorax. Mild bronchial wall thickening. Linear consolidations bilaterally favor scarring/atelectasis. Emphysema. Upper Abdomen:  Limited images of the upper abdomen demonstrate no acute abnormality. Soft Tissues/Bones: Compression of T4 with approximately 33% height loss is new from comparison study, technically age indeterminate. Compression of T7 and T9 appears unchanged. No evidence of retropulsion. There is diffuse demineralization, which limits evaluation for acute fracture. Within this limitation: Subacute to chronic right posterolateral nondisplaced 7th rib fracture. Bilateral rib variations are noted, similar to prior. A new displaced rib fracture is not identified. No central or segmental pulmonary embolus identified. Evaluation of the distal arteries is limited. Mild bronchial wall thickening could represent bronchitis secondary to infectious or inflammatory etiologies, likely chronic. New compression deformity of T4 with approximately 30% height loss, technically age indeterminate. Recommend point tenderness. No evidence of retropulsion. XR CHEST PORTABLE    Result Date: 11/21/2023  EXAMINATION: ONE XRAY VIEW OF THE CHEST 11/21/2023 10:15 am COMPARISON: 03/23/2023 HISTORY: ORDERING SYSTEM PROVIDED HISTORY: Shortness of Breath TECHNOLOGIST PROVIDED HISTORY: Reason for exam:->Shortness of Breath Reason for Exam: sob Initial encounter FINDINGS: Calcified granuloma in the right upper lung. No focal consolidation, pleural effusion or pneumothorax. The cardiomediastinal silhouette is stable.   No overt pulmonary

## 2023-11-24 VITALS
HEART RATE: 60 BPM | RESPIRATION RATE: 13 BRPM | OXYGEN SATURATION: 100 % | TEMPERATURE: 97.7 F | HEIGHT: 65 IN | BODY MASS INDEX: 22.79 KG/M2 | WEIGHT: 136.8 LBS | SYSTOLIC BLOOD PRESSURE: 120 MMHG | DIASTOLIC BLOOD PRESSURE: 45 MMHG

## 2023-11-24 LAB
ANION GAP SERPL CALCULATED.3IONS-SCNC: 6 MMOL/L (ref 4–16)
BASOPHILS ABSOLUTE: 0 K/CU MM
BASOPHILS RELATIVE PERCENT: 0.1 % (ref 0–1)
BUN SERPL-MCNC: 19 MG/DL (ref 6–23)
CALCIUM SERPL-MCNC: 8.8 MG/DL (ref 8.3–10.6)
CHLORIDE BLD-SCNC: 103 MMOL/L (ref 99–110)
CO2: 33 MMOL/L (ref 21–32)
CREAT SERPL-MCNC: 0.5 MG/DL (ref 0.6–1.1)
DIFFERENTIAL TYPE: ABNORMAL
EOSINOPHILS ABSOLUTE: 0 K/CU MM
EOSINOPHILS RELATIVE PERCENT: 0 % (ref 0–3)
GFR SERPL CREATININE-BSD FRML MDRD: >60 ML/MIN/1.73M2
GLUCOSE SERPL-MCNC: 85 MG/DL (ref 70–99)
HCT VFR BLD CALC: 41.4 % (ref 37–47)
HEMOGLOBIN: 12.6 GM/DL (ref 12.5–16)
IMMATURE NEUTROPHIL %: 0.5 % (ref 0–0.43)
INR BLD: 3.5 INDEX
LYMPHOCYTES ABSOLUTE: 1.4 K/CU MM
LYMPHOCYTES RELATIVE PERCENT: 16.8 % (ref 24–44)
MCH RBC QN AUTO: 30.4 PG (ref 27–31)
MCHC RBC AUTO-ENTMCNC: 30.4 % (ref 32–36)
MCV RBC AUTO: 100 FL (ref 78–100)
MONOCYTES ABSOLUTE: 0.7 K/CU MM
MONOCYTES RELATIVE PERCENT: 7.9 % (ref 0–4)
NUCLEATED RBC %: 0 %
PDW BLD-RTO: 14.1 % (ref 11.7–14.9)
PLATELET # BLD: 202 K/CU MM (ref 140–440)
PMV BLD AUTO: 10.1 FL (ref 7.5–11.1)
POTASSIUM SERPL-SCNC: 3.9 MMOL/L (ref 3.5–5.1)
PROTHROMBIN TIME: 35.2 SECONDS (ref 11.7–14.5)
RBC # BLD: 4.14 M/CU MM (ref 4.2–5.4)
SEGMENTED NEUTROPHILS ABSOLUTE COUNT: 6.1 K/CU MM
SEGMENTED NEUTROPHILS RELATIVE PERCENT: 74.7 % (ref 36–66)
SODIUM BLD-SCNC: 142 MMOL/L (ref 135–145)
TOTAL IMMATURE NEUTOROPHIL: 0.04 K/CU MM
TOTAL NUCLEATED RBC: 0 K/CU MM
WBC # BLD: 8.2 K/CU MM (ref 4–10.5)

## 2023-11-24 PROCEDURE — 6360000002 HC RX W HCPCS: Performed by: STUDENT IN AN ORGANIZED HEALTH CARE EDUCATION/TRAINING PROGRAM

## 2023-11-24 PROCEDURE — 2700000000 HC OXYGEN THERAPY PER DAY

## 2023-11-24 PROCEDURE — 94761 N-INVAS EAR/PLS OXIMETRY MLT: CPT

## 2023-11-24 PROCEDURE — 97166 OT EVAL MOD COMPLEX 45 MIN: CPT

## 2023-11-24 PROCEDURE — 6370000000 HC RX 637 (ALT 250 FOR IP): Performed by: STUDENT IN AN ORGANIZED HEALTH CARE EDUCATION/TRAINING PROGRAM

## 2023-11-24 PROCEDURE — 97530 THERAPEUTIC ACTIVITIES: CPT

## 2023-11-24 PROCEDURE — 36415 COLL VENOUS BLD VENIPUNCTURE: CPT

## 2023-11-24 PROCEDURE — 85610 PROTHROMBIN TIME: CPT

## 2023-11-24 PROCEDURE — 2580000003 HC RX 258: Performed by: STUDENT IN AN ORGANIZED HEALTH CARE EDUCATION/TRAINING PROGRAM

## 2023-11-24 PROCEDURE — 85025 COMPLETE CBC W/AUTO DIFF WBC: CPT

## 2023-11-24 PROCEDURE — 80048 BASIC METABOLIC PNL TOTAL CA: CPT

## 2023-11-24 PROCEDURE — 94640 AIRWAY INHALATION TREATMENT: CPT

## 2023-11-24 RX ORDER — LIDOCAINE 4 G/G
1 PATCH TOPICAL DAILY
Qty: 10 EACH | Refills: 0 | Status: SHIPPED | OUTPATIENT
Start: 2023-11-25 | End: 2023-11-24 | Stop reason: SDUPTHER

## 2023-11-24 RX ORDER — OXYCODONE HYDROCHLORIDE 5 MG/1
5 TABLET ORAL EVERY 6 HOURS PRN
Qty: 12 TABLET | Refills: 0 | Status: SHIPPED | OUTPATIENT
Start: 2023-11-24 | End: 2023-11-27

## 2023-11-24 RX ORDER — LIDOCAINE 4 G/G
1 PATCH TOPICAL DAILY
Qty: 10 EACH | Refills: 0 | Status: SHIPPED | OUTPATIENT
Start: 2023-11-25 | End: 2023-12-05

## 2023-11-24 RX ADMIN — SODIUM CHLORIDE, PRESERVATIVE FREE 10 ML: 5 INJECTION INTRAVENOUS at 08:14

## 2023-11-24 RX ADMIN — IPRATROPIUM BROMIDE AND ALBUTEROL SULFATE 1 DOSE: 2.5; .5 SOLUTION RESPIRATORY (INHALATION) at 07:24

## 2023-11-24 RX ADMIN — LOSARTAN POTASSIUM 50 MG: 25 TABLET, FILM COATED ORAL at 08:14

## 2023-11-24 RX ADMIN — ACETAMINOPHEN 650 MG: 325 TABLET ORAL at 08:22

## 2023-11-24 RX ADMIN — PANTOPRAZOLE SODIUM 40 MG: 40 TABLET, DELAYED RELEASE ORAL at 05:19

## 2023-11-24 RX ADMIN — IPRATROPIUM BROMIDE AND ALBUTEROL SULFATE 1 DOSE: 2.5; .5 SOLUTION RESPIRATORY (INHALATION) at 11:02

## 2023-11-24 RX ADMIN — DILTIAZEM HYDROCHLORIDE 60 MG: 60 TABLET, FILM COATED ORAL at 05:19

## 2023-11-24 RX ADMIN — ROFLUMILAST 500 MCG: 500 TABLET ORAL at 08:14

## 2023-11-24 RX ADMIN — BUDESONIDE 250 MCG: 0.25 INHALANT RESPIRATORY (INHALATION) at 07:26

## 2023-11-24 RX ADMIN — HYDROXYZINE HYDROCHLORIDE 10 MG: 10 TABLET ORAL at 00:30

## 2023-11-24 RX ADMIN — LEVOTHYROXINE SODIUM 75 MCG: 0.07 TABLET ORAL at 05:19

## 2023-11-24 RX ADMIN — TORSEMIDE 20 MG: 20 TABLET ORAL at 08:13

## 2023-11-24 RX ADMIN — PREDNISONE 40 MG: 10 TABLET ORAL at 08:13

## 2023-11-24 ASSESSMENT — PAIN DESCRIPTION - ORIENTATION: ORIENTATION: INNER

## 2023-11-24 ASSESSMENT — PAIN DESCRIPTION - LOCATION: LOCATION: HEAD

## 2023-11-24 ASSESSMENT — PAIN DESCRIPTION - DESCRIPTORS: DESCRIPTORS: ACHING

## 2023-11-24 ASSESSMENT — PAIN SCALES - GENERAL: PAINLEVEL_OUTOF10: 3

## 2023-11-24 ASSESSMENT — PAIN - FUNCTIONAL ASSESSMENT: PAIN_FUNCTIONAL_ASSESSMENT: ACTIVITIES ARE NOT PREVENTED

## 2023-11-24 NOTE — DISCHARGE SUMMARY
V2.0  Discharge Summary    Name:  Vonnie Montero /Age/Sex: 1945 (52 y.o. female)   Admit Date: 2023  Discharge Date: 23    MRN & CSN:  [id number] & [id number] Encounter Date and Time 23 9:22 AM EST    Attending:  Elisabet Morfin MD Discharging Provider: Elisabet Morfin MD       Hospital Course:     Brief HPI: Vonnie Montero is a 66 y.o. female who presented with pmh of  hyperlipidemia, CAD s/p PCI, chronic respiratory failure on 2 L of O2 secondary to COPD, hypothyroidism, history of DVT, Khyphosis who presents with COPD exacerbation ([address])    Brief Problem Based Course:   # Acute on chronic hypoxic and hypercapnic respiratory failure 2/ COPD exacerbation: Presented with upper back pain and found to be  hypoxic 89%, baseline O2 2 L, VBG showed PCO2 87, PO2 39, respiratory panel negative, started on high flow oxygen, IV steroids tapered to p.o. prednisone, DuoNebs, continued home inhalers, incentive spirometry, continue to monitor. PT/OT evaluated and recommended home health care discharged patient in a stable condition to follow-up with PCP and Ortho. # T4 compression fracture: Patient presented with upper back pain, no history of trauma,  CTA showed T4 compression fracture with 30% loss of weight, continued pain medication, consulted neurosurgery, evaluated and recommended to continue PT/OT and pain medication as there are no red flag signs and kyphotic for any brace. Lidocaine patch ordered. PT/OT evaluated and recommended home health care     # Paroxysmal A-fib: EKG showed sinus rhythm, on Cardizem and warfarin continue on telemetry.      # CAD s/p PCI: last echo on 23 showed EF 55%  mitral annular calcification otherwise normal.     # History of DVT on Coumadin,      # Hypothyroidism continue levothyroxine     # GERD continue PPI     # Hyperlipidemia continue statin     Comment: Please note this report has been produced using speech recognition software encounter FINDINGS: Calcified granuloma in the right upper lung. No focal consolidation, pleural effusion or pneumothorax. The cardiomediastinal silhouette is stable. No overt pulmonary edema. The osseous structures are stable. Osteopenia. No acute cardiopulmonary findings. CBC:   Recent Labs     11/22/23  0310 11/23/23  0504 11/24/23  0505   WBC 5.5 11.5* 8.2   HGB 14.0 12.6 12.6    224 202     BMP:    Recent Labs     11/22/23  0310 11/23/23  0504 11/24/23  0505    142 142   K 4.7 4.0 3.9   CL 99 102 103   CO2 31 32 33*   BUN 15 20 19   CREATININE 0.6 0.7 0.5*   GLUCOSE 130* 91 85     Hepatic:   Recent Labs     11/21/23  1019   AST 25   ALT 23   BILITOT 0.2   ALKPHOS 66     Lipids:   Lab Results   Component Value Date/Time    CHOL 197 09/19/2023 10:58 AM    HDL 89 09/19/2023 10:58 AM    TRIG 94 09/19/2023 10:58 AM     Hemoglobin A1C: No results found for: \"LABA1C\"  TSH: No results found for: \"TSH\"  Troponin:   Lab Results   Component Value Date/Time    TROPONINT <0.010 03/23/2023 04:48 PM    TROPONINT <0.010 01/24/2023 10:32 PM    TROPONINT <0.010 01/24/2023 07:05 PM     Lactic Acid: No results for input(s): \"LACTA\" in the last 72 hours.   BNP:   Recent Labs     11/21/23  1019   PROBNP 607.1*     UA:  Lab Results   Component Value Date/Time    NITRU NEGATIVE 10/14/2022 11:50 AM    COLORU YELLOW 10/14/2022 11:50 AM    WBCUA 4 10/14/2022 11:50 AM    RBCUA 0 10/14/2022 11:50 AM    MUCUS RARE 12/18/2018 11:37 AM    TRICHOMONAS NONE SEEN 12/18/2018 11:37 AM    BACTERIA OCCASIONAL 10/14/2022 11:50 AM    CLARITYU CLEAR 10/14/2022 11:50 AM    SPECGRAV 1.020 10/14/2022 11:50 AM    LEUKOCYTESUR SMALL NUMBER OR AMOUNT OBSERVED 10/14/2022 11:50 AM    UROBILINOGEN 0.2 10/14/2022 11:50 AM    BILIRUBINUR NEGATIVE 10/14/2022 11:50 AM    BLOODU NEGATIVE 10/14/2022 11:50 AM    KETUA TRACE 10/14/2022 11:50 AM     Urine Cultures: No results found for: \"LABURIN\"  Blood Cultures: No results found for: \"BC\"  No

## 2023-11-24 NOTE — PLAN OF CARE
Problem: Discharge Planning  Goal: Discharge to home or other facility with appropriate resources  Outcome: Adequate for Discharge     Problem: Neurosensory - Adult  Goal: Achieves stable or improved neurological status  Outcome: Adequate for Discharge  Goal: Achieves maximal functionality and self care  Outcome: Adequate for Discharge     Problem: Respiratory - Adult  Goal: Achieves optimal ventilation and oxygenation  Outcome: Adequate for Discharge     Problem: Cardiovascular - Adult  Goal: Maintains optimal cardiac output and hemodynamic stability  Outcome: Adequate for Discharge  Goal: Absence of cardiac dysrhythmias or at baseline  Outcome: Adequate for Discharge     Problem: Skin/Tissue Integrity - Adult  Goal: Skin integrity remains intact  Outcome: Adequate for Discharge  Goal: Incisions, wounds, or drain sites healing without S/S of infection  Outcome: Adequate for Discharge  Goal: Oral mucous membranes remain intact  Outcome: Adequate for Discharge     Problem: Musculoskeletal - Adult  Goal: Return mobility to safest level of function  Outcome: Adequate for Discharge  Goal: Maintain proper alignment of affected body part  Outcome: Adequate for Discharge  Goal: Return ADL status to a safe level of function  Outcome: Adequate for Discharge     Problem: Gastrointestinal - Adult  Goal: Minimal or absence of nausea and vomiting  Outcome: Adequate for Discharge  Goal: Maintains or returns to baseline bowel function  Outcome: Adequate for Discharge  Goal: Maintains adequate nutritional intake  Outcome: Adequate for Discharge  Goal: Establish and maintain optimal ostomy function  Outcome: Adequate for Discharge     Problem: Genitourinary - Adult  Goal: Absence of urinary retention  Outcome: Adequate for Discharge     Problem: Infection - Adult  Goal: Absence of infection at discharge  Outcome: Adequate for Discharge  Goal: Absence of infection during hospitalization  Outcome: Adequate for Discharge  Goal: Absence of fever/infection during anticipated neutropenic period  Outcome: Adequate for Discharge     Problem: Metabolic/Fluid and Electrolytes - Adult  Goal: Electrolytes maintained within normal limits  Outcome: Adequate for Discharge  Goal: Hemodynamic stability and optimal renal function maintained  Outcome: Adequate for Discharge     Problem: Hematologic - Adult  Goal: Maintains hematologic stability  Outcome: Adequate for Discharge     Problem: Safety - Adult  Goal: Free from fall injury  Outcome: Adequate for Discharge     Problem: ABCDS Injury Assessment  Goal: Absence of physical injury  Outcome: Adequate for Discharge     Problem: Pain  Goal: Verbalizes/displays adequate comfort level or baseline comfort level  Outcome: Adequate for Discharge

## 2023-11-24 NOTE — PROGRESS NOTES
PHARMACY ANTICOAGULATION MONITORING SERVICE    Stefany Bains is a 66 y.o. female on warfarin therapy. Pharmacy consulted by Dr. Rachele Garcia for monitoring and adjustment of treatment. Indication for anticoagulation: Atrial Fibrillation; History of DVT  INR goal: 2-3  Warfarin dose prior to admission: 6mg daily except for 3mg on Mondays (takes dose at bedtime)    Pertinent Laboratory Values   Recent Labs     11/22/23  0310 11/23/23  0504 11/24/23  0505   INR 2.6 3.8 3.5   HGB 14.0 12.6 12.6   HCT 43.5 40.1 41.4    224 202   INR MONITORING  Recent Labs     11/21/23  1019 11/22/23  0310 11/23/23  0504 11/24/23  0505   INR 3.0 2.6 3.8 3.5     Assessment/Plan:  Drug Interactions:   Enhanced anticoagulant effect: Prednisone, Levothyroxine (home medication), Rosuvastatin (home medication), Torsemide (home medication)  Decreased anticoagulant effect: None  Added bleed risk: None  Pt started methylprednisolone/prednisone which may increase wafarin effects. INR supra-therapeutic today  Hold warfarin this evening  Pharmacy will continue to monitor and adjust warfarin therapy as indicated    Thank you for the consult.   Regan Hagen, [facility], PharmD, BCPS  11/24/2023 9:05 AM

## 2023-11-24 NOTE — PROGRESS NOTES
[id number] ACUTE CARE OCCUPATIONAL THERAPY EVALUATION    Laurie Bailey, 1945, 2019/2019-A, 11/24/2023    Discharge Recommendation:  Home Health OT services      History:  [place]:  The primary encounter diagnosis was Pleuritic chest pain. Diagnoses of Compression fracture of T4 vertebra, initial encounter ([facility]) and Elevated troponin were also pertinent to this visit.   Past Medical History:   Diagnosis Date    Anticoagulant long-term use     Arthritis     Cancer ([address])     Chronic hypoxemic respiratory failure ([address]) 2/6/2018    Community acquired pneumonia of right upper lobe of lung 3/19/2022    COPD (chronic obstructive pulmonary disease) ([address])     Coronary atherosclerosis     COVID-19 virus detected 1/18/2021    DVT (deep venous thrombosis) ([facility])     Former smoker 11/22/2021    Gastroesophageal reflux disease     Hiatal hernia     Lung infiltrate on CT 1/22/2019    On home oxygen therapy     on 24/7    Osteopenia     Phlebitis     Pulmonary nodule 7/5/2016    S/P left heart catheterization by percutaneous approach 6/27/2013    Sepsis due to pneumonia ([address]) 11/14/2017    Shortness of breath 9/23/2019    Shortness of breath 1/18/2021    Shortness of breath 11/22/2021    Wears glasses        Subjective:  Patient states: \"I feel pretty good about going home'  Pain:  denies  Communication with other providers: handoff to RN  Restrictions: General Precautions, Fall Risk    Home Setup/Prior level of function:  Social/Functional History  Lives With: Spouse  Type of Home: House  Home Layout: One level  Home Access: Stairs to enter without rails  Entrance Stairs - Number of Steps: 1  Bathroom Shower/Tub: Walk-in shower, Shower chair with back  Bathroom Toilet:  (Raised)  Bathroom Equipment: Grab bars in shower, Shower chair  Home Equipment: [employer], Rajiv Alford, 4 wheeled, Oxygen (2L)  Has the patient had two or more falls in the past year or any fall with injury in the past year?: No  ADL

## 2023-11-27 ENCOUNTER — TELEPHONE (OUTPATIENT)
Dept: PULMONOLOGY | Age: 78
End: 2023-11-27

## 2023-11-27 ENCOUNTER — TELEPHONE (OUTPATIENT)
Dept: FAMILY MEDICINE CLINIC | Age: 78
End: 2023-11-27

## 2023-11-27 DIAGNOSIS — S22.040A COMPRESSION FRACTURE OF T4 VERTEBRA, INITIAL ENCOUNTER (HCC): Primary | ICD-10-CM

## 2023-11-27 NOTE — TELEPHONE ENCOUNTER
----- Message from Justen Adal sent at 11/27/2023  9:22 AM EST -----  Subject: Referral Request    Reason for referral request? Pt is needing a referral to Neuro surgeon in   [place]. Please call pt to advise. Provider patient wants to be referred to(if known):     Provider Phone Number(if known):     Additional Information for Provider?   ---------------------------------------------------------------------------  --------------  Tarik [place] Dequan    [phone]; OK to leave message on voicemail  ---------------------------------------------------------------------------  --------------

## 2023-11-27 NOTE — TELEPHONE ENCOUNTER
----- Message from Maame Eaton sent at 11/27/2023  9:21 AM EST -----  Subject: Message to Provider    QUESTIONS  Information for Provider? Pt was calling in to let her provider know that   she was recently discharged from the hospital. I asked her if she needed   to schedule a hospital follow up appt and she said her paperwork did not   say that she needed to do that. Please call pt to advise if her provider   would like to schedule a follow up appt. She was in [place]. She was   discharged on 11/24/23. She was in having back pain and called the   ambulance. She had a couple of fractures. ---------------------------------------------------------------------------  --------------  uJdy Llanes XTEX  [phone]; OK to leave message on voicemail  ---------------------------------------------------------------------------  --------------  SCRIPT ANSWERS  Relationship to Patient?  Self

## 2023-12-05 ENCOUNTER — TELEPHONE (OUTPATIENT)
Dept: FAMILY MEDICINE CLINIC | Age: 78
End: 2023-12-05

## 2023-12-05 NOTE — TELEPHONE ENCOUNTER
Patient called and requested a call back. Patient is having a procedure 12-14-23 ans will be stopping the coumadin on 12-10-23. How long after the procedure should patient re start the coumadin. Procedure is EGD and a Colonoscopy Dr Luigi Lincoln.

## 2023-12-06 ENCOUNTER — TELEPHONE (OUTPATIENT)
Dept: FAMILY MEDICINE CLINIC | Age: 78
End: 2023-12-06

## 2023-12-06 NOTE — TELEPHONE ENCOUNTER
Dr Venkata Saldana stopping warfarin 12/10/23 day before colonoscopy. When should pt resume and at what dose. Current 3 mg on Mon 6 mg remaining days.      Also when should pt have pt inr rechecked d/t holding med??

## 2023-12-12 ENCOUNTER — OFFICE VISIT (OUTPATIENT)
Dept: PULMONOLOGY | Age: 78
End: 2023-12-12
Payer: MEDICARE

## 2023-12-12 ENCOUNTER — ANESTHESIA EVENT (OUTPATIENT)
Dept: ENDOSCOPY | Age: 78
End: 2023-12-12
Payer: MEDICARE

## 2023-12-12 VITALS
BODY MASS INDEX: 22.9 KG/M2 | WEIGHT: 137.6 LBS | DIASTOLIC BLOOD PRESSURE: 70 MMHG | HEART RATE: 86 BPM | SYSTOLIC BLOOD PRESSURE: 140 MMHG | OXYGEN SATURATION: 89 %

## 2023-12-12 DIAGNOSIS — R06.02 SHORTNESS OF BREATH: ICD-10-CM

## 2023-12-12 DIAGNOSIS — R91.1 PULMONARY NODULE: Chronic | ICD-10-CM

## 2023-12-12 DIAGNOSIS — J44.9 COPD, VERY SEVERE (HCC): Primary | Chronic | ICD-10-CM

## 2023-12-12 DIAGNOSIS — Z87.891 FORMER SMOKER: ICD-10-CM

## 2023-12-12 DIAGNOSIS — I27.20 MILD PULMONARY HYPERTENSION (HCC): ICD-10-CM

## 2023-12-12 DIAGNOSIS — J43.9 PULMONARY EMPHYSEMA, UNSPECIFIED EMPHYSEMA TYPE (HCC): ICD-10-CM

## 2023-12-12 DIAGNOSIS — J96.11 CHRONIC HYPOXEMIC RESPIRATORY FAILURE (HCC): Chronic | ICD-10-CM

## 2023-12-12 PROCEDURE — G8427 DOCREV CUR MEDS BY ELIG CLIN: HCPCS | Performed by: INTERNAL MEDICINE

## 2023-12-12 PROCEDURE — 1036F TOBACCO NON-USER: CPT | Performed by: INTERNAL MEDICINE

## 2023-12-12 PROCEDURE — G8399 PT W/DXA RESULTS DOCUMENT: HCPCS | Performed by: INTERNAL MEDICINE

## 2023-12-12 PROCEDURE — G8484 FLU IMMUNIZE NO ADMIN: HCPCS | Performed by: INTERNAL MEDICINE

## 2023-12-12 PROCEDURE — 99213 OFFICE O/P EST LOW 20 MIN: CPT | Performed by: INTERNAL MEDICINE

## 2023-12-12 PROCEDURE — 1123F ACP DISCUSS/DSCN MKR DOCD: CPT | Performed by: INTERNAL MEDICINE

## 2023-12-12 PROCEDURE — 3023F SPIROM DOC REV: CPT | Performed by: INTERNAL MEDICINE

## 2023-12-12 PROCEDURE — 3077F SYST BP >= 140 MM HG: CPT | Performed by: INTERNAL MEDICINE

## 2023-12-12 PROCEDURE — 1111F DSCHRG MED/CURRENT MED MERGE: CPT | Performed by: INTERNAL MEDICINE

## 2023-12-12 PROCEDURE — G8420 CALC BMI NORM PARAMETERS: HCPCS | Performed by: INTERNAL MEDICINE

## 2023-12-12 PROCEDURE — 1090F PRES/ABSN URINE INCON ASSESS: CPT | Performed by: INTERNAL MEDICINE

## 2023-12-12 PROCEDURE — 3078F DIAST BP <80 MM HG: CPT | Performed by: INTERNAL MEDICINE

## 2023-12-12 NOTE — ANESTHESIA PRE PROCEDURE
Department of Anesthesiology  Preprocedure Note       Name:  Alin Weller   Age:  66 y.o.  :  1945                                          MRN:  [id number]         Date:  2023      Surgeon: Etienne Blankenship):  Alina Denson MD    Procedure: Procedure(s):  COLONOSCOPY DIAGNOSTIC  EGD ESOPHAGOGASTRODUODENOSCOPY    Medications prior to admission:   Prior to Admission medications    Medication Sig Start Date End Date Taking? Authorizing Provider   warfarin (COUMADIN) 6 MG tablet TAKE 1 PILL DAILY AS DIRECTED PER PATIENT INR LEVEL  Patient taking differently: Take by mouth nightly 23 Patient reports she takes 3 mg on Monday and 6 mg the rest of the week, takes at [facility] 23   Jes Sharma DO   albuterol (PROVENTIL) (2.5 MG/3ML) 0.083% nebulizer solution INHALE 3 ML BY NEBULIZATION EVERY 4 HOURS AS NEEDED FOR WHEEZE 11/17/23 2/15/24  Jes Sharma DO   warfarin (COUMADIN) 3 MG tablet TAKE 1 TABLET BY MOUTH EVERY , WED, , , SUN TAKE 2 TABLETS BY MOUTH EVERY TUES, FRIDAY  Patient taking differently: 23 Patient reports she takes 3 mg on Monday and 6 mg the rest of the week, takes at [facility] 10/16/23   Jes Sharma DO   gabapentin (NEURONTIN) 400 MG capsule Take 1 capsule by mouth every evening for 90 days. 10/10/23 1/8/24  Osmany Sharma DO   predniSONE (DELTASONE) 5 MG tablet Take 1 tablet by mouth daily 23   Alpha Fennel, APRN - CNP   Budeson-Glycopyrrol-Formoterol (BREZTRI AEROSPHERE) 160-9-4.8 MCG/ACT AERO Inhale 2 sprays into the lungs in the morning and 2 sprays in the evening.  23   Alpha Fennel, APRN - CNP   montelukast (SINGULAIR) 10 MG tablet TAKE 1 TABLET BY MOUTH EVERY DAY EVERY NIGHT  Patient taking differently: Take 1 tablet by mouth nightly TAKE 1 TABLET BY MOUTH EVERY DAY EVERY NIGHT 23   Edith, Beraht R, DO   esomeprazole (NEXIUM) 40 MG delayed release capsule Take 1 capsule by mouth in the morning and at bedtime 9/12/23 3/10/24  Jes Sharma, DO

## 2023-12-13 NOTE — PROGRESS NOTES
Spoke with patient and she will arrive at [facility] on 12/14/2023 for her procedure at [address]. IV order is in [facility].

## 2023-12-14 ENCOUNTER — ANESTHESIA (OUTPATIENT)
Dept: ENDOSCOPY | Age: 78
End: 2023-12-14
Payer: MEDICARE

## 2023-12-14 ENCOUNTER — HOSPITAL ENCOUNTER (OUTPATIENT)
Age: 78
Setting detail: OUTPATIENT SURGERY
Discharge: HOME | End: 2023-12-14
Attending: INTERNAL MEDICINE | Admitting: INTERNAL MEDICINE
Payer: MEDICARE

## 2023-12-14 VITALS
HEART RATE: 72 BPM | HEIGHT: 65 IN | OXYGEN SATURATION: 100 % | DIASTOLIC BLOOD PRESSURE: 57 MMHG | RESPIRATION RATE: 16 BRPM | BODY MASS INDEX: 23.32 KG/M2 | SYSTOLIC BLOOD PRESSURE: 138 MMHG | WEIGHT: 140 LBS | TEMPERATURE: 97.4 F

## 2023-12-14 DIAGNOSIS — J44.9 CHRONIC OBSTRUCTIVE PULMONARY DISEASE, UNSPECIFIED COPD TYPE (HCC): ICD-10-CM

## 2023-12-14 DIAGNOSIS — Z86.010 HISTORY OF COLON POLYPS: ICD-10-CM

## 2023-12-14 DIAGNOSIS — R13.10 DYSPHAGIA, UNSPECIFIED TYPE: ICD-10-CM

## 2023-12-14 PROCEDURE — 7100000011 HC PHASE II RECOVERY - ADDTL 15 MIN: Performed by: INTERNAL MEDICINE

## 2023-12-14 PROCEDURE — 6360000002 HC RX W HCPCS

## 2023-12-14 PROCEDURE — 2709999900 HC NON-CHARGEABLE SUPPLY: Performed by: INTERNAL MEDICINE

## 2023-12-14 PROCEDURE — 3700000001 HC ADD 15 MINUTES (ANESTHESIA): Performed by: INTERNAL MEDICINE

## 2023-12-14 PROCEDURE — 2720000010 HC SURG SUPPLY STERILE: Performed by: INTERNAL MEDICINE

## 2023-12-14 PROCEDURE — 3609010600 HC COLONOSCOPY POLYPECTOMY SNARE/COLD BIOPSY: Performed by: INTERNAL MEDICINE

## 2023-12-14 PROCEDURE — 3609013200 HC EGD W/ ABLATION: Performed by: INTERNAL MEDICINE

## 2023-12-14 PROCEDURE — 2580000003 HC RX 258

## 2023-12-14 PROCEDURE — 7100000010 HC PHASE II RECOVERY - FIRST 15 MIN: Performed by: INTERNAL MEDICINE

## 2023-12-14 PROCEDURE — 2500000003 HC RX 250 WO HCPCS

## 2023-12-14 PROCEDURE — C1726 CATH, BAL DIL, NON-VASCULAR: HCPCS | Performed by: INTERNAL MEDICINE

## 2023-12-14 PROCEDURE — 3700000000 HC ANESTHESIA ATTENDED CARE: Performed by: INTERNAL MEDICINE

## 2023-12-14 RX ORDER — LIDOCAINE HYDROCHLORIDE 20 MG/ML
INJECTION, SOLUTION EPIDURAL; INFILTRATION; INTRACAUDAL; PERINEURAL PRN
Status: DISCONTINUED | OUTPATIENT
Start: 2023-12-14 | End: 2023-12-14 | Stop reason: SDUPTHER

## 2023-12-14 RX ORDER — PROPOFOL 10 MG/ML
INJECTION, EMULSION INTRAVENOUS PRN
Status: DISCONTINUED | OUTPATIENT
Start: 2023-12-14 | End: 2023-12-14 | Stop reason: SDUPTHER

## 2023-12-14 RX ORDER — SODIUM CHLORIDE, SODIUM LACTATE, POTASSIUM CHLORIDE, CALCIUM CHLORIDE 600; 310; 30; 20 MG/100ML; MG/100ML; MG/100ML; MG/100ML
INJECTION, SOLUTION INTRAVENOUS CONTINUOUS
Status: DISCONTINUED | OUTPATIENT
Start: 2023-12-14 | End: 2023-12-14 | Stop reason: HOSPADM

## 2023-12-14 RX ORDER — EPHEDRINE SULFATE 50 MG/ML
INJECTION INTRAVENOUS PRN
Status: DISCONTINUED | OUTPATIENT
Start: 2023-12-14 | End: 2023-12-14 | Stop reason: SDUPTHER

## 2023-12-14 RX ADMIN — LIDOCAINE HYDROCHLORIDE 40 MG: 20 INJECTION, SOLUTION EPIDURAL; INFILTRATION; INTRACAUDAL; PERINEURAL at 08:59

## 2023-12-14 RX ADMIN — PROPOFOL 680 MG: 10 INJECTION, EMULSION INTRAVENOUS at 08:59

## 2023-12-14 RX ADMIN — EPHEDRINE SULFATE 10 MG: 50 INJECTION INTRAVENOUS at 09:32

## 2023-12-14 RX ADMIN — PHENYLEPHRINE HYDROCHLORIDE 100 MCG: 10 INJECTION INTRAVENOUS at 09:23

## 2023-12-14 ASSESSMENT — PAIN SCALES - GENERAL: PAINLEVEL_OUTOF10: 0

## 2023-12-14 ASSESSMENT — PAIN - FUNCTIONAL ASSESSMENT
PAIN_FUNCTIONAL_ASSESSMENT: 0-10
PAIN_FUNCTIONAL_ASSESSMENT: 0-10

## 2023-12-14 NOTE — ANESTHESIA POSTPROCEDURE EVALUATION
Department of Anesthesiology  Postprocedure Note    Patient: Matthias Juarez  MRN: [id number]  YOB: 1945  Date of evaluation: 12/14/2023    Procedure Summary     Date: 12/14/23 Room / Location: [address] [place] [address]    Anesthesia Start: 1088 Anesthesia Stop: 2124    Procedures:       COLONOSCOPY POLYPECTOMY SNARE/COLD BIOPSY      EGD POLYP ABLATION/OTHER WITH BALLOON DILATION WITH 15-18MM BALLOON UP TO 18MM Diagnosis:       History of colon polyps      Chronic obstructive pulmonary disease, unspecified COPD type ([address])      Dysphagia, unspecified type      (History of colon polyps [Z86.010])      (Chronic obstructive pulmonary disease, unspecified COPD type ([address]) [J44.9])      (Dysphagia, unspecified type [R13.10])    Surgeons: Dayanara Daigle MD Responsible Provider: Juanjose Gibbons MD    Anesthesia Type: MAC ASA Status: 3          Anesthesia Type: No value filed. Gaye Phase I: Gaye Score: 10    Gaye Phase II:      Anesthesia Post Evaluation    Patient location during evaluation: bedside  Patient participation: complete - patient participated  Level of consciousness: awake  Pain score: 0  Airway patency: patent  Nausea & Vomiting: no nausea and no vomiting  Cardiovascular status: blood pressure returned to baseline and hemodynamically stable  Respiratory status: acceptable and room air  Hydration status: euvolemic  Pain management: adequate        No notable events documented.

## 2023-12-14 NOTE — ANESTHESIA PRE PROCEDURE
Department of Anesthesiology  Preprocedure Note       Name:  Carina Egan   Age:  66 y.o.  :  1945                                          MRN:  [id number]         Date:  2023      Surgeon: Suzette Casillas):  Toya Neville MD    Procedure: Procedure(s):  COLONOSCOPY DIAGNOSTIC  EGD ESOPHAGOGASTRODUODENOSCOPY    Medications prior to admission:   Prior to Admission medications    Medication Sig Start Date End Date Taking? Authorizing Provider   warfarin (COUMADIN) 6 MG tablet TAKE 1 PILL DAILY AS DIRECTED PER PATIENT INR LEVEL  Patient taking differently: Take by mouth nightly 23 Patient reports she takes 3 mg on Monday and 6 mg the rest of the week, takes at [facility] 23   Jes Sharma DO   albuterol (PROVENTIL) (2.5 MG/3ML) 0.083% nebulizer solution INHALE 3 ML BY NEBULIZATION EVERY 4 HOURS AS NEEDED FOR WHEEZE 11/17/23 2/15/24  Jes Sharma DO   warfarin (COUMADIN) 3 MG tablet TAKE 1 TABLET BY MOUTH EVERY , WED, , , SUN TAKE 2 TABLETS BY MOUTH EVERY TUES, FRIDAY  Patient taking differently: 23 Patient reports she takes 3 mg on Monday and 6 mg the rest of the week, takes at [facility] 10/16/23   Jes Sharma DO   gabapentin (NEURONTIN) 400 MG capsule Take 1 capsule by mouth every evening for 90 days. 10/10/23 1/8/24  Jairo Sharma DO   predniSONE (DELTASONE) 5 MG tablet Take 1 tablet by mouth daily 23   IMANI Leal CNP   Budeson-Glycopyrrol-Formoterol (BREZTRI AEROSPHERE) 160-9-4.8 MCG/ACT AERO Inhale 2 sprays into the lungs in the morning and 2 sprays in the evening.  23   IMANI Leal CNP   montelukast (SINGULAIR) 10 MG tablet TAKE 1 TABLET BY MOUTH EVERY DAY EVERY NIGHT  Patient taking differently: Take 1 tablet by mouth nightly TAKE 1 TABLET BY MOUTH EVERY DAY EVERY NIGHT 23   Edith, Beraht R, DO   esomeprazole (NEXIUM) 40 MG delayed release capsule Take 1 capsule by mouth in the morning and at bedtime 9/12/23 3/10/24  Jes Sharma, DO

## 2023-12-14 NOTE — PROGRESS NOTES
1003 Pt received from Ginny and report received from [facility]. Pt denies c/o. Pt abdomen soft and non tender.  to bedside. Pt given Diet Pepsi and fern crackers. 1018 In to check on pt. Pt denies c/o or needs. Call light in reach.  at bedside. 1020 Discharge instructions given to pt and . Both verbalized understanding of instructions. Pt up to side of bed. Pt ready to get dressed to go home.  to get car. Pt los c/o or needs. Call light in reach. 1035 Pt discharged to home per wheelchair via [address]

## 2023-12-14 NOTE — BRIEF OP NOTE
[facility]    Procedure Note    2023  10:32 AM    Patient:    Matthias Juarez  : 1945   66 y.o. MRN: [id number]  Admitted: 2023  7:06 AM ATT: Dayanara Daigle MD   ENDO/NONE  AdmitDx: History of colon polyps [Z86.010]  Chronic obstructive pulmonary disease, unspecified COPD type ([address]) [J44.9]  Dysphagia, unspecified type [R13.10]  PCP: Yu Rodriguez DO    Procedure:     Esophagogastroduodenoscopy with biopsy, polypectomy  Colonoscopy polypectomy    Date:  2023     Surgeon:  Dayanara Daigle MD     Referring Physician:  ATT: Dayanara Daigle MD, PCP: Yu Rodriguez DO    Preoperative Diagnosis:  Dysphagia, screening colon    Postoperative Diagnosis:  Esophageal stenosis, hiatal hernia, gastric polyps, colon polyps, diverticulosis    Anesthesia:  MAC Sedation (Deep Anesthesia)    Indications: This is a 66y.o. year old female who presents today with indications as above. The patient tolerated the procedure well and was taken to the post anesthesia care unit in good condition. Complications: None  Estimated Blood Loss: <5cc  Specimens: biopsies were obtained  _______________________________________________________________________________  EGD:   Impression:          -  Benign-appearing esophageal stenosis. Dilated. -  3 cm hiatal hernia. -  A single gastric polyp. Biopsied.          -  Two gastric polyps. Resected and retrieved. -  Two gastric polyps. Resected and retrieved. -  Normal examined duodenum. Recommendation:          -  Await pathology results. -  Return to GI clinic in 2 weeks. - PPI daily          -  Repeat upper endoscopy in 3 years for surveillance of multiple polyps. Colonoscopy:  Impression:          -  Two 4 to 7 mm polyps in the transverse colon, removed with a cold snare. Resected and retrieved.           -  Diverticulosis in the transverse colon and in

## 2023-12-14 NOTE — H&P
[facility]  Pre-operative History and Physical    Patient: Nubia Blankenship  : 1945  Acct#:       ASSESSMENT AND PLAN:    1. Patient is a 66 y.o. female here for procedure: with deep sedation  - EGD: Dysphagia  - Colonoscopy: Screening/surveillance colonoscopy    2. Procedure options, risks and benefits reviewed with patient. Patient expresses understanding. Marek Nolan MD  [facility]    HISTORY OF PRESENT ILLNESS:    The patient is a 66 y.o. female with significant past medical history as below who presents for procedure. Indication: same as above    History Obtained From:  Patient and review of all records    Past Medical History:        Diagnosis Date    Anticoagulant long-term use     Arthritis     Cancer ([address])     Chronic hypoxemic respiratory failure ([address]) 2018    Community acquired pneumonia of right upper lobe of lung 2022    COPD (chronic obstructive pulmonary disease) ([facility])     Coronary atherosclerosis     COVID-19 virus detected 2021    DVT (deep venous thrombosis) ([facility])     Former smoker 2021    Gastroesophageal reflux disease     Hiatal hernia     Lung infiltrate on CT 2019    On home oxygen therapy     on     Osteopenia     Phlebitis     Pulmonary emphysema ([address]) 2023    Pulmonary nodule 2016    S/P left heart catheterization by percutaneous approach 2013    Sepsis due to pneumonia ([address]) 2017    Shortness of breath 2019    Shortness of breath 2021    Shortness of breath 2021    Wears glasses        Past Surgical History:        Procedure Laterality Date    Caleb Doshi Angles    COLONOSCOPY      ENDOSCOPY, COLON, DIAGNOSTIC      TONSILLECTOMY      TUBAL LIGATION      VEIN SURGERY           Current Medications:    Medications    Prior to Admission medications    Medication Sig Start Date End Date Taking?  Authorizing Provider   DICYCLOMINE HCL PO Take by mouth in the MG tablet TAKE 1 TABLET BY MOUTH EVERY 7 DAYS PATIENT TAKES ON SUNDAY  Patient taking differently: Take 1 tablet by mouth every 7 days TAKE 1 TABLET BY MOUTH EVERY 7 DAYS PATIENT TAKES ON SUNDAY 7/13/23   Jes Sharma, DO   Roflumilast (DALIRESP) 500 MCG tablet TAKE 1 TABLET BY MOUTH EVERY DAY  Patient taking differently: Take 1 tablet by mouth daily TAKE 1 TABLET BY MOUTH EVERY DAY 7/13/23   Jes Sharma,    hydrOXYzine HCl (ATARAX) 25 MG tablet TAKE 1 TABLET BY MOUTH NIGHTLY  Patient taking differently: Take 1 tablet by mouth nightly as needed TAKE 1 TABLET BY MOUTH NIGHTLY 7/13/23   Jes Sharma DO   theophylline (THEODUR) 450 MG extended release tablet TAKE 1/2 TABLET BY MOUTH TWICE A DAY  Patient taking differently: Take 0.5 tablets by mouth 2 times daily TAKE 1/2 TABLET BY MOUTH TWICE A DAY 3/3/23   Jes Sharma DO   torsemide (DEMADEX) 20 MG tablet Take 1 tablet by mouth nightly 5/26/22   Provider, MD Reina   dilTIAZem (CARDIZEM) 60 MG tablet Take 1 tablet by mouth every 8 hours 1/4/22   Alondra Tavarez MD   Dextromethorphan-guaiFENesin (MUCINEX DM)  MG TB12 Take 1 to 2 tablet by mouth every 12 hours (maximum: 4 tablets/24 hours). Drink plenty of water. 11/18/20   Conrado Alcantar PA-C   OXYGEN Inhale 2 L/hr into the lungs continuous. Provider, MD Reina      Scheduled Medications:   Infusions:    lactated ringers IV soln       PRN Medications: Allergies:    Allergies   Allergen Reactions    Codeine Swelling    Darvon [Propoxyphene Hcl] Swelling    Lamisil [Terbinafine Hcl]     Lisinopril Other (See Comments)     Pt doesn't remember reaction    Morphine Swelling    Neosporin [Neomycin-Polymyxin-Gramicidin]     Pcn [Penicillins] Swelling         Allergies:  Codeine, Darvon [propoxyphene hcl], Lamisil [terbinafine hcl], Lisinopril, Morphine, Neosporin [neomycin-polymyxin-gramicidin], and Pcn [penicillins]    Social History:   TOBACCO:   reports that she quit smoking

## 2023-12-14 NOTE — DISCHARGE INSTRUCTIONS
EGD/COLONOSCOPY    ________________________________    OFFICE [facility]___[phone]________________     Call for FOLLOW UP APPOINTMENT IN _____2___WEEKS WITH DR. BASILIO. REPEAT egd PROCEDURE IN _____3___YEARS     TEST ORDERED: NONE       What to Expect at [facility] Recovery:EGD  The only discomfort after your EGD is generally limited to a mild soreness of the throat, which may last a day or two. Call your physician immediately if you have severe chest pain, shortness of breath or a temperature of 100 degrees or higher if taken orally. Your Recovery: COLON   Your doctor will tell you when you can eat and do your other usual activitiesYour doctor will talk to you about when you will need your next colonoscopy. Your doctor can help you decide how often you need to be checked. This will depend on the results of your test and your risk for colorectal cancer. After the test, you may be bloated or have gas pains. You may need to pass gas. If a biopsy was done or a polyp was removed, you may have streaks of blood in your stool (feces) for a few days. This care sheet gives you a general idea about how long it will take for you to recover. But each person recovers at a different pace. Follow the steps below to get better as quickly as possible. How can you care for yourself at home? Activity  Rest as much as you need to after you go home. You should be able to go back to your usual activities the day after the test.  Diet  Follow your doctor's directions for eating after the test.  Drink plenty of fluids (unless your doctor has told you not to). Medications  If you have a sore throat the day after the test, use an over-the-counter spray to numb your throat. Your doctor will tell you if and when you can restart your medicines. He or she will also give you instructions about taking any new medicines.   If you take blood thinners, such as warfarin (Coumadin), clopidogrel (Plavix), or aspirin, be sure to

## 2023-12-29 ENCOUNTER — NURSE ONLY (OUTPATIENT)
Dept: FAMILY MEDICINE CLINIC | Age: 78
End: 2023-12-29
Payer: MEDICARE

## 2023-12-29 DIAGNOSIS — Z79.01 ANTICOAGULANT LONG-TERM USE: ICD-10-CM

## 2023-12-29 DIAGNOSIS — G45.9 TIA (TRANSIENT ISCHEMIC ATTACK): ICD-10-CM

## 2023-12-29 DIAGNOSIS — Z86.711 HISTORY OF PULMONARY EMBOLISM: ICD-10-CM

## 2023-12-29 LAB
INTERNATIONAL NORMALIZATION RATIO, POC: 4.2
PROTHROMBIN TIME, POC: 50.9

## 2023-12-29 PROCEDURE — 85610 PROTHROMBIN TIME: CPT | Performed by: STUDENT IN AN ORGANIZED HEALTH CARE EDUCATION/TRAINING PROGRAM

## 2024-01-01 NOTE — CARE COORDINATION
CM review of pt chart for readmission risk, last admission 98-11/22 dx multi focal PNE. Pt has PCP, and insurance. PtPatient is from home with her spouse/Ray ,independent with all mobility and ADL's. Patient stated that she has 2 walkers  she can use if needed. Patient stated that she wears home O2. Pt returns to ER today due to Back pain and SOB. Pt continues to show some patchy opacities, new on rt, improved on left. Pt continues to sat > 90% on 2L. New 6 MM rt LL nodule that will require f/u to Dr Cindy Bateman. Pt treated in ER with IV antibiotic, Rx for PO antibiotics, prednisone, inhaler and cough medication.  POC for discharge home, with o/p. JOHANRN/CM
no

## 2024-01-03 DIAGNOSIS — J44.9 COPD, VERY SEVERE (HCC): ICD-10-CM

## 2024-01-03 RX ORDER — THEOPHYLLINE 450 MG/1
225 TABLET, EXTENDED RELEASE ORAL 2 TIMES DAILY
Qty: 90 TABLET | Refills: 1 | Status: SHIPPED | OUTPATIENT
Start: 2024-01-03 | End: 2024-07-01

## 2024-01-05 NOTE — TELEPHONE ENCOUNTER
Detail Level: Detailed I spoke to [facility] over the phone and she now tells me that the right-sided pleuritic chest pain is relieved and she is not having discomfort. I told her that if this gets worse during the evening or night she should go directly the emergency room.   If she has more pain tomorrow then I will obtain a D-dimer and chest x-ray and then consider doing a CTA chest. Add 23332 Cpt? (Important Note: In 2017 The Use Of 55708 Is Being Tracked By Cms To Determine Future Global Period Reimbursement For Global Periods): yes

## 2024-01-12 ENCOUNTER — NURSE ONLY (OUTPATIENT)
Dept: FAMILY MEDICINE CLINIC | Age: 79
End: 2024-01-12

## 2024-01-12 DIAGNOSIS — Z86.718 HISTORY OF DVT OF LOWER EXTREMITY: Primary | ICD-10-CM

## 2024-01-12 DIAGNOSIS — E87.6 HYPOKALEMIA: ICD-10-CM

## 2024-01-12 DIAGNOSIS — I10 ESSENTIAL HYPERTENSION: ICD-10-CM

## 2024-01-12 LAB — INTERNATIONAL NORMALIZATION RATIO, POC: 3.6

## 2024-01-12 RX ORDER — BACLOFEN 20 MG
TABLET ORAL
Qty: 90 TABLET | Refills: 0 | Status: CANCELLED | OUTPATIENT
Start: 2024-01-12

## 2024-01-12 RX ORDER — VITAMIN E (DL,TOCOPHERYL ACET) 180 MG
CAPSULE ORAL
Qty: 90 CAPSULE | Refills: 1 | Status: SHIPPED | OUTPATIENT
Start: 2024-01-12 | End: 2024-04-12

## 2024-01-12 NOTE — PROGRESS NOTES
Current dose hold 1 day and 3 mg x 2 days and 6 mg x 5 days. INR: 3.6   Per Dr. Hood change to 3 mg x 3 days and 6 mg x 4 days recheck in 2 weeks.

## 2024-01-26 ENCOUNTER — NURSE ONLY (OUTPATIENT)
Dept: FAMILY MEDICINE CLINIC | Age: 79
End: 2024-01-26
Payer: MEDICARE

## 2024-01-26 ENCOUNTER — ANTI-COAG VISIT (OUTPATIENT)
Dept: FAMILY MEDICINE CLINIC | Age: 79
End: 2024-01-26

## 2024-01-26 DIAGNOSIS — Z86.711 HISTORY OF PULMONARY EMBOLISM: ICD-10-CM

## 2024-01-26 DIAGNOSIS — G45.9 TIA (TRANSIENT ISCHEMIC ATTACK): ICD-10-CM

## 2024-01-26 DIAGNOSIS — Z79.01 ANTICOAGULANT LONG-TERM USE: ICD-10-CM

## 2024-01-26 DIAGNOSIS — I48.91 ATRIAL FIBRILLATION, UNSPECIFIED TYPE (HCC): Primary | ICD-10-CM

## 2024-01-26 LAB
INTERNATIONAL NORMALIZATION RATIO, POC: 2.9
PROTHROMBIN TIME, POC: 34.4

## 2024-01-26 PROCEDURE — 85610 PROTHROMBIN TIME: CPT | Performed by: STUDENT IN AN ORGANIZED HEALTH CARE EDUCATION/TRAINING PROGRAM

## 2024-01-26 NOTE — PROGRESS NOTES
Confirmed current warfarin dose 3 mg x 3 days and 6 mg x 4 days. Inr 2.9. keep same  recheck in 3 weeks.

## 2024-02-03 DIAGNOSIS — L29.9 PRURITUS: ICD-10-CM

## 2024-02-05 RX ORDER — HYDROCORTISONE 10 MG/G
CREAM TOPICAL
Qty: 56.8 G | Refills: 2 | Status: SHIPPED | OUTPATIENT
Start: 2024-02-05

## 2024-02-06 ENCOUNTER — TELEPHONE (OUTPATIENT)
Dept: FAMILY MEDICINE CLINIC | Age: 79
End: 2024-02-06

## 2024-02-06 NOTE — TELEPHONE ENCOUNTER
To Dr. Sharma-    Patient needs her Handicap Placard renewed----for one only and could it be indefinitely.    Please call patient when ready and either she or her  will .    Phone:  [phone]

## 2024-02-11 DIAGNOSIS — Z79.01 ANTICOAGULANT LONG-TERM USE: ICD-10-CM

## 2024-02-12 RX ORDER — WARFARIN SODIUM 6 MG/1
6 TABLET ORAL NIGHTLY
Qty: 90 TABLET | Refills: 1 | Status: SHIPPED | OUTPATIENT
Start: 2024-02-12 | End: 2024-08-10

## 2024-02-14 DIAGNOSIS — F41.9 ANXIETY: ICD-10-CM

## 2024-02-14 RX ORDER — HYDROXYZINE HYDROCHLORIDE 25 MG/1
25 TABLET, FILM COATED ORAL NIGHTLY PRN
Qty: 90 TABLET | Refills: 1 | Status: SHIPPED | OUTPATIENT
Start: 2024-02-14

## 2024-02-16 ENCOUNTER — OFFICE VISIT (OUTPATIENT)
Dept: FAMILY MEDICINE CLINIC | Age: 79
End: 2024-02-16
Payer: MEDICARE

## 2024-02-16 VITALS
BODY MASS INDEX: 21.69 KG/M2 | HEART RATE: 73 BPM | HEIGHT: 65 IN | WEIGHT: 130.2 LBS | SYSTOLIC BLOOD PRESSURE: 140 MMHG | OXYGEN SATURATION: 88 % | DIASTOLIC BLOOD PRESSURE: 60 MMHG

## 2024-02-16 DIAGNOSIS — Z86.711 HISTORY OF PULMONARY EMBOLISM: ICD-10-CM

## 2024-02-16 DIAGNOSIS — G45.9 TIA (TRANSIENT ISCHEMIC ATTACK): ICD-10-CM

## 2024-02-16 DIAGNOSIS — J44.9 COPD, VERY SEVERE (HCC): Primary | Chronic | ICD-10-CM

## 2024-02-16 DIAGNOSIS — Z79.01 ANTICOAGULANT LONG-TERM USE: ICD-10-CM

## 2024-02-16 DIAGNOSIS — I10 ESSENTIAL HYPERTENSION: ICD-10-CM

## 2024-02-16 DIAGNOSIS — I25.10 ATHEROSCLEROSIS OF CORONARY ARTERY OF NATIVE HEART WITHOUT ANGINA PECTORIS, UNSPECIFIED VESSEL OR LESION TYPE: ICD-10-CM

## 2024-02-16 LAB
INTERNATIONAL NORMALIZATION RATIO, POC: 2.6
PROTHROMBIN TIME, POC: 30.7

## 2024-02-16 PROCEDURE — 3078F DIAST BP <80 MM HG: CPT | Performed by: STUDENT IN AN ORGANIZED HEALTH CARE EDUCATION/TRAINING PROGRAM

## 2024-02-16 PROCEDURE — 3077F SYST BP >= 140 MM HG: CPT | Performed by: STUDENT IN AN ORGANIZED HEALTH CARE EDUCATION/TRAINING PROGRAM

## 2024-02-16 PROCEDURE — G8484 FLU IMMUNIZE NO ADMIN: HCPCS | Performed by: STUDENT IN AN ORGANIZED HEALTH CARE EDUCATION/TRAINING PROGRAM

## 2024-02-16 PROCEDURE — 85610 PROTHROMBIN TIME: CPT | Performed by: STUDENT IN AN ORGANIZED HEALTH CARE EDUCATION/TRAINING PROGRAM

## 2024-02-16 PROCEDURE — 1123F ACP DISCUSS/DSCN MKR DOCD: CPT | Performed by: STUDENT IN AN ORGANIZED HEALTH CARE EDUCATION/TRAINING PROGRAM

## 2024-02-16 PROCEDURE — 1090F PRES/ABSN URINE INCON ASSESS: CPT | Performed by: STUDENT IN AN ORGANIZED HEALTH CARE EDUCATION/TRAINING PROGRAM

## 2024-02-16 PROCEDURE — 1036F TOBACCO NON-USER: CPT | Performed by: STUDENT IN AN ORGANIZED HEALTH CARE EDUCATION/TRAINING PROGRAM

## 2024-02-16 PROCEDURE — G8399 PT W/DXA RESULTS DOCUMENT: HCPCS | Performed by: STUDENT IN AN ORGANIZED HEALTH CARE EDUCATION/TRAINING PROGRAM

## 2024-02-16 PROCEDURE — 99214 OFFICE O/P EST MOD 30 MIN: CPT | Performed by: STUDENT IN AN ORGANIZED HEALTH CARE EDUCATION/TRAINING PROGRAM

## 2024-02-16 PROCEDURE — G8427 DOCREV CUR MEDS BY ELIG CLIN: HCPCS | Performed by: STUDENT IN AN ORGANIZED HEALTH CARE EDUCATION/TRAINING PROGRAM

## 2024-02-16 PROCEDURE — 3023F SPIROM DOC REV: CPT | Performed by: STUDENT IN AN ORGANIZED HEALTH CARE EDUCATION/TRAINING PROGRAM

## 2024-02-16 PROCEDURE — G8420 CALC BMI NORM PARAMETERS: HCPCS | Performed by: STUDENT IN AN ORGANIZED HEALTH CARE EDUCATION/TRAINING PROGRAM

## 2024-02-16 RX ORDER — ABALOPARATIDE 2000 UG/ML
INJECTION, SOLUTION SUBCUTANEOUS
COMMUNITY
Start: 2024-02-01

## 2024-02-16 NOTE — PROGRESS NOTES
2/21/2024    Janessa Davidson    Chief Complaint   Patient presents with    Follow-up     HTN     Office Visit for Anticoagulation Management       HPI  History was obtained from patient.  Janessa is a 79 y.o. female with a PMHx as listed below who presents today for follow up on chronic conditions.     Last appt itching rx. Hydrocortisone  Close to 10 pound weight loss since December. No reason paitent knows of.   Denies fever, chills, night sweats.     INR therapeutic  No recent falls  Completed PT    Now on tymlos. Multiple fractures. Started by Dr. Tanner office.     Social:  Does not drive, independent but issues severe COPD   1. COPD, very severe (HCC)    2. Essential hypertension    3. Anticoagulant long-term use    4. TIA (transient ischemic attack)    5. History of pulmonary embolism    6. Atherosclerosis of coronary artery of native heart without angina pectoris, unspecified vessel or lesion type             REVIEW OF SYMPTOMS    Review of Systems   Constitutional:  Negative for chills and fatigue.   HENT:  Negative for congestion and sore throat.    Respiratory:  Negative for shortness of breath and wheezing.    Cardiovascular:  Negative for chest pain and palpitations.   Gastrointestinal:  Negative for abdominal pain and nausea.   Genitourinary:  Negative for frequency and urgency.   Neurological:  Negative for light-headedness.       PAST MEDICAL HISTORY  Past Medical History:   Diagnosis Date    Anticoagulant long-term use     Arthritis     Cancer (HCC)     Chronic hypoxemic respiratory failure (HCC) 02/06/2018    Community acquired pneumonia of right upper lobe of lung 03/19/2022    COPD (chronic obstructive pulmonary disease) ([facility])     Coronary atherosclerosis     COVID-19 virus detected 01/18/2021    DVT (deep venous thrombosis) ([facility])     Former smoker 11/22/2021    Gastroesophageal reflux disease     Hiatal hernia     Lung infiltrate on CT 01/22/2019    On home oxygen therapy     on 24/7

## 2024-02-16 NOTE — PROGRESS NOTES
Confirmed current warfarin dose 3 mg x 3 days and 6 mg x 4 days. Inr 2.6. keep same  recheck in 4 weeks.

## 2024-02-18 DIAGNOSIS — J44.9 COPD, VERY SEVERE (HCC): ICD-10-CM

## 2024-02-18 DIAGNOSIS — M81.0 AGE-RELATED OSTEOPOROSIS WITHOUT CURRENT PATHOLOGICAL FRACTURE: ICD-10-CM

## 2024-02-20 RX ORDER — RISEDRONATE SODIUM 35 MG/1
35 TABLET, FILM COATED ORAL
Qty: 25 TABLET | Refills: 0 | Status: SHIPPED | OUTPATIENT
Start: 2024-02-20 | End: 2024-08-18

## 2024-02-20 RX ORDER — ROFLUMILAST 500 UG/1
500 TABLET ORAL DAILY
Qty: 90 TABLET | Refills: 1 | Status: SHIPPED | OUTPATIENT
Start: 2024-02-20 | End: 2024-08-18

## 2024-02-21 ASSESSMENT — ENCOUNTER SYMPTOMS
NAUSEA: 0
SORE THROAT: 0
ABDOMINAL PAIN: 0
WHEEZING: 0
SHORTNESS OF BREATH: 0

## 2024-03-01 DIAGNOSIS — E78.00 PURE HYPERCHOLESTEROLEMIA: ICD-10-CM

## 2024-03-01 DIAGNOSIS — M19.90 ARTHRITIS: ICD-10-CM

## 2024-03-01 DIAGNOSIS — K21.9 GASTROESOPHAGEAL REFLUX DISEASE, UNSPECIFIED WHETHER ESOPHAGITIS PRESENT: ICD-10-CM

## 2024-03-01 DIAGNOSIS — J44.9 COPD, VERY SEVERE (HCC): ICD-10-CM

## 2024-03-01 DIAGNOSIS — J44.9 CHRONIC OBSTRUCTIVE PULMONARY DISEASE, UNSPECIFIED (HCC): ICD-10-CM

## 2024-03-01 DIAGNOSIS — E03.9 ACQUIRED HYPOTHYROIDISM: ICD-10-CM

## 2024-03-01 RX ORDER — ALBUTEROL SULFATE 90 UG/1
AEROSOL, METERED RESPIRATORY (INHALATION)
Qty: 18 EACH | Refills: 1 | Status: SHIPPED | OUTPATIENT
Start: 2024-03-01

## 2024-03-01 RX ORDER — ESOMEPRAZOLE MAGNESIUM 40 MG/1
CAPSULE, DELAYED RELEASE ORAL
Qty: 180 CAPSULE | Refills: 1 | Status: SHIPPED | OUTPATIENT
Start: 2024-03-01

## 2024-03-01 RX ORDER — GABAPENTIN 400 MG/1
400 CAPSULE ORAL EVERY EVENING
Qty: 90 CAPSULE | Refills: 0 | Status: SHIPPED | OUTPATIENT
Start: 2024-03-01 | End: 2024-05-30

## 2024-03-01 RX ORDER — ROSUVASTATIN CALCIUM 20 MG/1
20 TABLET, COATED ORAL NIGHTLY
Qty: 90 TABLET | Refills: 1 | Status: SHIPPED | OUTPATIENT
Start: 2024-03-01 | End: 2024-08-28

## 2024-03-01 RX ORDER — LEVOTHYROXINE SODIUM 0.07 MG/1
75 TABLET ORAL DAILY
Qty: 90 TABLET | Refills: 1 | Status: SHIPPED | OUTPATIENT
Start: 2024-03-01 | End: 2024-08-28

## 2024-03-01 RX ORDER — ALBUTEROL SULFATE 2.5 MG/3ML
SOLUTION RESPIRATORY (INHALATION)
Qty: 375 ML | Refills: 5 | Status: SHIPPED | OUTPATIENT
Start: 2024-03-01

## 2024-03-04 ENCOUNTER — NURSE ONLY (OUTPATIENT)
Dept: FAMILY MEDICINE CLINIC | Age: 79
End: 2024-03-04
Payer: MEDICARE

## 2024-03-04 DIAGNOSIS — Z79.01 ANTICOAGULANT LONG-TERM USE: ICD-10-CM

## 2024-03-04 DIAGNOSIS — J44.9 COPD, VERY SEVERE (HCC): ICD-10-CM

## 2024-03-04 DIAGNOSIS — Z86.711 HISTORY OF PULMONARY EMBOLISM: ICD-10-CM

## 2024-03-04 DIAGNOSIS — G45.9 TIA (TRANSIENT ISCHEMIC ATTACK): ICD-10-CM

## 2024-03-04 LAB
INTERNATIONAL NORMALIZATION RATIO, POC: 4.1
PROTHROMBIN TIME, POC: 0

## 2024-03-04 PROCEDURE — 85610 PROTHROMBIN TIME: CPT | Performed by: STUDENT IN AN ORGANIZED HEALTH CARE EDUCATION/TRAINING PROGRAM

## 2024-03-04 RX ORDER — ALBUTEROL SULFATE 2.5 MG/3ML
SOLUTION RESPIRATORY (INHALATION)
Qty: 1275 ML | Refills: 0 | Status: CANCELLED | OUTPATIENT
Start: 2024-03-04

## 2024-03-04 NOTE — PROGRESS NOTES
Confirmed current warfarin dose 3 mg Mon and Thurs and 6 mg x all other days. Inr 4.1. Per Dr. Sharma Mon, Wed and Friday 3 mg, 6 mg all other days. Recheck in 3 weeks.     Patient also needs 1275 ML of nebulizer solution not 375.

## 2024-03-25 ENCOUNTER — NURSE ONLY (OUTPATIENT)
Dept: FAMILY MEDICINE CLINIC | Age: 79
End: 2024-03-25
Payer: MEDICARE

## 2024-03-25 ENCOUNTER — ANTI-COAG VISIT (OUTPATIENT)
Dept: FAMILY MEDICINE CLINIC | Age: 79
End: 2024-03-25

## 2024-03-25 DIAGNOSIS — Z79.01 ANTICOAGULANT LONG-TERM USE: ICD-10-CM

## 2024-03-25 DIAGNOSIS — I48.91 ATRIAL FIBRILLATION, UNSPECIFIED TYPE (HCC): Primary | ICD-10-CM

## 2024-03-25 DIAGNOSIS — G45.9 TIA (TRANSIENT ISCHEMIC ATTACK): ICD-10-CM

## 2024-03-25 DIAGNOSIS — Z86.711 HISTORY OF PULMONARY EMBOLISM: ICD-10-CM

## 2024-03-25 LAB
INTERNATIONAL NORMALIZATION RATIO, POC: 5.7
PROTHROMBIN TIME, POC: 67.8

## 2024-03-25 PROCEDURE — 85610 PROTHROMBIN TIME: CPT | Performed by: STUDENT IN AN ORGANIZED HEALTH CARE EDUCATION/TRAINING PROGRAM

## 2024-03-25 NOTE — PROGRESS NOTES
Confirmed current warfarin 3 mg on M/W/F & 6 mg all other days. Inr 5.7. Per Maria De Jesus hold warfarin 2 days. Then change to 3 mg 6 days 6 mg 1 days. Recheck 10 - 14 days.

## 2024-04-01 DIAGNOSIS — J44.9 COPD, VERY SEVERE (HCC): ICD-10-CM

## 2024-04-01 DIAGNOSIS — E87.6 HYPOKALEMIA: ICD-10-CM

## 2024-04-01 DIAGNOSIS — M19.90 ARTHRITIS: ICD-10-CM

## 2024-04-01 RX ORDER — POTASSIUM CHLORIDE 750 MG/1
TABLET, EXTENDED RELEASE ORAL
Qty: 145 TABLET | Refills: 1 | Status: SHIPPED | OUTPATIENT
Start: 2024-04-01

## 2024-04-01 RX ORDER — GABAPENTIN 400 MG/1
400 CAPSULE ORAL EVERY EVENING
Qty: 90 CAPSULE | Refills: 0 | Status: SHIPPED | OUTPATIENT
Start: 2024-04-01 | End: 2024-06-30

## 2024-04-01 RX ORDER — MONTELUKAST SODIUM 10 MG/1
10 TABLET ORAL NIGHTLY
Qty: 90 TABLET | Refills: 1 | Status: SHIPPED | OUTPATIENT
Start: 2024-04-01

## 2024-04-07 DIAGNOSIS — J44.9 COPD, VERY SEVERE (HCC): ICD-10-CM

## 2024-04-07 DIAGNOSIS — J44.9 CHRONIC OBSTRUCTIVE PULMONARY DISEASE, UNSPECIFIED (HCC): ICD-10-CM

## 2024-04-08 ENCOUNTER — NURSE ONLY (OUTPATIENT)
Dept: FAMILY MEDICINE CLINIC | Age: 79
End: 2024-04-08

## 2024-04-08 DIAGNOSIS — Z86.718 HISTORY OF DVT OF LOWER EXTREMITY: Primary | ICD-10-CM

## 2024-04-08 LAB — INTERNATIONAL NORMALIZATION RATIO, POC: 2.3

## 2024-04-08 RX ORDER — ALBUTEROL SULFATE 90 UG/1
AEROSOL, METERED RESPIRATORY (INHALATION)
Qty: 18 EACH | Refills: 1 | Status: SHIPPED | OUTPATIENT
Start: 2024-04-08

## 2024-04-08 RX ORDER — THEOPHYLLINE 450 MG/1
TABLET, EXTENDED RELEASE ORAL
Qty: 90 TABLET | Refills: 1 | Status: SHIPPED | OUTPATIENT
Start: 2024-04-08

## 2024-04-08 NOTE — PROGRESS NOTES
Confirmed current warfarin 3 mg on M/W/F & 6 mg all other days. Held for 2 days. Inr 2.3 Per Maria Dej Esus change to 3 mg M/W/F/Sat. 6 mg on all others. Recheck in 4 weeks. .

## 2024-04-09 ENCOUNTER — TELEPHONE (OUTPATIENT)
Dept: FAMILY MEDICINE CLINIC | Age: 79
End: 2024-04-09

## 2024-04-09 NOTE — TELEPHONE ENCOUNTER
Spoke to patient and informed her again to take 3mg on M/W/F/Sat and 6 mg on all others. Voiced understanding.

## 2024-04-09 NOTE — TELEPHONE ENCOUNTER
To Dr. Sharma-    Patient needs to know today how to take her Coumadin---the paper that was given to her yesterday at her INR nurse visit is not what she thought she was told.    Please call her today----she takes her coumadin in the late afternoon.

## 2024-04-15 ENCOUNTER — TELEPHONE (OUTPATIENT)
Dept: FAMILY MEDICINE CLINIC | Age: 79
End: 2024-04-15

## 2024-04-15 NOTE — TELEPHONE ENCOUNTER
To Dr. Sharma-    Patient would like you to call her regarding some new medicine she is taking.

## 2024-04-16 NOTE — TELEPHONE ENCOUNTER
JULIO, spoke with patient Hx. Compression Fx.'s started on Tymlos she was unable to tolerate 'made me feel bad' 'I do not like the way it made me feel.'   We will discuss further options at our upcoming appt.

## 2024-04-23 ENCOUNTER — HOSPITAL ENCOUNTER (EMERGENCY)
Age: 79
Discharge: HOME | End: 2024-04-23
Attending: STUDENT IN AN ORGANIZED HEALTH CARE EDUCATION/TRAINING PROGRAM
Payer: MEDICARE

## 2024-04-23 ENCOUNTER — APPOINTMENT (OUTPATIENT)
Dept: GENERAL RADIOLOGY | Age: 79
End: 2024-04-23
Payer: MEDICARE

## 2024-04-23 ENCOUNTER — TELEPHONE (OUTPATIENT)
Dept: PULMONOLOGY | Age: 79
End: 2024-04-23

## 2024-04-23 VITALS
OXYGEN SATURATION: 99 % | SYSTOLIC BLOOD PRESSURE: 165 MMHG | DIASTOLIC BLOOD PRESSURE: 72 MMHG | HEART RATE: 86 BPM | TEMPERATURE: 98.2 F | RESPIRATION RATE: 12 BRPM

## 2024-04-23 DIAGNOSIS — J44.1 COPD EXACERBATION (HCC): Primary | ICD-10-CM

## 2024-04-23 LAB
ALBUMIN SERPL-MCNC: 3.8 GM/DL (ref 3.4–5)
ALP BLD-CCNC: 130 IU/L (ref 40–128)
ALT SERPL-CCNC: 16 U/L (ref 10–40)
ANION GAP SERPL CALCULATED.3IONS-SCNC: 5 MMOL/L (ref 7–16)
AST SERPL-CCNC: 26 IU/L (ref 15–37)
BASE EXCESS MIXED: 12.2 (ref 0–3)
BASE EXCESS MIXED: 13.3 (ref 0–3)
BASOPHILS ABSOLUTE: 0 K/CU MM
BASOPHILS RELATIVE PERCENT: 0.4 % (ref 0–1)
BILIRUB SERPL-MCNC: 0.3 MG/DL (ref 0–1)
BUN SERPL-MCNC: 15 MG/DL (ref 6–23)
CALCIUM SERPL-MCNC: 9.2 MG/DL (ref 8.3–10.6)
CHLORIDE BLD-SCNC: 98 MMOL/L (ref 99–110)
CO2: 38 MMOL/L (ref 21–32)
COMMENT: ABNORMAL
CREAT SERPL-MCNC: 0.8 MG/DL (ref 0.6–1.1)
DIFFERENTIAL TYPE: ABNORMAL
EKG ATRIAL RATE: 77 BPM
EKG DIAGNOSIS: NORMAL
EKG P AXIS: 62 DEGREES
EKG P-R INTERVAL: 122 MS
EKG Q-T INTERVAL: 390 MS
EKG QRS DURATION: 110 MS
EKG QTC CALCULATION (BAZETT): 441 MS
EKG R AXIS: -40 DEGREES
EKG T AXIS: 73 DEGREES
EKG VENTRICULAR RATE: 77 BPM
EOSINOPHILS ABSOLUTE: 0 K/CU MM
EOSINOPHILS RELATIVE PERCENT: 0.1 % (ref 0–3)
GFR SERPL CREATININE-BSD FRML MDRD: 75 ML/MIN/1.73M2
GLUCOSE SERPL-MCNC: 106 MG/DL (ref 70–99)
HCO3 VENOUS: 40.1 MMOL/L (ref 22–29)
HCO3 VENOUS: 42.3 MMOL/L (ref 22–29)
HCT VFR BLD CALC: 41.4 % (ref 37–47)
HEMOGLOBIN: 12.8 GM/DL (ref 12.5–16)
IMMATURE NEUTROPHIL %: 0.4 % (ref 0–0.43)
INR BLD: 2.7 INDEX
LYMPHOCYTES ABSOLUTE: 1.3 K/CU MM
LYMPHOCYTES RELATIVE PERCENT: 16 % (ref 24–44)
MCH RBC QN AUTO: 30.6 PG (ref 27–31)
MCHC RBC AUTO-ENTMCNC: 30.9 % (ref 32–36)
MCV RBC AUTO: 99 FL (ref 78–100)
MONOCYTES ABSOLUTE: 0.7 K/CU MM
MONOCYTES RELATIVE PERCENT: 9.4 % (ref 0–4)
NEUTROPHILS RELATIVE PERCENT: 73.7 % (ref 36–66)
NUCLEATED RBC %: 0 %
O2 SAT, VEN: 65.5 % (ref 50–70)
O2 SAT, VEN: 94.3 % (ref 50–70)
PCO2, VEN: 59 MMHG (ref 41–51)
PCO2, VEN: 84 MMHG (ref 41–51)
PDW BLD-RTO: 14.2 % (ref 11.7–14.9)
PH VENOUS: 7.31 (ref 7.32–7.43)
PH VENOUS: 7.44 (ref 7.32–7.43)
PLATELET # BLD: 214 K/CU MM (ref 140–440)
PMV BLD AUTO: 9.9 FL (ref 7.5–11.1)
PO2, VEN: 169 MMHG (ref 28–48)
PO2, VEN: 38 MMHG (ref 28–48)
POTASSIUM SERPL-SCNC: 4.1 MMOL/L (ref 3.5–5.1)
PRO-BNP: 777.5 PG/ML
PROTHROMBIN TIME: 29.4 SECONDS (ref 11.7–14.5)
RBC # BLD: 4.18 M/CU MM (ref 4.2–5.4)
SEGMENTED NEUTROPHILS ABSOLUTE COUNT: 5.8 K/CU MM
SODIUM BLD-SCNC: 141 MMOL/L (ref 135–145)
TOTAL IMMATURE NEUTOROPHIL: 0.03 K/CU MM
TOTAL NUCLEATED RBC: 0 K/CU MM
TOTAL PROTEIN: 6 GM/DL (ref 6.4–8.2)
TROPONIN, HIGH SENSITIVITY: 26 NG/L (ref 0–14)
TROPONIN, HIGH SENSITIVITY: 33 NG/L (ref 0–14)
WBC # BLD: 7.9 K/CU MM (ref 4–10.5)

## 2024-04-23 PROCEDURE — 85610 PROTHROMBIN TIME: CPT

## 2024-04-23 PROCEDURE — 2580000003 HC RX 258: Performed by: STUDENT IN AN ORGANIZED HEALTH CARE EDUCATION/TRAINING PROGRAM

## 2024-04-23 PROCEDURE — 94640 AIRWAY INHALATION TREATMENT: CPT

## 2024-04-23 PROCEDURE — 6360000002 HC RX W HCPCS: Performed by: STUDENT IN AN ORGANIZED HEALTH CARE EDUCATION/TRAINING PROGRAM

## 2024-04-23 PROCEDURE — 80053 COMPREHEN METABOLIC PANEL: CPT

## 2024-04-23 PROCEDURE — 99285 EMERGENCY DEPT VISIT HI MDM: CPT

## 2024-04-23 PROCEDURE — 93010 ELECTROCARDIOGRAM REPORT: CPT | Performed by: INTERNAL MEDICINE

## 2024-04-23 PROCEDURE — 83880 ASSAY OF NATRIURETIC PEPTIDE: CPT

## 2024-04-23 PROCEDURE — 85025 COMPLETE CBC W/AUTO DIFF WBC: CPT

## 2024-04-23 PROCEDURE — 71045 X-RAY EXAM CHEST 1 VIEW: CPT

## 2024-04-23 PROCEDURE — 82805 BLOOD GASES W/O2 SATURATION: CPT

## 2024-04-23 PROCEDURE — 6370000000 HC RX 637 (ALT 250 FOR IP): Performed by: STUDENT IN AN ORGANIZED HEALTH CARE EDUCATION/TRAINING PROGRAM

## 2024-04-23 PROCEDURE — 93005 ELECTROCARDIOGRAM TRACING: CPT | Performed by: STUDENT IN AN ORGANIZED HEALTH CARE EDUCATION/TRAINING PROGRAM

## 2024-04-23 PROCEDURE — 96374 THER/PROPH/DIAG INJ IV PUSH: CPT

## 2024-04-23 PROCEDURE — 84484 ASSAY OF TROPONIN QUANT: CPT

## 2024-04-23 RX ORDER — IPRATROPIUM BROMIDE AND ALBUTEROL SULFATE 2.5; .5 MG/3ML; MG/3ML
1 SOLUTION RESPIRATORY (INHALATION) ONCE
Status: COMPLETED | OUTPATIENT
Start: 2024-04-23 | End: 2024-04-23

## 2024-04-23 RX ORDER — RISEDRONATE SODIUM 35 MG/1
35 TABLET, FILM COATED ORAL
COMMUNITY

## 2024-04-23 RX ORDER — POLYETHYLENE GLYCOL 3350 17 G/17G
17 POWDER, FOR SOLUTION ORAL DAILY
COMMUNITY

## 2024-04-23 RX ORDER — ALBUTEROL SULFATE 2.5 MG/3ML
2.5 SOLUTION RESPIRATORY (INHALATION) ONCE
Status: COMPLETED | OUTPATIENT
Start: 2024-04-23 | End: 2024-04-23

## 2024-04-23 RX ORDER — SENNOSIDES A AND B 8.6 MG/1
1 TABLET, FILM COATED ORAL DAILY PRN
COMMUNITY

## 2024-04-23 RX ORDER — PREDNISONE 20 MG/1
40 TABLET ORAL DAILY
Qty: 10 TABLET | Refills: 0 | Status: SHIPPED | OUTPATIENT
Start: 2024-04-23 | End: 2024-04-28

## 2024-04-23 RX ADMIN — WATER 80 MG: 1 INJECTION INTRAMUSCULAR; INTRAVENOUS; SUBCUTANEOUS at 10:05

## 2024-04-23 RX ADMIN — IPRATROPIUM BROMIDE AND ALBUTEROL SULFATE 1 DOSE: 2.5; .5 SOLUTION RESPIRATORY (INHALATION) at 10:06

## 2024-04-23 RX ADMIN — ALBUTEROL SULFATE 2.5 MG: 2.5 SOLUTION RESPIRATORY (INHALATION) at 10:06

## 2024-04-23 ASSESSMENT — ENCOUNTER SYMPTOMS
COUGH: 0
VOMITING: 0
SHORTNESS OF BREATH: 1
NAUSEA: 0
ABDOMINAL PAIN: 0
SORE THROAT: 0

## 2024-04-23 NOTE — ED NOTES
Medication History  [facility]    Patient Name: Janessa Davidson 1945     Medication history has been completed by: Daxa Baca [facility]    Source(s) of information: patient/family insurance claims     Primary Care Physician: Jes Sharma DO     Pharmacy: [facility]    Allergies as of 04/23/2024 - Fully Reviewed 04/23/2024   Allergen Reaction Noted    Codeine Swelling 12/22/2011    Darvon [propoxyphene hcl] Swelling 12/22/2011    Lamisil [terbinafine hcl]  06/04/2019    Lisinopril Other (See Comments) 06/20/2023    Morphine Swelling 12/22/2011    Neosporin [neomycin-polymyxin-gramicidin]  06/04/2019    Pcn [penicillins] Swelling 12/22/2011        Prior to Admission medications    Medication Sig Start Date End Date Taking? Authorizing Provider   predniSONE (DELTASONE) 20 MG tablet Take 2 tablets by mouth daily for 5 days 4/23/24 4/28/24 Yes Saran Mcmullen DO   risedronate (ACTONEL) 35 MG tablet Take 1 tablet by mouth every 7 days Takes on Sunday   Yes Provider, MD Reina   polyethylene glycol (GLYCOLAX) 17 GM/SCOOP powder Take 17 g by mouth daily   Yes Provider, Historical, MD   senna (SENOKOT) 8.6 MG tablet Take 1 tablet by mouth daily as needed for Constipation   Yes Provider, MD Reina   theophylline (THEODUR) 450 MG extended release tablet TAKE 1/2 TABLET TWICE A DAY BY MOUTH  Patient taking differently: Take 0.5 tablets by mouth 2 times daily 4/8/24   Maria D eJesus Peters APRN - CNP   albuterol sulfate HFA (PROVENTIL;VENTOLIN;PROAIR) 108 (90 Base) MCG/ACT inhaler INHALE 2 PUFFS BY MOUTH FOUR TIMES A DAY AS NEEDED 4/8/24   Maria De Jesus Peters APRN - CNP   gabapentin (NEURONTIN) 400 MG capsule Take 1 capsule by mouth every evening for 90 days. 4/1/24 6/30/24  Jes Sharma DO   potassium chloride (KLOR-CON M10) 10 MEQ extended release tablet ALTERNATE 1 TABLET AND 2 TABLETS DAILY 4/1/24   Jes Sharma DO   montelukast (SINGULAIR) 10 MG tablet Take 1 tablet by mouth nightly

## 2024-04-23 NOTE — TELEPHONE ENCOUNTER
FYI: Patients  left voicemail this morning stating he needed to cancel her appt at 9 am as she was unable to breath and was being transported by squad to hospital

## 2024-04-23 NOTE — DISCHARGE INSTRUCTIONS
You are seen today for shortness of breath.  Your chest x-ray and workup here is reassuring.  Initially your heart labs were slightly elevated but have since decreased.  I do not believe you are having a heart attack.  I believe this was a COPD exacerbation.  Please take your steroids as prescribed.  I did discuss with Dr. Judge who will be able to see you later today in the office.  Please call their office upon discharge.  Until that time I am prescribing you steroids to take for the next 5 days.  Please confirm with your pulmonologist that 40 mg of prednisone once a day for 5 days is appropriate for you.

## 2024-04-23 NOTE — ED PROVIDER NOTES
[facility] EMERGENCY DEPARTMENT  Emergency Department Encounter    Pt Name:Janessa Davidson  MRN: [id number]  Birthdate 1945  Date of evaluation: 4/23/24  PCP:  Jes Sharma DO       CHIEF COMPLAINT       Chief Complaint   Patient presents with    Shortness of Breath       HISTORY OF PRESENT ILLNESS     Janessa Davidson is a 79 y.o. female who presents with shortness of breath.  Patient has a history of COPD on 2 L nasal cannula chronically history of DVT chronic phlebitis, on Coumadin for decades.  Not a current smoker.    Patient states today she woke up to go to her pulmonologist office.  Initially felt well but then had some shortness of breath, she checked her oxygen saturation was 80% and she took a breathing treatment but did not clear up her shortness of breath.  She called 911.  En route got a breathing treatment.  Now here at the emergency department she is feeling much better.  She did not have any chest pain.  Does report a chronic cough that is unchanged in intensity, sputum characteristic or a mild.  No fevers.    Follows w/ maddi benitez    PAST MEDICAL / SURGICAL / SOCIAL / FAMILY HISTORY      has a past medical history of Anticoagulant long-term use, Arthritis, Cancer (HCC), Chronic hypoxemic respiratory failure (HCC), Community acquired pneumonia of right upper lobe of lung, COPD (chronic obstructive pulmonary disease) (HCC), Coronary atherosclerosis, COVID-19 virus detected, DVT (deep venous thrombosis) (HCC), Former smoker, Gastroesophageal reflux disease, Hiatal hernia, Lung infiltrate on CT, On home oxygen therapy, Osteopenia, Phlebitis, Pulmonary emphysema (HCC), Pulmonary nodule, S/P left heart catheterization by percutaneous approach, Sepsis due to pneumonia (HCC), Shortness of breath, Shortness of breath, Shortness of breath, and Wears glasses.     has a past surgical history that includes Appendectomy; Tonsillectomy; Tubal ligation;

## 2024-04-23 NOTE — ED TRIAGE NOTES
Pt arrives via EMS from home with c/o SOB worsening over past few days, Pt normally wears 2L O2, arrives wearing her baseline 2L O2, given duoneb en route with some relief.  Pt appears in no distress

## 2024-04-24 ENCOUNTER — OFFICE VISIT (OUTPATIENT)
Dept: PULMONOLOGY | Age: 79
End: 2024-04-24
Payer: MEDICARE

## 2024-04-24 VITALS
WEIGHT: 127 LBS | DIASTOLIC BLOOD PRESSURE: 78 MMHG | RESPIRATION RATE: 16 BRPM | OXYGEN SATURATION: 87 % | BODY MASS INDEX: 21.16 KG/M2 | HEART RATE: 98 BPM | SYSTOLIC BLOOD PRESSURE: 126 MMHG | HEIGHT: 65 IN

## 2024-04-24 DIAGNOSIS — I27.20 MILD PULMONARY HYPERTENSION (HCC): ICD-10-CM

## 2024-04-24 DIAGNOSIS — J44.9 COPD, VERY SEVERE (HCC): Primary | Chronic | ICD-10-CM

## 2024-04-24 DIAGNOSIS — J43.9 PULMONARY EMPHYSEMA, UNSPECIFIED EMPHYSEMA TYPE (HCC): ICD-10-CM

## 2024-04-24 DIAGNOSIS — R91.1 PULMONARY NODULE: Chronic | ICD-10-CM

## 2024-04-24 DIAGNOSIS — Z87.891 FORMER SMOKER: ICD-10-CM

## 2024-04-24 DIAGNOSIS — J96.11 CHRONIC HYPOXEMIC RESPIRATORY FAILURE (HCC): Chronic | ICD-10-CM

## 2024-04-24 DIAGNOSIS — R06.02 SHORTNESS OF BREATH: ICD-10-CM

## 2024-04-24 PROCEDURE — 3078F DIAST BP <80 MM HG: CPT | Performed by: INTERNAL MEDICINE

## 2024-04-24 PROCEDURE — 1090F PRES/ABSN URINE INCON ASSESS: CPT | Performed by: INTERNAL MEDICINE

## 2024-04-24 PROCEDURE — 3074F SYST BP LT 130 MM HG: CPT | Performed by: INTERNAL MEDICINE

## 2024-04-24 PROCEDURE — 99213 OFFICE O/P EST LOW 20 MIN: CPT | Performed by: INTERNAL MEDICINE

## 2024-04-24 PROCEDURE — G8420 CALC BMI NORM PARAMETERS: HCPCS | Performed by: INTERNAL MEDICINE

## 2024-04-24 PROCEDURE — 3023F SPIROM DOC REV: CPT | Performed by: INTERNAL MEDICINE

## 2024-04-24 PROCEDURE — 1036F TOBACCO NON-USER: CPT | Performed by: INTERNAL MEDICINE

## 2024-04-24 PROCEDURE — G8399 PT W/DXA RESULTS DOCUMENT: HCPCS | Performed by: INTERNAL MEDICINE

## 2024-04-24 PROCEDURE — G8427 DOCREV CUR MEDS BY ELIG CLIN: HCPCS | Performed by: INTERNAL MEDICINE

## 2024-04-24 PROCEDURE — 1123F ACP DISCUSS/DSCN MKR DOCD: CPT | Performed by: INTERNAL MEDICINE

## 2024-04-24 RX ORDER — BUDESONIDE, GLYCOPYRROLATE, AND FORMOTEROL FUMARATE 160; 9; 4.8 UG/1; UG/1; UG/1
2 AEROSOL, METERED RESPIRATORY (INHALATION) 2 TIMES DAILY
Qty: 1 EACH | Refills: 11 | Status: SHIPPED | OUTPATIENT
Start: 2024-04-24

## 2024-04-24 RX ORDER — ALBUTEROL SULFATE 90 UG/1
2 AEROSOL, METERED RESPIRATORY (INHALATION) EVERY 6 HOURS PRN
Qty: 18 G | Refills: 11 | Status: SHIPPED | OUTPATIENT
Start: 2024-04-24

## 2024-04-24 NOTE — PROGRESS NOTES
SUBJECTIVE:  Chief Complaint: Acute exacerbation COPD, very severe/stage IV COPD, chronic hypoxemic respiratory failure, pulmonary emphysema, pulmonary nodule, shortness of breath, former smoker  Janessa was scheduled to see us in the office yesterday but developed acute shortness of breath and went to the emergency room where she was evaluated and discharged.  She was placed on a tapering course of prednisone but did not require antibiotics and there was no evidence of a lower respiratory tract infection.  She states that she was noting wheezing with some chest tightness and was expectorating some thick mucus but it was not purulent.  She denied any fever or chills.  She is feeling fairly stable today but still having similar symptoms.  She continues on breast tree, long-acting theophylline, 5 mg prednisone daily, Singulair, albuterol MDI and also has albuterol solution for her nebulizer.  She denies hemoptysis or chest pain.  She continues on oxygen 24 hours a day and does have portable oxygen for ambulation but has great difficulty ambulating even short distances because of the severity of her lung disease.      ROS:  Constitution:  HEENT: Negative for ear, throat pain  Cardiovascular: Negative for chest pain, syncope, edema  Pulmonary: See HPI  Musculoskeletal: Negative for DVT, myalgias, arthralgias    OBJECTIVE:  /78   Pulse 98   Resp 16   Ht 1.651 m (5' 5\")   Wt 57.6 kg (127 lb)   SpO2 (!) 87% Comment: Portable o2 tank on 2 LPM  BMI 21.13 kg/m²      Physical Exam:  Constitutional:  She appears well developed but always chronically ill and always short of breath at rest.  She does use accessory muscles respiration.  She is wearing nasal oxygen  Neck:  Supple, No palpable lymphadenopathy, No JVD  Cardiovascular:  S1, S2 Normal, Regular rhythm, no murmurs or gallops, No pericardial  rubs.  Pulmonary: Diminished breath sounds throughout all lung fields with scattered expiratory wheezing.  I also hear a

## 2024-05-06 ENCOUNTER — NURSE ONLY (OUTPATIENT)
Dept: FAMILY MEDICINE CLINIC | Age: 79
End: 2024-05-06
Payer: MEDICARE

## 2024-05-06 DIAGNOSIS — Z86.711 HISTORY OF PULMONARY EMBOLISM: ICD-10-CM

## 2024-05-06 DIAGNOSIS — Z79.01 ANTICOAGULANT LONG-TERM USE: ICD-10-CM

## 2024-05-06 DIAGNOSIS — G45.9 TIA (TRANSIENT ISCHEMIC ATTACK): ICD-10-CM

## 2024-05-06 LAB
INTERNATIONAL NORMALIZATION RATIO, POC: 2.5
PROTHROMBIN TIME, POC: 0

## 2024-05-06 PROCEDURE — 85610 PROTHROMBIN TIME: CPT | Performed by: STUDENT IN AN ORGANIZED HEALTH CARE EDUCATION/TRAINING PROGRAM

## 2024-05-06 NOTE — PROGRESS NOTES
Confirmed current warfarin 3 mg on M/W/F/SAT. 6 MG the rest of days INR = 2.5  Per Sana keep same and Recheck in 4 weeks.

## 2024-05-06 NOTE — PATIENT INSTRUCTIONS
[facility] [place]      RESOUS ALIABDOULAYE NAVARRETE:      [employer] (Obi):  Rohit beltran ofri: Enfòmasyon andres elvis navarrete, elvis navarrete mobil, pwogram repa sukhi ete, ak pwogram herberth tony rodriguez  Nimewo Telefòn: [phone]  Sitwèb: [email or web address]      Mer[employer] ([place]):  Rohit beltran ofri: yon elvis navarrete ak yon kwizin soup  Nimewo Telefòn: [phone]  Sitwèb: [email or web address]      [employer] (Obi):  Rohit sa beltran ofri: Manje midi ak aswè, cho, frèt oswa glase, disponib herberth livrezon Lendi-Vandredi herberth yon don oswa yon ti frè. rosie gwoup nan sal manje kominotè a midi herberth moun ki gen laj 60 ak pi bon  Nimewo Telefòn: [phone]  Sitwèb: [email or web address]      [employer]:  Rohit beltran ofri: sergey livlakishajax ruperto yo  Nimewo Telefòn: [phone]  Sitwèb: [email or web address]/programs/meals-[email or web address]-wheels        [facility] [employer]NAP (Koup[employer] herberth Sergey):  Rohit beltran ofri: bay yon jay ki itilize vasiliyou lajan kach herberth achte atik manje an sante alejandra pierre.  Nimewo Telefòn: [phone]  Delfina Franz ranpli aplikasyon an nan [email or web address]  Sitwèb: [email or web address]        Bezwen resous adisyonèl?  Rele 211  Herberth Jwenn èd [email or web address]

## 2024-05-15 DIAGNOSIS — I10 ESSENTIAL HYPERTENSION: ICD-10-CM

## 2024-05-15 DIAGNOSIS — F41.9 ANXIETY: ICD-10-CM

## 2024-05-15 RX ORDER — PREDNISONE 5 MG/1
5 TABLET ORAL DAILY
Qty: 90 TABLET | Refills: 0 | Status: SHIPPED | OUTPATIENT
Start: 2024-05-15 | End: 2024-06-04

## 2024-05-16 RX ORDER — HYDROXYZINE HYDROCHLORIDE 25 MG/1
25 TABLET, FILM COATED ORAL NIGHTLY PRN
Qty: 90 TABLET | Refills: 1 | Status: SHIPPED | OUTPATIENT
Start: 2024-05-16

## 2024-05-16 RX ORDER — VITAMIN E (DL,TOCOPHERYL ACET) 180 MG
CAPSULE ORAL
Qty: 90 CAPSULE | Refills: 1 | Status: SHIPPED | OUTPATIENT
Start: 2024-05-16 | End: 2024-05-17 | Stop reason: SDUPTHER

## 2024-05-17 ENCOUNTER — OFFICE VISIT (OUTPATIENT)
Dept: FAMILY MEDICINE CLINIC | Age: 79
End: 2024-05-17
Payer: MEDICARE

## 2024-05-17 VITALS
OXYGEN SATURATION: 85 % | DIASTOLIC BLOOD PRESSURE: 70 MMHG | HEIGHT: 65 IN | BODY MASS INDEX: 21.38 KG/M2 | HEART RATE: 90 BPM | SYSTOLIC BLOOD PRESSURE: 132 MMHG | WEIGHT: 128.3 LBS

## 2024-05-17 DIAGNOSIS — J44.9 COPD, VERY SEVERE (HCC): ICD-10-CM

## 2024-05-17 DIAGNOSIS — M81.0 AGE-RELATED OSTEOPOROSIS WITHOUT CURRENT PATHOLOGICAL FRACTURE: Primary | ICD-10-CM

## 2024-05-17 DIAGNOSIS — I10 ESSENTIAL HYPERTENSION: ICD-10-CM

## 2024-05-17 DIAGNOSIS — K21.9 GASTROESOPHAGEAL REFLUX DISEASE, UNSPECIFIED WHETHER ESOPHAGITIS PRESENT: ICD-10-CM

## 2024-05-17 DIAGNOSIS — M19.90 ARTHRITIS: ICD-10-CM

## 2024-05-17 DIAGNOSIS — L29.9 PRURITUS: ICD-10-CM

## 2024-05-17 DIAGNOSIS — I48.91 ATRIAL FIBRILLATION, UNSPECIFIED TYPE (HCC): ICD-10-CM

## 2024-05-17 DIAGNOSIS — M81.0 AGE-RELATED OSTEOPOROSIS WITHOUT CURRENT PATHOLOGICAL FRACTURE: ICD-10-CM

## 2024-05-17 DIAGNOSIS — E03.9 ACQUIRED HYPOTHYROIDISM: ICD-10-CM

## 2024-05-17 DIAGNOSIS — E78.00 PURE HYPERCHOLESTEROLEMIA: ICD-10-CM

## 2024-05-17 LAB
25(OH)D3 SERPL-MCNC: 32.7 NG/ML
ANION GAP SERPL CALCULATED.3IONS-SCNC: 10 MMOL/L (ref 3–16)
BUN SERPL-MCNC: 16 MG/DL (ref 7–20)
CALCIUM SERPL-MCNC: 9.2 MG/DL (ref 8.3–10.6)
CHLORIDE SERPL-SCNC: 97 MMOL/L (ref 99–110)
CO2 SERPL-SCNC: 36 MMOL/L (ref 21–32)
CREAT SERPL-MCNC: 0.8 MG/DL (ref 0.6–1.2)
GFR SERPLBLD CREATININE-BSD FMLA CKD-EPI: 75 ML/MIN/{1.73_M2}
GLUCOSE SERPL-MCNC: 91 MG/DL (ref 70–99)
MAGNESIUM SERPL-MCNC: 2.1 MG/DL (ref 1.8–2.4)
POTASSIUM SERPL-SCNC: 3.7 MMOL/L (ref 3.5–5.1)
PTH-INTACT SERPL-MCNC: 98.8 PG/ML (ref 14–72)
SODIUM SERPL-SCNC: 143 MMOL/L (ref 136–145)
TSH SERPL DL<=0.005 MIU/L-ACNC: 2.33 UIU/ML (ref 0.27–4.2)

## 2024-05-17 PROCEDURE — 99214 OFFICE O/P EST MOD 30 MIN: CPT | Performed by: STUDENT IN AN ORGANIZED HEALTH CARE EDUCATION/TRAINING PROGRAM

## 2024-05-17 PROCEDURE — 3075F SYST BP GE 130 - 139MM HG: CPT | Performed by: STUDENT IN AN ORGANIZED HEALTH CARE EDUCATION/TRAINING PROGRAM

## 2024-05-17 PROCEDURE — 1036F TOBACCO NON-USER: CPT | Performed by: STUDENT IN AN ORGANIZED HEALTH CARE EDUCATION/TRAINING PROGRAM

## 2024-05-17 PROCEDURE — G8427 DOCREV CUR MEDS BY ELIG CLIN: HCPCS | Performed by: STUDENT IN AN ORGANIZED HEALTH CARE EDUCATION/TRAINING PROGRAM

## 2024-05-17 PROCEDURE — 1123F ACP DISCUSS/DSCN MKR DOCD: CPT | Performed by: STUDENT IN AN ORGANIZED HEALTH CARE EDUCATION/TRAINING PROGRAM

## 2024-05-17 PROCEDURE — G8399 PT W/DXA RESULTS DOCUMENT: HCPCS | Performed by: STUDENT IN AN ORGANIZED HEALTH CARE EDUCATION/TRAINING PROGRAM

## 2024-05-17 PROCEDURE — 3023F SPIROM DOC REV: CPT | Performed by: STUDENT IN AN ORGANIZED HEALTH CARE EDUCATION/TRAINING PROGRAM

## 2024-05-17 PROCEDURE — G8420 CALC BMI NORM PARAMETERS: HCPCS | Performed by: STUDENT IN AN ORGANIZED HEALTH CARE EDUCATION/TRAINING PROGRAM

## 2024-05-17 PROCEDURE — 1090F PRES/ABSN URINE INCON ASSESS: CPT | Performed by: STUDENT IN AN ORGANIZED HEALTH CARE EDUCATION/TRAINING PROGRAM

## 2024-05-17 PROCEDURE — 3078F DIAST BP <80 MM HG: CPT | Performed by: STUDENT IN AN ORGANIZED HEALTH CARE EDUCATION/TRAINING PROGRAM

## 2024-05-17 RX ORDER — HYDROCORTISONE 10 MG/G
CREAM TOPICAL
Qty: 56.8 G | Refills: 2 | Status: SHIPPED | OUTPATIENT
Start: 2024-05-17

## 2024-05-17 RX ORDER — ESOMEPRAZOLE MAGNESIUM 40 MG/1
CAPSULE, DELAYED RELEASE ORAL
Qty: 180 CAPSULE | Refills: 1 | Status: SHIPPED | OUTPATIENT
Start: 2024-05-17

## 2024-05-17 RX ORDER — THEOPHYLLINE 450 MG/1
TABLET, EXTENDED RELEASE ORAL
Qty: 90 TABLET | Refills: 1 | Status: SHIPPED | OUTPATIENT
Start: 2024-05-17

## 2024-05-17 RX ORDER — ROSUVASTATIN CALCIUM 20 MG/1
20 TABLET, COATED ORAL NIGHTLY
Qty: 90 TABLET | Refills: 1 | Status: SHIPPED | OUTPATIENT
Start: 2024-05-17 | End: 2024-11-13

## 2024-05-17 RX ORDER — GABAPENTIN 400 MG/1
400 CAPSULE ORAL EVERY EVENING
Qty: 90 CAPSULE | Refills: 0 | Status: SHIPPED | OUTPATIENT
Start: 2024-05-17 | End: 2024-08-15

## 2024-05-17 RX ORDER — VITAMIN E (DL,TOCOPHERYL ACET) 180 MG
1 CAPSULE ORAL DAILY
Qty: 90 CAPSULE | Refills: 1 | Status: SHIPPED | OUTPATIENT
Start: 2024-05-17

## 2024-05-17 RX ORDER — MONTELUKAST SODIUM 10 MG/1
10 TABLET ORAL NIGHTLY
Qty: 90 TABLET | Refills: 1 | Status: SHIPPED | OUTPATIENT
Start: 2024-05-17

## 2024-05-17 NOTE — PROGRESS NOTES
5/29/2024    Janessa Davidson    Chief Complaint   Patient presents with    3 Month Follow-Up     COPD, HTN        HPI  History was obtained from patient.  Janessa is a 79 y.o. female with a PMHx as listed below who presents today for follow up on chronic conditions.     Unable to tolerate fosamax.   1. Age-related osteoporosis without current pathological fracture    2. COPD, very severe (HCC)    3. Essential hypertension    4. Gastroesophageal reflux disease, unspecified whether esophagitis present    5. Arthritis    6. Pruritus    7. Pure hypercholesterolemia    8. Atrial fibrillation, unspecified type (HCC)    9. Acquired hypothyroidism             REVIEW OF SYMPTOMS    Review of Systems   Constitutional:  Negative for chills and fatigue.   HENT:  Negative for congestion and sore throat.    Respiratory:  Negative for shortness of breath and wheezing.    Cardiovascular:  Negative for chest pain and palpitations.   Gastrointestinal:  Negative for abdominal pain and nausea.   Genitourinary:  Negative for frequency and urgency.   Neurological:  Negative for light-headedness.       PAST MEDICAL HISTORY  Past Medical History:   Diagnosis Date    Age-related osteoporosis without current pathological fracture 05/23/2024    Anticoagulant long-term use     Arthritis     Cancer (HCC)     Chronic hypoxemic respiratory failure (HCC) 02/06/2018    Community acquired pneumonia of right upper lobe of lung 03/19/2022    COPD (chronic obstructive pulmonary disease) ([facility])     Coronary atherosclerosis     COVID-19 virus detected 01/18/2021    DVT (deep venous thrombosis) ([facility])     Former smoker 11/22/2021    Gastroesophageal reflux disease     Hiatal hernia     Lung infiltrate on CT 01/22/2019    On home oxygen therapy     on 24/7    Osteopenia     Phlebitis     Pulmonary emphysema (HCC) 12/12/2023    Pulmonary nodule 07/05/2016    S/P left heart catheterization by percutaneous approach 06/27/2013    Sepsis due to pneumonia ([facility])

## 2024-05-21 ENCOUNTER — TELEPHONE (OUTPATIENT)
Dept: FAMILY MEDICINE CLINIC | Age: 79
End: 2024-05-21

## 2024-05-21 NOTE — TELEPHONE ENCOUNTER
To Dr. Sharma      [facility] Requesting Orders for:    Skilled Nursing     INR draw    When is the next INR draw??    Please advise.

## 2024-05-23 DIAGNOSIS — M81.0 AGE-RELATED OSTEOPOROSIS WITHOUT CURRENT PATHOLOGICAL FRACTURE: Primary | ICD-10-CM

## 2024-05-23 RX ORDER — ONDANSETRON 2 MG/ML
8 INJECTION INTRAMUSCULAR; INTRAVENOUS
OUTPATIENT
Start: 2024-05-23

## 2024-05-23 RX ORDER — DIPHENHYDRAMINE HYDROCHLORIDE 50 MG/ML
50 INJECTION INTRAMUSCULAR; INTRAVENOUS
OUTPATIENT
Start: 2024-05-23

## 2024-05-23 RX ORDER — ALBUTEROL SULFATE 90 UG/1
4 AEROSOL, METERED RESPIRATORY (INHALATION) PRN
OUTPATIENT
Start: 2024-05-23

## 2024-05-23 RX ORDER — ACETAMINOPHEN 325 MG/1
650 TABLET ORAL
OUTPATIENT
Start: 2024-05-23

## 2024-05-23 RX ORDER — FAMOTIDINE 10 MG/ML
20 INJECTION, SOLUTION INTRAVENOUS
OUTPATIENT
Start: 2024-05-23

## 2024-05-23 RX ORDER — EPINEPHRINE 1 MG/ML
0.3 INJECTION, SOLUTION INTRAMUSCULAR; SUBCUTANEOUS PRN
OUTPATIENT
Start: 2024-05-23

## 2024-05-23 RX ORDER — SODIUM CHLORIDE 9 MG/ML
INJECTION, SOLUTION INTRAVENOUS CONTINUOUS
OUTPATIENT
Start: 2024-05-23

## 2024-05-23 NOTE — RESULT ENCOUNTER NOTE
Janessa's labs are normal. Please continue vit D supplementation. As discussed at appt. Recommend she starts prolia

## 2024-05-29 ASSESSMENT — ENCOUNTER SYMPTOMS
SHORTNESS OF BREATH: 0
ABDOMINAL PAIN: 0
SORE THROAT: 0
WHEEZING: 0
NAUSEA: 0

## 2024-05-30 ENCOUNTER — TELEPHONE (OUTPATIENT)
Dept: FAMILY MEDICINE CLINIC | Age: 79
End: 2024-05-30

## 2024-05-30 DIAGNOSIS — J96.11 CHRONIC HYPOXEMIC RESPIRATORY FAILURE (HCC): Primary | ICD-10-CM

## 2024-05-30 NOTE — TELEPHONE ENCOUNTER
NEED ORDER FOR FILTER FOR NEBULIZER  NEED ORDER FOR ALL O2 PARTS-TUBING ETC (PT DIDN'T KNOW THE NAMES)    [facility] EQUIP

## 2024-06-03 ENCOUNTER — TELEPHONE (OUTPATIENT)
Dept: FAMILY MEDICINE CLINIC | Age: 79
End: 2024-06-03

## 2024-06-03 DIAGNOSIS — E87.6 HYPOKALEMIA: ICD-10-CM

## 2024-06-03 NOTE — TELEPHONE ENCOUNTER
Stephen from [facility] contacted office reporting patients INR. INR= 1.9. Patient takes 3 mg sun, wed and Thursday. 6 mg the rest of days     Spoke to Dr Sharma and he advised to keep same and check in 4 weeks.     Stephen informed and voiced an understanding.

## 2024-06-04 RX ORDER — POTASSIUM CHLORIDE 750 MG/1
10 TABLET, EXTENDED RELEASE ORAL SEE ADMIN INSTRUCTIONS
Qty: 135 TABLET | Refills: 1 | Status: SHIPPED | OUTPATIENT
Start: 2024-06-04 | End: 2024-12-01

## 2024-06-04 RX ORDER — PREDNISONE 5 MG/1
5 TABLET ORAL DAILY
Qty: 90 TABLET | Refills: 0 | Status: SHIPPED | OUTPATIENT
Start: 2024-06-04

## 2024-06-12 ENCOUNTER — HOSPITAL ENCOUNTER (OUTPATIENT)
Dept: INFUSION THERAPY | Age: 79
Setting detail: INFUSION SERIES
Discharge: HOME | End: 2024-06-12
Payer: MEDICARE

## 2024-06-12 VITALS — RESPIRATION RATE: 16 BRPM | TEMPERATURE: 98.2 F | HEART RATE: 73 BPM

## 2024-06-12 DIAGNOSIS — M81.0 AGE-RELATED OSTEOPOROSIS WITHOUT CURRENT PATHOLOGICAL FRACTURE: Primary | ICD-10-CM

## 2024-06-12 PROCEDURE — 96372 THER/PROPH/DIAG INJ SC/IM: CPT

## 2024-06-12 PROCEDURE — 6360000002 HC RX W HCPCS: Performed by: STUDENT IN AN ORGANIZED HEALTH CARE EDUCATION/TRAINING PROGRAM

## 2024-06-12 RX ORDER — EPINEPHRINE 1 MG/ML
0.3 INJECTION, SOLUTION INTRAMUSCULAR; SUBCUTANEOUS PRN
Status: CANCELLED | OUTPATIENT
Start: 2024-06-12

## 2024-06-12 RX ORDER — ACETAMINOPHEN 325 MG/1
650 TABLET ORAL
Status: CANCELLED | OUTPATIENT
Start: 2024-06-12

## 2024-06-12 RX ORDER — ALBUTEROL SULFATE 90 UG/1
4 AEROSOL, METERED RESPIRATORY (INHALATION) PRN
Status: CANCELLED | OUTPATIENT
Start: 2024-06-12

## 2024-06-12 RX ORDER — SODIUM CHLORIDE 9 MG/ML
INJECTION, SOLUTION INTRAVENOUS CONTINUOUS
Status: CANCELLED | OUTPATIENT
Start: 2024-06-12

## 2024-06-12 RX ORDER — ONDANSETRON 2 MG/ML
8 INJECTION INTRAMUSCULAR; INTRAVENOUS
Status: CANCELLED | OUTPATIENT
Start: 2024-06-12

## 2024-06-12 RX ORDER — DIPHENHYDRAMINE HYDROCHLORIDE 50 MG/ML
50 INJECTION INTRAMUSCULAR; INTRAVENOUS
Status: CANCELLED | OUTPATIENT
Start: 2024-06-12

## 2024-06-12 RX ORDER — FAMOTIDINE 10 MG/ML
20 INJECTION, SOLUTION INTRAVENOUS
Status: CANCELLED | OUTPATIENT
Start: 2024-06-12

## 2024-06-12 RX ADMIN — DENOSUMAB 60 MG: 60 INJECTION SUBCUTANEOUS at 10:15

## 2024-06-13 ENCOUNTER — OFFICE VISIT (OUTPATIENT)
Dept: FAMILY MEDICINE CLINIC | Age: 79
End: 2024-06-13
Payer: MEDICARE

## 2024-06-13 VITALS
HEART RATE: 81 BPM | HEIGHT: 65 IN | SYSTOLIC BLOOD PRESSURE: 142 MMHG | OXYGEN SATURATION: 90 % | BODY MASS INDEX: 21.35 KG/M2 | DIASTOLIC BLOOD PRESSURE: 64 MMHG

## 2024-06-13 DIAGNOSIS — B96.89 BACTERIAL CELLULITIS: Primary | ICD-10-CM

## 2024-06-13 DIAGNOSIS — Z79.01 ANTICOAGULANT LONG-TERM USE: ICD-10-CM

## 2024-06-13 DIAGNOSIS — L03.90 BACTERIAL CELLULITIS: Primary | ICD-10-CM

## 2024-06-13 DIAGNOSIS — Z86.73 HISTORY OF STROKE: ICD-10-CM

## 2024-06-13 LAB
INTERNATIONAL NORMALIZATION RATIO, POC: 2.2
PROTHROMBIN TIME, POC: 26.6

## 2024-06-13 PROCEDURE — G8399 PT W/DXA RESULTS DOCUMENT: HCPCS | Performed by: STUDENT IN AN ORGANIZED HEALTH CARE EDUCATION/TRAINING PROGRAM

## 2024-06-13 PROCEDURE — 99213 OFFICE O/P EST LOW 20 MIN: CPT | Performed by: STUDENT IN AN ORGANIZED HEALTH CARE EDUCATION/TRAINING PROGRAM

## 2024-06-13 PROCEDURE — 3077F SYST BP >= 140 MM HG: CPT | Performed by: STUDENT IN AN ORGANIZED HEALTH CARE EDUCATION/TRAINING PROGRAM

## 2024-06-13 PROCEDURE — 1036F TOBACCO NON-USER: CPT | Performed by: STUDENT IN AN ORGANIZED HEALTH CARE EDUCATION/TRAINING PROGRAM

## 2024-06-13 PROCEDURE — 1090F PRES/ABSN URINE INCON ASSESS: CPT | Performed by: STUDENT IN AN ORGANIZED HEALTH CARE EDUCATION/TRAINING PROGRAM

## 2024-06-13 PROCEDURE — G8420 CALC BMI NORM PARAMETERS: HCPCS | Performed by: STUDENT IN AN ORGANIZED HEALTH CARE EDUCATION/TRAINING PROGRAM

## 2024-06-13 PROCEDURE — 3078F DIAST BP <80 MM HG: CPT | Performed by: STUDENT IN AN ORGANIZED HEALTH CARE EDUCATION/TRAINING PROGRAM

## 2024-06-13 PROCEDURE — 85610 PROTHROMBIN TIME: CPT | Performed by: STUDENT IN AN ORGANIZED HEALTH CARE EDUCATION/TRAINING PROGRAM

## 2024-06-13 PROCEDURE — 1123F ACP DISCUSS/DSCN MKR DOCD: CPT | Performed by: STUDENT IN AN ORGANIZED HEALTH CARE EDUCATION/TRAINING PROGRAM

## 2024-06-13 PROCEDURE — G8427 DOCREV CUR MEDS BY ELIG CLIN: HCPCS | Performed by: STUDENT IN AN ORGANIZED HEALTH CARE EDUCATION/TRAINING PROGRAM

## 2024-06-13 NOTE — PROGRESS NOTES
Confirmed current warfarin 3MG every Sun, Wed, Thurs, 6 mg all other. Keep same recheck in 4 weeks.   
AS NEEDED FOR WHEEZING 375 mL 5    levothyroxine (SYNTHROID) 75 MCG tablet Take 1 tablet by mouth Daily 90 tablet 1    warfarin (COUMADIN) 6 MG tablet Take 1 tablet by mouth nightly 11/21/23 Patient reports she takes 3 mg on Monday and 6 mg the rest of the week, takes at HS (Patient taking differently: Take by mouth nightly 04/23/24 Patient reports taking 3 mg Mon/Wed/Fri/Sat, 6 mg the rest of the week, takes at HS) 90 tablet 1    dicyclomine (BENTYL) 10 MG capsule Take 1 capsule by mouth in the morning and at bedtime      Budeson-Glycopyrrol-Formoterol (BREZTRI AEROSPHERE) 160-9-4.8 MCG/ACT AERO Inhale 2 sprays into the lungs in the morning and 2 sprays in the evening. 1 each 11    torsemide (DEMADEX) 20 MG tablet Take 1 tablet by mouth nightly      dilTIAZem (CARDIZEM) 60 MG tablet Take 1 tablet by mouth every 8 hours 120 tablet 3    Dextromethorphan-guaiFENesin (MUCINEX DM)  MG TB12 Take 1 to 2 tablet by mouth every 12 hours (maximum: 4 tablets/24 hours). Drink plenty of water. 28 tablet 0    OXYGEN Inhale 2 L/hr into the lungs continuous.      Roflumilast (DALIRESP) 500 MCG tablet TAKE 1 TABLET BY MOUTH EVERY DAY 90 tablet 1     No current facility-administered medications for this visit.       ALLERGIES  Allergies   Allergen Reactions    Codeine Swelling    Darvon [Propoxyphene Hcl] Swelling    Lamisil [Terbinafine Hcl]     Lisinopril Other (See Comments)     Pt doesn't remember reaction    Morphine Swelling    Neosporin [Neomycin-Polymyxin-Gramicidin]     Pcn [Penicillins] Swelling       PHYSICAL EXAM    BP (!) 142/64 (Site: Right Upper Arm, Position: Sitting, Cuff Size: Medium Adult)   Pulse 81   Ht 1.651 m (5' 5\")   SpO2 90% Comment: on 2L of oxygen  BMI 21.35 kg/m²     Physical Exam  Constitutional:       Appearance: Normal appearance.   HENT:      Head: Normocephalic and atraumatic.   Eyes:      Extraocular Movements: Extraocular movements intact.      Pupils: Pupils are equal, round, and

## 2024-06-14 ENCOUNTER — TELEPHONE (OUTPATIENT)
Dept: FAMILY MEDICINE CLINIC | Age: 79
End: 2024-06-14

## 2024-06-14 NOTE — TELEPHONE ENCOUNTER
Initial Anesthesia Post-op Note    Patient: Cary Leal  Procedure(s) Performed: COLONOSCOPY  Anesthesia type: MAC    Vitals Value Taken Time   Temp 36.6 °C (97.7 °F) 7/14/2020     Pulse 72 7/14/2020     Resp 13 7/14/2020    SpO2 99 % 7/14/2020    /79 7/14/2020        Last 24 I/O:     Intake/Output Summary (Last 24 hours) at 7/14/2020 0934  Last data filed at 7/14/2020 0928  Gross per 24 hour   Intake 150 ml   Output --   Net 150 ml         Patient Location: PACU Phase 1  Post-op Vital Signs:stable  Level of Consciousness: awake and alert  Respiratory Status: spontaneous ventilation  Cardiovascular stable  Hydration: euvolemic  Pain Management: adequately controlled  Handoff: Handoff to receiving nurse was performed and questions were answered  Nausea: None  Airway Patency:patent  Post-op Assessment: no complications, patient tolerated procedure well with no complications and dentition within defined limits     Spoke with patient to let her know Patient takes 3 mg sun, wed and Thursday. 6 mg the rest of days. Patient verbalized understanding.

## 2024-06-16 DIAGNOSIS — J44.9 COPD, VERY SEVERE (HCC): ICD-10-CM

## 2024-06-17 RX ORDER — ROFLUMILAST 500 UG/1
500 TABLET ORAL DAILY
Qty: 90 TABLET | Refills: 1 | Status: SHIPPED | OUTPATIENT
Start: 2024-06-17

## 2024-06-18 ENCOUNTER — PATIENT MESSAGE (OUTPATIENT)
Dept: FAMILY MEDICINE CLINIC | Age: 79
End: 2024-06-18

## 2024-07-01 ENCOUNTER — TELEPHONE (OUTPATIENT)
Dept: FAMILY MEDICINE CLINIC | Age: 79
End: 2024-07-01

## 2024-07-15 ENCOUNTER — TELEPHONE (OUTPATIENT)
Dept: FAMILY MEDICINE CLINIC | Age: 79
End: 2024-07-15

## 2024-07-15 NOTE — TELEPHONE ENCOUNTER
PT IS BRUISING FAIRLY EASY AND HC WANTS TO GO DO A INR FOR PT. THEY WANT TO GO OUT THIS AFTERNOON. NEED OK FOR THAT

## 2024-07-16 ENCOUNTER — OFFICE VISIT (OUTPATIENT)
Dept: PULMONOLOGY | Age: 79
End: 2024-07-16
Payer: MEDICARE

## 2024-07-16 VITALS
HEART RATE: 77 BPM | HEIGHT: 65 IN | OXYGEN SATURATION: 91 % | BODY MASS INDEX: 21.56 KG/M2 | SYSTOLIC BLOOD PRESSURE: 118 MMHG | WEIGHT: 129.4 LBS | DIASTOLIC BLOOD PRESSURE: 68 MMHG

## 2024-07-16 DIAGNOSIS — R06.02 SHORTNESS OF BREATH: ICD-10-CM

## 2024-07-16 DIAGNOSIS — I27.20 MILD PULMONARY HYPERTENSION (HCC): ICD-10-CM

## 2024-07-16 DIAGNOSIS — Z87.891 FORMER SMOKER: ICD-10-CM

## 2024-07-16 DIAGNOSIS — J43.8 OTHER EMPHYSEMA (HCC): ICD-10-CM

## 2024-07-16 DIAGNOSIS — J96.11 CHRONIC HYPOXEMIC RESPIRATORY FAILURE (HCC): Chronic | ICD-10-CM

## 2024-07-16 DIAGNOSIS — J44.9 COPD, VERY SEVERE (HCC): Primary | Chronic | ICD-10-CM

## 2024-07-16 PROCEDURE — 99213 OFFICE O/P EST LOW 20 MIN: CPT | Performed by: INTERNAL MEDICINE

## 2024-07-16 PROCEDURE — 1036F TOBACCO NON-USER: CPT | Performed by: INTERNAL MEDICINE

## 2024-07-16 PROCEDURE — 1123F ACP DISCUSS/DSCN MKR DOCD: CPT | Performed by: INTERNAL MEDICINE

## 2024-07-16 PROCEDURE — 3078F DIAST BP <80 MM HG: CPT | Performed by: INTERNAL MEDICINE

## 2024-07-16 PROCEDURE — 3074F SYST BP LT 130 MM HG: CPT | Performed by: INTERNAL MEDICINE

## 2024-07-16 PROCEDURE — 3023F SPIROM DOC REV: CPT | Performed by: INTERNAL MEDICINE

## 2024-07-16 PROCEDURE — G8399 PT W/DXA RESULTS DOCUMENT: HCPCS | Performed by: INTERNAL MEDICINE

## 2024-07-16 PROCEDURE — G8427 DOCREV CUR MEDS BY ELIG CLIN: HCPCS | Performed by: INTERNAL MEDICINE

## 2024-07-16 PROCEDURE — G8420 CALC BMI NORM PARAMETERS: HCPCS | Performed by: INTERNAL MEDICINE

## 2024-07-16 PROCEDURE — 1090F PRES/ABSN URINE INCON ASSESS: CPT | Performed by: INTERNAL MEDICINE

## 2024-07-16 NOTE — PROGRESS NOTES
bronchitis secondary to infectious or inflammatory etiologies, likely chronic.  New compression deformity of T4    Chest x-ray on 11/21/2023 showed no acute disease  PFT: Office spirometry last obtained on 7/21/2021 demonstrated a very severe/stage IV obstructive lung defect with no significant response to bronchodilators      Echocardiogram: Limited echo on 1/25/2023 showed normal left ventricular systolic function.  No mention of RVSP    ASSESSMENT:    1. COPD, very severe (HCC)    2. Other emphysema (HCC)    3. Chronic hypoxemic respiratory failure (HCC)    4. Shortness of breath    5. Mild pulmonary hypertension (HCC)    6. Former smoker          PLAN:  She is up-to-date with her current medications and I will make no changes.  I did recommend she continue wearing oxygen 24 hours a day for chronic hypoxic respiratory failure which has remained fairly stable.  Shortness of breath has remained also fairly stable and is secondary to her severe COPD/emphysema.  [facility] will continue to follow her    We have discussed the need to maintain yearly flu immunization, pneumococcal vaccination. We have discussed Coronavirus precaution including handwashing practice, wiping items touched in public such as gas pumps, door handles, shopping carts, etc. Self monitoring for infection - fever, chills, cough, SOB. Should they develop symptoms they should call office for further instructions.    Return in about 4 months (around 11/16/2024) for Recheck for COPD, Recheck for Shortness of Breath, chronic hypoxemic respiratory failure.      This dictation was performed with a verbal recognition program and it was checked for errors.  It is possible that there are still dictated errors within this office note.  Any errors should be brought immediately to my attention for correction.  All efforts were made to ensure that this office note is accurate.

## 2024-07-18 ENCOUNTER — TELEPHONE (OUTPATIENT)
Dept: FAMILY MEDICINE CLINIC | Age: 79
End: 2024-07-18

## 2024-07-19 DIAGNOSIS — E03.9 ACQUIRED HYPOTHYROIDISM: ICD-10-CM

## 2024-07-19 RX ORDER — LEVOTHYROXINE SODIUM 0.07 MG/1
75 TABLET ORAL DAILY
Qty: 90 TABLET | Refills: 1 | Status: SHIPPED | OUTPATIENT
Start: 2024-07-19

## 2024-08-01 ENCOUNTER — TELEPHONE (OUTPATIENT)
Dept: FAMILY MEDICINE CLINIC | Age: 79
End: 2024-08-01

## 2024-08-01 NOTE — TELEPHONE ENCOUNTER
Reporting:    INR   3.0      Patient takes Wafarin 3 mg Sunday, Wednesday, Thursday  Then 6 mg Monday, Tuesday, Friday     Please let Shira know when to draw INR again.

## 2024-08-13 DIAGNOSIS — L29.9 PRURITUS: ICD-10-CM

## 2024-08-13 RX ORDER — HYDROCORTISONE 10 MG/G
CREAM TOPICAL
Qty: 56.8 G | Refills: 2 | Status: SHIPPED | OUTPATIENT
Start: 2024-08-13

## 2024-08-17 DIAGNOSIS — M19.90 ARTHRITIS: ICD-10-CM

## 2024-08-19 RX ORDER — GABAPENTIN 400 MG/1
400 CAPSULE ORAL EVERY EVENING
Qty: 90 CAPSULE | Refills: 0 | Status: SHIPPED | OUTPATIENT
Start: 2024-08-19 | End: 2024-11-17

## 2024-08-19 RX ORDER — PREDNISONE 5 MG/1
5 TABLET ORAL DAILY
Qty: 90 TABLET | Refills: 0 | Status: SHIPPED | OUTPATIENT
Start: 2024-08-19

## 2024-08-22 ENCOUNTER — TELEPHONE (OUTPATIENT)
Dept: FAMILY MEDICINE CLINIC | Age: 79
End: 2024-08-22

## 2024-08-22 NOTE — TELEPHONE ENCOUNTER
Stephen from [facility] called with pt's INR reading of 3.5, current regimen is 3mg on W, Th, and Sun 6mg M,T, F, and Sat, spoke with Dr Sharma, instructions given:  Hold evening dose of coumadin today, change regimen to 3mg M, W, Th and Sun  6mg on T, F, and Sat. Recheck in 2 weeks.  Stephen verbalized understanding of instructions.

## 2024-09-06 ENCOUNTER — TELEPHONE (OUTPATIENT)
Dept: FAMILY MEDICINE CLINIC | Age: 79
End: 2024-09-06

## 2024-09-12 ENCOUNTER — TELEPHONE (OUTPATIENT)
Dept: FAMILY MEDICINE CLINIC | Age: 79
End: 2024-09-12

## 2024-09-16 DIAGNOSIS — K21.9 GASTROESOPHAGEAL REFLUX DISEASE, UNSPECIFIED WHETHER ESOPHAGITIS PRESENT: ICD-10-CM

## 2024-09-16 RX ORDER — ESOMEPRAZOLE MAGNESIUM 40 MG/1
CAPSULE, DELAYED RELEASE ORAL
Qty: 180 CAPSULE | Refills: 1 | Status: SHIPPED | OUTPATIENT
Start: 2024-09-16

## 2024-10-09 ENCOUNTER — TELEPHONE (OUTPATIENT)
Dept: FAMILY MEDICINE CLINIC | Age: 79
End: 2024-10-09

## 2024-10-09 NOTE — TELEPHONE ENCOUNTER
Has patient truly been taking 5 mg daily for at least 5 days?  If she has- then increase to 5 mg every other day alternating with 7 and half milligrams every other day which would be 1 alternate with one and one half of Coumadin 5 mg tablet daily and recheck INR 2 weeks

## 2024-10-11 ENCOUNTER — APPOINTMENT (OUTPATIENT)
Dept: GENERAL RADIOLOGY | Age: 79
DRG: 191 | End: 2024-10-11
Payer: MEDICARE

## 2024-10-11 ENCOUNTER — HOSPITAL ENCOUNTER (INPATIENT)
Age: 79
LOS: 4 days | Discharge: HOME HEALTH | DRG: 191 | End: 2024-10-15
Attending: EMERGENCY MEDICINE | Admitting: STUDENT IN AN ORGANIZED HEALTH CARE EDUCATION/TRAINING PROGRAM
Payer: MEDICARE

## 2024-10-11 DIAGNOSIS — R06.09 DYSPNEA ON EXERTION: ICD-10-CM

## 2024-10-11 DIAGNOSIS — R79.89 ELEVATED TROPONIN: ICD-10-CM

## 2024-10-11 DIAGNOSIS — J44.1 ACUTE EXACERBATION OF CHRONIC OBSTRUCTIVE PULMONARY DISEASE (COPD) (HCC): Primary | ICD-10-CM

## 2024-10-11 DIAGNOSIS — R79.1 SUBTHERAPEUTIC INTERNATIONAL NORMALIZED RATIO (INR): ICD-10-CM

## 2024-10-11 DIAGNOSIS — R79.89 ELEVATED BRAIN NATRIURETIC PEPTIDE (BNP) LEVEL: ICD-10-CM

## 2024-10-11 LAB
ALBUMIN SERPL-MCNC: 3.8 G/DL (ref 3.4–5)
ALBUMIN/GLOB SERPL: 2 {RATIO} (ref 1.1–2.2)
ALP SERPL-CCNC: 72 U/L (ref 40–129)
ALT SERPL-CCNC: 23 U/L (ref 10–40)
ANION GAP SERPL CALCULATED.3IONS-SCNC: 10 MMOL/L (ref 9–17)
ARTERIAL PATENCY WRIST A: ABNORMAL
ARTERIAL PATENCY WRIST A: NO
AST SERPL-CCNC: 34 U/L (ref 15–37)
BASOPHILS # BLD: 0.04 K/UL
BASOPHILS NFR BLD: 0 % (ref 0–1)
BDY SITE: ABNORMAL
BILIRUB SERPL-MCNC: 0.3 MG/DL (ref 0–1)
BNP SERPL-MCNC: 986 PG/ML (ref 0–450)
BODY TEMPERATURE: 37
BODY TEMPERATURE: 37
BUN SERPL-MCNC: 20 MG/DL (ref 7–20)
CALCIUM SERPL-MCNC: 9.5 MG/DL (ref 8.3–10.6)
CHLORIDE SERPL-SCNC: 96 MMOL/L (ref 99–110)
CO2 SERPL-SCNC: 37 MMOL/L (ref 21–32)
COHGB MFR BLD: 0.6 % (ref 0.5–1.5)
COHGB MFR BLD: 1 % (ref 0.5–1.5)
CREAT SERPL-MCNC: 0.8 MG/DL (ref 0.6–1.2)
EKG ATRIAL RATE: 84 BPM
EKG DIAGNOSIS: NORMAL
EKG Q-T INTERVAL: 412 MS
EKG QRS DURATION: 146 MS
EKG QTC CALCULATION (BAZETT): 475 MS
EKG R AXIS: -53 DEGREES
EKG T AXIS: 60 DEGREES
EKG VENTRICULAR RATE: 80 BPM
EOSINOPHIL # BLD: 0.02 K/UL
EOSINOPHILS RELATIVE PERCENT: 0 % (ref 0–3)
ERYTHROCYTE [DISTWIDTH] IN BLOOD BY AUTOMATED COUNT: 14.4 % (ref 11.7–14.9)
GAS FLOW.O2 O2 DELIVERY SYS: ABNORMAL L/MIN
GFR, ESTIMATED: 66 ML/MIN/1.73M2
GLUCOSE SERPL-MCNC: 107 MG/DL (ref 74–99)
HCO3 VENOUS: 31.3 MMOL/L (ref 22–29)
HCO3 VENOUS: 35.5 MMOL/L (ref 22–29)
HCT VFR BLD AUTO: 42.9 % (ref 37–47)
HGB BLD-MCNC: 13.4 G/DL (ref 12.5–16)
IMM GRANULOCYTES # BLD AUTO: 0.05 K/UL
IMM GRANULOCYTES NFR BLD: 0 %
INFLUENZA A BY PCR: NOT DETECTED
INFLUENZA B BY PCR: NOT DETECTED
INR PPP: 1.3
INR PPP: 1.4
LYMPHOCYTES NFR BLD: 1.79 K/UL
LYMPHOCYTES RELATIVE PERCENT: 13 % (ref 24–44)
MCH RBC QN AUTO: 30.5 PG (ref 27–31)
MCHC RBC AUTO-ENTMCNC: 31.2 G/DL (ref 32–36)
MCV RBC AUTO: 97.5 FL (ref 78–100)
METHEMOGLOBIN: 0.3 % (ref 0.5–1.5)
METHEMOGLOBIN: 0.3 % (ref 0.5–1.5)
MONOCYTES NFR BLD: 0.92 K/UL
MONOCYTES NFR BLD: 7 % (ref 0–4)
NEUTROPHILS NFR BLD: 79 % (ref 36–66)
NEUTS SEG NFR BLD: 10.68 K/UL
OXYHGB MFR BLD: 56.8 %
OXYHGB MFR BLD: 80 %
PCO2 VENOUS: 52 MM HG (ref 38–54)
PCO2 VENOUS: 65.1 MM HG (ref 38–54)
PH VENOUS: 7.36 (ref 7.32–7.43)
PH VENOUS: 7.4 (ref 7.32–7.43)
PLATELET # BLD AUTO: 224 K/UL (ref 140–440)
PMV BLD AUTO: 10.4 FL (ref 7.5–11.1)
PO2 VENOUS: 32.1 MM HG (ref 23–48)
PO2 VENOUS: 43.5 MM HG (ref 23–48)
POSITIVE BASE EXCESS, VEN: 5 MMOL/L (ref 0–3)
POSITIVE BASE EXCESS, VEN: 7.5 MMOL/L (ref 0–3)
POTASSIUM SERPL-SCNC: 3.7 MMOL/L (ref 3.5–5.1)
PROT SERPL-MCNC: 5.7 G/DL (ref 6.4–8.2)
PROTHROMBIN TIME: 16.5 SEC (ref 11.7–14.5)
PROTHROMBIN TIME: 17.1 SEC (ref 11.7–14.5)
RBC # BLD AUTO: 4.4 M/UL (ref 4.2–5.4)
SARS-COV-2 RDRP RESP QL NAA+PROBE: NOT DETECTED
SODIUM SERPL-SCNC: 143 MMOL/L (ref 136–145)
SPECIMEN DESCRIPTION: NORMAL
TEXT FOR RESPIRATORY: ABNORMAL
TROPONIN I SERPL HS-MCNC: 24 NG/L (ref 0–14)
TROPONIN I SERPL HS-MCNC: 27 NG/L (ref 0–14)
WBC OTHER # BLD: 13.5 K/UL (ref 4–10.5)

## 2024-10-11 PROCEDURE — 6370000000 HC RX 637 (ALT 250 FOR IP): Performed by: STUDENT IN AN ORGANIZED HEALTH CARE EDUCATION/TRAINING PROGRAM

## 2024-10-11 PROCEDURE — 94664 DEMO&/EVAL PT USE INHALER: CPT

## 2024-10-11 PROCEDURE — 84484 ASSAY OF TROPONIN QUANT: CPT

## 2024-10-11 PROCEDURE — 6370000000 HC RX 637 (ALT 250 FOR IP): Performed by: EMERGENCY MEDICINE

## 2024-10-11 PROCEDURE — 71045 X-RAY EXAM CHEST 1 VIEW: CPT

## 2024-10-11 PROCEDURE — 36415 COLL VENOUS BLD VENIPUNCTURE: CPT

## 2024-10-11 PROCEDURE — 6360000002 HC RX W HCPCS: Performed by: EMERGENCY MEDICINE

## 2024-10-11 PROCEDURE — 2580000003 HC RX 258: Performed by: STUDENT IN AN ORGANIZED HEALTH CARE EDUCATION/TRAINING PROGRAM

## 2024-10-11 PROCEDURE — 83880 ASSAY OF NATRIURETIC PEPTIDE: CPT

## 2024-10-11 PROCEDURE — 94640 AIRWAY INHALATION TREATMENT: CPT

## 2024-10-11 PROCEDURE — 87804 INFLUENZA ASSAY W/OPTIC: CPT

## 2024-10-11 PROCEDURE — 2700000000 HC OXYGEN THERAPY PER DAY

## 2024-10-11 PROCEDURE — 82805 BLOOD GASES W/O2 SATURATION: CPT

## 2024-10-11 PROCEDURE — 85610 PROTHROMBIN TIME: CPT

## 2024-10-11 PROCEDURE — 93010 ELECTROCARDIOGRAM REPORT: CPT | Performed by: INTERNAL MEDICINE

## 2024-10-11 PROCEDURE — 1200000000 HC SEMI PRIVATE

## 2024-10-11 PROCEDURE — 85025 COMPLETE CBC W/AUTO DIFF WBC: CPT

## 2024-10-11 PROCEDURE — 99285 EMERGENCY DEPT VISIT HI MDM: CPT

## 2024-10-11 PROCEDURE — 80053 COMPREHEN METABOLIC PANEL: CPT

## 2024-10-11 PROCEDURE — 6370000000 HC RX 637 (ALT 250 FOR IP): Performed by: NURSE PRACTITIONER

## 2024-10-11 PROCEDURE — 96374 THER/PROPH/DIAG INJ IV PUSH: CPT

## 2024-10-11 PROCEDURE — 87635 SARS-COV-2 COVID-19 AMP PRB: CPT

## 2024-10-11 PROCEDURE — 93005 ELECTROCARDIOGRAM TRACING: CPT | Performed by: EMERGENCY MEDICINE

## 2024-10-11 PROCEDURE — 2580000003 HC RX 258: Performed by: EMERGENCY MEDICINE

## 2024-10-11 RX ORDER — ACETAMINOPHEN 325 MG/1
650 TABLET ORAL EVERY 6 HOURS PRN
Status: DISCONTINUED | OUTPATIENT
Start: 2024-10-11 | End: 2024-10-15 | Stop reason: HOSPADM

## 2024-10-11 RX ORDER — ONDANSETRON 4 MG/1
4 TABLET, ORALLY DISINTEGRATING ORAL EVERY 8 HOURS PRN
Status: DISCONTINUED | OUTPATIENT
Start: 2024-10-11 | End: 2024-10-15 | Stop reason: HOSPADM

## 2024-10-11 RX ORDER — IPRATROPIUM BROMIDE AND ALBUTEROL SULFATE 2.5; .5 MG/3ML; MG/3ML
1 SOLUTION RESPIRATORY (INHALATION) ONCE
Status: COMPLETED | OUTPATIENT
Start: 2024-10-11 | End: 2024-10-11

## 2024-10-11 RX ORDER — WARFARIN SODIUM 2.5 MG/1
7.5 TABLET ORAL
Status: COMPLETED | OUTPATIENT
Start: 2024-10-11 | End: 2024-10-11

## 2024-10-11 RX ORDER — THEOPHYLLINE 400 MG/1
200 TABLET, EXTENDED RELEASE ORAL 2 TIMES DAILY
Status: DISCONTINUED | OUTPATIENT
Start: 2024-10-11 | End: 2024-10-12 | Stop reason: CLARIF

## 2024-10-11 RX ORDER — IPRATROPIUM BROMIDE AND ALBUTEROL SULFATE 2.5; .5 MG/3ML; MG/3ML
1 SOLUTION RESPIRATORY (INHALATION)
Status: DISCONTINUED | OUTPATIENT
Start: 2024-10-11 | End: 2024-10-15 | Stop reason: HOSPADM

## 2024-10-11 RX ORDER — AZITHROMYCIN 250 MG/1
500 TABLET, FILM COATED ORAL DAILY
Status: COMPLETED | OUTPATIENT
Start: 2024-10-11 | End: 2024-10-13

## 2024-10-11 RX ORDER — DICYCLOMINE HCL 20 MG
10 TABLET ORAL 2 TIMES DAILY
Status: DISCONTINUED | OUTPATIENT
Start: 2024-10-11 | End: 2024-10-15 | Stop reason: HOSPADM

## 2024-10-11 RX ORDER — PANTOPRAZOLE SODIUM 40 MG/1
40 TABLET, DELAYED RELEASE ORAL
Status: DISCONTINUED | OUTPATIENT
Start: 2024-10-11 | End: 2024-10-15 | Stop reason: HOSPADM

## 2024-10-11 RX ORDER — ROSUVASTATIN CALCIUM 20 MG/1
20 TABLET, COATED ORAL NIGHTLY
Status: DISCONTINUED | OUTPATIENT
Start: 2024-10-11 | End: 2024-10-15 | Stop reason: HOSPADM

## 2024-10-11 RX ORDER — MONTELUKAST SODIUM 10 MG/1
10 TABLET ORAL NIGHTLY
Status: DISCONTINUED | OUTPATIENT
Start: 2024-10-11 | End: 2024-10-15 | Stop reason: HOSPADM

## 2024-10-11 RX ORDER — LEVOTHYROXINE SODIUM 75 UG/1
75 TABLET ORAL DAILY
Status: DISCONTINUED | OUTPATIENT
Start: 2024-10-12 | End: 2024-10-15 | Stop reason: HOSPADM

## 2024-10-11 RX ORDER — BUDESONIDE AND FORMOTEROL FUMARATE DIHYDRATE 160; 4.5 UG/1; UG/1
2 AEROSOL RESPIRATORY (INHALATION)
Status: DISCONTINUED | OUTPATIENT
Start: 2024-10-11 | End: 2024-10-15 | Stop reason: HOSPADM

## 2024-10-11 RX ORDER — PREDNISONE 20 MG/1
40 TABLET ORAL DAILY
Status: DISCONTINUED | OUTPATIENT
Start: 2024-10-13 | End: 2024-10-15 | Stop reason: HOSPADM

## 2024-10-11 RX ORDER — ACETAMINOPHEN 650 MG/1
650 SUPPOSITORY RECTAL EVERY 6 HOURS PRN
Status: DISCONTINUED | OUTPATIENT
Start: 2024-10-11 | End: 2024-10-15 | Stop reason: HOSPADM

## 2024-10-11 RX ORDER — SODIUM CHLORIDE 0.9 % (FLUSH) 0.9 %
5-40 SYRINGE (ML) INJECTION PRN
Status: DISCONTINUED | OUTPATIENT
Start: 2024-10-11 | End: 2024-10-15 | Stop reason: HOSPADM

## 2024-10-11 RX ORDER — GABAPENTIN 400 MG/1
400 CAPSULE ORAL EVERY EVENING
Status: DISCONTINUED | OUTPATIENT
Start: 2024-10-11 | End: 2024-10-15 | Stop reason: HOSPADM

## 2024-10-11 RX ORDER — PANTOPRAZOLE SODIUM 40 MG/1
40 TABLET, DELAYED RELEASE ORAL
Status: DISCONTINUED | OUTPATIENT
Start: 2024-10-12 | End: 2024-10-11

## 2024-10-11 RX ORDER — WARFARIN SODIUM 2 MG/1
6 TABLET ORAL
Status: DISCONTINUED | OUTPATIENT
Start: 2024-10-11 | End: 2024-10-11

## 2024-10-11 RX ORDER — POLYETHYLENE GLYCOL 3350 17 G/17G
17 POWDER, FOR SOLUTION ORAL DAILY PRN
Status: DISCONTINUED | OUTPATIENT
Start: 2024-10-11 | End: 2024-10-15 | Stop reason: HOSPADM

## 2024-10-11 RX ORDER — SODIUM CHLORIDE 9 MG/ML
INJECTION, SOLUTION INTRAVENOUS PRN
Status: DISCONTINUED | OUTPATIENT
Start: 2024-10-11 | End: 2024-10-15 | Stop reason: HOSPADM

## 2024-10-11 RX ORDER — ONDANSETRON 2 MG/ML
4 INJECTION INTRAMUSCULAR; INTRAVENOUS EVERY 6 HOURS PRN
Status: DISCONTINUED | OUTPATIENT
Start: 2024-10-11 | End: 2024-10-15 | Stop reason: HOSPADM

## 2024-10-11 RX ORDER — TORSEMIDE 20 MG/1
20 TABLET ORAL 2 TIMES DAILY
Status: DISCONTINUED | OUTPATIENT
Start: 2024-10-11 | End: 2024-10-15 | Stop reason: HOSPADM

## 2024-10-11 RX ORDER — ROFLUMILAST 500 UG/1
500 TABLET ORAL DAILY
Status: DISCONTINUED | OUTPATIENT
Start: 2024-10-12 | End: 2024-10-15 | Stop reason: HOSPADM

## 2024-10-11 RX ORDER — SODIUM CHLORIDE 0.9 % (FLUSH) 0.9 %
5-40 SYRINGE (ML) INJECTION EVERY 12 HOURS SCHEDULED
Status: DISCONTINUED | OUTPATIENT
Start: 2024-10-11 | End: 2024-10-15 | Stop reason: HOSPADM

## 2024-10-11 RX ORDER — DILTIAZEM HCL 60 MG
60 TABLET ORAL EVERY 8 HOURS SCHEDULED
Status: DISCONTINUED | OUTPATIENT
Start: 2024-10-11 | End: 2024-10-15 | Stop reason: HOSPADM

## 2024-10-11 RX ORDER — GUAIFENESIN/DEXTROMETHORPHAN 100-10MG/5
5 SYRUP ORAL EVERY 4 HOURS PRN
Status: DISCONTINUED | OUTPATIENT
Start: 2024-10-11 | End: 2024-10-15 | Stop reason: HOSPADM

## 2024-10-11 RX ADMIN — DILTIAZEM HYDROCHLORIDE 60 MG: 60 TABLET, FILM COATED ORAL at 21:21

## 2024-10-11 RX ADMIN — DICYCLOMINE HYDROCHLORIDE 10 MG: 20 TABLET ORAL at 23:14

## 2024-10-11 RX ADMIN — WATER 60 MG: 1 INJECTION INTRAMUSCULAR; INTRAVENOUS; SUBCUTANEOUS at 15:48

## 2024-10-11 RX ADMIN — ROSUVASTATIN CALCIUM 20 MG: 20 TABLET, FILM COATED ORAL at 23:14

## 2024-10-11 RX ADMIN — GABAPENTIN 400 MG: 400 CAPSULE ORAL at 21:22

## 2024-10-11 RX ADMIN — WARFARIN SODIUM 7.5 MG: 2.5 TABLET ORAL at 21:21

## 2024-10-11 RX ADMIN — IPRATROPIUM BROMIDE AND ALBUTEROL SULFATE 1 DOSE: 2.5; .5 SOLUTION RESPIRATORY (INHALATION) at 16:02

## 2024-10-11 RX ADMIN — THEOPHYLLINE 200 MG: 400 TABLET, EXTENDED RELEASE ORAL at 23:14

## 2024-10-11 RX ADMIN — TORSEMIDE 20 MG: 20 TABLET ORAL at 21:22

## 2024-10-11 RX ADMIN — PANTOPRAZOLE SODIUM 40 MG: 40 TABLET, DELAYED RELEASE ORAL at 23:14

## 2024-10-11 RX ADMIN — BUDESONIDE AND FORMOTEROL FUMARATE DIHYDRATE 2 PUFF: 160; 4.5 AEROSOL RESPIRATORY (INHALATION) at 20:37

## 2024-10-11 RX ADMIN — AZITHROMYCIN DIHYDRATE 500 MG: 250 TABLET ORAL at 21:22

## 2024-10-11 RX ADMIN — SODIUM CHLORIDE, PRESERVATIVE FREE 10 ML: 5 INJECTION INTRAVENOUS at 21:22

## 2024-10-11 RX ADMIN — MONTELUKAST 10 MG: 10 TABLET, FILM COATED ORAL at 21:22

## 2024-10-11 RX ADMIN — IPRATROPIUM BROMIDE AND ALBUTEROL SULFATE 1 DOSE: 2.5; .5 SOLUTION RESPIRATORY (INHALATION) at 20:37

## 2024-10-11 NOTE — ED PROVIDER NOTES
Emergency Department Encounter    Patient: Janessa Davidson  MRN: [id number]  : 1945  Date of Evaluation: 10/11/2024  ED Provider:  Marcy Cody DO    Triage Chief Complaint:   Shortness of Breath (Hx of COPD)    [place]:  Janessa Davidson is a 79 y.o. female with history of COPD on 2 L of home oxygen around-the-clock, atrial fibrillation hyperlipidemia pulmonary hypertension TIA, pneumonia, essential hypertension, arthritis hiatal hernia DVT that presents to the emergency department complaining of shortness of breath for the past couple days ago and progressively worse.  She states she started out with a sore throat or cough productive yesterday and felt like she was getting a cold.  Patient states today more short of breath with exertion.  She states she has had increase her oxygen to about 3 L nasal cannula around-the-clock.  Patient states no earache or runny nose no fever no sick contacts that she knows of.  She states she has been using her inhaler and albuterol nebulizer with no relief.  Patient here for evaluation.    R10OS - see HPI, below listed is current ROS at time of my eval:  10 systems reviewed and negative except as above.     Past Medical History:   Diagnosis Date    Age-related osteoporosis without current pathological fracture 2024    Anticoagulant long-term use     Arthritis     Cancer (HCC)     Chronic hypoxemic respiratory failure 2018    Community acquired pneumonia of right upper lobe of lung 2022    COPD (chronic obstructive pulmonary disease) (HCC)     Coronary atherosclerosis     COVID-19 virus detected 2021    DVT (deep venous thrombosis) ([facility])     Former smoker 2021    Gastroesophageal reflux disease     Hiatal hernia     Lung infiltrate on CT 2019    On home oxygen therapy     on     Osteopenia     Phlebitis     Pulmonary emphysema (HCC) 2023    Pulmonary nodule 2016    S/P left heart catheterization by percutaneous

## 2024-10-11 NOTE — ED NOTES
Floor notified of sbar  
components:    Troponin, High Sensitivity 27 (*)     All other components within normal limits   BRAIN NATRIURETIC PEPTIDE - Abnormal; Notable for the following components:    NT Pro- (*)     All other components within normal limits        Background  History:   Past Medical History:   Diagnosis Date    Age-related osteoporosis without current pathological fracture 05/23/2024    Anticoagulant long-term use     Arthritis     Cancer ([facility])     Chronic hypoxemic respiratory failure 02/06/2018    Community acquired pneumonia of right upper lobe of lung 03/19/2022    COPD (chronic obstructive pulmonary disease) ([facility])     Coronary atherosclerosis     COVID-19 virus detected 01/18/2021    DVT (deep venous thrombosis) ([facility])     Former smoker 11/22/2021    Gastroesophageal reflux disease     Hiatal hernia     Lung infiltrate on CT 01/22/2019    On home oxygen therapy     on 24/7    Osteopenia     Phlebitis     Pulmonary emphysema ([facility]) 12/12/2023    Pulmonary nodule 07/05/2016    S/P left heart catheterization by percutaneous approach 06/27/2013    Sepsis due to pneumonia ([facility]) 11/14/2017    Shortness of breath 09/23/2019    Shortness of breath 01/18/2021    Shortness of breath 11/22/2021    Wears glasses        Assessment    Vitals:        Vitals:    10/11/24 1602 10/11/24 1603 10/11/24 1614 10/11/24 1615   BP:   (!) 159/69    Pulse: 76 89 77 78   Resp: 17 15 21 14   Temp:       TempSrc:       SpO2: 97% 97% 100% 99%   Weight:           PO Status: Regular    O2 Flow Rate: O2 Device: Nasal cannula O2 Flow Rate (L/min): 2 L/min (Patient's home O2)    Cardiac Rhythm: Normal Sinus     Last documented pain medication administered:     NIH Score: NIH       Active LDA's:   Peripheral IV 10/11/24 Distal;Left Forearm (Active)   Site Assessment Clean, dry & intact 10/11/24 1430   Line Status Blood return noted;Flushed;Normal saline locked;Specimen collected 10/11/24 1430   Dressing Status New dressing applied;Clean, dry &

## 2024-10-11 NOTE — H&P
V2.0  History and Physical      Name:  Janessa Davidson /Age/Sex: 1945  (79 y.o. female)   MRN & CSN:  [id number] & [id number] Encounter Date/Time: 10/11/2024 4:40 PM EDT   Location:  ED17/ED-17 PCP: Jes Sharma DO       Hospital Day: 1      History of Present Illness:     Chief Complaint: SOB    Janessa Davidson is a 79 y.o. female with PMH of COPD on 2L NC, atrial fibrillation, pulm HTN, TIA, essential HTN who presents with shortness of breathing worse on exertion. Pt also endorses worsening cough not able to bring up sputum. Pt reports sore throat but no URI symptoms. Pt no longer smokes and she is compliant with her medications.  Denies sick contact.  Patient states she stopped taking warfarin few days ago as she had tooth extraction procedure.     At ED, pt was afebrile, hemodynamically stable; on 2L NC sating 97%. Labs unremarkable except for WBC 13.5, , troponin 27- > 24. CXR no acute change. Pt received steroids, and breathing tx    Assessment and Plan:   COPD exacerbation  Symptoms of shortness of breath worse on exertion; same oxygen requirement, productive cough  Chest x-ray no sign of pneumonia no fever has mild leukocytosis 13.5  -DuoNebs  -Steroids IV then p.o.  -Symptomatic management  -Check Pro-Calc  -Azithromycin for COPD exacerbation  -Resume home inhalers and meds  - acapalla  -Recheck VBG    Atrial fibrillation.   On home Diltiazem, warfarin  INR 1.4 held warfarin due to dental procedure   - resume diltiazem  -Consult pharmacy for warfarin dosing    Hx of DVT.   On Warfarin as above    Elevated troponin. 27 -> 21  No chest pain. No acute ischemic changes on ECG  - monitor on tele    Hypothyroidism. On home synthroid 75 mcg. Last TSH 2.33 2024. Continue home meds    Code Status: Code status was discussed in detail with Patient/POA; verbalized understanding of the different types of code status; Patient/POA  opted for FULL CODE     Medical Decision Making:  Personally

## 2024-10-11 NOTE — CONSULTS
PHARMACY ANTICOAGULATION MONITORING SERVICE    Janessa Davidson is a 79 y.o. female on warfarin therapy for Afib/Aflutter. Pharmacy consulted by Dr. Fontanez for monitoring and adjustment of treatment.    Indication for anticoagulation: Afib/Aflutter  INR goal: 2-3  Warfarin dose prior to admission:   Patient had tooth extraction on 10/08 in which she was holding for 5 days prior (10/03-10/08). Dosing schedule was planned to be a boosted dose of 9 mg on 10/09 with plan to take 6 mg daily for 5 days and then scheudle an INR re-check.  Of note, in chart sounds like patient was potentially taking warfarin 5 mg daily when she was supposed to beholding and still had an INR of 1.0. Provider increased dose to warfarin 5 mg every other day alternating with 7.5 mg every other day    Pertinent Laboratory Values   Recent Labs     10/11/24  1416 10/11/24  1445   INR 1.4  --    HGB  --  13.4   HCT  --  42.9   PLT  --  224     Recent Warfarin doses given this admission...  Date INR Result Warfarin Dose Given   10/11 1.4 7.5 mg ordered                    Assessment/Plan:  Drug Interactions:   Azithromycin, SoluMedrol -may increase INR  INR of 1.4 is sub-therapeutic for goal  Will give warfarin 7.5 mg for tonight  Pharmacy will continue to monitor and adjust warfarin therapy as indicated      Thank you for the consult.  Magnolia Sellers [facility], PharmD.  10/11/2024 7:38 PM

## 2024-10-11 NOTE — CONSULTS
PHARMACY ANTICOAGULATION MONITORING SERVICE    Janessa Davidson is a 79 y.o. female on warfarin therapy for Afib/Aflutter. Pharmacy consulted by Dr. Fontanez for monitoring and adjustment of treatment.    Indication for anticoagulation: Afib/Aflutter  INR goal: 2-3  Warfarin dose prior to admission:   Patient had tooth extraction on 10/08 in which she was holding for 5 days prior (10/03-10/08). Dosing schedule was planned to be a boosted dose of 9 mg on 10/09 with plan to take 6 mg daily for 5 days and then scheudle an INR re-check.  Of note, in chart sounds like patient was potentially taking warfarin 5 mg daily when she was supposed to beholding and still had an INR of 1.0. Provider increased dose to warfarin 5 mg every other day alternating with 7.5 mg every other day    Pertinent Laboratory Values   Recent Labs     10/11/24  1416 10/11/24  1445   INR 1.4  --    HGB  --  13.4   HCT  --  42.9   PLT  --  224     Recent Warfarin doses given this admission...  Date INR Result Warfarin Dose Given   10/11 1.4 6 mg ordered                    Assessment/Plan:  Drug Interactions:   Azithromycin, SoluMedrol -may increase INR  INR of 1.4 is sub-therapeutic for goal  Will give warfarin 7.5 mg for tonight  Pharmacy will continue to monitor and adjust warfarin therapy as indicated      Thank you for the consult.  Magnolia Sellers [facility], PharmD.  10/11/2024 7:31 PM

## 2024-10-12 LAB
INR PPP: 1.6
PROTHROMBIN TIME: 19.5 SEC (ref 11.7–14.5)
TROPONIN I SERPL HS-MCNC: 21 NG/L (ref 0–14)

## 2024-10-12 PROCEDURE — 2700000000 HC OXYGEN THERAPY PER DAY

## 2024-10-12 PROCEDURE — 6370000000 HC RX 637 (ALT 250 FOR IP): Performed by: STUDENT IN AN ORGANIZED HEALTH CARE EDUCATION/TRAINING PROGRAM

## 2024-10-12 PROCEDURE — 85610 PROTHROMBIN TIME: CPT

## 2024-10-12 PROCEDURE — 1200000000 HC SEMI PRIVATE

## 2024-10-12 PROCEDURE — 2580000003 HC RX 258: Performed by: STUDENT IN AN ORGANIZED HEALTH CARE EDUCATION/TRAINING PROGRAM

## 2024-10-12 PROCEDURE — 36415 COLL VENOUS BLD VENIPUNCTURE: CPT

## 2024-10-12 PROCEDURE — 6370000000 HC RX 637 (ALT 250 FOR IP): Performed by: NURSE PRACTITIONER

## 2024-10-12 PROCEDURE — 94761 N-INVAS EAR/PLS OXIMETRY MLT: CPT

## 2024-10-12 PROCEDURE — 94640 AIRWAY INHALATION TREATMENT: CPT

## 2024-10-12 PROCEDURE — 6360000002 HC RX W HCPCS: Performed by: STUDENT IN AN ORGANIZED HEALTH CARE EDUCATION/TRAINING PROGRAM

## 2024-10-12 RX ORDER — WARFARIN SODIUM 2.5 MG/1
5 TABLET ORAL
Status: COMPLETED | OUTPATIENT
Start: 2024-10-12 | End: 2024-10-12

## 2024-10-12 RX ORDER — THEOPHYLLINE 450 MG/1
225 TABLET, EXTENDED RELEASE ORAL 2 TIMES DAILY
Status: DISCONTINUED | OUTPATIENT
Start: 2024-10-12 | End: 2024-10-15 | Stop reason: HOSPADM

## 2024-10-12 RX ORDER — HYDROXYZINE HYDROCHLORIDE 25 MG/1
25 TABLET, FILM COATED ORAL NIGHTLY PRN
Status: DISCONTINUED | OUTPATIENT
Start: 2024-10-12 | End: 2024-10-15 | Stop reason: HOSPADM

## 2024-10-12 RX ADMIN — WATER 40 MG: 1 INJECTION INTRAMUSCULAR; INTRAVENOUS; SUBCUTANEOUS at 05:54

## 2024-10-12 RX ADMIN — WATER 40 MG: 1 INJECTION INTRAMUSCULAR; INTRAVENOUS; SUBCUTANEOUS at 22:40

## 2024-10-12 RX ADMIN — IPRATROPIUM BROMIDE AND ALBUTEROL SULFATE 1 DOSE: 2.5; .5 SOLUTION RESPIRATORY (INHALATION) at 12:36

## 2024-10-12 RX ADMIN — GABAPENTIN 400 MG: 400 CAPSULE ORAL at 17:33

## 2024-10-12 RX ADMIN — TORSEMIDE 20 MG: 20 TABLET ORAL at 10:00

## 2024-10-12 RX ADMIN — IPRATROPIUM BROMIDE AND ALBUTEROL SULFATE 1 DOSE: 2.5; .5 SOLUTION RESPIRATORY (INHALATION) at 17:10

## 2024-10-12 RX ADMIN — DILTIAZEM HYDROCHLORIDE 60 MG: 60 TABLET, FILM COATED ORAL at 05:54

## 2024-10-12 RX ADMIN — PANTOPRAZOLE SODIUM 40 MG: 40 TABLET, DELAYED RELEASE ORAL at 05:54

## 2024-10-12 RX ADMIN — THEOPHYLLINE 200 MG: 400 TABLET, EXTENDED RELEASE ORAL at 09:57

## 2024-10-12 RX ADMIN — DILTIAZEM HYDROCHLORIDE 60 MG: 60 TABLET, FILM COATED ORAL at 22:40

## 2024-10-12 RX ADMIN — DILTIAZEM HYDROCHLORIDE 60 MG: 60 TABLET, FILM COATED ORAL at 13:30

## 2024-10-12 RX ADMIN — BUDESONIDE AND FORMOTEROL FUMARATE DIHYDRATE 2 PUFF: 160; 4.5 AEROSOL RESPIRATORY (INHALATION) at 19:05

## 2024-10-12 RX ADMIN — AZITHROMYCIN DIHYDRATE 500 MG: 250 TABLET ORAL at 09:58

## 2024-10-12 RX ADMIN — PANTOPRAZOLE SODIUM 40 MG: 40 TABLET, DELAYED RELEASE ORAL at 15:33

## 2024-10-12 RX ADMIN — WATER 40 MG: 1 INJECTION INTRAMUSCULAR; INTRAVENOUS; SUBCUTANEOUS at 13:30

## 2024-10-12 RX ADMIN — TORSEMIDE 20 MG: 20 TABLET ORAL at 21:11

## 2024-10-12 RX ADMIN — DICYCLOMINE HYDROCHLORIDE 10 MG: 20 TABLET ORAL at 09:58

## 2024-10-12 RX ADMIN — DICYCLOMINE HYDROCHLORIDE 10 MG: 20 TABLET ORAL at 21:10

## 2024-10-12 RX ADMIN — LEVOTHYROXINE SODIUM 75 MCG: 0.07 TABLET ORAL at 09:58

## 2024-10-12 RX ADMIN — GUAIFENESIN SYRUP AND DEXTROMETHORPHAN 5 ML: 100; 10 SYRUP ORAL at 07:27

## 2024-10-12 RX ADMIN — WARFARIN SODIUM 5 MG: 2.5 TABLET ORAL at 17:33

## 2024-10-12 RX ADMIN — ROSUVASTATIN CALCIUM 20 MG: 20 TABLET, FILM COATED ORAL at 21:07

## 2024-10-12 RX ADMIN — THEOPHYLLINE 225 MG: 450 TABLET, EXTENDED RELEASE ORAL at 21:16

## 2024-10-12 RX ADMIN — SODIUM CHLORIDE, PRESERVATIVE FREE 10 ML: 5 INJECTION INTRAVENOUS at 10:09

## 2024-10-12 RX ADMIN — SODIUM CHLORIDE, PRESERVATIVE FREE 10 ML: 5 INJECTION INTRAVENOUS at 22:41

## 2024-10-12 RX ADMIN — MONTELUKAST 10 MG: 10 TABLET, FILM COATED ORAL at 21:10

## 2024-10-12 RX ADMIN — IPRATROPIUM BROMIDE AND ALBUTEROL SULFATE 1 DOSE: 2.5; .5 SOLUTION RESPIRATORY (INHALATION) at 08:57

## 2024-10-12 RX ADMIN — SODIUM CHLORIDE, PRESERVATIVE FREE 10 ML: 5 INJECTION INTRAVENOUS at 21:17

## 2024-10-12 RX ADMIN — ROFLUMILAST 500 MCG: 500 TABLET ORAL at 09:59

## 2024-10-12 RX ADMIN — BUDESONIDE AND FORMOTEROL FUMARATE DIHYDRATE 2 PUFF: 160; 4.5 AEROSOL RESPIRATORY (INHALATION) at 08:57

## 2024-10-12 RX ADMIN — IPRATROPIUM BROMIDE AND ALBUTEROL SULFATE 1 DOSE: 2.5; .5 SOLUTION RESPIRATORY (INHALATION) at 19:06

## 2024-10-13 LAB
INR PPP: 2.5
PROTHROMBIN TIME: 27.2 SEC (ref 11.7–14.5)

## 2024-10-13 PROCEDURE — 94761 N-INVAS EAR/PLS OXIMETRY MLT: CPT

## 2024-10-13 PROCEDURE — 2580000003 HC RX 258: Performed by: STUDENT IN AN ORGANIZED HEALTH CARE EDUCATION/TRAINING PROGRAM

## 2024-10-13 PROCEDURE — 2700000000 HC OXYGEN THERAPY PER DAY

## 2024-10-13 PROCEDURE — 6370000000 HC RX 637 (ALT 250 FOR IP): Performed by: NURSE PRACTITIONER

## 2024-10-13 PROCEDURE — 85610 PROTHROMBIN TIME: CPT

## 2024-10-13 PROCEDURE — 94640 AIRWAY INHALATION TREATMENT: CPT

## 2024-10-13 PROCEDURE — 6370000000 HC RX 637 (ALT 250 FOR IP): Performed by: STUDENT IN AN ORGANIZED HEALTH CARE EDUCATION/TRAINING PROGRAM

## 2024-10-13 PROCEDURE — 36415 COLL VENOUS BLD VENIPUNCTURE: CPT

## 2024-10-13 PROCEDURE — 1200000000 HC SEMI PRIVATE

## 2024-10-13 RX ORDER — WARFARIN SODIUM 2 MG/1
2 TABLET ORAL
Status: COMPLETED | OUTPATIENT
Start: 2024-10-13 | End: 2024-10-13

## 2024-10-13 RX ADMIN — DILTIAZEM HYDROCHLORIDE 60 MG: 60 TABLET, FILM COATED ORAL at 22:07

## 2024-10-13 RX ADMIN — BUDESONIDE AND FORMOTEROL FUMARATE DIHYDRATE 2 PUFF: 160; 4.5 AEROSOL RESPIRATORY (INHALATION) at 21:22

## 2024-10-13 RX ADMIN — PANTOPRAZOLE SODIUM 40 MG: 40 TABLET, DELAYED RELEASE ORAL at 05:45

## 2024-10-13 RX ADMIN — AZITHROMYCIN DIHYDRATE 500 MG: 250 TABLET ORAL at 09:27

## 2024-10-13 RX ADMIN — DILTIAZEM HYDROCHLORIDE 60 MG: 60 TABLET, FILM COATED ORAL at 05:45

## 2024-10-13 RX ADMIN — SODIUM CHLORIDE, PRESERVATIVE FREE 10 ML: 5 INJECTION INTRAVENOUS at 22:07

## 2024-10-13 RX ADMIN — ROSUVASTATIN CALCIUM 20 MG: 20 TABLET, FILM COATED ORAL at 22:06

## 2024-10-13 RX ADMIN — THEOPHYLLINE 225 MG: 450 TABLET, EXTENDED RELEASE ORAL at 22:10

## 2024-10-13 RX ADMIN — GABAPENTIN 400 MG: 400 CAPSULE ORAL at 17:29

## 2024-10-13 RX ADMIN — SODIUM CHLORIDE, PRESERVATIVE FREE 10 ML: 5 INJECTION INTRAVENOUS at 09:29

## 2024-10-13 RX ADMIN — GUAIFENESIN SYRUP AND DEXTROMETHORPHAN 5 ML: 100; 10 SYRUP ORAL at 22:10

## 2024-10-13 RX ADMIN — PREDNISONE 40 MG: 20 TABLET ORAL at 09:27

## 2024-10-13 RX ADMIN — PANTOPRAZOLE SODIUM 40 MG: 40 TABLET, DELAYED RELEASE ORAL at 15:18

## 2024-10-13 RX ADMIN — IPRATROPIUM BROMIDE AND ALBUTEROL SULFATE 1 DOSE: 2.5; .5 SOLUTION RESPIRATORY (INHALATION) at 16:37

## 2024-10-13 RX ADMIN — IPRATROPIUM BROMIDE AND ALBUTEROL SULFATE 1 DOSE: 2.5; .5 SOLUTION RESPIRATORY (INHALATION) at 21:22

## 2024-10-13 RX ADMIN — DICYCLOMINE HYDROCHLORIDE 10 MG: 20 TABLET ORAL at 22:07

## 2024-10-13 RX ADMIN — TORSEMIDE 20 MG: 20 TABLET ORAL at 09:27

## 2024-10-13 RX ADMIN — IPRATROPIUM BROMIDE AND ALBUTEROL SULFATE 1 DOSE: 2.5; .5 SOLUTION RESPIRATORY (INHALATION) at 12:03

## 2024-10-13 RX ADMIN — BUDESONIDE AND FORMOTEROL FUMARATE DIHYDRATE 2 PUFF: 160; 4.5 AEROSOL RESPIRATORY (INHALATION) at 07:09

## 2024-10-13 RX ADMIN — WARFARIN SODIUM 2 MG: 2 TABLET ORAL at 17:29

## 2024-10-13 RX ADMIN — DILTIAZEM HYDROCHLORIDE 60 MG: 60 TABLET, FILM COATED ORAL at 13:46

## 2024-10-13 RX ADMIN — IPRATROPIUM BROMIDE AND ALBUTEROL SULFATE 1 DOSE: 2.5; .5 SOLUTION RESPIRATORY (INHALATION) at 07:08

## 2024-10-13 RX ADMIN — MONTELUKAST 10 MG: 10 TABLET, FILM COATED ORAL at 22:07

## 2024-10-13 RX ADMIN — TORSEMIDE 20 MG: 20 TABLET ORAL at 22:07

## 2024-10-13 RX ADMIN — LEVOTHYROXINE SODIUM 75 MCG: 0.07 TABLET ORAL at 09:28

## 2024-10-13 RX ADMIN — IPRATROPIUM BROMIDE AND ALBUTEROL SULFATE 1 DOSE: 2.5; .5 SOLUTION RESPIRATORY (INHALATION) at 02:31

## 2024-10-13 RX ADMIN — GUAIFENESIN SYRUP AND DEXTROMETHORPHAN 5 ML: 100; 10 SYRUP ORAL at 15:18

## 2024-10-13 RX ADMIN — THEOPHYLLINE 225 MG: 450 TABLET, EXTENDED RELEASE ORAL at 09:26

## 2024-10-13 RX ADMIN — ROFLUMILAST 500 MCG: 500 TABLET ORAL at 09:26

## 2024-10-13 RX ADMIN — DICYCLOMINE HYDROCHLORIDE 10 MG: 20 TABLET ORAL at 09:28

## 2024-10-13 ASSESSMENT — PAIN SCALES - GENERAL: PAINLEVEL_OUTOF10: 0

## 2024-10-13 NOTE — CARE COORDINATION
Cm met with pt and spouse to initiate discharge planning. Cm introduced self and role of CM. Pt lives with spouse in 1 story home with laundry in the basement. Pt does not drive. Pt drives. Pt has 2 dgts both of whom reside locally. Pt has the following DME; home 02 through [facility] DME, nebulizer, walker, and shower chair. [facility] HC sees pt at least monthly for blood draws but pt would likely benefit from a few more visits post hospitalization to monitor resp status. Message left to for physician to reorder HC at discharge. Plan for discharge is home HC.   10/13/24 [id number]   Service Assessment   Patient Orientation Alert and Oriented   Cognition Alert   History Provided By Patient;Medical Record   Primary Caregiver Spouse   Accompanied By/Relationship spouse   Support Systems Spouse/Significant Other;Children   Patient's Healthcare Decision Maker is: Legal Next of Kin   PCP Verified by CM Yes   Prior Functional Level Independent in ADLs/IADLs;Shopping;Housework;Cooking;Other (see comment)  (pt sponge bathe)   Current Functional Level Assistance with the following:;Bathing;Toileting;Mobility  (per hospital protocol)   Can patient return to prior living arrangement Yes   Ability to make needs known: Good   Family able to assist with home care needs: Yes   Would you like for me to discuss the discharge plan with any other family members/significant others, and if so, who? Yes  (LNOK)   Financial Resources Medicare   Community Resources ECF/Home Care  ([facility] HC draws protime monthly for pt from home)   CM/SW Referral Other (see comment)  (discharge planning assessment)

## 2024-10-14 ENCOUNTER — TELEPHONE (OUTPATIENT)
Dept: PULMONOLOGY | Age: 79
End: 2024-10-14

## 2024-10-14 LAB
INR PPP: 3.1
PROTHROMBIN TIME: 32.3 SEC (ref 11.7–14.5)

## 2024-10-14 PROCEDURE — 2700000000 HC OXYGEN THERAPY PER DAY

## 2024-10-14 PROCEDURE — 2580000003 HC RX 258: Performed by: STUDENT IN AN ORGANIZED HEALTH CARE EDUCATION/TRAINING PROGRAM

## 2024-10-14 PROCEDURE — 6360000002 HC RX W HCPCS: Performed by: STUDENT IN AN ORGANIZED HEALTH CARE EDUCATION/TRAINING PROGRAM

## 2024-10-14 PROCEDURE — 6370000000 HC RX 637 (ALT 250 FOR IP): Performed by: NURSE PRACTITIONER

## 2024-10-14 PROCEDURE — 94761 N-INVAS EAR/PLS OXIMETRY MLT: CPT

## 2024-10-14 PROCEDURE — 94010 BREATHING CAPACITY TEST: CPT

## 2024-10-14 PROCEDURE — 6370000000 HC RX 637 (ALT 250 FOR IP): Performed by: STUDENT IN AN ORGANIZED HEALTH CARE EDUCATION/TRAINING PROGRAM

## 2024-10-14 PROCEDURE — 89220 SPUTUM SPECIMEN COLLECTION: CPT

## 2024-10-14 PROCEDURE — 36415 COLL VENOUS BLD VENIPUNCTURE: CPT

## 2024-10-14 PROCEDURE — 94640 AIRWAY INHALATION TREATMENT: CPT

## 2024-10-14 PROCEDURE — 94669 MECHANICAL CHEST WALL OSCILL: CPT

## 2024-10-14 PROCEDURE — 1200000000 HC SEMI PRIVATE

## 2024-10-14 PROCEDURE — 85610 PROTHROMBIN TIME: CPT

## 2024-10-14 RX ADMIN — IPRATROPIUM BROMIDE AND ALBUTEROL SULFATE 1 DOSE: 2.5; .5 SOLUTION RESPIRATORY (INHALATION) at 16:38

## 2024-10-14 RX ADMIN — PANTOPRAZOLE SODIUM 40 MG: 40 TABLET, DELAYED RELEASE ORAL at 06:15

## 2024-10-14 RX ADMIN — MONTELUKAST 10 MG: 10 TABLET, FILM COATED ORAL at 21:37

## 2024-10-14 RX ADMIN — PANTOPRAZOLE SODIUM 40 MG: 40 TABLET, DELAYED RELEASE ORAL at 15:53

## 2024-10-14 RX ADMIN — IPRATROPIUM BROMIDE AND ALBUTEROL SULFATE 1 DOSE: 2.5; .5 SOLUTION RESPIRATORY (INHALATION) at 20:00

## 2024-10-14 RX ADMIN — GABAPENTIN 400 MG: 400 CAPSULE ORAL at 18:23

## 2024-10-14 RX ADMIN — IPRATROPIUM BROMIDE AND ALBUTEROL SULFATE 1 DOSE: 2.5; .5 SOLUTION RESPIRATORY (INHALATION) at 02:13

## 2024-10-14 RX ADMIN — GUAIFENESIN SYRUP AND DEXTROMETHORPHAN 5 ML: 100; 10 SYRUP ORAL at 02:07

## 2024-10-14 RX ADMIN — IPRATROPIUM BROMIDE AND ALBUTEROL SULFATE 1 DOSE: 2.5; .5 SOLUTION RESPIRATORY (INHALATION) at 10:56

## 2024-10-14 RX ADMIN — GUAIFENESIN SYRUP AND DEXTROMETHORPHAN 5 ML: 100; 10 SYRUP ORAL at 13:06

## 2024-10-14 RX ADMIN — DILTIAZEM HYDROCHLORIDE 60 MG: 60 TABLET, FILM COATED ORAL at 21:37

## 2024-10-14 RX ADMIN — ROFLUMILAST 500 MCG: 500 TABLET ORAL at 09:40

## 2024-10-14 RX ADMIN — GUAIFENESIN SYRUP AND DEXTROMETHORPHAN 5 ML: 100; 10 SYRUP ORAL at 18:23

## 2024-10-14 RX ADMIN — THEOPHYLLINE 225 MG: 450 TABLET, EXTENDED RELEASE ORAL at 21:39

## 2024-10-14 RX ADMIN — GUAIFENESIN SYRUP AND DEXTROMETHORPHAN 5 ML: 100; 10 SYRUP ORAL at 22:48

## 2024-10-14 RX ADMIN — WATER 40 MG: 1 INJECTION INTRAMUSCULAR; INTRAVENOUS; SUBCUTANEOUS at 16:40

## 2024-10-14 RX ADMIN — SODIUM CHLORIDE, PRESERVATIVE FREE 10 ML: 5 INJECTION INTRAVENOUS at 09:43

## 2024-10-14 RX ADMIN — ROSUVASTATIN CALCIUM 20 MG: 20 TABLET, FILM COATED ORAL at 21:38

## 2024-10-14 RX ADMIN — BUDESONIDE AND FORMOTEROL FUMARATE DIHYDRATE 2 PUFF: 160; 4.5 AEROSOL RESPIRATORY (INHALATION) at 20:05

## 2024-10-14 RX ADMIN — TORSEMIDE 20 MG: 20 TABLET ORAL at 21:37

## 2024-10-14 RX ADMIN — DILTIAZEM HYDROCHLORIDE 60 MG: 60 TABLET, FILM COATED ORAL at 06:15

## 2024-10-14 RX ADMIN — POLYETHYLENE GLYCOL (3350) 17 G: 17 POWDER, FOR SOLUTION ORAL at 18:23

## 2024-10-14 RX ADMIN — IPRATROPIUM BROMIDE AND ALBUTEROL SULFATE 1 DOSE: 2.5; .5 SOLUTION RESPIRATORY (INHALATION) at 06:24

## 2024-10-14 RX ADMIN — THEOPHYLLINE 225 MG: 450 TABLET, EXTENDED RELEASE ORAL at 09:41

## 2024-10-14 RX ADMIN — DICYCLOMINE HYDROCHLORIDE 10 MG: 20 TABLET ORAL at 21:37

## 2024-10-14 RX ADMIN — DILTIAZEM HYDROCHLORIDE 60 MG: 60 TABLET, FILM COATED ORAL at 15:53

## 2024-10-14 RX ADMIN — BUDESONIDE AND FORMOTEROL FUMARATE DIHYDRATE 2 PUFF: 160; 4.5 AEROSOL RESPIRATORY (INHALATION) at 06:25

## 2024-10-14 RX ADMIN — SODIUM CHLORIDE, PRESERVATIVE FREE 10 ML: 5 INJECTION INTRAVENOUS at 21:38

## 2024-10-14 RX ADMIN — LEVOTHYROXINE SODIUM 75 MCG: 0.07 TABLET ORAL at 09:41

## 2024-10-14 RX ADMIN — DICYCLOMINE HYDROCHLORIDE 10 MG: 20 TABLET ORAL at 09:40

## 2024-10-14 RX ADMIN — TORSEMIDE 20 MG: 20 TABLET ORAL at 09:41

## 2024-10-14 ASSESSMENT — COPD QUESTIONNAIRES
GOLD_GRADE: 4
CAT_TOTALSCORE: 26
QUESTION3_CHESTTIGHTNESS: 3
GOLD_GROUP: GROUP E
QUESTION5_HOMEACTIVITIES: 5
TOTAL_EXACERBATIONS_PASTYEAR: 3
QUESTION4_WALKINCLINE: 5
QUESTION7_SLEEPQUALITY: 1
QUESTION1_COUGHFREQUENCY: 5
QUESTION2_CHESTPHLEGM: 1
QUESTION6_LEAVINGHOUSE: 1
QUESTION8_ENERGYLEVEL: 5

## 2024-10-14 ASSESSMENT — PULMONARY FUNCTION TESTS
PIF_VALUE: 60
FEV1 (%PREDICTED): 29
POST BRONCHODILATOR FEV1/FVC: 53

## 2024-10-14 ASSESSMENT — PAIN SCALES - GENERAL: PAINLEVEL_OUTOF10: 0

## 2024-10-14 NOTE — CARE COORDINATION
Chart reviewed and patient discussed in IDR. Plan is home with [facility]. Will need updated HHC orders at [place].

## 2024-10-15 VITALS
HEART RATE: 92 BPM | SYSTOLIC BLOOD PRESSURE: 130 MMHG | OXYGEN SATURATION: 95 % | RESPIRATION RATE: 18 BRPM | WEIGHT: 125.22 LBS | BODY MASS INDEX: 25.24 KG/M2 | HEIGHT: 59 IN | TEMPERATURE: 98.1 F | DIASTOLIC BLOOD PRESSURE: 70 MMHG

## 2024-10-15 LAB
INR PPP: 2.7
PROTHROMBIN TIME: 29 SEC (ref 11.7–14.5)

## 2024-10-15 PROCEDURE — 6370000000 HC RX 637 (ALT 250 FOR IP): Performed by: NURSE PRACTITIONER

## 2024-10-15 PROCEDURE — 6370000000 HC RX 637 (ALT 250 FOR IP): Performed by: STUDENT IN AN ORGANIZED HEALTH CARE EDUCATION/TRAINING PROGRAM

## 2024-10-15 PROCEDURE — 99222 1ST HOSP IP/OBS MODERATE 55: CPT | Performed by: INTERNAL MEDICINE

## 2024-10-15 PROCEDURE — 85610 PROTHROMBIN TIME: CPT

## 2024-10-15 PROCEDURE — 6370000000 HC RX 637 (ALT 250 FOR IP): Performed by: INTERNAL MEDICINE

## 2024-10-15 PROCEDURE — 2580000003 HC RX 258: Performed by: STUDENT IN AN ORGANIZED HEALTH CARE EDUCATION/TRAINING PROGRAM

## 2024-10-15 PROCEDURE — 94640 AIRWAY INHALATION TREATMENT: CPT

## 2024-10-15 PROCEDURE — 2700000000 HC OXYGEN THERAPY PER DAY

## 2024-10-15 PROCEDURE — 94669 MECHANICAL CHEST WALL OSCILL: CPT

## 2024-10-15 PROCEDURE — 94761 N-INVAS EAR/PLS OXIMETRY MLT: CPT

## 2024-10-15 PROCEDURE — 36415 COLL VENOUS BLD VENIPUNCTURE: CPT

## 2024-10-15 RX ORDER — PREDNISONE 20 MG/1
40 TABLET ORAL DAILY
Qty: 6 TABLET | Refills: 0 | Status: SHIPPED | OUTPATIENT
Start: 2024-10-16 | End: 2024-10-19

## 2024-10-15 RX ORDER — LEVOFLOXACIN 750 MG/1
750 TABLET, FILM COATED ORAL EVERY OTHER DAY
Qty: 3 TABLET | Refills: 0 | Status: SHIPPED | OUTPATIENT
Start: 2024-10-17 | End: 2024-10-17

## 2024-10-15 RX ORDER — LEVOFLOXACIN 500 MG/1
500 TABLET, FILM COATED ORAL DAILY
Status: DISCONTINUED | OUTPATIENT
Start: 2024-10-15 | End: 2024-10-15 | Stop reason: DRUGHIGH

## 2024-10-15 RX ORDER — GUAIFENESIN/DEXTROMETHORPHAN 100-10MG/5
5 SYRUP ORAL EVERY 4 HOURS PRN
Qty: 120 ML | Refills: 0 | Status: SHIPPED | OUTPATIENT
Start: 2024-10-15 | End: 2024-10-17

## 2024-10-15 RX ORDER — LEVOFLOXACIN 500 MG/1
750 TABLET, FILM COATED ORAL EVERY OTHER DAY
Status: DISCONTINUED | OUTPATIENT
Start: 2024-10-15 | End: 2024-10-15 | Stop reason: HOSPADM

## 2024-10-15 RX ORDER — BENZONATATE 100 MG/1
100 CAPSULE ORAL 3 TIMES DAILY PRN
Status: DISCONTINUED | OUTPATIENT
Start: 2024-10-15 | End: 2024-10-15 | Stop reason: HOSPADM

## 2024-10-15 RX ORDER — WARFARIN SODIUM 2.5 MG/1
2.5 TABLET ORAL
Status: DISCONTINUED | OUTPATIENT
Start: 2024-10-15 | End: 2024-10-15 | Stop reason: HOSPADM

## 2024-10-15 RX ADMIN — IPRATROPIUM BROMIDE AND ALBUTEROL SULFATE 1 DOSE: 2.5; .5 SOLUTION RESPIRATORY (INHALATION) at 11:20

## 2024-10-15 RX ADMIN — PREDNISONE 40 MG: 20 TABLET ORAL at 09:20

## 2024-10-15 RX ADMIN — DICYCLOMINE HYDROCHLORIDE 10 MG: 20 TABLET ORAL at 09:20

## 2024-10-15 RX ADMIN — DILTIAZEM HYDROCHLORIDE 60 MG: 60 TABLET, FILM COATED ORAL at 05:48

## 2024-10-15 RX ADMIN — TORSEMIDE 20 MG: 20 TABLET ORAL at 09:20

## 2024-10-15 RX ADMIN — SODIUM CHLORIDE, PRESERVATIVE FREE 10 ML: 5 INJECTION INTRAVENOUS at 09:24

## 2024-10-15 RX ADMIN — LEVOTHYROXINE SODIUM 75 MCG: 0.07 TABLET ORAL at 09:20

## 2024-10-15 RX ADMIN — GUAIFENESIN SYRUP AND DEXTROMETHORPHAN 5 ML: 100; 10 SYRUP ORAL at 03:00

## 2024-10-15 RX ADMIN — ROFLUMILAST 500 MCG: 500 TABLET ORAL at 09:20

## 2024-10-15 RX ADMIN — IPRATROPIUM BROMIDE AND ALBUTEROL SULFATE 1 DOSE: 2.5; .5 SOLUTION RESPIRATORY (INHALATION) at 07:28

## 2024-10-15 RX ADMIN — GUAIFENESIN SYRUP AND DEXTROMETHORPHAN 5 ML: 100; 10 SYRUP ORAL at 09:20

## 2024-10-15 RX ADMIN — THEOPHYLLINE 225 MG: 450 TABLET, EXTENDED RELEASE ORAL at 09:20

## 2024-10-15 RX ADMIN — LEVOFLOXACIN 750 MG: 500 TABLET, FILM COATED ORAL at 09:57

## 2024-10-15 RX ADMIN — PANTOPRAZOLE SODIUM 40 MG: 40 TABLET, DELAYED RELEASE ORAL at 05:48

## 2024-10-15 RX ADMIN — BUDESONIDE AND FORMOTEROL FUMARATE DIHYDRATE 2 PUFF: 160; 4.5 AEROSOL RESPIRATORY (INHALATION) at 07:37

## 2024-10-15 NOTE — DISCHARGE INSTR - COC
Acute on chronic respiratory failure with hypercapnia J96.22    Moderate mixed hyperlipidemia not requiring statin therapy E78.2    Atrial fibrillation (HCC) I48.91    Pulmonary emphysema (HCC) J43.9    Age-related osteoporosis without current pathological fracture M81.0    History of stroke Z86.73       Isolation/Infection:   Isolation            No Isolation          Patient Infection Status       Infection Onset Added Last Indicated Last Indicated By Review Planned Expiration Resolved Resolved By    None active    Resolved    Influenza 21 Rapid Flu Swab   22 Infection                        Nurse Assessment:  Last Vital Signs: /70   Pulse 98   Temp 98.1 °F (36.7 °C) (Oral)   Resp 16   Ht 1.504 m (4' 11.21\")   Wt 56.8 kg (125 lb 3.5 oz)   SpO2 95%   BMI 25.11 kg/m²     Last documented pain score (0-10 scale): Pain Level: 0  Last Weight:   Wt Readings from Last 1 Encounters:   10/15/24 56.8 kg (125 lb 3.5 oz)     Mental Status:  {IP PT MENTAL STATUS:}    IV Access:  { [facility]ALA IV ACCESS:659431990}    Nursing Mobility/ADLs:  Walking   {CHP DME ADLs:241146922}  Transfer  {CHP DME ADLs:429985136}  Bathing  {CHP DME ADLs:548682478}  Dressing  {CHP DME ADLs:077167588}  Toileting  {CHP DME ADLs:223817838}  Feeding  {P DME ADLs:937363706}  Med Admin  {P DME ADLs:711056903}  Med Delivery   { LALA MED Delivery:250960566}    Wound Care Documentation and Therapy:        Elimination:  Continence:   Bowel: {YES / NO:}  Bladder: {YES / NO:}  Urinary Catheter: {Urinary Catheter:797333732}   Colostomy/Ileostomy/Ileal Conduit: {YES / NO:}       Date of Last BM: ***    Intake/Output Summary (Last 24 hours) at 10/15/2024 1001  Last data filed at 10/15/2024 0843  Gross per 24 hour   Intake 450 ml   Output 400 ml   Net 50 ml     I/O last 3 completed shifts:  In: 405 [P.O.:405]  Out: 400 [Urine:400]    Safety Concerns:     { LALA Safety

## 2024-10-15 NOTE — CONSULTS
Pulmonary Consult Note      Reason for Consult:     COPD exacerbation eloina pt      Requesting Physician: Altagracia Lew MD    Subjective:   CHIEF COMPLAINT :SOB    Patient Active Problem List    Diagnosis Date Noted    Acute on chronic respiratory failure with hypercapnia 01/24/2023     Priority: Medium    Chronic phlebitis of superficial vein of right lower extremity 09/15/2022     Priority: Medium    Multifocal pneumonia 09/08/2022     Priority: Medium    History of stroke 06/13/2024    Age-related osteoporosis without current pathological fracture 05/23/2024    Pulmonary emphysema (HCC) 12/12/2023    Atrial fibrillation (HCC) 09/19/2023    Moderate mixed hyperlipidemia not requiring statin therapy 06/23/2023    Community acquired pneumonia of right upper lobe of lung 03/19/2022    COPD exacerbation (HCC) 03/19/2022    Acute exacerbation of chronic obstructive pulmonary disease (COPD) (HCC) 03/17/2022    Mild pulmonary hypertension (HCC) 01/24/2022    Pneumonia due to influenza A virus 12/31/2021    Shortness of breath 11/22/2021    Former smoker 11/22/2021    COVID-19 virus detected 01/18/2021    History of DVT of lower extremity 12/04/2020    History of pulmonary embolism 12/04/2020    Gastroesophageal reflux disease     Pure hypercholesterolemia 12/02/2019    Acquired hypothyroidism 08/01/2019    Lung infiltrate on CT 01/22/2019    Chronic hypoxemic respiratory failure 02/06/2018    Leukocytosis 11/15/2017    Pulmonary nodule 07/05/2016    TIA (transient ischemic attack) 06/24/2015    Vertigo, central 06/24/2015     Overview Note:     Possible        Anticoagulant long-term use 06/24/2015    Arthritis 02/19/2014    Coronary atherosclerosis 02/19/2014    COPD, very severe (HCC) 06/28/2013    Essential hypertension 06/28/2013    Phlebitis 06/27/2013        HPI:                The patient is a 79 y.o. female with significant past medical history of COPD on 2L NC, atrial fibrillation, pulm HTN, TIA, essential HTN

## 2024-10-15 NOTE — PROGRESS NOTES
Pharmacy Note - Renal dose adjustment made per P/T protocol    Original order:  Levofloxacin 500 mg daily x 7 days    Estimated Creatinine Clearance: 44 mL/min (based on SCr of 0.8 mg/dL).    No results for input(s): \"BUN\", \"CREATININE\" in the last 72 hours.    Renally adjusted order:  Levofloxacin 750 mg Q48H x 4 doses    Please call pharmacy with any questions.    Thank you,  Ria Hawkins [facility]  10/15/2024 9:29 AM   
  Physician Progress Note      PATIENT:               MANJU ELLIS  CSN #:                  [id number]  :                       1945  ADMIT DATE:       10/11/2024 2:09 PM  DISCH DATE:  RESPONDING  PROVIDER #:        Evelio Fontanez MD          QUERY TEXT:    Pt admitted with COPD exacerbation.  Pt noted to have home oxygen therapy ATC.   If possible, please document in the progress notes and discharge summary if   you are evaluating and/or treating any of the following:    The medical record reflects the following:  Risk Factors: COPD Exacerbation home oxygen therapy A fib  Clinical Indicators: per ED Provider \"history of COPD on 2 L of home oxygen   around-the-clock\" VBG pCO2 65.1, Spo2 97--100% on 2L NC per ED RN note  Treatment: Oxygen therapy, HHN and IV Solumedrol  Options provided:  -- Chronic respiratory failure with hypoxia  -- Chronic respiratory failure with hypercapnia  -- Chronic respiratory failure with hypoxia and hypercapnia  -- Other - I will add my own diagnosis  -- Disagree - Not applicable / Not valid  -- Disagree - Clinically unable to determine / Unknown  -- Refer to Clinical Documentation Reviewer    PROVIDER RESPONSE TEXT:    This patient has chronic respiratory failure with hypoxia.    Query created by: Sameera Keene on 10/12/2024 7:19 PM      Electronically signed by:  Evelio Fontanez MD 10/12/2024 11:03 PM          
  Physician Progress Note      PATIENT:               MANJU ELLIS  CSN #:                  [id number]  :                       1945  ADMIT DATE:       10/11/2024 2:09 PM  DISCH DATE:  RESPONDING  PROVIDER #:        Altagracia Lew MD          QUERY TEXT:    Dear Dr. Lew,  Patient admitted with COPD exac, noted to have atrial fibrillation and is   maintained on Coumadin. If possible, please document in progress notes and   discharge summary if you are evaluating and/or treating any of the following:?  ?  The medical record reflects the following:  Risk Factors: Hx COPD, HTN, TIA, DVT, A-fib  Clinical Indicators: 10/11 ED for SOB, H and P notes A-fib on home diltiazem,   INR 1.4 held Warfarin due to dental procedure and pharmacy consulted for   warfarin dosing  Treatment: Pharmacy dosing Warfarin, monitor INR  Thank you,  Diana Sprague RN, CDS  Options provided:  -- Secondary hypercoagulable state in a patient with atrial fibrillation  -- Other - I will add my own diagnosis  -- Disagree - Not applicable / Not valid  -- Disagree - Clinically unable to determine / Unknown  -- Refer to Clinical Documentation Reviewer    PROVIDER RESPONSE TEXT:    This patient has secondary hypercoagulable state in a patient with atrial   fibrillation.    Query created by: Diana Sanderson on 10/14/2024 6:47 AM      QUERY TEXT:    Dear Dr. Lew,  Pt admitted with COPD exacerbation. Pt noted to have elevated troponin. If   possible, please document in the progress notes and discharge summary if you   are evaluating and/or treating any of the following:    The medical record reflects the following:  Risk Factors: Hx COPD, A-fib, CAD, DVT,  Clinical Indicators: H and P notes Elevated troponin, no chest pain, no acute   ischemic changes on EKG. trop=27, 24, 21  Treatment: monitor troponin, cardiac monitor  Thank you,  Diana Sprague RN, CDS  Options provided:  -- Non-ischemic myocardial injury due to COPD  -- Non-ischemic 
4 Eyes Skin Assessment     NAME:  Janessa Davidson  YOB: 1945  MEDICAL RECORD NUMBER:  [id number]    The patient is being assessed for  Admission    I agree that at least one RN has performed a thorough Head to Toe Skin Assessment on the patient. ALL assessment sites listed below have been assessed.      Areas assessed by both nurses:    Head, Face, Ears, Shoulders, Back, Chest, Arms, Elbows, Hands, Sacrum. Buttock, Coccyx, Ischium, Legs. Feet and Heels, and Under Medical Devices         Does the Patient have a Wound? No noted wound(s)       Gilbert Prevention initiated by RN: No  Wound Care Orders initiated by RN: No    Pressure Injury (Stage 3,4, Unstageable, DTI, NWPT, and Complex wounds) if present, place Wound referral order by RN under : No    New Ostomies, if present place, Ostomy referral order under : No     Nurse 1 eSignature: Electronically signed by Emelyn Cavanaugh RN on 10/11/24 at 7:07 PM EDT    **SHARE this note so that the co-signing nurse can place an eSignature**    Nurse 2 eSignature: Electronically signed by Em Hernandez RN on 10/11/24 at 7:26 PM EDT   
Current GOLD classification     GOLD Stage:    Group:    Recorded domestic exacerbations past 12 months:    Current recorded COPD Assessment Tool (CAT) score of    Current eosinophil count: 0.4     Inhaler Device   Acceptable for Use   Respimat  Not Breath Actuated Yes   MDI  Not Breath Actuated Yes           DPI  Observed PIF   using  In-Check Meter   Optimal PIF   Acceptable for Use   HANDIHALER 60 >30 Y   Pressair 60 >45 Y   NEOHALER 60 >50 Y   Diskus 60 >60 N   ELLIPTA 60 >60 N     Records show this patient was using ALBUTEROL AND BREZTRI maintenance therapy prior to admission.          LONG-ACTING (LABA)   Arformoterol (Brovana) NEBULIZER   Indacaterol (Arcapta) NEOHALER   Olodaterol (Striverdi) Respimat   Salmeterol (Serevent) MDI, DISKUS   LONG-ACTING (LAMA)   Aclidinium bromide (Tudorza) PRESSAIR   Glycopyrronium bromide (Seebri) NEOHALER   Tiotropium (Spiriva) Respimat, HANDIHALER   Umeclidinium (Incruse) ELLIPTA   (LABA/LAMA)   Formoterol/glycopyrronium (Bevespi) MDI   Indacaterol/glycopyrronium (Utibron) NEOHALER   Vilanterol/umeclidinium (Anoro) ELLIPTA   Olodaterol/tiotropium (Stiolto) Respimat   (LABA/ICS)   Formoterol/budesomide (Symbicort) MDI   Formoterol/mometasone (Dulera) MDI   Salmeterol/fluticasone (Advair) MDI, DISKUS   Vilanterol/fluticasone (Breo) ELLIPTA   (LABA/LAMA/ICS)   Fluticasone/umeclidinium/vilanterol (Trelegy) ELLIPTA   Budesonide/glycopyrrolate/formoterol fumarate (Beztri) aerosphere   60   10/14/24 1201   Spirometry Assessment   FEV1 (%PRED) 29   Post Bronchodilator FEV/FVC 53   COPD Exacerbations in last year 3   PIF 60 L/min   COPD Assessment (CAT Score)   Cough Assessment 5   Phlegm Assessment 1   Chest tightness 3   Walking on an incline 5   Home Activities 5   Confident Leaving The Home 1   Sleeping Soundly 1   Have Energy 5   Assessment Score 26   $RT COPD Assessment Yes   GOLD Staging   Gold Grade 4   Group Group E       
Outpatient Pharmacy Progress Note for Meds-to-Beds    Total number of Prescriptions Filled: 3    Additional Documentation:  Patient's family member picked-up the medication(s) in the OP Pharmacy      Thank you for letting us serve your patients.  [address] [address]    Phone: [phone]    Fax: [phone]        
PHARMACY ANTICOAGULATION MONITORING SERVICE    Janessa Davidson is a 79 y.o. female on warfarin therapy for Afib/Aflutter. Pharmacy consulted by Dr. Fontanez for monitoring and adjustment of treatment.    Indication for anticoagulation: Afib/Aflutter  INR goal: 2-3  Warfarin dose prior to admission:   Patient had tooth extraction on 10/08 in which she was holding for 5 days prior (10/03-10/08). Dosing schedule was planned to be a boosted dose of 9 mg on 10/09 with plan to take 6 mg daily for 5 days and then scheudle an INR re-check.  Of note, in chart sounds like patient was potentially taking warfarin 5 mg daily when she was supposed to beholding and still had an INR of 1.0. Provider increased dose to warfarin 5 mg every other day alternating with 7.5 mg every other day    Pertinent Laboratory Values   Recent Labs     10/11/24  1445 10/11/24  2250   INR  --  1.3   HGB 13.4  --    HCT 42.9  --      --      INR MONITORING  Recent Labs     10/11/24  1416 10/11/24  2250 10/12/24  0843   INR 1.4 1.3 1.6       Recent Warfarin doses given this admission...  Date INR Result Warfarin Dose Given   10/11 1.4 7.5 mg ordered   10/11 @ 2300 1.3    10/12 1.6 5mg          Assessment/Plan:  Drug Interactions:   Azithromycin, SoluMedrol -may increase INR  INR 1.3 last night, re-orderd INR for this AM.  Will give warfarin 5 mg for tonight  Pharmacy will continue to monitor and adjust warfarin therapy as indicated      Thank you for the consult.  Kindra Dillon RP, PharmD.  10/12/2024 8:20 AM    
PHARMACY ANTICOAGULATION MONITORING SERVICE    Janessa Davidson is a 79 y.o. female on warfarin therapy for Afib/Aflutter. Pharmacy consulted by Dr. Fontanez for monitoring and adjustment of treatment.    Indication for anticoagulation: Afib/Aflutter  INR goal: 2-3  Warfarin dose prior to admission:   Patient had tooth extraction on 10/08 in which she was holding for 5 days prior (10/03-10/08). Dosing schedule was planned to be a boosted dose of 9 mg on 10/09 with plan to take 6 mg daily for 5 days and then scheudle an INR re-check.  Of note, in chart sounds like patient was potentially taking warfarin 5 mg daily when she was supposed to beholding and still had an INR of 1.0. Provider increased dose to warfarin 5 mg every other day alternating with 7.5 mg every other day    Pertinent Laboratory Values   Recent Labs     10/11/24  1445 10/11/24  2250 10/13/24  0627   INR  --    < > 2.5   HGB 13.4  --   --    HCT 42.9  --   --      --   --     < > = values in this interval not displayed.     INR MONITORING  Recent Labs     10/11/24  1416 10/11/24  2250 10/12/24  0843 10/13/24  0627   INR 1.4 1.3 1.6 2.5       Recent Warfarin doses given this admission...  Date INR Result Warfarin Dose Given   10/11 1.4 7.5 mg ordered   10/11 @ 2300 1.3    10/12 1.6 5mg   10/13 2.5      Assessment/Plan:  Drug Interactions:   Azithromycin- inhibits warfarin metabolism, interaction requiring dose reduction  SoluMedrol -may increase INR  INR increased 1.6 to 2.5  Will give reduced dose of 2mg today. Concern that INR may continue to increase. Watch closely.  Pharmacy will continue to monitor and adjust warfarin therapy as indicated      Thank you for the consult.  Kindra Dillon RPH, PharmD.  10/13/2024 7:56 AM    
PHARMACY ANTICOAGULATION MONITORING SERVICE    Janessa Davidson is a 79 y.o. female on warfarin therapy for Afib/Aflutter. Pharmacy consulted by Dr. Fontanez for monitoring and adjustment of treatment.    Indication for anticoagulation: Afib/Aflutter  INR goal: 2-3  Warfarin dose prior to admission:   Patient had tooth extraction on 10/08 in which she was holding for 5 days prior (10/03-10/08). Dosing schedule was planned to be a boosted dose of 9 mg on 10/09 with plan to take 6 mg daily for 5 days and then scheudle an INR re-check.  Of note, in chart sounds like patient was potentially taking warfarin 5 mg daily when she was supposed to beholding and still had an INR of 1.0. Provider increased dose to warfarin 5 mg every other day alternating with 7.5 mg every other day    Pertinent Laboratory Values   Recent Labs     10/11/24  1445 10/11/24  2250 10/14/24  0721   INR  --    < > 3.1   HGB 13.4  --   --    HCT 42.9  --   --      --   --     < > = values in this interval not displayed.     INR MONITORING  Recent Labs     10/11/24  1416 10/11/24  2250 10/12/24  0843 10/13/24  0627 10/14/24  0721   INR 1.4 1.3 1.6 2.5 3.1       Recent Warfarin doses given this admission...  Date INR Result Warfarin Dose Given   10/11 1.4 7.5 mg ordered   10/12 1.6 5mg   10/13 2.5 2mg   10/14 3.1 no dose to be given     Assessment/Plan:  Drug Interactions:   Pt previously on 3 days of azithromycin- inhibits warfarin metabolism, and may increase warfarin effects.  SoluMedrol, prednisone -may increase INR  INR continues to trend upward to 3.1 today (supra-therapeutic).  Smaller warfarin 2mg dose given last night.  HGB previously normal.   Hold warfarin dose tonight and follow INR trends tomorrow.  Pharmacy will continue to monitor and adjust warfarin therapy as indicated      Thank you for the consult.  Trey Cody [facility]  10/14/2024 1:01 PM    
PHARMACY ANTICOAGULATION MONITORING SERVICE    Janessa Davidson is a 79 y.o. female on warfarin therapy for Afib/Aflutter. Pharmacy consulted by Dr. Fontanez for monitoring and adjustment of treatment.    Indication for anticoagulation: Afib/Aflutter  INR goal: 2-3  Warfarin dose prior to admission:   Patient had tooth extraction on 10/08 in which she was holding for 5 days prior (10/03-10/08). Dosing schedule was planned to be a boosted dose of 9 mg on 10/09 with plan to take 6 mg daily for 5 days and then scheudle an INR re-check.  Of note, in chart sounds like patient was potentially taking warfarin 5 mg daily when she was supposed to beholding and still had an INR of 1.0. Provider increased dose to warfarin 5 mg every other day alternating with 7.5 mg every other day    Pertinent Laboratory Values   Recent Labs     10/15/24  0140   INR 2.7     No results for input(s): \"HGB\", \"HCT\", \"PLT\" in the last 72 hours.    Recent Warfarin doses given this admission...  Date INR Result Warfarin Dose Given   10/11 1.4 7.5 mg ordered   10/12 1.6 5mg   10/13 2.5 2mg   10/14 3.1 HELD   10/15 2.7 2.5 mg          Assessment/Plan:  Drug Interactions:   Pt previously on 3 days of azithromycin which may increase INR. Now on levofloxacin -may also increase INR  SoluMedrol, prednisone -may increase INR  INR of 2.7 is therapeutic for goal, after holding dose last night given prior INR of 3.1  Ordered repeat CBC. INR order placed.  Will give reduced dose (based on home dose) of warfarin 2.5 mg for tonight  Pharmacy will continue to monitor and adjust warfarin therapy as indicated      Thank you for the consult.  Magnolia Sellers RPH, PharmD.  10/15/2024 10:53 AM      
This nurse called respiratory again and spoke to Faisal who said he would bring down an acapella.  
This nurse called respiratory to have an Acapella delivered per Dr. Fontanez request. Respiratory stated they would bring it to patient.  
   Protime 17.1 (H) 11.7 - 14.5 sec    INR 1.4    EKG 12 Lead    Collection Time: 10/11/24  2:24 PM   Result Value Ref Range    Ventricular Rate 80 BPM    Atrial Rate 84 BPM    QRS Duration 146 ms    Q-T Interval 412 ms    QTc Calculation (Bazett) 475 ms    R Axis -53 degrees    T Axis 60 degrees    Diagnosis       Undetermined rhythm  Right bundle branch block  Left anterior fascicular block   Bifascicular block   Left ventricular hypertrophy with repolarization abnormality  Cannot rule out Septal infarct (cited on or before 07-OCT-2022)  When compared with ECG of 23-APR-2024 10:08,  Current undetermined rhythm precludes rhythm comparison, needs review  (RBBB and left anterior fascicular block) is now present  Questionable change in initial forces of Septal leads  Confirmed by Arpan Frankel ([id number]) on 10/11/2024 3:34:35 PM     Blood Gas, Venous    Collection Time: 10/11/24  2:25 PM   Result Value Ref Range    pH, Mychal 7.355 7.320 - 7.430    pCO2, Mychal 65.1 (H) 38 - 54 mm Hg    PO2, Mychal 32.1 23 - 48 mm Hg    HCO3, Venous 35.5 (H) 22 - 29 mmol/L    Positive Base Excess, Mychal 7.5 (H) 0 - 3 mmol/L    Oxyhemoglobin 56.8 %    Carboxyhemoglobin 1.0 0.5 - 1.5 %    Methemoglobin 0.3 (L) 0.5 - 1.5 %    Pt Temp 37.0     O2 Device/Flow/% HEATED HIGH FLOW     Allan Test NO     Text for Respiratory Called to PROVIDER on 10/11/2024 at 14:26    CBC with Auto Differential    Collection Time: 10/11/24  2:45 PM   Result Value Ref Range    WBC 13.5 (H) 4.0 - 10.5 k/uL    RBC 4.40 4.20 - 5.40 m/uL    Hemoglobin 13.4 12.5 - 16.0 g/dL    Hematocrit 42.9 37.0 - 47.0 %    MCV 97.5 78.0 - 100.0 fL    MCH 30.5 27.0 - 31.0 pg    MCHC 31.2 (L) 32.0 - 36.0 g/dL    RDW 14.4 11.7 - 14.9 %    Platelets 224 140 - 440 k/uL    MPV 10.4 7.5 - 11.1 fL    Neutrophils % 79 (H) 36 - 66 %    Lymphocytes % 13 (L) 24 - 44 %    Monocytes % 7 (H) 0 - 4 %    Eosinophils % 0 0 - 3 %    Basophils % 0 0 - 1 %    Immature Granulocytes % 0 0 %    Neutrophils Absolute 
%    Basophils % 0 0 - 1 %    Immature Granulocytes % 0 0 %    Neutrophils Absolute 10.68 k/uL    Lymphocytes Absolute 1.79 k/uL    Monocytes Absolute 0.92 k/uL    Eosinophils Absolute 0.02 k/uL    Basophils Absolute 0.04 k/uL    Immature Granulocytes Absolute 0.05 k/uL   Comprehensive Metabolic Panel w/ Reflex to MG    Collection Time: 10/11/24  2:45 PM   Result Value Ref Range    Sodium 143 136 - 145 mmol/L    Potassium 3.7 3.5 - 5.1 mmol/L    Chloride 96 (L) 99 - 110 mmol/L    CO2 37 (H) 21 - 32 mmol/L    Anion Gap 10 9 - 17 mmol/L    Glucose 107 (H) 74 - 99 mg/dL    BUN 20 7 - 20 mg/dL    Creatinine 0.8 0.6 - 1.2 mg/dL    Est, Glom Filt Rate 66 >60 mL/min/1.73m2    Calcium 9.5 8.3 - 10.6 mg/dL    Total Protein 5.7 (L) 6.4 - 8.2 g/dL    Albumin 3.8 3.4 - 5.0 g/dL    Albumin/Globulin Ratio 2.0 1.1 - 2.2    Total Bilirubin 0.3 0.0 - 1.0 mg/dL    Alkaline Phosphatase 72 40 - 129 U/L    ALT 23 10 - 40 U/L    AST 34 15 - 37 U/L   Troponin    Collection Time: 10/11/24  2:45 PM   Result Value Ref Range    Troponin, High Sensitivity 27 (H) 0 - 14 ng/L   Brain Natriuretic Peptide    Collection Time: 10/11/24  2:45 PM   Result Value Ref Range    NT Pro- (H) 0 - 450 pg/mL   Troponin    Collection Time: 10/11/24  3:52 PM   Result Value Ref Range    Troponin, High Sensitivity 24 (H) 0 - 14 ng/L   COVID-19, Rapid    Collection Time: 10/11/24  4:15 PM    Specimen: Nasopharyngeal Swab   Result Value Ref Range    Specimen Description .NASOPHARYNGEAL SWAB     SARS-CoV-2, Rapid Not Detected Not Detected   Influenza A and B    Collection Time: 10/11/24  4:15 PM    Specimen: Nasal   Result Value Ref Range    Influenza A by PCR Not Detected Not Detected    Influenza B by PCR Not Detected Not Detected   Troponin    Collection Time: 10/11/24  7:39 PM   Result Value Ref Range    Troponin, High Sensitivity 21 (H) 0 - 14 ng/L   Blood Gas, Venous    Collection Time: 10/11/24  8:02 PM   Result Value Ref Range    pH, Mychal 7.397 7.320 - 
mm Hg    HCO3, Venous 31.3 (H) 22 - 29 mmol/L    Positive Base Excess, Mychal 5.0 (H) 0 - 3 mmol/L    Oxyhemoglobin 80.0 %    Carboxyhemoglobin 0.6 0.5 - 1.5 %    Methemoglobin 0.3 (L) 0.5 - 1.5 %    Pt Temp 37.0     Allan Test NOT APPLICABLE     Sample Site Left Brachial Artery    Protime-INR    Collection Time: 10/11/24 10:50 PM   Result Value Ref Range    Protime 16.5 (H) 11.7 - 14.5 sec    INR 1.3    Protime-INR    Collection Time: 10/12/24  8:43 AM   Result Value Ref Range    Protime 19.5 (H) 11.7 - 14.5 sec    INR 1.6         Imaging/Diagnostics Last 24 Hours   XR CHEST PORTABLE    Result Date: 10/11/2024  Chest X-ray INDICATION:  SOB COMPARISON: Chest x-ray 4/23/2024. TECHNIQUE: AP/PA view of the chest was obtained.     Stable right hilar prominence. Bilateral hilar and mediastinal calcifications are seen, along with few bilateral lung dense small calcifications, consistent with old granulomatous disease. Lungs are hyperinflated with increased interstitial markings, consistent with chronic lung disease. No evidence of pulmonary edema. No focal infiltrate is seen. No pleural effusion or pneumothorax is noted. The cardiomediastinal silhouette is within the normal range. Generalized osteopenia is also noted. Electronically signed by El Dunham MD      Electronically signed by Evelio Fontanez MD on 10/13/2024 at 10:31 AM

## 2024-10-15 NOTE — PLAN OF CARE
Problem: Discharge Planning  Goal: Discharge to home or other facility with appropriate resources  10/15/2024 0511 by Ashley Titus RN  Outcome: Progressing  10/14/2024 1732 by Letha Pastrana LPN  Outcome: Progressing     Problem: Safety - Adult  Goal: Free from fall injury  10/15/2024 0511 by Ashley Titus RN  Outcome: Progressing  10/14/2024 1732 by Letha Pastrana LPN  Outcome: Progressing     Problem: ABCDS Injury Assessment  Goal: Absence of physical injury  10/15/2024 0511 by Ashley Titus RN  Outcome: Progressing  10/14/2024 1732 by Letha Pastrana LPN  Outcome: Progressing     Problem: Pain  Goal: Verbalizes/displays adequate comfort level or baseline comfort level  10/15/2024 0511 by Ashley Titus RN  Outcome: Progressing  10/14/2024 1732 by Letha Pastrana LPN  Outcome: Progressing     Problem: Respiratory - Adult  Goal: Achieves optimal ventilation and oxygenation  10/15/2024 0511 by Ashley Titus RN  Outcome: Progressing  10/14/2024 1732 by Letha Pastrana LPN  Outcome: Progressing     Problem: Cardiovascular - Adult  Goal: Maintains optimal cardiac output and hemodynamic stability  10/15/2024 0511 by Ashley Titus RN  Outcome: Progressing  10/14/2024 1732 by Letha Pastrana LPN  Outcome: Progressing     Problem: Skin/Tissue Integrity - Adult  Goal: Skin integrity remains intact  10/15/2024 0511 by Ashley Titus RN  Outcome: Progressing  10/14/2024 1732 by Letha Pastrana LPN  Outcome: Progressing  Goal: Incisions, wounds, or drain sites healing without S/S of infection  10/15/2024 0511 by Ashley Titus RN  Outcome: Progressing  10/14/2024 1732 by Letha Pastrana LPN  Outcome: Progressing     Problem: Musculoskeletal - Adult  Goal: Return mobility to safest level of function  10/15/2024 0511 by Ashley Titus RN  Outcome: Progressing  10/14/2024 1732 by Letha Pastrana LPN  Outcome: Progressing  Goal: Return ADL status to a safe 
  Problem: Discharge Planning  Goal: Discharge to home or other facility with appropriate resources  Outcome: Progressing     Problem: Safety - Adult  Goal: Free from fall injury  Outcome: Progressing     Problem: ABCDS Injury Assessment  Goal: Absence of physical injury  Outcome: Progressing     
  Problem: Discharge Planning  Goal: Discharge to home or other facility with appropriate resources  Outcome: Progressing     Problem: Safety - Adult  Goal: Free from fall injury  Outcome: Progressing     Problem: ABCDS Injury Assessment  Goal: Absence of physical injury  Outcome: Progressing     
Shirley Hicks, RN  Outcome: Progressing  10/15/2024 0511 by Ashley Ttius, RN  Outcome: Progressing

## 2024-10-15 NOTE — DISCHARGE SUMMARY
V2.0  Discharge Summary    Name:  Janessa Davidson /Age/Sex: 1945 (79 y.o. female)   Admit Date: 10/11/2024  Discharge Date: 10/15/24    MRN & CSN:  [id number] & [id number] Encounter Date and Time 10/15/24 12:06 PM EDT    Attending:  Altagracia Lew MD Discharging Provider: Altagracia Lew MD       Hospital Course:     Brief HPI: Janessa Davidson is a 79 y.o. female with PMH of COPD on 2L NC, atrial fibrillation, pulm HTN, TIA, essential HTN who presents with shortness of breathing worse on exertion. Pt also endorses worsening cough not able to bring up sputum. Pt reports sore throat but no URI symptoms. Pt no longer smokes and she is compliant with her medications.  Denies sick contact.  Patient states she stopped taking warfarin few days ago as she had tooth extraction procedure.     Brief Problem Based Course:   COPD exacerbation  Symptoms of shortness of breath worse on exertion; same oxygen requirement, productive cough  Chest x-ray no sign of pneumonia no fever has mild leukocytosis 13.5  -DuoNebs  -discharge on prednisone po 40 mg Daily along with levaquin        Atrial fibrillation.   On home Diltiazem, warfarin  Resume on discharge     Hx of DVT.   On Warfarin as above     Elevated troponin. 27 -> 21  No chest pain. No acute ischemic changes on ECG       Hypothyroidism. On home synthroid 75 mcg. Last TSH 2.33 2024. Continue home meds      The patient expressed appropriate understanding of, and agreement with the discharge recommendations, medications, and plan.     Consults this admission:  IP CONSULT TO PHARMACY  IP CONSULT TO PULMONOLOGY    Discharge Diagnosis:   COPD exacerbation (HCC)      Discharge Instruction:   Follow up appointments:   Primary care physician: Jes Sharma DO within 2 weeks  Diet: regular diet   Activity: activity as tolerated  Disposition: Discharged to:   [x]Home, []HHC, []SNF, []Acute Rehab, []Hospice   Condition on discharge: Stable  Labs and Tests to be Followed up as

## 2024-10-16 ENCOUNTER — CARE COORDINATION (OUTPATIENT)
Dept: CASE MANAGEMENT | Age: 79
End: 2024-10-16

## 2024-10-16 DIAGNOSIS — J44.1 COPD EXACERBATION (HCC): Primary | ICD-10-CM

## 2024-10-16 PROCEDURE — 1111F DSCHRG MED/CURRENT MED MERGE: CPT | Performed by: STUDENT IN AN ORGANIZED HEALTH CARE EDUCATION/TRAINING PROGRAM

## 2024-10-16 NOTE — CARE COORDINATION
Care Transitions Note    Initial Call - Call within 2 business days of discharge: Yes    Patient Current Location:  Home: [address] [place] [address]    Care Transition Nurse contacted the patient by telephone to perform post hospital discharge assessment, verified name and  as identifiers. Provided introduction to self, and explanation of the Care Transition Nurse role.     Patient: Janessa Davidson    Patient : 1945   MRN: [id number]    Reason for Admission: DX: COPD exacerbation  [facility]: 10/11-10/15/24  Discharge Date: 10/15/24  RURS: Readmission Risk Score: 13.3      Last Discharge Facility       Date Complaint Diagnosis Description Type Department Provider    10/11/24 Shortness of Breath Acute exacerbation of chronic obstructive pulmonary disease (COPD) (HCC) ... ED to Hosp-Admission (Discharged) (ADMITTED) [facility] Altagracia Lew MD; Marcy Cody...            Was this an external facility discharge? No    Additional needs identified to be addressed with provider   No needs identified             Method of communication with provider: none.    Patients top risk factors for readmission:  see above    Interventions to address risk factors:   Education: see below  Review of patient management of conditions/medications: see below  [facility]: [facility] [phone]     Care Summary Note: Pt polite and engaged during call. Pt lives with  in private residence. Pt reports she wears 2L NC 02 at baseline. Report SOB remains but has slightly improved. Denies CP, palpitations or increased dizziness. S/S of pneumonia reviewed, Pt instructed to call MD w/ fever, increased SOB, increased cough /yellow/green sputum. Pt states she uses no AD to assist w/ ambulation throughout home , however has both a walker and a cane in  the home if needed. Reports appetite comes and goes, denies N/V/D and reports B/B are at baseline.Pt educated on foods/meal  that take less energy to eat or prepare ,pt VU. S/S of

## 2024-10-17 ENCOUNTER — OFFICE VISIT (OUTPATIENT)
Dept: FAMILY MEDICINE CLINIC | Age: 79
End: 2024-10-17
Payer: MEDICARE

## 2024-10-17 DIAGNOSIS — J44.1 COPD WITH ACUTE EXACERBATION (HCC): Primary | ICD-10-CM

## 2024-10-17 DIAGNOSIS — Z79.01 ANTICOAGULANT LONG-TERM USE: ICD-10-CM

## 2024-10-17 LAB
INTERNATIONAL NORMALIZATION RATIO, POC: 2.3
PROTHROMBIN TIME, POC: NORMAL

## 2024-10-17 PROCEDURE — 1123F ACP DISCUSS/DSCN MKR DOCD: CPT | Performed by: PHYSICIAN ASSISTANT

## 2024-10-17 PROCEDURE — 85610 PROTHROMBIN TIME: CPT | Performed by: PHYSICIAN ASSISTANT

## 2024-10-17 PROCEDURE — 3074F SYST BP LT 130 MM HG: CPT | Performed by: PHYSICIAN ASSISTANT

## 2024-10-17 PROCEDURE — G8427 DOCREV CUR MEDS BY ELIG CLIN: HCPCS | Performed by: PHYSICIAN ASSISTANT

## 2024-10-17 PROCEDURE — 1111F DSCHRG MED/CURRENT MED MERGE: CPT | Performed by: PHYSICIAN ASSISTANT

## 2024-10-17 PROCEDURE — 99215 OFFICE O/P EST HI 40 MIN: CPT | Performed by: PHYSICIAN ASSISTANT

## 2024-10-17 PROCEDURE — 1090F PRES/ABSN URINE INCON ASSESS: CPT | Performed by: PHYSICIAN ASSISTANT

## 2024-10-17 PROCEDURE — G8419 CALC BMI OUT NRM PARAM NOF/U: HCPCS | Performed by: PHYSICIAN ASSISTANT

## 2024-10-17 PROCEDURE — 3023F SPIROM DOC REV: CPT | Performed by: PHYSICIAN ASSISTANT

## 2024-10-17 PROCEDURE — 1036F TOBACCO NON-USER: CPT | Performed by: PHYSICIAN ASSISTANT

## 2024-10-17 PROCEDURE — G8484 FLU IMMUNIZE NO ADMIN: HCPCS | Performed by: PHYSICIAN ASSISTANT

## 2024-10-17 PROCEDURE — G8399 PT W/DXA RESULTS DOCUMENT: HCPCS | Performed by: PHYSICIAN ASSISTANT

## 2024-10-17 PROCEDURE — 3078F DIAST BP <80 MM HG: CPT | Performed by: PHYSICIAN ASSISTANT

## 2024-10-17 RX ORDER — GUAIFENESIN/DEXTROMETHORPHAN 100-10MG/5
5 SYRUP ORAL 3 TIMES DAILY PRN
Qty: 473 ML | Refills: 0 | Status: SHIPPED | OUTPATIENT
Start: 2024-10-17

## 2024-10-17 RX ORDER — LEVOFLOXACIN 500 MG/1
500 TABLET, FILM COATED ORAL DAILY
Qty: 5 TABLET | Refills: 0 | Status: SHIPPED | OUTPATIENT
Start: 2024-10-17 | End: 2024-10-22

## 2024-10-17 RX ORDER — BENZONATATE 200 MG/1
200 CAPSULE ORAL 3 TIMES DAILY PRN
Qty: 30 CAPSULE | Refills: 2 | Status: SHIPPED | OUTPATIENT
Start: 2024-10-17

## 2024-10-17 RX ORDER — METHYLPREDNISOLONE 4 MG/1
TABLET ORAL
Qty: 1 KIT | Refills: 0 | Status: SHIPPED | OUTPATIENT
Start: 2024-10-17

## 2024-10-17 NOTE — PROGRESS NOTES
(BENTYL) 10 MG capsule Take 1 capsule by mouth in the morning and at bedtime      Budeson-Glycopyrrol-Formoterol (BREZTRI AEROSPHERE) 160-9-4.8 MCG/ACT AERO Inhale 2 sprays into the lungs in the morning and 2 sprays in the evening. 1 each 11    torsemide (DEMADEX) 20 MG tablet Take 1 tablet by mouth nightly      dilTIAZem (CARDIZEM) 60 MG tablet Take 1 tablet by mouth every 8 hours 120 tablet 3    OXYGEN Inhale 2 L/hr into the lungs continuous.      warfarin (COUMADIN) 6 MG tablet Take 1 tablet by mouth nightly 11/21/23 Patient reports she takes 3 mg on Monday and 6 mg the rest of the week, takes at HS (Patient taking differently: Take by mouth nightly 04/23/24 Patient reports taking 3 mg Mon/Wed/Fri/Sat, 6 mg the rest of the week, takes at HS) 90 tablet 1     No current facility-administered medications for this visit.       ALLERGIES  Allergies   Allergen Reactions    Codeine Swelling    Darvon [Propoxyphene Hcl] Swelling    Lamisil [Terbinafine Hcl]     Lisinopril Other (See Comments)     Pt doesn't remember reaction    Morphine Swelling    Neosporin [Neomycin-Polymyxin-Gramicidin]     Pcn [Penicillins] Swelling       PHYSICAL EXAM    /72 (Site: Left Lower Arm, Position: Sitting, Cuff Size: Medium Adult)   Pulse 93   Resp 16   Ht 1.499 m (4' 11\")   SpO2 91%   BMI 25.29 kg/m²     Constitutional:  Well developed, well nourished.  No acute distress.  HENT:  Normocephalic, atraumatic  Eyes:  conjunctiva normal, no discharge, no scleral icterus  Lymphatic:  No lymphadenopathy noted  Cardiovascular:  Normal heart rate, normal rhythm, no murmurs, gallops or rubs  Thorax & Lungs: No respiratory distress.  Diminished lung sounds in all fields.  Currently using oxygen via nasal cannula.  No appreciable wheezing, rales, rhonchi  Abdomen:  Soft, no tenderness to palpation.  No palpable mass or organomegaly.  No distention.  Skin:  Warm, dry, no erythema, no rash  Extremities:  No edema, no tenderness, no

## 2024-10-18 ENCOUNTER — CARE COORDINATION (OUTPATIENT)
Dept: CASE MANAGEMENT | Age: 79
End: 2024-10-18

## 2024-10-18 NOTE — CARE COORDINATION
Care Transitions Note    Follow Up Call     DX: COPD exacerbation     Patient Current Location:  Home: [address] [place] [address] Care Coordinator contacted the patient by telephone. Verified name and  as identifiers.    Additional needs identified to be addressed with provider   No needs identified                 Method of communication with provider: none.    Care Summary Note: Spoke with Janessa Davidson who reported that she is doing good. Patient seen at PCP office yesterday and was started on several new medications. Benzonatate, levofloxacin, methylprednisolone and Robitussin and patient stated that she was able to get all ordered medications and taking them as ordered. Patient report that appetite and fluid intake is good and denied any problems with bowel or bladder(dialysis). Patient denied any other needs at this time. Patient instructed to continue to monitor s/s, reporting any that may present to MD immediately for early intervention.  Patient is agreeable to f/u calls.    Plan of care updates since last contact:  New medications       Advance Care Planning:   Does patient have an Advance Directive: patient declined education on a previous call    Medication Review:  Medications changed since last call, reviewed today.     Remote Patient Monitoring:  Offered patient enrollment in the Remote Patient Monitoring (RPM) program for in-home monitoring: Yes, but did not enroll at this time: controlled chronic disease management and already monitoring with home equipment.    Assessments:  Care Transitions Subsequent and Final Call    Subsequent and Final Calls  Do you have any ongoing symptoms?: No  Have your medications changed?: No  Do you have any questions related to your medications?: No  Do you currently have any active services?: No  Are you currently active with any services?: Home Health  Do you have any needs or concerns that I can assist you with?: No  Care Transitions

## 2024-10-21 VITALS
HEIGHT: 59 IN | SYSTOLIC BLOOD PRESSURE: 110 MMHG | OXYGEN SATURATION: 91 % | HEART RATE: 93 BPM | BODY MASS INDEX: 25.29 KG/M2 | RESPIRATION RATE: 16 BRPM | DIASTOLIC BLOOD PRESSURE: 72 MMHG

## 2024-10-24 ENCOUNTER — TELEPHONE (OUTPATIENT)
Dept: FAMILY MEDICINE CLINIC | Age: 79
End: 2024-10-24

## 2024-10-24 NOTE — TELEPHONE ENCOUNTER
6mg Every M,W,F   3mg Every Tues,Thurs,Sat, Sun    HOWEVER, We are going to do a loading dose 6mg tonight   '  Recheck INR in 2 weeks.

## 2024-10-24 NOTE — TELEPHONE ENCOUNTER
We just had checked in office was therapeutic. Also confirm she is on 3mg daily believe we have it differently here in office

## 2024-10-24 NOTE — TELEPHONE ENCOUNTER
To Dr. Sharma-    Reporting:    INR   1.5    Coumadin   3 mg daily    Shira is with patient right now.    Please leave detailed instructions for patient on Shira's phone:  [phone]

## 2024-10-25 ENCOUNTER — CARE COORDINATION (OUTPATIENT)
Dept: CASE MANAGEMENT | Age: 79
End: 2024-10-25

## 2024-10-25 NOTE — CARE COORDINATION
Future Appointments         Provider Specialty Dept Phone    11/15/2024 10:30 AM El Judge MD Pulmonology [phone]    12/13/2024 10:00 AM INFUSION ROOM CHAIR 1 - [facility] Infusion Therapy [phone]            LPN Care Coordinator provided contact information.  Plan for follow-up call in 6-10 days based on severity of symptoms and risk factors.  Plan for next call: symptom management-cough  self management-       Lindsye Leary LPN

## 2024-11-01 ENCOUNTER — CARE COORDINATION (OUTPATIENT)
Dept: CASE MANAGEMENT | Age: 79
End: 2024-11-01

## 2024-11-01 NOTE — CARE COORDINATION
Care Transitions Note    Follow Up Call     Attempted to reach patient for transitions of care follow up.  Unable to reach patient.      Outreach Attempts:   HIPAA compliant voicemail left for patient.     Follow Up Appointment:   Future Appointments         Provider Specialty Dept Phone    11/15/2024 10:30 AM El Judge MD Pulmonology [phone]    12/13/2024 10:00 AM INFUSION ROOM CHAIR 1 - [facility] Infusion Therapy [phone]            Plan for follow-up call in 2-5 days based on severity of symptoms and risk factors. Plan for next call: symptom management-cough  self management-     Meghana Ayoub LPN

## 2024-11-02 DIAGNOSIS — J44.1 COPD WITH ACUTE EXACERBATION (HCC): ICD-10-CM

## 2024-11-05 RX ORDER — GUAIFENESIN/DEXTROMETHORPHAN 100-10MG/5
5 SYRUP ORAL 3 TIMES DAILY PRN
Qty: 473 ML | Refills: 0 | Status: SHIPPED | OUTPATIENT
Start: 2024-11-05

## 2024-11-07 ENCOUNTER — TELEPHONE (OUTPATIENT)
Dept: FAMILY MEDICINE CLINIC | Age: 79
End: 2024-11-07

## 2024-11-07 ENCOUNTER — CARE COORDINATION (OUTPATIENT)
Dept: CASE MANAGEMENT | Age: 79
End: 2024-11-07

## 2024-11-07 NOTE — TELEPHONE ENCOUNTER
Home Care Reporting-    NR 4.6      Coumadin:    alternating 3 mg and 6 mg daily      ----------------------------------------------  Talked with Shashank----Shira's phone # is verified.    Please call her right back to let her know what you want her to do.

## 2024-11-07 NOTE — CARE COORDINATION
Care Transitions Note    Follow Up Call     Patient Current Location:  Home: [address] [place], [place] [id number]    Care Transition Nurse contacted the patient by telephone. Verified name and  as identifiers.    Additional needs identified to be addressed with provider   No needs identified                 Method of communication with provider: none.    Care Summary Note:Pt polite and engaged.reports she is resting in chair during call.and feels about \"normal\" Denies increase wheezing/ SOB or increase cough /sputum production. No reports of CP or palpitations.No reports of N/V/D , reports appetite , B/B are at baseline. Remains active with [facility] . Denies med changes since last call Pt agreeable to additional calls    Plan of care updates since last contact:  Review of patient management of conditions/medications: see above     .    Medication Review:  No changes since last call.     Remote Patient Monitoring:  Offered patient enrollment in the Remote Patient Monitoring (RPM) program for in-home monitoring: Yes, but did not enroll at this time: already monitoring with home equipment.    Assessments:  Care Transitions Subsequent and Final Call    Subsequent and Final Calls  Do you have any ongoing symptoms?: No  Have your medications changed?: No  Do you have any questions related to your medications?: No  Are you currently active with any services?: Home Health  Identified Barriers: None  Care Transitions Interventions  No Identified Needs   Home Care Waiver: Declined        Transportation Support: Declined    Meals on Wheels: Declined  DME Assistance: Declined     Senior Services: Declined     Medication Assistance Program: Declined       Social Work: Declined    Other Interventions:              Follow Up Appointment:   Reviewed upcoming appointment(s).  Future Appointments         Provider Specialty Dept Phone    11/15/2024 10:30 AM El Judge MD Pulmonology [phone]    2024 10:00 AM INFUSION

## 2024-11-12 RX ORDER — ONDANSETRON 2 MG/ML
8 INJECTION INTRAMUSCULAR; INTRAVENOUS
OUTPATIENT
Start: 2024-12-13

## 2024-11-12 RX ORDER — ALBUTEROL SULFATE 90 UG/1
4 INHALANT RESPIRATORY (INHALATION) PRN
OUTPATIENT
Start: 2024-12-13

## 2024-11-12 RX ORDER — ACETAMINOPHEN 325 MG/1
650 TABLET ORAL
OUTPATIENT
Start: 2024-12-13

## 2024-11-12 RX ORDER — SODIUM CHLORIDE 9 MG/ML
INJECTION, SOLUTION INTRAVENOUS CONTINUOUS
OUTPATIENT
Start: 2024-12-13

## 2024-11-12 RX ORDER — FAMOTIDINE 10 MG/ML
20 INJECTION, SOLUTION INTRAVENOUS
OUTPATIENT
Start: 2024-12-13

## 2024-11-12 RX ORDER — HYDROCORTISONE SODIUM SUCCINATE 100 MG/2ML
100 INJECTION INTRAMUSCULAR; INTRAVENOUS
OUTPATIENT
Start: 2024-12-13

## 2024-11-12 RX ORDER — DIPHENHYDRAMINE HYDROCHLORIDE 50 MG/ML
50 INJECTION INTRAMUSCULAR; INTRAVENOUS
OUTPATIENT
Start: 2024-12-13

## 2024-11-12 RX ORDER — EPINEPHRINE 1 MG/ML
0.3 INJECTION, SOLUTION INTRAMUSCULAR; SUBCUTANEOUS PRN
OUTPATIENT
Start: 2024-12-13

## 2024-11-13 ENCOUNTER — TELEPHONE (OUTPATIENT)
Dept: PHARMACY | Age: 79
End: 2024-11-13

## 2024-11-13 NOTE — TELEPHONE ENCOUNTER
Received call from Taylor with [facility] that patient INR 1.4. Patient held 2 doses then took 6mg Sunday and Thursday with 3 mg rest of days.     Patient not managed by this clinic.     Returned call and left message for Taylor that patient is managed by Dr. Sharma and she will need to call his office at [phone]. Requested call back to confirm she received message.     For Pharmacy Admin Tracking Only    Intervention Detail:   Total # of Interventions Recommended:   Total # of Interventions Accepted:   Time Spent (min): 5

## 2024-11-14 ENCOUNTER — TELEPHONE (OUTPATIENT)
Dept: FAMILY MEDICINE CLINIC | Age: 79
End: 2024-11-14

## 2024-11-14 ENCOUNTER — CARE COORDINATION (OUTPATIENT)
Dept: CASE MANAGEMENT | Age: 79
End: 2024-11-14

## 2024-11-14 NOTE — CARE COORDINATION
Care Transitions Note    Follow Up Call     Attempted to reach patient for transitions of care follow up.  Unable to reach patient.      Outreach Attempts:   HIPAA compliant voicemail left for patient.       Follow Up Appointment:   Future Appointments         Provider Specialty Dept Phone    11/15/2024 10:30 AM El Judge MD Pulmonology [phone]    12/13/2024 10:00 AM INFUSION ROOM CHAIR 1 - [facility] Infusion Therapy [phone]            Plan for follow-up on next business day.  based on severity of symptoms and risk factors.     Savannah Ross

## 2024-11-15 ENCOUNTER — CARE COORDINATION (OUTPATIENT)
Dept: CASE MANAGEMENT | Age: 79
End: 2024-11-15

## 2024-11-15 ENCOUNTER — OFFICE VISIT (OUTPATIENT)
Dept: PULMONOLOGY | Age: 79
End: 2024-11-15

## 2024-11-15 VITALS — OXYGEN SATURATION: 87 % | WEIGHT: 125 LBS | HEART RATE: 80 BPM | BODY MASS INDEX: 21.34 KG/M2 | HEIGHT: 64 IN

## 2024-11-15 DIAGNOSIS — J96.11 CHRONIC HYPOXEMIC RESPIRATORY FAILURE: Chronic | ICD-10-CM

## 2024-11-15 DIAGNOSIS — J44.9 COPD, VERY SEVERE (HCC): Primary | Chronic | ICD-10-CM

## 2024-11-15 DIAGNOSIS — R91.1 PULMONARY NODULE: Chronic | ICD-10-CM

## 2024-11-15 DIAGNOSIS — R06.02 SHORTNESS OF BREATH: ICD-10-CM

## 2024-11-15 DIAGNOSIS — J43.9 PULMONARY EMPHYSEMA, UNSPECIFIED EMPHYSEMA TYPE (HCC): ICD-10-CM

## 2024-11-15 DIAGNOSIS — Z87.891 FORMER SMOKER: ICD-10-CM

## 2024-11-15 DIAGNOSIS — I27.20 MILD PULMONARY HYPERTENSION (HCC): ICD-10-CM

## 2024-11-15 NOTE — CARE COORDINATION
Care Transitions Note    Final Call     Attempted to reach patient for transitions of care follow up.  Unable to reach patient.      Outreach Attempts:   HIPAA compliant voicemail left for patient.     Patient graduated from the Care Transitions program on 11/15/24.  Patient/family has the ability to self manage at this time..      Handoff:   Patient was not referred to the ACM team due to unable to contact patient.          Upcoming Appointments:    Future Appointments         Provider Specialty Dept Phone    12/13/2024 10:00 AM INFUSION ROOM CHAIR 1 - [facility] Infusion Therapy [phone]    2/14/2025 10:00 AM El Judge MD Pulmonology [phone]            Savannah Ross

## 2024-11-15 NOTE — PROGRESS NOTES
SUBJECTIVE:  Chief Complaint: Very severe/stage IV COPD, pulmonary emphysema, chronic hypoxemic respiratory failure, shortness of breath, former smoker  Janessa states that she was hospitalized in October 2024 for exacerbation of her COPD.  She recovered from that and has not had any other bronchitic infections since.  She continues on multiple medications for her COPD which include Breztri, long-acting oral theophylline, 5 mg prednisone daily, Singulair, Daliresp and she also has albuterol MDI or albuterol solution for her nebulizer.  She does continue on oxygen 24 hours a day.  She denies chest pain or hemoptysis.  She quit smoking in 1991      ROS:  Constitution:  HEENT: Negative for ear, throat pain  Cardiovascular: Negative for chest pain, syncope, edema  Pulmonary: See HPI  Musculoskeletal: Negative for DVT, myalgias, arthralgias    OBJECTIVE:  Pulse 80   Ht 1.626 m (5' 4\")   Wt 56.7 kg (125 lb)   SpO2 (!) 87% Comment: on 2lpm pulse  BMI 21.46 kg/m²      Physical Exam:  Constitutional:  She appears well developed and well-nourished.  Wearing nasal oxygen.  Always appears to be in mild respiratory distress even at rest.  Does use accessory muscles respiration  Neck:  Supple, No palpable lymphadenopathy, No JVD  Cardiovascular:  S1, S2 Normal, Regular rhythm, no murmurs or gallops, No pericardial  rubs.  Pulmonary: Breath sounds are diminished throughout all lung fields but no wheezing or rhonchi are noted  Abdomen: Not examined  Extremities: no edema, No DVT, marked clubbing of the fingernails  Neurologic: Oriented x3, No focal deficits    Radiology: Chest x-ray on 4/23/2024  Stable right hilar prominence.  Bilateral hilar and mediastinal calcifications are seen, along with few  bilateral lung dense small calcifications, consistent with old granulomatous  disease.     Lungs are hyperinflated with increased interstitial markings, consistent with  chronic lung disease.  No evidence of pulmonary edema.  No

## 2024-11-22 ENCOUNTER — TELEPHONE (OUTPATIENT)
Dept: FAMILY MEDICINE CLINIC | Age: 79
End: 2024-11-22

## 2024-12-05 ENCOUNTER — TELEPHONE (OUTPATIENT)
Dept: FAMILY MEDICINE CLINIC | Age: 79
End: 2024-12-05

## 2024-12-05 NOTE — TELEPHONE ENCOUNTER
Reporting:      INR     3.0      6 Mg of Warfin every Tuesday and Sunday     3 mg the rest of the days.       Please let Shira know when to recheck INR

## 2024-12-13 ENCOUNTER — HOSPITAL ENCOUNTER (OUTPATIENT)
Dept: INFUSION THERAPY | Age: 79
Setting detail: INFUSION SERIES
Discharge: HOME | End: 2024-12-13
Payer: MEDICARE

## 2024-12-13 VITALS
DIASTOLIC BLOOD PRESSURE: 62 MMHG | RESPIRATION RATE: 20 BRPM | OXYGEN SATURATION: 90 % | TEMPERATURE: 97.7 F | SYSTOLIC BLOOD PRESSURE: 146 MMHG | HEART RATE: 73 BPM

## 2024-12-13 DIAGNOSIS — M81.0 AGE-RELATED OSTEOPOROSIS WITHOUT CURRENT PATHOLOGICAL FRACTURE: Primary | ICD-10-CM

## 2024-12-13 PROCEDURE — 96372 THER/PROPH/DIAG INJ SC/IM: CPT

## 2024-12-13 PROCEDURE — 6360000002 HC RX W HCPCS: Performed by: STUDENT IN AN ORGANIZED HEALTH CARE EDUCATION/TRAINING PROGRAM

## 2024-12-13 RX ORDER — DIPHENHYDRAMINE HYDROCHLORIDE 50 MG/ML
50 INJECTION INTRAMUSCULAR; INTRAVENOUS
Status: CANCELLED | OUTPATIENT
Start: 2024-12-13

## 2024-12-13 RX ORDER — SODIUM CHLORIDE 9 MG/ML
INJECTION, SOLUTION INTRAVENOUS CONTINUOUS
Status: CANCELLED | OUTPATIENT
Start: 2024-12-13

## 2024-12-13 RX ORDER — HYDROCORTISONE SODIUM SUCCINATE 100 MG/2ML
100 INJECTION INTRAMUSCULAR; INTRAVENOUS
Status: CANCELLED | OUTPATIENT
Start: 2024-12-13

## 2024-12-13 RX ORDER — ALBUTEROL SULFATE 90 UG/1
4 INHALANT RESPIRATORY (INHALATION) PRN
Status: CANCELLED | OUTPATIENT
Start: 2024-12-13

## 2024-12-13 RX ORDER — ACETAMINOPHEN 325 MG/1
650 TABLET ORAL
Status: CANCELLED | OUTPATIENT
Start: 2024-12-13

## 2024-12-13 RX ORDER — FAMOTIDINE 10 MG/ML
20 INJECTION, SOLUTION INTRAVENOUS
Status: CANCELLED | OUTPATIENT
Start: 2024-12-13

## 2024-12-13 RX ORDER — EPINEPHRINE 1 MG/ML
0.3 INJECTION, SOLUTION INTRAMUSCULAR; SUBCUTANEOUS PRN
Status: CANCELLED | OUTPATIENT
Start: 2024-12-13

## 2024-12-13 RX ORDER — ONDANSETRON 2 MG/ML
8 INJECTION INTRAMUSCULAR; INTRAVENOUS
Status: CANCELLED | OUTPATIENT
Start: 2024-12-13

## 2024-12-13 RX ADMIN — DENOSUMAB 60 MG: 60 INJECTION SUBCUTANEOUS at 09:45

## 2024-12-13 NOTE — PROGRESS NOTES
Patient wheeled to infusion area, here today for a prolia injection. No concerns at this time. Treatment approved and given in left arm SQ. Patient tolerated well. Patient provided appointment card with next appointment.

## 2024-12-17 NOTE — Clinical Note
roadtest kg/m²     Results for orders placed or performed in visit on 12/19/24   AMB POC URINALYSIS DIP STICK AUTO W/O MICRO   Result Value Ref Range    Color (UA POC) Yellow     Clarity (UA POC) Clear     Glucose, Urine, POC Negative Negative mg/dL    Bilirubin, Urine, POC Negative Negative    KETONES, Urine, POC Negative Negative mg/dL    Specific Gravity, Urine, POC 1.020 1.001 - 1.035    Blood (UA POC) Trace     pH, Urine, POC 5.5 4.6 - 8.0    Protein, Urine, POC Negative Negative mg/dL    Urobilinogen, POC 0.2 mg/dL <1.1 mg/dL    Nitrite, Urine, POC Negative Negative    Leukocyte Esterase, Urine, POC Negative Negative        The patient appears well, alert, oriented x 3, in no distress.  ENT normal.  Neck supple. No adenopathy or thyromegaly.   Lungs:  clear, good air entry, no wheezes, rhonchi or rales.   Heart:  S1 and S2 normal, no murmurs, regular rate and rhythm.  Abdomen:  soft without tenderness, guarding, mass or organomegaly.   Extremities show no edema, normal peripheral pulses.   Neurological is normal, no focal findings.    BREAST EXAM: breasts appear normal, no suspicious masses, no skin or nipple changes or axillary nodes    PELVIC EXAM: External genitalia is within normal limits, urethra, urethra meatus and bladder are midline well supported. Vagina is well rugated, Cervix comes into full view and is within normal limits. Uterus is normal size and shape, nontender, 8 week size, no ovarian masses palpated    Assessment/Plan:      Diagnosis Orders   1. Well woman exam  AMB POC URINALYSIS DIP STICK AUTO W/O MICRO    PAP IG, HPV Rfx HPV 16/18,45    PAP IG, HPV Rfx HPV 16/18,45      2. Screening for genitourinary condition  AMB POC URINALYSIS DIP STICK AUTO W/O MICRO      3. ASCUS of cervix with negative high risk HPV  PAP IG, HPV Rfx HPV 16/18,45    PAP IG, HPV Rfx HPV 16/18,45      4. Screening for human papillomavirus (HPV)  PAP IG, HPV Rfx HPV 16/18,45    PAP IG, HPV Rfx HPV 16/18,45        current

## 2024-12-30 RX ORDER — PREDNISONE 5 MG/1
5 TABLET ORAL DAILY
Qty: 90 TABLET | Refills: 0 | Status: SHIPPED | OUTPATIENT
Start: 2024-12-30

## 2024-12-30 NOTE — PROGRESS NOTES
I refilled prednisone 5 mg daily per D. Monko for dx of severe COPD. Patient was updated on discussion and prescription.

## 2024-12-31 ENCOUNTER — OFFICE VISIT (OUTPATIENT)
Dept: FAMILY MEDICINE CLINIC | Age: 79
End: 2024-12-31
Payer: MEDICARE

## 2024-12-31 VITALS
DIASTOLIC BLOOD PRESSURE: 64 MMHG | BODY MASS INDEX: 22.01 KG/M2 | TEMPERATURE: 97.7 F | WEIGHT: 128.2 LBS | OXYGEN SATURATION: 88 % | SYSTOLIC BLOOD PRESSURE: 124 MMHG | HEART RATE: 90 BPM

## 2024-12-31 DIAGNOSIS — J44.1 COPD EXACERBATION (HCC): Primary | ICD-10-CM

## 2024-12-31 PROCEDURE — 3074F SYST BP LT 130 MM HG: CPT | Performed by: PHYSICIAN ASSISTANT

## 2024-12-31 PROCEDURE — 1090F PRES/ABSN URINE INCON ASSESS: CPT | Performed by: PHYSICIAN ASSISTANT

## 2024-12-31 PROCEDURE — 3023F SPIROM DOC REV: CPT | Performed by: PHYSICIAN ASSISTANT

## 2024-12-31 PROCEDURE — 1036F TOBACCO NON-USER: CPT | Performed by: PHYSICIAN ASSISTANT

## 2024-12-31 PROCEDURE — G8427 DOCREV CUR MEDS BY ELIG CLIN: HCPCS | Performed by: PHYSICIAN ASSISTANT

## 2024-12-31 PROCEDURE — 3078F DIAST BP <80 MM HG: CPT | Performed by: PHYSICIAN ASSISTANT

## 2024-12-31 PROCEDURE — G8399 PT W/DXA RESULTS DOCUMENT: HCPCS | Performed by: PHYSICIAN ASSISTANT

## 2024-12-31 PROCEDURE — 1159F MED LIST DOCD IN RCRD: CPT | Performed by: PHYSICIAN ASSISTANT

## 2024-12-31 PROCEDURE — 99213 OFFICE O/P EST LOW 20 MIN: CPT | Performed by: PHYSICIAN ASSISTANT

## 2024-12-31 PROCEDURE — G8420 CALC BMI NORM PARAMETERS: HCPCS | Performed by: PHYSICIAN ASSISTANT

## 2024-12-31 PROCEDURE — 1123F ACP DISCUSS/DSCN MKR DOCD: CPT | Performed by: PHYSICIAN ASSISTANT

## 2024-12-31 PROCEDURE — G8484 FLU IMMUNIZE NO ADMIN: HCPCS | Performed by: PHYSICIAN ASSISTANT

## 2024-12-31 RX ORDER — PREDNISONE 20 MG/1
TABLET ORAL
Qty: 12 TABLET | Refills: 0 | Status: SHIPPED | OUTPATIENT
Start: 2024-12-31

## 2024-12-31 RX ORDER — AZITHROMYCIN 250 MG/1
TABLET, FILM COATED ORAL
Qty: 6 TABLET | Refills: 0 | Status: SHIPPED | OUTPATIENT
Start: 2024-12-31 | End: 2025-01-10

## 2024-12-31 RX ORDER — METHYLPREDNISOLONE ACETATE 40 MG/ML
40 INJECTION, SUSPENSION INTRA-ARTICULAR; INTRALESIONAL; INTRAMUSCULAR; SOFT TISSUE ONCE
Status: COMPLETED | OUTPATIENT
Start: 2024-12-31 | End: 2024-12-31

## 2024-12-31 RX ADMIN — METHYLPREDNISOLONE ACETATE 40 MG: 40 INJECTION, SUSPENSION INTRA-ARTICULAR; INTRALESIONAL; INTRAMUSCULAR; SOFT TISSUE at 14:54

## 2024-12-31 ASSESSMENT — ENCOUNTER SYMPTOMS
EYE REDNESS: 0
SORE THROAT: 0
BLOOD IN STOOL: 0
COLOR CHANGE: 0
EYE DISCHARGE: 0
EYE PAIN: 0
COUGH: 1
SHORTNESS OF BREATH: 1
WHEEZING: 1
DIARRHEA: 0
BACK PAIN: 0
CONSTIPATION: 0
RHINORRHEA: 0
ABDOMINAL PAIN: 0
PHOTOPHOBIA: 0
CHEST TIGHTNESS: 1
NAUSEA: 0
VOMITING: 0

## 2024-12-31 NOTE — PROGRESS NOTES
Janessa Davidson (:  1945) is a 79 y.o. female,Established patient, here for evaluation of the following chief complaint(s):    Cough (Coughing up stuff that is green)        ASSESSMENT/PLAN:  Assessment & Plan  1. Chronic Obstructive Pulmonary Disease (COPD) exacerbation.  Their symptoms, including increased shortness of breath, chest tightness, and productive cough with green sputum, are consistent with a COPD exacerbation. Their oxygen saturation is currently at 88%, which is acceptable given their condition. They will receive a steroid injection today, followed by a prescription for prednisone 40 mg for 4 days, then 20 mg for an additional 4 days. Azithromycin will be prescribed due to its anti-inflammatory properties. They are advised to continue using their nebulizer every 4 hours and to increase their oxygen flow to 3 L/min during ambulation. If their symptoms worsen, such as increased chest tightness, shortness of breath, fever, or oxygen saturation dropping to the 70s, they should seek immediate medical attention at the hospital.      1. COPD exacerbation (HCC)  -     methylPREDNISolone acetate (DEPO-MEDROL) injection 40 mg; 40 mg, IntraMUSCular, ONCE, 1 dose, On 24 at 1500  -     predniSONE (DELTASONE) 20 MG tablet; Take 40 mg (2 tabs) x 4 days, 20 mg x 4 days, Disp-12 tablet, R-0Normal  -     azithromycin (ZITHROMAX) 250 MG tablet; 500mg on day 1 followed by 250mg on days 2 - 5, Disp-6 tablet, R-0Normal      No follow-ups on file.      SUBJECTIVE/OBJECTIVE:  HPI  History of Present Illness  The patient presents for evaluation of COPD, constipation, and Coumadin management.    They began experiencing chest tightness, shortness of breath and cough productive of purulent sputum which began approximately 2 days ago. They report occasional severe shortness of breath, with their oxygen saturation levels fluctuating between the 80s and 90s during lunchtime, and dropping to the 80s during

## 2025-01-02 ENCOUNTER — TELEPHONE (OUTPATIENT)
Dept: FAMILY MEDICINE CLINIC | Age: 80
End: 2025-01-02

## 2025-01-02 NOTE — TELEPHONE ENCOUNTER
Per Dr Sharma, hold x2 days.  Take 3mg after than until rechecked in 1 week.  Home Care nurse notified.

## 2025-01-02 NOTE — TELEPHONE ENCOUNTER
INR today is 5.5    Please advise.      -----------------------------------------------------------  Sent call to Shashank JOVEL----JR

## 2025-01-10 ENCOUNTER — TELEPHONE (OUTPATIENT)
Dept: FAMILY MEDICINE CLINIC | Age: 80
End: 2025-01-10

## 2025-01-10 NOTE — TELEPHONE ENCOUNTER
Laurie from [facility] called to report the patients INR is out of range. INR today 1.3, taking 3 mg every day. Per Dr. Sharma take 6 mg today then change to 6 mg every Monday's and Friday's, 3 mg all other then recheck Wednesday. Laurie notified and verbalized understanding.

## 2025-01-15 ENCOUNTER — TELEPHONE (OUTPATIENT)
Dept: FAMILY MEDICINE CLINIC | Age: 80
End: 2025-01-15

## 2025-01-15 NOTE — TELEPHONE ENCOUNTER
INR is 2.0 today      Stephen will need to know when to draw the next INR and any dose change.      She takes 6 mg on Monday and Friday and 3 mg the other days of the week.

## 2025-01-26 DIAGNOSIS — E87.6 HYPOKALEMIA: ICD-10-CM

## 2025-01-27 RX ORDER — POTASSIUM CHLORIDE 750 MG/1
TABLET, EXTENDED RELEASE ORAL
Qty: 135 TABLET | Refills: 1 | Status: SHIPPED | OUTPATIENT
Start: 2025-01-27

## 2025-01-28 ENCOUNTER — HOSPITAL ENCOUNTER (OUTPATIENT)
Dept: GENERAL RADIOLOGY | Age: 80
Discharge: HOME | End: 2025-01-28
Payer: MEDICARE

## 2025-01-28 ENCOUNTER — HOSPITAL ENCOUNTER (OUTPATIENT)
Age: 80
Discharge: HOME | End: 2025-01-28
Payer: MEDICARE

## 2025-01-28 ENCOUNTER — TELEPHONE (OUTPATIENT)
Dept: FAMILY MEDICINE CLINIC | Age: 80
End: 2025-01-28

## 2025-01-28 DIAGNOSIS — Z87.891 FORMER SMOKER: ICD-10-CM

## 2025-01-28 DIAGNOSIS — R91.1 PULMONARY NODULE: Chronic | ICD-10-CM

## 2025-01-28 DIAGNOSIS — J44.9 COPD, VERY SEVERE (HCC): Chronic | ICD-10-CM

## 2025-01-28 DIAGNOSIS — I27.20 MILD PULMONARY HYPERTENSION (HCC): ICD-10-CM

## 2025-01-28 DIAGNOSIS — R06.02 SHORTNESS OF BREATH: ICD-10-CM

## 2025-01-28 DIAGNOSIS — J96.11 CHRONIC HYPOXEMIC RESPIRATORY FAILURE: Chronic | ICD-10-CM

## 2025-01-28 DIAGNOSIS — J43.9 PULMONARY EMPHYSEMA, UNSPECIFIED EMPHYSEMA TYPE (HCC): ICD-10-CM

## 2025-01-28 PROCEDURE — 71046 X-RAY EXAM CHEST 2 VIEWS: CPT

## 2025-01-28 NOTE — TELEPHONE ENCOUNTER
Spoke with Sana.  Change to 6mg M/W/F and 3mg the rest and recheck in 2 weeks.    Shira with home care notified.

## 2025-01-28 NOTE — TELEPHONE ENCOUNTER
INR for today is:  1.7        Dosage of coumadin is:  6 mg Monday & Friday, and the 5 mg the rest of the week.

## 2025-01-29 DIAGNOSIS — F41.9 ANXIETY: ICD-10-CM

## 2025-01-29 RX ORDER — HYDROXYZINE HYDROCHLORIDE 25 MG/1
25 TABLET, FILM COATED ORAL NIGHTLY PRN
Qty: 90 TABLET | Refills: 1 | Status: SHIPPED | OUTPATIENT
Start: 2025-01-29

## 2025-02-02 DIAGNOSIS — J44.9 COPD, VERY SEVERE (HCC): ICD-10-CM

## 2025-02-03 RX ORDER — ROFLUMILAST 500 UG/1
500 TABLET ORAL DAILY
Qty: 90 TABLET | Refills: 1 | Status: SHIPPED | OUTPATIENT
Start: 2025-02-03

## 2025-02-06 ENCOUNTER — TELEPHONE (OUTPATIENT)
Dept: FAMILY MEDICINE CLINIC | Age: 80
End: 2025-02-06

## 2025-02-06 NOTE — TELEPHONE ENCOUNTER
Reporting:      INR  (from today)-------2.2      Dosing of coumadin     6 mg on Mon---Wed---Friday  And 3 mg the rest of the week.    Please let Taylor know of any changes and when  the next INR draw should be.

## 2025-02-09 DIAGNOSIS — J44.9 COPD, VERY SEVERE (HCC): ICD-10-CM

## 2025-02-09 DIAGNOSIS — E78.00 PURE HYPERCHOLESTEROLEMIA: ICD-10-CM

## 2025-02-10 RX ORDER — ROSUVASTATIN CALCIUM 20 MG/1
20 TABLET, COATED ORAL NIGHTLY
Qty: 90 TABLET | Refills: 1 | Status: SHIPPED | OUTPATIENT
Start: 2025-02-10

## 2025-02-10 RX ORDER — MONTELUKAST SODIUM 10 MG/1
10 TABLET ORAL NIGHTLY
Qty: 90 TABLET | Refills: 1 | Status: SHIPPED | OUTPATIENT
Start: 2025-02-10

## 2025-02-11 DIAGNOSIS — E03.9 ACQUIRED HYPOTHYROIDISM: ICD-10-CM

## 2025-02-11 RX ORDER — LEVOTHYROXINE SODIUM 75 UG/1
75 TABLET ORAL DAILY
Qty: 90 TABLET | Refills: 1 | Status: SHIPPED | OUTPATIENT
Start: 2025-02-11

## 2025-02-14 ENCOUNTER — OFFICE VISIT (OUTPATIENT)
Dept: PULMONOLOGY | Age: 80
End: 2025-02-14

## 2025-02-14 VITALS — HEART RATE: 78 BPM | OXYGEN SATURATION: 95 % | HEIGHT: 64 IN | WEIGHT: 128 LBS | BODY MASS INDEX: 21.85 KG/M2

## 2025-02-14 DIAGNOSIS — J96.11 CHRONIC HYPOXEMIC RESPIRATORY FAILURE: Chronic | ICD-10-CM

## 2025-02-14 DIAGNOSIS — J44.9 COPD, VERY SEVERE (HCC): ICD-10-CM

## 2025-02-14 DIAGNOSIS — I27.20 MILD PULMONARY HYPERTENSION (HCC): ICD-10-CM

## 2025-02-14 DIAGNOSIS — R91.1 PULMONARY NODULE: Chronic | ICD-10-CM

## 2025-02-14 DIAGNOSIS — Z87.891 FORMER SMOKER: ICD-10-CM

## 2025-02-14 DIAGNOSIS — R06.02 SHORTNESS OF BREATH: ICD-10-CM

## 2025-02-14 DIAGNOSIS — J43.9 PULMONARY EMPHYSEMA, UNSPECIFIED EMPHYSEMA TYPE (HCC): Primary | ICD-10-CM

## 2025-02-14 RX ORDER — ALBUTEROL SULFATE 0.83 MG/ML
2.5 SOLUTION RESPIRATORY (INHALATION) EVERY 6 HOURS PRN
Qty: 375 ML | Refills: 5 | Status: SHIPPED | OUTPATIENT
Start: 2025-02-14

## 2025-02-14 RX ORDER — ALBUTEROL SULFATE 90 UG/1
INHALANT RESPIRATORY (INHALATION)
Qty: 3 EACH | Refills: 3 | Status: SHIPPED | OUTPATIENT
Start: 2025-02-14

## 2025-02-14 RX ORDER — BUDESONIDE, GLYCOPYRROLATE, AND FORMOTEROL FUMARATE 160; 9; 4.8 UG/1; UG/1; UG/1
2 AEROSOL, METERED RESPIRATORY (INHALATION) 2 TIMES DAILY
Qty: 3 EACH | Refills: 3 | Status: SHIPPED | OUTPATIENT
Start: 2025-02-14

## 2025-02-14 NOTE — PROGRESS NOTES
SUBJECTIVE:  Chief Complaint: Very severe/stage IV COPD, pulmonary emphysema, chronic hypoxemic respiratory failure, shortness of breath, mild pulmonary hypertension, former smoker  Janessa states that she has had no recent episodes of bronchitis and denies chest pain or chest discomfort.  Although she is short of breath chronically she states that this has not gotten any worse.  She is able to ambulate short distances.  She continues on oxygen 24 hours a day.  She also states that she is up-to-date with her Breztri, oral theophylline, 5 mg of prednisone daily, Singulair, Daliresp, albuterol MDI and her albuterol solution for her nebulizer.  She denies hemoptysis or purulent sputum expectoration.  She has had no other intervening illnesses.  She also mentions that she is up-to-date with her vaccinations which include influenza, RSV and COVID-19.  I do not know her status concerning pneumococcal vaccine      ROS:  Constitution:  HEENT: Negative for ear, throat pain  Cardiovascular: Negative for chest pain, syncope, edema  Pulmonary: See HPI  Musculoskeletal: Negative for DVT, myalgias, arthralgias    OBJECTIVE:  Pulse 78   Ht 1.626 m (5' 4\")   Wt 58.1 kg (128 lb)   SpO2 95% Comment: 2lpm pulse  BMI 21.97 kg/m²      Physical Exam:  Constitutional:  She appears well developed and well-nourished.  Thin but not cachectic.  Does use accessory muscles of respiration and always appears to be mildly short of breath even at rest  Neck:  Supple, No palpable lymphadenopathy, No JVD  Cardiovascular:  S1, S2 Normal, Regular rhythm, no murmurs or gallops, No pericardial  rubs.  Pulmonary: Diminished breath sounds throughout all lung fields with wheezing or rhonchi  Moderately barrel chested with some kyphosis  Abdomen: Not examined  Extremities: Mild bilateral peripheral edema which is stable, No evidence for DVT, fingernail clubbing appears to be stable  Neurologic: Oriented x3, No focal deficits    Radiology: Chest x-ray on

## 2025-02-18 ASSESSMENT — PATIENT HEALTH QUESTIONNAIRE - PHQ9
SUM OF ALL RESPONSES TO PHQ QUESTIONS 1-9: 0
2. FEELING DOWN, DEPRESSED OR HOPELESS: NOT AT ALL
SUM OF ALL RESPONSES TO PHQ QUESTIONS 1-9: 0
SUM OF ALL RESPONSES TO PHQ9 QUESTIONS 1 & 2: 0
1. LITTLE INTEREST OR PLEASURE IN DOING THINGS: NOT AT ALL
1. LITTLE INTEREST OR PLEASURE IN DOING THINGS: NOT AT ALL
SUM OF ALL RESPONSES TO PHQ9 QUESTIONS 1 & 2: 0
SUM OF ALL RESPONSES TO PHQ QUESTIONS 1-9: 0
2. FEELING DOWN, DEPRESSED OR HOPELESS: NOT AT ALL
SUM OF ALL RESPONSES TO PHQ QUESTIONS 1-9: 0

## 2025-02-19 SDOH — HEALTH STABILITY: PHYSICAL HEALTH
ON AVERAGE, HOW MANY DAYS PER WEEK DO YOU ENGAGE IN MODERATE TO STRENUOUS EXERCISE (LIKE A BRISK WALK)?: PATIENT DECLINED

## 2025-02-19 ASSESSMENT — LIFESTYLE VARIABLES
HOW OFTEN DO YOU HAVE SIX OR MORE DRINKS ON ONE OCCASION: 1
HOW OFTEN DO YOU HAVE A DRINK CONTAINING ALCOHOL: 1
HOW OFTEN DO YOU HAVE A DRINK CONTAINING ALCOHOL: NEVER
HOW MANY STANDARD DRINKS CONTAINING ALCOHOL DO YOU HAVE ON A TYPICAL DAY: 0
HOW MANY STANDARD DRINKS CONTAINING ALCOHOL DO YOU HAVE ON A TYPICAL DAY: PATIENT DOES NOT DRINK

## 2025-02-21 ENCOUNTER — OFFICE VISIT (OUTPATIENT)
Dept: FAMILY MEDICINE CLINIC | Age: 80
End: 2025-02-21

## 2025-02-21 VITALS
HEART RATE: 77 BPM | OXYGEN SATURATION: 90 % | BODY MASS INDEX: 21.17 KG/M2 | SYSTOLIC BLOOD PRESSURE: 126 MMHG | WEIGHT: 124 LBS | DIASTOLIC BLOOD PRESSURE: 82 MMHG | HEIGHT: 64 IN

## 2025-02-21 DIAGNOSIS — M19.90 ARTHRITIS: ICD-10-CM

## 2025-02-21 DIAGNOSIS — E87.6 HYPOKALEMIA: ICD-10-CM

## 2025-02-21 DIAGNOSIS — E78.00 PURE HYPERCHOLESTEROLEMIA: ICD-10-CM

## 2025-02-21 DIAGNOSIS — F41.9 ANXIETY: ICD-10-CM

## 2025-02-21 DIAGNOSIS — E03.9 ACQUIRED HYPOTHYROIDISM: ICD-10-CM

## 2025-02-21 DIAGNOSIS — J44.9 COPD, VERY SEVERE (HCC): ICD-10-CM

## 2025-02-21 DIAGNOSIS — I10 ESSENTIAL HYPERTENSION: ICD-10-CM

## 2025-02-21 DIAGNOSIS — Z79.01 ANTICOAGULANT LONG-TERM USE: ICD-10-CM

## 2025-02-21 DIAGNOSIS — K21.9 GASTROESOPHAGEAL REFLUX DISEASE, UNSPECIFIED WHETHER ESOPHAGITIS PRESENT: ICD-10-CM

## 2025-02-21 DIAGNOSIS — L29.9 PRURITUS: ICD-10-CM

## 2025-02-21 DIAGNOSIS — Z00.00 MEDICARE ANNUAL WELLNESS VISIT, SUBSEQUENT: Primary | ICD-10-CM

## 2025-02-21 DIAGNOSIS — R10.9 ABDOMINAL CRAMPS: ICD-10-CM

## 2025-02-21 LAB
ALBUMIN SERPL-MCNC: 4.1 G/DL (ref 3.4–5)
ALBUMIN/GLOB SERPL: 2.2 {RATIO} (ref 1.1–2.2)
ALP SERPL-CCNC: 62 U/L (ref 40–129)
ALT SERPL-CCNC: 27 U/L (ref 10–40)
ANION GAP SERPL CALCULATED.3IONS-SCNC: 11 MMOL/L (ref 3–16)
AST SERPL-CCNC: 37 U/L (ref 15–37)
BASOPHILS # BLD: 0 K/UL (ref 0–0.2)
BASOPHILS NFR BLD: 0.4 %
BILIRUB SERPL-MCNC: 0.3 MG/DL (ref 0–1)
BUN SERPL-MCNC: 15 MG/DL (ref 7–20)
CALCIUM SERPL-MCNC: 9.6 MG/DL (ref 8.3–10.6)
CHLORIDE SERPL-SCNC: 97 MMOL/L (ref 99–110)
CHOLEST SERPL-MCNC: 233 MG/DL (ref 0–199)
CO2 SERPL-SCNC: 34 MMOL/L (ref 21–32)
CREAT SERPL-MCNC: 0.9 MG/DL (ref 0.6–1.2)
DEPRECATED RDW RBC AUTO: 14.9 % (ref 12.4–15.4)
EOSINOPHIL # BLD: 0 K/UL (ref 0–0.6)
EOSINOPHIL NFR BLD: 0.1 %
GFR SERPLBLD CREATININE-BSD FMLA CKD-EPI: 65 ML/MIN/{1.73_M2}
GLUCOSE SERPL-MCNC: 106 MG/DL (ref 70–99)
HCT VFR BLD AUTO: 39.8 % (ref 36–48)
HDLC SERPL-MCNC: 113 MG/DL (ref 40–60)
HGB BLD-MCNC: 13.3 G/DL (ref 12–16)
INR PPP: 2.06 (ref 0.85–1.15)
LDL CHOLESTEROL: 102 MG/DL
LYMPHOCYTES # BLD: 0.8 K/UL (ref 1–5.1)
LYMPHOCYTES NFR BLD: 7.8 %
MCH RBC QN AUTO: 30.7 PG (ref 26–34)
MCHC RBC AUTO-ENTMCNC: 33.5 G/DL (ref 31–36)
MCV RBC AUTO: 91.6 FL (ref 80–100)
MONOCYTES # BLD: 0.7 K/UL (ref 0–1.3)
MONOCYTES NFR BLD: 6.6 %
NEUTROPHILS # BLD: 8.9 K/UL (ref 1.7–7.7)
NEUTROPHILS NFR BLD: 85.1 %
PLATELET # BLD AUTO: 227 K/UL (ref 135–450)
PMV BLD AUTO: 9.1 FL (ref 5–10.5)
POTASSIUM SERPL-SCNC: 3.9 MMOL/L (ref 3.5–5.1)
PROT SERPL-MCNC: 6 G/DL (ref 6.4–8.2)
PROTHROMBIN TIME: 23.2 SEC (ref 11.9–14.9)
RBC # BLD AUTO: 4.34 M/UL (ref 4–5.2)
SODIUM SERPL-SCNC: 142 MMOL/L (ref 136–145)
T4 FREE SERPL-MCNC: 1.5 NG/DL (ref 0.9–1.8)
TRIGL SERPL-MCNC: 88 MG/DL (ref 0–150)
TSH SERPL DL<=0.005 MIU/L-ACNC: 5.59 UIU/ML (ref 0.27–4.2)
VLDLC SERPL CALC-MCNC: 18 MG/DL
WBC # BLD AUTO: 10.4 K/UL (ref 4–11)

## 2025-02-21 RX ORDER — ALBUTEROL SULFATE 90 UG/1
INHALANT RESPIRATORY (INHALATION)
Qty: 3 EACH | Refills: 3 | Status: SHIPPED | OUTPATIENT
Start: 2025-02-21

## 2025-02-21 RX ORDER — BUDESONIDE, GLYCOPYRROLATE, AND FORMOTEROL FUMARATE 160; 9; 4.8 UG/1; UG/1; UG/1
2 AEROSOL, METERED RESPIRATORY (INHALATION) 2 TIMES DAILY
Qty: 3 EACH | Refills: 3 | Status: SHIPPED | OUTPATIENT
Start: 2025-02-21

## 2025-02-21 RX ORDER — ROSUVASTATIN CALCIUM 20 MG/1
20 TABLET, COATED ORAL NIGHTLY
Qty: 90 TABLET | Refills: 1 | Status: SHIPPED | OUTPATIENT
Start: 2025-02-21

## 2025-02-21 RX ORDER — LEVOTHYROXINE SODIUM 75 UG/1
75 TABLET ORAL DAILY
Qty: 90 TABLET | Refills: 1 | Status: SHIPPED | OUTPATIENT
Start: 2025-02-21

## 2025-02-21 RX ORDER — ROFLUMILAST 500 UG/1
500 TABLET ORAL DAILY
Qty: 90 TABLET | Refills: 1 | Status: SHIPPED | OUTPATIENT
Start: 2025-02-21

## 2025-02-21 RX ORDER — GABAPENTIN 400 MG/1
400 CAPSULE ORAL EVERY EVENING
Qty: 90 CAPSULE | Refills: 0 | Status: SHIPPED | OUTPATIENT
Start: 2025-02-21 | End: 2025-05-22

## 2025-02-21 RX ORDER — HYDROCORTISONE 25 MG/G
CREAM TOPICAL 2 TIMES DAILY PRN
Qty: 60 G | Refills: 3 | Status: SHIPPED | OUTPATIENT
Start: 2025-02-21 | End: 2025-05-22

## 2025-02-21 RX ORDER — HYDROXYZINE HYDROCHLORIDE 10 MG/1
10 TABLET, FILM COATED ORAL 3 TIMES DAILY PRN
Qty: 90 TABLET | Refills: 1 | Status: SHIPPED | OUTPATIENT
Start: 2025-02-21

## 2025-02-21 RX ORDER — MONTELUKAST SODIUM 10 MG/1
10 TABLET ORAL NIGHTLY
Qty: 90 TABLET | Refills: 1 | Status: SHIPPED | OUTPATIENT
Start: 2025-02-21

## 2025-02-21 RX ORDER — DICYCLOMINE HYDROCHLORIDE 10 MG/1
10 CAPSULE ORAL 2 TIMES DAILY
Qty: 180 CAPSULE | Refills: 1 | Status: SHIPPED | OUTPATIENT
Start: 2025-02-21

## 2025-02-21 RX ORDER — THEOPHYLLINE 450 MG/1
TABLET, EXTENDED RELEASE ORAL
Qty: 90 TABLET | Refills: 1 | Status: SHIPPED | OUTPATIENT
Start: 2025-02-21

## 2025-02-21 RX ORDER — HYDROCORTISONE 10 MG/G
CREAM TOPICAL
Qty: 56.8 G | Refills: 2 | Status: SHIPPED | OUTPATIENT
Start: 2025-02-21

## 2025-02-21 RX ORDER — ESOMEPRAZOLE MAGNESIUM 40 MG/1
CAPSULE, DELAYED RELEASE ORAL
Qty: 180 CAPSULE | Refills: 1 | Status: SHIPPED | OUTPATIENT
Start: 2025-02-21

## 2025-02-21 RX ORDER — VITAMIN E (DL,TOCOPHERYL ACET) 180 MG
1 CAPSULE ORAL DAILY
Qty: 90 CAPSULE | Refills: 1 | Status: SHIPPED | OUTPATIENT
Start: 2025-02-21

## 2025-02-21 RX ORDER — WARFARIN SODIUM 6 MG/1
TABLET ORAL
Qty: 90 TABLET | Refills: 1 | Status: SHIPPED | OUTPATIENT
Start: 2025-02-21

## 2025-02-21 RX ORDER — POTASSIUM CHLORIDE 750 MG/1
TABLET, EXTENDED RELEASE ORAL
Qty: 135 TABLET | Refills: 1 | Status: SHIPPED | OUTPATIENT
Start: 2025-02-21

## 2025-02-21 NOTE — PROGRESS NOTES
Medicare Annual Wellness Visit    Janessa Davidson is here for Follow-up and Medicare AWV    Assessment & Plan   Gastroesophageal reflux disease, unspecified whether esophagitis present  The following orders have not been finalized:  -     esomeprazole (NEXIUM) 40 MG delayed release capsule  Arthritis  The following orders have not been finalized:  -     gabapentin (NEURONTIN) 400 MG capsule  Anxiety  Acquired hypothyroidism  The following orders have not been finalized:  -     levothyroxine (SYNTHROID) 75 MCG tablet  Pruritus  The following orders have not been finalized:  -     Hydrocortisone, Perianal, 1 % cream  Essential hypertension  The following orders have not been finalized:  -     Magnesium Oxide -Mg Supplement 500 MG CAPS  COPD, very severe (HCC)  The following orders have not been finalized:  -     theophylline (THEODUR) 450 MG extended release tablet  -     Roflumilast (DALIRESP) 500 MCG tablet  -     montelukast (SINGULAIR) 10 MG tablet  Hypokalemia  The following orders have not been finalized:  -     potassium chloride (KLOR-CON M10) 10 MEQ extended release tablet  Pure hypercholesterolemia  The following orders have not been finalized:  -     rosuvastatin (CRESTOR) 20 MG tablet  Anticoagulant long-term use  The following orders have not been finalized:  -     warfarin (COUMADIN) 6 MG tablet       No follow-ups on file.     Subjective       Patient's complete Health Risk Assessment and screening values have been reviewed and are found in Flowsheets. The following problems were reviewed today and where indicated follow up appointments were made and/or referrals ordered.    No Positive Risk Factors identified today.                                    Objective   Vitals:    02/21/25 1028   BP: 126/82   Site: Left Lower Arm   Position: Sitting   Cuff Size: Small Adult   Pulse: 77   SpO2: 90%   Weight: 60.9 kg (134 lb 3.2 oz)   Height: 1.626 m (5' 4.02\")      Body mass index is 23.02 kg/m².             
Future  -     Comprehensive Metabolic Panel; Future  -     CBC with Auto Differential; Future  -     TSH reflex to FT4,FT3 ([place] Only); Future  COPD, very severe (HCC)  -     albuterol sulfate HFA (PROVENTIL;VENTOLIN;PROAIR) 108 (90 Base) MCG/ACT inhaler; INHALE 2 PUFFS BY MOUTH FOUR TIMES A DAY AS NEEDED  -     Budeson-Glycopyrrol-Formoterol (BREZTRI AEROSPHERE) 160-9-4.8 MCG/ACT AERO; Inhale 2 actuation into the lungs 2 times daily  -     montelukast (SINGULAIR) 10 MG tablet; Take 1 tablet by mouth nightly  -     Roflumilast (DALIRESP) 500 MCG tablet; Take 1 tablet by mouth daily  -     theophylline (THEODUR) 450 MG extended release tablet; TAKE 1/2 TABLET TWICE A DAY BY MOUTH  Hypokalemia  -     potassium chloride (KLOR-CON M10) 10 MEQ extended release tablet; TAKE 1 TABLET BY MOUTH SEE ADMIN INSTRUCTIONS ALTERNATE 1 TABLET AND 2 TABLETS DAILY  Pure hypercholesterolemia  -     rosuvastatin (CRESTOR) 20 MG tablet; Take 1 tablet by mouth nightly  -     Protime-INR; Future  -     Lipid, Fasting; Future  -     Comprehensive Metabolic Panel; Future  -     CBC with Auto Differential; Future  -     TSH reflex to FT4,FT3 ([place] Only); Future  Anticoagulant long-term use  -     warfarin (COUMADIN) 6 MG tablet; Take 1 tablet 3 times a week, take 1/2 tablet 4 times a week per INR instructions.  Abdominal cramps  -     dicyclomine (BENTYL) 10 MG capsule; Take 1 capsule by mouth in the morning and at bedtime  Other orders  -     hydrocortisone (PROCTOZONE-HC) 2.5 % CREA rectal cream; Place rectally 2 times daily as needed for Hemorrhoids     F/u labs  INR therapeutic  Adjust thyroid pending labs  GERD stable  Continue current regimen  Assessment & Plan      No follow-ups on file.         The patient (or guardian, if applicable) and other individuals in attendance with the patient were advised that Artificial Intelligence will be utilized during this visit to record, process the conversation to generate a clinical

## 2025-02-24 ENCOUNTER — TELEPHONE (OUTPATIENT)
Dept: FAMILY MEDICINE CLINIC | Age: 80
End: 2025-02-24

## 2025-02-24 NOTE — TELEPHONE ENCOUNTER
To Dr. Sharma-    Patient would like to know her INR results and what dosage of coumadin she should take.    Please call her today with this information.

## 2025-02-28 ASSESSMENT — ENCOUNTER SYMPTOMS
ABDOMINAL PAIN: 0
NAUSEA: 0
SORE THROAT: 0
WHEEZING: 0
SHORTNESS OF BREATH: 0

## 2025-03-02 DIAGNOSIS — J44.1 COPD WITH ACUTE EXACERBATION (HCC): ICD-10-CM

## 2025-03-02 DIAGNOSIS — M19.90 ARTHRITIS: ICD-10-CM

## 2025-03-03 ENCOUNTER — OFFICE VISIT (OUTPATIENT)
Dept: FAMILY MEDICINE CLINIC | Age: 80
End: 2025-03-03
Payer: MEDICARE

## 2025-03-03 VITALS
BODY MASS INDEX: 21.34 KG/M2 | WEIGHT: 125 LBS | SYSTOLIC BLOOD PRESSURE: 164 MMHG | HEART RATE: 79 BPM | DIASTOLIC BLOOD PRESSURE: 74 MMHG | OXYGEN SATURATION: 91 % | HEIGHT: 64 IN | TEMPERATURE: 97.8 F

## 2025-03-03 DIAGNOSIS — J44.1 COPD WITH ACUTE EXACERBATION (HCC): Primary | ICD-10-CM

## 2025-03-03 PROCEDURE — 3078F DIAST BP <80 MM HG: CPT | Performed by: PHYSICIAN ASSISTANT

## 2025-03-03 PROCEDURE — 3077F SYST BP >= 140 MM HG: CPT | Performed by: PHYSICIAN ASSISTANT

## 2025-03-03 PROCEDURE — 3023F SPIROM DOC REV: CPT | Performed by: PHYSICIAN ASSISTANT

## 2025-03-03 PROCEDURE — 1159F MED LIST DOCD IN RCRD: CPT | Performed by: PHYSICIAN ASSISTANT

## 2025-03-03 PROCEDURE — 99213 OFFICE O/P EST LOW 20 MIN: CPT | Performed by: PHYSICIAN ASSISTANT

## 2025-03-03 PROCEDURE — G8399 PT W/DXA RESULTS DOCUMENT: HCPCS | Performed by: PHYSICIAN ASSISTANT

## 2025-03-03 PROCEDURE — G8427 DOCREV CUR MEDS BY ELIG CLIN: HCPCS | Performed by: PHYSICIAN ASSISTANT

## 2025-03-03 PROCEDURE — 1160F RVW MEDS BY RX/DR IN RCRD: CPT | Performed by: PHYSICIAN ASSISTANT

## 2025-03-03 PROCEDURE — G8420 CALC BMI NORM PARAMETERS: HCPCS | Performed by: PHYSICIAN ASSISTANT

## 2025-03-03 PROCEDURE — 1123F ACP DISCUSS/DSCN MKR DOCD: CPT | Performed by: PHYSICIAN ASSISTANT

## 2025-03-03 PROCEDURE — 1090F PRES/ABSN URINE INCON ASSESS: CPT | Performed by: PHYSICIAN ASSISTANT

## 2025-03-03 PROCEDURE — 1036F TOBACCO NON-USER: CPT | Performed by: PHYSICIAN ASSISTANT

## 2025-03-03 RX ORDER — AZITHROMYCIN 250 MG/1
TABLET, FILM COATED ORAL
Qty: 6 TABLET | Refills: 0 | Status: SHIPPED | OUTPATIENT
Start: 2025-03-03

## 2025-03-03 RX ORDER — METHYLPREDNISOLONE 4 MG/1
TABLET ORAL
Qty: 1 KIT | Refills: 0 | Status: SHIPPED | OUTPATIENT
Start: 2025-03-03

## 2025-03-03 NOTE — PROGRESS NOTES
3/3/2025    Janessa Davidson    Chief Complaint   Patient presents with    Cold Symptoms     - pt accompanied by spouse Ray. denies sinus pain & pressure, denies ear pain or pressure, runny nose, did have sore throat now resolved, productive cough - thick green sputum, chest feels \"tight\" with ambulation, sob, noticed drop in o2, denies body aches or chilling, denies gi sx's. 3 days.        HPI  History was obtained from patient and her   Janessa is a 80 y.o. female who presents today with complaints of 3-day history of chest tightness, chest congestion, productive cough, worsening of chronic shortness of breath.  States sputum is green in color.  A little bit of drainage and sore throat but has improved.  She denies hemoptysis, edema, fever, chills or myalgias.  Good p.o. intake and urine output.  She is on continuous oxygen for severe COPD.  Has bumped from 2 L to 3 L with current illness.  She follows Dr. Judge, pulmonology.    PAST MEDICAL HISTORY  Past Medical History:   Diagnosis Date    Age-related osteoporosis without current pathological fracture 05/23/2024    Anticoagulant long-term use     Arthritis     Cancer ([facility])     Chronic hypoxemic respiratory failure ([facility]) 02/06/2018    Community acquired pneumonia of right upper lobe of lung 03/19/2022    COPD (chronic obstructive pulmonary disease) ([facility])     Coronary atherosclerosis     COVID-19 virus detected 01/18/2021    DVT (deep venous thrombosis) ([facility])     Former smoker 11/22/2021    Gastroesophageal reflux disease     Hiatal hernia     Lung infiltrate on CT 01/22/2019    On home oxygen therapy     on 24/7    Osteopenia     Phlebitis     Pulmonary emphysema ([facility]) 12/12/2023    Pulmonary nodule 07/05/2016    S/P left heart catheterization by percutaneous approach 06/27/2013    Sepsis due to pneumonia ([facility]) 11/14/2017    Shortness of breath 09/23/2019    Shortness of breath 01/18/2021    Shortness of breath 11/22/2021    Wears glasses        FAMILY

## 2025-03-04 RX ORDER — GABAPENTIN 400 MG/1
400 CAPSULE ORAL EVERY EVENING
Qty: 90 CAPSULE | Refills: 0 | OUTPATIENT
Start: 2025-03-04 | End: 2025-06-02

## 2025-03-04 RX ORDER — GUAIFENESIN/DEXTROMETHORPHAN 100-10MG/5
5 SYRUP ORAL 3 TIMES DAILY PRN
Qty: 474 ML | Refills: 0 | Status: SHIPPED | OUTPATIENT
Start: 2025-03-04

## 2025-03-20 ENCOUNTER — CLINICAL SUPPORT (OUTPATIENT)
Dept: FAMILY MEDICINE CLINIC | Age: 80
End: 2025-03-20
Payer: MEDICARE

## 2025-03-20 DIAGNOSIS — Z86.718 HISTORY OF DVT OF LOWER EXTREMITY: ICD-10-CM

## 2025-03-20 DIAGNOSIS — Z86.711 HISTORY OF PULMONARY EMBOLISM: ICD-10-CM

## 2025-03-20 DIAGNOSIS — Z79.01 ANTICOAGULANT LONG-TERM USE: Primary | ICD-10-CM

## 2025-03-20 LAB
INTERNATIONAL NORMALIZATION RATIO, POC: 2.4
PROTHROMBIN TIME, POC: 29

## 2025-03-20 PROCEDURE — 85610 PROTHROMBIN TIME: CPT | Performed by: STUDENT IN AN ORGANIZED HEALTH CARE EDUCATION/TRAINING PROGRAM

## 2025-03-20 PROCEDURE — 36415 COLL VENOUS BLD VENIPUNCTURE: CPT | Performed by: STUDENT IN AN ORGANIZED HEALTH CARE EDUCATION/TRAINING PROGRAM

## 2025-03-20 NOTE — PROGRESS NOTES
Confirmed current warfarin 6mg on Mon, Wed, and Friday and 3 mg the rest  INR= 2.4 Keep same and repeat in 4 weeks.

## 2025-04-15 ENCOUNTER — TELEPHONE (OUTPATIENT)
Dept: FAMILY MEDICINE CLINIC | Age: 80
End: 2025-04-15

## 2025-04-15 DIAGNOSIS — J44.9 COPD, VERY SEVERE (HCC): Primary | Chronic | ICD-10-CM

## 2025-04-17 ENCOUNTER — CLINICAL SUPPORT (OUTPATIENT)
Dept: FAMILY MEDICINE CLINIC | Age: 80
End: 2025-04-17
Payer: MEDICARE

## 2025-04-17 DIAGNOSIS — Z86.711 HISTORY OF PULMONARY EMBOLISM: ICD-10-CM

## 2025-04-17 DIAGNOSIS — Z79.01 ANTICOAGULANT LONG-TERM USE: ICD-10-CM

## 2025-04-17 DIAGNOSIS — Z86.718 HISTORY OF DVT OF LOWER EXTREMITY: ICD-10-CM

## 2025-04-17 LAB
INTERNATIONAL NORMALIZATION RATIO, POC: 2.5
PROTHROMBIN TIME, POC: 30.2

## 2025-04-17 PROCEDURE — 36415 COLL VENOUS BLD VENIPUNCTURE: CPT | Performed by: STUDENT IN AN ORGANIZED HEALTH CARE EDUCATION/TRAINING PROGRAM

## 2025-04-17 PROCEDURE — 85610 PROTHROMBIN TIME: CPT | Performed by: STUDENT IN AN ORGANIZED HEALTH CARE EDUCATION/TRAINING PROGRAM

## 2025-04-17 NOTE — PROGRESS NOTES
Confirmed current warfarin 6mg on Mon, Wed, and Friday and 3 mg the rest  INR= 2.2 Keep same and repeat in 4 weeks.

## 2025-04-20 DIAGNOSIS — F41.9 ANXIETY: ICD-10-CM

## 2025-04-21 RX ORDER — HYDROXYZINE HYDROCHLORIDE 10 MG/1
10 TABLET, FILM COATED ORAL 3 TIMES DAILY PRN
Qty: 90 TABLET | Refills: 1 | Status: SHIPPED | OUTPATIENT
Start: 2025-04-21

## 2025-05-05 ENCOUNTER — OFFICE VISIT (OUTPATIENT)
Dept: FAMILY MEDICINE CLINIC | Age: 80
End: 2025-05-05
Payer: MEDICARE

## 2025-05-05 VITALS
HEART RATE: 77 BPM | SYSTOLIC BLOOD PRESSURE: 130 MMHG | HEIGHT: 64 IN | DIASTOLIC BLOOD PRESSURE: 64 MMHG | BODY MASS INDEX: 21.51 KG/M2 | WEIGHT: 126 LBS | OXYGEN SATURATION: 81 %

## 2025-05-05 DIAGNOSIS — J44.1 COPD WITH ACUTE EXACERBATION (HCC): ICD-10-CM

## 2025-05-05 DIAGNOSIS — N64.9 LESION OF RIGHT NIPPLE: Primary | ICD-10-CM

## 2025-05-05 PROCEDURE — G8399 PT W/DXA RESULTS DOCUMENT: HCPCS | Performed by: PHYSICIAN ASSISTANT

## 2025-05-05 PROCEDURE — 99213 OFFICE O/P EST LOW 20 MIN: CPT | Performed by: PHYSICIAN ASSISTANT

## 2025-05-05 PROCEDURE — 3023F SPIROM DOC REV: CPT | Performed by: PHYSICIAN ASSISTANT

## 2025-05-05 PROCEDURE — 1090F PRES/ABSN URINE INCON ASSESS: CPT | Performed by: PHYSICIAN ASSISTANT

## 2025-05-05 PROCEDURE — 1160F RVW MEDS BY RX/DR IN RCRD: CPT | Performed by: PHYSICIAN ASSISTANT

## 2025-05-05 PROCEDURE — 3078F DIAST BP <80 MM HG: CPT | Performed by: PHYSICIAN ASSISTANT

## 2025-05-05 PROCEDURE — G8420 CALC BMI NORM PARAMETERS: HCPCS | Performed by: PHYSICIAN ASSISTANT

## 2025-05-05 PROCEDURE — 1123F ACP DISCUSS/DSCN MKR DOCD: CPT | Performed by: PHYSICIAN ASSISTANT

## 2025-05-05 PROCEDURE — G8427 DOCREV CUR MEDS BY ELIG CLIN: HCPCS | Performed by: PHYSICIAN ASSISTANT

## 2025-05-05 PROCEDURE — 1036F TOBACCO NON-USER: CPT | Performed by: PHYSICIAN ASSISTANT

## 2025-05-05 PROCEDURE — 3075F SYST BP GE 130 - 139MM HG: CPT | Performed by: PHYSICIAN ASSISTANT

## 2025-05-05 PROCEDURE — 1159F MED LIST DOCD IN RCRD: CPT | Performed by: PHYSICIAN ASSISTANT

## 2025-05-05 RX ORDER — METHYLPREDNISOLONE 4 MG/1
TABLET ORAL
Qty: 1 KIT | Refills: 0 | Status: SHIPPED | OUTPATIENT
Start: 2025-05-05

## 2025-05-05 RX ORDER — AZITHROMYCIN 250 MG/1
TABLET, FILM COATED ORAL
Qty: 6 TABLET | Refills: 0 | Status: SHIPPED | OUTPATIENT
Start: 2025-05-05

## 2025-05-05 NOTE — PROGRESS NOTES
nipple.    Diagnoses and all orders for this visit:    Lesion of right nipple  -     Amb External Referral To Dermatology  Refer to dermatology    COPD with acute exacerbation (HCC)  -     methylPREDNISolone (MEDROL, JENNIFER,) 4 MG tablet; Use as directed  -     azithromycin (ZITHROMAX) 250 MG tablet; Use as directed  Initiate Z-Jennifer and Medrol Dosepak.  Monitor SpO2 closely.  ER for any worsening.       Medications Discontinued During This Encounter   Medication Reason    azithromycin (ZITHROMAX) 250 MG tablet Therapy completed    methylPREDNISolone (MEDROL, JENNIFER,) 4 MG tablet Therapy completed    benzonatate (TESSALON) 200 MG capsule Therapy completed        No follow-ups on file.     Plan of care reviewed with patient who verbalizes understanding and wishes to continue.   Patient to call with any questions or concerns.                 Please note that this chart was generated using dragon dictation software.  Although every effort was made to ensure the accuracy of this automated transcription, some errors in transcription may have occurred.    Electronically signed by THOR AVENDANO PA-C on 5/5/2025

## 2025-05-06 ENCOUNTER — TELEPHONE (OUTPATIENT)
Dept: PULMONOLOGY | Age: 80
End: 2025-05-06

## 2025-05-06 NOTE — TELEPHONE ENCOUNTER
[facility] pharmacy called requesting a med list for pt. Faxed it to [phone].    *Received confirmation.

## 2025-05-12 RX ORDER — DENOSUMAB 60 MG/ML
INJECTION SUBCUTANEOUS
OUTPATIENT
Start: 2025-05-12

## 2025-05-13 DIAGNOSIS — M19.90 ARTHRITIS: ICD-10-CM

## 2025-05-13 DIAGNOSIS — J44.1 COPD WITH ACUTE EXACERBATION (HCC): ICD-10-CM

## 2025-05-13 DIAGNOSIS — F41.9 ANXIETY: ICD-10-CM

## 2025-05-13 DIAGNOSIS — K21.9 GASTROESOPHAGEAL REFLUX DISEASE, UNSPECIFIED WHETHER ESOPHAGITIS PRESENT: ICD-10-CM

## 2025-05-13 DIAGNOSIS — R10.9 ABDOMINAL CRAMPS: ICD-10-CM

## 2025-05-13 DIAGNOSIS — J44.9 COPD, VERY SEVERE (HCC): ICD-10-CM

## 2025-05-13 DIAGNOSIS — E03.9 ACQUIRED HYPOTHYROIDISM: ICD-10-CM

## 2025-05-13 DIAGNOSIS — Z79.01 ANTICOAGULANT LONG-TERM USE: ICD-10-CM

## 2025-05-13 DIAGNOSIS — E87.6 HYPOKALEMIA: ICD-10-CM

## 2025-05-13 DIAGNOSIS — E78.00 PURE HYPERCHOLESTEROLEMIA: ICD-10-CM

## 2025-05-13 DIAGNOSIS — I10 ESSENTIAL HYPERTENSION: ICD-10-CM

## 2025-05-13 RX ORDER — THEOPHYLLINE 450 MG/1
TABLET, EXTENDED RELEASE ORAL
Qty: 90 TABLET | Refills: 1 | Status: SHIPPED | OUTPATIENT
Start: 2025-05-13

## 2025-05-13 RX ORDER — HYDROXYZINE HYDROCHLORIDE 25 MG/1
25 TABLET, FILM COATED ORAL NIGHTLY PRN
Qty: 90 TABLET | Refills: 1 | Status: SHIPPED | OUTPATIENT
Start: 2025-05-13

## 2025-05-13 RX ORDER — POTASSIUM CHLORIDE 750 MG/1
TABLET, EXTENDED RELEASE ORAL
Qty: 135 TABLET | Refills: 1 | Status: SHIPPED | OUTPATIENT
Start: 2025-05-13

## 2025-05-13 RX ORDER — HYDROCORTISONE 25 MG/G
CREAM TOPICAL 2 TIMES DAILY PRN
Qty: 60 G | Refills: 3 | Status: SHIPPED | OUTPATIENT
Start: 2025-05-13 | End: 2025-08-11

## 2025-05-13 RX ORDER — GABAPENTIN 400 MG/1
400 CAPSULE ORAL EVERY EVENING
Qty: 90 CAPSULE | Refills: 0 | Status: SHIPPED | OUTPATIENT
Start: 2025-05-13 | End: 2025-08-11

## 2025-05-13 RX ORDER — ROSUVASTATIN CALCIUM 20 MG/1
20 TABLET, COATED ORAL NIGHTLY
Qty: 90 TABLET | Refills: 1 | Status: SHIPPED | OUTPATIENT
Start: 2025-05-13

## 2025-05-13 RX ORDER — MONTELUKAST SODIUM 10 MG/1
10 TABLET ORAL NIGHTLY
Qty: 90 TABLET | Refills: 1 | Status: SHIPPED | OUTPATIENT
Start: 2025-05-13

## 2025-05-13 RX ORDER — GUAIFENESIN/DEXTROMETHORPHAN 100-10MG/5
5 SYRUP ORAL 3 TIMES DAILY PRN
Qty: 474 ML | Refills: 0 | Status: SHIPPED | OUTPATIENT
Start: 2025-05-13

## 2025-05-13 RX ORDER — WARFARIN SODIUM 6 MG/1
TABLET ORAL
Qty: 90 TABLET | Refills: 1 | Status: SHIPPED | OUTPATIENT
Start: 2025-05-13

## 2025-05-13 RX ORDER — LEVOTHYROXINE SODIUM 75 UG/1
75 TABLET ORAL DAILY
Qty: 90 TABLET | Refills: 1 | Status: SHIPPED | OUTPATIENT
Start: 2025-05-13

## 2025-05-13 RX ORDER — DICYCLOMINE HYDROCHLORIDE 10 MG/1
10 CAPSULE ORAL 2 TIMES DAILY
Qty: 180 CAPSULE | Refills: 1 | Status: SHIPPED | OUTPATIENT
Start: 2025-05-13

## 2025-05-13 RX ORDER — BUDESONIDE, GLYCOPYRROLATE, AND FORMOTEROL FUMARATE 160; 9; 4.8 UG/1; UG/1; UG/1
2 AEROSOL, METERED RESPIRATORY (INHALATION) 2 TIMES DAILY
Qty: 3 EACH | Refills: 3 | Status: SHIPPED | OUTPATIENT
Start: 2025-05-13

## 2025-05-13 RX ORDER — VITAMIN E (DL,TOCOPHERYL ACET) 180 MG
1 CAPSULE ORAL DAILY
Qty: 90 CAPSULE | Refills: 1 | Status: SHIPPED | OUTPATIENT
Start: 2025-05-13

## 2025-05-13 RX ORDER — ALBUTEROL SULFATE 90 UG/1
INHALANT RESPIRATORY (INHALATION)
Qty: 3 EACH | Refills: 3 | Status: SHIPPED | OUTPATIENT
Start: 2025-05-13

## 2025-05-13 RX ORDER — ESOMEPRAZOLE MAGNESIUM 40 MG/1
CAPSULE, DELAYED RELEASE ORAL
Qty: 180 CAPSULE | Refills: 1 | Status: SHIPPED | OUTPATIENT
Start: 2025-05-13

## 2025-05-13 RX ORDER — ROFLUMILAST 500 UG/1
500 TABLET ORAL DAILY
Qty: 90 TABLET | Refills: 1 | Status: SHIPPED | OUTPATIENT
Start: 2025-05-13

## 2025-05-13 NOTE — TELEPHONE ENCOUNTER
PT WANTS TO START PILL JENNIFER UP W/[address]. CAN YOU SEND LIST AND RAJENDRA MEDS FOR PILL JENNIFER.    LV-2/21  NA-8/22    YVONNE

## 2025-05-15 ENCOUNTER — APPOINTMENT (OUTPATIENT)
Dept: GENERAL RADIOLOGY | Age: 80
End: 2025-05-15
Payer: MEDICARE

## 2025-05-15 ENCOUNTER — HOSPITAL ENCOUNTER (EMERGENCY)
Age: 80
Discharge: HOME | End: 2025-05-15
Attending: EMERGENCY MEDICINE
Payer: MEDICARE

## 2025-05-15 ENCOUNTER — APPOINTMENT (OUTPATIENT)
Dept: CT IMAGING | Age: 80
End: 2025-05-15
Payer: MEDICARE

## 2025-05-15 VITALS
RESPIRATION RATE: 25 BRPM | BODY MASS INDEX: 24.75 KG/M2 | TEMPERATURE: 98 F | DIASTOLIC BLOOD PRESSURE: 69 MMHG | HEIGHT: 64 IN | SYSTOLIC BLOOD PRESSURE: 133 MMHG | OXYGEN SATURATION: 96 % | WEIGHT: 145 LBS | HEART RATE: 78 BPM

## 2025-05-15 DIAGNOSIS — S51.811A SKIN TEAR OF RIGHT FOREARM WITHOUT COMPLICATION, INITIAL ENCOUNTER: ICD-10-CM

## 2025-05-15 DIAGNOSIS — T14.8XXA HEMATOMA: Primary | ICD-10-CM

## 2025-05-15 LAB
ABO + RH BLD: NORMAL
ALBUMIN SERPL-MCNC: 3.5 G/DL (ref 3.4–5)
ALBUMIN/GLOB SERPL: 2.3 {RATIO} (ref 1.1–2.2)
ALP SERPL-CCNC: 61 U/L (ref 40–129)
ALT SERPL-CCNC: 31 U/L (ref 10–40)
ANION GAP SERPL CALCULATED.3IONS-SCNC: 9 MMOL/L (ref 9–17)
AST SERPL-CCNC: 34 U/L (ref 15–37)
BILIRUB SERPL-MCNC: 0.4 MG/DL (ref 0–1)
BLOOD BANK SAMPLE EXPIRATION: NORMAL
BLOOD GROUP ANTIBODIES SERPL: NEGATIVE
BUN SERPL-MCNC: 19 MG/DL (ref 7–20)
CALCIUM SERPL-MCNC: 10 MG/DL (ref 8.3–10.6)
CHLORIDE SERPL-SCNC: 94 MMOL/L (ref 99–110)
CO2 SERPL-SCNC: 39 MMOL/L (ref 21–32)
CREAT SERPL-MCNC: 1 MG/DL (ref 0.6–1.2)
EKG ATRIAL RATE: 73 BPM
EKG DIAGNOSIS: NORMAL
EKG P AXIS: 57 DEGREES
EKG P-R INTERVAL: 116 MS
EKG Q-T INTERVAL: 432 MS
EKG QRS DURATION: 154 MS
EKG QTC CALCULATION (BAZETT): 475 MS
EKG R AXIS: -49 DEGREES
EKG T AXIS: 58 DEGREES
EKG VENTRICULAR RATE: 73 BPM
ERYTHROCYTE [DISTWIDTH] IN BLOOD BY AUTOMATED COUNT: 14.6 % (ref 11.7–14.9)
GFR, ESTIMATED: 56 ML/MIN/1.73M2
GLUCOSE SERPL-MCNC: 72 MG/DL (ref 74–99)
HCT VFR BLD AUTO: 38.3 % (ref 37–47)
HGB BLD-MCNC: 11.8 G/DL (ref 12.5–16)
INR PPP: 3.1
MCH RBC QN AUTO: 29.4 PG (ref 27–31)
MCHC RBC AUTO-ENTMCNC: 30.8 G/DL (ref 32–36)
MCV RBC AUTO: 95.3 FL (ref 78–100)
PARTIAL THROMBOPLASTIN TIME: 38 SEC (ref 25.1–37.1)
PLATELET # BLD AUTO: 189 K/UL (ref 140–440)
PMV BLD AUTO: 10.5 FL (ref 7.5–11.1)
POTASSIUM SERPL-SCNC: 4 MMOL/L (ref 3.5–5.1)
PROT SERPL-MCNC: 5 G/DL (ref 6.4–8.2)
PROTHROMBIN TIME: 32.1 SEC (ref 11.7–14.5)
RBC # BLD AUTO: 4.02 M/UL (ref 4.2–5.4)
SODIUM SERPL-SCNC: 142 MMOL/L (ref 136–145)
TROPONIN I SERPL HS-MCNC: 41 NG/L (ref 0–14)
TROPONIN I SERPL HS-MCNC: 42 NG/L (ref 0–14)
WBC OTHER # BLD: 9 K/UL (ref 4–10.5)

## 2025-05-15 PROCEDURE — 85730 THROMBOPLASTIN TIME PARTIAL: CPT

## 2025-05-15 PROCEDURE — 85027 COMPLETE CBC AUTOMATED: CPT

## 2025-05-15 PROCEDURE — 94640 AIRWAY INHALATION TREATMENT: CPT

## 2025-05-15 PROCEDURE — 73502 X-RAY EXAM HIP UNI 2-3 VIEWS: CPT

## 2025-05-15 PROCEDURE — 84484 ASSAY OF TROPONIN QUANT: CPT

## 2025-05-15 PROCEDURE — 73090 X-RAY EXAM OF FOREARM: CPT

## 2025-05-15 PROCEDURE — 36415 COLL VENOUS BLD VENIPUNCTURE: CPT

## 2025-05-15 PROCEDURE — 12001 RPR S/N/AX/GEN/TRNK 2.5CM/<: CPT

## 2025-05-15 PROCEDURE — 6370000000 HC RX 637 (ALT 250 FOR IP): Performed by: EMERGENCY MEDICINE

## 2025-05-15 PROCEDURE — 70450 CT HEAD/BRAIN W/O DYE: CPT

## 2025-05-15 PROCEDURE — 73030 X-RAY EXAM OF SHOULDER: CPT

## 2025-05-15 PROCEDURE — 71045 X-RAY EXAM CHEST 1 VIEW: CPT

## 2025-05-15 PROCEDURE — 80053 COMPREHEN METABOLIC PANEL: CPT

## 2025-05-15 PROCEDURE — 93005 ELECTROCARDIOGRAM TRACING: CPT | Performed by: EMERGENCY MEDICINE

## 2025-05-15 PROCEDURE — 86900 BLOOD TYPING SEROLOGIC ABO: CPT

## 2025-05-15 PROCEDURE — 73080 X-RAY EXAM OF ELBOW: CPT

## 2025-05-15 PROCEDURE — 86850 RBC ANTIBODY SCREEN: CPT

## 2025-05-15 PROCEDURE — 85610 PROTHROMBIN TIME: CPT

## 2025-05-15 PROCEDURE — 99285 EMERGENCY DEPT VISIT HI MDM: CPT

## 2025-05-15 PROCEDURE — 93010 ELECTROCARDIOGRAM REPORT: CPT | Performed by: INTERNAL MEDICINE

## 2025-05-15 PROCEDURE — 86901 BLOOD TYPING SEROLOGIC RH(D): CPT

## 2025-05-15 PROCEDURE — 72170 X-RAY EXAM OF PELVIS: CPT

## 2025-05-15 RX ORDER — ACETAMINOPHEN 500 MG
1000 TABLET ORAL ONCE
Status: COMPLETED | OUTPATIENT
Start: 2025-05-15 | End: 2025-05-15

## 2025-05-15 RX ORDER — IPRATROPIUM BROMIDE AND ALBUTEROL SULFATE 2.5; .5 MG/3ML; MG/3ML
1 SOLUTION RESPIRATORY (INHALATION) ONCE
Status: COMPLETED | OUTPATIENT
Start: 2025-05-15 | End: 2025-05-15

## 2025-05-15 RX ADMIN — IPRATROPIUM BROMIDE AND ALBUTEROL SULFATE 1 DOSE: .5; 2.5 SOLUTION RESPIRATORY (INHALATION) at 15:10

## 2025-05-15 RX ADMIN — ACETAMINOPHEN 1000 MG: 500 TABLET ORAL at 14:54

## 2025-05-15 ASSESSMENT — PAIN SCALES - GENERAL
PAINLEVEL_OUTOF10: 5
PAINLEVEL_OUTOF10: 10
PAINLEVEL_OUTOF10: 10
PAINLEVEL_OUTOF10: 0

## 2025-05-15 ASSESSMENT — LIFESTYLE VARIABLES
HOW MANY STANDARD DRINKS CONTAINING ALCOHOL DO YOU HAVE ON A TYPICAL DAY: PATIENT DOES NOT DRINK
HOW OFTEN DO YOU HAVE A DRINK CONTAINING ALCOHOL: NEVER

## 2025-05-15 ASSESSMENT — PAIN - FUNCTIONAL ASSESSMENT
PAIN_FUNCTIONAL_ASSESSMENT: 0-10
PAIN_FUNCTIONAL_ASSESSMENT: NONE - DENIES PAIN

## 2025-05-15 ASSESSMENT — PAIN DESCRIPTION - LOCATION: LOCATION: HEAD

## 2025-05-16 NOTE — ED PROVIDER NOTES
[facility]    Emergency Department Encounter      Patient: Janessa Davidson  MRN: [id number]  : 1945  Date of Evaluation: 5/15/2025  ED Provider:  Viridiana Montes De Oca DO    Triage Chief Complaint:   Fall (Was at the salon and went to turn and ended up on the floor. Hit head on cement floor. Small lac noted to right eyebrow. C/o right hip pain. Able to move right hip)    [place]:  Janessa Davidson is a 80 y.o. female who  has a past medical history of Age-related osteoporosis without current pathological fracture, Anticoagulant long-term use, Arthritis, Cancer (HCC), Chronic hypoxemic respiratory failure (HCC), Community acquired pneumonia of right upper lobe of lung, COPD (chronic obstructive pulmonary disease) (HCC), Coronary atherosclerosis, COVID-19 virus detected, DVT (deep venous thrombosis) ([facility]), Former smoker, Gastroesophageal reflux disease, Hiatal hernia, Lung infiltrate on CT, On home oxygen therapy, Osteopenia, Phlebitis, Pulmonary emphysema (HCC), Pulmonary nodule, S/P left heart catheterization by percutaneous approach, Sepsis due to pneumonia ([facility]), Shortness of breath, Shortness of breath, Shortness of breath, and Wears glasses. and presents with a mechanical fall when she was at the salon.  She just pivoted and fell.  She landed on her right side, hitting her face, right arm and hip.  No LOC.  On blood thinners.  Would like tylenol for pain.  Is on oxygen and uses her inhaler regularly.  She would like a breathing treatment.    ROS - see HPI, below listed is current ROS at time of my eval:   systems reviewed and negative except as above.     Past Medical History:   Diagnosis Date    Age-related osteoporosis without current pathological fracture 2024    Anticoagulant long-term use     Arthritis     Cancer (HCC)     Chronic hypoxemic respiratory failure (HCC) 2018    Community acquired pneumonia of right upper lobe of lung 2022    COPD (chronic obstructive

## 2025-05-20 ENCOUNTER — OFFICE VISIT (OUTPATIENT)
Dept: FAMILY MEDICINE CLINIC | Age: 80
End: 2025-05-20
Payer: MEDICARE

## 2025-05-20 VITALS
SYSTOLIC BLOOD PRESSURE: 126 MMHG | HEIGHT: 64 IN | OXYGEN SATURATION: 83 % | BODY MASS INDEX: 22.2 KG/M2 | HEART RATE: 85 BPM | DIASTOLIC BLOOD PRESSURE: 84 MMHG | TEMPERATURE: 96.8 F | WEIGHT: 130 LBS

## 2025-05-20 DIAGNOSIS — S70.01XA HEMATOMA OF RIGHT HIP, INITIAL ENCOUNTER: ICD-10-CM
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
## 2025-05-20 DIAGNOSIS — S50.11XA HEMATOMA OF RIGHT FOREARM: ICD-10-CM

## 2025-05-20 DIAGNOSIS — J44.1 COPD WITH ACUTE EXACERBATION (HCC): ICD-10-CM

## 2025-05-20 DIAGNOSIS — T14.8XXA TRAUMATIC ECCHYMOSIS: ICD-10-CM

## 2025-05-20 PROCEDURE — G8420 CALC BMI NORM PARAMETERS: HCPCS | Performed by: PHYSICIAN ASSISTANT

## 2025-05-20 PROCEDURE — 99214 OFFICE O/P EST MOD 30 MIN: CPT | Performed by: PHYSICIAN ASSISTANT

## 2025-05-20 PROCEDURE — 1090F PRES/ABSN URINE INCON ASSESS: CPT | Performed by: PHYSICIAN ASSISTANT

## 2025-05-20 PROCEDURE — 1160F RVW MEDS BY RX/DR IN RCRD: CPT | Performed by: PHYSICIAN ASSISTANT

## 2025-05-20 PROCEDURE — 1036F TOBACCO NON-USER: CPT | Performed by: PHYSICIAN ASSISTANT

## 2025-05-20 PROCEDURE — 1123F ACP DISCUSS/DSCN MKR DOCD: CPT | Performed by: PHYSICIAN ASSISTANT

## 2025-05-20 PROCEDURE — 3023F SPIROM DOC REV: CPT | Performed by: PHYSICIAN ASSISTANT

## 2025-05-20 PROCEDURE — 3074F SYST BP LT 130 MM HG: CPT | Performed by: PHYSICIAN ASSISTANT

## 2025-05-20 PROCEDURE — 1159F MED LIST DOCD IN RCRD: CPT | Performed by: PHYSICIAN ASSISTANT

## 2025-05-20 PROCEDURE — 3079F DIAST BP 80-89 MM HG: CPT | Performed by: PHYSICIAN ASSISTANT

## 2025-05-20 PROCEDURE — G8399 PT W/DXA RESULTS DOCUMENT: HCPCS | Performed by: PHYSICIAN ASSISTANT

## 2025-05-20 PROCEDURE — G8427 DOCREV CUR MEDS BY ELIG CLIN: HCPCS | Performed by: PHYSICIAN ASSISTANT

## 2025-05-20 RX ORDER — PREDNISONE 20 MG/1
20 TABLET ORAL 2 TIMES DAILY
Qty: 10 TABLET | Refills: 0 | Status: SHIPPED | OUTPATIENT
Start: 2025-05-20 | End: 2025-05-25

## 2025-05-20 RX ORDER — DOXYCYCLINE HYCLATE 100 MG
100 TABLET ORAL 2 TIMES DAILY
Qty: 20 TABLET | Refills: 0 | Status: SHIPPED | OUTPATIENT
Start: 2025-05-20 | End: 2025-05-30

## 2025-05-20 NOTE — PROGRESS NOTES
5/20/2025    Janessa Davidson    Chief Complaint   Patient presents with    Follow-Up from Hospital     Fall  - on 5/16/25 pt was at the salon sat her purse down turned around and fell. Small cut above right eye. No stitches required. Negative for fx's. Today right hip & right elbow pain.     COPD     - pt complaining of sob & wheezing, denies cough. Sx started 5/20/25. Tried nebulizer tx helped some.        HPI  History was obtained from patient.  Janessa is a 80 y.o. female who presents today for ER follow-up.  5 days ago, patient had a mechanical fall while at the hair salon.  She describes a pivot and fall.  She landed on her right side, hitting her face, right arm, and right hip.  There was no loss of consciousness.  CT head and multiple x-rays were obtained in the ED which I reviewed today.  She complains of mild pain right hip overlying hematoma.  She also has some bruising and hematoma on right forearm which she states she continues to accidentally strike on things.  She is right-hand dominant.  She denies any signs of infection.  She denies any general feeling, fever, chills.  She denies any light sensitivity, vision changes, nausea, headaches.  She states she does not need any medication to help with pain.  She is using Tylenol as needed.      5/15/2025 x-ray right hip  IMPRESSION:  1. Osteopenia and degenerative change without acute fracture or dislocation of   the right hip.  2. Ovoid 10 cm high density region in the soft tissues overlying the right hip,   probably hematoma.    5/15/2025 x-ray pelvis  IMPRESSION:  1. No acute osseous abnormality    5/15/2025 x-ray right ulna and radius  IMPRESSION:  1. Soft tissue swelling without acute osseous abnormality    5/15/2025 x-ray right elbow  IMPRESSION:  1. Forearm soft tissue swelling without radiographic evidence of acute osseous   Abnormality    5/15/2025 x-ray right shoulder  IMPRESSION:  1. Radiographic findings suggesting full-thickness rotator cuff

## 2025-05-21 RX ORDER — EPINEPHRINE 1 MG/ML
0.3 INJECTION, SOLUTION INTRAMUSCULAR; SUBCUTANEOUS PRN
OUTPATIENT
Start: 2025-06-16

## 2025-05-21 RX ORDER — ALBUTEROL SULFATE 90 UG/1
4 INHALANT RESPIRATORY (INHALATION) PRN
OUTPATIENT
Start: 2025-06-16

## 2025-05-21 RX ORDER — ACETAMINOPHEN 325 MG/1
650 TABLET ORAL
OUTPATIENT
Start: 2025-06-16

## 2025-05-21 RX ORDER — FAMOTIDINE 10 MG/ML
20 INJECTION, SOLUTION INTRAVENOUS
OUTPATIENT
Start: 2025-06-16

## 2025-05-21 RX ORDER — ONDANSETRON 2 MG/ML
8 INJECTION INTRAMUSCULAR; INTRAVENOUS
OUTPATIENT
Start: 2025-06-16

## 2025-05-21 RX ORDER — DIPHENHYDRAMINE HYDROCHLORIDE 50 MG/ML
50 INJECTION, SOLUTION INTRAMUSCULAR; INTRAVENOUS
OUTPATIENT
Start: 2025-06-16

## 2025-05-21 RX ORDER — SODIUM CHLORIDE 9 MG/ML
INJECTION, SOLUTION INTRAVENOUS CONTINUOUS
OUTPATIENT
Start: 2025-06-16

## 2025-05-21 RX ORDER — HYDROCORTISONE SODIUM SUCCINATE 100 MG/2ML
100 INJECTION INTRAMUSCULAR; INTRAVENOUS
OUTPATIENT
Start: 2025-06-16

## 2025-06-02 ENCOUNTER — OFFICE VISIT (OUTPATIENT)
Dept: FAMILY MEDICINE CLINIC | Age: 80
End: 2025-06-02
Payer: MEDICARE

## 2025-06-02 VITALS
HEART RATE: 83 BPM | SYSTOLIC BLOOD PRESSURE: 140 MMHG | OXYGEN SATURATION: 91 % | BODY MASS INDEX: 22.36 KG/M2 | HEIGHT: 64 IN | TEMPERATURE: 97.3 F | DIASTOLIC BLOOD PRESSURE: 58 MMHG | WEIGHT: 131 LBS

## 2025-06-02 DIAGNOSIS — J96.11 CHRONIC HYPOXEMIC RESPIRATORY FAILURE (HCC): Chronic | ICD-10-CM

## 2025-06-02 DIAGNOSIS — Z86.711 HISTORY OF PULMONARY EMBOLISM: ICD-10-CM

## 2025-06-02 DIAGNOSIS — Z86.718 HISTORY OF DVT OF LOWER EXTREMITY: ICD-10-CM

## 2025-06-02 DIAGNOSIS — L03.116 BILATERAL CELLULITIS OF LOWER LEG: ICD-10-CM

## 2025-06-02 DIAGNOSIS — R60.0 BILATERAL LOWER EXTREMITY EDEMA: ICD-10-CM

## 2025-06-02 DIAGNOSIS — R06.09 CHRONIC DYSPNEA: ICD-10-CM

## 2025-06-02 DIAGNOSIS — J44.9 COPD, VERY SEVERE (HCC): Primary | Chronic | ICD-10-CM

## 2025-06-02 DIAGNOSIS — Z79.01 ANTICOAGULANT LONG-TERM USE: ICD-10-CM

## 2025-06-02 DIAGNOSIS — L03.115 BILATERAL CELLULITIS OF LOWER LEG: ICD-10-CM

## 2025-06-02 LAB
INTERNATIONAL NORMALIZATION RATIO, POC: 6.1
PROTHROMBIN TIME, POC: 72.9

## 2025-06-02 PROCEDURE — 3023F SPIROM DOC REV: CPT | Performed by: PHYSICIAN ASSISTANT

## 2025-06-02 PROCEDURE — 99214 OFFICE O/P EST MOD 30 MIN: CPT | Performed by: PHYSICIAN ASSISTANT

## 2025-06-02 PROCEDURE — 3078F DIAST BP <80 MM HG: CPT | Performed by: PHYSICIAN ASSISTANT

## 2025-06-02 PROCEDURE — G8399 PT W/DXA RESULTS DOCUMENT: HCPCS | Performed by: PHYSICIAN ASSISTANT

## 2025-06-02 PROCEDURE — G8420 CALC BMI NORM PARAMETERS: HCPCS | Performed by: PHYSICIAN ASSISTANT

## 2025-06-02 PROCEDURE — 1159F MED LIST DOCD IN RCRD: CPT | Performed by: PHYSICIAN ASSISTANT

## 2025-06-02 PROCEDURE — 1123F ACP DISCUSS/DSCN MKR DOCD: CPT | Performed by: PHYSICIAN ASSISTANT

## 2025-06-02 PROCEDURE — 1036F TOBACCO NON-USER: CPT | Performed by: PHYSICIAN ASSISTANT

## 2025-06-02 PROCEDURE — 1160F RVW MEDS BY RX/DR IN RCRD: CPT | Performed by: PHYSICIAN ASSISTANT

## 2025-06-02 PROCEDURE — G8427 DOCREV CUR MEDS BY ELIG CLIN: HCPCS | Performed by: PHYSICIAN ASSISTANT

## 2025-06-02 PROCEDURE — 1090F PRES/ABSN URINE INCON ASSESS: CPT | Performed by: PHYSICIAN ASSISTANT

## 2025-06-02 PROCEDURE — 3077F SYST BP >= 140 MM HG: CPT | Performed by: PHYSICIAN ASSISTANT

## 2025-06-02 RX ORDER — DOXYCYCLINE HYCLATE 100 MG
100 TABLET ORAL 2 TIMES DAILY
Qty: 20 TABLET | Refills: 0 | Status: ON HOLD | OUTPATIENT
Start: 2025-06-02 | End: 2025-06-05 | Stop reason: HOSPADM

## 2025-06-02 RX ORDER — PREDNISONE 20 MG/1
20 TABLET ORAL 2 TIMES DAILY
Qty: 10 TABLET | Refills: 0 | Status: SHIPPED | OUTPATIENT
Start: 2025-06-02 | End: 2025-06-07

## 2025-06-02 NOTE — PROGRESS NOTES
6/2/2025    Janessa Davidson    Chief Complaint   Patient presents with    possible cellulitis     - bilateral lower extremity redness, pain & swelling. 1 week. Worse over past 2 days.     Shortness of Breath     - sob even while wearing oxygen, intermittent \"tightness in chest, burning sensation\" 10 days. Worse over the past 2 days.     Coagulation Disorder     - confirmed current warfarin 6 mg M W & F 3 mg all other days. Inr 6.1. per Sana hold warfarin 6/2/25 & 6/3/25.on 6/4/25 take 3 mg warfarin then change to 6 mg M & F 3 mg all other days. Recheck in 2 weeks.     HPI  History was obtained from patient  Janessa is a 80 y.o. female who presents today with complaints of bilateral lower leg redness, pain for the past 1 week.  She is also feeling more short of breath than usual.    She has very severe COPD, chronic hypoxemic respiratory failure, pulmonary hypertension- follows Dr. Judge.  She sees him this week.  She is on continuous oxygen, theophylline, Daliresp Breo, prednisone, albuterol    She is following Dr Newsome now due to chronic edema and hyperpigmentation bilateral lower legs.  I do not have these records.   Jose Juan planned for near future.    Recent fall that caused significant bruising to both lower legs, and right upper extremity.  Large hematoma right arm is improving.  She is on Coumadin.  Will check INR today.  Recent abx use.      REVIEW OF SYMPTOMS    Constitutional:  Denies fever, chills  Eyes:  Denies photophobia or vision changes  ENT:  Denies URI symptoms  Cardiovascular:  Denies chest pain, palpitations   Respiratory:  Admits chronic cough or shortness of breath.  Reports worsening dyspnea the last few days  GI:  Denies abdominal pain, nausea, vomiting, or bowel changes  Skin:  No rashes  Lower legs:  Admits bruising, redness, swelling, and pain to both lower legs  Neurologic:  Denies headache, focal weakness, or sensory changes      PAST MEDICAL HISTORY  Past Medical History:

## 2025-06-02 NOTE — PROGRESS NOTES
confirmed current warfarin 6 mg M W & F 3 mg all other days. Inr 6.1. per Sana hold warfarin 6/2/25 & 6/3/25.on 6/4/25 take 3 mg warfarin then change to 6 mg M & F 3 mg all other days. Recheck in 2 weeks.

## 2025-06-03 ENCOUNTER — ANESTHESIA EVENT (OUTPATIENT)
Dept: ENDOSCOPY | Age: 80
DRG: 605 | End: 2025-06-03
Payer: MEDICARE

## 2025-06-03 ENCOUNTER — RESULTS FOLLOW-UP (OUTPATIENT)
Dept: FAMILY MEDICINE CLINIC | Age: 80
End: 2025-06-03

## 2025-06-03 ENCOUNTER — APPOINTMENT (OUTPATIENT)
Dept: GENERAL RADIOLOGY | Age: 80
DRG: 605 | End: 2025-06-03
Payer: MEDICARE

## 2025-06-03 ENCOUNTER — HOSPITAL ENCOUNTER (INPATIENT)
Age: 80
LOS: 2 days | Discharge: HOME HEALTH | DRG: 605 | End: 2025-06-05
Attending: INTERNAL MEDICINE | Admitting: INTERNAL MEDICINE
Payer: MEDICARE

## 2025-06-03 DIAGNOSIS — J44.1 COPD EXACERBATION (HCC): Primary | ICD-10-CM

## 2025-06-03 DIAGNOSIS — L03.115 CELLULITIS OF RIGHT LEG: ICD-10-CM

## 2025-06-03 DIAGNOSIS — R79.89 ELEVATED BRAIN NATRIURETIC PEPTIDE (BNP) LEVEL: ICD-10-CM

## 2025-06-03 PROBLEM — D64.9 ACUTE ANEMIA: Status: ACTIVE | Noted: 2025-06-03

## 2025-06-03 LAB
ALBUMIN SERPL-MCNC: 3.5 G/DL (ref 3.4–5)
ALBUMIN SERPL-MCNC: 3.5 G/DL (ref 3.4–5)
ALBUMIN/GLOB SERPL: 2.2 {RATIO} (ref 1.1–2.2)
ALBUMIN/GLOB SERPL: 2.4 {RATIO} (ref 1.1–2.2)
ALP SERPL-CCNC: 75 U/L (ref 40–129)
ALP SERPL-CCNC: 77 U/L (ref 40–129)
ALT SERPL-CCNC: 29 U/L (ref 10–40)
ALT SERPL-CCNC: 30 U/L (ref 10–40)
ANION GAP SERPL CALCULATED.3IONS-SCNC: 10 MMOL/L (ref 3–16)
ANION GAP SERPL CALCULATED.3IONS-SCNC: 7 MMOL/L (ref 9–17)
ARTERIAL PATENCY WRIST A: NO
AST SERPL-CCNC: 32 U/L (ref 15–37)
AST SERPL-CCNC: 34 U/L (ref 15–37)
BASOPHILS # BLD: 0 K/UL (ref 0–0.2)
BASOPHILS # BLD: 0.03 K/UL
BASOPHILS NFR BLD: 0 % (ref 0–1)
BASOPHILS NFR BLD: 0.1 %
BILIRUB SERPL-MCNC: 0.4 MG/DL (ref 0–1)
BILIRUB SERPL-MCNC: 0.5 MG/DL (ref 0–1)
BNP SERPL-MCNC: 3509 PG/ML (ref 0–450)
BODY TEMPERATURE: 37
BUN SERPL-MCNC: 14 MG/DL (ref 7–20)
BUN SERPL-MCNC: 16 MG/DL (ref 7–20)
CALCIUM SERPL-MCNC: 9.4 MG/DL (ref 8.3–10.6)
CALCIUM SERPL-MCNC: 9.7 MG/DL (ref 8.3–10.6)
CHLORIDE SERPL-SCNC: 94 MMOL/L (ref 99–110)
CHLORIDE SERPL-SCNC: 94 MMOL/L (ref 99–110)
CO2 SERPL-SCNC: 38 MMOL/L (ref 21–32)
CO2 SERPL-SCNC: 41 MMOL/L (ref 21–32)
COHGB MFR BLD: 1 % (ref 0.5–1.5)
CREAT SERPL-MCNC: 0.9 MG/DL (ref 0.6–1.2)
CREAT SERPL-MCNC: 0.9 MG/DL (ref 0.6–1.2)
DEPRECATED RDW RBC AUTO: 17.7 % (ref 12.4–15.4)
EKG DIAGNOSIS: NORMAL
EKG Q-T INTERVAL: 406 MS
EKG QRS DURATION: 156 MS
EKG QTC CALCULATION (BAZETT): 496 MS
EKG R AXIS: -46 DEGREES
EKG T AXIS: 57 DEGREES
EKG VENTRICULAR RATE: 90 BPM
EOSINOPHIL # BLD: 0 K/UL (ref 0–0.6)
EOSINOPHIL # BLD: 0.04 K/UL
EOSINOPHIL NFR BLD: 0 %
EOSINOPHILS RELATIVE PERCENT: 1 % (ref 0–3)
ERYTHROCYTE [DISTWIDTH] IN BLOOD BY AUTOMATED COUNT: 16.1 % (ref 11.7–14.9)
GFR SERPLBLD CREATININE-BSD FMLA CKD-EPI: 65 ML/MIN/{1.73_M2}
GFR, ESTIMATED: 60 ML/MIN/1.73M2
GLUCOSE SERPL-MCNC: 117 MG/DL (ref 70–99)
GLUCOSE SERPL-MCNC: 86 MG/DL (ref 74–99)
HCO3 VENOUS: 45.9 MMOL/L (ref 22–29)
HCT VFR BLD AUTO: 29.6 % (ref 37–47)
HCT VFR BLD AUTO: 30.5 % (ref 36–48)
HCT VFR BLD AUTO: 32.4 % (ref 37–47)
HGB BLD-MCNC: 8.6 G/DL (ref 12.5–16)
HGB BLD-MCNC: 9.3 G/DL (ref 12.5–16)
HGB BLD-MCNC: 9.6 G/DL (ref 12–16)
IMM GRANULOCYTES # BLD AUTO: 0.03 K/UL
IMM GRANULOCYTES NFR BLD: 0 %
INR PPP: 4.6
LYMPHOCYTES # BLD: 0.3 K/UL (ref 1–5.1)
LYMPHOCYTES NFR BLD: 1.16 K/UL
LYMPHOCYTES NFR BLD: 3.1 %
LYMPHOCYTES RELATIVE PERCENT: 16 % (ref 24–44)
MAGNESIUM SERPL-MCNC: 2.2 MG/DL (ref 1.8–2.4)
MCH RBC QN AUTO: 28.7 PG (ref 27–31)
MCH RBC QN AUTO: 29.4 PG (ref 26–34)
MCHC RBC AUTO-ENTMCNC: 28.7 G/DL (ref 32–36)
MCHC RBC AUTO-ENTMCNC: 31.3 G/DL (ref 31–36)
MCV RBC AUTO: 100 FL (ref 78–100)
MCV RBC AUTO: 94 FL (ref 80–100)
METHEMOGLOBIN: 0.5 % (ref 0.5–1.5)
MONOCYTES # BLD: 0.8 K/UL (ref 0–1.3)
MONOCYTES NFR BLD: 0.85 K/UL
MONOCYTES NFR BLD: 12 % (ref 0–5)
MONOCYTES NFR BLD: 7.7 %
NEUTROPHILS # BLD: 8.9 K/UL (ref 1.7–7.7)
NEUTROPHILS NFR BLD: 71 % (ref 36–66)
NEUTROPHILS NFR BLD: 89.1 %
NEUTS SEG NFR BLD: 5.14 K/UL
NT-PROBNP SERPL-MCNC: 3339 PG/ML (ref 0–449)
OXYHGB MFR BLD: 47.7 %
PCO2 VENOUS: 84.2 MM HG (ref 38–54)
PH VENOUS: 7.35 (ref 7.32–7.43)
PLATELET # BLD AUTO: 332 K/UL (ref 135–450)
PLATELET # BLD AUTO: 344 K/UL (ref 140–440)
PMV BLD AUTO: 8.3 FL (ref 5–10.5)
PMV BLD AUTO: 9.6 FL (ref 7.5–11.1)
PO2 VENOUS: 28 MM HG (ref 23–48)
POSITIVE BASE EXCESS, VEN: 16.9 MMOL/L (ref 0–3)
POTASSIUM SERPL-SCNC: 4 MMOL/L (ref 3.5–5.1)
POTASSIUM SERPL-SCNC: 4.1 MMOL/L (ref 3.5–5.1)
PROT SERPL-MCNC: 4.9 G/DL (ref 6.4–8.2)
PROT SERPL-MCNC: 5.1 G/DL (ref 6.4–8.2)
PROTHROMBIN TIME: 42.8 SEC (ref 11.7–14.5)
RBC # BLD AUTO: 3.24 M/UL (ref 4.2–5.4)
RBC # BLD AUTO: 3.25 M/UL (ref 4–5.2)
SODIUM SERPL-SCNC: 142 MMOL/L (ref 136–145)
SODIUM SERPL-SCNC: 143 MMOL/L (ref 136–145)
TEXT FOR RESPIRATORY: ABNORMAL
TROPONIN I SERPL HS-MCNC: 37 NG/L (ref 0–14)
TROPONIN I SERPL HS-MCNC: 47 NG/L (ref 0–14)
WBC # BLD AUTO: 10 K/UL (ref 4–11)
WBC OTHER # BLD: 7.3 K/UL (ref 4–10.5)

## 2025-06-03 PROCEDURE — 84484 ASSAY OF TROPONIN QUANT: CPT

## 2025-06-03 PROCEDURE — 94664 DEMO&/EVAL PT USE INHALER: CPT

## 2025-06-03 PROCEDURE — 83880 ASSAY OF NATRIURETIC PEPTIDE: CPT

## 2025-06-03 PROCEDURE — 83735 ASSAY OF MAGNESIUM: CPT

## 2025-06-03 PROCEDURE — 6360000002 HC RX W HCPCS: Performed by: PHYSICIAN ASSISTANT

## 2025-06-03 PROCEDURE — 93010 ELECTROCARDIOGRAM REPORT: CPT | Performed by: INTERNAL MEDICINE

## 2025-06-03 PROCEDURE — 6370000000 HC RX 637 (ALT 250 FOR IP): Performed by: INTERNAL MEDICINE

## 2025-06-03 PROCEDURE — 36415 COLL VENOUS BLD VENIPUNCTURE: CPT

## 2025-06-03 PROCEDURE — 85610 PROTHROMBIN TIME: CPT

## 2025-06-03 PROCEDURE — 6360000002 HC RX W HCPCS: Performed by: INTERNAL MEDICINE

## 2025-06-03 PROCEDURE — 1200000000 HC SEMI PRIVATE

## 2025-06-03 PROCEDURE — 99285 EMERGENCY DEPT VISIT HI MDM: CPT

## 2025-06-03 PROCEDURE — 85025 COMPLETE CBC W/AUTO DIFF WBC: CPT

## 2025-06-03 PROCEDURE — 85014 HEMATOCRIT: CPT

## 2025-06-03 PROCEDURE — 82805 BLOOD GASES W/O2 SATURATION: CPT

## 2025-06-03 PROCEDURE — 2500000003 HC RX 250 WO HCPCS: Performed by: INTERNAL MEDICINE

## 2025-06-03 PROCEDURE — 94761 N-INVAS EAR/PLS OXIMETRY MLT: CPT

## 2025-06-03 PROCEDURE — 85018 HEMOGLOBIN: CPT

## 2025-06-03 PROCEDURE — 71045 X-RAY EXAM CHEST 1 VIEW: CPT

## 2025-06-03 PROCEDURE — 80053 COMPREHEN METABOLIC PANEL: CPT

## 2025-06-03 PROCEDURE — 93005 ELECTROCARDIOGRAM TRACING: CPT | Performed by: PHYSICIAN ASSISTANT

## 2025-06-03 PROCEDURE — 94640 AIRWAY INHALATION TREATMENT: CPT

## 2025-06-03 PROCEDURE — 6370000000 HC RX 637 (ALT 250 FOR IP): Performed by: PHYSICIAN ASSISTANT

## 2025-06-03 PROCEDURE — 96374 THER/PROPH/DIAG INJ IV PUSH: CPT

## 2025-06-03 PROCEDURE — 2700000000 HC OXYGEN THERAPY PER DAY

## 2025-06-03 RX ORDER — ACETAMINOPHEN 325 MG/1
650 TABLET ORAL EVERY 6 HOURS PRN
Status: DISCONTINUED | OUTPATIENT
Start: 2025-06-03 | End: 2025-06-05 | Stop reason: HOSPADM

## 2025-06-03 RX ORDER — ROSUVASTATIN CALCIUM 20 MG/1
20 TABLET, COATED ORAL NIGHTLY
Status: DISCONTINUED | OUTPATIENT
Start: 2025-06-04 | End: 2025-06-05 | Stop reason: HOSPADM

## 2025-06-03 RX ORDER — THEOPHYLLINE 400 MG/1
400 TABLET, EXTENDED RELEASE ORAL 2 TIMES DAILY
Status: DISCONTINUED | OUTPATIENT
Start: 2025-06-03 | End: 2025-06-04

## 2025-06-03 RX ORDER — SODIUM CHLORIDE 0.9 % (FLUSH) 0.9 %
5-40 SYRINGE (ML) INJECTION EVERY 12 HOURS SCHEDULED
Status: DISCONTINUED | OUTPATIENT
Start: 2025-06-03 | End: 2025-06-05 | Stop reason: HOSPADM

## 2025-06-03 RX ORDER — ROFLUMILAST 500 UG/1
500 TABLET ORAL DAILY
Status: DISCONTINUED | OUTPATIENT
Start: 2025-06-03 | End: 2025-06-05 | Stop reason: HOSPADM

## 2025-06-03 RX ORDER — IPRATROPIUM BROMIDE AND ALBUTEROL SULFATE 2.5; .5 MG/3ML; MG/3ML
1 SOLUTION RESPIRATORY (INHALATION) ONCE
Status: COMPLETED | OUTPATIENT
Start: 2025-06-03 | End: 2025-06-03

## 2025-06-03 RX ORDER — ONDANSETRON 2 MG/ML
4 INJECTION INTRAMUSCULAR; INTRAVENOUS EVERY 6 HOURS PRN
Status: DISCONTINUED | OUTPATIENT
Start: 2025-06-03 | End: 2025-06-05 | Stop reason: HOSPADM

## 2025-06-03 RX ORDER — ALBUTEROL SULFATE 90 UG/1
2 INHALANT RESPIRATORY (INHALATION) EVERY 4 HOURS PRN
Status: DISCONTINUED | OUTPATIENT
Start: 2025-06-03 | End: 2025-06-05 | Stop reason: HOSPADM

## 2025-06-03 RX ORDER — ALBUTEROL SULFATE 0.83 MG/ML
2.5 SOLUTION RESPIRATORY (INHALATION) EVERY 6 HOURS PRN
Status: DISCONTINUED | OUTPATIENT
Start: 2025-06-03 | End: 2025-06-05 | Stop reason: HOSPADM

## 2025-06-03 RX ORDER — LEVOTHYROXINE SODIUM 75 UG/1
75 TABLET ORAL DAILY
Status: DISCONTINUED | OUTPATIENT
Start: 2025-06-03 | End: 2025-06-05 | Stop reason: HOSPADM

## 2025-06-03 RX ORDER — TORSEMIDE 20 MG/1
20 TABLET ORAL 2 TIMES DAILY
Status: DISCONTINUED | OUTPATIENT
Start: 2025-06-03 | End: 2025-06-05 | Stop reason: HOSPADM

## 2025-06-03 RX ORDER — ACETAMINOPHEN 650 MG/1
650 SUPPOSITORY RECTAL EVERY 6 HOURS PRN
Status: DISCONTINUED | OUTPATIENT
Start: 2025-06-03 | End: 2025-06-05 | Stop reason: HOSPADM

## 2025-06-03 RX ORDER — FUROSEMIDE 10 MG/ML
40 INJECTION INTRAMUSCULAR; INTRAVENOUS ONCE
Status: COMPLETED | OUTPATIENT
Start: 2025-06-03 | End: 2025-06-03

## 2025-06-03 RX ORDER — DILTIAZEM HCL 60 MG
60 TABLET ORAL EVERY 8 HOURS SCHEDULED
Status: DISCONTINUED | OUTPATIENT
Start: 2025-06-03 | End: 2025-06-05 | Stop reason: HOSPADM

## 2025-06-03 RX ORDER — BUDESONIDE AND FORMOTEROL FUMARATE DIHYDRATE 160; 4.5 UG/1; UG/1
2 AEROSOL RESPIRATORY (INHALATION)
Status: DISCONTINUED | OUTPATIENT
Start: 2025-06-03 | End: 2025-06-05 | Stop reason: HOSPADM

## 2025-06-03 RX ORDER — SODIUM CHLORIDE 9 MG/ML
INJECTION, SOLUTION INTRAVENOUS PRN
Status: DISCONTINUED | OUTPATIENT
Start: 2025-06-03 | End: 2025-06-05 | Stop reason: HOSPADM

## 2025-06-03 RX ORDER — MONTELUKAST SODIUM 10 MG/1
10 TABLET ORAL NIGHTLY
Status: DISCONTINUED | OUTPATIENT
Start: 2025-06-03 | End: 2025-06-05 | Stop reason: HOSPADM

## 2025-06-03 RX ORDER — GUAIFENESIN 600 MG/1
600 TABLET, EXTENDED RELEASE ORAL 2 TIMES DAILY
COMMUNITY

## 2025-06-03 RX ORDER — ONDANSETRON 4 MG/1
4 TABLET, ORALLY DISINTEGRATING ORAL EVERY 8 HOURS PRN
Status: DISCONTINUED | OUTPATIENT
Start: 2025-06-03 | End: 2025-06-05 | Stop reason: HOSPADM

## 2025-06-03 RX ORDER — SODIUM CHLORIDE 0.9 % (FLUSH) 0.9 %
5-40 SYRINGE (ML) INJECTION PRN
Status: DISCONTINUED | OUTPATIENT
Start: 2025-06-03 | End: 2025-06-05 | Stop reason: HOSPADM

## 2025-06-03 RX ORDER — PREDNISONE 5 MG/1
5 TABLET ORAL DAILY
Qty: 90 TABLET | Refills: 3 | Status: SHIPPED | OUTPATIENT
Start: 2025-06-03

## 2025-06-03 RX ORDER — DICYCLOMINE HCL 20 MG
10 TABLET ORAL 2 TIMES DAILY
Status: DISCONTINUED | OUTPATIENT
Start: 2025-06-03 | End: 2025-06-05 | Stop reason: HOSPADM

## 2025-06-03 RX ORDER — GABAPENTIN 400 MG/1
400 CAPSULE ORAL EVERY EVENING
Status: DISCONTINUED | OUTPATIENT
Start: 2025-06-03 | End: 2025-06-05 | Stop reason: HOSPADM

## 2025-06-03 RX ADMIN — LEVOTHYROXINE SODIUM 75 MCG: 0.07 TABLET ORAL at 16:41

## 2025-06-03 RX ADMIN — FUROSEMIDE 40 MG: 10 INJECTION, SOLUTION INTRAMUSCULAR; INTRAVENOUS at 09:02

## 2025-06-03 RX ADMIN — DILTIAZEM HYDROCHLORIDE 60 MG: 60 TABLET, FILM COATED ORAL at 16:41

## 2025-06-03 RX ADMIN — TORSEMIDE 20 MG: 20 TABLET ORAL at 20:20

## 2025-06-03 RX ADMIN — THEOPHYLLINE 400 MG: 400 TABLET, EXTENDED RELEASE ORAL at 20:20

## 2025-06-03 RX ADMIN — BUDESONIDE AND FORMOTEROL FUMARATE DIHYDRATE 2 PUFF: 160; 4.5 AEROSOL RESPIRATORY (INHALATION) at 17:40

## 2025-06-03 RX ADMIN — DILTIAZEM HYDROCHLORIDE 60 MG: 60 TABLET, FILM COATED ORAL at 21:46

## 2025-06-03 RX ADMIN — ROFLUMILAST 500 MCG: 500 TABLET ORAL at 16:42

## 2025-06-03 RX ADMIN — DICYCLOMINE HYDROCHLORIDE 10 MG: 20 TABLET ORAL at 20:20

## 2025-06-03 RX ADMIN — ALBUTEROL SULFATE 2.5 MG: 2.5 SOLUTION RESPIRATORY (INHALATION) at 17:40

## 2025-06-03 RX ADMIN — IPRATROPIUM BROMIDE AND ALBUTEROL SULFATE 1 DOSE: .5; 3 SOLUTION RESPIRATORY (INHALATION) at 11:27

## 2025-06-03 RX ADMIN — TIOTROPIUM BROMIDE INHALATION SPRAY 2 PUFF: 3.12 SPRAY, METERED RESPIRATORY (INHALATION) at 17:45

## 2025-06-03 RX ADMIN — GABAPENTIN 400 MG: 400 CAPSULE ORAL at 18:41

## 2025-06-03 RX ADMIN — SODIUM CHLORIDE, PRESERVATIVE FREE 5 ML: 5 INJECTION INTRAVENOUS at 20:22

## 2025-06-03 RX ADMIN — MONTELUKAST 10 MG: 10 TABLET, FILM COATED ORAL at 20:20

## 2025-06-03 RX ADMIN — DICYCLOMINE HYDROCHLORIDE 10 MG: 20 TABLET ORAL at 16:41

## 2025-06-03 ASSESSMENT — PAIN - FUNCTIONAL ASSESSMENT: PAIN_FUNCTIONAL_ASSESSMENT: NONE - DENIES PAIN

## 2025-06-03 NOTE — H&P
V2.0  History and Physical      Name:  Janessa Davidson /Age/Sex: 1945  (80 y.o. female)   MRN & CSN:  [id number] & [id number] Encounter Date/Time: 6/3/2025 12:04 PM EDT   Location:  ED26/ED-26 PCP: Jes Sharma DO       Hospital Day: 1    Assessment and Plan:   Janessa Davidson is a 80 y.o. female  who presents with Acute anemia    Hospital Problems           Last Modified POA    * (Principal) Acute anemia 6/3/2025 Yes       #Acute anemia  - Hemoglobin 9.3 in the ER, in 2025, hemoglobin was 11.8 and previously it was around 13  - Patient without any melena or blood in stool  - Does endorse having heartburn, also having lightheadedness for at least a few days  IV PPI twice daily  GI consulted, appreciate recs  Every 6 hours H&H  Clear liquid diet  N.p.o. at midnight in case patient gets scoped tomorrow  Telemetry monitoring    # Bilateral lower extremity wounds  - Patient was prescribed doxycycline on , prescription was filled this morning, patient did not take any  -On my exam I did not feel like patient's wounds were infected, did have mild erythema  Continue to monitor  Wound care consulted, appreciate recs    # Right arm wound  # Right hip hematoma  -Sustained during fall patient had 2 weeks prior  Wound care consulted, appreciate recs--> recommended general surgery consult  General surgery consulted, appreciate recs    # History of PE and DVT  # Supratherapeutic INR  - INR 4.6 today  Holding warfarin for now    # Hypothyroidism  Continue levothyroxine    # COPD, not in exacerbation  #  Chronic hypoxemic respiratory failure  Continue home inhalers, Theophylline and montelukast    Discussed CODE STATUS with patient's family, patient is a DNR, CODE STATUS will be DNR CCA.    Disposition:   Current Living situation: Home  Expected Disposition: Home versus SNF  Estimated D/C: 3 days    Diet No diet orders on file   DVT Prophylaxis [] Lovenox, []  Heparin, [] SCDs, [] Ambulation,  [] Eliquis, []  INDICATION: SOB COMPARISON: 5/15/2025 FINDINGS:  Single AP view of the chest. Heart size is stable. Calcified granuloma in the right midlung. Areas of scarring in both lung apices. Calcified granuloma in the  left lower lung. No large effusion or pneumothorax. No acute areas of consolidation seen. IMPRESSION:  1.  No acute cardiopulmonary disease. This dictation was created with voice recognition software.  While attempts have  been made to review the dictation as it is transcribed, on occasion the spoken word can be misinterpreted by the technology leading to omissions or inappropriate words, phrases or sentences.  Dictated and Electronically Signed By: Asher Avendano MD [facility] 6/3/2025 9:16            Electronically signed by Marizol Ramon MD on 6/3/2025 at 12:04 PM

## 2025-06-03 NOTE — ED NOTES
ED TO INPATIENT SBAR HANDOFF    Patient Name: Janessa Davidson   :  1945  80 y.o.   Preferred Name    Family/Caregiver Present yes   Restraints no   C-SSRS: Risk of Suicide: No Risk  Sitter no   Sepsis Risk Score Sepsis V2 Risk Score: 45.1      Situation  Chief Complaint   Patient presents with    Shortness of Breath     Sent by doctors office for abnormal lab (high bnp) and patient reports being short of breath     Brief Description of Patient's Condition: pt to the ED after getting a call from the doctors office about a high BNP level. Patient was at 65% on arrival to the ED and was brought straight back to a room. Pts baseline is 2L of 02 and she becomes hypoxic even when she is getting out of bed. Family, including daughter who is a psych nurse, has been educated on fall risk and have been asked not to get the patient out of bed without staff permission or without staff present   Mental Status: oriented, alert, coherent, logical, thought processes intact, and able to concentrate and follow conversation  Arrived from: home    Imaging:   XR CHEST PORTABLE   Final Result        Abnormal labs:   Abnormal Labs Reviewed   CBC WITH AUTO DIFFERENTIAL - Abnormal; Notable for the following components:       Result Value    RBC 3.24 (*)     Hemoglobin 9.3 (*)     Hematocrit 32.4 (*)     MCHC 28.7 (*)     RDW 16.1 (*)     Neutrophils % 71 (*)     Lymphocytes % 16 (*)     Monocytes % 12 (*)     All other components within normal limits   COMPREHENSIVE METABOLIC PANEL - Abnormal; Notable for the following components:    Chloride 94 (*)     CO2 41 (*)     Anion Gap 7 (*)     Est, Glom Filt Rate 60 (*)     Total Protein 4.9 (*)     Albumin/Globulin Ratio 2.4 (*)     All other components within normal limits   BRAIN NATRIURETIC PEPTIDE - Abnormal; Notable for the following components:    NT Pro-BNP 3,509 (*)     All other components within normal limits   TROPONIN - Abnormal; Notable for the following components:

## 2025-06-03 NOTE — CONSULTS
David NORTON MD at [facility] ENDOSCOPY    VEIN SURGERY         FAMILY HISTORY    Family History   Problem Relation Age of Onset    Heart Disease Mother     Cancer Father         lung ca    Heart Disease Father     COPD Father     Cancer Sister     Heart Disease Sister        SOCIAL HISTORY    Social History     Tobacco Use    Smoking status: Former     Current packs/day: 0.00     Average packs/day: 1.5 packs/day for 26.9 years (40.3 ttl pk-yrs)     Types: Cigarettes     Start date:      Quit date: 1991     Years since quittin.5    Smokeless tobacco: Never   Vaping Use    Vaping status: Never Used   Substance Use Topics    Alcohol use: No    Drug use: No       ALLERGIES    Allergies   Allergen Reactions    Codeine Swelling    Darvon [Propoxyphene Hcl] Swelling    Lamisil [Terbinafine Hcl]     Lisinopril Other (See Comments)     Pt doesn't remember reaction    Morphine Swelling    Neosporin [Neomycin-Polymyxin-Gramicidin]     Pcn [Penicillins] Swelling       MEDICATIONS    No current facility-administered medications on file prior to encounter.     Current Outpatient Medications on File Prior to Encounter   Medication Sig Dispense Refill    guaiFENesin (MUCINEX) 600 MG extended release tablet Take 1 tablet by mouth 2 times daily OTC      Budeson-Glycopyrrol-Formoterol (BREZTRI AEROSPHERE) 160-9-4.8 MCG/ACT AERO Inhale 2 actuation into the lungs 2 times daily 3 each 3    dicyclomine (BENTYL) 10 MG capsule Take 1 capsule by mouth in the morning and at bedtime 180 capsule 1    esomeprazole (NEXIUM) 40 MG delayed release capsule TAKE 1 CAPSULE BY MOUTH EVERY DAY IN THE MORNING AND AT BEDTIME 180 capsule 1    gabapentin (NEURONTIN) 400 MG capsule Take 1 capsule by mouth every evening for 90 days. 90 capsule 0    hydrocortisone (PROCTOZONE-HC) 2.5 % CREA rectal cream Place rectally 2 times daily as needed for Hemorrhoids (Patient taking differently: Place rectally 2 times daily as needed for Hemorrhoids 25  06/03/25 1349   Drainage Description Serosanguinous 06/03/25 1349   Odor None 06/03/25 1349   Joelle-wound Assessment Intact 06/03/25 1349   Margins Attached edges 06/03/25 1349   Wound Thickness Description not for Pressure Injury Full thickness 06/03/25 1349   Number of days: 0       Wound 06/03/25 Elbow Right;Anterior;Proximal (Active)   Wound Etiology Traumatic 06/03/25 1349   Wound Cleansed Cleansed with saline 06/03/25 1349   Dressing/Treatment Betadine swabs/povidone iodine 06/03/25 1349   Wound Length (cm) 0.3 cm 06/03/25 1349   Wound Width (cm) 0.4 cm 06/03/25 1349   Wound Depth (cm) 0.1 cm 06/03/25 1349   Wound Surface Area (cm^2) 0.12 cm^2 06/03/25 1349   Wound Volume (cm^3) 0.012 cm^3 06/03/25 1349   Distance Tunneling (cm) 0 cm 06/03/25 1349   Tunneling Position ___ O'Clock 0 06/03/25 1349   Undermining Starts ___ O'Clock 0 06/03/25 1349   Undermining Ends___ O'Clock 0 06/03/25 1349   Undermining Maxium Distance (cm) 0 06/03/25 1349   Wound Assessment Eschar dry 06/03/25 1349   Drainage Amount None (dry) 06/03/25 1349   Odor None 06/03/25 1349   Joelle-wound Assessment Intact 06/03/25 1349   Margins Attached edges 06/03/25 1349   Number of days: 0       Wound 06/03/25 Hip Right (Active)   Wound Image   06/03/25 1349   Wound Etiology Traumatic 06/03/25 1349   Wound Length (cm) 0 cm 06/03/25 1349   Wound Width (cm) 0 cm 06/03/25 1349   Wound Depth (cm) 0 cm 06/03/25 1349   Wound Surface Area (cm^2) 0 cm^2 06/03/25 1349   Wound Volume (cm^3) 0 cm^3 06/03/25 1349   Wound Assessment Purple/maroon 06/03/25 1349   Drainage Amount None (dry) 06/03/25 1349   Odor None 06/03/25 1349   Joelle-wound Assessment Induration 06/03/25 1349   Margins Attached edges 06/03/25 1349   Number of days: 0       Response to treatment:  With complaints of pain.     Pain Assessment:  Severity:  mild  Quality of pain: rt hip   Wound Pain Timing/Severity: wound care  Premedicated: no    Plan:     Plan of Care: Wound 06/03/25 Radial

## 2025-06-03 NOTE — PROGRESS NOTES
Spiritual Health History and Assessment/Progress Note  [facility]    Spiritual/Emotional Needs,  ,  ,      Name: Janessa Davidson MRN: [id number]    Age: 80 y.o.     Sex: female   Language: English   Alevism: Jewish   Acute anemia     Date: 6/3/2025            Total Time Calculated: 9 min              Spiritual Assessment began in [facility]        Referral/Consult From: Rounding   Encounter Overview/Reason: Spiritual/Emotional Needs  Service Provided For: Patient    Diane, Belief, Meaning:   Patient unable to assess at this time  Family/Friends No family/friends present      Importance and Influence:  Patient has spiritual/personal beliefs that influence decisions regarding their health  Family/Friends No family/friends present    Community:  Patient Other: unable to assess  Family/Friends No family/friends present    Assessment and Plan of Care:     Patient Interventions include: Facilitated expression of thoughts and feelings  Family/Friends Interventions include: No family/friends present    Patient Plan of Care: Spiritual Care available upon further referral  Family/Friends Plan of Care: No family/friends present    Electronically signed by LUZ Mercedes on 6/3/2025 at 3:35 PM

## 2025-06-03 NOTE — ED NOTES
Medication History  [facility]    Patient Name: Janessa Davidson 1945     Medication history has been completed by: [facility]    Source(s) of information: Patient/family and insurance claims     Primary Care Physician: Jes Sharma DO     Pharmacy: [facility]    Allergies as of 06/03/2025 - Fully Reviewed 06/03/2025   Allergen Reaction Noted    Codeine Swelling 12/22/2011    Darvon [propoxyphene hcl] Swelling 12/22/2011    Lamisil [terbinafine hcl]  06/04/2019    Lisinopril Other (See Comments) 06/20/2023    Morphine Swelling 12/22/2011    Neosporin [neomycin-polymyxin-gramicidin]  06/04/2019    Pcn [penicillins] Swelling 12/22/2011        Prior to Admission medications    Medication Sig Start Date End Date Taking? Authorizing Provider   guaiFENesin (MUCINEX) 600 MG extended release tablet Take 1 tablet by mouth 2 times daily OTC   Yes Provider, MD Reina   Budeson-Glycopyrrol-Formoterol (BREZTRI AEROSPHERE) 160-9-4.8 MCG/ACT AERO Inhale 2 actuation into the lungs 2 times daily 5/13/25  Yes Rodrigue Carreon PA-C   dicyclomine (BENTYL) 10 MG capsule Take 1 capsule by mouth in the morning and at bedtime 5/13/25  Yes Rodrigue Carreon PA-C   esomeprazole (NEXIUM) 40 MG delayed release capsule TAKE 1 CAPSULE BY MOUTH EVERY DAY IN THE MORNING AND AT BEDTIME 5/13/25  Yes Rodrigue Carreon PA-C   gabapentin (NEURONTIN) 400 MG capsule Take 1 capsule by mouth every evening for 90 days. 5/13/25 8/11/25 Yes Rodrigue Carreon PA-C   hydrocortisone (PROCTOZONE-HC) 2.5 % CREA rectal cream Place rectally 2 times daily as needed for Hemorrhoids  Patient taking differently: Place rectally 2 times daily as needed for Hemorrhoids 06/03/25 Applies to whole back area 5/13/25 8/11/25 Yes Rodrigue Carreon PA-C   levothyroxine (SYNTHROID) 75 MCG tablet Take 1 tablet by mouth daily 5/13/25  Yes Rodrigue Carreon PA-C   Magnesium Oxide -Mg Supplement 500 MG CAPS Take 1 tablet by mouth daily 5/13/25  Yes  Rodrigue Carreon PA-C   montelukast (SINGULAIR) 10 MG tablet Take 1 tablet by mouth nightly 5/13/25  Yes Rodrigue Carreon PA-C   Roflumilast (DALIRESP) 500 MCG tablet Take 1 tablet by mouth daily 5/13/25  Yes Rodrigue Carreon PA-C   rosuvastatin (CRESTOR) 20 MG tablet Take 1 tablet by mouth nightly 5/13/25  Yes Rodrigue Carreon PA-C   theophylline (THEODUR) 450 MG extended release tablet TAKE 1/2 TABLET TWICE A DAY BY MOUTH 5/13/25  Yes Rodrigue Carreon PA-C   warfarin (COUMADIN) 6 MG tablet Take 1 tablet 3 times a week, take 1/2 tablet 4 times a week per INR instructions.  Patient taking differently: 06/03/25 Per anti-coag calendar patient to take 3 mg on Sun/Tues/Wed/Thurs/Sat, 6 mg Mon/Fri patient reports takes at HS 5/13/25  Yes Rodrigue Carreon PA-C   albuterol (PROVENTIL) (2.5 MG/3ML) 0.083% nebulizer solution Take 3 mLs by nebulization every 6 hours as needed for Wheezing 2/14/25  Yes El Judge MD   dilTIAZem (CARDIZEM) 60 MG tablet Take 1 tablet by mouth every 8 hours 1/4/22  Yes Palla, Chandra M, MD   OXYGEN Inhale 2 L/hr into the lungs continuous.   Yes ProviderReina MD   predniSONE (DELTASONE) 5 MG tablet Take 1 tablet by mouth daily 6/3/25   El Judge MD   doxycycline hyclate (VIBRA-TABS) 100 MG tablet Take 1 tablet by mouth 2 times daily for 10 days  Patient not taking: Reported on 6/3/2025 6/2/25 6/12/25  Sana Leiva PA-C   predniSONE (DELTASONE) 20 MG tablet Take 1 tablet by mouth 2 times daily for 5 days 6/2/25 6/7/25  Sana Leiva PA-C   albuterol sulfate HFA (PROVENTIL;VENTOLIN;PROAIR) 108 (90 Base) MCG/ACT inhaler INHALE 2 PUFFS BY MOUTH FOUR TIMES A DAY AS NEEDED 5/13/25   Rodrigue Carreon PA-C   guaiFENesin-dextromethorphan (ROBITUSSIN DM) 100-10 MG/5ML syrup Take 5 mLs by mouth 3 times daily as needed for Cough  Patient not taking: Reported on 6/3/2025 5/13/25   Rodrigue Carreon PA-C   hydrOXYzine HCl (ATARAX) 25 MG tablet Take 1 tablet by mouth nightly as needed

## 2025-06-03 NOTE — ED PROVIDER NOTES
[facility] EMERGENCY DEPARTMENT  EMERGENCY DEPARTMENT ENCOUNTER        Pt Name: Janessa Davidson  MRN: [id number]  Birthdate 1945  Date of evaluation: 6/3/2025  Provider: Shaheen Rockwell PA-C  PCP: Jes Sharma DO  Note Started: 8:59 AM EDT 6/3/25      MAIRA. I have evaluated this patient.        CHIEF COMPLAINT       Chief Complaint   Patient presents with    Shortness of Breath     Sent by doctors office for abnormal lab (high bnp) and patient reports being short of breath       HISTORY OF PRESENT ILLNESS: 1 or more Elements     History From: patient    Limitations to history : None    Social Determinants Significantly Affecting Health : Patient has significant healthcare illiteracy. Additional time provided in explanations.    Chief Complaint: Shortness of breath, states was told by primary care to come here    Janessa Davidson is a 80 y.o. female with past medical history of COPD on chronic oxygen, CHF, hypertension, DVT on Coumadin who presents for elevated BNP..  Patient states she went to primary care yesterday for feeling short of breath, unwell, also had some redness on her right leg and was worried about cellulitis.  She states she was started on antibiotics and steroids, labs were drawn.  They called her this morning and told her her BNP was elevated and go to the ER.  Patient reports worsening shortness of breath, on her baseline 2 L O2.  Denies fever.          Nursing Notes were all reviewed and agreed with or any disagreements were addressed in the HPI.    REVIEW OF SYSTEMS :      Review of Systems   All other systems reviewed and are negative.      Positives and Pertinent negatives as per HPI.     SURGICAL HISTORY     Past Surgical History:   Procedure Laterality Date    APPENDECTOMY      BRONCHOSCOPY  1995    Dr Judge    COLONOSCOPY      COLONOSCOPY N/A 12/14/2023    COLONOSCOPY POLYPECTOMY SNARE/COLD BIOPSY performed by David Pena MD at [facility] ENDOSCOPY     left   Neurological:      Mental Status: She is alert and oriented to person, place, and time.      Motor: No weakness.   Psychiatric:         Mood and Affect: Mood normal.         Behavior: Behavior normal.           DIAGNOSTIC RESULTS   LABS:    Labs Reviewed   CBC WITH AUTO DIFFERENTIAL - Abnormal; Notable for the following components:       Result Value    RBC 3.24 (*)     Hemoglobin 9.3 (*)     Hematocrit 32.4 (*)     MCHC 28.7 (*)     RDW 16.1 (*)     Neutrophils % 71 (*)     Lymphocytes % 16 (*)     Monocytes % 12 (*)     All other components within normal limits   COMPREHENSIVE METABOLIC PANEL - Abnormal; Notable for the following components:    Chloride 94 (*)     CO2 41 (*)     Anion Gap 7 (*)     Est, Glom Filt Rate 60 (*)     Total Protein 4.9 (*)     Albumin/Globulin Ratio 2.4 (*)     All other components within normal limits   BRAIN NATRIURETIC PEPTIDE - Abnormal; Notable for the following components:    NT Pro-BNP 3,509 (*)     All other components within normal limits   TROPONIN - Abnormal; Notable for the following components:    Troponin, High Sensitivity 47 (*)     All other components within normal limits   TROPONIN - Abnormal; Notable for the following components:    Troponin, High Sensitivity 37 (*)     All other components within normal limits   PROTIME-INR - Abnormal; Notable for the following components:    Protime 42.8 (*)     All other components within normal limits   BLOOD GAS, VENOUS - Abnormal; Notable for the following components:    pCO2, Mychal 84.2 (*)     HCO3, Venous 45.9 (*)     Positive Base Excess, Mychal 16.9 (*)     All other components within normal limits   MAGNESIUM   VENOUS BLOOD GAS LAB COLLECT       When ordered only abnormal lab results are displayed. All other labs were within normal range or not returned as of this dictation.    EKG: When ordered, EKG's are interpreted by the Emergency Department Physician in the absence of a cardiologist.  Please see their note for

## 2025-06-03 NOTE — ED NOTES
Pts daughter asked this RN to placed the patient on the bedside commode at approx 1150. This RN stated we will continue with the purewick as the patient is a fall risk and becomes hypoxic very quickly when she gets out of bed and the doctor entered the room. This RN stated I would be back in as soon as the hosptialist was done speaking with them.    This RN walked into the room to find the daughter placing the patient back in bed after letting her use the bedside commode. Daughter educated on fall risk again and risk decreasing the patients oxygen level. Daughter states she is a psych nurse and helps her mother at home. This RN explained the hospital policies are much different than what families do at home and that in order to ensure patient safety, the patient may not exit the bed without a staff member present to prevent falls and hypoxiaTod Ernst at bedside    Upon review of the monitor, patients oxygen saturation dropped to 88% while her daughter assisted her to the BSC without a staff member present

## 2025-06-03 NOTE — PROGRESS NOTES
4 Eyes Skin Assessment     NAME:  Janessa Davidson  YOB: 1945  MEDICAL RECORD NUMBER:  [id number]    The patient is being assessed for  Admission    I agree that at least one RN has performed a thorough Head to Toe Skin Assessment on the patient. ALL assessment sites listed below have been assessed.      Areas assessed by both nurses:    Head, Face, Ears, Shoulders, Back, Chest, Arms, Elbows, Hands, Sacrum. Buttock, Coccyx, Ischium, Legs. Feet and Heels, and Under Medical Devices         Does the Patient have a Wound? Yes wound(s) were present on assessment. LDA wound assessment was Initiated and completed by RN       Gilbert Prevention initiated by RN: No  Wound Care Orders initiated by RN: No    Pressure Injury (Stage 3,4, Unstageable, DTI, NWPT, and Complex wounds) if present, place Wound referral order by RN under : No    New Ostomies, if present place, Ostomy referral order under : No     Nurse 1 eSignature: Electronically signed by KAI TAVERA RN on 6/3/25 at 1:08 PM EDT    **SHARE this note so that the co-signing nurse can place an eSignature**    Nurse 2 eSignature: Electronically signed by Hayley Guaman LPN on 6/3/25 at 2:09 PM EDT

## 2025-06-03 NOTE — ED NOTES
This EDT ambulated this pt. Pt o2 decreased to 79% on room air. Pt placed on 2L walked again o2 did not drop but pt was extremely SOB    Joya Villafuerte

## 2025-06-04 ENCOUNTER — ANESTHESIA (OUTPATIENT)
Dept: ENDOSCOPY | Age: 80
DRG: 605 | End: 2025-06-04
Payer: MEDICARE

## 2025-06-04 ENCOUNTER — TELEPHONE (OUTPATIENT)
Dept: FAMILY MEDICINE CLINIC | Age: 80
End: 2025-06-04

## 2025-06-04 LAB
HCT VFR BLD AUTO: 27.9 % (ref 37–47)
HCT VFR BLD AUTO: 29.1 % (ref 37–47)
HCT VFR BLD AUTO: 31.9 % (ref 37–47)
HGB BLD-MCNC: 8.2 G/DL (ref 12.5–16)
HGB BLD-MCNC: 8.6 G/DL (ref 12.5–16)
HGB BLD-MCNC: 9 G/DL (ref 12.5–16)
INR PPP: 5.6
IRON SATN MFR SERPL: 16 % (ref 15–50)
IRON SERPL-MCNC: 39 UG/DL (ref 37–145)
PARTIAL THROMBOPLASTIN TIME: 77.1 SEC (ref 25.1–37.1)
PROTHROMBIN TIME: 48.3 SEC (ref 11.7–14.5)
TIBC SERPL-MCNC: 247 UG/DL (ref 260–445)
UNSATURATED IRON BINDING CAPACITY: 208 UG/DL (ref 110–370)

## 2025-06-04 PROCEDURE — 99222 1ST HOSP IP/OBS MODERATE 55: CPT | Performed by: SURGERY

## 2025-06-04 PROCEDURE — 85014 HEMATOCRIT: CPT

## 2025-06-04 PROCEDURE — 85730 THROMBOPLASTIN TIME PARTIAL: CPT

## 2025-06-04 PROCEDURE — 6360000002 HC RX W HCPCS: Performed by: INTERNAL MEDICINE

## 2025-06-04 PROCEDURE — 6370000000 HC RX 637 (ALT 250 FOR IP): Performed by: STUDENT IN AN ORGANIZED HEALTH CARE EDUCATION/TRAINING PROGRAM

## 2025-06-04 PROCEDURE — 83550 IRON BINDING TEST: CPT

## 2025-06-04 PROCEDURE — 2700000000 HC OXYGEN THERAPY PER DAY

## 2025-06-04 PROCEDURE — 36415 COLL VENOUS BLD VENIPUNCTURE: CPT

## 2025-06-04 PROCEDURE — 94640 AIRWAY INHALATION TREATMENT: CPT

## 2025-06-04 PROCEDURE — 85018 HEMOGLOBIN: CPT

## 2025-06-04 PROCEDURE — 6370000000 HC RX 637 (ALT 250 FOR IP): Performed by: INTERNAL MEDICINE

## 2025-06-04 PROCEDURE — 85610 PROTHROMBIN TIME: CPT

## 2025-06-04 PROCEDURE — 2580000003 HC RX 258: Performed by: INTERNAL MEDICINE

## 2025-06-04 PROCEDURE — 83540 ASSAY OF IRON: CPT

## 2025-06-04 PROCEDURE — 2500000003 HC RX 250 WO HCPCS: Performed by: INTERNAL MEDICINE

## 2025-06-04 PROCEDURE — 1200000000 HC SEMI PRIVATE

## 2025-06-04 PROCEDURE — 94761 N-INVAS EAR/PLS OXIMETRY MLT: CPT

## 2025-06-04 RX ORDER — THEOPHYLLINE 400 MG/1
200 TABLET, EXTENDED RELEASE ORAL 2 TIMES DAILY
Status: DISCONTINUED | OUTPATIENT
Start: 2025-06-04 | End: 2025-06-04 | Stop reason: CLARIF

## 2025-06-04 RX ORDER — PHYTONADIONE 5 MG/1
5 TABLET ORAL ONCE
Status: COMPLETED | OUTPATIENT
Start: 2025-06-04 | End: 2025-06-04

## 2025-06-04 RX ORDER — HYDROXYZINE HYDROCHLORIDE 10 MG/1
10 TABLET, FILM COATED ORAL 3 TIMES DAILY PRN
Status: DISCONTINUED | OUTPATIENT
Start: 2025-06-04 | End: 2025-06-05 | Stop reason: HOSPADM

## 2025-06-04 RX ADMIN — ROSUVASTATIN CALCIUM 20 MG: 20 TABLET, FILM COATED ORAL at 21:13

## 2025-06-04 RX ADMIN — DICYCLOMINE HYDROCHLORIDE 10 MG: 20 TABLET ORAL at 21:13

## 2025-06-04 RX ADMIN — BUDESONIDE AND FORMOTEROL FUMARATE DIHYDRATE 2 PUFF: 160; 4.5 AEROSOL RESPIRATORY (INHALATION) at 09:01

## 2025-06-04 RX ADMIN — DILTIAZEM HYDROCHLORIDE 60 MG: 60 TABLET, FILM COATED ORAL at 05:26

## 2025-06-04 RX ADMIN — SODIUM CHLORIDE, PRESERVATIVE FREE 10 ML: 5 INJECTION INTRAVENOUS at 09:25

## 2025-06-04 RX ADMIN — TORSEMIDE 20 MG: 20 TABLET ORAL at 21:13

## 2025-06-04 RX ADMIN — ALBUTEROL SULFATE 2.5 MG: 2.5 SOLUTION RESPIRATORY (INHALATION) at 09:09

## 2025-06-04 RX ADMIN — ALBUTEROL SULFATE 2.5 MG: 2.5 SOLUTION RESPIRATORY (INHALATION) at 16:28

## 2025-06-04 RX ADMIN — THEOPHYLLINE ANHYDROUS 200 MG: 200 CAPSULE, EXTENDED RELEASE ORAL at 21:13

## 2025-06-04 RX ADMIN — PHYTONADIONE 5 MG: 5 TABLET ORAL at 12:06

## 2025-06-04 RX ADMIN — DICYCLOMINE HYDROCHLORIDE 10 MG: 20 TABLET ORAL at 09:26

## 2025-06-04 RX ADMIN — ROFLUMILAST 500 MCG: 500 TABLET ORAL at 09:25

## 2025-06-04 RX ADMIN — DILTIAZEM HYDROCHLORIDE 60 MG: 60 TABLET, FILM COATED ORAL at 21:17

## 2025-06-04 RX ADMIN — PANTOPRAZOLE SODIUM 40 MG: 40 INJECTION, POWDER, FOR SOLUTION INTRAVENOUS at 14:20

## 2025-06-04 RX ADMIN — LEVOTHYROXINE SODIUM 75 MCG: 0.07 TABLET ORAL at 09:26

## 2025-06-04 RX ADMIN — BUDESONIDE AND FORMOTEROL FUMARATE DIHYDRATE 2 PUFF: 160; 4.5 AEROSOL RESPIRATORY (INHALATION) at 20:35

## 2025-06-04 RX ADMIN — SODIUM CHLORIDE, PRESERVATIVE FREE 10 ML: 5 INJECTION INTRAVENOUS at 21:14

## 2025-06-04 RX ADMIN — MONTELUKAST 10 MG: 10 TABLET, FILM COATED ORAL at 21:13

## 2025-06-04 RX ADMIN — GABAPENTIN 400 MG: 400 CAPSULE ORAL at 17:56

## 2025-06-04 RX ADMIN — ALBUTEROL SULFATE 2.5 MG: 2.5 SOLUTION RESPIRATORY (INHALATION) at 20:34

## 2025-06-04 RX ADMIN — PANTOPRAZOLE SODIUM 40 MG: 40 INJECTION, POWDER, FOR SOLUTION INTRAVENOUS at 07:01

## 2025-06-04 RX ADMIN — TIOTROPIUM BROMIDE INHALATION SPRAY 2 PUFF: 3.12 SPRAY, METERED RESPIRATORY (INHALATION) at 09:01

## 2025-06-04 RX ADMIN — HYDROXYZINE HYDROCHLORIDE 10 MG: 10 TABLET, FILM COATED ORAL at 17:42

## 2025-06-04 RX ADMIN — DILTIAZEM HYDROCHLORIDE 60 MG: 60 TABLET, FILM COATED ORAL at 14:25

## 2025-06-04 RX ADMIN — TORSEMIDE 20 MG: 20 TABLET ORAL at 09:26

## 2025-06-04 ASSESSMENT — ENCOUNTER SYMPTOMS
COLOR CHANGE: 1
ABDOMINAL PAIN: 0

## 2025-06-04 NOTE — CONSULTS
Anemia in the setting of hypercoagulable state  May have chronic GI loss  Hemoglobin still drifting down  Agree with correcting INR keep INR around 1.5  EGD a.m.  Further recommendations after EGD abdomen soft otherwise    I saw and evaluated the patient myself.  I personally reviewed the chart, labs, and imaging studies and provided a substantive portion of the care for this patient.  I personally performed all aspects of medical decision making for this encounter. I have spoken with the patient, nursing staff and provided them with appropriate verbal and written instructions.      I have reviewed and verified this documentation done by Heike Elena and it accurately reflects our care and I have added a separate note to reflect my clinical impression and suggestions.    Tara [facility]      Please note that time of note may not reflect time of encounter    Please note this report has been partially produced using speech recognition software and may contain errors related to that system including errors in grammar, punctuation, and spelling, as well as words and phrases that may be inappropriate. If there are any questions or concerns please feel free to contact the dictating provider for clarification.    [facility]    [facility]  Gastroenterology Consultation    2025  8:32 AM  _________________________________________________________________________  Patient:    Janessa Davidson  : 1945   80 y.o.             MRN: [id number]  Admitted: 6/3/2025  8:46 AM ATT: Altagracia Lew MD   4115/4115-A  AdmitDx: COPD exacerbation (HCC) [J44.1]  Cellulitis of right leg [L03.115]  Elevated brain natriuretic peptide (BNP) level [R79.89]  Acute anemia [D64.9] PCP: Jes Sharma, DO  _________________________________________________________________________    Reason for Consult:  Acute anemia  Requesting Physician:  Altagracia Lew,

## 2025-06-04 NOTE — CONSULTS
Department of General Surgery   Surgical Service Dr. Araiza   Consult Note    Date of Consult: 6/4/25    Critical care consult time: 0 min    Reason for Consult:  RUE eschar and right lateral hip hematoma    Requesting Physician:  Hospitalist    CHIEF COMPLAINT:  As above    History Obtained From:  patient, electronic medical record    HISTORY OF PRESENT ILLNESS:      The patient is a 80 y.o. female who presented to the ED with complaints described as:      Location: right forearm eschar and right lateral hip hematoma  Quality: s/p fall, \"feels better\"  Severity: denies  Duration: several weeks ago  Timing: as above  Context: fall on blood thinners  Modifying factors: denies  Associated signs and symptoms: soreness but improving.    Pt had routine blood work done by PCP which was abnormal and was called to go to ED for admission.    Pt was seen by Wound Team and c/s recommended to General Surgery.       Past Medical History:    Past Medical History:   Diagnosis Date    Age-related osteoporosis without current pathological fracture 05/23/2024    Anticoagulant long-term use     Arthritis     Cancer ([facility])     Chronic hypoxemic respiratory failure ([facility]) 02/06/2018    Community acquired pneumonia of right upper lobe of lung 03/19/2022    COPD (chronic obstructive pulmonary disease) ([facility])     Coronary atherosclerosis     COVID-19 virus detected 01/18/2021    DVT (deep venous thrombosis) ([facility])     Former smoker 11/22/2021    Gastroesophageal reflux disease     Hiatal hernia     Lung infiltrate on CT 01/22/2019    On home oxygen therapy     on 24/7    Osteopenia     Phlebitis     Pulmonary emphysema ([facility]) 12/12/2023    Pulmonary nodule 07/05/2016    S/P left heart catheterization by percutaneous approach 06/27/2013    Sepsis due to pneumonia ([facility]) 11/14/2017    Shortness of breath 09/23/2019    Shortness of breath 01/18/2021    Shortness of breath 11/22/2021    Wears glasses        Past Surgical History:    Past Surgical  films as above and labs.   -INR elevated at 5.6  -hematoma and eschar secondary to fall and anti coagulation  -overall her symptoms related to the hematoma and eschar are improving. Would not recommend draining hematoma at this time as symptoms improving. Body will ultimately reabsorb the hematoma. Pt and daughter also want to avoid surgical procedure. Continue local wound care to the right forearm eschar.  -above plan d/w pt and pt's daughter.   -ok for diet.         Mao Araiza II, MD

## 2025-06-04 NOTE — PROGRESS NOTES
5-40 mL, PRN  sodium chloride, , PRN  ondansetron, 4 mg, Q8H PRN   Or  ondansetron, 4 mg, Q6H PRN  acetaminophen, 650 mg, Q6H PRN   Or  acetaminophen, 650 mg, Q6H PRN        Labs      Recent Results (from the past 24 hours)   Hemoglobin and Hematocrit    Collection Time: 06/03/25  5:55 PM   Result Value Ref Range    Hemoglobin 8.6 (L) 12.5 - 16.0 g/dL    Hematocrit 29.6 (L) 37.0 - 47.0 %   Hemoglobin and Hematocrit    Collection Time: 06/04/25  1:27 AM   Result Value Ref Range    Hemoglobin 8.2 (L) 12.5 - 16.0 g/dL    Hematocrit 27.9 (L) 37.0 - 47.0 %   Protime-INR    Collection Time: 06/04/25  1:27 AM   Result Value Ref Range    Protime 48.3 (H) 11.7 - 14.5 sec    INR 5.6 (HH)    APTT    Collection Time: 06/04/25  1:27 AM   Result Value Ref Range    APTT 77.1 (H) 25.1 - 37.1 sec   Iron and TIBC    Collection Time: 06/04/25  1:27 AM   Result Value Ref Range    Iron 39 37 - 145 ug/dL    TIBC 247 (L) 260 - 445 ug/dL    Iron % Saturation 16 15 - 50 %    UIBC 208 110 - 370 ug/dL        Imaging/Diagnostics Last 24 Hours   XR CHEST PORTABLE  Result Date: 6/3/2025  PROCEDURE: XR CHEST PORTABLE DATE OF EXAM:  6/3/2025 9:11 DEMOGRAPHICS: 80 years old Female INDICATION: SOB COMPARISON: 5/15/2025 FINDINGS:  Single AP view of the chest. Heart size is stable. Calcified granuloma in the right midlung. Areas of scarring in both lung apices. Calcified granuloma in the  left lower lung. No large effusion or pneumothorax. No acute areas of consolidation seen. IMPRESSION:  1.  No acute cardiopulmonary disease. This dictation was created with voice recognition software.  While attempts have  been made to review the dictation as it is transcribed, on occasion the spoken word can be misinterpreted by the technology leading to omissions or inappropriate words, phrases or sentences.  Dictated and Electronically Signed By: Asher Avendano MD [facility] 6/3/2025 9:16          Electronically signed by Altagracia Lew MD on  6/4/2025 at 10:48 AM

## 2025-06-05 VITALS
DIASTOLIC BLOOD PRESSURE: 52 MMHG | OXYGEN SATURATION: 99 % | BODY MASS INDEX: 25.09 KG/M2 | HEART RATE: 73 BPM | WEIGHT: 146.16 LBS | SYSTOLIC BLOOD PRESSURE: 122 MMHG | TEMPERATURE: 97.9 F | RESPIRATION RATE: 16 BRPM

## 2025-06-05 LAB
HCT VFR BLD AUTO: 29.7 % (ref 37–47)
HCT VFR BLD AUTO: 30.9 % (ref 37–47)
HGB BLD-MCNC: 8.8 G/DL (ref 12.5–16)
HGB BLD-MCNC: 9.1 G/DL (ref 12.5–16)
INR PPP: 1.4
INR PPP: 1.7
PROTHROMBIN TIME: 17.6 SEC (ref 11.7–14.5)
PROTHROMBIN TIME: 19.7 SEC (ref 11.7–14.5)

## 2025-06-05 PROCEDURE — 85610 PROTHROMBIN TIME: CPT

## 2025-06-05 PROCEDURE — 6360000002 HC RX W HCPCS: Performed by: INTERNAL MEDICINE

## 2025-06-05 PROCEDURE — 3700000000 HC ANESTHESIA ATTENDED CARE: Performed by: INTERNAL MEDICINE

## 2025-06-05 PROCEDURE — 2709999900 HC NON-CHARGEABLE SUPPLY: Performed by: INTERNAL MEDICINE

## 2025-06-05 PROCEDURE — 85014 HEMATOCRIT: CPT

## 2025-06-05 PROCEDURE — 94664 DEMO&/EVAL PT USE INHALER: CPT

## 2025-06-05 PROCEDURE — 85018 HEMOGLOBIN: CPT

## 2025-06-05 PROCEDURE — 2700000000 HC OXYGEN THERAPY PER DAY

## 2025-06-05 PROCEDURE — 99232 SBSQ HOSP IP/OBS MODERATE 35: CPT | Performed by: SURGERY

## 2025-06-05 PROCEDURE — 2580000003 HC RX 258: Performed by: NURSE ANESTHETIST, CERTIFIED REGISTERED

## 2025-06-05 PROCEDURE — 94640 AIRWAY INHALATION TREATMENT: CPT

## 2025-06-05 PROCEDURE — 6370000000 HC RX 637 (ALT 250 FOR IP): Performed by: INTERNAL MEDICINE

## 2025-06-05 PROCEDURE — 3609017100 HC EGD: Performed by: INTERNAL MEDICINE

## 2025-06-05 PROCEDURE — 94761 N-INVAS EAR/PLS OXIMETRY MLT: CPT

## 2025-06-05 PROCEDURE — 36415 COLL VENOUS BLD VENIPUNCTURE: CPT

## 2025-06-05 PROCEDURE — 6370000000 HC RX 637 (ALT 250 FOR IP): Performed by: STUDENT IN AN ORGANIZED HEALTH CARE EDUCATION/TRAINING PROGRAM

## 2025-06-05 PROCEDURE — 6360000002 HC RX W HCPCS: Performed by: NURSE ANESTHETIST, CERTIFIED REGISTERED

## 2025-06-05 PROCEDURE — 3700000001 HC ADD 15 MINUTES (ANESTHESIA): Performed by: INTERNAL MEDICINE

## 2025-06-05 PROCEDURE — 0DJ08ZZ INSPECTION OF UPPER INTESTINAL TRACT, VIA NATURAL OR ARTIFICIAL OPENING ENDOSCOPIC: ICD-10-PCS | Performed by: INTERNAL MEDICINE

## 2025-06-05 PROCEDURE — 2580000003 HC RX 258: Performed by: INTERNAL MEDICINE

## 2025-06-05 RX ORDER — SODIUM CHLORIDE 0.9 % (FLUSH) 0.9 %
5-40 SYRINGE (ML) INJECTION EVERY 12 HOURS SCHEDULED
Status: DISCONTINUED | OUTPATIENT
Start: 2025-06-05 | End: 2025-06-05 | Stop reason: HOSPADM

## 2025-06-05 RX ORDER — SODIUM CHLORIDE, SODIUM LACTATE, POTASSIUM CHLORIDE, CALCIUM CHLORIDE 600; 310; 30; 20 MG/100ML; MG/100ML; MG/100ML; MG/100ML
INJECTION, SOLUTION INTRAVENOUS
Status: DISCONTINUED | OUTPATIENT
Start: 2025-06-05 | End: 2025-06-05 | Stop reason: SDUPTHER

## 2025-06-05 RX ORDER — SODIUM CHLORIDE 0.9 % (FLUSH) 0.9 %
5-40 SYRINGE (ML) INJECTION PRN
Status: DISCONTINUED | OUTPATIENT
Start: 2025-06-05 | End: 2025-06-05 | Stop reason: HOSPADM

## 2025-06-05 RX ORDER — PROPOFOL 10 MG/ML
INJECTION, EMULSION INTRAVENOUS
Status: DISCONTINUED | OUTPATIENT
Start: 2025-06-05 | End: 2025-06-05 | Stop reason: SDUPTHER

## 2025-06-05 RX ORDER — IPRATROPIUM BROMIDE AND ALBUTEROL SULFATE 2.5; .5 MG/3ML; MG/3ML
1 SOLUTION RESPIRATORY (INHALATION) ONCE
Status: DISCONTINUED | OUTPATIENT
Start: 2025-06-05 | End: 2025-06-05 | Stop reason: HOSPADM

## 2025-06-05 RX ORDER — LIDOCAINE HYDROCHLORIDE 20 MG/ML
INJECTION, SOLUTION INFILTRATION; PERINEURAL
Status: DISCONTINUED | OUTPATIENT
Start: 2025-06-05 | End: 2025-06-05 | Stop reason: SDUPTHER

## 2025-06-05 RX ORDER — SODIUM CHLORIDE 9 MG/ML
INJECTION, SOLUTION INTRAVENOUS PRN
Status: DISCONTINUED | OUTPATIENT
Start: 2025-06-05 | End: 2025-06-05 | Stop reason: HOSPADM

## 2025-06-05 RX ADMIN — BUDESONIDE AND FORMOTEROL FUMARATE DIHYDRATE 2 PUFF: 160; 4.5 AEROSOL RESPIRATORY (INHALATION) at 07:44

## 2025-06-05 RX ADMIN — TIOTROPIUM BROMIDE INHALATION SPRAY 2 PUFF: 3.12 SPRAY, METERED RESPIRATORY (INHALATION) at 07:44

## 2025-06-05 RX ADMIN — DILTIAZEM HYDROCHLORIDE 60 MG: 60 TABLET, FILM COATED ORAL at 05:52

## 2025-06-05 RX ADMIN — DILTIAZEM HYDROCHLORIDE 60 MG: 60 TABLET, FILM COATED ORAL at 15:08

## 2025-06-05 RX ADMIN — LIDOCAINE HYDROCHLORIDE 100 MG: 20 INJECTION, SOLUTION INFILTRATION; PERINEURAL at 11:28

## 2025-06-05 RX ADMIN — PROPOFOL 40 MG: 10 INJECTION, EMULSION INTRAVENOUS at 11:28

## 2025-06-05 RX ADMIN — SODIUM CHLORIDE, POTASSIUM CHLORIDE, SODIUM LACTATE AND CALCIUM CHLORIDE: 600; 310; 30; 20 INJECTION, SOLUTION INTRAVENOUS at 11:25

## 2025-06-05 RX ADMIN — PANTOPRAZOLE SODIUM 40 MG: 40 INJECTION, POWDER, FOR SOLUTION INTRAVENOUS at 01:01

## 2025-06-05 RX ADMIN — HYDROXYZINE HYDROCHLORIDE 10 MG: 10 TABLET, FILM COATED ORAL at 01:02

## 2025-06-05 RX ADMIN — ALBUTEROL SULFATE 2.5 MG: 2.5 SOLUTION RESPIRATORY (INHALATION) at 14:22

## 2025-06-05 ASSESSMENT — ENCOUNTER SYMPTOMS: SHORTNESS OF BREATH: 1

## 2025-06-05 ASSESSMENT — PAIN - FUNCTIONAL ASSESSMENT: PAIN_FUNCTIONAL_ASSESSMENT: 0-10

## 2025-06-05 NOTE — PROGRESS NOTES
V2.0  [facility] Progress Note      Name:  Janessa Davidson /Age/Sex: 1945  (80 y.o. female)   MRN & CSN:  [id number] & [id number] Encounter Date/Time: 2025 10:48 AM EDT    Location:  ENDO/NONE PCP: Jes Sharma DO       Hospital Day: 3    Assessment and Plan:   Janessa Davidson is a 80 y.o. female with pmh of  multiple history of PE and DVT, hypothyroidism, COPD, chronic hypoxemic respiratory failure  who presents with Acute anemia      Plan:  #Acute normocytic anemia  - Hemoglobin 8.2 this morning with baseline hemoglobin around 12-13  - Patient without any melena or blood in stool  - Likely anemic in the setting of right hip hematoma and right arm wound secondary to fall  IV PPI twice daily  -No evidence of GI bleed  GI consulted, EGD was normal no active bleeding sites     # Bilateral lower extremity wounds  - Patient was prescribed doxycycline on , prescription was filled this morning, patient did not take any  -Continue to monitor off antibiotics  Wound care consulted, appreciate recs     # Right arm wound  # Right hip hematoma  -Sustained during fall patient had 2 weeks prior  Wound care consulted, appreciate recs--> recommended general surgery consult  General surgery consulted, recommends local wound care no surgical intervention planned.     # History of PE and DVT  # Supratherapeutic INR  - INR 5.6, in the setting of hematoma will give 5 mg oral vitamin K INR 1.7 in AM     # Hypothyroidism  Continue levothyroxine     # COPD, not in exacerbation  #  Chronic hypoxemic respiratory failure  Continue home inhalers, Theophylline and montelukast       Diet ADULT DIET; Regular; No Added Salt (3-4 gm)   DVT Prophylaxis [] Lovenox, []  Heparin, [] SCDs, [] Ambulation,  [] Eliquis, [] Xarelto  [] Coumadin   Code Status DNR-CCA   Disposition From: Home  Expected Disposition: Same  Estimated Date of Discharge: 2 days  Patient requires continued admission due to anemia   Surrogate Decision

## 2025-06-05 NOTE — ANESTHESIA POSTPROCEDURE EVALUATION
Department of Anesthesiology  Postprocedure Note    Patient: Janessa Davidson  MRN: [id number]  YOB: 1945  Date of evaluation: 6/5/2025    Procedure Summary       Date: 06/05/25 Room / Location: [place] [address] / [facility]    Anesthesia Start: 1123 Anesthesia Stop: 1134    Procedure: ESOPHAGOGASTRODUODENOSCOPY Diagnosis:       Acute anemia      (Acute anemia [D64.9])    Surgeons: Tara Gavin MD Responsible Provider: Rae Ponce MD    Anesthesia Type: MAC ASA Status: 4            Anesthesia Type: No value filed.    Gaye Phase I: 10    Gaye Phase II:  10    Anesthesia Post Evaluation    Patient location during evaluation: bedside  Patient participation: complete - patient participated  Level of consciousness: awake and alert  Pain score: 0  Airway patency: patent  Nausea & Vomiting: no nausea and no vomiting  Cardiovascular status: hemodynamically stable  Respiratory status: acceptable, spontaneous ventilation, nonlabored ventilation and nasal cannula  Hydration status: euvolemic  Pain management: adequate        No notable events documented.

## 2025-06-05 NOTE — OP NOTE
Operative Note      Patient: Janessa Davidson  YOB: 1945  MRN: [id number]    Date of Procedure: 6/5/2025    Pre-Op Diagnosis Codes:      * Acute anemia [D64.9]    [facility]      BRIEF OP REPORT:    Impression:    1) EGD showing normal esophagus small hiatal hernia   2) stomach appeared normal   3) duodenum normal        Suggest:   1) advance diet as tolerated   2) treat symptomatically   3) can start Coumadin cautiously if needed  Will hold off on further GI workup  Will consider capsule endoscopy if needed as outpatient  Patient had a colonoscopy in 2023     Full EGD/COLONOSCOPY report available by going to \"chart review\" then \"procedures\" then  \"EGD/Colonoscopy\"  then \"View Endoscopy Report\"       Please note that time of note may not reflect time of procedure     Electronically signed by Tara Gavin MD on 6/5/2025 at 11:40 AM

## 2025-06-05 NOTE — PLAN OF CARE
Problem: Discharge Planning  Goal: Discharge to home or other facility with appropriate resources  6/4/2025 2122 by Rosario Cramer, RN  Outcome: Progressing  6/4/2025 1530 by Nannette Puckett, RN  Outcome: Progressing

## 2025-06-05 NOTE — PROGRESS NOTES
General Surgery-Dr. Araiza    [facility] Day: 3    Chief Complaint on Admission: fall, hematoma, RUE wound      Subjective:     Denies new issues.  Pain stable to improved.  Seen by GI.  Planned upper endoscopy today.   Asking about going home.         ROS:  Review of Systems   Skin:  Positive for wound.   All other systems reviewed and are negative.      Allergies  Codeine, Darvon [propoxyphene hcl], Lamisil [terbinafine hcl], Lisinopril, Morphine, Neosporin [neomycin-polymyxin-gramicidin], and Pcn [penicillins]          Diagnosis Date    Age-related osteoporosis without current pathological fracture 2024    Anticoagulant long-term use     Arthritis     Cancer (formerly [facility])     Chronic hypoxemic respiratory failure (formerly [facility]) 2018    Community acquired pneumonia of right upper lobe of lung 2022    COPD (chronic obstructive pulmonary disease) (formerly [facility])     Coronary atherosclerosis     COVID-19 virus detected 2021    DVT (deep venous thrombosis) (formerly [facility])     Former smoker 2021    Gastroesophageal reflux disease     Hiatal hernia     Lung infiltrate on CT 2019    On home oxygen therapy     on     Osteopenia     Phlebitis     Pulmonary emphysema (formerly [facility]) 2023    Pulmonary nodule 2016    S/P left heart catheterization by percutaneous approach 2013    Sepsis due to pneumonia (formerly [facility]) 2017    Shortness of breath 2019    Shortness of breath 2021    Shortness of breath 2021    Wears glasses        Objective:     Vitals:    25 0800   BP: 120/71   Pulse: 75   Resp: 17   Temp: 98.1 °F (36.7 °C)   SpO2: 94%       TEMPERATURE:  Current -Temp: 98.1 °F (36.7 °C); Max - Temp  Av °F (36.7 °C)  Min: 97.7 °F (36.5 °C)  Max: 98.2 °F (36.8 °C)    No intake/output data recorded.No intake/output data recorded.      Physical Exam:  Physical Exam  Vitals reviewed.   Constitutional:       General: She is not in acute distress.  HENT:      Head: Normocephalic and atraumatic.       Right Ear: External ear normal.      Left Ear: External ear normal.   Eyes:      General:         Right eye: No discharge.         Left eye: No discharge.      Extraocular Movements: Extraocular movements intact.   Cardiovascular:      Rate and Rhythm: Normal rate.   Abdominal:      Palpations: Abdomen is soft.   Musculoskeletal:      Comments: RUE forearm eschar unchanged from yesterday.   Right lateral hip hematoma unchanged. Appropriately TTP with overlying and expected bruising.    Neurological:      Mental Status: She is alert.           Scheduled Meds:   theophylline  200 mg Oral BID    budesonide-formoterol  2 puff Inhalation BID RT    And    tiotropium  2 puff Inhalation Daily RT    dicyclomine  10 mg Oral BID    dilTIAZem  60 mg Oral 3 times per day    gabapentin  400 mg Oral QPM    levothyroxine  75 mcg Oral Daily    montelukast  10 mg Oral Nightly    Roflumilast  500 mcg Oral Daily    rosuvastatin  20 mg Oral Nightly    torsemide  20 mg Oral BID    sodium chloride flush  5-40 mL IntraVENous 2 times per day    pantoprazole (PROTONIX) 40 mg in sodium chloride (PF) 0.9 % 10 mL injection  40 mg IntraVENous Q12H     ContinuousInfusions:   sodium chloride       PRN Meds:hydrOXYzine HCl, albuterol, albuterol sulfate HFA, sodium chloride flush, sodium chloride, ondansetron **OR** ondansetron, acetaminophen **OR** acetaminophen      Labs/Imaging Results:   Lab Results   Component Value Date    WBC 7.3 06/03/2025    HGB 8.6 (L) 06/04/2025    HCT 29.1 (L) 06/04/2025    .0 06/03/2025     06/03/2025     Lab Results   Component Value Date     06/03/2025    K 4.1 06/03/2025    CL 94 (L) 06/03/2025    CO2 41 (H) 06/03/2025    BUN 14 06/03/2025    CREATININE 0.9 06/03/2025    GLUCOSE 86 06/03/2025    CALCIUM 9.4 06/03/2025    BILITOT 0.4 06/03/2025    ALKPHOS 75 06/03/2025    AST 32 06/03/2025    ALT 29 06/03/2025    LABGLOM 60 (L) 06/03/2025    GFRAA >60 10/14/2022    AGRATIO 2.2 06/02/2025    GLOB

## 2025-06-05 NOTE — ANESTHESIA PRE PROCEDURE
Department of Anesthesiology  Preprocedure Note       Name:  Janessa Davidson   Age:  80 y.o.  :  1945                                          MRN:  [id number]         Date:  2025      Surgeon: Surgeon(s):  Tara Gavin MD    Procedure: Procedure(s):  ESOPHAGOGASTRODUODENOSCOPY    Medications prior to admission:   Prior to Admission medications    Medication Sig Start Date End Date Taking? Authorizing Provider   guaiFENesin (MUCINEX) 600 MG extended release tablet Take 1 tablet by mouth 2 times daily OTC   Yes Provider, MD Reina   Budeson-Glycopyrrol-Formoterol (BREZTRI AEROSPHERE) 160-9-4.8 MCG/ACT AERO Inhale 2 actuation into the lungs 2 times daily 25  Yes Rodrigue Carreon PA-C   dicyclomine (BENTYL) 10 MG capsule Take 1 capsule by mouth in the morning and at bedtime 25  Yes Rodrigue Carreon PA-C   esomeprazole (NEXIUM) 40 MG delayed release capsule TAKE 1 CAPSULE BY MOUTH EVERY DAY IN THE MORNING AND AT BEDTIME 25  Yes Rodrigue Carreon PA-C   gabapentin (NEURONTIN) 400 MG capsule Take 1 capsule by mouth every evening for 90 days. 25 Yes Rodrigue Carreon PA-C   hydrocortisone (PROCTOZONE-HC) 2.5 % CREA rectal cream Place rectally 2 times daily as needed for Hemorrhoids  Patient taking differently: Place rectally 2 times daily as needed for Hemorrhoids 25 Applies to whole back area 25 Yes Rodrigue Carreon PA-C   levothyroxine (SYNTHROID) 75 MCG tablet Take 1 tablet by mouth daily 25  Yes Rodrigue Carreon PA-C   Magnesium Oxide -Mg Supplement 500 MG CAPS Take 1 tablet by mouth daily 25  Yes Rodrigue Carreon PA-C   montelukast (SINGULAIR) 10 MG tablet Take 1 tablet by mouth nightly 25  Yes Rodrigue Carreon PA-C   Roflumilast (DALIRESP) 500 MCG tablet Take 1 tablet by mouth daily 25  Yes Rodrigue Carreon PA-C   rosuvastatin (CRESTOR) 20 MG tablet Take 1 tablet by mouth nightly 25  Yes Rodrigue Carreon PA-C   theophylline (THEODUR) 450 
  06/05/25 66.3 kg (146 lb 2.6 oz)   06/02/25 59.4 kg (131 lb)   05/20/25 59 kg (130 lb)     Body mass index is 25.09 kg/m².    CBC:   Lab Results   Component Value Date/Time    WBC 7.3 06/03/2025 08:57 AM    RBC 3.24 06/03/2025 08:57 AM    HGB 8.8 06/05/2025 08:07 AM    HCT 29.7 06/05/2025 08:07 AM    .0 06/03/2025 08:57 AM    RDW 16.1 06/03/2025 08:57 AM     06/03/2025 08:57 AM       CMP:   Lab Results   Component Value Date/Time     06/03/2025 08:57 AM    K 4.1 06/03/2025 08:57 AM    CL 94 06/03/2025 08:57 AM    CO2 41 06/03/2025 08:57 AM    BUN 14 06/03/2025 08:57 AM    CREATININE 0.9 06/03/2025 08:57 AM    GFRAA >60 10/14/2022 11:25 AM    AGRATIO 2.2 06/02/2025 02:42 PM    LABGLOM 60 06/03/2025 08:57 AM    LABGLOM 75 04/23/2024 09:15 AM    GLUCOSE 86 06/03/2025 08:57 AM    CALCIUM 9.4 06/03/2025 08:57 AM    BILITOT 0.4 06/03/2025 08:57 AM    ALKPHOS 75 06/03/2025 08:57 AM    AST 32 06/03/2025 08:57 AM    ALT 29 06/03/2025 08:57 AM       POC Tests: No results for input(s): \"POCGLU\", \"POCNA\", \"POCK\", \"POCCL\", \"POCBUN\", \"POCHEMO\", \"POCHCT\" in the last 72 hours.    Coags:   Lab Results   Component Value Date/Time    PROTIME 48.3 06/04/2025 01:27 AM    PROTIME 72.9 06/02/2025 02:04 PM    INR 5.6 06/04/2025 01:27 AM    APTT 77.1 06/04/2025 01:27 AM    APTT 35.6 03/23/2023 04:48 PM       HCG (If Applicable): No results found for: \"PREGTESTUR\", \"PREGSERUM\", \"HCG\", \"HCGQUANT\"     ABGs:   Lab Results   Component Value Date/Time    PO2ART 89 03/18/2022 11:30 AM    VVC3TWH 45.0 03/18/2022 11:30 AM    GIS7WCG 27.9 03/18/2022 11:30 AM        Type & Screen (If Applicable):  Lab Results   Component Value Date    ABORH A NEGATIVE 05/15/2025    LABANTI NEGATIVE 05/15/2025       Drug/Infectious Status (If Applicable):  No results found for: \"HIV\", \"HEPCAB\"    COVID-19 Screening (If Applicable):   Lab Results   Component Value Date/Time    COVID19 Not Detected 10/11/2024 04:15 PM    COVID19 NOT DETECTED

## 2025-06-06 ENCOUNTER — TELEPHONE (OUTPATIENT)
Dept: FAMILY MEDICINE CLINIC | Age: 80
End: 2025-06-06

## 2025-06-06 ENCOUNTER — OFFICE VISIT (OUTPATIENT)
Dept: PULMONOLOGY | Age: 80
End: 2025-06-06

## 2025-06-06 VITALS
BODY MASS INDEX: 22.4 KG/M2 | HEART RATE: 84 BPM | DIASTOLIC BLOOD PRESSURE: 54 MMHG | SYSTOLIC BLOOD PRESSURE: 102 MMHG | WEIGHT: 131.2 LBS | OXYGEN SATURATION: 86 % | HEIGHT: 64 IN

## 2025-06-06 DIAGNOSIS — J44.9 COPD, VERY SEVERE (HCC): Primary | Chronic | ICD-10-CM

## 2025-06-06 DIAGNOSIS — R06.02 SHORTNESS OF BREATH: ICD-10-CM

## 2025-06-06 DIAGNOSIS — D64.9 ACUTE ANEMIA: ICD-10-CM

## 2025-06-06 DIAGNOSIS — R91.1 PULMONARY NODULE: Chronic | ICD-10-CM

## 2025-06-06 DIAGNOSIS — J96.11 CHRONIC HYPOXEMIC RESPIRATORY FAILURE (HCC): Chronic | ICD-10-CM

## 2025-06-06 DIAGNOSIS — Z87.891 FORMER SMOKER: ICD-10-CM

## 2025-06-06 DIAGNOSIS — Z86.718 HISTORY OF DVT OF LOWER EXTREMITY: ICD-10-CM

## 2025-06-06 DIAGNOSIS — J43.9 PULMONARY EMPHYSEMA, UNSPECIFIED EMPHYSEMA TYPE (HCC): ICD-10-CM

## 2025-06-06 DIAGNOSIS — I27.20 MILD PULMONARY HYPERTENSION (HCC): ICD-10-CM

## 2025-06-06 NOTE — PROGRESS NOTES
SUBJECTIVE:  Chief Complaint: Very severe/stage IV COPD, pulmonary emphysema, chronic hypoxemic respiratory failure, shortness of breath, mild pulmonary hypertension, former smoker, recent hospitalization for acute anemia and suspected GI bleeding  Janessa states that she was discharged several days ago from [facility].  Lab work done at the time admission demonstrated a hemoglobin of 8.2.  She did undergo endoscopy but no definite etiology for her anemia was found.  Just prior to this hospitalization she was treated for right leg cellulitis and also had a fall in mid May injuring her right arm, right hip and right leg.  She states that she is always short of breath and has not noted any significant change.  She denies fever, chills or purulent sputum expectoration.  She is on chronic oral prednisone and Coumadin.  She is not complaining of abdominal pain.  She continues on oxygen 24 hours a day, Breztri, oral theophylline, Singulair, Daliresp, prednisone and her albuterol MDI or albuterol solution for her nebulizer.  ROS:  Constitution:  HEENT: Negative for ear, throat pain  Cardiovascular: Negative for chest pain, syncope, edema  Pulmonary: See HPI  Musculoskeletal: Negative for DVT, myalgias, arthralgias.  Positive for right leg cellulitis    OBJECTIVE:  BP (!) 102/54 (BP Site: Right Upper Arm, Patient Position: Sitting, BP Cuff Size: Medium Adult)   Pulse 84   Ht 1.626 m (5' 4\")   Wt 59.5 kg (131 lb 3.2 oz)   SpO2 (!) 86%   PF (!) 2 L/min   BMI 22.52 kg/m²      Physical Exam:  Constitutional:  She appears well developed and well-nourished.  Always appears to be short of breath even at rest and does use accessory muscles respiration  Neck:  Supple, No palpable lymphadenopathy, No JVD  Cardiovascular:  S1, S2 Normal, Regular rhythm, no murmurs or gallops, No pericardial  rubs.  Pulmonary: Breath sounds are diminished throughout all lung fields with a few scattered wheezes.  She is barrel chested  Abdomen: Not

## 2025-06-06 NOTE — TELEPHONE ENCOUNTER
Care Transitions Initial Follow Up Call    Outreach made within 2 business days of discharge: Yes    Patient: Janessa Davidson Patient : 1945   MRN: [id number]  Reason for Admission: chf, copd exacerbation, cellulitis  Discharge Date: 25       Spoke with: pt    Discharge department/facility: [facility]    TCM Interactive Patient Contact:  Was patient able to fill all prescriptions: Yes  Was patient instructed to bring all medications to the follow-up visit: Yes  Is patient taking all medications as directed in the discharge summary? Yes  Does patient understand their discharge instructions: Yes  Does patient have questions or concerns that need addressed prior to 7-14 day follow up office visit: no    Additional needs identified to be addressed with provider  No needs identified             Scheduled appointment with PCP within 7-14 days    Follow Up  Future Appointments   Date Time Provider Department Center   2025 10:30 AM El Judge MD [facility]   2025 10:00 AM INFUSION ROOM CHAIR 1 - [facility]   2025  9:00 AM Sana Leiva PA-C [facility]   2025 10:30 AM Jes Sharma DO [facility]       Marisa Mckenna LPN

## 2025-06-06 NOTE — CARE COORDINATION
CM called [facility] and they have all needed documentation to follow pt. LH  
  Active  No   Patient's  Info Spouse or daughters

## 2025-06-08 NOTE — DISCHARGE SUMMARY
V2.0  Discharge Summary    Name:  Janessa Davidson /Age/Sex: 1945 (80 y.o. female)   Admit Date: 6/3/2025  Discharge Date: 25    MRN & CSN:  [id number] & [id number] Encounter Date and Time 25 5:06 PM EDT    Attending:  No att. providers found Discharging Provider: Altagracia Lew MD       Hospital Course:     Brief HPI: Janessa Davidson is a 80 y.o. female with pmh of multiple history of PE and DVT, hypothyroidism, COPD, chronic hypoxemic respiratory failure who presents with abnormal labs.  Patient states that she feels no more shortness of breath than she normally does.  Her family member at bedside, stated that she had some increased redness in her lower extremities, where her wounds are.  She denies fevers or chills, has been having lightheadedness on standing, especially upon standing.     Brief Problem Based Course:   #Acute normocytic anemia  - Hemoglobin low but stable  - Patient without any melena or blood in stool  - Likely anemic in the setting of right hip hematoma and right arm wound secondary to fall  -No evidence of GI bleed  GI consulted, EGD was normal no active bleeding sites     # Bilateral lower extremity wounds  - Patient was prescribed doxycycline on , prescription was filled this morning, patient did not take any  -Continue to monitor off antibiotics  - Continue to follow up with wound care     # Right arm wound  # Right hip hematoma  -Sustained during fall patient had 2 weeks prior  Follow up with general surgery as OP in 2 weeks     # History of PE and DVT  # Supratherapeutic INR  - INR 5.6, in the setting of hematoma wwas given 5 mg vitamin K and INR improved 1.7, resume coumadin on discharge and follow up with PT/INR     # Hypothyroidism  Continue levothyroxine     # COPD, not in exacerbation  #  Chronic hypoxemic respiratory failure  Continue home inhalers, Theophylline and montelukast      The patient expressed appropriate understanding of, and agreement with the

## 2025-06-10 NOTE — PROGRESS NOTES
Physician Progress Note      PATIENT:               MANJU ELLIS  CSN #:                  [id number]  :                       1945  ADMIT DATE:       6/3/2025 8:46 AM  DISCH DATE:        2025 3:48 PM  RESPONDING  PROVIDER #:        Altagracia Lew MD          QUERY TEXT:    History of CHF is documented in ED provider note on 6/3 and op/anesthesia note   on . proBNP 3509, treated with 1 dose IV Lasix in ED. Please document the   type and acuity:    The clinical indicators include:  --79 yo F admitted with acute anemia, right hip hematoma. PMH: PE and DVT,   hypothyroidism, COPD, chronic hypoxemic respiratory failure.    --Echo 2023: Left ventricular systolic function is normal. Ejection fraction   is visually estimated at 55%.    --CXR on 6/3: No acute cardiopulmonary disease    --IV Lasix, Crestor, po Torsemide, weights, I&O's  Options provided:  -- Acute on Chronic Diastolic CHF/HFpEF  -- Chronic Diastolic CHF/HFpEF  -- Other - I will add my own diagnosis  -- Disagree - Not applicable / Not valid  -- Disagree - Clinically unable to determine / Unknown  -- Refer to Clinical Documentation Reviewer    PROVIDER RESPONSE TEXT:    This patient has chronic diastolic CHF/HFpEF.    Query created by: Anna Mendieta on 2025 9:27 AM      Electronically signed by:  Altagracia Lew MD 6/10/2025 8:00 AM

## 2025-06-11 DIAGNOSIS — J44.9 COPD, VERY SEVERE (HCC): ICD-10-CM

## 2025-06-11 DIAGNOSIS — L29.9 PRURITUS: ICD-10-CM

## 2025-06-11 DIAGNOSIS — E78.00 PURE HYPERCHOLESTEROLEMIA: ICD-10-CM

## 2025-06-11 DIAGNOSIS — M19.90 ARTHRITIS: ICD-10-CM

## 2025-06-11 DIAGNOSIS — E03.9 ACQUIRED HYPOTHYROIDISM: ICD-10-CM

## 2025-06-11 DIAGNOSIS — F41.9 ANXIETY: ICD-10-CM

## 2025-06-11 DIAGNOSIS — K21.9 GASTROESOPHAGEAL REFLUX DISEASE, UNSPECIFIED WHETHER ESOPHAGITIS PRESENT: ICD-10-CM

## 2025-06-11 DIAGNOSIS — I10 ESSENTIAL HYPERTENSION: ICD-10-CM

## 2025-06-11 DIAGNOSIS — Z79.01 ANTICOAGULANT LONG-TERM USE: ICD-10-CM

## 2025-06-11 DIAGNOSIS — R10.9 ABDOMINAL CRAMPS: ICD-10-CM

## 2025-06-11 DIAGNOSIS — E87.6 HYPOKALEMIA: ICD-10-CM

## 2025-06-11 RX ORDER — VITAMIN E (DL,TOCOPHERYL ACET) 180 MG
1 CAPSULE ORAL DAILY
Qty: 90 CAPSULE | Refills: 1 | Status: SHIPPED | OUTPATIENT
Start: 2025-06-11

## 2025-06-11 RX ORDER — HYDROXYZINE HYDROCHLORIDE 25 MG/1
25 TABLET, FILM COATED ORAL NIGHTLY PRN
Qty: 90 TABLET | Refills: 1 | Status: SHIPPED | OUTPATIENT
Start: 2025-06-11

## 2025-06-11 RX ORDER — BUDESONIDE, GLYCOPYRROLATE, AND FORMOTEROL FUMARATE 160; 9; 4.8 UG/1; UG/1; UG/1
2 AEROSOL, METERED RESPIRATORY (INHALATION) 2 TIMES DAILY
Qty: 3 EACH | Refills: 3 | Status: SHIPPED | OUTPATIENT
Start: 2025-06-11

## 2025-06-11 RX ORDER — ALBUTEROL SULFATE 90 UG/1
INHALANT RESPIRATORY (INHALATION)
Qty: 3 EACH | Refills: 3 | Status: SHIPPED | OUTPATIENT
Start: 2025-06-11

## 2025-06-11 RX ORDER — LEVOTHYROXINE SODIUM 75 UG/1
75 TABLET ORAL DAILY
Qty: 90 TABLET | Refills: 1 | Status: SHIPPED | OUTPATIENT
Start: 2025-06-11

## 2025-06-11 RX ORDER — ROSUVASTATIN CALCIUM 20 MG/1
20 TABLET, COATED ORAL NIGHTLY
Qty: 90 TABLET | Refills: 1 | Status: SHIPPED | OUTPATIENT
Start: 2025-06-11

## 2025-06-11 RX ORDER — HYDROCORTISONE 25 MG/G
1 CREAM TOPICAL 2 TIMES DAILY PRN
Qty: 56 G | Refills: 2 | Status: SHIPPED | OUTPATIENT
Start: 2025-06-11 | End: 2025-09-09

## 2025-06-11 RX ORDER — ESOMEPRAZOLE MAGNESIUM 40 MG/1
CAPSULE, DELAYED RELEASE ORAL
Qty: 180 CAPSULE | Refills: 1 | Status: SHIPPED | OUTPATIENT
Start: 2025-06-11

## 2025-06-11 RX ORDER — THEOPHYLLINE 450 MG/1
TABLET, EXTENDED RELEASE ORAL
Qty: 90 TABLET | Refills: 1 | Status: SHIPPED | OUTPATIENT
Start: 2025-06-11

## 2025-06-11 RX ORDER — HYDROCORTISONE 10 MG/G
CREAM TOPICAL
Qty: 56.8 G | Refills: 2 | Status: SHIPPED | OUTPATIENT
Start: 2025-06-11

## 2025-06-11 RX ORDER — HYDROXYZINE HYDROCHLORIDE 10 MG/1
10 TABLET, FILM COATED ORAL 3 TIMES DAILY PRN
Qty: 90 TABLET | Refills: 1 | Status: SHIPPED | OUTPATIENT
Start: 2025-06-11

## 2025-06-11 RX ORDER — ROFLUMILAST 500 UG/1
500 TABLET ORAL DAILY
Qty: 90 TABLET | Refills: 1 | Status: SHIPPED | OUTPATIENT
Start: 2025-06-11

## 2025-06-11 RX ORDER — DICYCLOMINE HYDROCHLORIDE 10 MG/1
10 CAPSULE ORAL 2 TIMES DAILY
Qty: 180 CAPSULE | Refills: 1 | Status: SHIPPED | OUTPATIENT
Start: 2025-06-11

## 2025-06-11 RX ORDER — GABAPENTIN 400 MG/1
400 CAPSULE ORAL EVERY EVENING
Qty: 90 CAPSULE | Refills: 0 | Status: SHIPPED | OUTPATIENT
Start: 2025-06-11 | End: 2025-09-09

## 2025-06-11 RX ORDER — POTASSIUM CHLORIDE 750 MG/1
TABLET, EXTENDED RELEASE ORAL
Qty: 135 TABLET | Refills: 1 | Status: SHIPPED | OUTPATIENT
Start: 2025-06-11

## 2025-06-11 RX ORDER — MONTELUKAST SODIUM 10 MG/1
10 TABLET ORAL NIGHTLY
Qty: 90 TABLET | Refills: 1 | Status: SHIPPED | OUTPATIENT
Start: 2025-06-11

## 2025-06-11 RX ORDER — WARFARIN SODIUM 6 MG/1
TABLET ORAL
Qty: 90 TABLET | Refills: 1 | Status: SHIPPED | OUTPATIENT
Start: 2025-06-11 | End: 2025-06-13 | Stop reason: SDUPTHER

## 2025-06-13 DIAGNOSIS — Z79.01 ANTICOAGULANT LONG-TERM USE: ICD-10-CM

## 2025-06-13 RX ORDER — ALBUTEROL SULFATE 0.83 MG/ML
2.5 SOLUTION RESPIRATORY (INHALATION) EVERY 6 HOURS PRN
Qty: 375 ML | Refills: 5 | Status: SHIPPED | OUTPATIENT
Start: 2025-06-13

## 2025-06-13 RX ORDER — PREDNISONE 5 MG/1
5 TABLET ORAL DAILY
Qty: 90 TABLET | Refills: 3 | Status: SHIPPED | OUTPATIENT
Start: 2025-06-13

## 2025-06-13 RX ORDER — WARFARIN SODIUM 6 MG/1
TABLET ORAL
Qty: 90 TABLET | Refills: 1 | Status: SHIPPED | OUTPATIENT
Start: 2025-06-13

## 2025-06-16 ENCOUNTER — HOSPITAL ENCOUNTER (OUTPATIENT)
Dept: INFUSION THERAPY | Age: 80
Setting detail: INFUSION SERIES
Discharge: HOME | End: 2025-06-16
Payer: MEDICARE

## 2025-06-16 VITALS
HEART RATE: 81 BPM | TEMPERATURE: 98 F | OXYGEN SATURATION: 95 % | RESPIRATION RATE: 16 BRPM | DIASTOLIC BLOOD PRESSURE: 50 MMHG | SYSTOLIC BLOOD PRESSURE: 122 MMHG

## 2025-06-16 DIAGNOSIS — M81.0 AGE-RELATED OSTEOPOROSIS WITHOUT CURRENT PATHOLOGICAL FRACTURE: Primary | ICD-10-CM

## 2025-06-16 PROCEDURE — 6360000002 HC RX W HCPCS: Performed by: STUDENT IN AN ORGANIZED HEALTH CARE EDUCATION/TRAINING PROGRAM

## 2025-06-16 PROCEDURE — 96372 THER/PROPH/DIAG INJ SC/IM: CPT

## 2025-06-16 RX ORDER — ONDANSETRON 2 MG/ML
8 INJECTION INTRAMUSCULAR; INTRAVENOUS
OUTPATIENT
Start: 2025-12-15

## 2025-06-16 RX ORDER — SODIUM CHLORIDE 9 MG/ML
INJECTION, SOLUTION INTRAVENOUS CONTINUOUS
OUTPATIENT
Start: 2025-12-15

## 2025-06-16 RX ORDER — EPINEPHRINE 1 MG/ML
0.3 INJECTION, SOLUTION INTRAMUSCULAR; SUBCUTANEOUS PRN
OUTPATIENT
Start: 2025-12-15

## 2025-06-16 RX ORDER — DIPHENHYDRAMINE HYDROCHLORIDE 50 MG/ML
50 INJECTION, SOLUTION INTRAMUSCULAR; INTRAVENOUS
OUTPATIENT
Start: 2025-12-15

## 2025-06-16 RX ORDER — ACETAMINOPHEN 325 MG/1
650 TABLET ORAL
OUTPATIENT
Start: 2025-12-15

## 2025-06-16 RX ORDER — FAMOTIDINE 10 MG/ML
20 INJECTION, SOLUTION INTRAVENOUS
OUTPATIENT
Start: 2025-12-15

## 2025-06-16 RX ORDER — HYDROCORTISONE SODIUM SUCCINATE 100 MG/2ML
100 INJECTION INTRAMUSCULAR; INTRAVENOUS
OUTPATIENT
Start: 2025-12-15

## 2025-06-16 RX ORDER — ALBUTEROL SULFATE 90 UG/1
4 INHALANT RESPIRATORY (INHALATION) PRN
OUTPATIENT
Start: 2025-12-15

## 2025-06-16 RX ADMIN — DENOSUMAB 60 MG: 60 INJECTION SUBCUTANEOUS at 10:04

## 2025-06-17 ENCOUNTER — OFFICE VISIT (OUTPATIENT)
Dept: FAMILY MEDICINE CLINIC | Age: 80
End: 2025-06-17
Payer: MEDICARE

## 2025-06-17 VITALS
SYSTOLIC BLOOD PRESSURE: 116 MMHG | DIASTOLIC BLOOD PRESSURE: 58 MMHG | OXYGEN SATURATION: 73 % | HEART RATE: 78 BPM | HEIGHT: 64 IN | BODY MASS INDEX: 22.2 KG/M2 | WEIGHT: 130 LBS | TEMPERATURE: 97.7 F

## 2025-06-17 DIAGNOSIS — R60.0 BILATERAL LOWER EXTREMITY EDEMA: ICD-10-CM

## 2025-06-17 DIAGNOSIS — J96.11 CHRONIC HYPOXEMIC RESPIRATORY FAILURE (HCC): ICD-10-CM

## 2025-06-17 DIAGNOSIS — R79.89 ELEVATED BRAIN NATRIURETIC PEPTIDE (BNP) LEVEL: ICD-10-CM

## 2025-06-17 DIAGNOSIS — D64.9 ACUTE ANEMIA: ICD-10-CM

## 2025-06-17 DIAGNOSIS — S50.11XA HEMATOMA OF RIGHT FOREARM: ICD-10-CM

## 2025-06-17 DIAGNOSIS — J44.1 COPD WITH ACUTE EXACERBATION (HCC): Primary | ICD-10-CM

## 2025-06-17 DIAGNOSIS — Z79.01 ANTICOAGULANT LONG-TERM USE: ICD-10-CM

## 2025-06-17 DIAGNOSIS — R06.01 ORTHOPNEA: ICD-10-CM

## 2025-06-17 DIAGNOSIS — S70.01XA HEMATOMA OF RIGHT HIP, INITIAL ENCOUNTER: ICD-10-CM

## 2025-06-17 LAB
BASOPHILS # BLD: 0 K/UL (ref 0–0.2)
BASOPHILS NFR BLD: 0.4 %
DEPRECATED RDW RBC AUTO: 15.9 % (ref 12.4–15.4)
EOSINOPHIL # BLD: 0 K/UL (ref 0–0.6)
EOSINOPHIL NFR BLD: 0.1 %
HCT VFR BLD AUTO: 32.4 % (ref 36–48)
HGB BLD-MCNC: 10.4 G/DL (ref 12–16)
INTERNATIONAL NORMALIZATION RATIO, POC: 3.4
LYMPHOCYTES # BLD: 0.5 K/UL (ref 1–5.1)
LYMPHOCYTES NFR BLD: 5.9 %
MCH RBC QN AUTO: 28.9 PG (ref 26–34)
MCHC RBC AUTO-ENTMCNC: 32.1 G/DL (ref 31–36)
MCV RBC AUTO: 90 FL (ref 80–100)
MONOCYTES # BLD: 0.8 K/UL (ref 0–1.3)
MONOCYTES NFR BLD: 8.8 %
NEUTROPHILS # BLD: 7.8 K/UL (ref 1.7–7.7)
NEUTROPHILS NFR BLD: 84.8 %
NT-PROBNP SERPL-MCNC: 2876 PG/ML (ref 0–449)
PLATELET # BLD AUTO: 254 K/UL (ref 135–450)
PMV BLD AUTO: 8.8 FL (ref 5–10.5)
PROTHROMBIN TIME, POC: 41.1
RBC # BLD AUTO: 3.6 M/UL (ref 4–5.2)
WBC # BLD AUTO: 9.2 K/UL (ref 4–11)

## 2025-06-17 PROCEDURE — G8420 CALC BMI NORM PARAMETERS: HCPCS | Performed by: PHYSICIAN ASSISTANT

## 2025-06-17 PROCEDURE — 1159F MED LIST DOCD IN RCRD: CPT | Performed by: PHYSICIAN ASSISTANT

## 2025-06-17 PROCEDURE — 85610 PROTHROMBIN TIME: CPT | Performed by: PHYSICIAN ASSISTANT

## 2025-06-17 PROCEDURE — 1036F TOBACCO NON-USER: CPT | Performed by: PHYSICIAN ASSISTANT

## 2025-06-17 PROCEDURE — 1160F RVW MEDS BY RX/DR IN RCRD: CPT | Performed by: PHYSICIAN ASSISTANT

## 2025-06-17 PROCEDURE — 3074F SYST BP LT 130 MM HG: CPT | Performed by: PHYSICIAN ASSISTANT

## 2025-06-17 PROCEDURE — 1123F ACP DISCUSS/DSCN MKR DOCD: CPT | Performed by: PHYSICIAN ASSISTANT

## 2025-06-17 PROCEDURE — 3023F SPIROM DOC REV: CPT | Performed by: PHYSICIAN ASSISTANT

## 2025-06-17 PROCEDURE — G8399 PT W/DXA RESULTS DOCUMENT: HCPCS | Performed by: PHYSICIAN ASSISTANT

## 2025-06-17 PROCEDURE — 3078F DIAST BP <80 MM HG: CPT | Performed by: PHYSICIAN ASSISTANT

## 2025-06-17 PROCEDURE — 1111F DSCHRG MED/CURRENT MED MERGE: CPT | Performed by: PHYSICIAN ASSISTANT

## 2025-06-17 PROCEDURE — 1090F PRES/ABSN URINE INCON ASSESS: CPT | Performed by: PHYSICIAN ASSISTANT

## 2025-06-17 PROCEDURE — G8427 DOCREV CUR MEDS BY ELIG CLIN: HCPCS | Performed by: PHYSICIAN ASSISTANT

## 2025-06-17 PROCEDURE — 99215 OFFICE O/P EST HI 40 MIN: CPT | Performed by: PHYSICIAN ASSISTANT

## 2025-06-17 RX ORDER — PREDNISONE 20 MG/1
20 TABLET ORAL 2 TIMES DAILY
Qty: 10 TABLET | Refills: 0 | Status: SHIPPED | OUTPATIENT
Start: 2025-06-17 | End: 2025-06-22

## 2025-06-17 RX ORDER — DOXYCYCLINE HYCLATE 100 MG
100 TABLET ORAL 2 TIMES DAILY
Qty: 20 TABLET | Refills: 0 | Status: SHIPPED | OUTPATIENT
Start: 2025-06-17 | End: 2025-06-27

## 2025-06-17 NOTE — PATIENT INSTRUCTIONS
Obtains labs and Chest Xray  Start steroid and antibiotic  Elevate the legs  Wear compression hose  If you remain in 70s on 3 L of oxygen, go back to the ER  For any worsening of symptoms, go back to the ER

## 2025-06-17 NOTE — PROGRESS NOTES
pt states she is currently taking warfarin. 6 Mon Weds & Fri & 3 mg all other days. Pt misunderstood last direction change to 6 mg Mon & Fri 3 mg all other days. Pt states she didn't look at her warfarin daily dose calendar per last visit. Reese Hood keeps same recheck 2 weeks.   
UPPER GASTROINTESTINAL ENDOSCOPY N/A 6/5/2025    ESOPHAGOGASTRODUODENOSCOPY DIAGNOSTIC ONLY performed by Tara Gavin MD at [facility] ENDOSCOPY    VEIN SURGERY         CURRENT MEDICATIONS  Current Outpatient Medications   Medication Sig Dispense Refill    predniSONE (DELTASONE) 20 MG tablet Take 1 tablet by mouth 2 times daily for 5 days 10 tablet 0    doxycycline hyclate (VIBRA-TABS) 100 MG tablet Take 1 tablet by mouth 2 times daily for 10 days 20 tablet 0    albuterol (PROVENTIL) (2.5 MG/3ML) 0.083% nebulizer solution Take 3 mLs by nebulization every 6 hours as needed for Wheezing 375 mL 5    predniSONE (DELTASONE) 5 MG tablet Take 1 tablet by mouth daily 90 tablet 3    warfarin (COUMADIN) 6 MG tablet 06/03/25 Per anti-coag calendar patient to take 3 mg on Sun/Tues/Wed/Thurs/Sat, 6 mg Mon/Fri patient reports takes at HS 90 tablet 1    Hydrocortisone, Perianal, 1 % cream APPLY TOPICALLY 3 TIMES DAILY AS NEEDED 56.8 g 2    hydrOXYzine HCl (ATARAX) 10 MG tablet Take 1 tablet by mouth 3 times daily as needed for Itching 90 tablet 1    albuterol sulfate HFA (PROVENTIL;VENTOLIN;PROAIR) 108 (90 Base) MCG/ACT inhaler INHALE 2 PUFFS BY MOUTH FOUR TIMES A DAY AS NEEDED 3 each 3    Budeson-Glycopyrrol-Formoterol (BREZTRI AEROSPHERE) 160-9-4.8 MCG/ACT AERO Inhale 2 actuation into the lungs 2 times daily 3 each 3    dicyclomine (BENTYL) 10 MG capsule Take 1 capsule by mouth in the morning and at bedtime 180 capsule 1    esomeprazole (NEXIUM) 40 MG delayed release capsule TAKE 1 CAPSULE BY MOUTH EVERY DAY IN THE MORNING AND AT BEDTIME 180 capsule 1    gabapentin (NEURONTIN) 400 MG capsule Take 1 capsule by mouth every evening for 90 days. 90 capsule 0    hydrocortisone (PROCTOZONE-HC) 2.5 % CREA rectal cream Place 1 Application rectally 2 times daily as needed for Hemorrhoids 06/03/25 Applies to whole back area 56 g 2    hydrOXYzine HCl (ATARAX) 25 MG tablet Take 1 tablet by mouth nightly as needed (Anxiety/Insomnia) 90 tablet 1

## 2025-06-18 ENCOUNTER — HOSPITAL ENCOUNTER (OUTPATIENT)
Dept: GENERAL RADIOLOGY | Age: 80
Discharge: HOME | End: 2025-06-18
Payer: MEDICARE

## 2025-06-18 ENCOUNTER — RESULTS FOLLOW-UP (OUTPATIENT)
Dept: FAMILY MEDICINE CLINIC | Age: 80
End: 2025-06-18

## 2025-06-18 DIAGNOSIS — J96.11 CHRONIC HYPOXEMIC RESPIRATORY FAILURE (HCC): ICD-10-CM

## 2025-06-18 DIAGNOSIS — J44.1 COPD WITH ACUTE EXACERBATION (HCC): ICD-10-CM

## 2025-06-18 PROCEDURE — 71046 X-RAY EXAM CHEST 2 VIEWS: CPT

## 2025-06-24 ENCOUNTER — OFFICE VISIT (OUTPATIENT)
Dept: BARIATRICS/WEIGHT MGMT | Age: 80
End: 2025-06-24
Payer: MEDICARE

## 2025-06-24 VITALS
OXYGEN SATURATION: 92 % | BODY MASS INDEX: 22.31 KG/M2 | HEART RATE: 77 BPM | HEIGHT: 64 IN | DIASTOLIC BLOOD PRESSURE: 66 MMHG | SYSTOLIC BLOOD PRESSURE: 118 MMHG
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
## 2025-06-24 DIAGNOSIS — S70.01XS HEMATOMA OF RIGHT HIP, SEQUELA: ICD-10-CM

## 2025-06-24 DIAGNOSIS — R23.4 SKIN ESCHAR: Primary | ICD-10-CM

## 2025-06-24 PROCEDURE — 1036F TOBACCO NON-USER: CPT | Performed by: SURGERY

## 2025-06-24 PROCEDURE — G8427 DOCREV CUR MEDS BY ELIG CLIN: HCPCS | Performed by: SURGERY

## 2025-06-24 PROCEDURE — 99213 OFFICE O/P EST LOW 20 MIN: CPT | Performed by: SURGERY

## 2025-06-24 PROCEDURE — 1090F PRES/ABSN URINE INCON ASSESS: CPT | Performed by: SURGERY

## 2025-06-24 PROCEDURE — 3078F DIAST BP <80 MM HG: CPT | Performed by: SURGERY

## 2025-06-24 PROCEDURE — G8399 PT W/DXA RESULTS DOCUMENT: HCPCS | Performed by: SURGERY

## 2025-06-24 PROCEDURE — 1123F ACP DISCUSS/DSCN MKR DOCD: CPT | Performed by: SURGERY

## 2025-06-24 PROCEDURE — 3074F SYST BP LT 130 MM HG: CPT | Performed by: SURGERY

## 2025-06-24 PROCEDURE — G8420 CALC BMI NORM PARAMETERS: HCPCS | Performed by: SURGERY

## 2025-06-24 PROCEDURE — 1111F DSCHRG MED/CURRENT MED MERGE: CPT | Performed by: SURGERY

## 2025-06-24 PROCEDURE — 1159F MED LIST DOCD IN RCRD: CPT | Performed by: SURGERY

## 2025-06-24 ASSESSMENT — ENCOUNTER SYMPTOMS: COLOR CHANGE: 1

## 2025-06-24 NOTE — PROGRESS NOTES
Chief Complaint   Patient presents with    New Patient     N/P Right Upper Arm Eschar and right Hip hematoma         SUBJECTIVE:  HPI: Patient is here with complaints of:    F/u from hospital.     Had fall and was on anticoagulation.     Has right forearm eschar and right hip hematoma.     Hematoma has decreased in size and is softer.     Pt still on anti coagulation.    She is at home.    She has help at home.     No further falls.    I have reviewed the patient's(pertinent information to this visit) medical history, family history(scanned in  the [employer] under \"patient questioner\"), social history and review of systems with the patient today in the office.            Past Surgical History:   Procedure Laterality Date    APPENDECTOMY      BRONCHOSCOPY  1995    Dr Judge    COLONOSCOPY      COLONOSCOPY N/A 12/14/2023    COLONOSCOPY POLYPECTOMY SNARE/COLD BIOPSY performed by David Pena MD at [facility] ENDOSCOPY    ENDOSCOPY, COLON, DIAGNOSTIC      TONSILLECTOMY      TUBAL LIGATION      UPPER GASTROINTESTINAL ENDOSCOPY N/A 12/14/2023    EGD POLYP ABLATION/OTHER WITH BALLOON DILATION WITH 15-18MM BALLOON UP TO 18MM performed by David Pena MD at [facility] ENDOSCOPY    UPPER GASTROINTESTINAL ENDOSCOPY N/A 6/5/2025    ESOPHAGOGASTRODUODENOSCOPY DIAGNOSTIC ONLY performed by Tara Gavin MD at [facility] ENDOSCOPY    VEIN SURGERY       Past Medical History:   Diagnosis Date    Age-related osteoporosis without current pathological fracture 05/23/2024    Anticoagulant long-term use     Arthritis     Cancer (HCC)     Chronic hypoxemic respiratory failure (HCC) 02/06/2018    Community acquired pneumonia of right upper lobe of lung 03/19/2022    COPD (chronic obstructive pulmonary disease) (HCC)     Coronary atherosclerosis     COVID-19 virus detected 01/18/2021    DVT (deep venous thrombosis) (HCC)     Former smoker 11/22/2021    Gastroesophageal reflux disease     Hiatal hernia     Lung infiltrate on CT 01/22/2019    On home

## 2025-06-25 ENCOUNTER — HOSPITAL ENCOUNTER (OUTPATIENT)
Dept: WOUND CARE | Age: 80
Discharge: HOME | End: 2025-06-25
Attending: NURSE PRACTITIONER
Payer: MEDICARE

## 2025-06-25 VITALS
HEART RATE: 89 BPM | RESPIRATION RATE: 18 BRPM | SYSTOLIC BLOOD PRESSURE: 109 MMHG | TEMPERATURE: 97.2 F | DIASTOLIC BLOOD PRESSURE: 79 MMHG

## 2025-06-25 DIAGNOSIS — S51.811A LACERATION WITHOUT FOREIGN BODY OF RIGHT FOREARM, INITIAL ENCOUNTER: ICD-10-CM

## 2025-06-25 DIAGNOSIS — S80.821A BLISTER (NONTHERMAL), RIGHT LOWER LEG, INITIAL ENCOUNTER: ICD-10-CM

## 2025-06-25 DIAGNOSIS — S70.01XA: Primary | ICD-10-CM

## 2025-06-25 PROCEDURE — 11042 DBRDMT SUBQ TIS 1ST 20SQCM/<: CPT

## 2025-06-25 PROCEDURE — 99213 OFFICE O/P EST LOW 20 MIN: CPT | Performed by: NURSE PRACTITIONER

## 2025-06-25 PROCEDURE — 99213 OFFICE O/P EST LOW 20 MIN: CPT

## 2025-06-25 PROCEDURE — 97597 DBRDMT OPN WND 1ST 20 CM/<: CPT

## 2025-06-25 PROCEDURE — 11042 DBRDMT SUBQ TIS 1ST 20SQCM/<: CPT | Performed by: NURSE PRACTITIONER

## 2025-06-25 PROCEDURE — 97597 DBRDMT OPN WND 1ST 20 CM/<: CPT | Performed by: NURSE PRACTITIONER

## 2025-06-25 NOTE — PROGRESS NOTES
cardiomegaly, no change from previous examination. Lungs are   hyperexpanded in keeping with changes of COPD. Calcified granulomas are   unchanged from previous.     IMPRESSION:    Nonacute. Stable coronal change.            Dictated and Electronically Signed By:   David Irvin MD   [facility]   2025 17:27                 No LMP recorded. Patient is postmenopausal.     Diabetes:   [x] N/A  [] On an oral regimen  [] On an insulin regimen  Neuropathy:  [] Indicated  [] Gabapentin  [] Lyrica  [x] Not indicated    No results found for: \"LABA1C\"     Diabetes education provided:    Metabolic syndrome: association of diabetes with dyslipidemia, HTN and obesity.  Diabetic Neuropathy: signs and therapy.  Foot care: advised to wash feet daily, pat dry and apply lotion at night, avoiding between toes. Need to look at feet daily and report to a physician any signs of inflammation or skin damage. Discussed diabetes shoes and socks.  Diabetic management related to wound healing    Smoking:   Tobacco Use      Smoking status: Former        Packs/day: 0.00        Years: 1.5 packs/day for 26.9 years (40.3 ttl pk-yrs)        Types: Cigarettes        Start date:         Quit date: 1991        Years since quittin.6      Smokeless tobacco: Never     Patient counseled in length on smoking cessation including benefits of quitting and health risks of continuing to smoke including but not limited to lung cancer, COPD, risk of coronary artery disease. Patient verbalized understanding today, former smoker no intent to restart.    Anticoagulant/Antiplatelet therapy: [] N/A [] Aspirin   [] Plavix    [x]Warfarin/Coumadin [] Eliquis/Apixaban   [] Rivaroxaban/Xarelto  [] Dabigatran/ Pradaxa [] Edoxaban/Lixiana    Immunosuppression: [x] No [] Yes    Obesity:  BMI Readings from Last 3 Encounters:   25 22.31 kg/m²   25 22.31 kg/m²   25 22.52 kg/m²        Patient educated on the 6

## 2025-06-25 NOTE — PATIENT INSTRUCTIONS
PHYSICIAN ORDERS AND DISCHARGE INSTRUCTIONS     Wound Care Notes:           Orders for this week:  2025      Fax to [facility]     Right Lateral Arm:Wash with soap and water. Pat dry.   Apply Collactive Ag  Cover with Zetuvit Plus Silicone Border  Tubi E  Change Monday, Wednesday and Friday    Right Lower Leg-- Apply [place] to wound area and Joelle Wound. Cover with Zetuvit Plus Silicone Border  Change Monday, Wednesday and Friday    Right Hip- [place] to area         Dispense 30 day quantity when ordering supplies.  Plan:        Follow Up Instructions: 1 week   Primary Wound Care Provider: Loretta Velasco CNP  Call  for any questions or concerns.  Central [phone] for imaging and lab work

## 2025-06-26 ENCOUNTER — TELEPHONE (OUTPATIENT)
Dept: FAMILY MEDICINE CLINIC | Age: 80
End: 2025-06-26

## 2025-06-26 NOTE — TELEPHONE ENCOUNTER
Diana from [facility] called asking if the pt can have the INR done at home instead of coming in on July 1. Or if there is other things the pt will need to be seen for that day. Please advise

## 2025-06-26 NOTE — TELEPHONE ENCOUNTER
Verbal given to Diana to do patients INR on 7/1/25. Also provided Diana with the patients current Warfarin dosage, Diana verbalized understanding.

## 2025-06-27 DIAGNOSIS — J44.9 COPD, VERY SEVERE (HCC): ICD-10-CM

## 2025-06-27 DIAGNOSIS — F41.9 ANXIETY: ICD-10-CM

## 2025-06-27 DIAGNOSIS — K21.9 GASTROESOPHAGEAL REFLUX DISEASE, UNSPECIFIED WHETHER ESOPHAGITIS PRESENT: ICD-10-CM

## 2025-06-27 DIAGNOSIS — L29.9 PRURITUS: ICD-10-CM

## 2025-06-27 DIAGNOSIS — E87.6 HYPOKALEMIA: ICD-10-CM

## 2025-06-27 DIAGNOSIS — M19.90 ARTHRITIS: ICD-10-CM

## 2025-06-27 DIAGNOSIS — E78.00 PURE HYPERCHOLESTEROLEMIA: ICD-10-CM

## 2025-06-27 DIAGNOSIS — R10.9 ABDOMINAL CRAMPS: ICD-10-CM

## 2025-06-27 DIAGNOSIS — I10 ESSENTIAL HYPERTENSION: ICD-10-CM

## 2025-06-27 DIAGNOSIS — E03.9 ACQUIRED HYPOTHYROIDISM: ICD-10-CM

## 2025-06-27 RX ORDER — BUDESONIDE, GLYCOPYRROLATE, AND FORMOTEROL FUMARATE 160; 9; 4.8 UG/1; UG/1; UG/1
2 AEROSOL, METERED RESPIRATORY (INHALATION) 2 TIMES DAILY
Qty: 3 EACH | Refills: 3 | Status: SHIPPED | OUTPATIENT
Start: 2025-06-27

## 2025-06-27 RX ORDER — HYDROCORTISONE 10 MG/G
CREAM TOPICAL
Qty: 56.8 G | Refills: 2 | Status: SHIPPED | OUTPATIENT
Start: 2025-06-27

## 2025-06-27 RX ORDER — ESOMEPRAZOLE MAGNESIUM 40 MG/1
CAPSULE, DELAYED RELEASE ORAL
Qty: 180 CAPSULE | Refills: 1 | Status: SHIPPED | OUTPATIENT
Start: 2025-06-27

## 2025-06-27 RX ORDER — HYDROXYZINE HYDROCHLORIDE 10 MG/1
10 TABLET, FILM COATED ORAL 3 TIMES DAILY PRN
Qty: 90 TABLET | Refills: 1 | Status: SHIPPED | OUTPATIENT
Start: 2025-06-27

## 2025-06-27 RX ORDER — POTASSIUM CHLORIDE 750 MG/1
TABLET, EXTENDED RELEASE ORAL
Qty: 135 TABLET | Refills: 1 | Status: SHIPPED | OUTPATIENT
Start: 2025-06-27

## 2025-06-27 RX ORDER — LEVOTHYROXINE SODIUM 75 UG/1
75 TABLET ORAL DAILY
Qty: 90 TABLET | Refills: 1 | Status: SHIPPED | OUTPATIENT
Start: 2025-06-27

## 2025-06-27 RX ORDER — GABAPENTIN 400 MG/1
400 CAPSULE ORAL EVERY EVENING
Qty: 90 CAPSULE | Refills: 0 | Status: SHIPPED | OUTPATIENT
Start: 2025-06-27 | End: 2025-09-25

## 2025-06-27 RX ORDER — VITAMIN E (DL,TOCOPHERYL ACET) 180 MG
1 CAPSULE ORAL DAILY
Qty: 90 CAPSULE | Refills: 1 | Status: SHIPPED | OUTPATIENT
Start: 2025-06-27

## 2025-06-27 RX ORDER — THEOPHYLLINE 450 MG/1
TABLET, EXTENDED RELEASE ORAL
Qty: 90 TABLET | Refills: 1 | Status: SHIPPED | OUTPATIENT
Start: 2025-06-27

## 2025-06-27 RX ORDER — ROFLUMILAST 500 UG/1
500 TABLET ORAL DAILY
Qty: 90 TABLET | Refills: 1 | Status: SHIPPED | OUTPATIENT
Start: 2025-06-27

## 2025-06-27 RX ORDER — ROSUVASTATIN CALCIUM 20 MG/1
20 TABLET, COATED ORAL NIGHTLY
Qty: 90 TABLET | Refills: 1 | Status: SHIPPED | OUTPATIENT
Start: 2025-06-27

## 2025-06-27 RX ORDER — MONTELUKAST SODIUM 10 MG/1
10 TABLET ORAL NIGHTLY
Qty: 90 TABLET | Refills: 1 | Status: SHIPPED | OUTPATIENT
Start: 2025-06-27

## 2025-06-27 RX ORDER — HYDROCORTISONE 25 MG/G
1 CREAM TOPICAL 2 TIMES DAILY PRN
Qty: 56 G | Refills: 2 | Status: SHIPPED | OUTPATIENT
Start: 2025-06-27 | End: 2025-09-25

## 2025-06-27 RX ORDER — ALBUTEROL SULFATE 90 UG/1
INHALANT RESPIRATORY (INHALATION)
Qty: 3 EACH | Refills: 3 | Status: SHIPPED | OUTPATIENT
Start: 2025-06-27

## 2025-06-27 RX ORDER — DICYCLOMINE HYDROCHLORIDE 10 MG/1
10 CAPSULE ORAL 2 TIMES DAILY
Qty: 180 CAPSULE | Refills: 1 | Status: SHIPPED | OUTPATIENT
Start: 2025-06-27

## 2025-06-28 RX ORDER — PREDNISONE 5 MG/1
5 TABLET ORAL DAILY
Qty: 90 TABLET | Refills: 0 | Status: SHIPPED | OUTPATIENT
Start: 2025-06-28 | End: 2025-07-31

## 2025-06-28 RX ORDER — ALBUTEROL SULFATE 0.83 MG/ML
2.5 SOLUTION RESPIRATORY (INHALATION) EVERY 6 HOURS PRN
Qty: 375 ML | Refills: 0 | Status: SHIPPED | OUTPATIENT
Start: 2025-06-28 | End: 2025-07-31

## 2025-06-30 ENCOUNTER — TELEPHONE (OUTPATIENT)
Dept: FAMILY MEDICINE CLINIC | Age: 80
End: 2025-06-30

## 2025-06-30 NOTE — TELEPHONE ENCOUNTER
Per Maria De Jesus  Would advise to hold tonight's dose. Would then do 6 mg on Fridays and 3 mg all others. recheck in 2 weeks please.     Home care nurse notified.  Was still at patients house and will advise patient.

## 2025-07-02 ENCOUNTER — HOSPITAL ENCOUNTER (OUTPATIENT)
Dept: WOUND CARE | Age: 80
Discharge: HOME | End: 2025-07-02
Attending: NURSE PRACTITIONER
Payer: MEDICARE

## 2025-07-02 VITALS
RESPIRATION RATE: 20 BRPM | SYSTOLIC BLOOD PRESSURE: 132 MMHG | TEMPERATURE: 97.1 F | DIASTOLIC BLOOD PRESSURE: 98 MMHG | HEART RATE: 80 BPM

## 2025-07-02 DIAGNOSIS — S51.811D LACERATION OF RIGHT FOREARM, SUBSEQUENT ENCOUNTER: ICD-10-CM

## 2025-07-02 DIAGNOSIS — S70.01XA: Primary | ICD-10-CM

## 2025-07-02 DIAGNOSIS — L97.922 NON-PRESSURE CHRONIC ULCER OF LEFT LOWER LEG WITH FAT LAYER EXPOSED (HCC): ICD-10-CM

## 2025-07-02 PROBLEM — S51.811A LACERATION OF RIGHT FOREARM: Status: ACTIVE | Noted: 2025-07-02

## 2025-07-02 PROCEDURE — 11042 DBRDMT SUBQ TIS 1ST 20SQCM/<: CPT

## 2025-07-02 PROCEDURE — 11042 DBRDMT SUBQ TIS 1ST 20SQCM/<: CPT | Performed by: NURSE PRACTITIONER

## 2025-07-02 RX ORDER — LIDOCAINE HYDROCHLORIDE 40 MG/ML
SOLUTION TOPICAL PRN
OUTPATIENT
Start: 2025-07-02

## 2025-07-02 RX ORDER — SILVER SULFADIAZINE 10 MG/G
CREAM TOPICAL PRN
OUTPATIENT
Start: 2025-07-02

## 2025-07-02 RX ORDER — MUPIROCIN 2 %
OINTMENT (GRAM) TOPICAL PRN
OUTPATIENT
Start: 2025-07-02

## 2025-07-02 RX ORDER — LIDOCAINE 40 MG/G
CREAM TOPICAL PRN
OUTPATIENT
Start: 2025-07-02

## 2025-07-02 RX ORDER — CLOBETASOL PROPIONATE 0.5 MG/G
OINTMENT TOPICAL PRN
OUTPATIENT
Start: 2025-07-02

## 2025-07-02 RX ORDER — LIDOCAINE 50 MG/G
OINTMENT TOPICAL PRN
OUTPATIENT
Start: 2025-07-02

## 2025-07-02 RX ORDER — LIDOCAINE HYDROCHLORIDE 20 MG/ML
JELLY TOPICAL PRN
OUTPATIENT
Start: 2025-07-02

## 2025-07-02 RX ORDER — TRIAMCINOLONE ACETONIDE 1 MG/G
OINTMENT TOPICAL PRN
OUTPATIENT
Start: 2025-07-02

## 2025-07-02 RX ORDER — SODIUM CHLOR/HYPOCHLOROUS ACID 0.033 %
SOLUTION, IRRIGATION IRRIGATION PRN
OUTPATIENT
Start: 2025-07-02

## 2025-07-02 RX ORDER — BETAMETHASONE DIPROPIONATE 0.5 MG/G
CREAM TOPICAL PRN
OUTPATIENT
Start: 2025-07-02

## 2025-07-02 RX ORDER — GINSENG 100 MG
CAPSULE ORAL PRN
OUTPATIENT
Start: 2025-07-02

## 2025-07-02 NOTE — PROGRESS NOTES
therapy: [] N/A [] Aspirin   [] Plavix    [x]Warfarin/Coumadin [] Eliquis/Apixaban   [] Rivaroxaban/Xarelto  [] Dabigatran/ Pradaxa [] Edoxaban/Lixiana    Immunosuppression: [x] No [] Yes    Obesity:  BMI Readings from Last 3 Encounters:   06/24/25 22.31 kg/m²   06/17/25 22.31 kg/m²   06/06/25 22.52 kg/m²        Patient educated on the 6 essential components necessary for wound healing: Circulation, Debridements, Proper Dressings and Topical Wound Products, Infection Control, Edema Control and Offloading.     Patient educated on those factors that negatively effect or impact wound healing: smoking, obesity, uncontrolled diabetes, anticoagulant and immunosuppressive regimens, inadequate nutrition, untreated arterial and venous disease if applicable and measures to manage edema.      Nutritional status: well nourished. Discussed need for increased protein and calories for wound healing and good sources of protein (just over 7 grams for every 20 pounds of body weight). Animal-based foods high in protein (meat, poultry, fish, eggs, and dairy foods). Plant based foods high in protein (tofu, lentils, beans, chickpeas, nuts, quinoa and raghavendra seeds.     Lab Results   Component Value Date     06/03/2025    K 4.1 06/03/2025    CL 94 (L) 06/03/2025    CO2 41 (H) 06/03/2025    BUN 14 06/03/2025    CREATININE 0.9 06/03/2025    GLUCOSE 86 06/03/2025    CALCIUM 9.4 06/03/2025    BILITOT 0.4 06/03/2025    ALKPHOS 75 06/03/2025    AST 32 06/03/2025    ALT 29 06/03/2025    LABGLOM 60 (L) 06/03/2025    GFRAA >60 10/14/2022    AGRATIO 2.2 06/02/2025    GLOB 1.8 06/04/2020        Off Loading  Offloading or minimizing or removing weight placed on an area with poor circulation such as diabetic wounds or pressure. This can be achieved with crutches, wheel chair, knee walker etc. Minimizing pressure through partial weight bearing (minimizing the amount of  pressure applied and or the amount of time on the area of pressure) or

## 2025-07-02 NOTE — PATIENT INSTRUCTIONS
PHYSICIAN ORDERS AND DISCHARGE INSTRUCTIONS     Wound Care Notes:           Orders for this week:  2025      Fax to [facility]     Right Lateral Arm:Wash with soap and water. Pat dry.   Apply Collactive Ag to wound bed  Cover with Zetuvit Plus Silicone Border  Tubi E  Change Monday, 25 and Friday    Right Lower Leg-- Wash with soap and water. Pat dry.   Apply Sharp to wound area and Joelle Wound.   Apply colactive plus Ag to wound bed   Cover with Zetuvit Plus Silicone Border  Tubi F   Change Monday, 25 and Friday    Right Hip- Marathon to area       Dispense 30 day quantity when ordering supplies.  Plan:        Follow Up Instructions: 2 week   Primary Wound Care Provider: Loretta Velasco CNP  Call  for any questions or concerns.  Central [phone] for imaging and lab work

## 2025-07-14 ENCOUNTER — TELEPHONE (OUTPATIENT)
Dept: FAMILY MEDICINE CLINIC | Age: 80
End: 2025-07-14

## 2025-07-14 DIAGNOSIS — Z86.718 HISTORY OF DVT OF LOWER EXTREMITY: Primary | ICD-10-CM

## 2025-07-14 NOTE — TELEPHONE ENCOUNTER
PIPER DID  NOT HAVE A NEW ORDER FOR INR BUT WENT AHEAD BECAUSE IT WAS 2 WKS AGO. NEED NEW ORDERS FOR INR CHECKS-STARTING TODAY  INR 1.8 COUMADIN 6 FRI AND 3 REST OF THE DAYS. PLEASE ADVISE

## 2025-07-17 ENCOUNTER — HOSPITAL ENCOUNTER (OUTPATIENT)
Dept: WOUND CARE | Age: 80
Discharge: HOME | End: 2025-07-17
Attending: NURSE PRACTITIONER
Payer: MEDICARE

## 2025-07-17 VITALS
HEART RATE: 97 BPM | TEMPERATURE: 97.5 F | DIASTOLIC BLOOD PRESSURE: 76 MMHG | SYSTOLIC BLOOD PRESSURE: 134 MMHG | RESPIRATION RATE: 20 BRPM

## 2025-07-17 DIAGNOSIS — L97.921 NON-PRESSURE CHRONIC ULCER OF LEFT LOWER LEG, LIMITED TO BREAKDOWN OF SKIN (HCC): ICD-10-CM

## 2025-07-17 DIAGNOSIS — S70.01XD: ICD-10-CM

## 2025-07-17 DIAGNOSIS — S51.811D LACERATION OF RIGHT FOREARM, SUBSEQUENT ENCOUNTER: Primary | ICD-10-CM

## 2025-07-17 PROCEDURE — 97597 DBRDMT OPN WND 1ST 20 CM/<: CPT

## 2025-07-17 PROCEDURE — 97597 DBRDMT OPN WND 1ST 20 CM/<: CPT | Performed by: NURSE PRACTITIONER

## 2025-07-17 RX ORDER — LIDOCAINE HYDROCHLORIDE 20 MG/ML
JELLY TOPICAL PRN
OUTPATIENT
Start: 2025-07-17

## 2025-07-17 RX ORDER — LIDOCAINE 50 MG/G
OINTMENT TOPICAL PRN
OUTPATIENT
Start: 2025-07-17

## 2025-07-17 RX ORDER — TRIAMCINOLONE ACETONIDE 1 MG/G
OINTMENT TOPICAL PRN
OUTPATIENT
Start: 2025-07-17

## 2025-07-17 RX ORDER — CLOBETASOL PROPIONATE 0.5 MG/G
OINTMENT TOPICAL PRN
OUTPATIENT
Start: 2025-07-17

## 2025-07-17 RX ORDER — SILVER SULFADIAZINE 10 MG/G
CREAM TOPICAL PRN
OUTPATIENT
Start: 2025-07-17

## 2025-07-17 RX ORDER — LIDOCAINE 40 MG/G
CREAM TOPICAL PRN
OUTPATIENT
Start: 2025-07-17

## 2025-07-17 RX ORDER — MUPIROCIN 2 %
OINTMENT (GRAM) TOPICAL PRN
OUTPATIENT
Start: 2025-07-17

## 2025-07-17 RX ORDER — BETAMETHASONE DIPROPIONATE 0.5 MG/G
CREAM TOPICAL PRN
OUTPATIENT
Start: 2025-07-17

## 2025-07-17 RX ORDER — SODIUM CHLOR/HYPOCHLOROUS ACID 0.033 %
SOLUTION, IRRIGATION IRRIGATION PRN
OUTPATIENT
Start: 2025-07-17

## 2025-07-17 RX ORDER — LIDOCAINE HYDROCHLORIDE 40 MG/ML
SOLUTION TOPICAL PRN
OUTPATIENT
Start: 2025-07-17

## 2025-07-17 RX ORDER — GINSENG 100 MG
CAPSULE ORAL PRN
OUTPATIENT
Start: 2025-07-17

## 2025-07-17 NOTE — PROGRESS NOTES
her next shower. The hematoma to the right hip continues to decrease in size and has no drainage. The patient will continue to monitor and will call if there are any new issues. The patient will be discharged from the wound care center today. She was encouraged to use her compression garments to help manage lower extremity edema.    7/2/25  Patient presents for follow up. Left lower leg has opened up where leg was hit as expected, debrided at bedside. Leg is slightly swollen will add Tubigrip. Right forearm debrided, site is healing nicely. Hematoma to right hip remains, patient states it is getting softer and smaller. Patient does not want it drained at this time. Will continue to monitor.     6/25/25 Patient states she fell at the salon 5/15/25 and landed on her right hip and right arm. Laceration noted to arm with very large thick eschar noted. Hematoma to right hip, that according to records and patient is decreasing in size and not as bothersome anymore. Patient states last week she hit her leg, area to right anterior lower leg which appears to have likely been a blister that is reabsorbing. [facility], and they can help with dressing changes as needed. Forearm eschar is starting to loosen and patient is afraid she is going to hit it and knock it off. Bedside debridement completed of subcutaneous level to right forearm, and devitalized tissue debridement completed to right lower leg, patient tolerated well. Will utilize Collagen dressing to forearm to facilitate healing, and skin prep to other areas to protect the skin. All questions answered. Patient to follow up next week.     Regimen discussed and established with patient/family as below. The patients records were reviewed and discussed.  Time was given for questions. All questions were answered to the patients satisfaction. Face-to-face time was spent with the patient providing counseling and coordination of care.     [] Stable

## 2025-07-17 NOTE — PATIENT INSTRUCTIONS
PHYSICIAN ORDERS AND DISCHARGE INSTRUCTIONS     Wound Care Notes:           Orders for this week:  2025      Fax to [facility]     Right Arm:Wash with soap and water. Pat dry.   Cover with Zetuvit Plus Silicone Border  Tubi E    Right Lower Leg-- Wash with soap and water. Pat dry.   Cover with Zetuvit Plus Silicone Border  Tubi F        Dispense 30 day quantity when ordering supplies.  Plan:        Follow Up Instructions: Discharged   Primary Wound Care Provider: Loretta Velasco [facility]  Call  for any questions or concerns.  Central [phone] for imaging and lab work

## 2025-07-17 NOTE — WOUND CARE
Right Arm:Wash with soap and water. Pat dry.   Cover with Zetuvit Plus Silicone Border  Tubi E     Right Lower Leg-- Wash with soap and water. Pat dry.   Cover with Zetuvit Plus Silicone Border  Tubi F     Applied by RIVERA Cooper RN

## 2025-07-22 ENCOUNTER — TELEPHONE (OUTPATIENT)
Dept: FAMILY MEDICINE CLINIC | Age: 80
End: 2025-07-22

## 2025-07-22 NOTE — TELEPHONE ENCOUNTER
Shashank Trevino, PT with [facility] seen patient this morning - her oxygen cord had a kink in it - her oxygen was down to 50 but was up to 96 before our phone call - patient is being discharged today but Shashank is requesting to see her 1 time a week for the next two weeks. Please advise.

## 2025-07-24 ENCOUNTER — APPOINTMENT (OUTPATIENT)
Dept: NON INVASIVE DIAGNOSTICS | Age: 80
DRG: 189 | End: 2025-07-24
Attending: INTERNAL MEDICINE
Payer: MEDICARE

## 2025-07-24 ENCOUNTER — APPOINTMENT (OUTPATIENT)
Dept: GENERAL RADIOLOGY | Age: 80
DRG: 189 | End: 2025-07-24
Payer: MEDICARE

## 2025-07-24 ENCOUNTER — HOSPITAL ENCOUNTER (INPATIENT)
Age: 80
LOS: 6 days | Discharge: HOSPICE | DRG: 189 | End: 2025-07-30
Attending: INTERNAL MEDICINE | Admitting: INTERNAL MEDICINE
Payer: MEDICARE

## 2025-07-24 DIAGNOSIS — J44.1 COPD EXACERBATION (HCC): ICD-10-CM

## 2025-07-24 DIAGNOSIS — J96.21 ACUTE ON CHRONIC RESPIRATORY FAILURE WITH HYPOXIA AND HYPERCAPNIA (HCC): Primary | ICD-10-CM

## 2025-07-24 DIAGNOSIS — I50.33 ACUTE ON CHRONIC DIASTOLIC CONGESTIVE HEART FAILURE (HCC): ICD-10-CM

## 2025-07-24 DIAGNOSIS — I48.91 ATRIAL FIBRILLATION WITH RVR (HCC): ICD-10-CM

## 2025-07-24 DIAGNOSIS — J96.22 ACUTE ON CHRONIC RESPIRATORY FAILURE WITH HYPOXIA AND HYPERCAPNIA (HCC): Primary | ICD-10-CM

## 2025-07-24 DIAGNOSIS — I50.31 ACUTE HEART FAILURE WITH PRESERVED EJECTION FRACTION (HCC): ICD-10-CM

## 2025-07-24 LAB
ALBUMIN SERPL-MCNC: 3.7 G/DL (ref 3.4–5)
ALBUMIN/GLOB SERPL: 1.7 {RATIO} (ref 1.1–2.2)
ALP SERPL-CCNC: 77 U/L (ref 40–129)
ALT SERPL-CCNC: 36 U/L (ref 10–40)
ANION GAP SERPL CALCULATED.3IONS-SCNC: 9 MMOL/L (ref 9–17)
ARTERIAL PATENCY WRIST A: ABNORMAL
ARTERIAL PATENCY WRIST A: ABNORMAL
ARTERIAL PATENCY WRIST A: NO
AST SERPL-CCNC: 38 U/L (ref 15–37)
B PARAP IS1001 DNA NPH QL NAA+NON-PROBE: NOT DETECTED
B PERT DNA SPEC QL NAA+PROBE: NOT DETECTED
BASOPHILS # BLD: 0.04 K/UL
BASOPHILS NFR BLD: 0 % (ref 0–1)
BILIRUB SERPL-MCNC: 0.2 MG/DL (ref 0–1)
BNP SERPL-MCNC: 6709 PG/ML (ref 0–450)
BODY TEMPERATURE: 37
BUN SERPL-MCNC: 16 MG/DL (ref 7–20)
C PNEUM DNA NPH QL NAA+NON-PROBE: NOT DETECTED
CALCIUM SERPL-MCNC: 9.8 MG/DL (ref 8.3–10.6)
CHLORIDE SERPL-SCNC: 95 MMOL/L (ref 99–110)
CO2 SERPL-SCNC: 36 MMOL/L (ref 21–32)
COHGB MFR BLD: 0.2 % (ref 0.5–1.5)
COHGB MFR BLD: 0.2 % (ref 0.5–1.5)
COHGB MFR BLD: 1.1 % (ref 0.5–1.5)
CREAT SERPL-MCNC: 0.9 MG/DL (ref 0.6–1.2)
EOSINOPHIL # BLD: 0.01 K/UL
EOSINOPHILS RELATIVE PERCENT: 0 % (ref 0–3)
ERYTHROCYTE [DISTWIDTH] IN BLOOD BY AUTOMATED COUNT: 15.1 % (ref 11.7–14.9)
FLUAV RNA NPH QL NAA+NON-PROBE: NOT DETECTED
FLUBV RNA NPH QL NAA+NON-PROBE: NOT DETECTED
GFR, ESTIMATED: 62 ML/MIN/1.73M2
GLUCOSE SERPL-MCNC: 91 MG/DL (ref 74–99)
HADV DNA NPH QL NAA+NON-PROBE: NOT DETECTED
HCO3 VENOUS: 37.2 MMOL/L (ref 22–29)
HCO3 VENOUS: 40 MMOL/L (ref 22–29)
HCO3 VENOUS: 40.8 MMOL/L (ref 22–29)
HCOV 229E RNA NPH QL NAA+NON-PROBE: NOT DETECTED
HCOV HKU1 RNA NPH QL NAA+NON-PROBE: NOT DETECTED
HCOV NL63 RNA NPH QL NAA+NON-PROBE: NOT DETECTED
HCOV OC43 RNA NPH QL NAA+NON-PROBE: NOT DETECTED
HCT VFR BLD AUTO: 38.8 % (ref 37–47)
HGB BLD-MCNC: 11 G/DL (ref 12.5–16)
HMPV RNA NPH QL NAA+NON-PROBE: NOT DETECTED
HPIV1 RNA NPH QL NAA+NON-PROBE: NOT DETECTED
HPIV2 RNA NPH QL NAA+NON-PROBE: NOT DETECTED
HPIV3 RNA NPH QL NAA+NON-PROBE: NOT DETECTED
HPIV4 RNA NPH QL NAA+NON-PROBE: NOT DETECTED
IMM GRANULOCYTES # BLD AUTO: 0.02 K/UL
IMM GRANULOCYTES NFR BLD: 0 %
INR PPP: 2.4
LYMPHOCYTES NFR BLD: 1.43 K/UL
LYMPHOCYTES RELATIVE PERCENT: 15 % (ref 24–44)
M PNEUMO DNA NPH QL NAA+NON-PROBE: NOT DETECTED
MAGNESIUM SERPL-MCNC: 2.4 MG/DL (ref 1.8–2.4)
MCH RBC QN AUTO: 26.7 PG (ref 27–31)
MCHC RBC AUTO-ENTMCNC: 28.4 G/DL (ref 32–36)
MCV RBC AUTO: 94.2 FL (ref 78–100)
METHEMOGLOBIN: 0.2 % (ref 0.5–1.5)
METHEMOGLOBIN: 0.5 % (ref 0.5–1.5)
METHEMOGLOBIN: 0.7 % (ref 0.5–1.5)
MONOCYTES NFR BLD: 1.14 K/UL
MONOCYTES NFR BLD: 12 % (ref 0–5)
NEUTROPHILS NFR BLD: 72 % (ref 36–66)
NEUTS SEG NFR BLD: 6.92 K/UL
OXYHGB MFR BLD: 30.6 %
OXYHGB MFR BLD: 53.8 %
OXYHGB MFR BLD: 91.7 %
PCO2 VENOUS: 65 MM HG (ref 38–54)
PCO2 VENOUS: 85.6 MM HG (ref 38–54)
PCO2 VENOUS: 97.4 MM HG (ref 38–54)
PH VENOUS: 7.23 (ref 7.32–7.43)
PH VENOUS: 7.26 (ref 7.32–7.43)
PH VENOUS: 7.42 (ref 7.32–7.43)
PLATELET # BLD AUTO: 290 K/UL (ref 140–440)
PMV BLD AUTO: 10.1 FL (ref 7.5–11.1)
PO2 VENOUS: 24.1 MM HG (ref 23–48)
PO2 VENOUS: 33.9 MM HG (ref 23–48)
PO2 VENOUS: 64.5 MM HG (ref 23–48)
POSITIVE BASE EXCESS, VEN: 13.7 MMOL/L (ref 0–3)
POSITIVE BASE EXCESS, VEN: 7.3 MMOL/L (ref 0–3)
POSITIVE BASE EXCESS, VEN: 9.2 MMOL/L (ref 0–3)
POTASSIUM SERPL-SCNC: 4.4 MMOL/L (ref 3.5–5.1)
PROT SERPL-MCNC: 6 G/DL (ref 6.4–8.2)
PROTHROMBIN TIME: 26.4 SEC (ref 11.7–14.5)
RBC # BLD AUTO: 4.12 M/UL (ref 4.2–5.4)
RSV RNA NPH QL NAA+NON-PROBE: NOT DETECTED
RV+EV RNA NPH QL NAA+NON-PROBE: NOT DETECTED
SARS-COV-2 RNA NPH QL NAA+NON-PROBE: NOT DETECTED
SODIUM SERPL-SCNC: 139 MMOL/L (ref 136–145)
SPECIMEN DESCRIPTION: NORMAL
TEXT FOR RESPIRATORY: ABNORMAL
TROPONIN I SERPL HS-MCNC: 34 NG/L (ref 0–14)
TROPONIN I SERPL HS-MCNC: 34 NG/L (ref 0–14)
TSH SERPL DL<=0.05 MIU/L-ACNC: 3.91 UIU/ML (ref 0.27–4.2)
WBC OTHER # BLD: 9.6 K/UL (ref 4–10.5)

## 2025-07-24 PROCEDURE — 2500000003 HC RX 250 WO HCPCS: Performed by: NURSE PRACTITIONER

## 2025-07-24 PROCEDURE — 71045 X-RAY EXAM CHEST 1 VIEW: CPT

## 2025-07-24 PROCEDURE — 85025 COMPLETE CBC W/AUTO DIFF WBC: CPT

## 2025-07-24 PROCEDURE — 6370000000 HC RX 637 (ALT 250 FOR IP): Performed by: NURSE PRACTITIONER

## 2025-07-24 PROCEDURE — 6360000002 HC RX W HCPCS: Performed by: NURSE PRACTITIONER

## 2025-07-24 PROCEDURE — 94761 N-INVAS EAR/PLS OXIMETRY MLT: CPT

## 2025-07-24 PROCEDURE — 94660 CPAP INITIATION&MGMT: CPT

## 2025-07-24 PROCEDURE — 2580000003 HC RX 258: Performed by: NURSE PRACTITIONER

## 2025-07-24 PROCEDURE — 2500000003 HC RX 250 WO HCPCS: Performed by: INTERNAL MEDICINE

## 2025-07-24 PROCEDURE — 82805 BLOOD GASES W/O2 SATURATION: CPT

## 2025-07-24 PROCEDURE — 0202U NFCT DS 22 TRGT SARS-COV-2: CPT

## 2025-07-24 PROCEDURE — 94010 BREATHING CAPACITY TEST: CPT

## 2025-07-24 PROCEDURE — 99222 1ST HOSP IP/OBS MODERATE 55: CPT | Performed by: INTERNAL MEDICINE

## 2025-07-24 PROCEDURE — 6370000000 HC RX 637 (ALT 250 FOR IP): Performed by: INTERNAL MEDICINE

## 2025-07-24 PROCEDURE — 36415 COLL VENOUS BLD VENIPUNCTURE: CPT

## 2025-07-24 PROCEDURE — 94664 DEMO&/EVAL PT USE INHALER: CPT

## 2025-07-24 PROCEDURE — 5A09557 ASSISTANCE WITH RESPIRATORY VENTILATION, GREATER THAN 96 CONSECUTIVE HOURS, CONTINUOUS POSITIVE AIRWAY PRESSURE: ICD-10-PCS | Performed by: INTERNAL MEDICINE

## 2025-07-24 PROCEDURE — 2140000000 HC CCU INTERMEDIATE R&B

## 2025-07-24 PROCEDURE — 93005 ELECTROCARDIOGRAM TRACING: CPT | Performed by: NURSE PRACTITIONER

## 2025-07-24 PROCEDURE — 83880 ASSAY OF NATRIURETIC PEPTIDE: CPT

## 2025-07-24 PROCEDURE — 83735 ASSAY OF MAGNESIUM: CPT

## 2025-07-24 PROCEDURE — 84443 ASSAY THYROID STIM HORMONE: CPT

## 2025-07-24 PROCEDURE — 99223 1ST HOSP IP/OBS HIGH 75: CPT | Performed by: INTERNAL MEDICINE

## 2025-07-24 PROCEDURE — 93306 TTE W/DOPPLER COMPLETE: CPT

## 2025-07-24 PROCEDURE — 6360000002 HC RX W HCPCS: Performed by: INTERNAL MEDICINE

## 2025-07-24 PROCEDURE — 84484 ASSAY OF TROPONIN QUANT: CPT

## 2025-07-24 PROCEDURE — 2700000000 HC OXYGEN THERAPY PER DAY

## 2025-07-24 PROCEDURE — 96374 THER/PROPH/DIAG INJ IV PUSH: CPT

## 2025-07-24 PROCEDURE — 80053 COMPREHEN METABOLIC PANEL: CPT

## 2025-07-24 PROCEDURE — 94640 AIRWAY INHALATION TREATMENT: CPT

## 2025-07-24 PROCEDURE — 85610 PROTHROMBIN TIME: CPT

## 2025-07-24 PROCEDURE — 99285 EMERGENCY DEPT VISIT HI MDM: CPT

## 2025-07-24 RX ORDER — HYDROXYZINE HYDROCHLORIDE 25 MG/1
25 TABLET, FILM COATED ORAL NIGHTLY
Status: DISCONTINUED | OUTPATIENT
Start: 2025-07-24 | End: 2025-07-30 | Stop reason: HOSPADM

## 2025-07-24 RX ORDER — ONDANSETRON 4 MG/1
4 TABLET, ORALLY DISINTEGRATING ORAL EVERY 8 HOURS PRN
Status: DISCONTINUED | OUTPATIENT
Start: 2025-07-24 | End: 2025-07-30 | Stop reason: HOSPADM

## 2025-07-24 RX ORDER — LEVOTHYROXINE SODIUM 75 UG/1
75 TABLET ORAL DAILY
Status: DISCONTINUED | OUTPATIENT
Start: 2025-07-24 | End: 2025-07-30 | Stop reason: HOSPADM

## 2025-07-24 RX ORDER — SODIUM CHLORIDE 0.9 % (FLUSH) 0.9 %
5-40 SYRINGE (ML) INJECTION PRN
Status: DISCONTINUED | OUTPATIENT
Start: 2025-07-24 | End: 2025-07-30 | Stop reason: HOSPADM

## 2025-07-24 RX ORDER — BUDESONIDE AND FORMOTEROL FUMARATE DIHYDRATE 160; 4.5 UG/1; UG/1
2 AEROSOL RESPIRATORY (INHALATION)
Status: DISCONTINUED | OUTPATIENT
Start: 2025-07-24 | End: 2025-07-30 | Stop reason: HOSPADM

## 2025-07-24 RX ORDER — LORAZEPAM 0.5 MG/1
0.5 TABLET ORAL ONCE
Status: COMPLETED | OUTPATIENT
Start: 2025-07-24 | End: 2025-07-24

## 2025-07-24 RX ORDER — HYDROXYZINE HYDROCHLORIDE 10 MG/1
10 TABLET, FILM COATED ORAL 3 TIMES DAILY PRN
Status: DISCONTINUED | OUTPATIENT
Start: 2025-07-24 | End: 2025-07-30 | Stop reason: HOSPADM

## 2025-07-24 RX ORDER — ENOXAPARIN SODIUM 100 MG/ML
40 INJECTION SUBCUTANEOUS DAILY
Status: CANCELLED | OUTPATIENT
Start: 2025-07-24

## 2025-07-24 RX ORDER — IPRATROPIUM BROMIDE AND ALBUTEROL SULFATE 2.5; .5 MG/3ML; MG/3ML
3 SOLUTION RESPIRATORY (INHALATION) ONCE
Status: COMPLETED | OUTPATIENT
Start: 2025-07-24 | End: 2025-07-24

## 2025-07-24 RX ORDER — WARFARIN SODIUM 3 MG/1
3 TABLET ORAL
Status: DISCONTINUED | OUTPATIENT
Start: 2025-07-24 | End: 2025-07-26

## 2025-07-24 RX ORDER — ONDANSETRON 2 MG/ML
4 INJECTION INTRAMUSCULAR; INTRAVENOUS EVERY 6 HOURS PRN
Status: DISCONTINUED | OUTPATIENT
Start: 2025-07-24 | End: 2025-07-30 | Stop reason: HOSPADM

## 2025-07-24 RX ORDER — DICYCLOMINE HCL 20 MG
10 TABLET ORAL 2 TIMES DAILY
Status: DISCONTINUED | OUTPATIENT
Start: 2025-07-24 | End: 2025-07-30 | Stop reason: HOSPADM

## 2025-07-24 RX ORDER — ACETAMINOPHEN 650 MG/1
650 SUPPOSITORY RECTAL EVERY 6 HOURS PRN
Status: DISCONTINUED | OUTPATIENT
Start: 2025-07-24 | End: 2025-07-30 | Stop reason: HOSPADM

## 2025-07-24 RX ORDER — ROFLUMILAST 500 UG/1
500 TABLET ORAL DAILY
Status: DISCONTINUED | OUTPATIENT
Start: 2025-07-24 | End: 2025-07-30 | Stop reason: HOSPADM

## 2025-07-24 RX ORDER — PANTOPRAZOLE SODIUM 40 MG/1
40 TABLET, DELAYED RELEASE ORAL
Status: DISCONTINUED | OUTPATIENT
Start: 2025-07-24 | End: 2025-07-30 | Stop reason: HOSPADM

## 2025-07-24 RX ORDER — VITAMIN E (DL,TOCOPHERYL ACET) 180 MG
1 CAPSULE ORAL DAILY
Status: DISCONTINUED | OUTPATIENT
Start: 2025-07-24 | End: 2025-07-24

## 2025-07-24 RX ORDER — DILTIAZEM HYDROCHLORIDE 5 MG/ML
10 INJECTION INTRAVENOUS ONCE
Status: COMPLETED | OUTPATIENT
Start: 2025-07-24 | End: 2025-07-24

## 2025-07-24 RX ORDER — MONTELUKAST SODIUM 10 MG/1
10 TABLET ORAL NIGHTLY
Status: DISCONTINUED | OUTPATIENT
Start: 2025-07-24 | End: 2025-07-30 | Stop reason: HOSPADM

## 2025-07-24 RX ORDER — SODIUM CHLORIDE 0.9 % (FLUSH) 0.9 %
5-40 SYRINGE (ML) INJECTION EVERY 12 HOURS SCHEDULED
Status: DISCONTINUED | OUTPATIENT
Start: 2025-07-24 | End: 2025-07-30 | Stop reason: HOSPADM

## 2025-07-24 RX ORDER — SODIUM CHLORIDE 9 MG/ML
INJECTION, SOLUTION INTRAVENOUS PRN
Status: DISCONTINUED | OUTPATIENT
Start: 2025-07-24 | End: 2025-07-30 | Stop reason: HOSPADM

## 2025-07-24 RX ORDER — LANOLIN ALCOHOL/MO/W.PET/CERES
400 CREAM (GRAM) TOPICAL DAILY
Status: DISCONTINUED | OUTPATIENT
Start: 2025-07-24 | End: 2025-07-30 | Stop reason: HOSPADM

## 2025-07-24 RX ORDER — IPRATROPIUM BROMIDE AND ALBUTEROL SULFATE 2.5; .5 MG/3ML; MG/3ML
1 SOLUTION RESPIRATORY (INHALATION)
Status: DISCONTINUED | OUTPATIENT
Start: 2025-07-24 | End: 2025-07-25

## 2025-07-24 RX ORDER — POLYETHYLENE GLYCOL 3350 17 G/17G
17 POWDER, FOR SOLUTION ORAL DAILY PRN
Status: DISCONTINUED | OUTPATIENT
Start: 2025-07-24 | End: 2025-07-30 | Stop reason: HOSPADM

## 2025-07-24 RX ORDER — FUROSEMIDE 10 MG/ML
40 INJECTION INTRAMUSCULAR; INTRAVENOUS ONCE
Status: COMPLETED | OUTPATIENT
Start: 2025-07-24 | End: 2025-07-24

## 2025-07-24 RX ORDER — GABAPENTIN 400 MG/1
400 CAPSULE ORAL EVERY EVENING
Status: DISCONTINUED | OUTPATIENT
Start: 2025-07-24 | End: 2025-07-30 | Stop reason: HOSPADM

## 2025-07-24 RX ORDER — ROSUVASTATIN CALCIUM 20 MG/1
20 TABLET, COATED ORAL NIGHTLY
Status: DISCONTINUED | OUTPATIENT
Start: 2025-07-24 | End: 2025-07-30 | Stop reason: HOSPADM

## 2025-07-24 RX ORDER — POTASSIUM CHLORIDE 1500 MG/1
20 TABLET, EXTENDED RELEASE ORAL DAILY
Status: DISCONTINUED | OUTPATIENT
Start: 2025-07-24 | End: 2025-07-30 | Stop reason: HOSPADM

## 2025-07-24 RX ORDER — ACETAMINOPHEN 325 MG/1
650 TABLET ORAL EVERY 6 HOURS PRN
Status: DISCONTINUED | OUTPATIENT
Start: 2025-07-24 | End: 2025-07-30 | Stop reason: HOSPADM

## 2025-07-24 RX ORDER — FUROSEMIDE 10 MG/ML
40 INJECTION INTRAMUSCULAR; INTRAVENOUS 2 TIMES DAILY
Status: DISCONTINUED | OUTPATIENT
Start: 2025-07-24 | End: 2025-07-27

## 2025-07-24 RX ORDER — PREDNISONE 20 MG/1
40 TABLET ORAL DAILY
Status: DISCONTINUED | OUTPATIENT
Start: 2025-07-26 | End: 2025-07-27

## 2025-07-24 RX ORDER — DILTIAZEM HCL 60 MG
60 TABLET ORAL EVERY 8 HOURS SCHEDULED
Status: DISCONTINUED | OUTPATIENT
Start: 2025-07-24 | End: 2025-07-27

## 2025-07-24 RX ORDER — GUAIFENESIN 600 MG/1
600 TABLET, EXTENDED RELEASE ORAL 2 TIMES DAILY
Status: DISCONTINUED | OUTPATIENT
Start: 2025-07-24 | End: 2025-07-30 | Stop reason: HOSPADM

## 2025-07-24 RX ORDER — WARFARIN SODIUM 6 MG/1
6 TABLET ORAL
Status: DISCONTINUED | OUTPATIENT
Start: 2025-07-25 | End: 2025-07-25

## 2025-07-24 RX ADMIN — SODIUM CHLORIDE 7.5 MG/HR: 900 INJECTION, SOLUTION INTRAVENOUS at 12:51

## 2025-07-24 RX ADMIN — FUROSEMIDE 40 MG: 10 INJECTION, SOLUTION INTRAMUSCULAR; INTRAVENOUS at 17:35

## 2025-07-24 RX ADMIN — PANTOPRAZOLE SODIUM 40 MG: 40 TABLET, DELAYED RELEASE ORAL at 09:10

## 2025-07-24 RX ADMIN — FUROSEMIDE 40 MG: 10 INJECTION, SOLUTION INTRAMUSCULAR; INTRAVENOUS at 02:40

## 2025-07-24 RX ADMIN — HYDROXYZINE HYDROCHLORIDE 10 MG: 10 TABLET, FILM COATED ORAL at 12:07

## 2025-07-24 RX ADMIN — LEVOTHYROXINE SODIUM 75 MCG: 0.07 TABLET ORAL at 09:10

## 2025-07-24 RX ADMIN — IPRATROPIUM BROMIDE AND ALBUTEROL SULFATE 1 DOSE: .5; 2.5 SOLUTION RESPIRATORY (INHALATION) at 16:00

## 2025-07-24 RX ADMIN — GUAIFENESIN 600 MG: 600 TABLET ORAL at 21:31

## 2025-07-24 RX ADMIN — HYDROXYZINE HYDROCHLORIDE 25 MG: 25 TABLET ORAL at 21:31

## 2025-07-24 RX ADMIN — DILTIAZEM HYDROCHLORIDE 10 MG: 5 INJECTION, SOLUTION INTRAVENOUS at 02:41

## 2025-07-24 RX ADMIN — HYDROXYZINE HYDROCHLORIDE 10 MG: 10 TABLET, FILM COATED ORAL at 16:42

## 2025-07-24 RX ADMIN — GUAIFENESIN 600 MG: 600 TABLET ORAL at 09:10

## 2025-07-24 RX ADMIN — METHYLPREDNISOLONE SODIUM SUCCINATE 125 MG: 125 INJECTION, POWDER, LYOPHILIZED, FOR SOLUTION INTRAMUSCULAR; INTRAVENOUS at 01:04

## 2025-07-24 RX ADMIN — SODIUM CHLORIDE 5 MG/HR: 900 INJECTION, SOLUTION INTRAVENOUS at 02:49

## 2025-07-24 RX ADMIN — IPRATROPIUM BROMIDE AND ALBUTEROL SULFATE 3 DOSE: .5; 2.5 SOLUTION RESPIRATORY (INHALATION) at 00:24

## 2025-07-24 RX ADMIN — POTASSIUM CHLORIDE 20 MEQ: 1500 TABLET, EXTENDED RELEASE ORAL at 09:10

## 2025-07-24 RX ADMIN — WARFARIN SODIUM 3 MG: 3 TABLET ORAL at 17:35

## 2025-07-24 RX ADMIN — IPRATROPIUM BROMIDE AND ALBUTEROL SULFATE 1 DOSE: .5; 2.5 SOLUTION RESPIRATORY (INHALATION) at 20:06

## 2025-07-24 RX ADMIN — FUROSEMIDE 40 MG: 10 INJECTION, SOLUTION INTRAMUSCULAR; INTRAVENOUS at 09:10

## 2025-07-24 RX ADMIN — IPRATROPIUM BROMIDE AND ALBUTEROL SULFATE 1 DOSE: .5; 2.5 SOLUTION RESPIRATORY (INHALATION) at 07:18

## 2025-07-24 RX ADMIN — SODIUM CHLORIDE 10 MG/HR: 900 INJECTION, SOLUTION INTRAVENOUS at 23:50

## 2025-07-24 RX ADMIN — ROSUVASTATIN CALCIUM 20 MG: 20 TABLET, FILM COATED ORAL at 21:32

## 2025-07-24 RX ADMIN — SODIUM CHLORIDE, PRESERVATIVE FREE 10 ML: 5 INJECTION INTRAVENOUS at 21:33

## 2025-07-24 RX ADMIN — IPRATROPIUM BROMIDE AND ALBUTEROL SULFATE 1 DOSE: .5; 2.5 SOLUTION RESPIRATORY (INHALATION) at 11:20

## 2025-07-24 RX ADMIN — METHYLPREDNISOLONE SODIUM SUCCINATE 40 MG: 40 INJECTION, POWDER, LYOPHILIZED, FOR SOLUTION INTRAMUSCULAR; INTRAVENOUS at 09:10

## 2025-07-24 RX ADMIN — BUDESONIDE AND FORMOTEROL FUMARATE DIHYDRATE 2 PUFF: 160; 4.5 AEROSOL RESPIRATORY (INHALATION) at 07:18

## 2025-07-24 RX ADMIN — Medication 400 MG: at 09:10

## 2025-07-24 RX ADMIN — MONTELUKAST 10 MG: 10 TABLET, FILM COATED ORAL at 21:32

## 2025-07-24 RX ADMIN — LORAZEPAM 0.5 MG: 0.5 TABLET ORAL at 01:04

## 2025-07-24 RX ADMIN — METHYLPREDNISOLONE SODIUM SUCCINATE 40 MG: 40 INJECTION, POWDER, LYOPHILIZED, FOR SOLUTION INTRAMUSCULAR; INTRAVENOUS at 21:32

## 2025-07-24 RX ADMIN — BUDESONIDE AND FORMOTEROL FUMARATE DIHYDRATE 2 PUFF: 160; 4.5 AEROSOL RESPIRATORY (INHALATION) at 20:06

## 2025-07-24 RX ADMIN — DICYCLOMINE HYDROCHLORIDE 10 MG: 20 TABLET ORAL at 21:31

## 2025-07-24 RX ADMIN — DICYCLOMINE HYDROCHLORIDE 10 MG: 20 TABLET ORAL at 09:10

## 2025-07-24 RX ADMIN — GABAPENTIN 400 MG: 400 CAPSULE ORAL at 17:35

## 2025-07-24 RX ADMIN — ROFLUMILAST 500 MCG: 500 TABLET ORAL at 12:07

## 2025-07-24 ASSESSMENT — COPD QUESTIONNAIRES
CAT_TOTALSCORE: 31
QUESTION3_CHESTTIGHTNESS: 4
QUESTION1_COUGHFREQUENCY: 2
QUESTION8_ENERGYLEVEL: 5
QUESTION4_WALKINCLINE: 5
GOLD_GRADE: 4
QUESTION7_SLEEPQUALITY: 4
QUESTION2_CHESTPHLEGM: 1
QUESTION6_LEAVINGHOUSE: 5
GOLD_GROUP: GROUP B
QUESTION5_HOMEACTIVITIES: 5
TOTAL_EXACERBATIONS_PASTYEAR: 1

## 2025-07-24 ASSESSMENT — PAIN SCALES - GENERAL
PAINLEVEL_OUTOF10: 0
PAINLEVEL_OUTOF10: 0

## 2025-07-24 ASSESSMENT — PULMONARY FUNCTION TESTS
POST BRONCHODILATOR FEV1/FVC: 53
FEV1 (%PREDICTED): 29
PIF_VALUE: 55

## 2025-07-24 ASSESSMENT — PAIN - FUNCTIONAL ASSESSMENT: PAIN_FUNCTIONAL_ASSESSMENT: 0-10

## 2025-07-24 NOTE — PROGRESS NOTES
07/24/25 0250   NIV Type   Equipment Type v60   Mode Bilevel   Mask Type Full face mask   Mask Size Small   Assessment   Pulse (!) 115   Heart Rate Source Monitor   Respirations 20   SpO2 100 %   Level of Consciousness 0   Comfort Level Good   Using Accessory Muscles No   Mask Compliance Good   Skin Assessment Clean, dry, & intact   Settings/Measurements   PIP Observed 15 cm H20   IPAP (S)  15 cmH20  (increased from 12 to [id number])   CPAP/EPAP 5 cmH2O   Vt (Measured) 518 mL   Rate Ordered (S)  20  (increased from 16 to 20)   Insp Rise Time (%) 3 %   FiO2  40 %   I Time/ I Time % 0.9 s   Minute Volume (L/min) 10.4 Liters   Mask Leak (lpm) 2 lpm   Patient's Home Machine No   Alarm Settings   Alarms On Y   Low Pressure (cmH2O) 5 cmH2O   High Pressure (cmH2O) 20 cmH2O   Delay Alarm 20 sec(s)   RR Low (bpm) 12   RR High (bpm) 40 br/min

## 2025-07-24 NOTE — CARE COORDINATION
07/24/25 1527   Service Assessment   Patient Orientation Alert and Oriented;Person;Place;Situation   Cognition Alert   History Provided By Patient   Primary Caregiver Family   Support Systems Spouse/Significant Other;Children;Family Members   Patient's Healthcare Decision Maker is: Named in Scanned ACP Document   PCP Verified by CM Yes   Last Visit to PCP Within last 3 months   Prior Functional Level Assistance with the following:;Bathing;Toileting;Cooking;Housework;Shopping;Mobility   Current Functional Level Assistance with the following:;Bathing;Dressing;Toileting;Cooking;Housework;Shopping;Mobility   Can patient return to prior living arrangement Yes   Ability to make needs known: Good   Family able to assist with home care needs: Yes   Would you like for me to discuss the discharge plan with any other family members/significant others, and if so, who? Yes  ( and dtr in room and permission to speak to them both)   Social/Functional History   Lives With Spouse   Type of Home House   Home Layout One level   Home Access   (1 BARB)   Bathroom Shower/Tub Shower chair with back   Bathroom Toilet Bedside commode   Receives Help From Family   Prior Level of Assist for ADLs Needs assistance     CM into see pt ,  and dtr to initiate a safe discharge plan. Permission to speak in her families presence.  Cm introduced self and explained role of CM. Pt is kind, alert and oriented. Pt lives with her spouse. Home is one level,  is primary  caregiver. Pt has 2 dtrs that are a mile away. Pt has a PCP and insurance. Insurance covers medications. DME includes a shower chr, rollator, bsc and recliner that she sleeps in. Pt has home O2 at 2-3 lmin cont from [facility]. Pt is active with [facility] and wants to cont. Pt does not want a facility and plans to return home with family. CM sent a PS to [facility]. Pt   CM provided card and encouraged to call for any needs or concern.   CM is available if any needs

## 2025-07-24 NOTE — PROGRESS NOTES
Latest Reference Range & Units 07/24/25 07:25   pH, Mychal 7.320 - 7.430  7.416   pCO2, Mychal 38 - 54 mm Hg 65.0 (H)   pO2, Mychal 23 - 48 mm Hg 64.5 (H)   Bicarbonate, Venous 22 - 29 mmol/L 40.8 (H)   Positive Base Excess, Mychal 0 - 3 mmol/L 13.7 (H)   Vbg results sent to Dr Mims    Pt wearing bipap 15/5

## 2025-07-24 NOTE — PROGRESS NOTES
4 Eyes Skin Assessment     NAME:  Janessa Davidson  YOB: 1945  MEDICAL RECORD NUMBER:  [id number]    The patient is being assessed for  Admission    I agree that at least one RN has performed a thorough Head to Toe Skin Assessment on the patient. ALL assessment sites listed below have been assessed.      Areas assessed by both nurses:    Head, Face, Ears, Shoulders, Back, Chest, Arms, Elbows, Hands, Sacrum. Buttock, Coccyx, Ischium, Legs. Feet and Heels, and Under Medical Devices         Does the Patient have a Wound? Yes wound(s) were present on assessment. LDA wound assessment was Initiated and completed by RN       Gilbert Prevention initiated by RN: Yes  Wound Care Orders initiated by RN: Yes    For hospital-acquired stage 1 & 2 and ALL Stage 3,4, Unstageable, DTI, NWPT, and Complex wounds: place order “IP Wound Care/Ostomy Nurse Eval and Treat” by RN under : No    New Ostomies, if present place, Ostomy referral order under : No     Nurse 1 eSignature: Electronically signed by Lorene Lu RN on 7/24/25 at 6:11 AM EDT    **SHARE this note so that the co-signing nurse can place an eSignature**    Nurse 2 eSignature: Electronically signed by Arik Senior RN on 7/24/25 at 6:33 AM EDT

## 2025-07-24 NOTE — ED PROVIDER NOTES
[facility] EMERGENCY DEPARTMENT  EMERGENCY DEPARTMENT ENCOUNTER        Pt Name: Janessa Davidson  MRN: [id number]  Birthdate 1945  Date of evaluation: 7/24/2025  Provider: IMANI HEWITT - CNP  PCP: Jes Sharma DO     I have discussed the care of this patient with my supervising physician Dr. Aravind Davila who is in agreement with the evaluation and plan of care      Triage CHIEF COMPLAINT       Chief Complaint   Patient presents with    Shortness of Breath     COPD, 4L n/c.          HISTORY OF PRESENT ILLNESS      Chief Complaint: Shortness of breath    Janessa Davidson is a 80 y.o. female who presents for evaluation by EMS for shortness of breath.  Patient reports increasing shortness of breath over the last couple of days, has COPD.  Noticed a significant change this evening and became very distressed.  Medics report that she was not hypoxic when they arrived but was working hard and had poor air movement.  She was given a nebulizer and route but did not seem to get much benefit from this.  Patient is on 4 L of oxygen at baseline, has not had any increased O2 demand.  Denies any cough, URI symptoms.  No fever.  No chest pain    Nursing Notes were all reviewed and agreed with or any disagreements were addressed in the HPI.    REVIEW OF SYSTEMS     Pertinent ROS as noted in HPI.      PAST MEDICAL HISTORY     Past Medical History:   Diagnosis Date    Age-related osteoporosis without current pathological fracture 05/23/2024    Anticoagulant long-term use     Arthritis     Cancer (HCC)     Chronic hypoxemic respiratory failure (HCC) 02/06/2018    Community acquired pneumonia of right upper lobe of lung 03/19/2022    COPD (chronic obstructive pulmonary disease) ([facility])     Coronary atherosclerosis     COVID-19 virus detected 01/18/2021    DVT (deep venous thrombosis) ([facility])     Former smoker 11/22/2021    Gastroesophageal reflux disease     Hiatal hernia     Lung infiltrate on CT

## 2025-07-24 NOTE — CONSULTS
to auscultation. No accessory muscle use  CARDIOVASCULAR:  normal S1 and S2, no edema and no JVD  ABDOMEN:  normal bowel sounds, non-distended and no masses palpated, and no tenderness to palpation. No hepatospleenomegaly  LYMPHADENOPATHY:  no axillary or supraclavicular adenopathy. No cervical adnenopathy  PSYCHIATRIC: Oriented to person place and time. No obvious depression or anxiety.  MUSCULOSKELETAL: No obvious misalignment or effusion of the joints. No clubbing, cyanosis of the digits.  SKIN:  normal skin color, texture, turgor and no redness, warmth, or swelling. No palpable nodules    DATA:    Old records have been reviewed  CBC with Differential:    Lab Results   Component Value Date/Time    WBC 9.6 07/24/2025 12:10 AM    RBC 4.12 07/24/2025 12:10 AM    HGB 11.0 07/24/2025 12:10 AM    HCT 38.8 07/24/2025 12:10 AM     07/24/2025 12:10 AM    MCV 94.2 07/24/2025 12:10 AM    MCH 26.7 07/24/2025 12:10 AM    MCHC 28.4 07/24/2025 12:10 AM    RDW 15.1 07/24/2025 12:10 AM    BANDSPCT 2 01/04/2022 06:52 AM    LYMPHOPCT 15 07/24/2025 12:10 AM    MONOPCT 12 07/24/2025 12:10 AM    EOSPCT 0 07/24/2025 12:10 AM    BASOPCT 0 07/24/2025 12:10 AM    MONOSABS 1.14 07/24/2025 12:10 AM    LYMPHSABS 1.43 07/24/2025 12:10 AM    EOSABS 0.01 07/24/2025 12:10 AM    BASOSABS 0.04 07/24/2025 12:10 AM    DIFFTYPE AUTOMATED DIFFERENTIAL 04/23/2024 09:15 AM     BMP:    Lab Results   Component Value Date/Time     07/24/2025 12:10 AM    K 4.4 07/24/2025 12:10 AM    CL 95 07/24/2025 12:10 AM    CO2 36 07/24/2025 12:10 AM    BUN 16 07/24/2025 12:10 AM    CREATININE 0.9 07/24/2025 12:10 AM    CALCIUM 9.8 07/24/2025 12:10 AM    GFRAA >60 10/14/2022 11:25 AM    LABGLOM 62 07/24/2025 12:10 AM    LABGLOM 75 04/23/2024 09:15 AM    GLUCOSE 91 07/24/2025 12:10 AM     Hepatic Function Panel:    Lab Results   Component Value Date/Time    ALKPHOS 77 07/24/2025 12:10 AM    ALT 36 07/24/2025 12:10 AM    AST 38 07/24/2025 12:10 AM    BILITOT  Leukocytosis 11/15/2017    Pulmonary nodule 07/05/2016    TIA (transient ischemic attack) 06/24/2015    Vertigo, central 06/24/2015     Overview Note:     Possible        Anticoagulant long-term use 06/24/2015    Arthritis 02/19/2014    Coronary atherosclerosis 02/19/2014    COPD, very severe (HCC) 06/28/2013    Essential hypertension 06/28/2013    Phlebitis 06/27/2013     Acute on Chronic Hypoxic Hypercapnic resp failure  Chronic Metabolic alkalosis  Coagulopathy  Afib  Acute on Chronic Diastolic dysfunction  Severe COPD  Anxiety  DVT/PE on Coumadin  Hypothyroidism      PLAN    Abx  F/u C&S  Inhalers  Solumedrol  Keep sats > 92%  ICS  OOB  BIPAP qhs and prn  DVT and GI Prophylaxis  C.w present management            Total time spent for this encounter: 55 mins    Electronically signed by Gerardo Beck MD on 7/24/2025 at 12:30 PM

## 2025-07-24 NOTE — ED TRIAGE NOTES
ED TO INPATIENT SBAR HANDOFF    Patient Name: Janessa Davidson   :  1945  80 y.o.   Preferred Name  janessa  Family/Caregiver Present yes   Restraints no   C-SSRS: Risk of Suicide: No Risk  Sitter no   Sepsis Risk Score Sepsis V2 Risk Score: 35    PLEASE NOTE--Encounter / Re-Admission Within 30 Days  This patient has had another encounter or admission within the last 30 days.      Readmission Risk Score: 17.5      Situation  Chief Complaint   Patient presents with    Shortness of Breath     COPD, 4L n/c.      Brief Description of Patient's Condition: OnBiPAP, IV to L AC. Alert, but unable to walk at this time. Has been in A-fib. Will likely start on drip if it persists. Overall hemodynamically stable.   Mental Status: oriented  Arrived from: home    Imaging:   XR CHEST PORTABLE   Final Result        Abnormal labs:   Abnormal Labs Reviewed   BRAIN NATRIURETIC PEPTIDE - Abnormal; Notable for the following components:       Result Value    NT Pro-BNP 6,709 (*)     All other components within normal limits   CBC WITH AUTO DIFFERENTIAL - Abnormal; Notable for the following components:    RBC 4.12 (*)     Hemoglobin 11.0 (*)     MCH 26.7 (*)     MCHC 28.4 (*)     RDW 15.1 (*)     Neutrophils % 72 (*)     Lymphocytes % 15 (*)     Monocytes % 12 (*)     All other components within normal limits   COMPREHENSIVE METABOLIC PANEL - Abnormal; Notable for the following components:    Chloride 95 (*)     CO2 36 (*)     Total Protein 6.0 (*)     AST 38 (*)     All other components within normal limits   TROPONIN - Abnormal; Notable for the following components:    Troponin, High Sensitivity 34 (*)     All other components within normal limits   BLOOD GAS, VENOUS - Abnormal; Notable for the following components:    pH, Mychal 7.256 (*)     pCO2, Mychal 85.6 (*)     HCO3, Venous 37.2 (*)     Positive Base Excess, Mychal 7.3 (*)     Methemoglobin 0.2 (*)     All other components within normal limits        Background  History:   Past

## 2025-07-24 NOTE — CONSULTS
PHARMACY ANTICOAGULATION MONITORING SERVICE    Janessa Davidson is a 80 y.o. female on warfarin therapy for History of DVT/PE and Atrial fibrillation. Pharmacy consulted by Dr. Rincon for monitoring and adjustment of treatment.    Indication for anticoagulation: History of DVT/PE and Atrial fibrillation  INR goal: 2.0 - 3.0   Warfarin dose prior to admission: 3 mg daily, except 6 mg M/F (TWD 27 mg)    Pertinent Laboratory Values   Recent Labs     07/24/25  0010 07/24/25  0720   INR  --  2.4   WBC 9.6  --    HGB 11.0*  --    HCT 38.8  --      --        Date INR Result Warfarin Dose Given   7/24 2.4 3 mg                    Assessment/Plan:  Drug Interactions:   Inpt meds: SoluMedrol (increase INR)  Home meds: Lasix (decrease), Synthroid (increase)  INR of 2.4 is therapeutic for goal. H&H stable.  Noted CHF with EF 55%. Recent admission with acute anemia  Will continue usual home dose of warfarin 3 mg daily except 6 mg M/F  Pharmacy will continue to monitor and adjust warfarin therapy as indicated    Thank you for the consult,  Magnolia Sellers [facility], PharmD.  7/24/2025 8:17 AM

## 2025-07-24 NOTE — ED PROVIDER NOTES
12 lead EKG per my interpretation:  Atrial Fibrillation with RVR at 115  Kamas is   Left axis deviation  QTc is  within an acceptable range  There is no specific T wave changes appreciated.  There is no specific ST wave changes appreciated.  No STEMI  Bifascicular block present.    Prior EKG to compare with was available and atrial fibrillation was seen on prior with no clinically significant change normal morphology.    MD Giovanni Montemayor Andrew, MD  07/24/25 0039     Call to patient, states 12/21 she started to have mild cramping and on 12/23 she noted an increase in the cramping and took 600 mg of ibuprofen which helped. Also notes she had some bleeding yesterday, nothing heavy but she did have to use a pad. Discussed bleeding warning signs to call with and to continue ibuprofen and if no help to call clinic for IUD check. She verbalized understanding.

## 2025-07-24 NOTE — PLAN OF CARE
Problem: Discharge Planning  Goal: Discharge to home or other facility with appropriate resources  Outcome: Progressing     Problem: Pain  Goal: Verbalizes/displays adequate comfort level or baseline comfort level  Outcome: Progressing     Problem: Safety - Adult  Goal: Free from fall injury  Outcome: Progressing     Problem: Skin/Tissue Integrity  Goal: Skin integrity remains intact  Outcome: Progressing

## 2025-07-24 NOTE — PLAN OF CARE
Problem: Knowledge deficit  Goal: Knowledge - disease process  Description: Patient/caregiver will demonstrate adequate knowledge of COPD as evidenced by the ability to verbalize the signs and symptoms of disease, causes of disease, complications of disease and actions to take if exacerbation is suspected within 3 day(s).  Outcome: Completed

## 2025-07-24 NOTE — PROGRESS NOTES
Current GOLD classification       GOLD Stage:  4  Group:  B  Recorded domestic exacerbations past 12 months:  1  Current recorded COPD Assessment Tool (CAT) score of  31  Current eosinophil count: 0.4           Inhaler Device   Acceptable for Use   Respimat  Not Breath Actuated Yes   MDI  Not Breath Actuated Yes           DPI  Observed PIF   using  In-Check Meter   Optimal PIF   Acceptable for Use   HANDIHALER 55 >30 Y   Pressair 55 >45 Y   NEOHALER 55 >50 Y   Diskus 55 >60 N   ELLIPTA 55 >60 N     Records show this patient was using ALBUTEROL / BREZTRI maintenance therapy prior to admission.        LONG-ACTING (LABA)   Arformoterol (Brovana) NEBULIZER   Indacaterol (Arcapta) NEOHALER   Olodaterol (Striverdi) Respimat   Salmeterol (Serevent) MDI, DISKUS   LONG-ACTING (LAMA)   Aclidinium bromide (Tudorza) PRESSAIR   Glycopyrronium bromide (Seebri) NEOHALER   Tiotropium (Spiriva) Respimat, HANDIHALER   Umeclidinium (Incruse) ELLIPTA   (LABA/LAMA)   Formoterol/glycopyrronium (Bevespi) MDI   Indacaterol/glycopyrronium (Utibron) NEOHALER   Vilanterol/umeclidinium (Anoro) ELLIPTA   Olodaterol/tiotropium (Stiolto) Respimat   (LABA/ICS)   Formoterol/budesomide (Symbicort) MDI   Formoterol/mometasone (Dulera) MDI   Salmeterol/fluticasone (Advair) MDI, DISKUS   Vilanterol/fluticasone (Breo) ELLIPTA   (LABA/LAMA/ICS)   Fluticasone/umeclidinium/vilanterol (Trelegy) ELLIPTA   Budesonide/glycopyrrolate/formoterol fumarate (Beztri) aerosphere     COPD Spot Light and QR Code education given to patient. _____   07/24/25 1440   Spirometry Assessment   FEV1 (%PRED) 29   Post Bronchodilator FEV/FVC 53   COPD Exacerbations in last year 1   PIF 55 L/min   COPD Assessment (CAT Score)   Cough Assessment 2   Phlegm Assessment 1   Chest tightness 4   Walking on an incline 5   Home Activities 5   Confident Leaving The Home 5   Sleeping Soundly 4   Have Energy 5   Assessment Score 31   $RT COPD Assessment Yes   GOLD Staging   Gold Grade 4    Group Group B

## 2025-07-24 NOTE — CONSULTS
CARDIOLOGY CONSULT NOTE   Reason for consultation: CHF shortness of    Referring physician:  Jennifer Rincon MD     Primary care physician: Jes Sharma DO      Dear   Thanks for the consult.    History of present illness:Janessa is a 80 y.o.year old who  presents with shortness of breath,.for shortness of breath which is moderate to severe, intermittent, self limiting, not associated with cough or fever, gets worse with activity and better with rest,    Chief Complaint   Patient presents with    Shortness of Breath     COPD, 4L n/c.      Blood pressure, cholesterol, blood glucose and weight are well controlled.    Past medical history:    has a past medical history of Age-related osteoporosis without current pathological fracture, Anticoagulant long-term use, Arthritis, Cancer ([facility]), Chronic hypoxemic respiratory failure ([facility]), Community acquired pneumonia of right upper lobe of lung, COPD (chronic obstructive pulmonary disease) ([facility]), Coronary atherosclerosis, COVID-19 virus detected, DVT (deep venous thrombosis) ([facility]), Former smoker, Gastroesophageal reflux disease, Hiatal hernia, Lung infiltrate on CT, On home oxygen therapy, Osteopenia, Phlebitis, Pulmonary emphysema ([facility]), Pulmonary nodule, S/P left heart catheterization by percutaneous approach, Sepsis due to pneumonia ([facility]), Shortness of breath, Shortness of breath, Shortness of breath, and Wears glasses.  Past surgical history:   has a past surgical history that includes Appendectomy; Tonsillectomy; Tubal ligation; Vein Surgery; bronchoscopy (1995); Colonoscopy; Endoscopy, colon, diagnostic; Colonoscopy (N/A, 12/14/2023); Upper gastrointestinal endoscopy (N/A, 12/14/2023); and Upper gastrointestinal endoscopy (N/A, 6/5/2025).  Social History:   reports that she quit smoking about 33 years ago. Her smoking use included cigarettes. She started smoking about 60 years ago. She has a 40.3 pack-year smoking history. She has never used smokeless tobacco.  puff at 07/24/25 0718    dicyclomine (BENTYL) tablet 10 mg  10 mg Oral BID Jennifer Rincon MD   10 mg at 07/24/25 0910    [Held by provider] dilTIAZem (CARDIZEM) immediate release tablet 60 mg  60 mg Oral 3 times per day Jennifer Rincon MD        pantoprazole (PROTONIX) tablet 40 mg  40 mg Oral QAM AC Jennifer Rincon MD   40 mg at 07/24/25 0910    gabapentin (NEURONTIN) capsule 400 mg  400 mg Oral QPM Jennifer Rincon MD   400 mg at 07/24/25 1735    guaiFENesin (MUCINEX) extended release tablet 600 mg  600 mg Oral BID Jennifer Rincon MD   600 mg at 07/24/25 0910    hydrOXYzine HCl (ATARAX) tablet 10 mg  10 mg Oral TID PRN Jennifer Rincon MD   10 mg at 07/24/25 1642    hydrOXYzine HCl (ATARAX) tablet 25 mg  25 mg Oral Nightly Jennifer Rincon MD        levothyroxine (SYNTHROID) tablet 75 mcg  75 mcg Oral Daily Jennifer Rincon MD   75 mcg at 07/24/25 0910    montelukast (SINGULAIR) tablet 10 mg  10 mg Oral Nightly Jnenifer Rincon MD        potassium chloride (KLOR-CON M) extended release tablet 20 mEq  20 mEq Oral Daily Jennifer Rincon MD   20 mEq at 07/24/25 0910    Roflumilast (DALIRESP) tablet 500 mcg  500 mcg Oral Daily Jennifer Rincon MD   500 mcg at 07/24/25 1207    rosuvastatin (CRESTOR) tablet 20 mg  20 mg Oral Nightly Jennifer Rincon MD        furosemide (LASIX) injection 40 mg  40 mg IntraVENous BID Jennifer Rincon MD   40 mg at 07/24/25 1735    magnesium oxide (MAG-OX) tablet 400 mg  400 mg Oral Daily Jennifer Rincon MD   400 mg at 07/24/25 0910    warfarin (COUMADIN) tablet 3 mg  3 mg Oral Once per day on Sunday Tuesday Wednesday Thursday Saturday Jennifer Rincon MD   3 mg at 07/24/25 1735    [START ON 7/25/2025] warfarin (COUMADIN) tablet 6 mg  6 mg Oral Once per day on Monday Friday Jennifer Rincon MD         Review of Systems:   Constitutional: No Fever or Weight Loss   Eyes: No Decreased Vision  ENT: No

## 2025-07-24 NOTE — PROGRESS NOTES
07/24/25 0250   NIV Type   Equipment Type v60   Mode Bilevel   Mask Type Full face mask   Mask Size Small   Assessment   Pulse (!) 115   Heart Rate Source Monitor   Respirations 20   SpO2 100 %   Level of Consciousness 0   Comfort Level Good   Using Accessory Muscles No   Mask Compliance Good   Skin Assessment Clean, dry, & intact   Settings/Measurements   PIP Observed 15 cm H20   IPAP (S)  15 cmH20  (increased from 15 to [id number])   CPAP/EPAP 5 cmH2O   Vt (Measured) 518 mL   Rate Ordered (S)  20  (increased from 16 to 20)   Insp Rise Time (%) 3 %   FiO2  40 %   I Time/ I Time % 0.9 s   Minute Volume (L/min) 10.4 Liters   Mask Leak (lpm) 2 lpm   Patient's Home Machine No   Alarm Settings   Alarms On Y   Low Pressure (cmH2O) 5 cmH2O   High Pressure (cmH2O) 20 cmH2O   Delay Alarm 20 sec(s)   RR Low (bpm) 12   RR High (bpm) 40 br/min

## 2025-07-24 NOTE — H&P
History and Physical      Name:  Janessa Davidson /Age/Sex: 1945  (80 y.o. female)   MRN & CSN:  [id number] & [id number] Encounter Date/Time: 2025 3:18 AM EDT   Location:  [facility]-16 PCP: Jes Sharma DO       Hospital Day: 1    Assessment and Plan:     #.  Acute on chronic hypoxic and hypercapnic respiratory failure  #.  COPD exacerbation  - As per information patient was hypoxic in 70s  - Chest x-ray cardiomegaly with findings suggestive of mild pulmonary edema and small bilateral pleural effusions  - VBG on arrival-pH 7.256, PCO2 85.6, PO2 33.9, HCO3 37.2  - Currently on BiPAP  - Repeat VBG-pH 7.231, PCO2 97.4, PO2 24.1, HCO3 14  - Repeat VBG  -consult pulmonology  - Continue steroids, DuoNeb    #.  A-fib with RVR  - HR ranging 109-139  - Patient started on Cardizem drip in ER  - Consult cardiology  -on Coumadin for anticoagulation  -check PT/INR  - Patient on Cardizem 60 mg every 8 hours at home-resume when appropriate    #.  Acute on chronic diastolic congestive heart failure  - Echo-2023-EF 55%-repeat  - NT proBNP-2009  - Has bilateral lower extremity pitting edema/lymphedematous changes  - Chest x-ray consistent with vascular congestion  - Continue Lasix and monitor    #.  Recent admission 6/3-for acute anemia  - Also found to have right hip hematoma  - Had EGD with no acute findings.    #.  Severe stage IV COPD, chronic respiratory failure on oxygen @ 2.5 L/min  - On theophylline, Singulair, prednisone 5 mg daily, Roflumilast, Breztri    #.  Chronic diastolic congestive heart failure  - On torsemide, Crestor    #.  Anxiety disorder  - On hydroxyzine    #.  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  [facility]-2013, 2018-no significant CAD    #.  Hypothyroidism-on levothyroxine    #.  Hiatal hernia  - EGD-2023-esophageal stenosis, hiatal hernia, gastric polyps  -on esomeprazole    #.  Chronic wounds in bilateral lower extremities  -following with wound care    #.  Neuropathic pain  - On

## 2025-07-24 NOTE — PROGRESS NOTES
Attempted to call phlebot multiple times for STAT orders for labs. No answer. Spoke with Rosario--she will notify them to come and draw.

## 2025-07-24 NOTE — PROGRESS NOTES
07/24/25 0101   NIV Type   $NIV $Daily Charge   Suction Setup and Functional Yes   NIV Started/Stopped On   Equipment Type v60   Mode Bilevel   Mask Type Full face mask   Mask Size Small   Bonnet size Small   Assessment   Pulse (!) 124   Heart Rate Source Monitor   Respirations 18   BP (!) 153/96   SpO2 97 %   Level of Consciousness 0   Comfort Level Good   Using Accessory Muscles No   Mask Compliance Good   Skin Assessment Clean, dry, & intact   Settings/Measurements   PIP Observed 12 cm H20   IPAP 12 cmH20   CPAP/EPAP 5 cmH2O   Vt (Measured) 461 mL   Rate Ordered 16   Insp Rise Time (%) 3 %   FiO2  40 %   I Time/ I Time % 0.9 s   Minute Volume (L/min) 8.4 Liters   Mask Leak (lpm) 2 lpm   Patient's Home Machine No   Alarm Settings   Alarms On Y   Low Pressure (cmH2O) 5 cmH2O   High Pressure (cmH2O) 20 cmH2O   Delay Alarm 20 sec(s)   RR Low (bpm) 12   RR High (bpm) 40 br/min   Patient Observation   Patient Observations placed on BiPAP

## 2025-07-24 NOTE — ED NOTES
Spoke to MD about pt being in a-fib. Plan is to give pt about 30min, then possibly start her on a drip

## 2025-07-25 LAB
ALBUMIN SERPL-MCNC: 3.2 G/DL (ref 3.4–5)
ALBUMIN/GLOB SERPL: 1.8 {RATIO} (ref 1.1–2.2)
ALP SERPL-CCNC: 64 U/L (ref 40–129)
ALT SERPL-CCNC: 32 U/L (ref 10–40)
ANION GAP SERPL CALCULATED.3IONS-SCNC: 5 MMOL/L (ref 9–17)
ARTERIAL PATENCY WRIST A: ABNORMAL
AST SERPL-CCNC: 25 U/L (ref 15–37)
BASOPHILS # BLD: 0.01 K/UL
BASOPHILS NFR BLD: 0 % (ref 0–1)
BILIRUB SERPL-MCNC: <0.2 MG/DL (ref 0–1)
BODY TEMPERATURE: 37
BUN SERPL-MCNC: 21 MG/DL (ref 7–20)
CALCIUM SERPL-MCNC: 9.1 MG/DL (ref 8.3–10.6)
CHLORIDE SERPL-SCNC: 96 MMOL/L (ref 99–110)
CO2 SERPL-SCNC: 38 MMOL/L (ref 21–32)
COHGB MFR BLD: 0.5 % (ref 0.5–1.5)
CREAT SERPL-MCNC: 0.8 MG/DL (ref 0.6–1.2)
ECHO AO ROOT DIAM: 2.7 CM
ECHO AO ROOT INDEX: 1.66 CM/M2
ECHO AV AREA PEAK VELOCITY: 1.4 CM2
ECHO AV AREA VTI: 1.4 CM2
ECHO AV AREA/BSA PEAK VELOCITY: 0.9 CM2/M2
ECHO AV AREA/BSA VTI: 0.9 CM2/M2
ECHO AV MEAN GRADIENT: 10 MMHG
ECHO AV MEAN VELOCITY: 1.5 M/S
ECHO AV PEAK GRADIENT: 14 MMHG
ECHO AV PEAK VELOCITY: 1.9 M/S
ECHO AV VELOCITY RATIO: 0.37
ECHO AV VTI: 33.7 CM
ECHO BSA: 1.63 M2
ECHO EST RA PRESSURE: 15 MMHG
ECHO IVC PROX: 2.8 CM
ECHO LA AREA 4C: 21.3 CM2
ECHO LA DIAMETER INDEX: 2.45 CM/M2
ECHO LA DIAMETER: 4 CM
ECHO LA MAJOR AXIS: 5.9 CM
ECHO LA TO AORTIC ROOT RATIO: 1.48
ECHO LA VOL MOD A4C: 61 ML (ref 22–52)
ECHO LA VOLUME INDEX MOD A4C: 37 ML/M2 (ref 16–34)
ECHO LV E' LATERAL VELOCITY: 8.2 CM/S
ECHO LV E' SEPTAL VELOCITY: 9.2 CM/S
ECHO LV EDV A4C: 93 ML
ECHO LV EDV INDEX A4C: 57 ML/M2
ECHO LV EF PHYSICIAN: 50 %
ECHO LV EJECTION FRACTION A4C: 48 %
ECHO LV ESV A4C: 48 ML
ECHO LV ESV INDEX A4C: 29 ML/M2
ECHO LV FRACTIONAL SHORTENING: 26 % (ref 28–44)
ECHO LV INTERNAL DIMENSION DIASTOLE INDEX: 3.25 CM/M2
ECHO LV INTERNAL DIMENSION DIASTOLIC: 5.3 CM (ref 3.9–5.3)
ECHO LV INTERNAL DIMENSION SYSTOLIC INDEX: 2.39 CM/M2
ECHO LV INTERNAL DIMENSION SYSTOLIC: 3.9 CM
ECHO LV IVSD: 0.9 CM (ref 0.6–0.9)
ECHO LV MASS 2D: 213.9 G (ref 67–162)
ECHO LV MASS INDEX 2D: 131.2 G/M2 (ref 43–95)
ECHO LV POSTERIOR WALL DIASTOLIC: 1.2 CM (ref 0.6–0.9)
ECHO LV RELATIVE WALL THICKNESS RATIO: 0.45
ECHO LVOT AREA: 3.5 CM2
ECHO LVOT AV VTI INDEX: 0.4
ECHO LVOT DIAM: 2.1 CM
ECHO LVOT MEAN GRADIENT: 1 MMHG
ECHO LVOT PEAK GRADIENT: 2 MMHG
ECHO LVOT PEAK VELOCITY: 0.7 M/S
ECHO LVOT STROKE VOLUME INDEX: 28.9 ML/M2
ECHO LVOT SV: 47.1 ML
ECHO LVOT VTI: 13.6 CM
ECHO MV E VELOCITY: 1.02 M/S
ECHO MV E/E' LATERAL: 12.44
ECHO MV E/E' RATIO (AVERAGED): 11.76
ECHO MV E/E' SEPTAL: 11.09
ECHO RIGHT VENTRICULAR SYSTOLIC PRESSURE (RVSP): 60 MMHG
ECHO RV MID DIMENSION: 3.4 CM
ECHO TV REGURGITANT MAX VELOCITY: 3.35 M/S
ECHO TV REGURGITANT PEAK GRADIENT: 45 MMHG
EKG ATRIAL RATE: 129 BPM
EKG DIAGNOSIS: NORMAL
EKG Q-T INTERVAL: 338 MS
EKG QRS DURATION: 140 MS
EKG QTC CALCULATION (BAZETT): 467 MS
EKG R AXIS: -53 DEGREES
EKG T AXIS: 81 DEGREES
EKG VENTRICULAR RATE: 115 BPM
EOSINOPHIL # BLD: 0 K/UL
EOSINOPHILS RELATIVE PERCENT: 0 % (ref 0–3)
ERYTHROCYTE [DISTWIDTH] IN BLOOD BY AUTOMATED COUNT: 15.3 % (ref 11.7–14.9)
GFR, ESTIMATED: 68 ML/MIN/1.73M2
GLUCOSE SERPL-MCNC: 148 MG/DL (ref 74–99)
HCO3 VENOUS: 37.9 MMOL/L (ref 22–29)
HCT VFR BLD AUTO: 31.3 % (ref 37–47)
HGB BLD-MCNC: 9.1 G/DL (ref 12.5–16)
IMM GRANULOCYTES # BLD AUTO: 0.03 K/UL
IMM GRANULOCYTES NFR BLD: 0 %
INR PPP: 2.6
LYMPHOCYTES NFR BLD: 0.29 K/UL
LYMPHOCYTES RELATIVE PERCENT: 4 % (ref 24–44)
MAGNESIUM SERPL-MCNC: 2.1 MG/DL (ref 1.8–2.4)
MCH RBC QN AUTO: 26.1 PG (ref 27–31)
MCHC RBC AUTO-ENTMCNC: 29.1 G/DL (ref 32–36)
MCV RBC AUTO: 89.9 FL (ref 78–100)
METHEMOGLOBIN: 0.5 % (ref 0.5–1.5)
MONOCYTES NFR BLD: 0.36 K/UL
MONOCYTES NFR BLD: 5 % (ref 0–5)
NEUTROPHILS NFR BLD: 91 % (ref 36–66)
NEUTS SEG NFR BLD: 7.14 K/UL
OXYHGB MFR BLD: 80.5 %
PCO2 VENOUS: 79 MM HG (ref 38–54)
PH VENOUS: 7.3 (ref 7.32–7.43)
PHOSPHATE SERPL-MCNC: 3.2 MG/DL (ref 2.5–4.9)
PLATELET # BLD AUTO: 242 K/UL (ref 140–440)
PMV BLD AUTO: 10.6 FL (ref 7.5–11.1)
PO2 VENOUS: 50.8 MM HG (ref 23–48)
POSITIVE BASE EXCESS, VEN: 9 MMOL/L (ref 0–3)
POTASSIUM SERPL-SCNC: 4.8 MMOL/L (ref 3.5–5.1)
PROT SERPL-MCNC: 4.9 G/DL (ref 6.4–8.2)
PROTHROMBIN TIME: 28.2 SEC (ref 11.7–14.5)
RBC # BLD AUTO: 3.48 M/UL (ref 4.2–5.4)
SODIUM SERPL-SCNC: 139 MMOL/L (ref 136–145)
TEXT FOR RESPIRATORY: ABNORMAL
WBC OTHER # BLD: 7.8 K/UL (ref 4–10.5)

## 2025-07-25 PROCEDURE — 2140000000 HC CCU INTERMEDIATE R&B

## 2025-07-25 PROCEDURE — 83735 ASSAY OF MAGNESIUM: CPT

## 2025-07-25 PROCEDURE — 85025 COMPLETE CBC W/AUTO DIFF WBC: CPT

## 2025-07-25 PROCEDURE — 6360000002 HC RX W HCPCS: Performed by: NURSE PRACTITIONER

## 2025-07-25 PROCEDURE — 82805 BLOOD GASES W/O2 SATURATION: CPT

## 2025-07-25 PROCEDURE — 6370000000 HC RX 637 (ALT 250 FOR IP): Performed by: INTERNAL MEDICINE

## 2025-07-25 PROCEDURE — 94640 AIRWAY INHALATION TREATMENT: CPT

## 2025-07-25 PROCEDURE — 84100 ASSAY OF PHOSPHORUS: CPT

## 2025-07-25 PROCEDURE — 6360000002 HC RX W HCPCS: Performed by: INTERNAL MEDICINE

## 2025-07-25 PROCEDURE — 94761 N-INVAS EAR/PLS OXIMETRY MLT: CPT

## 2025-07-25 PROCEDURE — 99232 SBSQ HOSP IP/OBS MODERATE 35: CPT | Performed by: INTERNAL MEDICINE

## 2025-07-25 PROCEDURE — 2500000003 HC RX 250 WO HCPCS: Performed by: INTERNAL MEDICINE

## 2025-07-25 PROCEDURE — 94660 CPAP INITIATION&MGMT: CPT

## 2025-07-25 PROCEDURE — 80053 COMPREHEN METABOLIC PANEL: CPT

## 2025-07-25 PROCEDURE — 99233 SBSQ HOSP IP/OBS HIGH 50: CPT | Performed by: INTERNAL MEDICINE

## 2025-07-25 PROCEDURE — 93306 TTE W/DOPPLER COMPLETE: CPT | Performed by: INTERNAL MEDICINE

## 2025-07-25 PROCEDURE — 2580000003 HC RX 258: Performed by: NURSE PRACTITIONER

## 2025-07-25 PROCEDURE — 36415 COLL VENOUS BLD VENIPUNCTURE: CPT

## 2025-07-25 PROCEDURE — 2700000000 HC OXYGEN THERAPY PER DAY

## 2025-07-25 PROCEDURE — 85610 PROTHROMBIN TIME: CPT

## 2025-07-25 PROCEDURE — 93010 ELECTROCARDIOGRAM REPORT: CPT | Performed by: INTERNAL MEDICINE

## 2025-07-25 RX ORDER — ALBUTEROL SULFATE 0.83 MG/ML
2.5 SOLUTION RESPIRATORY (INHALATION) PRN
Status: DISCONTINUED | OUTPATIENT
Start: 2025-07-25 | End: 2025-07-30 | Stop reason: HOSPADM

## 2025-07-25 RX ORDER — BENZONATATE 100 MG/1
100 CAPSULE ORAL 3 TIMES DAILY PRN
Status: DISCONTINUED | OUTPATIENT
Start: 2025-07-25 | End: 2025-07-30 | Stop reason: HOSPADM

## 2025-07-25 RX ORDER — IPRATROPIUM BROMIDE AND ALBUTEROL SULFATE 2.5; .5 MG/3ML; MG/3ML
1 SOLUTION RESPIRATORY (INHALATION)
Status: DISCONTINUED | OUTPATIENT
Start: 2025-07-25 | End: 2025-07-29

## 2025-07-25 RX ORDER — WARFARIN SODIUM 3 MG/1
3 TABLET ORAL
Status: COMPLETED | OUTPATIENT
Start: 2025-07-25 | End: 2025-07-25

## 2025-07-25 RX ORDER — WARFARIN SODIUM 6 MG/1
6 TABLET ORAL
Status: DISCONTINUED | OUTPATIENT
Start: 2025-07-28 | End: 2025-07-26

## 2025-07-25 RX ORDER — ALBUTEROL SULFATE 5 MG/ML
2.5 SOLUTION RESPIRATORY (INHALATION) EVERY 4 HOURS PRN
Status: DISCONTINUED | OUTPATIENT
Start: 2025-07-25 | End: 2025-07-25

## 2025-07-25 RX ORDER — GUAIFENESIN 200 MG/10ML
200 LIQUID ORAL EVERY 4 HOURS PRN
Status: DISCONTINUED | OUTPATIENT
Start: 2025-07-25 | End: 2025-07-30 | Stop reason: HOSPADM

## 2025-07-25 RX ADMIN — IPRATROPIUM BROMIDE AND ALBUTEROL SULFATE 1 DOSE: .5; 2.5 SOLUTION RESPIRATORY (INHALATION) at 19:57

## 2025-07-25 RX ADMIN — BUDESONIDE AND FORMOTEROL FUMARATE DIHYDRATE 2 PUFF: 160; 4.5 AEROSOL RESPIRATORY (INHALATION) at 19:57

## 2025-07-25 RX ADMIN — GUAIFENESIN 600 MG: 600 TABLET ORAL at 21:29

## 2025-07-25 RX ADMIN — DICYCLOMINE HYDROCHLORIDE 10 MG: 20 TABLET ORAL at 09:45

## 2025-07-25 RX ADMIN — HYDROXYZINE HYDROCHLORIDE 10 MG: 10 TABLET, FILM COATED ORAL at 12:34

## 2025-07-25 RX ADMIN — GUAIFENESIN 600 MG: 600 TABLET ORAL at 09:45

## 2025-07-25 RX ADMIN — Medication 400 MG: at 09:45

## 2025-07-25 RX ADMIN — SODIUM CHLORIDE 12.5 MG/HR: 900 INJECTION, SOLUTION INTRAVENOUS at 19:09

## 2025-07-25 RX ADMIN — SODIUM CHLORIDE, PRESERVATIVE FREE 10 ML: 5 INJECTION INTRAVENOUS at 10:02

## 2025-07-25 RX ADMIN — ALBUTEROL SULFATE 2.5 MG: 2.5 SOLUTION RESPIRATORY (INHALATION) at 07:50

## 2025-07-25 RX ADMIN — FUROSEMIDE 40 MG: 10 INJECTION, SOLUTION INTRAMUSCULAR; INTRAVENOUS at 09:45

## 2025-07-25 RX ADMIN — FUROSEMIDE 40 MG: 10 INJECTION, SOLUTION INTRAMUSCULAR; INTRAVENOUS at 17:55

## 2025-07-25 RX ADMIN — HYDROXYZINE HYDROCHLORIDE 10 MG: 10 TABLET, FILM COATED ORAL at 05:35

## 2025-07-25 RX ADMIN — IPRATROPIUM BROMIDE AND ALBUTEROL SULFATE 1 DOSE: .5; 2.5 SOLUTION RESPIRATORY (INHALATION) at 15:53

## 2025-07-25 RX ADMIN — GUAIFENESIN 200 MG: 200 SOLUTION ORAL at 21:37

## 2025-07-25 RX ADMIN — ROFLUMILAST 500 MCG: 500 TABLET ORAL at 09:49

## 2025-07-25 RX ADMIN — LEVOTHYROXINE SODIUM 75 MCG: 0.07 TABLET ORAL at 06:52

## 2025-07-25 RX ADMIN — MONTELUKAST 10 MG: 10 TABLET, FILM COATED ORAL at 21:30

## 2025-07-25 RX ADMIN — POTASSIUM CHLORIDE 20 MEQ: 1500 TABLET, EXTENDED RELEASE ORAL at 09:45

## 2025-07-25 RX ADMIN — BUDESONIDE AND FORMOTEROL FUMARATE DIHYDRATE 2 PUFF: 160; 4.5 AEROSOL RESPIRATORY (INHALATION) at 07:20

## 2025-07-25 RX ADMIN — SODIUM CHLORIDE, PRESERVATIVE FREE 10 ML: 5 INJECTION INTRAVENOUS at 21:31

## 2025-07-25 RX ADMIN — SODIUM CHLORIDE 12.5 MG/HR: 900 INJECTION, SOLUTION INTRAVENOUS at 10:01

## 2025-07-25 RX ADMIN — GABAPENTIN 400 MG: 400 CAPSULE ORAL at 17:54

## 2025-07-25 RX ADMIN — BENZONATATE 100 MG: 100 CAPSULE ORAL at 21:37

## 2025-07-25 RX ADMIN — PANTOPRAZOLE SODIUM 40 MG: 40 TABLET, DELAYED RELEASE ORAL at 06:52

## 2025-07-25 RX ADMIN — METHYLPREDNISOLONE SODIUM SUCCINATE 40 MG: 40 INJECTION, POWDER, LYOPHILIZED, FOR SOLUTION INTRAMUSCULAR; INTRAVENOUS at 09:45

## 2025-07-25 RX ADMIN — WARFARIN SODIUM 3 MG: 3 TABLET ORAL at 17:54

## 2025-07-25 RX ADMIN — DICYCLOMINE HYDROCHLORIDE 10 MG: 20 TABLET ORAL at 21:29

## 2025-07-25 RX ADMIN — Medication 1 LOZENGE: at 18:47

## 2025-07-25 RX ADMIN — IPRATROPIUM BROMIDE AND ALBUTEROL SULFATE 1 DOSE: .5; 2.5 SOLUTION RESPIRATORY (INHALATION) at 12:07

## 2025-07-25 RX ADMIN — ROSUVASTATIN CALCIUM 20 MG: 20 TABLET, FILM COATED ORAL at 21:29

## 2025-07-25 RX ADMIN — METHYLPREDNISOLONE SODIUM SUCCINATE 40 MG: 40 INJECTION, POWDER, LYOPHILIZED, FOR SOLUTION INTRAMUSCULAR; INTRAVENOUS at 21:29

## 2025-07-25 RX ADMIN — HYDROXYZINE HYDROCHLORIDE 25 MG: 25 TABLET ORAL at 21:29

## 2025-07-25 RX ADMIN — IPRATROPIUM BROMIDE AND ALBUTEROL SULFATE 1 DOSE: .5; 2.5 SOLUTION RESPIRATORY (INHALATION) at 06:01

## 2025-07-25 ASSESSMENT — PAIN SCALES - GENERAL: PAINLEVEL_OUTOF10: 0

## 2025-07-25 NOTE — PROGRESS NOTES
Today's plan: Echo shows mild LV dysfunction, patient is not a candidate for heart cath recommend maximize medication treatment, cannot use beta-blocker because of severe end-stage COPD, patient has lisinopril listed as allergies we will find out in detail, medical management can be performed as an outpatient will sign off    Recommend hospice for end-stage COPD    Admit Date:  7/24/2025    Subjective:      Chief complaints on admission  Chief Complaint   Patient presents with    Shortness of Breath     COPD, 4L n/c.          History of present illness:Janessa is a 80 y.o.year old who  presents with had concerns including Shortness of Breath (COPD, 4L n/c. ).      Past medical history:    has a past medical history of Age-related osteoporosis without current pathological fracture, Anticoagulant long-term use, Arthritis, Cancer ([facility]), Chronic hypoxemic respiratory failure ([facility]), Community acquired pneumonia of right upper lobe of lung, COPD (chronic obstructive pulmonary disease) ([facility]), Coronary atherosclerosis, COVID-19 virus detected, DVT (deep venous thrombosis) ([facility]), Former smoker, Gastroesophageal reflux disease, Hiatal hernia, Lung infiltrate on CT, On home oxygen therapy, Osteopenia, Phlebitis, Pulmonary emphysema ([facility]), Pulmonary nodule, S/P left heart catheterization by percutaneous approach, Sepsis due to pneumonia ([facility]), Shortness of breath, Shortness of breath, Shortness of breath, and Wears glasses.  Past surgical history:   has a past surgical history that includes Appendectomy; Tonsillectomy; Tubal ligation; Vein Surgery; bronchoscopy (1995); Colonoscopy; Endoscopy, colon, diagnostic; Colonoscopy (N/A, 12/14/2023); Upper gastrointestinal endoscopy (N/A, 12/14/2023); and Upper gastrointestinal endoscopy (N/A, 6/5/2025).  Social History:   reports that she quit smoking about 33 years ago. Her smoking use included cigarettes. She started smoking about 60 years ago. She has a 40.3 pack-year smoking

## 2025-07-25 NOTE — PLAN OF CARE
Problem: Discharge Planning  Goal: Discharge to home or other facility with appropriate resources  7/25/2025 1110 by Shirley Álvarez RN  Outcome: Progressing  7/25/2025 0212 by Surya Vasquez RN  Outcome: Progressing     Problem: Pain  Goal: Verbalizes/displays adequate comfort level or baseline comfort level  7/25/2025 1110 by Shirley Álvarez RN  Outcome: Progressing  7/25/2025 0212 by Surya Vasquez RN  Outcome: Progressing     Problem: Safety - Adult  Goal: Free from fall injury  7/25/2025 1110 by Shirley Álvarez RN  Outcome: Progressing  7/25/2025 0212 by Surya Vasquez RN  Outcome: Progressing     Problem: Skin/Tissue Integrity  Goal: Skin integrity remains intact  Description: 1.  Monitor for areas of redness and/or skin breakdown  2.  Assess vascular access sites hourly  3.  Every 4-6 hours minimum:  Change oxygen saturation probe site  4.  Every 4-6 hours:  If on nasal continuous positive airway pressure, respiratory therapy assess nares and determine need for appliance change or resting period  7/25/2025 1110 by Shirley Álvarez RN  Outcome: Progressing  Flowsheets  Taken 7/25/2025 0939 by Shirley Álvarez RN  Skin Integrity Remains Intact: Monitor for areas of redness and/or skin breakdown  Taken 7/25/2025 0400 by Surya Vasquez, RN  Skin Integrity Remains Intact: Monitor for areas of redness and/or skin breakdown  7/25/2025 0212 by Surya Vasquez RN  Flowsheets (Taken 7/25/2025 0212)  Skin Integrity Remains Intact: Monitor for areas of redness and/or skin breakdown     Problem: ABCDS Injury Assessment  Goal: Absence of physical injury  Outcome: Progressing

## 2025-07-25 NOTE — PROGRESS NOTES
Pulmonary and Critical Care  Progress Note      VITALS:  /77   Pulse 81   Temp 98.3 °F (36.8 °C) (Oral)   Resp 14   Ht 1.626 m (5' 4\")   Wt 59 kg (130 lb)   SpO2 100%   BMI 22.31 kg/m²     Subjective:   CHIEF COMPLAINT :SOB     HPI:                The patient is a 80 y.o. female is lying in the bed. She is in mild resp distress    Objective:   PHYSICAL EXAM:    LUNGS:Occasional basal crackles  Abd-soft, BS+,NT  Ext- no pedal edema  CVS-s1s2,no murmurs      DATA:    CBC:  Recent Labs     07/24/25  0010 07/25/25  0427   WBC 9.6 7.8   RBC 4.12* 3.48*   HGB 11.0* 9.1*   HCT 38.8 31.3*    242   MCV 94.2 89.9   MCH 26.7* 26.1*   MCHC 28.4* 29.1*   RDW 15.1* 15.3*      BMP:  Recent Labs     07/24/25  0010 07/25/25  0427    139   K 4.4 4.8   CL 95* 96*   CO2 36* 38*   BUN 16 21*   CREATININE 0.9 0.8   CALCIUM 9.8 9.1   GLUCOSE 91 148*      ABG:  No results for input(s): \"PH\", \"PO2ART\", \"BIA9LFV\", \"HCO3\", \"BEART\", \"O2SAT\" in the last 72 hours.  BNP  Lab Results   Component Value Date    BNP 57 10/21/2011      D-Dimer:  Lab Results   Component Value Date    DDIMER <0.47 10/14/2022      Radiology: None      Assessment/Plan     Patient Active Problem List    Diagnosis Date Noted    Acute on chronic respiratory failure with hypercapnia (HCC) 01/24/2023     Priority: Medium    Chronic phlebitis of superficial vein of right lower extremity 09/15/2022     Priority: Medium    Multifocal pneumonia 09/08/2022     Priority: Medium    Acute on chronic respiratory failure with hypoxia and hypercapnia (HCC) 07/24/2025    Non-pressure chronic ulcer of left lower leg, limited to breakdown of skin (HCC) 07/02/2025    Laceration of right forearm 07/02/2025    Traumatic hematoma of hip, right, subsequent encounter 06/25/2025    Acute anemia 06/03/2025    History of stroke 06/13/2024    Age-related osteoporosis without current pathological fracture 05/23/2024    Pulmonary emphysema (HCC) 12/12/2023    Atrial

## 2025-07-25 NOTE — CONSULTS
PHARMACY ANTICOAGULATION MONITORING SERVICE    Janessa Davdison is a 80 y.o. female on warfarin therapy for History of DVT/PE and Atrial fibrillation. Pharmacy consulted by Dr. Rincon for monitoring and adjustment of treatment.    Indication for anticoagulation: History of DVT/PE and Atrial fibrillation  INR goal: 2.0 - 3.0   Warfarin dose prior to admission: 3 mg daily, except 6 mg M/F (TWD 27 mg)    Pertinent Laboratory Values   Recent Labs     07/24/25  0010 07/24/25  0720 07/25/25  0427   INR  --  2.4 2.6   WBC 9.6  --  7.8   HGB 11.0*  --  9.1*   HCT 38.8  --  31.3*     --  242       Date INR Result Warfarin Dose Given   7/24 2.4 3 mg   7/25 2.6 3 mg (ordered)               Assessment/Plan:  Drug Interactions:   Inpt meds: SoluMedrol (increase INR)  Home meds: Lasix (decrease), Synthroid (increase)  INR of 2.6 remains therapeutic for goal.   H&H with a downward trend.  Noted CHF with EF 55%. Recent admission with acute anemia  Give lower warfarin 3mg dose again tonight and follow INR trends tomorrow.  Pharmacy will continue to monitor and adjust warfarin therapy as indicated    Thank you for the consult,  Trey Cody RPH  7/25/2025 1:23 PM

## 2025-07-25 NOTE — PLAN OF CARE
Problem: Discharge Planning  Goal: Discharge to home or other facility with appropriate resources  7/25/2025 0212 by Surya Vasquez RN  Outcome: Progressing  7/24/2025 1817 by Geovanna Osborne RN  Outcome: Progressing     Problem: Pain  Goal: Verbalizes/displays adequate comfort level or baseline comfort level  7/25/2025 0212 by Suray Vasquez RN  Outcome: Progressing  7/24/2025 1817 by Geovanna Osborne RN  Outcome: Progressing     Problem: Safety - Adult  Goal: Free from fall injury  7/25/2025 0212 by Surya Vasquez RN  Outcome: Progressing  7/24/2025 1817 by Geovanna Osborne RN  Outcome: Progressing     Problem: Skin/Tissue Integrity  Goal: Skin integrity remains intact  Description: 1.  Monitor for areas of redness and/or skin breakdown  2.  Assess vascular access sites hourly  3.  Every 4-6 hours minimum:  Change oxygen saturation probe site  4.  Every 4-6 hours:  If on nasal continuous positive airway pressure, respiratory therapy assess nares and determine need for appliance change or resting period  7/25/2025 0212 by Surya Vasquez RN  Flowsheets (Taken 7/25/2025 0212)  Skin Integrity Remains Intact: Monitor for areas of redness and/or skin breakdown  7/24/2025 1817 by Geovanna Osborne RN  Outcome: Progressing

## 2025-07-25 NOTE — PROGRESS NOTES
V2.0    [facility] [place]      Name:  Janessa Davidson /Age/Sex: 1945  (80 y.o. female)   MRN & CSN:  [id number] & [id number] Encounter Date/Time: 2025 9:27 PM EDT   [facility] PCP: Jes Sharma DO     Attending:Pato Harrison MD       Hospital Day: 2    Assessment and Recommendations   Janessa Davidson is a 80 y.o. female    Acute on chronic hypoxic and hypercapnic respiratory failure  - Very hypercapnic upon admission  - Treated with BiPAP the night of admission then weaned off the following day    COPD exacerbation  -DuoNebs  -Steroids  -Pulmonary consult appreciated    Atrial fibrillation with RVR  -Cardizem drip started the night of admission    Secondary hypercoagulable state in a patient with atrial fibrillation  -Continue warfarin    Acute on chronic HFpEF  - Had bilateral lower extremity pitting edema/lymphedema this changes upon admission and chest x-ray had some vascular congestion  - Furosemide 40 mg IV twice daily started  and remains on it  - continue     Recent admission 6/3-for acute anemia  - Also found to have right hip hematoma  - Had EGD with no acute findings.    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-2013, 2018-no significant CAD     Hypothyroidism-on levothyroxine     Hiatal hernia  - EGD-2023-esophageal stenosis, hiatal hernia, gastric polyps  -on esomeprazole     Chronic wounds in bilateral lower extremities  -following with wound care     Neuropathic pain  - On gabapentin           Diet ADULT DIET; Regular; No Added Salt (3-4 gm)   DVT Prophylaxis [] Lovenox, []  Heparin, [] SCDs, [] Ambulation,  [] Eliquis, [] Xarelto  [] Coumadin

## 2025-07-25 NOTE — CONSULTS
[facility] Contin[facility] Nurse  Consult Note       Janessa Davidson  AGE: 80 y.o.   GENDER: female  : 1945  TODAY'S DATE:  2025    Subjective:     Reason for Evaluation and Assessment: wound assessment       Janessa Davidson is a 80 y.o. female referred by:   [x] Physician  [] Nursing  [] Other:     Wound Identification:  Wound Type: venous and traumatic  Contributing Factors: edema, venous stasis, and shear force        PAST MEDICAL HISTORY        Diagnosis Date    Age-related osteoporosis without current pathological fracture 2024    Anticoagulant long-term use     Arthritis     Cancer (HCC)     Chronic hypoxemic respiratory failure (HCC) 2018    Community acquired pneumonia of right upper lobe of lung 2022    COPD (chronic obstructive pulmonary disease) ([facility])     Coronary atherosclerosis     COVID-19 virus detected 2021    DVT (deep venous thrombosis) ([facility])     Former smoker 2021    Gastroesophageal reflux disease     Hiatal hernia     Lung infiltrate on CT 2019    On home oxygen therapy     on     Osteopenia     Phlebitis     Pulmonary emphysema (HCC) 2023    Pulmonary nodule 2016    S/P left heart catheterization by percutaneous approach 2013    Sepsis due to pneumonia (HCC) 2017    Shortness of breath 2019    Shortness of breath 2021    Shortness of breath 2021    Wears glasses        PAST SURGICAL HISTORY    Past Surgical History:   Procedure Laterality Date    APPENDECTOMY      BRONCHOSCOPY      Dr Judge    COLONOSCOPY      COLONOSCOPY N/A 2023    COLONOSCOPY POLYPECTOMY SNARE/COLD BIOPSY performed by David Pena MD at [facility] ENDOSCOPY    ENDOSCOPY, COLON, DIAGNOSTIC      TONSILLECTOMY      TUBAL LIGATION      UPPER GASTROINTESTINAL ENDOSCOPY N/A 2023    EGD POLYP ABLATION/OTHER WITH BALLOON DILATION WITH 15-18MM BALLOON UP TO 18MM performed by David Pena MD at [facility] ENDOSCOPY

## 2025-07-25 NOTE — PROGRESS NOTES
V2.0    [facility] [place]      Name:  Janessa Davidson /Age/Sex: 1945  (80 y.o. female)   MRN & CSN:  [id number] & [id number] Encounter Date/Time: 2025 9:27 PM EDT   [facility]  [facility] PCP: Jes Sharma DO     Attending:Pato Harrison MD       Hospital Day: 1    Assessment and Recommendations   Janessa Davidson is a 80 y.o. female    Acute on chronic hypoxic and hypercapnic respiratory failure  - Very hypercapnic upon admission  -Given BiPAP the night of admission then weaned off the following day    COPD exacerbation  -DuoNebs  -Steroids  -Pulmonary consult appreciated    Atrial fibrillation with RVR  -Cardizem drip started the night of admission    Secondary hypercoagulable state in a patient with atrial fibrillation  -Continue warfarin    Acute on chronic HFpEF  - Had bilateral lower extremity pitting edema/lymphedema this changes upon admission and chest x-ray had some vascular congestion  - Furosemide 40 mg IV twice daily started      Recent admission 6/3-for acute anemia  - Also found to have right hip hematoma  - Had EGD with no acute findings.     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

## 2025-07-26 LAB
ALBUMIN SERPL-MCNC: 3.7 G/DL (ref 3.4–5)
ALBUMIN/GLOB SERPL: 1.8 {RATIO} (ref 1.1–2.2)
ALP SERPL-CCNC: 72 U/L (ref 40–129)
ALT SERPL-CCNC: 40 U/L (ref 10–40)
ANION GAP SERPL CALCULATED.3IONS-SCNC: 8 MMOL/L (ref 9–17)
AST SERPL-CCNC: 30 U/L (ref 15–37)
BASOPHILS # BLD: 0 K/UL
BASOPHILS NFR BLD: 0 % (ref 0–1)
BILIRUB SERPL-MCNC: 0.2 MG/DL (ref 0–1)
BUN SERPL-MCNC: 29 MG/DL (ref 7–20)
CALCIUM SERPL-MCNC: 9.1 MG/DL (ref 8.3–10.6)
CHLORIDE SERPL-SCNC: 93 MMOL/L (ref 99–110)
CO2 SERPL-SCNC: 35 MMOL/L (ref 21–32)
CREAT SERPL-MCNC: 0.9 MG/DL (ref 0.6–1.2)
EOSINOPHIL # BLD: 0 K/UL
EOSINOPHILS RELATIVE PERCENT: 0 % (ref 0–3)
ERYTHROCYTE [DISTWIDTH] IN BLOOD BY AUTOMATED COUNT: 15.3 % (ref 11.7–14.9)
GFR, ESTIMATED: 60 ML/MIN/1.73M2
GLUCOSE SERPL-MCNC: 131 MG/DL (ref 74–99)
HCT VFR BLD AUTO: 37.1 % (ref 37–47)
HGB BLD-MCNC: 10.4 G/DL (ref 12.5–16)
IMM GRANULOCYTES # BLD AUTO: 0.04 K/UL
IMM GRANULOCYTES NFR BLD: 0 %
INR PPP: 2.3
LYMPHOCYTES NFR BLD: 0.24 K/UL
LYMPHOCYTES RELATIVE PERCENT: 2 % (ref 24–44)
MAGNESIUM SERPL-MCNC: 2.4 MG/DL (ref 1.8–2.4)
MCH RBC QN AUTO: 26.3 PG (ref 27–31)
MCHC RBC AUTO-ENTMCNC: 28 G/DL (ref 32–36)
MCV RBC AUTO: 93.7 FL (ref 78–100)
MONOCYTES NFR BLD: 0.49 K/UL
MONOCYTES NFR BLD: 5 % (ref 0–5)
NEUTROPHILS NFR BLD: 92 % (ref 36–66)
NEUTS SEG NFR BLD: 9.24 K/UL
PHOSPHATE SERPL-MCNC: 4.2 MG/DL (ref 2.5–4.9)
PLATELET # BLD AUTO: 302 K/UL (ref 140–440)
PMV BLD AUTO: 10.7 FL (ref 7.5–11.1)
POTASSIUM SERPL-SCNC: 5.1 MMOL/L (ref 3.5–5.1)
PROT SERPL-MCNC: 5.7 G/DL (ref 6.4–8.2)
PROTHROMBIN TIME: 26 SEC (ref 11.7–14.5)
RBC # BLD AUTO: 3.96 M/UL (ref 4.2–5.4)
SODIUM SERPL-SCNC: 136 MMOL/L (ref 136–145)
WBC OTHER # BLD: 10 K/UL (ref 4–10.5)

## 2025-07-26 PROCEDURE — 6360000002 HC RX W HCPCS: Performed by: INTERNAL MEDICINE

## 2025-07-26 PROCEDURE — 2140000000 HC CCU INTERMEDIATE R&B

## 2025-07-26 PROCEDURE — 6370000000 HC RX 637 (ALT 250 FOR IP): Performed by: INTERNAL MEDICINE

## 2025-07-26 PROCEDURE — 85025 COMPLETE CBC W/AUTO DIFF WBC: CPT

## 2025-07-26 PROCEDURE — 94660 CPAP INITIATION&MGMT: CPT

## 2025-07-26 PROCEDURE — 80053 COMPREHEN METABOLIC PANEL: CPT

## 2025-07-26 PROCEDURE — 84100 ASSAY OF PHOSPHORUS: CPT

## 2025-07-26 PROCEDURE — 36415 COLL VENOUS BLD VENIPUNCTURE: CPT

## 2025-07-26 PROCEDURE — 2500000003 HC RX 250 WO HCPCS: Performed by: INTERNAL MEDICINE

## 2025-07-26 PROCEDURE — 85610 PROTHROMBIN TIME: CPT

## 2025-07-26 PROCEDURE — 83735 ASSAY OF MAGNESIUM: CPT

## 2025-07-26 PROCEDURE — 99232 SBSQ HOSP IP/OBS MODERATE 35: CPT | Performed by: INTERNAL MEDICINE

## 2025-07-26 PROCEDURE — 2700000000 HC OXYGEN THERAPY PER DAY

## 2025-07-26 PROCEDURE — 6360000002 HC RX W HCPCS: Performed by: NURSE PRACTITIONER

## 2025-07-26 PROCEDURE — 94761 N-INVAS EAR/PLS OXIMETRY MLT: CPT

## 2025-07-26 PROCEDURE — 94640 AIRWAY INHALATION TREATMENT: CPT

## 2025-07-26 PROCEDURE — 94664 DEMO&/EVAL PT USE INHALER: CPT

## 2025-07-26 PROCEDURE — 2580000003 HC RX 258: Performed by: NURSE PRACTITIONER

## 2025-07-26 RX ORDER — WARFARIN SODIUM 6 MG/1
6 TABLET ORAL
Status: COMPLETED | OUTPATIENT
Start: 2025-07-26 | End: 2025-07-26

## 2025-07-26 RX ADMIN — IPRATROPIUM BROMIDE AND ALBUTEROL SULFATE 1 DOSE: .5; 2.5 SOLUTION RESPIRATORY (INHALATION) at 15:46

## 2025-07-26 RX ADMIN — GUAIFENESIN 600 MG: 600 TABLET ORAL at 08:50

## 2025-07-26 RX ADMIN — Medication 400 MG: at 08:50

## 2025-07-26 RX ADMIN — HYDROXYZINE HYDROCHLORIDE 10 MG: 10 TABLET, FILM COATED ORAL at 01:09

## 2025-07-26 RX ADMIN — HYDROXYZINE HYDROCHLORIDE 25 MG: 25 TABLET ORAL at 20:59

## 2025-07-26 RX ADMIN — POTASSIUM CHLORIDE 20 MEQ: 1500 TABLET, EXTENDED RELEASE ORAL at 08:50

## 2025-07-26 RX ADMIN — SODIUM CHLORIDE 15 MG/HR: 900 INJECTION, SOLUTION INTRAVENOUS at 06:23

## 2025-07-26 RX ADMIN — ROFLUMILAST 500 MCG: 500 TABLET ORAL at 08:50

## 2025-07-26 RX ADMIN — WARFARIN SODIUM 6 MG: 6 TABLET ORAL at 18:00

## 2025-07-26 RX ADMIN — DICYCLOMINE HYDROCHLORIDE 10 MG: 20 TABLET ORAL at 08:49

## 2025-07-26 RX ADMIN — BUDESONIDE AND FORMOTEROL FUMARATE DIHYDRATE 2 PUFF: 160; 4.5 AEROSOL RESPIRATORY (INHALATION) at 19:45

## 2025-07-26 RX ADMIN — SODIUM CHLORIDE, PRESERVATIVE FREE 10 ML: 5 INJECTION INTRAVENOUS at 08:50

## 2025-07-26 RX ADMIN — IPRATROPIUM BROMIDE AND ALBUTEROL SULFATE 1 DOSE: .5; 2.5 SOLUTION RESPIRATORY (INHALATION) at 12:00

## 2025-07-26 RX ADMIN — LEVOTHYROXINE SODIUM 75 MCG: 0.07 TABLET ORAL at 05:51

## 2025-07-26 RX ADMIN — SODIUM CHLORIDE 15 MG/HR: 900 INJECTION, SOLUTION INTRAVENOUS at 00:24

## 2025-07-26 RX ADMIN — PANTOPRAZOLE SODIUM 40 MG: 40 TABLET, DELAYED RELEASE ORAL at 05:51

## 2025-07-26 RX ADMIN — SODIUM CHLORIDE 10 MG/HR: 900 INJECTION, SOLUTION INTRAVENOUS at 15:17

## 2025-07-26 RX ADMIN — FUROSEMIDE 40 MG: 10 INJECTION, SOLUTION INTRAMUSCULAR; INTRAVENOUS at 18:01

## 2025-07-26 RX ADMIN — SODIUM CHLORIDE, PRESERVATIVE FREE 10 ML: 5 INJECTION INTRAVENOUS at 22:57

## 2025-07-26 RX ADMIN — IPRATROPIUM BROMIDE AND ALBUTEROL SULFATE 1 DOSE: .5; 2.5 SOLUTION RESPIRATORY (INHALATION) at 19:44

## 2025-07-26 RX ADMIN — IPRATROPIUM BROMIDE AND ALBUTEROL SULFATE 1 DOSE: .5; 2.5 SOLUTION RESPIRATORY (INHALATION) at 08:22

## 2025-07-26 RX ADMIN — GABAPENTIN 400 MG: 400 CAPSULE ORAL at 18:01

## 2025-07-26 RX ADMIN — PREDNISONE 40 MG: 20 TABLET ORAL at 08:48

## 2025-07-26 RX ADMIN — FUROSEMIDE 40 MG: 10 INJECTION, SOLUTION INTRAMUSCULAR; INTRAVENOUS at 08:50

## 2025-07-26 RX ADMIN — BUDESONIDE AND FORMOTEROL FUMARATE DIHYDRATE 2 PUFF: 160; 4.5 AEROSOL RESPIRATORY (INHALATION) at 08:23

## 2025-07-26 NOTE — PLAN OF CARE
Problem: Discharge Planning  Goal: Discharge to home or other facility with appropriate resources  Outcome: Progressing  Flowsheets (Taken 7/26/2025 0100)  Discharge to home or other facility with appropriate resources: Identify barriers to discharge with patient and caregiver     Problem: Pain  Goal: Verbalizes/displays adequate comfort level or baseline comfort level  Outcome: Progressing  Flowsheets (Taken 7/26/2025 0000)  Verbalizes/displays adequate comfort level or baseline comfort level:   Encourage patient to monitor pain and request assistance   Assess pain using appropriate pain scale     Problem: Safety - Adult  Goal: Free from fall injury  Outcome: Progressing     Problem: Skin/Tissue Integrity  Goal: Skin integrity remains intact  Description: 1.  Monitor for areas of redness and/or skin breakdown  2.  Assess vascular access sites hourly  3.  Every 4-6 hours minimum:  Change oxygen saturation probe site  4.  Every 4-6 hours:  If on nasal continuous positive airway pressure, respiratory therapy assess nares and determine need for appliance change or resting period  Outcome: Progressing  Flowsheets  Taken 7/26/2025 0100  Skin Integrity Remains Intact:   Monitor for areas of redness and/or skin breakdown   Check visual cues for pain   Turn and reposition as indicated  Taken 7/25/2025 2152  Skin Integrity Remains Intact:   Monitor for areas of redness and/or skin breakdown   Check visual cues for pain   Turn and reposition as indicated  Taken 7/25/2025 2151  Skin Integrity Remains Intact:   Monitor for areas of redness and/or skin breakdown   Turn and reposition as indicated   Check visual cues for pain     Problem: ABCDS Injury Assessment  Goal: Absence of physical injury  Outcome: Progressing      Patient made aware of 24/7 emergency services.

## 2025-07-26 NOTE — PROGRESS NOTES
Pulmonary and Critical Care  Progress Note      VITALS:  /77   Pulse 96   Temp 98 °F (36.7 °C) (Oral)   Resp 12   Ht 1.626 m (5' 4\")   Wt 59 kg (130 lb)   SpO2 95%   BMI 22.31 kg/m²     Subjective:   CHIEF COMPLAINT :SOB     HPI:                The patient is a 80 y.o. female is sleepy but arousable. She is in mild resp distress    Objective:   PHYSICAL EXAM:    LUNGS:Occasional basal crackles  Abd-soft, BS+,NT  Ext- no pedal edema  CVS-s1s2,no murmurs         DATA:    CBC:  Recent Labs     07/24/25  0010 07/25/25  0427 07/26/25  0507   WBC 9.6 7.8 10.0   RBC 4.12* 3.48* 3.96*   HGB 11.0* 9.1* 10.4*   HCT 38.8 31.3* 37.1    242 302   MCV 94.2 89.9 93.7   MCH 26.7* 26.1* 26.3*   MCHC 28.4* 29.1* 28.0*   RDW 15.1* 15.3* 15.3*      BMP:  Recent Labs     07/24/25  0010 07/25/25  0427 07/26/25  0507    139 136   K 4.4 4.8 5.1   CL 95* 96* 93*   CO2 36* 38* 35*   BUN 16 21* 29*   CREATININE 0.9 0.8 0.9   CALCIUM 9.8 9.1 9.1   GLUCOSE 91 148* 131*      ABG:  No results for input(s): \"PH\", \"PO2ART\", \"PZI6ZYB\", \"HCO3\", \"BEART\", \"O2SAT\" in the last 72 hours.  BNP  Lab Results   Component Value Date    BNP 57 10/21/2011      D-Dimer:  Lab Results   Component Value Date    DDIMER <0.47 10/14/2022      Radiology: None      Assessment/Plan     Patient Active Problem List    Diagnosis Date Noted    Acute on chronic respiratory failure with hypercapnia (HCC) 01/24/2023     Priority: Medium    Chronic phlebitis of superficial vein of right lower extremity 09/15/2022     Priority: Medium    Multifocal pneumonia 09/08/2022     Priority: Medium    Acute on chronic respiratory failure with hypoxia and hypercapnia (HCC) 07/24/2025    Non-pressure chronic ulcer of left lower leg, limited to breakdown of skin (HCC) 07/02/2025    Laceration of right forearm 07/02/2025    Traumatic hematoma of hip, right, subsequent encounter 06/25/2025    Acute anemia 06/03/2025    History of stroke 06/13/2024    Age-related

## 2025-07-26 NOTE — PROGRESS NOTES
V2.0    [facility] Progress Note      Name:  Janessa Davidson /Age/Sex: 1945  (80 y.o. female)   MRN & CSN:  [id number] & [id number] Encounter Date/Time: 2025 9:27 PM EDT   [facility] PCP: Jes Sharma DO     Attending:Pato Harrison MD       Hospital Day: 3    Assessment and Recommendations   Janessa Davidson is a 80 y.o. female      - Spoke with the daughter in the room  - Patient required BiPAP most of the day  - Continue BiPAP    Acute on chronic hypoxic and hypercapnic respiratory failure  - Very hypercapnic upon admission  - Treated with BiPAP the night of admission then weaned off the following day    COPD exacerbation  -DuoNebs  -Steroids  -Pulmonary consult appreciated    Atrial fibrillation with RVR  -Cardizem drip started the night of admission    Secondary hypercoagulable state in a patient with atrial fibrillation  -Continue warfarin    Acute on chronic HFpEF  - Had bilateral lower extremity pitting edema/lymphedema this changes upon admission and chest x-ray had some vascular congestion  - Furosemide 40 mg IV twice daily started  and remains on it  - continue     Recent admission 6/3-for acute anemia  - Also found to have right hip hematoma  - Had EGD with no acute findings.    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

## 2025-07-26 NOTE — CONSULTS
PHARMACY ANTICOAGULATION MONITORING SERVICE    Janessa Davidson is a 80 y.o. female on warfarin therapy for History of DVT/PE and Atrial fibrillation. Pharmacy consulted by Dr. Rincon for monitoring and adjustment of treatment.    Indication for anticoagulation: History of DVT/PE and Atrial fibrillation  INR goal: 2.0 - 3.0   Warfarin dose prior to admission: 3 mg daily, except 6 mg M/F (TWD 27 mg)    Pertinent Laboratory Values   Recent Labs     07/24/25  0010 07/24/25  0720 07/25/25  0427 07/26/25  0507   INR  --  2.4 2.6 2.3   WBC 9.6  --  7.8 10.0   HGB 11.0*  --  9.1* 10.4*   HCT 38.8  --  31.3* 37.1     --  242 302       Date INR Result Warfarin Dose Given   7/24 2.4 3 mg   7/25 2.6 3 mg   7/26 2.3 6 mg (ordered)          Assessment/Plan:  Drug Interactions:   Inpt meds: SoluMedrol (increase INR)  Home meds: Lasix (decrease), Synthroid (increase)  INR of 2.6 remains therapeutic for goal.   H&H now with upward trend.  Noted CHF. Recent admission with acute anemia  Give warfarin 6mg dose tonight and follow INR trends tomorrow.  Pharmacy will continue to monitor and adjust warfarin therapy as indicated    Thank you for the consult,  Trey Cody [facility]  7/26/2025 10:29 AM

## 2025-07-26 NOTE — PLAN OF CARE
Problem: Discharge Planning  Goal: Discharge to home or other facility with appropriate resources  7/26/2025 0925 by Nicholas Damon RN  Outcome: Progressing  Flowsheets (Taken 7/26/2025 0530 by Sada Long, RN)  Discharge to home or other facility with appropriate resources: Identify barriers to discharge with patient and caregiver  7/26/2025 0415 by Sada Long RN  Outcome: Progressing  Flowsheets (Taken 7/26/2025 0100)  Discharge to home or other facility with appropriate resources: Identify barriers to discharge with patient and caregiver     Problem: Pain  Goal: Verbalizes/displays adequate comfort level or baseline comfort level  7/26/2025 0925 by Nicholas Damon RN  Outcome: Progressing  7/26/2025 0415 by Sada Long RN  Outcome: Progressing  Flowsheets (Taken 7/26/2025 0000)  Verbalizes/displays adequate comfort level or baseline comfort level:   Encourage patient to monitor pain and request assistance   Assess pain using appropriate pain scale     Problem: Safety - Adult  Goal: Free from fall injury  7/26/2025 0925 by Nicholas Damon RN  Outcome: Progressing  7/26/2025 0415 by Sada Long RN  Outcome: Progressing     Problem: Skin/Tissue Integrity  Goal: Skin integrity remains intact  Description: 1.  Monitor for areas of redness and/or skin breakdown  2.  Assess vascular access sites hourly  3.  Every 4-6 hours minimum:  Change oxygen saturation probe site  4.  Every 4-6 hours:  If on nasal continuous positive airway pressure, respiratory therapy assess nares and determine need for appliance change or resting period  7/26/2025 0925 by Nicholas Damon RN  Outcome: Progressing  Flowsheets (Taken 7/26/2025 0530 by Sada Long RN)  Skin Integrity Remains Intact:   Check visual cues for pain   Turn and reposition as indicated   Monitor for areas of redness and/or skin breakdown  7/26/2025 0415 by Sada Long RN  Outcome: Progressing  Flowsheets  Taken 7/26/2025 0100  Skin

## 2025-07-27 ENCOUNTER — APPOINTMENT (OUTPATIENT)
Dept: GENERAL RADIOLOGY | Age: 80
DRG: 189 | End: 2025-07-27
Payer: MEDICARE

## 2025-07-27 LAB
ALBUMIN SERPL-MCNC: 3.3 G/DL (ref 3.4–5)
ALBUMIN/GLOB SERPL: 1.7 {RATIO} (ref 1.1–2.2)
ALP SERPL-CCNC: 67 U/L (ref 40–129)
ALT SERPL-CCNC: 41 U/L (ref 10–40)
ANION GAP SERPL CALCULATED.3IONS-SCNC: 9 MMOL/L (ref 9–17)
AST SERPL-CCNC: 32 U/L (ref 15–37)
BASOPHILS # BLD: 0 K/UL
BASOPHILS NFR BLD: 0 % (ref 0–1)
BILIRUB SERPL-MCNC: 0.3 MG/DL (ref 0–1)
BUN SERPL-MCNC: 37 MG/DL (ref 7–20)
CALCIUM SERPL-MCNC: 8.4 MG/DL (ref 8.3–10.6)
CHLORIDE SERPL-SCNC: 95 MMOL/L (ref 99–110)
CO2 SERPL-SCNC: 32 MMOL/L (ref 21–32)
CREAT SERPL-MCNC: 0.9 MG/DL (ref 0.6–1.2)
EOSINOPHIL # BLD: 0 K/UL
EOSINOPHILS RELATIVE PERCENT: 0 % (ref 0–3)
ERYTHROCYTE [DISTWIDTH] IN BLOOD BY AUTOMATED COUNT: 15.4 % (ref 11.7–14.9)
GFR, ESTIMATED: 62 ML/MIN/1.73M2
GLUCOSE SERPL-MCNC: 110 MG/DL (ref 74–99)
HCT VFR BLD AUTO: 35.9 % (ref 37–47)
HGB BLD-MCNC: 10.1 G/DL (ref 12.5–16)
IMM GRANULOCYTES # BLD AUTO: 0.07 K/UL
IMM GRANULOCYTES NFR BLD: 1 %
INR PPP: 2.8
LYMPHOCYTES NFR BLD: 0.46 K/UL
LYMPHOCYTES RELATIVE PERCENT: 4 % (ref 24–44)
MAGNESIUM SERPL-MCNC: 2.7 MG/DL (ref 1.8–2.4)
MCH RBC QN AUTO: 26.7 PG (ref 27–31)
MCHC RBC AUTO-ENTMCNC: 28.1 G/DL (ref 32–36)
MCV RBC AUTO: 95 FL (ref 78–100)
MONOCYTES NFR BLD: 1.64 K/UL
MONOCYTES NFR BLD: 15 % (ref 0–5)
NEUTROPHILS NFR BLD: 80 % (ref 36–66)
NEUTS SEG NFR BLD: 8.73 K/UL
PHOSPHATE SERPL-MCNC: 4.1 MG/DL (ref 2.5–4.9)
PLATELET # BLD AUTO: 232 K/UL (ref 140–440)
PMV BLD AUTO: 10.6 FL (ref 7.5–11.1)
POTASSIUM SERPL-SCNC: 5.3 MMOL/L (ref 3.5–5.1)
PROT SERPL-MCNC: 5.2 G/DL (ref 6.4–8.2)
PROTHROMBIN TIME: 29.5 SEC (ref 11.7–14.5)
RBC # BLD AUTO: 3.78 M/UL (ref 4.2–5.4)
SODIUM SERPL-SCNC: 136 MMOL/L (ref 136–145)
WBC OTHER # BLD: 10.9 K/UL (ref 4–10.5)

## 2025-07-27 PROCEDURE — 85610 PROTHROMBIN TIME: CPT

## 2025-07-27 PROCEDURE — 85025 COMPLETE CBC W/AUTO DIFF WBC: CPT

## 2025-07-27 PROCEDURE — 83735 ASSAY OF MAGNESIUM: CPT

## 2025-07-27 PROCEDURE — 84100 ASSAY OF PHOSPHORUS: CPT

## 2025-07-27 PROCEDURE — 36415 COLL VENOUS BLD VENIPUNCTURE: CPT

## 2025-07-27 PROCEDURE — 6370000000 HC RX 637 (ALT 250 FOR IP): Performed by: INTERNAL MEDICINE

## 2025-07-27 PROCEDURE — 6360000002 HC RX W HCPCS: Performed by: INTERNAL MEDICINE

## 2025-07-27 PROCEDURE — 94761 N-INVAS EAR/PLS OXIMETRY MLT: CPT

## 2025-07-27 PROCEDURE — 2500000003 HC RX 250 WO HCPCS: Performed by: INTERNAL MEDICINE

## 2025-07-27 PROCEDURE — 94664 DEMO&/EVAL PT USE INHALER: CPT

## 2025-07-27 PROCEDURE — 2700000000 HC OXYGEN THERAPY PER DAY

## 2025-07-27 PROCEDURE — 94640 AIRWAY INHALATION TREATMENT: CPT

## 2025-07-27 PROCEDURE — 2140000000 HC CCU INTERMEDIATE R&B

## 2025-07-27 PROCEDURE — 94660 CPAP INITIATION&MGMT: CPT

## 2025-07-27 PROCEDURE — 80053 COMPREHEN METABOLIC PANEL: CPT

## 2025-07-27 PROCEDURE — 71045 X-RAY EXAM CHEST 1 VIEW: CPT

## 2025-07-27 RX ORDER — PREDNISONE 20 MG/1
40 TABLET ORAL DAILY
Status: DISCONTINUED | OUTPATIENT
Start: 2025-07-28 | End: 2025-07-28

## 2025-07-27 RX ORDER — DILTIAZEM HCL 60 MG
60 TABLET ORAL EVERY 8 HOURS SCHEDULED
Status: DISCONTINUED | OUTPATIENT
Start: 2025-07-27 | End: 2025-07-30 | Stop reason: HOSPADM

## 2025-07-27 RX ORDER — WARFARIN SODIUM 3 MG/1
3 TABLET ORAL
Status: COMPLETED | OUTPATIENT
Start: 2025-07-27 | End: 2025-07-27

## 2025-07-27 RX ORDER — SENNOSIDES 8.6 MG/1
1 TABLET ORAL NIGHTLY
Status: DISCONTINUED | OUTPATIENT
Start: 2025-07-27 | End: 2025-07-27

## 2025-07-27 RX ORDER — SENNOSIDES 8.6 MG/1
1 TABLET ORAL DAILY
Status: DISCONTINUED | OUTPATIENT
Start: 2025-07-27 | End: 2025-07-30 | Stop reason: HOSPADM

## 2025-07-27 RX ORDER — POLYETHYLENE GLYCOL 3350 17 G/17G
17 POWDER, FOR SOLUTION ORAL DAILY PRN
Status: DISCONTINUED | OUTPATIENT
Start: 2025-07-27 | End: 2025-07-30 | Stop reason: HOSPADM

## 2025-07-27 RX ORDER — FUROSEMIDE 40 MG/1
40 TABLET ORAL DAILY
Status: DISCONTINUED | OUTPATIENT
Start: 2025-07-28 | End: 2025-07-30 | Stop reason: HOSPADM

## 2025-07-27 RX ORDER — POLYETHYLENE GLYCOL 3350 17 G/17G
17 POWDER, FOR SOLUTION ORAL DAILY
Status: DISCONTINUED | OUTPATIENT
Start: 2025-07-27 | End: 2025-07-30 | Stop reason: HOSPADM

## 2025-07-27 RX ADMIN — IPRATROPIUM BROMIDE AND ALBUTEROL SULFATE 1 DOSE: .5; 2.5 SOLUTION RESPIRATORY (INHALATION) at 16:39

## 2025-07-27 RX ADMIN — FUROSEMIDE 40 MG: 10 INJECTION, SOLUTION INTRAMUSCULAR; INTRAVENOUS at 08:47

## 2025-07-27 RX ADMIN — IPRATROPIUM BROMIDE AND ALBUTEROL SULFATE 1 DOSE: .5; 2.5 SOLUTION RESPIRATORY (INHALATION) at 20:01

## 2025-07-27 RX ADMIN — SENNOSIDES 8.6 MG: 8.6 TABLET, FILM COATED ORAL at 13:04

## 2025-07-27 RX ADMIN — HYDROXYZINE HYDROCHLORIDE 10 MG: 10 TABLET, FILM COATED ORAL at 01:43

## 2025-07-27 RX ADMIN — BUDESONIDE AND FORMOTEROL FUMARATE DIHYDRATE 2 PUFF: 160; 4.5 AEROSOL RESPIRATORY (INHALATION) at 20:02

## 2025-07-27 RX ADMIN — SODIUM CHLORIDE, PRESERVATIVE FREE 10 ML: 5 INJECTION INTRAVENOUS at 08:48

## 2025-07-27 RX ADMIN — POTASSIUM CHLORIDE 20 MEQ: 1500 TABLET, EXTENDED RELEASE ORAL at 08:47

## 2025-07-27 RX ADMIN — HYDROXYZINE HYDROCHLORIDE 10 MG: 10 TABLET, FILM COATED ORAL at 17:45

## 2025-07-27 RX ADMIN — DICYCLOMINE HYDROCHLORIDE 10 MG: 20 TABLET ORAL at 21:48

## 2025-07-27 RX ADMIN — BUDESONIDE AND FORMOTEROL FUMARATE DIHYDRATE 2 PUFF: 160; 4.5 AEROSOL RESPIRATORY (INHALATION) at 08:26

## 2025-07-27 RX ADMIN — DILTIAZEM HYDROCHLORIDE 60 MG: 60 TABLET ORAL at 13:04

## 2025-07-27 RX ADMIN — WARFARIN SODIUM 3 MG: 3 TABLET ORAL at 16:47

## 2025-07-27 RX ADMIN — DILTIAZEM HYDROCHLORIDE 60 MG: 60 TABLET ORAL at 21:48

## 2025-07-27 RX ADMIN — IPRATROPIUM BROMIDE AND ALBUTEROL SULFATE 1 DOSE: .5; 2.5 SOLUTION RESPIRATORY (INHALATION) at 11:42

## 2025-07-27 RX ADMIN — SODIUM CHLORIDE, PRESERVATIVE FREE 10 ML: 5 INJECTION INTRAVENOUS at 21:52

## 2025-07-27 RX ADMIN — POLYETHYLENE GLYCOL (3350) 17 G: 17 POWDER, FOR SOLUTION ORAL at 12:22

## 2025-07-27 RX ADMIN — GABAPENTIN 400 MG: 400 CAPSULE ORAL at 16:47

## 2025-07-27 RX ADMIN — HYDROXYZINE HYDROCHLORIDE 25 MG: 25 TABLET ORAL at 21:48

## 2025-07-27 RX ADMIN — GUAIFENESIN 600 MG: 600 TABLET ORAL at 08:47

## 2025-07-27 RX ADMIN — LEVOTHYROXINE SODIUM 75 MCG: 0.07 TABLET ORAL at 08:47

## 2025-07-27 RX ADMIN — ROFLUMILAST 500 MCG: 500 TABLET ORAL at 08:47

## 2025-07-27 RX ADMIN — IPRATROPIUM BROMIDE AND ALBUTEROL SULFATE 1 DOSE: .5; 2.5 SOLUTION RESPIRATORY (INHALATION) at 08:25

## 2025-07-27 RX ADMIN — GUAIFENESIN 600 MG: 600 TABLET ORAL at 21:47

## 2025-07-27 RX ADMIN — Medication 400 MG: at 08:47

## 2025-07-27 RX ADMIN — PANTOPRAZOLE SODIUM 40 MG: 40 TABLET, DELAYED RELEASE ORAL at 08:47

## 2025-07-27 RX ADMIN — MONTELUKAST 10 MG: 10 TABLET, FILM COATED ORAL at 21:47

## 2025-07-27 RX ADMIN — ROSUVASTATIN CALCIUM 20 MG: 20 TABLET, FILM COATED ORAL at 21:48

## 2025-07-27 RX ADMIN — PREDNISONE 40 MG: 20 TABLET ORAL at 08:47

## 2025-07-27 RX ADMIN — DICYCLOMINE HYDROCHLORIDE 10 MG: 20 TABLET ORAL at 08:47

## 2025-07-27 NOTE — PROGRESS NOTES
Pulmonary and Critical Care  Progress Note    Subjective:   The patient is comfortable in bed.On 4 L N/C.  Shortness of breath has improved  Chest pain none.  Addressing respiratory complaints Patient is negative for  hemoptysis and cyanosis  CONSTITUTIONAL:  negative for fevers and chills      Past Medical History:     has a past medical history of Age-related osteoporosis without current pathological fracture, Anticoagulant long-term use, Arthritis, Cancer ([facility]), Chronic hypoxemic respiratory failure ([facility]), Community acquired pneumonia of right upper lobe of lung, COPD (chronic obstructive pulmonary disease) ([facility]), Coronary atherosclerosis, COVID-19 virus detected, DVT (deep venous thrombosis) ([facility]), Former smoker, Gastroesophageal reflux disease, Hiatal hernia, Lung infiltrate on CT, On home oxygen therapy, Osteopenia, Phlebitis, Pulmonary emphysema ([facility]), Pulmonary nodule, S/P left heart catheterization by percutaneous approach, Sepsis due to pneumonia ([facility]), Shortness of breath, Shortness of breath, Shortness of breath, and Wears glasses.   has a past surgical history that includes Appendectomy; Tonsillectomy; Tubal ligation; Vein Surgery; bronchoscopy (1995); Colonoscopy; Endoscopy, colon, diagnostic; Colonoscopy (N/A, 12/14/2023); Upper gastrointestinal endoscopy (N/A, 12/14/2023); and Upper gastrointestinal endoscopy (N/A, 6/5/2025).   reports that she quit smoking about 33 years ago. Her smoking use included cigarettes. She started smoking about 60 years ago. She has a 40.3 pack-year smoking history. She has never used smokeless tobacco. She reports that she does not drink alcohol and does not use drugs.  Family history:  family history includes COPD in her father; Cancer in her father and sister; Heart Disease in her father, mother, and sister.    Allergies   Allergen Reactions    Codeine Swelling    Darvon [Propoxyphene Hcl] Swelling    Lamisil [Terbinafine Hcl]     Lisinopril Other (See Comments)     Pt  failure (HCC)    Lung infiltrate on CT    Acquired hypothyroidism    Pure hypercholesterolemia    History of DVT of lower extremity    History of pulmonary embolism    Gastroesophageal reflux disease    COVID-19 virus detected    Shortness of breath    Former smoker    Pneumonia due to influenza A virus    Mild pulmonary hypertension (HCC)    Acute exacerbation of chronic obstructive pulmonary disease (COPD) (HCC)    Community acquired pneumonia of right upper lobe of lung    COPD exacerbation (HCC)    Multifocal pneumonia    Chronic phlebitis of superficial vein of right lower extremity    Acute on chronic respiratory failure with hypercapnia (HCC)    Moderate mixed hyperlipidemia not requiring statin therapy    Atrial fibrillation (HCC)    Pulmonary emphysema (HCC)    Age-related osteoporosis without current pathological fracture    History of stroke    Acute anemia    Traumatic hematoma of hip, right, subsequent encounter    Non-pressure chronic ulcer of left lower leg, limited to breakdown of skin (HCC)    Laceration of right forearm    Acute on chronic respiratory failure with hypoxia and hypercapnia (HCC)       Plan:   1. Overall the patient has improved  2. Discussed with the family.  3. Wean FiO2.    Victorino Land MD MD  7/27/2025  12:33 PM

## 2025-07-27 NOTE — PLAN OF CARE
Problem: Discharge Planning  Goal: Discharge to home or other facility with appropriate resources  7/27/2025 1004 by Nicholas Damon RN  Outcome: Progressing  7/27/2025 0342 by Magnolia Wu RN  Outcome: Progressing     Problem: Pain  Goal: Verbalizes/displays adequate comfort level or baseline comfort level  7/27/2025 1004 by Nicholas Damon RN  Outcome: Progressing  7/27/2025 0342 by Magnolia Wu RN  Outcome: Progressing     Problem: Safety - Adult  Goal: Free from fall injury  7/27/2025 1004 by Nicholas Damon RN  Outcome: Progressing  7/27/2025 0342 by Magnolia Wu RN  Outcome: Progressing     Problem: Skin/Tissue Integrity  Goal: Skin integrity remains intact  Description: 1.  Monitor for areas of redness and/or skin breakdown  2.  Assess vascular access sites hourly  3.  Every 4-6 hours minimum:  Change oxygen saturation probe site  4.  Every 4-6 hours:  If on nasal continuous positive airway pressure, respiratory therapy assess nares and determine need for appliance change or resting period  7/27/2025 1004 by Nicholas Damon RN  Outcome: Progressing  7/27/2025 0342 by Magnolia Wu RN  Outcome: Progressing     Problem: ABCDS Injury Assessment  Goal: Absence of physical injury  7/27/2025 1004 by Nicholas Damon RN  Outcome: Progressing  7/27/2025 0342 by Magnolia Wu RN  Outcome: Progressing

## 2025-07-27 NOTE — CONSULTS
PHARMACY ANTICOAGULATION MONITORING SERVICE    Janessa Davidson is a 80 y.o. female on warfarin therapy for History of DVT/PE and Atrial fibrillation. Pharmacy consulted by Dr. Rincon for monitoring and adjustment of treatment.    Indication for anticoagulation: History of DVT/PE and Atrial fibrillation  INR goal: 2.0 - 3.0   Warfarin dose prior to admission: 3 mg daily, except 6 mg M/F (TWD 27 mg)    Pertinent Laboratory Values   Recent Labs     07/25/25  0427 07/26/25  0507 07/27/25  0401   INR 2.6 2.3 2.8   WBC 7.8 10.0 10.9*   HGB 9.1* 10.4* 10.1*   HCT 31.3* 37.1 35.9*    302 232       Date INR Result Warfarin Dose Given   7/24 2.4 3 mg   7/25 2.6 3 mg   7/26 2.3 6 mg   7/27 2.8 3 mg (ordered)     Assessment/Plan:  Drug Interactions:   Inpt meds: prednisone (increase INR)  Home meds: Lasix (decrease), Synthroid (increase)  INR of 2.8 remains therapeutic for goal.  Higher 6mg dose given last night   H&H appears stable..  Noted CHF. Recent admission with acute anemia  Give warfarin 3mg dose tonight and follow INR trends tomorrow.  Pharmacy will continue to monitor and adjust warfarin therapy as indicated    Thank you for the consult,  Trey Cody [facility]  7/27/2025 6:46 AM

## 2025-07-28 ENCOUNTER — APPOINTMENT (OUTPATIENT)
Dept: GENERAL RADIOLOGY | Age: 80
DRG: 189 | End: 2025-07-28
Payer: MEDICARE

## 2025-07-28 LAB
ALBUMIN SERPL-MCNC: 3.5 G/DL (ref 3.4–5)
ALBUMIN SERPL-MCNC: 3.8 G/DL (ref 3.4–5)
ALBUMIN/GLOB SERPL: 1.9 {RATIO} (ref 1.1–2.2)
ALBUMIN/GLOB SERPL: 1.9 {RATIO} (ref 1.1–2.2)
ALP SERPL-CCNC: 72 U/L (ref 40–129)
ALP SERPL-CCNC: 75 U/L (ref 40–129)
ALT SERPL-CCNC: 40 U/L (ref 10–40)
ALT SERPL-CCNC: 41 U/L (ref 10–40)
ANION GAP SERPL CALCULATED.3IONS-SCNC: 4 MMOL/L (ref 9–17)
ANION GAP SERPL CALCULATED.3IONS-SCNC: 5 MMOL/L (ref 9–17)
ARTERIAL PATENCY WRIST A: NO
AST SERPL-CCNC: 25 U/L (ref 15–37)
AST SERPL-CCNC: 29 U/L (ref 15–37)
BASOPHILS # BLD: 0 K/UL
BASOPHILS NFR BLD: 0 % (ref 0–1)
BDY SITE: ABNORMAL
BILIRUB SERPL-MCNC: 0.3 MG/DL (ref 0–1)
BILIRUB SERPL-MCNC: 0.3 MG/DL (ref 0–1)
BNP SERPL-MCNC: ABNORMAL PG/ML (ref 0–450)
BODY TEMPERATURE: 37
BUN SERPL-MCNC: 29 MG/DL (ref 7–20)
BUN SERPL-MCNC: 30 MG/DL (ref 7–20)
CALCIUM SERPL-MCNC: 10.2 MG/DL (ref 8.3–10.6)
CALCIUM SERPL-MCNC: 9.6 MG/DL (ref 8.3–10.6)
CHLORIDE SERPL-SCNC: 95 MMOL/L (ref 99–110)
CHLORIDE SERPL-SCNC: 96 MMOL/L (ref 99–110)
CO2 SERPL-SCNC: 37 MMOL/L (ref 21–32)
CO2 SERPL-SCNC: 40 MMOL/L (ref 21–32)
COHGB MFR BLD: 0.1 % (ref 0.5–1.5)
COHGB MFR BLD: 0.3 % (ref 0.5–1.5)
COHGB MFR BLD: 0.4 % (ref 0.5–1.5)
CREAT SERPL-MCNC: 0.7 MG/DL (ref 0.6–1.2)
CREAT SERPL-MCNC: 0.7 MG/DL (ref 0.6–1.2)
EKG ATRIAL RATE: 112 BPM
EKG DIAGNOSIS: NORMAL
EKG Q-T INTERVAL: 338 MS
EKG QRS DURATION: 146 MS
EKG QTC CALCULATION (BAZETT): 473 MS
EKG R AXIS: -53 DEGREES
EKG T AXIS: 75 DEGREES
EKG VENTRICULAR RATE: 118 BPM
EOSINOPHIL # BLD: 0 K/UL
EOSINOPHILS RELATIVE PERCENT: 0 % (ref 0–3)
ERYTHROCYTE [DISTWIDTH] IN BLOOD BY AUTOMATED COUNT: 15.4 % (ref 11.7–14.9)
FIO2 ON VENT: 85 %
GAS FLOW.O2 O2 DELIVERY SYS: ABNORMAL L/MIN
GFR, ESTIMATED: 78 ML/MIN/1.73M2
GFR, ESTIMATED: 84 ML/MIN/1.73M2
GLUCOSE SERPL-MCNC: 104 MG/DL (ref 74–99)
GLUCOSE SERPL-MCNC: 92 MG/DL (ref 74–99)
HCO3 ARTERIAL: 41.5 MMOL/L (ref 21–28)
HCO3 VENOUS: 36 MMOL/L (ref 22–29)
HCO3 VENOUS: 50.7 MMOL/L (ref 22–29)
HCT VFR BLD AUTO: 38.7 % (ref 37–47)
HGB BLD-MCNC: 10.7 G/DL (ref 12.5–16)
IMM GRANULOCYTES # BLD AUTO: 0.03 K/UL
IMM GRANULOCYTES NFR BLD: 0 %
INR PPP: 3.6
LACTATE BLDV-SCNC: 0.8 MMOL/L (ref 0.4–2)
LACTATE BLDV-SCNC: 1.1 MMOL/L (ref 0.4–2)
LACTATE BLDV-SCNC: 1.9 MMOL/L (ref 0.4–2)
LYMPHOCYTES NFR BLD: 0.56 K/UL
LYMPHOCYTES RELATIVE PERCENT: 7 % (ref 24–44)
MCH RBC QN AUTO: 26.4 PG (ref 27–31)
MCHC RBC AUTO-ENTMCNC: 27.6 G/DL (ref 32–36)
MCV RBC AUTO: 95.3 FL (ref 78–100)
METHEMOGLOBIN: 0.5 % (ref 0.5–1.5)
METHEMOGLOBIN: 0.5 % (ref 0.5–1.5)
METHEMOGLOBIN: 0.6 % (ref 0.5–1.5)
MONOCYTES NFR BLD: 1.34 K/UL
MONOCYTES NFR BLD: 16 % (ref 0–5)
NEUTROPHILS NFR BLD: 76 % (ref 36–66)
NEUTS SEG NFR BLD: 6.23 K/UL
OXYHGB MFR BLD: 66.6 %
OXYHGB MFR BLD: 93.4 %
OXYHGB MFR BLD: 97.4 %
PCO2 ARTERIAL: 101.2 MMHG (ref 32–45)
PCO2 VENOUS: 131.9 MM HG (ref 38–54)
PCO2 VENOUS: 66.7 MM HG (ref 38–54)
PH ARTERIAL: 7.23 (ref 7.35–7.45)
PH VENOUS: 7.2 (ref 7.32–7.43)
PH VENOUS: 7.35 (ref 7.32–7.43)
PLATELET # BLD AUTO: 170 K/UL (ref 140–440)
PMV BLD AUTO: 11.3 FL (ref 7.5–11.1)
PO2 ARTERIAL: 140.1 MMHG (ref 83–108)
PO2 VENOUS: 40.8 MM HG (ref 23–48)
PO2 VENOUS: 76.2 MM HG (ref 23–48)
POSITIVE BASE EXCESS, ART: 10.3 MMOL/L (ref 0–3)
POSITIVE BASE EXCESS, VEN: 17.1 MMOL/L (ref 0–3)
POSITIVE BASE EXCESS, VEN: 8.2 MMOL/L (ref 0–3)
POTASSIUM SERPL-SCNC: 5.2 MMOL/L (ref 3.5–5.1)
POTASSIUM SERPL-SCNC: 5.4 MMOL/L (ref 3.5–5.1)
PROCALCITONIN SERPL-MCNC: 0.1 NG/ML
PROT SERPL-MCNC: 5.4 G/DL (ref 6.4–8.2)
PROT SERPL-MCNC: 5.8 G/DL (ref 6.4–8.2)
PROTHROMBIN TIME: 36.1 SEC (ref 11.7–14.5)
RBC # BLD AUTO: 4.06 M/UL (ref 4.2–5.4)
SODIUM SERPL-SCNC: 138 MMOL/L (ref 136–145)
SODIUM SERPL-SCNC: 139 MMOL/L (ref 136–145)
TEXT FOR RESPIRATORY: ABNORMAL
TROPONIN I SERPL HS-MCNC: 37 NG/L (ref 0–14)
TROPONIN I SERPL HS-MCNC: 41 NG/L (ref 0–14)
WBC OTHER # BLD: 8.2 K/UL (ref 4–10.5)

## 2025-07-28 PROCEDURE — 80053 COMPREHEN METABOLIC PANEL: CPT

## 2025-07-28 PROCEDURE — 6370000000 HC RX 637 (ALT 250 FOR IP): Performed by: INTERNAL MEDICINE

## 2025-07-28 PROCEDURE — 84484 ASSAY OF TROPONIN QUANT: CPT

## 2025-07-28 PROCEDURE — 85025 COMPLETE CBC W/AUTO DIFF WBC: CPT

## 2025-07-28 PROCEDURE — 94640 AIRWAY INHALATION TREATMENT: CPT

## 2025-07-28 PROCEDURE — 82805 BLOOD GASES W/O2 SATURATION: CPT

## 2025-07-28 PROCEDURE — 94761 N-INVAS EAR/PLS OXIMETRY MLT: CPT

## 2025-07-28 PROCEDURE — 2500000003 HC RX 250 WO HCPCS: Performed by: INTERNAL MEDICINE

## 2025-07-28 PROCEDURE — 2580000003 HC RX 258: Performed by: INTERNAL MEDICINE

## 2025-07-28 PROCEDURE — 84145 PROCALCITONIN (PCT): CPT

## 2025-07-28 PROCEDURE — 36415 COLL VENOUS BLD VENIPUNCTURE: CPT

## 2025-07-28 PROCEDURE — 94660 CPAP INITIATION&MGMT: CPT

## 2025-07-28 PROCEDURE — 74018 RADEX ABDOMEN 1 VIEW: CPT

## 2025-07-28 PROCEDURE — 6360000002 HC RX W HCPCS: Performed by: INTERNAL MEDICINE

## 2025-07-28 PROCEDURE — 71045 X-RAY EXAM CHEST 1 VIEW: CPT

## 2025-07-28 PROCEDURE — 85610 PROTHROMBIN TIME: CPT

## 2025-07-28 PROCEDURE — 2140000000 HC CCU INTERMEDIATE R&B

## 2025-07-28 PROCEDURE — 36600 WITHDRAWAL OF ARTERIAL BLOOD: CPT

## 2025-07-28 PROCEDURE — 83605 ASSAY OF LACTIC ACID: CPT

## 2025-07-28 PROCEDURE — 2700000000 HC OXYGEN THERAPY PER DAY

## 2025-07-28 PROCEDURE — 83880 ASSAY OF NATRIURETIC PEPTIDE: CPT

## 2025-07-28 RX ORDER — PANTOPRAZOLE SODIUM 40 MG/10ML
40 INJECTION, POWDER, LYOPHILIZED, FOR SOLUTION INTRAVENOUS DAILY
Status: DISCONTINUED | OUTPATIENT
Start: 2025-07-28 | End: 2025-07-30 | Stop reason: HOSPADM

## 2025-07-28 RX ORDER — FUROSEMIDE 10 MG/ML
40 INJECTION INTRAMUSCULAR; INTRAVENOUS ONCE
Status: DISCONTINUED | OUTPATIENT
Start: 2025-07-28 | End: 2025-07-28

## 2025-07-28 RX ORDER — MAGNESIUM SULFATE IN WATER 40 MG/ML
2000 INJECTION, SOLUTION INTRAVENOUS PRN
Status: DISCONTINUED | OUTPATIENT
Start: 2025-07-28 | End: 2025-07-30 | Stop reason: HOSPADM

## 2025-07-28 RX ORDER — FUROSEMIDE 10 MG/ML
40 INJECTION INTRAMUSCULAR; INTRAVENOUS DAILY
Status: DISCONTINUED | OUTPATIENT
Start: 2025-07-28 | End: 2025-07-28

## 2025-07-28 RX ADMIN — DILTIAZEM HYDROCHLORIDE 60 MG: 60 TABLET ORAL at 06:08

## 2025-07-28 RX ADMIN — ONDANSETRON 4 MG: 2 INJECTION INTRAMUSCULAR; INTRAVENOUS at 21:41

## 2025-07-28 RX ADMIN — AZITHROMYCIN MONOHYDRATE 500 MG: 500 INJECTION, POWDER, LYOPHILIZED, FOR SOLUTION INTRAVENOUS at 12:22

## 2025-07-28 RX ADMIN — SODIUM CHLORIDE, PRESERVATIVE FREE 10 ML: 5 INJECTION INTRAVENOUS at 09:15

## 2025-07-28 RX ADMIN — FUROSEMIDE 5 MG/HR: 10 INJECTION, SOLUTION INTRAMUSCULAR; INTRAVENOUS at 15:43

## 2025-07-28 RX ADMIN — BUDESONIDE AND FORMOTEROL FUMARATE DIHYDRATE 2 PUFF: 160; 4.5 AEROSOL RESPIRATORY (INHALATION) at 19:52

## 2025-07-28 RX ADMIN — SODIUM CHLORIDE: 0.9 INJECTION, SOLUTION INTRAVENOUS at 12:11

## 2025-07-28 RX ADMIN — IPRATROPIUM BROMIDE AND ALBUTEROL SULFATE 1 DOSE: .5; 2.5 SOLUTION RESPIRATORY (INHALATION) at 07:56

## 2025-07-28 RX ADMIN — DILTIAZEM HYDROCHLORIDE 60 MG: 60 TABLET ORAL at 15:19

## 2025-07-28 RX ADMIN — IPRATROPIUM BROMIDE AND ALBUTEROL SULFATE 1 DOSE: .5; 2.5 SOLUTION RESPIRATORY (INHALATION) at 19:52

## 2025-07-28 RX ADMIN — HYDROXYZINE HYDROCHLORIDE 10 MG: 10 TABLET, FILM COATED ORAL at 15:19

## 2025-07-28 RX ADMIN — DILTIAZEM HYDROCHLORIDE 60 MG: 60 TABLET ORAL at 23:01

## 2025-07-28 RX ADMIN — METHYLPREDNISOLONE SODIUM SUCCINATE 40 MG: 40 INJECTION, POWDER, LYOPHILIZED, FOR SOLUTION INTRAMUSCULAR; INTRAVENOUS at 21:41

## 2025-07-28 RX ADMIN — FUROSEMIDE 40 MG: 10 INJECTION, SOLUTION INTRAMUSCULAR; INTRAVENOUS at 13:25

## 2025-07-28 RX ADMIN — HYDROXYZINE HYDROCHLORIDE 10 MG: 10 TABLET, FILM COATED ORAL at 06:07

## 2025-07-28 RX ADMIN — PANTOPRAZOLE SODIUM 40 MG: 40 INJECTION, POWDER, FOR SOLUTION INTRAVENOUS at 13:24

## 2025-07-28 RX ADMIN — METHYLPREDNISOLONE SODIUM SUCCINATE 40 MG: 40 INJECTION, POWDER, LYOPHILIZED, FOR SOLUTION INTRAMUSCULAR; INTRAVENOUS at 12:11

## 2025-07-28 RX ADMIN — ACETAMINOPHEN 650 MG: 325 TABLET ORAL at 06:07

## 2025-07-28 RX ADMIN — IPRATROPIUM BROMIDE AND ALBUTEROL SULFATE 1 DOSE: .5; 2.5 SOLUTION RESPIRATORY (INHALATION) at 11:53

## 2025-07-28 RX ADMIN — IPRATROPIUM BROMIDE AND ALBUTEROL SULFATE 1 DOSE: .5; 2.5 SOLUTION RESPIRATORY (INHALATION) at 16:15

## 2025-07-28 RX ADMIN — BUDESONIDE AND FORMOTEROL FUMARATE DIHYDRATE 2 PUFF: 160; 4.5 AEROSOL RESPIRATORY (INHALATION) at 07:55

## 2025-07-28 RX ADMIN — HYDROXYZINE HYDROCHLORIDE 25 MG: 25 TABLET ORAL at 22:53

## 2025-07-28 ASSESSMENT — PAIN DESCRIPTION - DESCRIPTORS
DESCRIPTORS: ACHING
DESCRIPTORS: DULL

## 2025-07-28 ASSESSMENT — PAIN DESCRIPTION - PAIN TYPE: TYPE: CHRONIC PAIN

## 2025-07-28 ASSESSMENT — PAIN DESCRIPTION - ORIENTATION: ORIENTATION: MID

## 2025-07-28 ASSESSMENT — PAIN - FUNCTIONAL ASSESSMENT: PAIN_FUNCTIONAL_ASSESSMENT: PREVENTS OR INTERFERES SOME ACTIVE ACTIVITIES AND ADLS

## 2025-07-28 ASSESSMENT — PAIN SCALES - GENERAL
PAINLEVEL_OUTOF10: 5
PAINLEVEL_OUTOF10: 4

## 2025-07-28 ASSESSMENT — PAIN DESCRIPTION - LOCATION
LOCATION: CHEST
LOCATION: BACK

## 2025-07-28 ASSESSMENT — PAIN DESCRIPTION - FREQUENCY: FREQUENCY: INTERMITTENT

## 2025-07-28 ASSESSMENT — PAIN DESCRIPTION - ONSET: ONSET: SUDDEN

## 2025-07-28 NOTE — PROGRESS NOTES
Critical ABG results:    PS sent to Dr. Harrison    This RT notified RT [place] who is providing care for this patient.    RN notified.     Latest Reference Range & Units 07/28/25 09:26   pH, Arterial 7.350 - 7.450  7.231 (L)   pCO2, Arterial 32.0 - 45.0 mmHg 101.2 (H)   pO2, Arterial 83.0 - 108.0 mmHg 140.1 (H)   HCO3, Arterial 21 - 28 mmol/L 41.5 (H)   Positive Base Excess, Art 0 - 3 mmol/L 10.3 (H)   (L): Data is abnormally low  (H): Data is abnormally high

## 2025-07-28 NOTE — PROGRESS NOTES
Pulmonary and Critical Care  Progress Note    Subjective:   The patient was less responsive this am.VBGs CO2 retention.  Shortness of breath worse.  Chest pain none.  Addressing respiratory complaints Patient is negative for  hemoptysis and cyanosis  CONSTITUTIONAL:  negative for fevers and chills      Past Medical History:     has a past medical history of Age-related osteoporosis without current pathological fracture, Anticoagulant long-term use, Arthritis, Cancer ([facility]), Chronic hypoxemic respiratory failure ([facility]), Community acquired pneumonia of right upper lobe of lung, COPD (chronic obstructive pulmonary disease) ([facility]), Coronary atherosclerosis, COVID-19 virus detected, DVT (deep venous thrombosis) ([facility]), Former smoker, Gastroesophageal reflux disease, Hiatal hernia, Lung infiltrate on CT, On home oxygen therapy, Osteopenia, Phlebitis, Pulmonary emphysema ([facility]), Pulmonary nodule, S/P left heart catheterization by percutaneous approach, Sepsis due to pneumonia ([facility]), Shortness of breath, Shortness of breath, Shortness of breath, and Wears glasses.   has a past surgical history that includes Appendectomy; Tonsillectomy; Tubal ligation; Vein Surgery; bronchoscopy (1995); Colonoscopy; Endoscopy, colon, diagnostic; Colonoscopy (N/A, 12/14/2023); Upper gastrointestinal endoscopy (N/A, 12/14/2023); and Upper gastrointestinal endoscopy (N/A, 6/5/2025).   reports that she quit smoking about 33 years ago. Her smoking use included cigarettes. She started smoking about 60 years ago. She has a 40.3 pack-year smoking history. She has never used smokeless tobacco. She reports that she does not drink alcohol and does not use drugs.  Family history:  family history includes COPD in her father; Cancer in her father and sister; Heart Disease in her father, mother, and sister.    Allergies   Allergen Reactions    Codeine Swelling    Darvon [Propoxyphene Hcl] Swelling    Lamisil [Terbinafine Hcl]     Lisinopril Other (See Comments)

## 2025-07-28 NOTE — CONSULTS
PHARMACY ANTICOAGULATION MONITORING SERVICE    Janessa Davidson is a 80 y.o. female on warfarin therapy for History of DVT/PE and Atrial fibrillation. Pharmacy consulted by Dr. Rincon for monitoring and adjustment of treatment.    Indication for anticoagulation: History of DVT/PE and Atrial fibrillation  INR goal: 2.0 - 3.0   Warfarin dose prior to admission: 3 mg daily, except 6 mg M/F (TWD 27 mg)    Pertinent Laboratory Values   Recent Labs     07/26/25  0507 07/27/25  0401 07/28/25  0531   INR 2.3 2.8 3.6   WBC 10.0 10.9* 8.2   HGB 10.4* 10.1* 10.7*   HCT 37.1 35.9* 38.7    232 170       Date INR Result Warfarin Dose Given   7/24 2.4 3 mg   7/25 2.6 3 mg   7/26 2.3 6 mg   7/27 2.8 3 mg   7/28 3.6 HOLD     Assessment/Plan:  Drug Interactions:   Inpt meds: prednisone (increase INR)  Home meds: Lasix (decrease), Synthroid (increase)  INR jumped up to 3.6, supra-therapeutic.  H&H appears stable..  Noted CHF. Recent admission with acute anemia  Hold warfarin tonight and follow INR trends tomorrow.  Pharmacy will continue to monitor and adjust warfarin therapy as indicated    Thank you for the consult,  Trey Cody [facility]  7/28/2025 10:58 AM

## 2025-07-28 NOTE — PROGRESS NOTES
07/28/25 0930   Encounter Summary   Encounter Overview/Reason Crisis   Encounter Code  Assessment by  services   Service Provided For Patient and family together   Referral/Consult From Nurse   Support System Spouse;Children   Last Encounter  07/28/25  (Rapid Response. Offered prayer and family support.  was outside the room and came in with scripturs and prayer. No further needs at this time patient recovered quickly)   Complexity of Encounter High   Begin Time 0920   End Time  0933   Total Time Calculated 13 min   Crisis   Type Rapid Response   Grief, Loss, and Adjustments   Type Other (Comment)  (family support)   Assessment/Intervention/Outcome   Assessment Tearful;Shock   Intervention Active listening;Empowerment;Prayer (assurance of)/[place];Read/Provided Scripture;Other (Comment)  ( visit)   Outcome Restored Hope;Encouraged;Engaged in conversation;Expressed Gratitude   Plan and Referrals   Plan/Referrals No future visits requested

## 2025-07-28 NOTE — PROGRESS NOTES
Rapid response called patient suddenly became hypoxic in the 50's after transferring from the [facility] and getting back to bed. Patient became less alert but no LOC. BiPAP placed on patient and sats immediately returned to 90's and patient became more alert. ABG, Labs, CXR ordered. STAT CT chest/abdomen on hold per Dr. Harrison until patient is able to come off the BIPAP.

## 2025-07-28 NOTE — PLAN OF CARE
Problem: Pain  Goal: Verbalizes/displays adequate comfort level or baseline comfort level  7/28/2025 1202 by Geovanna Sewell RN  Outcome: Progressing  7/28/2025 0022 by Suzan Ramirez RN  Outcome: Progressing     Problem: Safety - Adult  Goal: Free from fall injury  7/28/2025 1202 by Geovanna Sewell RN  Outcome: Progressing  7/28/2025 0022 by Suzan Ramirez RN  Outcome: Progressing     Problem: Skin/Tissue Integrity  Goal: Skin integrity remains intact  Description: 1.  Monitor for areas of redness and/or skin breakdown  2.  Assess vascular access sites hourly  3.  Every 4-6 hours minimum:  Change oxygen saturation probe site  4.  Every 4-6 hours:  If on nasal continuous positive airway pressure, respiratory therapy assess nares and determine need for appliance change or resting period  7/28/2025 1202 by Geovanna Sewell RN  Outcome: Progressing  7/28/2025 0022 by Suzan Ramirez RN  Outcome: Progressing     Problem: ABCDS Injury Assessment  Goal: Absence of physical injury  7/28/2025 1202 by Geovanna Sewell RN  Outcome: Progressing  7/28/2025 0022 by Suzan Ramirez RN  Outcome: Progressing

## 2025-07-28 NOTE — PROGRESS NOTES
Hospitalist    Currently on BiPAP, noted ABG results, will obtain a venous gas.    CT abdomen ordered.

## 2025-07-28 NOTE — PROGRESS NOTES
Start lasix drip    Monitoring UOP will be helpful in decision-making    Full note to follow    Thank you

## 2025-07-28 NOTE — CARE COORDINATION
Hospice consult ordered. Met with pt and family and provided them with a hospice list.  Daughter states that they will review the list and her sister will call this CM with their Hospice choice.  TE

## 2025-07-28 NOTE — ACP (ADVANCE CARE PLANNING)
Hospitalist    Spoke to  at the bedside.    Confirmed no intubation (already ordered), no CPR (already ordered) and no defibrillation (currently ordered as OK for defibrillation, will adjut).      Addendum at 12:25 pm -- discussed with family about possibly meeting with hospice to get some information, and they are interested.  Will consult hospice.

## 2025-07-28 NOTE — PROGRESS NOTES
V2.0    [facility] Progress Note      Name:  Janessa Davidson /Age/Sex: 1945  (80 y.o. female)   MRN & CSN:  [id number] & [id number] Encounter Date/Time: 2025 9:27 PM EDT   Location:  [facility]/3114-A PCP: Jes Sharma DO     Attending:Pato Harrison MD       Hospital Day: 4    Assessment and Recommendations   Janessa Davidson is a 80 y.o. female      - Again when I saw her she was requiring BiPAP.  Continue BiPAP.  - Oral Cardizem restarted, it is a home medication.  She has been off of Cardizem drip.    Acute on chronic hypoxic and hypercapnic respiratory failure  - Very hypercapnic upon admission  - Treated with BiPAP the night of admission then weaned off the following day    COPD exacerbation  -DuoNebs  -Steroids  -Pulmonary consult appreciated    Atrial fibrillation with RVR  -Cardizem drip started the night of admission    Secondary hypercoagulable state in a patient with atrial fibrillation  -Continue warfarin    Acute on chronic HFpEF  - Had bilateral lower extremity pitting edema/lymphedema this changes upon admission and chest x-ray had some vascular congestion  - Furosemide 40 mg IV twice daily started  and remains on it  - continue     Recent admission 6/3-for acute anemia  - Also found to have right hip hematoma  - Had EGD with no acute findings.    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

## 2025-07-28 NOTE — PROGRESS NOTES
07/28/25 4235   NIV Type   $NIV $Daily Charge   Suction Setup and Functional Yes   NIV Started/Stopped On   Mode Bilevel   Mask Type Full face mask   Mask Size Small   Assessment   Pulse 87   Respirations 18   SpO2 100 %   Level of Consciousness 1   Using Accessory Muscles No   Mask Compliance Good   Skin Assessment Clean, dry, & intact   Skin Protection for O2 Device Yes   Orientation Middle   Location Nose   Intervention(s) Other (Comment)  (gecko)   Settings/Measurements   PIP Observed 20 cm H20   IPAP 20 cmH20   CPAP/EPAP 5 cmH2O   Vt (Measured) 475 mL   Rate Ordered 20   FiO2  50 %   Minute Volume (L/min) 10.2 Liters   Mask Leak (lpm) 0 lpm   Patient's Home Machine No   Alarm Settings   Alarms On Y   Low Pressure (cmH2O) 4 cmH2O   High Pressure (cmH2O) 25 cmH2O   Apnea (secs) 20 secs   RR High (bpm) 40 br/min

## 2025-07-29 LAB
ALBUMIN SERPL-MCNC: 3.6 G/DL (ref 3.4–5)
ALBUMIN/GLOB SERPL: 1.6 {RATIO} (ref 1.1–2.2)
ALP SERPL-CCNC: 67 U/L (ref 40–129)
ALT SERPL-CCNC: 37 U/L (ref 10–40)
ANION GAP SERPL CALCULATED.3IONS-SCNC: 11 MMOL/L (ref 9–17)
ARTERIAL PATENCY WRIST A: ABNORMAL
ARTERIAL PATENCY WRIST A: NO
AST SERPL-CCNC: 24 U/L (ref 15–37)
BASOPHILS # BLD: 0 K/UL
BASOPHILS NFR BLD: 0 % (ref 0–1)
BILIRUB SERPL-MCNC: 0.4 MG/DL (ref 0–1)
BODY TEMPERATURE: 37
BODY TEMPERATURE: 37
BUN SERPL-MCNC: 36 MG/DL (ref 7–20)
CALCIUM SERPL-MCNC: 9.6 MG/DL (ref 8.3–10.6)
CHLORIDE SERPL-SCNC: 89 MMOL/L (ref 99–110)
CO2 SERPL-SCNC: 38 MMOL/L (ref 21–32)
COHGB MFR BLD: 0.5 % (ref 0.5–1.5)
COHGB MFR BLD: 1.1 % (ref 0.5–1.5)
CREAT SERPL-MCNC: 0.9 MG/DL (ref 0.6–1.2)
EOSINOPHIL # BLD: 0 K/UL
EOSINOPHILS RELATIVE PERCENT: 0 % (ref 0–3)
ERYTHROCYTE [DISTWIDTH] IN BLOOD BY AUTOMATED COUNT: 15.2 % (ref 11.7–14.9)
GFR, ESTIMATED: 64 ML/MIN/1.73M2
GLUCOSE SERPL-MCNC: 135 MG/DL (ref 74–99)
HCO3 VENOUS: 39.9 MMOL/L (ref 22–29)
HCO3 VENOUS: 47.5 MMOL/L (ref 22–29)
HCT VFR BLD AUTO: 40.1 % (ref 37–47)
HGB BLD-MCNC: 11.3 G/DL (ref 12.5–16)
IMM GRANULOCYTES # BLD AUTO: 0.04 K/UL
IMM GRANULOCYTES NFR BLD: 1 %
INR PPP: 3.9
LYMPHOCYTES NFR BLD: 0.14 K/UL
LYMPHOCYTES RELATIVE PERCENT: 2 % (ref 24–44)
MAGNESIUM SERPL-MCNC: 2.2 MG/DL (ref 1.8–2.4)
MCH RBC QN AUTO: 25.8 PG (ref 27–31)
MCHC RBC AUTO-ENTMCNC: 28.2 G/DL (ref 32–36)
MCV RBC AUTO: 91.6 FL (ref 78–100)
METHEMOGLOBIN: 0.1 % (ref 0.5–1.5)
METHEMOGLOBIN: 1.6 % (ref 0.5–1.5)
MONOCYTES NFR BLD: 0.32 K/UL
MONOCYTES NFR BLD: 4 % (ref 0–5)
NEUTROPHILS NFR BLD: 94 % (ref 36–66)
NEUTS SEG NFR BLD: 7.41 K/UL
OXYHGB MFR BLD: 29.4 %
OXYHGB MFR BLD: 40.2 %
PCO2 VENOUS: 98.1 MM HG (ref 38–54)
PCO2 VENOUS: 98.7 MM HG (ref 38–54)
PH VENOUS: 7.23 (ref 7.32–7.43)
PH VENOUS: 7.3 (ref 7.32–7.43)
PLATELET # BLD AUTO: 223 K/UL (ref 140–440)
PMV BLD AUTO: 10.7 FL (ref 7.5–11.1)
PO2 VENOUS: 22.5 MM HG (ref 23–48)
PO2 VENOUS: 27.2 MM HG (ref 23–48)
POSITIVE BASE EXCESS, VEN: 16.9 MMOL/L (ref 0–3)
POSITIVE BASE EXCESS, VEN: 8.8 MMOL/L (ref 0–3)
POTASSIUM SERPL-SCNC: 5.2 MMOL/L (ref 3.5–5.1)
PROT SERPL-MCNC: 5.9 G/DL (ref 6.4–8.2)
PROTHROMBIN TIME: 38.1 SEC (ref 11.7–14.5)
RBC # BLD AUTO: 4.38 M/UL (ref 4.2–5.4)
SODIUM SERPL-SCNC: 138 MMOL/L (ref 136–145)
TEXT FOR RESPIRATORY: ABNORMAL
TEXT FOR RESPIRATORY: ABNORMAL
WBC OTHER # BLD: 7.9 K/UL (ref 4–10.5)

## 2025-07-29 PROCEDURE — 6360000002 HC RX W HCPCS: Performed by: INTERNAL MEDICINE

## 2025-07-29 PROCEDURE — 80053 COMPREHEN METABOLIC PANEL: CPT

## 2025-07-29 PROCEDURE — 94640 AIRWAY INHALATION TREATMENT: CPT

## 2025-07-29 PROCEDURE — 2500000003 HC RX 250 WO HCPCS: Performed by: INTERNAL MEDICINE

## 2025-07-29 PROCEDURE — 85025 COMPLETE CBC W/AUTO DIFF WBC: CPT

## 2025-07-29 PROCEDURE — 94660 CPAP INITIATION&MGMT: CPT

## 2025-07-29 PROCEDURE — 6370000000 HC RX 637 (ALT 250 FOR IP): Performed by: INTERNAL MEDICINE

## 2025-07-29 PROCEDURE — 2580000003 HC RX 258: Performed by: INTERNAL MEDICINE

## 2025-07-29 PROCEDURE — 36415 COLL VENOUS BLD VENIPUNCTURE: CPT

## 2025-07-29 PROCEDURE — 82805 BLOOD GASES W/O2 SATURATION: CPT

## 2025-07-29 PROCEDURE — 83735 ASSAY OF MAGNESIUM: CPT

## 2025-07-29 PROCEDURE — 2140000000 HC CCU INTERMEDIATE R&B

## 2025-07-29 PROCEDURE — 51798 US URINE CAPACITY MEASURE: CPT

## 2025-07-29 PROCEDURE — 94761 N-INVAS EAR/PLS OXIMETRY MLT: CPT

## 2025-07-29 PROCEDURE — 2700000000 HC OXYGEN THERAPY PER DAY

## 2025-07-29 PROCEDURE — 85610 PROTHROMBIN TIME: CPT

## 2025-07-29 PROCEDURE — 6360000002 HC RX W HCPCS: Performed by: STUDENT IN AN ORGANIZED HEALTH CARE EDUCATION/TRAINING PROGRAM

## 2025-07-29 RX ORDER — IPRATROPIUM BROMIDE AND ALBUTEROL SULFATE 2.5; .5 MG/3ML; MG/3ML
1 SOLUTION RESPIRATORY (INHALATION)
Status: DISCONTINUED | OUTPATIENT
Start: 2025-07-29 | End: 2025-07-30 | Stop reason: HOSPADM

## 2025-07-29 RX ORDER — KETOROLAC TROMETHAMINE 30 MG/ML
15 INJECTION, SOLUTION INTRAMUSCULAR; INTRAVENOUS ONCE
Status: COMPLETED | OUTPATIENT
Start: 2025-07-29 | End: 2025-07-29

## 2025-07-29 RX ORDER — METOLAZONE 2.5 MG/1
5 TABLET ORAL ONCE
Status: COMPLETED | OUTPATIENT
Start: 2025-07-29 | End: 2025-07-29

## 2025-07-29 RX ORDER — BUSPIRONE HYDROCHLORIDE 5 MG/1
5 TABLET ORAL 2 TIMES DAILY
Status: DISCONTINUED | OUTPATIENT
Start: 2025-07-29 | End: 2025-07-30 | Stop reason: HOSPADM

## 2025-07-29 RX ADMIN — IPRATROPIUM BROMIDE AND ALBUTEROL SULFATE 1 DOSE: .5; 2.5 SOLUTION RESPIRATORY (INHALATION) at 19:59

## 2025-07-29 RX ADMIN — METOLAZONE 5 MG: 2.5 TABLET ORAL at 09:02

## 2025-07-29 RX ADMIN — FUROSEMIDE 5 MG/HR: 10 INJECTION, SOLUTION INTRAMUSCULAR; INTRAVENOUS at 06:59

## 2025-07-29 RX ADMIN — GUAIFENESIN 600 MG: 600 TABLET ORAL at 09:02

## 2025-07-29 RX ADMIN — METHYLPREDNISOLONE SODIUM SUCCINATE 40 MG: 40 INJECTION, POWDER, LYOPHILIZED, FOR SOLUTION INTRAMUSCULAR; INTRAVENOUS at 02:49

## 2025-07-29 RX ADMIN — KETOROLAC TROMETHAMINE 15 MG: 30 INJECTION, SOLUTION INTRAMUSCULAR; INTRAVENOUS at 17:34

## 2025-07-29 RX ADMIN — IPRATROPIUM BROMIDE AND ALBUTEROL SULFATE 1 DOSE: .5; 2.5 SOLUTION RESPIRATORY (INHALATION) at 07:22

## 2025-07-29 RX ADMIN — METHYLPREDNISOLONE SODIUM SUCCINATE 40 MG: 40 INJECTION, POWDER, LYOPHILIZED, FOR SOLUTION INTRAMUSCULAR; INTRAVENOUS at 20:08

## 2025-07-29 RX ADMIN — BUDESONIDE AND FORMOTEROL FUMARATE DIHYDRATE 2 PUFF: 160; 4.5 AEROSOL RESPIRATORY (INHALATION) at 19:59

## 2025-07-29 RX ADMIN — BUDESONIDE AND FORMOTEROL FUMARATE DIHYDRATE 2 PUFF: 160; 4.5 AEROSOL RESPIRATORY (INHALATION) at 07:23

## 2025-07-29 RX ADMIN — HYDROXYZINE HYDROCHLORIDE 10 MG: 10 TABLET, FILM COATED ORAL at 02:49

## 2025-07-29 RX ADMIN — SODIUM CHLORIDE, PRESERVATIVE FREE 10 ML: 5 INJECTION INTRAVENOUS at 20:09

## 2025-07-29 RX ADMIN — METHYLPREDNISOLONE SODIUM SUCCINATE 40 MG: 40 INJECTION, POWDER, LYOPHILIZED, FOR SOLUTION INTRAMUSCULAR; INTRAVENOUS at 12:04

## 2025-07-29 RX ADMIN — AZITHROMYCIN MONOHYDRATE 500 MG: 500 INJECTION, POWDER, LYOPHILIZED, FOR SOLUTION INTRAVENOUS at 12:05

## 2025-07-29 RX ADMIN — PANTOPRAZOLE SODIUM 40 MG: 40 INJECTION, POWDER, FOR SOLUTION INTRAVENOUS at 09:03

## 2025-07-29 RX ADMIN — IPRATROPIUM BROMIDE AND ALBUTEROL SULFATE 1 DOSE: .5; 2.5 SOLUTION RESPIRATORY (INHALATION) at 11:10

## 2025-07-29 RX ADMIN — POLYETHYLENE GLYCOL (3350) 17 G: 17 POWDER, FOR SOLUTION ORAL at 09:06

## 2025-07-29 RX ADMIN — IPRATROPIUM BROMIDE AND ALBUTEROL SULFATE 1 DOSE: .5; 2.5 SOLUTION RESPIRATORY (INHALATION) at 15:19

## 2025-07-29 RX ADMIN — LEVOTHYROXINE SODIUM 75 MCG: 0.07 TABLET ORAL at 09:02

## 2025-07-29 RX ADMIN — SODIUM CHLORIDE, PRESERVATIVE FREE 10 ML: 5 INJECTION INTRAVENOUS at 09:06

## 2025-07-29 ASSESSMENT — PAIN SCALES - GENERAL
PAINLEVEL_OUTOF10: 0
PAINLEVEL_OUTOF10: 0

## 2025-07-29 ASSESSMENT — PAIN DESCRIPTION - DESCRIPTORS: DESCRIPTORS: ACHING

## 2025-07-29 ASSESSMENT — PAIN DESCRIPTION - LOCATION: LOCATION: GENERALIZED

## 2025-07-29 NOTE — PROGRESS NOTES
PHARMACY ANTICOAGULATION MONITORING SERVICE    Janessa Davidson is a 80 y.o. female on warfarin therapy for History of DVT/PE and Atrial fibrillation. Pharmacy consulted by Dr. Rincon for monitoring and adjustment of treatment.    Indication for anticoagulation: History of DVT/PE and Atrial fibrillation  INR goal: 2.0 - 3.0   Warfarin dose prior to admission: 3 mg daily, except 6 mg M/F (TWD 27 mg)    Pertinent Laboratory Values   Recent Labs     07/27/25  0401 07/28/25  0531 07/29/25  0532   INR 2.8 3.6 3.9   WBC 10.9* 8.2 7.9   HGB 10.1* 10.7* 11.3*   HCT 35.9* 38.7 40.1    170 223       Date INR Result Warfarin Dose Given   7/24 2.4 3 mg   7/25 2.6 3 mg   7/26 2.3 6 mg   7/27 2.8 3 mg   7/28 3.6 HOLD     Assessment/Plan:  Drug Interactions:   Azithromycin   INR up to 3.9  Hold warfarin tonight and follow INR trends tomorrow.  Pharmacy will continue to monitor and adjust warfarin therapy as indicated    Thank you for the consult,  Jae Mendoza RPH  7/29/2025 8:13 AM

## 2025-07-29 NOTE — PROGRESS NOTES
Pulmonary and Critical Care  Progress Note    Subjective:   The patient is more responsive.VBGs better last pm,worse this am.  Shortness of breath better.  Chest pain none.  Addressing respiratory complaints Patient is negative for  hemoptysis and cyanosis  CONSTITUTIONAL:  negative for fevers and chills      Past Medical History:     has a past medical history of Age-related osteoporosis without current pathological fracture, Anticoagulant long-term use, Arthritis, Cancer ([facility]), Chronic hypoxemic respiratory failure ([facility]), Community acquired pneumonia of right upper lobe of lung, COPD (chronic obstructive pulmonary disease) ([facility]), Coronary atherosclerosis, COVID-19 virus detected, DVT (deep venous thrombosis) ([facility]), Former smoker, Gastroesophageal reflux disease, Hiatal hernia, Lung infiltrate on CT, On home oxygen therapy, Osteopenia, Phlebitis, Pulmonary emphysema ([facility]), Pulmonary nodule, S/P left heart catheterization by percutaneous approach, Sepsis due to pneumonia ([facility]), Shortness of breath, Shortness of breath, Shortness of breath, and Wears glasses.   has a past surgical history that includes Appendectomy; Tonsillectomy; Tubal ligation; Vein Surgery; bronchoscopy (1995); Colonoscopy; Endoscopy, colon, diagnostic; Colonoscopy (N/A, 12/14/2023); Upper gastrointestinal endoscopy (N/A, 12/14/2023); and Upper gastrointestinal endoscopy (N/A, 6/5/2025).   reports that she quit smoking about 33 years ago. Her smoking use included cigarettes. She started smoking about 60 years ago. She has a 40.3 pack-year smoking history. She has never used smokeless tobacco. She reports that she does not drink alcohol and does not use drugs.  Family history:  family history includes COPD in her father; Cancer in her father and sister; Heart Disease in her father, mother, and sister.    Allergies   Allergen Reactions    Codeine Swelling    Darvon [Propoxyphene Hcl] Swelling    Lamisil [Terbinafine Hcl]     Lisinopril Other (See

## 2025-07-29 NOTE — CARE COORDINATION
Pt is on continuous b-pap.  Met with family to f/u for Hospice choice.  Family has requested [facility].  Referral made to [facility] via [facility] and to [facility].  They will reach out to daughter Shruti Root per family request.  TE

## 2025-07-29 NOTE — CONSULTS
Nephrology Service Consultation      [address] [id number]  Phone: [phone]  Office Hours: 8:30AM - 4:30PM  Monday - Friday        MEDICAL DECISION MAKING and Recommendations   -Hyperkalemia: K 5.2 from 5.4  -Alkalemia: likely compensatory  -Hypervolemia  -Dyspnea  -Acute CHF    Suggest;  -Continue lasix drip  -At this point, she is hospice-bound so I do not have much else to offer and I have discussed this with Dr Harrison, the referring hospitalist.  You may give a dose of metolazone 5mg once and continue the lasix drip.  If there is a change in the goals of care and full code or aggressive care is desired then let me know so I can plan RRT.  -Informed both daughters and the granddaughter at bedside of the plan to continue the lasix drip until she goes to hospice.  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) 07/02/2025    Laceration of right forearm 07/02/2025    Traumatic hematoma of hip, right, subsequent encounter 06/25/2025    Acute anemia 06/03/2025    History of stroke 06/13/2024    Age-related osteoporosis without current pathological fracture 05/23/2024    Pulmonary emphysema (HCC) 12/12/2023    Atrial fibrillation (HCC) 09/19/2023    Moderate mixed hyperlipidemia not requiring statin therapy 06/23/2023    Community acquired pneumonia of right upper lobe of lung 03/19/2022    COPD exacerbation (HCC) 03/19/2022    Acute exacerbation of chronic obstructive pulmonary disease (COPD) (HCC) 03/17/2022    Mild pulmonary hypertension (HCC) 01/24/2022

## 2025-07-29 NOTE — PLAN OF CARE
Problem: Discharge Planning  Goal: Discharge to home or other facility with appropriate resources  Outcome: Progressing  Flowsheets (Taken 7/29/2025 0857)  Discharge to home or other facility with appropriate resources: Identify barriers to discharge with patient and caregiver     Problem: Pain  Goal: Verbalizes/displays adequate comfort level or baseline comfort level  Outcome: Progressing     Problem: Safety - Adult  Goal: Free from fall injury  Outcome: Progressing     Problem: Skin/Tissue Integrity  Goal: Skin integrity remains intact  Description: 1.  Monitor for areas of redness and/or skin breakdown  2.  Assess vascular access sites hourly  3.  Every 4-6 hours minimum:  Change oxygen saturation probe site  4.  Every 4-6 hours:  If on nasal continuous positive airway pressure, respiratory therapy assess nares and determine need for appliance change or resting period  Outcome: Progressing  Flowsheets (Taken 7/29/2025 0857)  Skin Integrity Remains Intact: Check visual cues for pain     Problem: ABCDS Injury Assessment  Goal: Absence of physical injury  Outcome: Progressing

## 2025-07-29 NOTE — PLAN OF CARE
Problem: Discharge Planning  Goal: Discharge to home or other facility with appropriate resources  Outcome: Progressing     Problem: Pain  Goal: Verbalizes/displays adequate comfort level or baseline comfort level  7/28/2025 2230 by Suzan Ramirez RN  Outcome: Progressing  7/28/2025 1202 by Geovanna Sewell RN  Outcome: Progressing     Problem: Safety - Adult  Goal: Free from fall injury  7/28/2025 2230 by Suzan Ramirez RN  Outcome: Progressing  7/28/2025 1202 by Geovanna Sewell RN  Outcome: Progressing     Problem: Skin/Tissue Integrity  Goal: Skin integrity remains intact  Description: 1.  Monitor for areas of redness and/or skin breakdown  2.  Assess vascular access sites hourly  3.  Every 4-6 hours minimum:  Change oxygen saturation probe site  4.  Every 4-6 hours:  If on nasal continuous positive airway pressure, respiratory therapy assess nares and determine need for appliance change or resting period  7/28/2025 2230 by Suzan Ramirez RN  Outcome: Progressing  7/28/2025 1202 by Geovanna Sewell RN  Outcome: Progressing     Problem: ABCDS Injury Assessment  Goal: Absence of physical injury  7/28/2025 2230 by Suzan Ramirez RN  Outcome: Progressing  7/28/2025 1202 by Geovanna Sewell RN  Outcome: Progressing

## 2025-07-29 NOTE — PROGRESS NOTES
V2.0    [facility] Progress Note      Name:  Janessa Davidson /Age/Sex: 1945  (80 y.o. female)   MRN & CSN:  [id number] & [id number] Encounter Date/Time: 2025 9:27 PM EDT   Location:  3114/3114-A PCP: Jes Sharma DO     Attending:Pato Harrison MD       Hospital Day: 5    Assessment and Recommendations   Janessa Davidson is a 80 y.o. female      - Had a rapid response this morning.  Had been doing well.  Got up to the commode.  When came back to bed she was extremely hypoxic and her eyes rolled back and she had a decreased level of consciousness.  Blood gas was done and showed she is acidotic and extremely hypercapnic.  Placed on BiPAP.  - Discussed with pulmonary.  She was on oral prednisone and had not received any antibiotics this admission as she had not had increased phlegm production.  IV steroids started today.  Zithromax IV started today.  - Chest x-ray showed pulmonary edema.  Consulted nephrology to help with volume management as she has an acid/base disturbance.  Lasix drip ordered.  - Had some abdominal pain this morning.  CT abdomen ordered, but I was informed that she cannot lay down flat for this exam.  - Later staff contacted me asking about a possible hospice consult  - Spoke with patient, , and daughters at the bedside.  Hospice consulted for information.      - Again when I saw her she was requiring BiPAP.  Continue BiPAP.  - Oral Cardizem restarted, it is a home medication.  She has been off of Cardizem drip.    Assessment and plan    Acute on chronic hypoxic and hypercapnic respiratory failure  - Very hypercapnic upon admission  - Treated with BiPAP the night of admission then weaned off the following day  - Placed back on BiPAP on     COPD exacerbation  -DuoNebs  -Steroids  -Pulmonary consult appreciated    Atrial fibrillation with RVR  -Cardizem drip started the night of admission    Secondary hypercoagulable state in a patient with atrial

## 2025-07-29 NOTE — PROGRESS NOTES
\"BLOODCULT2\"  Organism: No results found for: \"ORG\"      Electronically signed by Adeola Alford MD on 7/29/2025 at 9:10 AM

## 2025-07-30 VITALS
RESPIRATION RATE: 24 BRPM | TEMPERATURE: 97.6 F | HEIGHT: 64 IN | WEIGHT: 137 LBS | OXYGEN SATURATION: 98 % | BODY MASS INDEX: 23.39 KG/M2 | DIASTOLIC BLOOD PRESSURE: 74 MMHG | SYSTOLIC BLOOD PRESSURE: 129 MMHG | HEART RATE: 129 BPM

## 2025-07-30 PROBLEM — J96.20 ACUTE ON CHRONIC RESPIRATORY FAILURE (HCC): Status: ACTIVE | Noted: 2025-07-30

## 2025-07-30 PROCEDURE — 6360000002 HC RX W HCPCS: Performed by: STUDENT IN AN ORGANIZED HEALTH CARE EDUCATION/TRAINING PROGRAM

## 2025-07-30 PROCEDURE — 6360000002 HC RX W HCPCS: Performed by: INTERNAL MEDICINE

## 2025-07-30 PROCEDURE — 94660 CPAP INITIATION&MGMT: CPT

## 2025-07-30 PROCEDURE — 6360000002 HC RX W HCPCS: Performed by: PHYSICIAN ASSISTANT

## 2025-07-30 PROCEDURE — 51701 INSERT BLADDER CATHETER: CPT

## 2025-07-30 PROCEDURE — 51798 US URINE CAPACITY MEASURE: CPT

## 2025-07-30 PROCEDURE — 2580000003 HC RX 258: Performed by: INTERNAL MEDICINE

## 2025-07-30 PROCEDURE — 2700000000 HC OXYGEN THERAPY PER DAY

## 2025-07-30 PROCEDURE — 94664 DEMO&/EVAL PT USE INHALER: CPT

## 2025-07-30 PROCEDURE — 2500000003 HC RX 250 WO HCPCS: Performed by: INTERNAL MEDICINE

## 2025-07-30 PROCEDURE — 6370000000 HC RX 637 (ALT 250 FOR IP): Performed by: INTERNAL MEDICINE

## 2025-07-30 PROCEDURE — 94640 AIRWAY INHALATION TREATMENT: CPT

## 2025-07-30 PROCEDURE — 6370000000 HC RX 637 (ALT 250 FOR IP): Performed by: PHYSICIAN ASSISTANT

## 2025-07-30 PROCEDURE — 94761 N-INVAS EAR/PLS OXIMETRY MLT: CPT

## 2025-07-30 RX ORDER — GLYCOPYRROLATE 0.2 MG/ML
0.2 INJECTION INTRAMUSCULAR; INTRAVENOUS
Status: CANCELLED | OUTPATIENT
Start: 2025-07-30

## 2025-07-30 RX ORDER — SODIUM CHLORIDE 0.9 % (FLUSH) 0.9 %
5-40 SYRINGE (ML) INJECTION EVERY 12 HOURS SCHEDULED
Status: CANCELLED | OUTPATIENT
Start: 2025-07-30

## 2025-07-30 RX ORDER — IPRATROPIUM BROMIDE AND ALBUTEROL SULFATE 2.5; .5 MG/3ML; MG/3ML
1 SOLUTION RESPIRATORY (INHALATION)
Status: CANCELLED | OUTPATIENT
Start: 2025-07-30

## 2025-07-30 RX ORDER — SODIUM CHLORIDE 0.9 % (FLUSH) 0.9 %
5-40 SYRINGE (ML) INJECTION PRN
Status: CANCELLED | OUTPATIENT
Start: 2025-07-30

## 2025-07-30 RX ORDER — FUROSEMIDE 10 MG/ML
40 INJECTION INTRAMUSCULAR; INTRAVENOUS 3 TIMES DAILY
Status: CANCELLED | OUTPATIENT
Start: 2025-07-30

## 2025-07-30 RX ORDER — GABAPENTIN 400 MG/1
400 CAPSULE ORAL EVERY EVENING
Status: CANCELLED | OUTPATIENT
Start: 2025-07-30

## 2025-07-30 RX ORDER — METOLAZONE 2.5 MG/1
5 TABLET ORAL ONCE
Status: CANCELLED | OUTPATIENT
Start: 2025-07-30 | End: 2025-07-30

## 2025-07-30 RX ORDER — MORPHINE SULFATE 4 MG/ML
4 INJECTION, SOLUTION INTRAMUSCULAR; INTRAVENOUS
Refills: 0 | Status: CANCELLED | OUTPATIENT
Start: 2025-07-30

## 2025-07-30 RX ORDER — BUSPIRONE HYDROCHLORIDE 5 MG/1
5 TABLET ORAL 2 TIMES DAILY
Status: CANCELLED | OUTPATIENT
Start: 2025-07-30

## 2025-07-30 RX ORDER — LEVOTHYROXINE SODIUM 75 UG/1
75 TABLET ORAL DAILY
Status: CANCELLED | OUTPATIENT
Start: 2025-07-31

## 2025-07-30 RX ORDER — ACETAMINOPHEN 325 MG/1
650 TABLET ORAL EVERY 4 HOURS PRN
Status: CANCELLED | OUTPATIENT
Start: 2025-07-30

## 2025-07-30 RX ORDER — ACETAMINOPHEN 650 MG/1
650 SUPPOSITORY RECTAL EVERY 4 HOURS PRN
Status: CANCELLED | OUTPATIENT
Start: 2025-07-30

## 2025-07-30 RX ORDER — ROFLUMILAST 500 UG/1
500 TABLET ORAL DAILY
Status: CANCELLED | OUTPATIENT
Start: 2025-07-30

## 2025-07-30 RX ORDER — MONTELUKAST SODIUM 10 MG/1
10 TABLET ORAL NIGHTLY
Status: CANCELLED | OUTPATIENT
Start: 2025-07-30

## 2025-07-30 RX ORDER — HYDROXYZINE HYDROCHLORIDE 25 MG/1
25 TABLET, FILM COATED ORAL NIGHTLY
Status: CANCELLED | OUTPATIENT
Start: 2025-07-30

## 2025-07-30 RX ORDER — DICYCLOMINE HCL 20 MG
10 TABLET ORAL 2 TIMES DAILY
Status: CANCELLED | OUTPATIENT
Start: 2025-07-30

## 2025-07-30 RX ORDER — BISACODYL 10 MG
10 SUPPOSITORY, RECTAL RECTAL DAILY PRN
Status: CANCELLED | OUTPATIENT
Start: 2025-07-30

## 2025-07-30 RX ORDER — KETOROLAC TROMETHAMINE 30 MG/ML
15 INJECTION, SOLUTION INTRAMUSCULAR; INTRAVENOUS EVERY 6 HOURS PRN
Status: DISCONTINUED | OUTPATIENT
Start: 2025-07-30 | End: 2025-07-30 | Stop reason: HOSPADM

## 2025-07-30 RX ORDER — BUDESONIDE AND FORMOTEROL FUMARATE DIHYDRATE 160; 4.5 UG/1; UG/1
2 AEROSOL RESPIRATORY (INHALATION)
Status: CANCELLED | OUTPATIENT
Start: 2025-07-30

## 2025-07-30 RX ORDER — LORAZEPAM 1 MG/1
1 TABLET ORAL EVERY 4 HOURS PRN
Status: CANCELLED | OUTPATIENT
Start: 2025-07-30

## 2025-07-30 RX ORDER — DILTIAZEM HCL 60 MG
60 TABLET ORAL EVERY 8 HOURS SCHEDULED
Status: CANCELLED | OUTPATIENT
Start: 2025-07-30

## 2025-07-30 RX ORDER — KETOROLAC TROMETHAMINE 30 MG/ML
15 INJECTION, SOLUTION INTRAMUSCULAR; INTRAVENOUS ONCE
Status: COMPLETED | OUTPATIENT
Start: 2025-07-30 | End: 2025-07-30

## 2025-07-30 RX ADMIN — IPRATROPIUM BROMIDE AND ALBUTEROL SULFATE 1 DOSE: .5; 2.5 SOLUTION RESPIRATORY (INHALATION) at 03:23

## 2025-07-30 RX ADMIN — IPRATROPIUM BROMIDE AND ALBUTEROL SULFATE 1 DOSE: .5; 2.5 SOLUTION RESPIRATORY (INHALATION) at 08:15

## 2025-07-30 RX ADMIN — BUDESONIDE AND FORMOTEROL FUMARATE DIHYDRATE 2 PUFF: 160; 4.5 AEROSOL RESPIRATORY (INHALATION) at 08:16

## 2025-07-30 RX ADMIN — FUROSEMIDE 5 MG/HR: 10 INJECTION, SOLUTION INTRAMUSCULAR; INTRAVENOUS at 03:42

## 2025-07-30 RX ADMIN — KETOROLAC TROMETHAMINE 15 MG: 30 INJECTION, SOLUTION INTRAMUSCULAR; INTRAVENOUS at 08:57

## 2025-07-30 RX ADMIN — ONDANSETRON 4 MG: 2 INJECTION INTRAMUSCULAR; INTRAVENOUS at 06:50

## 2025-07-30 RX ADMIN — DICLOFENAC SODIUM 2 G: 10 GEL TOPICAL at 01:00

## 2025-07-30 RX ADMIN — IPRATROPIUM BROMIDE AND ALBUTEROL SULFATE 1 DOSE: .5; 2.5 SOLUTION RESPIRATORY (INHALATION) at 00:04

## 2025-07-30 RX ADMIN — KETOROLAC TROMETHAMINE 15 MG: 30 INJECTION, SOLUTION INTRAMUSCULAR; INTRAVENOUS at 04:15

## 2025-07-30 RX ADMIN — METHYLPREDNISOLONE SODIUM SUCCINATE 40 MG: 40 INJECTION, POWDER, LYOPHILIZED, FOR SOLUTION INTRAMUSCULAR; INTRAVENOUS at 03:39

## 2025-07-30 ASSESSMENT — PAIN SCALES - GENERAL: PAINLEVEL_OUTOF10: 0

## 2025-07-30 NOTE — CARE COORDINATION
Family informed CM that they signed all the paperwork yesterday afternoon for [facility] and that the Dr from [facility] is supposed to see pt this am.  They informed CM that the hospice nurse will be here this am. Notified Dr Alford. TE

## 2025-07-30 NOTE — PROGRESS NOTES
Pulmonary and Critical Care  Progress Note    Subjective:   The patient is on Bipap.  Shortness of breath unchanged.  Chest pain none.  Addressing respiratory complaints Patient is negative for  hemoptysis and cyanosis  CONSTITUTIONAL:  negative for fevers and chills      Past Medical History:     has a past medical history of Age-related osteoporosis without current pathological fracture, Anticoagulant long-term use, Arthritis, Cancer ([facility]), Chronic hypoxemic respiratory failure ([facility]), Community acquired pneumonia of right upper lobe of lung, COPD (chronic obstructive pulmonary disease) ([facility]), Coronary atherosclerosis, COVID-19 virus detected, DVT (deep venous thrombosis) ([facility]), Former smoker, Gastroesophageal reflux disease, Hiatal hernia, Lung infiltrate on CT, On home oxygen therapy, Osteopenia, Phlebitis, Pulmonary emphysema ([facility]), Pulmonary nodule, S/P left heart catheterization by percutaneous approach, Sepsis due to pneumonia ([facility]), Shortness of breath, Shortness of breath, Shortness of breath, and Wears glasses.   has a past surgical history that includes Appendectomy; Tonsillectomy; Tubal ligation; Vein Surgery; bronchoscopy (1995); Colonoscopy; Endoscopy, colon, diagnostic; Colonoscopy (N/A, 12/14/2023); Upper gastrointestinal endoscopy (N/A, 12/14/2023); and Upper gastrointestinal endoscopy (N/A, 6/5/2025).   reports that she quit smoking about 33 years ago. Her smoking use included cigarettes. She started smoking about 60 years ago. She has a 40.3 pack-year smoking history. She has never used smokeless tobacco. She reports that she does not drink alcohol and does not use drugs.  Family history:  family history includes COPD in her father; Cancer in her father and sister; Heart Disease in her father, mother, and sister.    Allergies   Allergen Reactions    Codeine Swelling    Darvon [Propoxyphene Hcl] Swelling    Lamisil [Terbinafine Hcl]     Lisinopril Other (See Comments)     Pt doesn't remember

## 2025-07-30 NOTE — DISCHARGE SUMMARY
V2.0  Discharge Summary    Name:  Janessa Davidson /Age/Sex: 1945 (80 y.o. female)   Admit Date: 2025  Discharge Date: 25    MRN & CSN:  [id number] & [id number] Encounter Date and Time 25 8:15 AM EDT    Attending:  Adeola Alford MD Discharging Provider: Adeola Alford MD       Hospital Course:     Brief HPI: Janessa Davidson is a 80 y.o. female who presented with acute on chronic hypoxic and hypercapnic respiratory failure with atrial fibrillation with RVR.    Patient continued to deteriorate despite medical management.  Goals of care and advance care directives discussed with family who decided to pursue hospice care and comfort care measures.    Hospice care met with family on  and patient admitted to inpatient hospice on .    Brief Problem Based Course:   Acute on chronic hypoxic and hypercapnic respiratory failure 2/2 COPD exacerbation  - Very hypercapnic upon admission  - Treated with BiPAP the night of admission  - rapid response . Got up to the commode.  When came back to bed she was extremely hypoxic with decreased level of consciousness.  Blood gas was done and acidotic and extremely hypercapnic.  Placed on BiPAP.  - Discussed with pulmonary.  She was on oral prednisone and had not received any antibiotics this admission as she had not had increased phlegm production.  IV steroids started.  Zithromax IV started today.  - Chest x-ray showed pulmonary edema.  Consulted nephrology to help with volume management as she has an acid/base disturbance.  Started on Lasix drip   -Patient's clinical condition worsened on  when she got up to use the bathroom and was found to be severely hypoxic.  ABG shows acidemia with hypercapnia.  Started on continuous BiPAP  - Clinical course discussed by my colleague on  with the patient and family, hospice consulted.     Atrial fibrillation with RVR  -Cardizem drip started the night of admission, weaned off  -Currently rate controlled on

## 2025-07-30 NOTE — PLAN OF CARE
Problem: Discharge Planning  Goal: Discharge to home or other facility with appropriate resources  7/30/2025 0239 by Isaias Zaman RN  Outcome: Progressing  Flowsheets (Taken 7/29/2025 2007)  Discharge to home or other facility with appropriate resources:   Identify barriers to discharge with patient and caregiver   Arrange for needed discharge resources and transportation as appropriate   Identify discharge learning needs (meds, wound care, etc)  7/29/2025 1452 by Shirley Álvarez RN  Outcome: Progressing  Flowsheets (Taken 7/29/2025 0857)  Discharge to home or other facility with appropriate resources: Identify barriers to discharge with patient and caregiver     Problem: Pain  Goal: Verbalizes/displays adequate comfort level or baseline comfort level  7/30/2025 0239 by Isaias Zaman RN  Outcome: Progressing  Flowsheets (Taken 7/29/2025 2007)  Verbalizes/displays adequate comfort level or baseline comfort level:   Encourage patient to monitor pain and request assistance   Administer analgesics based on type and severity of pain and evaluate response   Assess pain using appropriate pain scale   Implement non-pharmacological measures as appropriate and evaluate response   Consider cultural and social influences on pain and pain management   Notify Licensed Independent Practitioner if interventions unsuccessful or patient reports new pain  7/29/2025 1452 by Shirley Álvarez RN  Outcome: Progressing     Problem: Safety - Adult  Goal: Free from fall injury  7/30/2025 0239 by Isaias Zaman RN  Outcome: Progressing  7/29/2025 1452 by Shirley Álvarez RN  Outcome: Progressing     Problem: Skin/Tissue Integrity  Goal: Skin integrity remains intact  Description: 1.  Monitor for areas of redness and/or skin breakdown  2.  Assess vascular access sites hourly  3.  Every 4-6 hours minimum:  Change oxygen saturation probe site  4.  Every 4-6 hours:  If on nasal continuous positive airway pressure, respiratory

## (undated) DEVICE — Device

## (undated) DEVICE — ENDOSCOPY KIT: Brand: MEDLINE INDUSTRIES, INC.

## (undated) DEVICE — HERCULES 3 STAGE BALLOON ESOPHAGEAL: Brand: HERCULES

## (undated) DEVICE — FORCEPS BX L240CM JAW DIA2.8MM L CAP W/ NDL MIC MESH TOOTH

## (undated) DEVICE — SNARE VASC L240CM LOOP W10MM SHTH DIA2.4MM RND STIFF CLD

## (undated) DEVICE — ENDOSCOPIC KIT 1.1+ OP4 CA DE 2 GWN AAMI LEVEL 3

## (undated) DEVICE — SNARE ENDOSCP L240CM OD2.4MM LOOP W15MM RND INSUL STIFF

## (undated) DEVICE — SYRINGE INFL 60ML DISP ALLIANCE II

## (undated) DEVICE — ELECTRODE PT RET AD L9FT HI MOIST COND ADH HYDRGEL CORDED